# Patient Record
Sex: FEMALE | Race: WHITE | NOT HISPANIC OR LATINO | Employment: OTHER | URBAN - METROPOLITAN AREA
[De-identification: names, ages, dates, MRNs, and addresses within clinical notes are randomized per-mention and may not be internally consistent; named-entity substitution may affect disease eponyms.]

---

## 2017-01-18 DIAGNOSIS — E21.3 HYPERPARATHYROIDISM (H): Chronic | ICD-10-CM

## 2017-01-18 DIAGNOSIS — N18.30 CKD (CHRONIC KIDNEY DISEASE) STAGE 3, GFR 30-59 ML/MIN (H): Chronic | ICD-10-CM

## 2017-01-18 DIAGNOSIS — E55.9 VITAMIN D DEFICIENCY: ICD-10-CM

## 2017-01-18 DIAGNOSIS — D51.9 ANEMIA DUE TO VITAMIN B12 DEFICIENCY, UNSPECIFIED B12 DEFICIENCY TYPE: ICD-10-CM

## 2017-01-18 DIAGNOSIS — I27.20 PULMONARY HYPERTENSION (H): Chronic | ICD-10-CM

## 2017-01-18 LAB
ALBUMIN SERPL-MCNC: 3.6 G/DL (ref 3.4–5)
ALP SERPL-CCNC: 97 U/L (ref 40–150)
ALT SERPL W P-5'-P-CCNC: 17 U/L (ref 0–50)
ANION GAP SERPL CALCULATED.3IONS-SCNC: 6 MMOL/L (ref 3–14)
AST SERPL W P-5'-P-CCNC: 14 U/L (ref 0–45)
BASOPHILS # BLD AUTO: 0.1 10E9/L (ref 0–0.2)
BASOPHILS NFR BLD AUTO: 1 %
BILIRUB SERPL-MCNC: 0.5 MG/DL (ref 0.2–1.3)
BUN SERPL-MCNC: 29 MG/DL (ref 7–30)
CALCIUM SERPL-MCNC: 9.2 MG/DL (ref 8.5–10.1)
CHLORIDE SERPL-SCNC: 106 MMOL/L (ref 94–109)
CO2 SERPL-SCNC: 27 MMOL/L (ref 20–32)
COPATH REPORT: NORMAL
CREAT SERPL-MCNC: 1.43 MG/DL (ref 0.52–1.04)
DEPRECATED CALCIDIOL+CALCIFEROL SERPL-MC: 54 UG/L (ref 20–75)
DIFFERENTIAL METHOD BLD: NORMAL
EOSINOPHIL # BLD AUTO: 0.4 10E9/L (ref 0–0.7)
EOSINOPHIL NFR BLD AUTO: 6 %
ERYTHROCYTE [DISTWIDTH] IN BLOOD BY AUTOMATED COUNT: 13.9 % (ref 10–15)
GFR SERPL CREATININE-BSD FRML MDRD: 35 ML/MIN/1.7M2
GLUCOSE SERPL-MCNC: 96 MG/DL (ref 70–99)
HCT VFR BLD AUTO: 37.8 % (ref 35–47)
HGB BLD-MCNC: 12 G/DL (ref 11.7–15.7)
LYMPHOCYTES # BLD AUTO: 2.5 10E9/L (ref 0.8–5.3)
LYMPHOCYTES NFR BLD AUTO: 36 %
MACROCYTES BLD QL SMEAR: PRESENT
MCH RBC QN AUTO: 30.8 PG (ref 26.5–33)
MCHC RBC AUTO-ENTMCNC: 31.7 G/DL (ref 31.5–36.5)
MCV RBC AUTO: 97 FL (ref 78–100)
MONOCYTES # BLD AUTO: 0.4 10E9/L (ref 0–1.3)
MONOCYTES NFR BLD AUTO: 5 %
NEUTROPHILS # BLD AUTO: 3.6 10E9/L (ref 1.6–8.3)
NEUTROPHILS NFR BLD AUTO: 52 %
PLATELET # BLD AUTO: 254 10E9/L (ref 150–450)
PLATELET # BLD EST: NORMAL 10*3/UL
POLYCHROMASIA BLD QL SMEAR: NORMAL
POTASSIUM SERPL-SCNC: 4.8 MMOL/L (ref 3.4–5.3)
PROT SERPL-MCNC: 7.4 G/DL (ref 6.8–8.8)
PTH-INTACT SERPL-MCNC: 111 PG/ML (ref 12–72)
RBC # BLD AUTO: 3.89 10E12/L (ref 3.8–5.2)
RETICS # AUTO: 69.6 10E9/L (ref 25–95)
RETICS/RBC NFR AUTO: 1.8 % (ref 0.5–2)
SODIUM SERPL-SCNC: 139 MMOL/L (ref 133–144)
WBC # BLD AUTO: 7 10E9/L (ref 4–11)

## 2017-01-18 PROCEDURE — 85060 BLOOD SMEAR INTERPRETATION: CPT | Performed by: INTERNAL MEDICINE

## 2017-01-18 PROCEDURE — 85045 AUTOMATED RETICULOCYTE COUNT: CPT | Performed by: INTERNAL MEDICINE

## 2017-01-18 PROCEDURE — 36415 COLL VENOUS BLD VENIPUNCTURE: CPT | Performed by: INTERNAL MEDICINE

## 2017-01-18 PROCEDURE — 80053 COMPREHEN METABOLIC PANEL: CPT | Performed by: INTERNAL MEDICINE

## 2017-01-18 PROCEDURE — 85004 AUTOMATED DIFF WBC COUNT: CPT | Mod: GZ | Performed by: INTERNAL MEDICINE

## 2017-01-18 PROCEDURE — 85027 COMPLETE CBC AUTOMATED: CPT | Performed by: INTERNAL MEDICINE

## 2017-01-18 PROCEDURE — 83970 ASSAY OF PARATHORMONE: CPT | Performed by: INTERNAL MEDICINE

## 2017-01-18 PROCEDURE — 40000847 ZZHCL STATISTIC MORPHOLOGY W/INTERP HISTOLOGY TC 85060: Performed by: INTERNAL MEDICINE

## 2017-01-18 PROCEDURE — 82306 VITAMIN D 25 HYDROXY: CPT | Performed by: INTERNAL MEDICINE

## 2017-01-24 ENCOUNTER — OFFICE VISIT (OUTPATIENT)
Dept: INTERNAL MEDICINE | Facility: CLINIC | Age: 82
End: 2017-01-24
Payer: MEDICARE

## 2017-01-24 VITALS
BODY MASS INDEX: 31.53 KG/M2 | SYSTOLIC BLOOD PRESSURE: 140 MMHG | HEART RATE: 67 BPM | DIASTOLIC BLOOD PRESSURE: 70 MMHG | WEIGHT: 184.7 LBS | TEMPERATURE: 97.9 F | OXYGEN SATURATION: 100 % | HEIGHT: 64 IN

## 2017-01-24 DIAGNOSIS — R29.898 WEAKNESS OF LEFT LOWER EXTREMITY: ICD-10-CM

## 2017-01-24 DIAGNOSIS — G62.9 PERIPHERAL POLYNEUROPATHY: Primary | Chronic | ICD-10-CM

## 2017-01-24 DIAGNOSIS — N18.30 CKD (CHRONIC KIDNEY DISEASE) STAGE 3, GFR 30-59 ML/MIN (H): Chronic | ICD-10-CM

## 2017-01-24 DIAGNOSIS — I10 ESSENTIAL HYPERTENSION WITH GOAL BLOOD PRESSURE LESS THAN 140/90: Chronic | ICD-10-CM

## 2017-01-24 DIAGNOSIS — D51.0 PERNICIOUS ANEMIA: ICD-10-CM

## 2017-01-24 DIAGNOSIS — E21.3 HYPERPARATHYROIDISM (H): Chronic | ICD-10-CM

## 2017-01-24 DIAGNOSIS — E78.5 HYPERLIPIDEMIA WITH TARGET LDL LESS THAN 130: Chronic | ICD-10-CM

## 2017-01-24 PROCEDURE — 99214 OFFICE O/P EST MOD 30 MIN: CPT | Mod: 25 | Performed by: INTERNAL MEDICINE

## 2017-01-24 PROCEDURE — 96372 THER/PROPH/DIAG INJ SC/IM: CPT | Performed by: INTERNAL MEDICINE

## 2017-01-24 RX ORDER — GABAPENTIN 100 MG/1
CAPSULE ORAL
Qty: 180 CAPSULE | Refills: 1 | Status: SHIPPED | OUTPATIENT
Start: 2017-01-24 | End: 2017-08-25

## 2017-01-24 RX ORDER — AMLODIPINE BESYLATE 2.5 MG/1
2.5 TABLET ORAL DAILY
Qty: 90 TABLET | Refills: 1 | Status: SHIPPED | OUTPATIENT
Start: 2017-01-24 | End: 2017-09-06

## 2017-01-24 RX ORDER — METOPROLOL TARTRATE 50 MG
25 TABLET ORAL 2 TIMES DAILY
Qty: 90 TABLET | Refills: 1 | Status: SHIPPED | OUTPATIENT
Start: 2017-01-24 | End: 2017-09-06

## 2017-01-24 NOTE — NURSING NOTE
"Chief Complaint   Patient presents with     Recheck Medication     Hypertension     Musculoskeletal Problem       Initial /76 mmHg  Pulse 67  Temp(Src) 97.9  F (36.6  C) (Oral)  Ht 5' 4\" (1.626 m)  Wt 184 lb 11.2 oz (83.779 kg)  BMI 31.69 kg/m2  SpO2 100%  Breastfeeding? No Estimated body mass index is 31.69 kg/(m^2) as calculated from the following:    Height as of this encounter: 5' 4\" (1.626 m).    Weight as of this encounter: 184 lb 11.2 oz (83.779 kg).  BP completed using cuff size: yani Barroso CMA      "

## 2017-01-24 NOTE — PROGRESS NOTES
Dr Steiner's note      Patient's instructions / PLAN:                                                        Plan:  1. Gabapentin 100 mg in am, 100 mg in the afternoon and 300 mg at bed time  2. Continue the other meds, same doses for now.  3. Please make a lab appointment for fasting labs   4. Make sure you drink plenty of water the day of the blood test.    5. Please make an appointment few days after the labs to discuss about the results.   6. Neurology referral       ASSESSMENT & PLAN:                                                      (G62.9) Peripheral polyneuropathy (H)  (primary encounter diagnosis)  Comment:   Plan: gabapentin (NEURONTIN) 100 MG capsule, CBC with        platelets, Comprehensive metabolic panel, Lipid        panel reflex to direct LDL, TSH with free T4         reflex, Vitamin D Deficiency, Parathyroid         Hormone Intact            (I10) Essential hypertension with goal blood pressure less than 140/90  Comment:   Plan: metoprolol (LOPRESSOR) 50 MG tablet, amLODIPine        (NORVASC) 2.5 MG tablet, CBC with platelets,         Comprehensive metabolic panel, Lipid panel         reflex to direct LDL, TSH with free T4 reflex,         Vitamin D Deficiency, Parathyroid Hormone         Intact            (E21.3) Hyperparathyroidism (H)  Comment:   Plan: CBC with platelets, Comprehensive metabolic         panel, Lipid panel reflex to direct LDL, TSH         with free T4 reflex, Vitamin D Deficiency,         Parathyroid Hormone Intact            (N18.3) CKD (chronic kidney disease) stage 3, GFR 30-59 ml/min  Comment:   Plan: CBC with platelets, Comprehensive metabolic         panel, Lipid panel reflex to direct LDL, TSH         with free T4 reflex, Vitamin D Deficiency,         Parathyroid Hormone Intact            (E78.5) Hyperlipidemia with target LDL less than 130  Comment:   Plan: CBC with platelets, Comprehensive metabolic         panel, Lipid panel reflex to direct LDL, TSH         with free  "T4 reflex, Vitamin D Deficiency,         Parathyroid Hormone Intact            (I10) HTN goal < 140/90  Comment:   Plan: CBC with platelets            (M62.81) Weakness of left lower extremity  Comment:   Plan: NEUROLOGY ADULT REFERRAL            (D51.0) Pernicious anemia  Comment:   Plan:        Chief complaint:                                                      Follow up chronic medical problems      SUBJECTIVE:                                                    Glendy Díaz is a 83 year old female who presents to clinic today for the following health issues:    Peripheral neuropathy   -- still symptoms   -- she would like to increase the Gabapentin. Not sure what she wants. We talked about Gabapentin.     Upset that the DM dx is still in the chart.     L leg weakness  -- she doesn't feel it all the time  -- it happens 2 times when the L leg gave out, mostly the knee. The weakness was just for \" a split second\" She held on furniture and didn't fall. No knee pain, but hx of knee OA with baker cyst. I doubt it was a TIA  -- no leg weakness today on exam  -- she walks slowly and the balance is not great   -- advised her to use the walker and the christian  -- neuro ref    Hyperlipidemia - intol to statins    CKD - creat little higher. Advised her to drink more water. She takes Furosemide seldom, only when she notices edema     Echo - shows no pulmonary edema as it was 2014.     Hypertension Follow-up      Outpatient blood pressures are not being checked.    Low Salt Diet: low salt       Amount of exercise or physical activity: \"Not too much\"    Problems taking medications regularly: No    Medication side effects: none    Diet: regular (no restrictions)    Echo: Nov 2016 The right ventricle is normal in structure, function and size.  The left atrium is mildly dilated.  Right atrial size is normal.  Small atrial septal defect, secundum type  Left to right atrial shunt, small  Doppler findings do not suggest pulmonary " "hypertension.  Normal IVC (1.5-2.5cm) with >50% respiratory collapse; right atrial pressure  is estimated at 5-10mmHg.  There is trace to mild aortic regurgitation.    LOV: Plan:  1. Vitamin 2000 units daily    2. Continue the other meds, same doses for now.  3. Echocardiogram To schedule this test please call [facility] at: [phone]    4. Please make a lab appointment for non  fasting labs in Jan - Feb  5. Please make an appointment few days after the labs to discuss about the results.      Review of Systems:                                                      ROS: negative for fever, chills, cough, wheezes, chest pain, shortness of breath, vomiting, abdominal pain, leg swelling     A 10-point review of systems was obtained.  Those pertinent are above and in the in the Subjective section.  The rest of the systems are negative.           OBJECTIVE:             Physical exam:  Blood pressure 140/70, pulse 67, temperature 97.9  F (36.6  C), temperature source Oral, height 5' 4\" (1.626 m), weight 184 lb 11.2 oz (83.779 kg), SpO2 100 %, not currently breastfeeding.   NAD, appears comfortable  Skin: no rashes   Neck: supple, no JVD,  No thyroidmegaly. Lymph nodes nonpalpable cervical and supraclavicular.  Chest: clear to auscultation bilaterally, good respiratory effort  Heart: S1 S2, RRR, no mgr appreciated  Abdomen: soft, not tender,   Extremities: no edema, clubbing, cyanosis. Palpable pedal pulses bilaterally,   Neurologic: A, Ox3, no focal signs appreciated. No leg weakness today     PMHx: reviewed  Past Medical History   Diagnosis Date     Hyperlipidemia LDL goal <100      Neck pain, chronic      Osteopenia      Leg weakness, bilateral      Peripheral neuropathy (H)      Hematuria      Hypoglycemia      CKD (chronic kidney disease) stage 3, GFR 30-59 ml/min      Osteoarthritis      Incontinence      DM type 2 (diabetes mellitus, type 2) (H)      diet control     Anxiety      Hyperparathyroidism (H)      " Hypertension      No Cardiologist      PSHx: reviewed  Past Surgical History   Procedure Laterality Date     Appendectomy       Cholecystectomy       Hysterectomy total abdominal       ovaries are in     Cataract iol, rt/lt       Arthroscopy ankle, open repair ligament, combined  5/31/2012     Procedure:COMBINED ARTHROSCOPY ANKLE, OPEN REPAIR LIGAMENT; LEFT ANKLE ARTHROSCOPY, LATERAL LIGAMENT RECONSTRUCTION, ENDOSCOPIC DRISS, BUNION SECOND TOE RAY CORRECTION    LEFT KNEE Marcaine AND CORTIZONE INJECTION, 5 CC Marcaine, 1 CC CELESTONE, ; Surgeon:VARSHA GERMAN; Location:[facility]     Remove hardware foot  12/4/2012     Procedure: REMOVE HARDWARE FOOT;  REMOVAL HARDWARE(SYNTHES SCREW) LEFT FOOT, EXCISION OF INCLUSION CYST;  Surgeon: Varsha German MD;  Location: [facility]     Excise mass foot  12/4/2012     Procedure: EXCISE MASS FOOT;;  Surgeon: Varsha German MD;  Location: [facility]     Arthrodesis foot  5/1/2014     Procedure: ARTHRODESIS FOOT;  Surgeon: Sid Marks DPM;  Location: [facility]     Repair hammer toe  5/1/2014     Procedure: REPAIR HAMMER TOE;  Surgeon: Sid Marks DPM;  Location: [facility]     Release tendon toe  5/1/2014     Procedure: RELEASE TENDON TOE;  Surgeon: Sid Marks DPM;  Location: [facility]     Reverse arthroplasty shoulder Right 7/18/2016     Procedure: REVERSE ARTHROPLASTY SHOULDER;  Surgeon: Marlo Alejandro MD;  Location: [facility]        Meds: reviewed  Current Outpatient Prescriptions   Medication Sig Dispense Refill     traMADol (ULTRAM) 50 MG tablet Take 1 tablet (50 mg) by mouth every 8 hours as needed for moderate pain 40 tablet 0     gabapentin (NEURONTIN) 300 MG capsule Take 1 capsule (300 mg) by mouth At Bedtime 90 capsule 1     aspirin 81 MG tablet Take 1 tablet (81 mg) by mouth daily 30 tablet 3     Cholecalciferol (VITAMIN D) 2000 UNITS tablet Take 2,000 Units by mouth daily 100 tablet 3     furosemide (LASIX) 40 MG tablet Take 1-2 tablets  (40-80 mg) by mouth daily 90 tablet 1     metoprolol (LOPRESSOR) 50 MG tablet Take 0.5 tablets (25 mg) by mouth 2 times daily 90 tablet 1     amLODIPine (NORVASC) 2.5 MG tablet Take 1 tablet (2.5 mg) by mouth daily 90 tablet 1     cyanocobalamin (VITAMIN B12) 1000 MCG/ML injection 1 mL (1,000 mcg) every 30 days 1 mL 11     senna-docusate (SENOKOT-S;PERICOLACE) 8.6-50 MG per tablet Take 1-2 tablets by mouth 2 times daily as needed for constipation 30 tablet 0     ketoprofen 10% in PLO 10% Apply 4 times a day as need for pain 50 g 1     [DISCONTINUED] ORDER FOR DME Equipment being ordered: All diabetic testing supplies including test strips, lancets and solution for testing 1 times per day. 3 Month 1       Soc Hx: reviewed  Fam Hx: reviewed          Veronica Steiner MD  Internal Medicine

## 2017-01-24 NOTE — MR AVS SNAPSHOT
After Visit Summary   1/24/2017    Glendy Díaz    MRN: [id number]           Patient Information     Date Of Birth          11/16/1933        Visit Information        Provider Department      1/24/2017 7:40 AM Veronica Davis MD [facility]        Today's Diagnoses     Peripheral polyneuropathy (H)    -  1     Essential hypertension with goal blood pressure less than 140/90         Hyperparathyroidism (H)         CKD (chronic kidney disease) stage 3, GFR 30-59 ml/min         Hyperlipidemia with target LDL less than 130         HTN goal < 140/90         Weakness of left lower extremity           Care Instructions    Plan:  1. Gabapentin 100 mg in am, 100 mg in the afternoon and 300 mg at bed time  2. Continue the other meds, same doses for now.  3. Please make a lab appointment for fasting labs   4. Make sure you drink plenty of water the day of the blood test.    5. Please make an appointment few days after the labs to discuss about the results.   6. Neurology referral         Follow-ups after your visit        Additional Services     NEUROLOGY ADULT REFERRAL       Your provider has referred you to: [facility]:     [facility] [phone]       [email or web address]    Please be aware that coverage of these services is subject to the terms and limitations of your health insurance plan.  Call member services at your health plan with any benefit or coverage questions.      Please bring the following to your appointment:    >>   Any x-rays, CTs or MRIs which have been performed.  Contact the facility where they were done to arrange for  prior to your scheduled appointment.  Any new CT, MRI or other procedures ordered by your specialist must be performed at a [facility] facility or coordinated by your clinic's referral office.    >>   List of current medications   >>   This referral request   >>   Any  "documents/labs given to you for this referral                  Future tests that were ordered for you today     Open Future Orders        Priority Expected Expires Ordered    CBC with platelets Routine  2018    Comprehensive metabolic panel Routine  2018    Lipid panel reflex to direct LDL Routine  2018    TSH with free T4 reflex Routine  2018    Vitamin D Deficiency Routine  2018    Parathyroid Hormone Intact Routine  2018            Who to contact     If you have questions or need follow up information about today's clinic visit or your schedule [facility] at [phone].  Normal or non-critical lab and imaging results will be communicated to you by MyChart, letter or phone within 4 business days after the clinic has received the results. If you do not hear from us within 7 days, please contact the clinic through [email or web address] or phone. If you have a critical or abnormal lab result, we will notify you by phone as soon as possible.  Submit refill requests through [employer] or call your pharmacy and they will forward the refill request to us. Please allow 3 business days for your refill to be completed.          Additional Information About Your Visit        [employer] Information     [employer] lets you send messages to your doctor, view your test results, renew your prescriptions, schedule appointments and more. To sign up, go to [email or web address] . Click on \"Log in\" on the left side of the screen, which will take you to the Welcome page. Then click on \"Sign up Now\" on the right side of the page.     You will be asked to enter the access code listed below, as well as some personal information. Please follow the directions to create your username and password.     Your access code is: [email or web address]  Expires: 2017  8:43 AM     Your access code will  in 90 days. If you need help or a " "new code, please call your [facility] or [phone].        Care EveryWhere ID     This is your Care EveryWhere ID. This could be used by other organizations to access your [place] medical records  [id number]        Your Vitals Were     Pulse Temperature Height BMI (Body Mass Index) Pulse Oximetry Breastfeeding?    67 97.9  F (36.6  C) (Oral) 5' 4\" (1.626 m) 31.69 kg/m2 100% No       Blood Pressure from Last 3 Encounters:   01/24/17 140/70   10/24/16 140/76   09/16/16 138/60    Weight from Last 3 Encounters:   01/24/17 184 lb 11.2 oz (83.779 kg)   10/24/16 186 lb 14.4 oz (84.777 kg)   09/16/16 179 lb (81.194 kg)              We Performed the Following     NEUROLOGY ADULT REFERRAL          Today's Medication Changes          These changes are accurate as of: 1/24/17  8:43 AM.  If you have any questions, ask your nurse or doctor.               These medicines have changed or have updated prescriptions.        Dose/Directions    * gabapentin 300 MG capsule   Commonly known as:  NEURONTIN   This may have changed:  Another medication with the same name was added. Make sure you understand how and when to take each.   Used for:  Peripheral polyneuropathy (H)   Changed by:  Veronica Davis MD        Dose:  300 mg   Take 1 capsule (300 mg) by mouth At Bedtime   Quantity:  90 capsule   Refills:  1       * gabapentin 100 MG capsule   Commonly known as:  NEURONTIN   This may have changed:  You were already taking a medication with the same name, and this prescription was added. Make sure you understand how and when to take each.   Used for:  Peripheral polyneuropathy (H)   Changed by:  Veronica Davis MD        Take 1 capsule in am and 1 capsule in pm. (you will continue the 300 mg capsule at night you have at home)   Quantity:  180 capsule   Refills:  1       * Notice:  This list has 2 medication(s) that are the same as other medications prescribed for you. Read the directions " carefully, and ask your doctor or other care provider to review them with you.         Where to get your medicines      These medications were sent to [facility] - [address], [facility] [address]     Phone:  [phone]    - amLODIPine 2.5 MG tablet  - gabapentin 100 MG capsule  - metoprolol 50 MG tablet             Primary Care Provider Office Phone # Fax #    Veronica Davis [phone]       [facility] [address] [facility] [address]        Thank you!     Thank you for choosing [facility]  for your care. Our goal is always to provide you with excellent care. Hearing back from our patients is one way we can continue to improve our services. Please take a few minutes to complete the written survey that you may receive in the mail after your visit with us. Thank you!             Your Updated Medication List - Protect others around you: Learn how to safely use, store and throw away your medicines at www.disposemymeds.org.          This list is accurate as of: 1/24/17  8:43 AM.  Always use your most recent med list.                   Brand Name Dispense Instructions for use    amLODIPine 2.5 MG tablet    NORVASC    90 tablet    Take 1 tablet (2.5 mg) by mouth daily       aspirin 81 MG tablet     30 tablet    Take 1 tablet (81 mg) by mouth daily       cyanocobalamin 1000 MCG/ML injection    VITAMIN B12    1 mL    1 mL (1,000 mcg) every 30 days       furosemide 40 MG tablet    LASIX    90 tablet    Take 1-2 tablets (40-80 mg) by mouth daily       * gabapentin 300 MG capsule    NEURONTIN    90 capsule    Take 1 capsule (300 mg) by mouth At Bedtime       * gabapentin 100 MG capsule    NEURONTIN    180 capsule    Take 1 capsule in am and 1 capsule in pm. (you will continue the 300 mg capsule at night you have at home)       ketoprofen 10% in PLO 10% topical gel     50 g    Apply 4  times a day as need for pain       metoprolol 50 MG tablet    LOPRESSOR    90 tablet    Take 0.5 tablets (25 mg) by mouth 2 times daily       senna-docusate 8.6-50 MG per tablet    SENOKOT-S;PERICOLACE    30 tablet    Take 1-2 tablets by mouth 2 times daily as needed for constipation       traMADol 50 MG tablet    ULTRAM    40 tablet    Take 1 tablet (50 mg) by mouth every 8 hours as needed for moderate pain       vitamin D 2000 UNITS tablet     100 tablet    Take 2,000 Units by mouth daily       * Notice:  This list has 2 medication(s) that are the same as other medications prescribed for you. Read the directions carefully, and ask your doctor or other care provider to review them with you.

## 2017-02-06 ENCOUNTER — OFFICE VISIT (OUTPATIENT)
Dept: ORTHOPEDICS | Facility: CLINIC | Age: 82
End: 2017-02-06
Payer: MEDICARE

## 2017-02-06 VITALS — BODY MASS INDEX: 31.53 KG/M2 | HEIGHT: 64 IN | WEIGHT: 184.7 LBS

## 2017-02-06 DIAGNOSIS — M23.204 OLD COMPLEX TEAR OF MEDIAL MENISCUS OF LEFT KNEE: ICD-10-CM

## 2017-02-06 DIAGNOSIS — M17.12 PRIMARY OSTEOARTHRITIS OF LEFT KNEE: Primary | ICD-10-CM

## 2017-02-06 DIAGNOSIS — E66.9 NON MORBID OBESITY, UNSPECIFIED OBESITY TYPE: ICD-10-CM

## 2017-02-06 PROCEDURE — 20611 DRAIN/INJ JOINT/BURSA W/US: CPT | Mod: LT | Performed by: PHYSICAL MEDICINE & REHABILITATION

## 2017-02-06 PROCEDURE — 99213 OFFICE O/P EST LOW 20 MIN: CPT | Mod: 25 | Performed by: PHYSICAL MEDICINE & REHABILITATION

## 2017-02-06 NOTE — Clinical Note
Dear Glendy Laguna [place] [facility] [place].  Please refer to the visit note at your convenience and feel free to contact me should you have any questions.  Sincerely,  Ellis Jc DO, [facility]

## 2017-02-06 NOTE — PATIENT INSTRUCTIONS
We addressed the following today:    1. Left knee arthritis  2. Left medial meniscus tear  3. Obesity    Activity modification as discussed    Home exercise program as instructed    Topical Treatments: Ice    Over the counter medication: Acetaminophen (Tylenol) 1000 mg every 6 hours with food (Maximum of 3000 mg/day)    Discussed importance of weight loss, including decreased oral intake and increased physical activity including aqua therapy, biking activities as tolerated, etc    Steroid injection of the left knee with ultrasound guidance was performed today in clinic    Follow-up in 2 weeks if no improvement of symptoms for further evaluation/medical care (call direct clinic number [[phone]] at any time with questions or concerns)

## 2017-02-06 NOTE — MR AVS SNAPSHOT
After Visit Summary   2/6/2017    Glendy Díaz    MRN: [id number]           Patient Information     Date Of Birth          11/16/1933        Visit Information        Provider Department      2/6/2017 4:40 PM Ellis Jc DO [facility]        Care Instructions    We addressed the following today:    1. Left knee arthritis  2. Left medial meniscus tear  3. Obesity    Activity modification as discussed    Home exercise program as instructed    Topical Treatments: Ice    Over the counter medication: Acetaminophen (Tylenol) 1000 mg every 6 hours with food (Maximum of 3000 mg/day)    Ibuprofen (Advil) maximum of 800 mg four times a day with food    Discussed importance of weight loss, including decreased oral intake and increased physical activity including aqua therapy, biking activities as tolerated, etc    Steroid injection of the left knee with ultrasound guidance was performed today in clinic    Follow-up in 2 weeks if no improvement of symptoms for further evaluation/medical care (call direct clinic number [[phone]] at any time with questions or concerns)          Follow-ups after your visit        Who to contact     If you have questions or need follow up information about today's clinic visit or your schedule please contact [facility] directly at [phone].  Normal or non-critical lab and imaging results will be communicated to you by MyChart, letter or phone within 4 business days after the clinic has received the results. If you do not hear from us within 7 days, please contact the clinic through MyChart or phone. If you have a critical or abnormal lab result, we will notify you by phone as soon as possible.  Submit refill requests through HSystem or call your pharmacy and they will forward the refill request to us. Please allow 3 business days for your refill to be completed.          Additional Information About Your Visit        MyChart  "Information     PrizeBoxâ„¢ lets you send messages to your doctor, view your test results, renew your prescriptions, schedule appointments and more. To sign up, go to [email or web address] . Click on \"Log in\" on the left side of the screen, which will take you to the Welcome page. Then click on \"Sign up Now\" on the right side of the page.     You will be asked to enter the access code listed below, as well as some personal information. Please follow the directions to create your username and password.     Your access code is: [email or web address]  Expires: 2017  8:43 AM     Your access code will  in 90 days. If you need help or a new code, please call your [facility] clinic or [phone].        Care EveryWhere ID     This is your [facility] EveryWhere ID. This could be used by other organizations to access your [facility] medical records  [id number]        Your Vitals Were     Height BMI (Body Mass Index)                1.626 m (5' 4\") 31.69 kg/m2           Blood Pressure from Last 3 Encounters:   17 140/70   10/24/16 140/76   16 138/60    Weight from Last 3 Encounters:   17 83.779 kg (184 lb 11.2 oz)   17 83.779 kg (184 lb 11.2 oz)   10/24/16 84.777 kg (186 lb 14.4 oz)              Today, you had the following     No orders found for display       Primary Care Provider Office Phone # Fax #    Veronica Sophia Davis [phone]       [facility] [address] [facility] [address]        Thank you!     Thank you for choosing [facility]  for your care. Our goal is always to provide you with excellent care. Hearing back from our patients is one way we can continue to improve our services. Please take a few minutes to complete the written survey that you may receive in the mail after your visit with us. Thank you!             Your Updated Medication List - Protect others around you: Learn how to safely use, store and throw away your " medicines at www.disposemymeds.org.          This list is accurate as of: 2/6/17  5:22 PM.  Always use your most recent med list.                   Brand Name Dispense Instructions for use    amLODIPine 2.5 MG tablet    NORVASC    90 tablet    Take 1 tablet (2.5 mg) by mouth daily       aspirin 81 MG tablet     30 tablet    Take 1 tablet (81 mg) by mouth daily       cyanocobalamin 1000 MCG/ML injection    VITAMIN B12    1 mL    1 mL (1,000 mcg) every 30 days       furosemide 40 MG tablet    LASIX    90 tablet    Take 1-2 tablets (40-80 mg) by mouth daily       * gabapentin 300 MG capsule    NEURONTIN    90 capsule    Take 1 capsule (300 mg) by mouth At Bedtime       * gabapentin 100 MG capsule    NEURONTIN    180 capsule    Take 1 capsule in am and 1 capsule in pm. (you will continue the 300 mg capsule at night you have at home)       ketoprofen 10% in PLO 10% topical gel     50 g    Apply 4 times a day as need for pain       metoprolol 50 MG tablet    LOPRESSOR    90 tablet    Take 0.5 tablets (25 mg) by mouth 2 times daily       senna-docusate 8.6-50 MG per tablet    SENOKOT-S;PERICOLACE    30 tablet    Take 1-2 tablets by mouth 2 times daily as needed for constipation       traMADol 50 MG tablet    ULTRAM    40 tablet    Take 1 tablet (50 mg) by mouth every 8 hours as needed for moderate pain       vitamin D 2000 UNITS tablet     100 tablet    Take 2,000 Units by mouth daily       * Notice:  This list has 2 medication(s) that are the same as other medications prescribed for you. Read the directions carefully, and ask your doctor or other care provider to review them with you.

## 2017-02-06 NOTE — PROGRESS NOTES
" [facility]   Follow-up Visit s Feb 6, 2017    Subjective:  Glendy Díaz is a 83 year old female who is seen in follow up for evaluation of left knee pain.  Patient is accompanied in clinic today by her daughter.  Last visit for the left knee was on 9/25/2015 with a left knee intra-articular aspiration/corticosteroid injection performed with ultrasound guidance with excellent relief for 1 year.  Has been completing home exercise program as previously prescribed during formal physical therapy.    Reports left knee pain that is located anterior with radiation absent.  Reports intermittent weakness of the left knee.  Reports popping of the left knee.  Denies any locking, catching, clicking, bruising, or swelling of the left knee.  Denies any trauma/falls since last clinical visit.    Patient's past medical, surgical, social, and family histories are reviewed today    Objective:  Ht 1.626 m (5' 4\")  Wt 83.779 kg (184 lb 11.2 oz)  BMI 31.69 kg/m2  General: healthy, alert, obese, and in no distress    Imaging:  No x-rays indicated during today's visit  Previous films were reviewed today and results were discussed with the patient    ASSESSMENT:  1. Primary left knee osteoarthrosis  2. Left medial meniscus tear  3. Non-morbid obesity    PLAN:  1. Acetaminophen/ice as needed for improved pain control.  2. Activity modification as discussed, including limitation of activities that cause pain/discomfort.  3. Discussed importance of weight loss, including decreased oral intake and increased physical activity including aqua therapy, biking activities as tolerated, etc.  4. Continue with pain-free home exercise program as previously prescribed from formal physical therapy.  5. Discussed potential side effects and complications of repeat left knee intra-articular corticosteroid injection with ultrasound guidance including but not necessarily limited to infection, bleeding, allergic reaction, " post-injection flare, local tissue breakdown (including but not limited to potential for skin depigmentation and/or subcutaneous fat atrophy), systemic effects of corticosteroids, elevation of blood glucose, injury to soft tissue and/or nerves and seizure.  Patient indicated their understanding and agreed to proceed.  See procedure note below for further details.  6. Follow-up in 2 weeks if no improvement of symptoms for further evaluation/medical care.  If asymptomatic, can follow-up as needed.  Consider referral to orthopedic surgery, left knee intra-articular viscosupplementation injection with ultrasound guidance, etc as deemed appropriate moving forward.  Instructed to follow-up if change of symptoms arise.    Left Knee Intra-Articular Corticosteroid Injection     Diagnoses (preoperative and postoperative):  Left knee pain/primary left knee osteoarthrosis  Current Procedure (include preoperative):  Sonographically guided left knee intra-articular corticosteroid injection  Current Indication (include preoperative):  Alleviation of pain  REASON FOR REFERRAL:  A repeat left knee intra-articular corticosteroid injection has been recommended to help with modulation of pain. Sonographic guidance will be used to ensure accurate placement of the medication within the joint space.  PATIENT EDUCATION:  Ready to learn with no apparent learning barriers identified.  Learning preferences include listening. Explained diagnosis and treatment plan as well as treatment alternatives. Patient expressed understanding of the content.  Following denial of allergy and review of potential side effects and complications including but not necessarily limited to infection, bleeding, allergic reaction, post-injection flare, local tissue breakdown, injury to soft tissue and/or nerves and seizure, patient indicated their understanding and agreed to proceed.  PROCEDURE:  Prior to the procedure, the left knee joint was examined with a 12 MHz  linear transducer to visualize the joint space and determine the approach for the procedure.  Procedure was carried out using sterile technique including Betadine, a sterile transducer cover, and sterile transducer gel. A simple surgical tray was used.  PROCEDURAL PAUSE:  Procedural pause conducted to verify correct patient identity, procedure to be performed, and as applicable, correct side/site, correct patient position, availability of implants, special equipment, or special requirements.  Patient position:  Supine  Transducer type:  12 MHz linear array transducer  Approach: Lateral to medial parallel to long axis of transducer  Local Anesthesia: Sonographically guided 25-gauge 2-inch needle was used to anesthetize the skin and subcutaneous tissue with 5 ml of 1% Lidocaine  Injection: Sonographically guided 25-gauge 2-inch needle was directly visualized entering down into the left knee joint.  After confirming needle tip position a solution containing 1 ml of 40 mg/ml Depo Medrol and 4 ml of 1% Lidocaine was injected and seen filling the joint space. Needle/syringe was removed and bandage placed over the wound.  AFTERCARE:  Patient tolerated the procedure without complication. After a short observation period, the patient was discharged under their own power and in excellent condition.  Physician spent a total of 20 minutes face-to-face with the patient, including 10 minutes spent discussing chief complaint and 10 minutes for completion of corticosteroid injection.      Ellis Jc DO, [facility]    Disclaimer: This note consists of symbols derived from keyboarding, dictation and/or voice recognition software. As a result, there may be errors in the script that have gone undetected. Please consider this when interpreting information found in this chart.    This document serves as a record of the services and decisions personally performed and made by Ellis Jc DO. It was created on  his behalf by Bacilio Rosenberg, a trained medical scribe. The creation of this document is based on the provider's statements to the medical scribe.  Bacilio Rosenberg February 6, 2017 5:12 PM

## 2017-02-07 RX ORDER — METHYLPREDNISOLONE ACETATE 40 MG/ML
40 INJECTION, SUSPENSION INTRA-ARTICULAR; INTRALESIONAL; INTRAMUSCULAR; SOFT TISSUE ONCE
Qty: 1 ML | Refills: 0 | OUTPATIENT
Start: 2017-02-07 | End: 2017-02-07

## 2017-02-07 RX ORDER — LIDOCAINE HYDROCHLORIDE 10 MG/ML
4 INJECTION, SOLUTION INFILTRATION; PERINEURAL ONCE
Qty: 4 ML | Refills: 0 | OUTPATIENT
Start: 2017-02-07 | End: 2017-02-07

## 2017-02-23 ENCOUNTER — ALLIED HEALTH/NURSE VISIT (OUTPATIENT)
Dept: NURSING | Facility: CLINIC | Age: 82
End: 2017-02-23
Payer: MEDICARE

## 2017-02-23 DIAGNOSIS — D51.0 PERNICIOUS ANEMIA: ICD-10-CM

## 2017-02-23 PROCEDURE — 99207 ZZC NO CHARGE NURSE ONLY: CPT

## 2017-02-23 PROCEDURE — 96372 THER/PROPH/DIAG INJ SC/IM: CPT

## 2017-03-24 ENCOUNTER — ALLIED HEALTH/NURSE VISIT (OUTPATIENT)
Dept: NURSING | Facility: CLINIC | Age: 82
End: 2017-03-24
Payer: MEDICARE

## 2017-03-24 DIAGNOSIS — D51.0 PERNICIOUS ANEMIA: ICD-10-CM

## 2017-03-24 PROCEDURE — 96372 THER/PROPH/DIAG INJ SC/IM: CPT

## 2017-04-21 ENCOUNTER — ALLIED HEALTH/NURSE VISIT (OUTPATIENT)
Dept: NURSING | Facility: CLINIC | Age: 82
End: 2017-04-21
Payer: MEDICARE

## 2017-04-21 DIAGNOSIS — D51.0 PERNICIOUS ANEMIA: ICD-10-CM

## 2017-04-21 PROCEDURE — 96372 THER/PROPH/DIAG INJ SC/IM: CPT

## 2017-05-26 ENCOUNTER — ALLIED HEALTH/NURSE VISIT (OUTPATIENT)
Dept: NURSING | Facility: CLINIC | Age: 82
End: 2017-05-26
Payer: MEDICARE

## 2017-05-26 DIAGNOSIS — D51.0 PERNICIOUS ANEMIA: Primary | ICD-10-CM

## 2017-05-26 PROCEDURE — 96372 THER/PROPH/DIAG INJ SC/IM: CPT

## 2017-05-26 NOTE — MR AVS SNAPSHOT
"              After Visit Summary   2017    Glendy Díaz    MRN: [id number]           Patient Information     Date Of Birth          1933        Visit Information        Provider Department      2017 8:30 AM RI IM NURSE [facility]        Today's Diagnoses     Pernicious anemia    -  1       Follow-ups after your visit        Who to contact     If you have questions or need follow up information about today's clinic visit or your schedule please contact [facility] directly at [phone].  Normal or non-critical lab and imaging results will be communicated to you by MyChart, letter or phone within 4 business days after the clinic has received the results. If you do not hear from us within 7 days, please contact the clinic through Intermolecular[employer] or phone. If you have a critical or abnormal lab result, we will notify you by phone as soon as possible.  Submit refill requests through RocketPlay or call your pharmacy and they will forward the refill request to us. Please allow 3 business days for your refill to be completed.          Additional Information About Your Visit        MyChart Information     RocketPlay lets you send messages to your doctor, view your test results, renew your prescriptions, schedule appointments and more. To sign up, go to [email or web address] . Click on \"Log in\" on the left side of the screen, which will take you to the Welcome page. Then click on \"Sign up Now\" on the right side of the page.     You will be asked to enter the access code listed below, as well as some personal information. Please follow the directions to create your username and password.     Your access code is: [email or web address]  Expires: 2017 11:06 AM     Your access code will  in 90 days. If you need help or a new code, please call your [facility] or [phone].        Care EveryWhere ID     This is your Care EveryWhere ID. This could be used by other organizations to " access your [facility] medical records  [id number]         Blood Pressure from Last 3 Encounters:   01/24/17 140/70   10/24/16 140/76   09/16/16 138/60    Weight from Last 3 Encounters:   02/06/17 184 lb 11.2 oz (83.8 kg)   01/24/17 184 lb 11.2 oz (83.8 kg)   10/24/16 186 lb 14.4 oz (84.8 kg)              Today, you had the following     No orders found for display       Primary Care Provider Office Phone # Fax #    Veronica Sophia Davis [phone]       [facility] [address] [facility] [address]        Thank you!     Thank you for choosing [facility]  for your care. Our goal is always to provide you with excellent care. Hearing back from our patients is one way we can continue to improve our services. Please take a few minutes to complete the written survey that you may receive in the mail after your visit with us. Thank you!             Your Updated Medication List - Protect others around you: Learn how to safely use, store and throw away your medicines at www.disposemymeds.org.          This list is accurate as of: 5/26/17 11:06 AM.  Always use your most recent med list.                   Brand Name Dispense Instructions for use    amLODIPine 2.5 MG tablet    NORVASC    90 tablet    Take 1 tablet (2.5 mg) by mouth daily       aspirin 81 MG tablet     30 tablet    Take 1 tablet (81 mg) by mouth daily       cyanocobalamin 1000 MCG/ML injection    VITAMIN B12    1 mL    1 mL (1,000 mcg) every 30 days       furosemide 40 MG tablet    LASIX    90 tablet    Take 1-2 tablets (40-80 mg) by mouth daily       * gabapentin 300 MG capsule    NEURONTIN    90 capsule    Take 1 capsule (300 mg) by mouth At Bedtime       * gabapentin 100 MG capsule    NEURONTIN    180 capsule    Take 1 capsule in am and 1 capsule in pm. (you will continue the 300 mg capsule at night you have at home)       ketoprofen 10% in PLO 10% topical gel     50 g    Apply 4 times a day as  need for pain       metoprolol 50 MG tablet    LOPRESSOR    90 tablet    Take 0.5 tablets (25 mg) by mouth 2 times daily       senna-docusate 8.6-50 MG per tablet    SENOKOT-S;PERICOLACE    30 tablet    Take 1-2 tablets by mouth 2 times daily as needed for constipation       traMADol 50 MG tablet    ULTRAM    40 tablet    Take 1 tablet (50 mg) by mouth every 8 hours as needed for moderate pain       vitamin D 2000 UNITS tablet     100 tablet    Take 2,000 Units by mouth daily       * Notice:  This list has 2 medication(s) that are the same as other medications prescribed for you. Read the directions carefully, and ask your doctor or other care provider to review them with you.

## 2017-06-23 ENCOUNTER — ALLIED HEALTH/NURSE VISIT (OUTPATIENT)
Dept: NURSING | Facility: CLINIC | Age: 82
End: 2017-06-23
Payer: MEDICARE

## 2017-06-23 DIAGNOSIS — D51.0 PERNICIOUS ANEMIA: ICD-10-CM

## 2017-06-23 PROCEDURE — 96372 THER/PROPH/DIAG INJ SC/IM: CPT

## 2017-07-18 ENCOUNTER — OFFICE VISIT (OUTPATIENT)
Dept: ORTHOPEDICS | Facility: CLINIC | Age: 82
End: 2017-07-18
Payer: MEDICARE

## 2017-07-18 ENCOUNTER — RADIANT APPOINTMENT (OUTPATIENT)
Dept: GENERAL RADIOLOGY | Facility: CLINIC | Age: 82
End: 2017-07-18
Attending: PHYSICIAN ASSISTANT
Payer: MEDICARE

## 2017-07-18 DIAGNOSIS — Z96.611 STATUS POST REPLACEMENT OF RIGHT SHOULDER JOINT: Primary | ICD-10-CM

## 2017-07-18 DIAGNOSIS — Z96.611 STATUS POST REPLACEMENT OF RIGHT SHOULDER JOINT: ICD-10-CM

## 2017-07-18 PROCEDURE — 99212 OFFICE O/P EST SF 10 MIN: CPT | Performed by: PHYSICIAN ASSISTANT

## 2017-07-18 PROCEDURE — 73030 X-RAY EXAM OF SHOULDER: CPT | Mod: RT | Performed by: ORTHOPAEDIC SURGERY

## 2017-07-18 NOTE — PROGRESS NOTES
[facility]   Follow-up Visit s Jul 18, 2017    Subjective:  Glendy Díaz is a 83 year old female who is seen in follow up for 1 year evaluation of R rev BECK, WINSTON 7/18/2016, Dr. Alejandro.  Her last visit was on Visit date 10/4/17.  Since that time, symptoms have been improving overall.    Still cannot sleep on right side.  Preop pain is gone while using during the day.  Main complaint is lack of reaching behind the back and behind the head.  Tries to use during the day as much as possible but due to weakness is limited.     Overall is painless, but weaker and can do less than before surgery.    Patient's past medical, surgical, social and family histories are reviewed today.    Objective:  There were no vitals taken for this visit.  General: healthy, alert and no distress  Skin: no suspicious lesions or rashes  Neuro: Sensory and motor exam grossly normal; no focal neurologic deficits appreciated.  MSK:    RIGHT SHOULDER  Inspection: no swelling, bruising, discoloration, or obvious deformity, mild muscle atrophy at deltoid and shoulder height asymmetry lower on right.  Palpation: Tender about the lateral upper humerus otherwise asymptomatic.  Active Range of Motion:   Abduction 1050 / FF 1350 /  / IR hip pocket  Passive Range of Motion: limited by tightness  Strength:  forward flexion 4+/5, abduction  4+/5, internal rotation  5-/5, external rotation  4/5 and bicep 5-/5.  Special Tests: none.      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 No evidence of subluxation or dislocation of the shoulder joint is noted. Presence of ill-defined ridged of tissue mass is present as noted on the Y view of the shoulder.The exact location of the soft tissue shadow is not clear. Further imaging with MRI scan may be  helpful.               Last Resulted: 07/24/17 12:24 PM         Dr. 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    PLAN:  1) Home exercises per previous.  2) activities as tolerated.  3)  Well placed reverse total shoulder based on X ray.  Potential un identified soft tissue noted on read.    Nir Rojo PA-C  [facility] - Surgery  July 18, 2017

## 2017-07-18 NOTE — MR AVS SNAPSHOT
"              After Visit Summary   7/18/2017    Glendy Díaz    MRN: [id number]           Patient Information     Date Of Birth          11/16/1933        Visit Information        Provider Department      7/18/2017 8:20 AM Nir Rojo PA-C [facility] ORTHOPEDIC SURGERY        Today's Diagnoses     Status post replacement of right shoulder joint    -  1      Care Instructions    Activities as tolerated.    Continue to work on flexibility by using the other hand or a stick in the other hand to lift arm.    Follow up as needed.           Follow-ups after your visit        Follow-up notes from your care team     Return if symptoms worsen or fail to improve.      Your next 10 appointments already scheduled     Jul 27, 2017  1:30 PM CDT   Nurse Only with [facility] IM NURSE   [facility]    [address]  [facility] [address] [phone]              Who to contact     If you have questions or need follow up information about today's clinic visit or your schedule please contact [facility] ORTHOPEDIC SURGERY directly at [phone].  Normal or non-critical lab and imaging results will be communicated to you by [email or web address].[email or web address], letter or phone within 4 business days after the clinic has received the results. If you do not hear from us within 7 days, please contact the clinic through [email or web address].[email or web address] or phone. If you have a critical or abnormal lab result, we will notify you by phone as soon as possible.  Submit refill requests through CHOBOLABS or call your pharmacy and they will forward the refill request to us. Please allow 3 business days for your refill to be completed.          Additional Information About Your Visit        MyChart Information     CHOBOLABS lets you send messages to your doctor, view your test results, renew your prescriptions, schedule appointments and more. To sign up, go to [email or web address] . Click on \"Log in\" on the left side of the " "screen, which will take you to the Welcome page. Then click on \"Sign up Now\" on the right side of the page.     You will be asked to enter the access code listed below, as well as some personal information. Please follow the directions to create your username and password.     Your access code is: [email or web address]  Expires: 2017 11:06 AM     Your access code will  in 90 days. If you need help or a new code, please call your [facility] or [phone].        Care EveryWhere ID     This is your Care EveryWhere ID. This could be used by other organizations to access your [facility] medical records  [id number]         Blood Pressure from Last 3 Encounters:   17 140/70   10/24/16 140/76   16 138/60    Weight from Last 3 Encounters:   17 184 lb 11.2 oz (83.8 kg)   17 184 lb 11.2 oz (83.8 kg)   10/24/16 186 lb 14.4 oz (84.8 kg)               Primary Care Provider Office Phone # Fax #    Veronica Sophia Davis [phone]       [facility] [address] [facility] [address]        Equal Access to Services     PATRICK HIDALGO AH: Hadii carlos ku hadasho Soomaali, waaxda luqadaha, qaybta kaalmada adeegyada, deirdre driver haymateo cevallos . So [facility] [phone].    ATENCIÓN: Si habla español, tiene a jones disposición servicios gratuitos de asistencia lingüística. Llame al [phone].    We comply with applicable federal civil rights laws and [place] laws. We do not discriminate on the basis of race, color, national origin, age, disability sex, sexual orientation or gender identity.            Thank you!     Thank you for choosing [facility] ORTHOPEDIC SURGERY  for your care. Our goal is always to provide you with excellent care. Hearing back from our patients is one way we can continue to improve our services. Please take a few minutes to complete the written survey that you may receive in the mail after your visit with us. Thank you!      "        Your Updated Medication List - Protect others around you: Learn how to safely use, store and throw away your medicines at www.disposemymeds.org.          This list is accurate as of: 7/18/17 11:59 PM.  Always use your most recent med list.                   Brand Name Dispense Instructions for use Diagnosis    amLODIPine 2.5 MG tablet    NORVASC    90 tablet    Take 1 tablet (2.5 mg) by mouth daily    Essential hypertension with goal blood pressure less than 140/90       aspirin 81 MG tablet     30 tablet    Take 1 tablet (81 mg) by mouth daily    CKD (chronic kidney disease) stage 3, GFR 30-59 ml/min, Essential hypertension with goal blood pressure less than 140/90       cyanocobalamin 1000 MCG/ML injection    VITAMIN B12    1 mL    1 mL (1,000 mcg) every 30 days        furosemide 40 MG tablet    LASIX    90 tablet    Take 1-2 tablets (40-80 mg) by mouth daily    Essential hypertension with goal blood pressure less than 140/90       * gabapentin 300 MG capsule    NEURONTIN    90 capsule    Take 1 capsule (300 mg) by mouth At Bedtime    Peripheral polyneuropathy (H)       * gabapentin 100 MG capsule    NEURONTIN    180 capsule    Take 1 capsule in am and 1 capsule in pm. (you will continue the 300 mg capsule at night you have at home)    Peripheral polyneuropathy (H)       ketoprofen 10% in PLO 10% topical gel     50 g    Apply 4 times a day as need for pain    Arthritis       metoprolol 50 MG tablet    LOPRESSOR    90 tablet    Take 0.5 tablets (25 mg) by mouth 2 times daily    Essential hypertension with goal blood pressure less than 140/90       senna-docusate 8.6-50 MG per tablet    SENOKOT-S;PERICOLACE    30 tablet    Take 1-2 tablets by mouth 2 times daily as needed for constipation    Constipation due to pain medication       traMADol 50 MG tablet    ULTRAM    40 tablet    Take 1 tablet (50 mg) by mouth every 8 hours as needed for moderate pain    Multiple joint pain       vitamin D 2000 UNITS tablet      100 tablet    Take 2,000 Units by mouth daily    Vitamin D deficiency, Hyperparathyroidism (H)       * Notice:  This list has 2 medication(s) that are the same as other medications prescribed for you. Read the directions carefully, and ask your doctor or other care provider to review them with you.

## 2017-07-21 NOTE — PATIENT INSTRUCTIONS
Activities as tolerated.    Continue to work on flexibility by using the other hand or a stick in the other hand to lift arm.    Follow up as needed.

## 2017-07-27 ENCOUNTER — ALLIED HEALTH/NURSE VISIT (OUTPATIENT)
Dept: NURSING | Facility: CLINIC | Age: 82
End: 2017-07-27
Payer: MEDICARE

## 2017-07-27 DIAGNOSIS — D51.0 PERNICIOUS ANEMIA: ICD-10-CM

## 2017-07-27 PROCEDURE — 99207 ZZC NO CHARGE NURSE ONLY: CPT

## 2017-07-27 PROCEDURE — 96372 THER/PROPH/DIAG INJ SC/IM: CPT

## 2017-07-27 NOTE — MR AVS SNAPSHOT
"              After Visit Summary   2017    Glendy Díaz    MRN: [id number]           Patient Information     Date Of Birth          1933        Visit Information        Provider Department      2017 1:30 PM [facility] IM NURSE [facility]        Today's Diagnoses     Pernicious anemia           Follow-ups after your visit        Who to contact     If you have questions or need follow up information about today's clinic visit or your schedule please contact [facility] directly at [phone].  Normal or non-critical lab and imaging results will be communicated to you by Prestigoshart, letter or phone within 4 business days after the clinic has received the results. If you do not hear from us within 7 days, please contact the clinic through Prestigoshart or phone. If you have a critical or abnormal lab result, we will notify you by phone as soon as possible.  Submit refill requests through Tiller or call your pharmacy and they will forward the refill request to us. Please allow 3 business days for your refill to be completed.          Additional Information About Your Visit        MyChart Information     Tiller lets you send messages to your doctor, view your test results, renew your prescriptions, schedule appointments and more. To sign up, go to [email or web address] . Click on \"Log in\" on the left side of the screen, which will take you to the Welcome page. Then click on \"Sign up Now\" on the right side of the page.     You will be asked to enter the access code listed below, as well as some personal information. Please follow the directions to create your username and password.     Your access code is: [email or web address]  Expires: 2017 11:06 AM     Your access code will  in 90 days. If you need help or a new code, please call your [facility] or [phone].        Care EveryWhere ID     This is your Care EveryWhere ID. This could be used by other organizations to " access your [facility] medical records  [id number]         Blood Pressure from Last 3 Encounters:   01/24/17 140/70   10/24/16 140/76   09/16/16 138/60    Weight from Last 3 Encounters:   02/06/17 184 lb 11.2 oz (83.8 kg)   01/24/17 184 lb 11.2 oz (83.8 kg)   10/24/16 186 lb 14.4 oz (84.8 kg)              We Performed the Following     VITAMIN B12 1000 mcg (standing order with a count of 15)        Primary Care Provider Office Phone # Fax #    Veronica Davis [phone]       [facility] [address] [facility] [address]        Equal Access to Services     PATRICK HIDALGO : Hadii aad ku hadasho Soomaali, waaxda luqadaha, qaybta kaalmada adeegyada, deirdre young. So [place] [phone].    ATENCIÓN: Si habla español, tiene a jones disposición servicios gratuitos de asistencia lingüística. Llame al [phone].    We comply with applicable federal civil rights laws and [place] laws. We do not discriminate on the basis of race, color, national origin, age, disability sex, sexual orientation or gender identity.            Thank you!     Thank you for choosing [facility]  for your care. Our goal is always to provide you with excellent care. Hearing back from our patients is one way we can continue to improve our services. Please take a few minutes to complete the written survey that you may receive in the mail after your visit with us. Thank you!             Your Updated Medication List - Protect others around you: Learn how to safely use, store and throw away your medicines at www.disposemymeds.org.          This list is accurate as of: 7/27/17  1:46 PM.  Always use your most recent med list.                   Brand Name Dispense Instructions for use Diagnosis    amLODIPine 2.5 MG tablet    NORVASC    90 tablet    Take 1 tablet (2.5 mg) by mouth daily    Essential hypertension with goal blood pressure less than 140/90        aspirin 81 MG tablet     30 tablet    Take 1 tablet (81 mg) by mouth daily    CKD (chronic kidney disease) stage 3, GFR 30-59 ml/min, Essential hypertension with goal blood pressure less than 140/90       cyanocobalamin 1000 MCG/ML injection    VITAMIN B12    1 mL    1 mL (1,000 mcg) every 30 days        furosemide 40 MG tablet    LASIX    90 tablet    Take 1-2 tablets (40-80 mg) by mouth daily    Essential hypertension with goal blood pressure less than 140/90       * gabapentin 300 MG capsule    NEURONTIN    90 capsule    Take 1 capsule (300 mg) by mouth At Bedtime    Peripheral polyneuropathy (H)       * gabapentin 100 MG capsule    NEURONTIN    180 capsule    Take 1 capsule in am and 1 capsule in pm. (you will continue the 300 mg capsule at night you have at home)    Peripheral polyneuropathy (H)       ketoprofen 10% in PLO 10% topical gel     50 g    Apply 4 times a day as need for pain    Arthritis       metoprolol 50 MG tablet    LOPRESSOR    90 tablet    Take 0.5 tablets (25 mg) by mouth 2 times daily    Essential hypertension with goal blood pressure less than 140/90       senna-docusate 8.6-50 MG per tablet    SENOKOT-S;PERICOLACE    30 tablet    Take 1-2 tablets by mouth 2 times daily as needed for constipation    Constipation due to pain medication       traMADol 50 MG tablet    ULTRAM    40 tablet    Take 1 tablet (50 mg) by mouth every 8 hours as needed for moderate pain    Multiple joint pain       vitamin D 2000 UNITS tablet     100 tablet    Take 2,000 Units by mouth daily    Vitamin D deficiency, Hyperparathyroidism (H)       * Notice:  This list has 2 medication(s) that are the same as other medications prescribed for you. Read the directions carefully, and ask your doctor or other care provider to review them with you.

## 2017-08-17 DIAGNOSIS — I10 ESSENTIAL HYPERTENSION WITH GOAL BLOOD PRESSURE LESS THAN 140/90: Chronic | ICD-10-CM

## 2017-08-17 DIAGNOSIS — E21.3 HYPERPARATHYROIDISM (H): Chronic | ICD-10-CM

## 2017-08-17 DIAGNOSIS — E78.5 HYPERLIPIDEMIA WITH TARGET LDL LESS THAN 130: Chronic | ICD-10-CM

## 2017-08-17 DIAGNOSIS — G62.9 PERIPHERAL POLYNEUROPATHY: Chronic | ICD-10-CM

## 2017-08-17 DIAGNOSIS — N18.30 CKD (CHRONIC KIDNEY DISEASE) STAGE 3, GFR 30-59 ML/MIN (H): Chronic | ICD-10-CM

## 2017-08-17 LAB
ALBUMIN SERPL-MCNC: 3.7 G/DL (ref 3.4–5)
ALP SERPL-CCNC: 103 U/L (ref 40–150)
ALT SERPL W P-5'-P-CCNC: 19 U/L (ref 0–50)
ANION GAP SERPL CALCULATED.3IONS-SCNC: 12 MMOL/L (ref 3–14)
AST SERPL W P-5'-P-CCNC: 20 U/L (ref 0–45)
BILIRUB SERPL-MCNC: 0.5 MG/DL (ref 0.2–1.3)
BUN SERPL-MCNC: 30 MG/DL (ref 7–30)
CALCIUM SERPL-MCNC: 9.9 MG/DL (ref 8.5–10.1)
CHLORIDE SERPL-SCNC: 109 MMOL/L (ref 94–109)
CHOLEST SERPL-MCNC: 232 MG/DL
CO2 SERPL-SCNC: 22 MMOL/L (ref 20–32)
CREAT SERPL-MCNC: 1.5 MG/DL (ref 0.52–1.04)
ERYTHROCYTE [DISTWIDTH] IN BLOOD BY AUTOMATED COUNT: 13.5 % (ref 10–15)
GFR SERPL CREATININE-BSD FRML MDRD: 33 ML/MIN/1.7M2
GLUCOSE SERPL-MCNC: 103 MG/DL (ref 70–99)
HCT VFR BLD AUTO: 38.7 % (ref 35–47)
HDLC SERPL-MCNC: 54 MG/DL
HGB BLD-MCNC: 12.1 G/DL (ref 11.7–15.7)
LDLC SERPL CALC-MCNC: 142 MG/DL
MCH RBC QN AUTO: 30.6 PG (ref 26.5–33)
MCHC RBC AUTO-ENTMCNC: 31.3 G/DL (ref 31.5–36.5)
MCV RBC AUTO: 98 FL (ref 78–100)
NONHDLC SERPL-MCNC: 178 MG/DL
PLATELET # BLD AUTO: 280 10E9/L (ref 150–450)
POTASSIUM SERPL-SCNC: 4.6 MMOL/L (ref 3.4–5.3)
PROT SERPL-MCNC: 7.7 G/DL (ref 6.8–8.8)
PTH-INTACT SERPL-MCNC: 69 PG/ML (ref 12–72)
RBC # BLD AUTO: 3.95 10E12/L (ref 3.8–5.2)
SODIUM SERPL-SCNC: 143 MMOL/L (ref 133–144)
TRIGL SERPL-MCNC: 178 MG/DL
TSH SERPL DL<=0.005 MIU/L-ACNC: 0.94 MU/L (ref 0.4–4)
WBC # BLD AUTO: 7 10E9/L (ref 4–11)

## 2017-08-17 PROCEDURE — 82306 VITAMIN D 25 HYDROXY: CPT | Performed by: INTERNAL MEDICINE

## 2017-08-17 PROCEDURE — 80061 LIPID PANEL: CPT | Performed by: INTERNAL MEDICINE

## 2017-08-17 PROCEDURE — 84443 ASSAY THYROID STIM HORMONE: CPT | Performed by: INTERNAL MEDICINE

## 2017-08-17 PROCEDURE — 85027 COMPLETE CBC AUTOMATED: CPT | Performed by: INTERNAL MEDICINE

## 2017-08-17 PROCEDURE — 36415 COLL VENOUS BLD VENIPUNCTURE: CPT | Performed by: INTERNAL MEDICINE

## 2017-08-17 PROCEDURE — 80053 COMPREHEN METABOLIC PANEL: CPT | Performed by: INTERNAL MEDICINE

## 2017-08-17 PROCEDURE — 83970 ASSAY OF PARATHORMONE: CPT | Performed by: INTERNAL MEDICINE

## 2017-08-18 LAB — DEPRECATED CALCIDIOL+CALCIFEROL SERPL-MC: 51 UG/L (ref 20–75)

## 2017-08-25 ENCOUNTER — OFFICE VISIT (OUTPATIENT)
Dept: INTERNAL MEDICINE | Facility: CLINIC | Age: 82
End: 2017-08-25
Payer: MEDICARE

## 2017-08-25 VITALS
TEMPERATURE: 98.3 F | DIASTOLIC BLOOD PRESSURE: 78 MMHG | SYSTOLIC BLOOD PRESSURE: 144 MMHG | BODY MASS INDEX: 33.43 KG/M2 | OXYGEN SATURATION: 98 % | WEIGHT: 195.8 LBS | HEIGHT: 64 IN | HEART RATE: 98 BPM

## 2017-08-25 DIAGNOSIS — K59.03 CONSTIPATION DUE TO PAIN MEDICATION: ICD-10-CM

## 2017-08-25 DIAGNOSIS — R07.2 PRECORDIAL PAIN: Primary | ICD-10-CM

## 2017-08-25 DIAGNOSIS — R06.02 SOB (SHORTNESS OF BREATH) ON EXERTION: ICD-10-CM

## 2017-08-25 DIAGNOSIS — G62.9 PERIPHERAL POLYNEUROPATHY: Chronic | ICD-10-CM

## 2017-08-25 DIAGNOSIS — M25.50 MULTIPLE JOINT PAIN: ICD-10-CM

## 2017-08-25 DIAGNOSIS — R60.0 BILATERAL LEG EDEMA: ICD-10-CM

## 2017-08-25 DIAGNOSIS — I10 ESSENTIAL HYPERTENSION WITH GOAL BLOOD PRESSURE LESS THAN 140/90: Chronic | ICD-10-CM

## 2017-08-25 PROCEDURE — 99215 OFFICE O/P EST HI 40 MIN: CPT | Performed by: INTERNAL MEDICINE

## 2017-08-25 PROCEDURE — 93000 ELECTROCARDIOGRAM COMPLETE: CPT | Performed by: INTERNAL MEDICINE

## 2017-08-25 RX ORDER — AMOXICILLIN 250 MG
1-2 CAPSULE ORAL 2 TIMES DAILY PRN
Qty: 30 TABLET | Refills: 3 | Status: SHIPPED | OUTPATIENT
Start: 2017-08-25 | End: 2020-11-05

## 2017-08-25 RX ORDER — FUROSEMIDE 40 MG
40-80 TABLET ORAL DAILY
Qty: 90 TABLET | Refills: 1 | Status: SHIPPED | OUTPATIENT
Start: 2017-08-25 | End: 2018-04-06

## 2017-08-25 RX ORDER — TRAMADOL HYDROCHLORIDE 50 MG/1
50 TABLET ORAL EVERY 8 HOURS PRN
Qty: 40 TABLET | Refills: 0 | Status: SHIPPED | OUTPATIENT
Start: 2017-08-25 | End: 2017-09-22

## 2017-08-25 ASSESSMENT — ANXIETY QUESTIONNAIRES
6. BECOMING EASILY ANNOYED OR IRRITABLE: MORE THAN HALF THE DAYS
1. FEELING NERVOUS, ANXIOUS, OR ON EDGE: MORE THAN HALF THE DAYS
3. WORRYING TOO MUCH ABOUT DIFFERENT THINGS: SEVERAL DAYS
5. BEING SO RESTLESS THAT IT IS HARD TO SIT STILL: MORE THAN HALF THE DAYS
GAD7 TOTAL SCORE: 14
IF YOU CHECKED OFF ANY PROBLEMS ON THIS QUESTIONNAIRE, HOW DIFFICULT HAVE THESE PROBLEMS MADE IT FOR YOU TO DO YOUR WORK, TAKE CARE OF THINGS AT HOME, OR GET ALONG WITH OTHER PEOPLE: SOMEWHAT DIFFICULT
7. FEELING AFRAID AS IF SOMETHING AWFUL MIGHT HAPPEN: MORE THAN HALF THE DAYS
2. NOT BEING ABLE TO STOP OR CONTROL WORRYING: NEARLY EVERY DAY

## 2017-08-25 ASSESSMENT — PATIENT HEALTH QUESTIONNAIRE - PHQ9
5. POOR APPETITE OR OVEREATING: MORE THAN HALF THE DAYS
SUM OF ALL RESPONSES TO PHQ QUESTIONS 1-9: 13

## 2017-08-25 NOTE — NURSING NOTE
"Chief Complaint   Patient presents with     RECHECK     Imm/Inj     Allergies     Pain     Chest Pain       Initial /78 (BP Location: Left arm, Patient Position: Chair, Cuff Size: Adult Large)  Pulse 98  Temp 98.3  F (36.8  C) (Oral)  Ht 5' 4\" (1.626 m)  Wt 195 lb 12.8 oz (88.8 kg)  SpO2 98%  Breastfeeding? No  BMI 33.61 kg/m2 Estimated body mass index is 33.61 kg/(m^2) as calculated from the following:    Height as of this encounter: 5' 4\" (1.626 m).    Weight as of this encounter: 195 lb 12.8 oz (88.8 kg).  Medication Reconciliation: complete   Aspen Barroso CMA      "

## 2017-08-25 NOTE — LETTER
[facility]  [address], [address]  [place] [address]  [phone]        August 25, 2017    Glendy Díaz  [address]  [place] [address]            To Whom It May Concern:       Ms. Glendy Díaz has no diabetes mellitus.    Sincerely,    Veronica Steiner MD  Internal Medicine

## 2017-08-25 NOTE — PROGRESS NOTES
Dr Steiner's note      Patient's instructions / PLAN:                                                        Plan:  1. Take the FUrosemide very early in the morning   -- take 2 tablets for 3 days then take 1 tablet daily  -- the goal is to loose 10 lbs water weight   2. Continue the other meds, same doses for now.  3. Stress test To schedule this test please call [facility] at: [phone]   4. Follow up few days after the stress test  5. Electrocardiogram today      ASSESSMENT & PLAN:                                                      (R07.2) Precordial pain  (primary encounter diagnosis)  (R06.02) SOB (shortness of breath) on exertion  (R60.0) Bilateral leg edema  Comment: She has history of self adjusting medications  Plan: NM Lexiscan stress test, EKG 12-lead complete         w/read - Clinics              (G62.9) Peripheral polyneuropathy (H)  Comment:   Plan: Resume gabapentin    (I10) Essential hypertension with goal blood pressure less than 140/90  Comment: Controlled    Plan: furosemide (LASIX) 40 MG tablet, EKG 12-lead         complete w/read - Clinics            (K59.03) Constipation due to pain medication  Comment:   Plan: senna-docusate (SENOKOT-S;PERICOLACE) 8.6-50 MG        per tablet            (M25.50) Multiple joint pain  Comment:   Plan: traMADol (ULTRAM) 50 MG tablet               Chief complaint:                                                      Follow up chronic medical problems    Chest pain, shortness of breath  Daughter is present    SUBJECTIVE:   Glendy Díaz is a 83 year old female who presents to clinic today for the following health issues:    Leg edema  Wt is up w 10 lbs  --  No salt resteriction   -- she takes 3 tabs Furosemide a week  -- she doesn't take it because she wants to go and play cards     Allergies  -- more for 1 month  -- sinuses congestion   -- takes Zyrtec     periph neuropathy  -- stopped gabapentin because she thought it caused dry mouth   -- didn't want to see  "neurologist   -- Again she complains of painful burning sensation in her hands and feet    CP  -- pressure pain sometimes at rest, she doesn't do much exercise.  -- No chest pain with exercise, but shortness of breath with exercise  -- Daughter reports that pt has been very tired, can hardly walk any distances.         LOV: Plan:  1. Gabapentin 100 mg in am, 100 mg in the afternoon and 300 mg at bed time  2. Continue the other meds, same doses for now.  3. Please make a lab appointment for fasting labs   4. Make sure you drink plenty of water the day of the blood test.    5. Please make an appointment few days after the labs to discuss about the results.   6. Neurology referral     Review of Systems:                                                      ROS: negative for fever, chills, cough, wheezes, vomiting, abdominal pain, positive for chest pain, shortness of breath, leg swelling     A 10-point review of systems was obtained.  Those pertinent are above and in the in the Subjective section.  The rest of the systems are negative.           OBJECTIVE:             Physical exam:  Blood pressure 116/78, pulse 98, temperature 98.3  F (36.8  C), temperature source Oral, height 5' 4\" (1.626 m), weight 195 lb 12.8 oz (88.8 kg), SpO2 98 %, not currently breastfeeding.   NAD, appears comfortable  Skin: no rashes   Neck: supple, no JVD,  No thyroidmegaly. Lymph nodes nonpalpable cervical and supraclavicular.  Chest: few rales bilaterally, good respiratory effort  Heart: S1 S2, RRR, no mgr appreciated  Abdomen: soft, not tender, no hepatosplenomegaly or masses appreciated, no abdominal bruit, present bowel sounds  Extremities: + 2 edema,   Neurologic: A, Ox3, no focal signs appreciated    PMHx: reviewed  Past Medical History:   Diagnosis Date     Anxiety      CKD (chronic kidney disease) stage 3, GFR 30-59 ml/min      Hematuria      Hyperlipidemia LDL goal <100      Hyperparathyroidism (H)      Hypertension     No " Cardiologist     Incontinence      Leg weakness, bilateral      Neck pain, chronic      Osteoarthritis      Osteopenia      Peripheral neuropathy (H)       PSHx: reviewed  Past Surgical History:   Procedure Laterality Date     APPENDECTOMY       ARTHRODESIS FOOT  5/1/2014    Procedure: ARTHRODESIS FOOT;  Surgeon: Sid Marks DPM;  Location: [facility] OR     ARTHROSCOPY ANKLE, OPEN REPAIR LIGAMENT, COMBINED  5/31/2012    Procedure:COMBINED ARTHROSCOPY ANKLE, OPEN REPAIR LIGAMENT; LEFT ANKLE ARTHROSCOPY, LATERAL LIGAMENT RECONSTRUCTION, ENDOSCOPIC DRISS, BUNION SECOND TOE RAY CORRECTION    LEFT KNEE Marcaine AND CORTIZONE INJECTION, 5 CC Marcaine, 1 CC CELESTONE, ; Surgeon:VARSHA GERMAN; Location: [facility]     CATARACT IOL, RT/LT       CHOLECYSTECTOMY       EXCISE MASS FOOT  12/4/2012    Procedure: EXCISE MASS FOOT;;  Surgeon: Varsha German MD;  Location:  [facility]     HYSTERECTOMY TOTAL ABDOMINAL      ovaries are in     RELEASE TENDON TOE  5/1/2014    Procedure: RELEASE TENDON TOE;  Surgeon: Sid Marks DPM;  Location: [facility] OR     REMOVE HARDWARE FOOT  12/4/2012    Procedure: REMOVE HARDWARE FOOT;  REMOVAL HARDWARE(SYNTHES SCREW) LEFT FOOT, EXCISION OF INCLUSION CYST;  Surgeon: Varsha German MD;  [facility]     REPAIR HAMMER TOE  5/1/2014    Procedure: REPAIR HAMMER TOE;  Surgeon: Sid Marks DPM;  Location: [facility] OR     REVERSE ARTHROPLASTY SHOULDER Right 7/18/2016    Procedure: REVERSE ARTHROPLASTY SHOULDER;  Surgeon: Marlo Alejandro MD;  Location: [facility] OR        Meds: reviewed  Current Outpatient Prescriptions   Medication Sig Dispense Refill     metoprolol (LOPRESSOR) 50 MG tablet Take 0.5 tablets (25 mg) by mouth 2 times daily 90 tablet 1     amLODIPine (NORVASC) 2.5 MG tablet Take 1 tablet (2.5 mg) by mouth daily 90 tablet 1     traMADol (ULTRAM) 50 MG tablet Take 1 tablet (50 mg) by mouth every 8 hours as needed for moderate pain 40 tablet 0     aspirin 81 MG  tablet Take 1 tablet (81 mg) by mouth daily 30 tablet 3     Cholecalciferol (VITAMIN D) 2000 UNITS tablet Take 2,000 Units by mouth daily 100 tablet 3     furosemide (LASIX) 40 MG tablet Take 1-2 tablets (40-80 mg) by mouth daily 90 tablet 1     cyanocobalamin (VITAMIN B12) 1000 MCG/ML injection 1 mL (1,000 mcg) every 30 days 1 mL 11     senna-docusate (SENOKOT-S;PERICOLACE) 8.6-50 MG per tablet Take 1-2 tablets by mouth 2 times daily as needed for constipation 30 tablet 0     ketoprofen 10% in PLO 10% Apply 4 times a day as need for pain 50 g 1     [DISCONTINUED] ORDER FOR DME Equipment being ordered: All diabetic testing supplies including test strips, lancets and solution for testing 1 times per day. 3 Month 1       Soc Hx: reviewed  Fam Hx: reviewed      Time spent with the patient  40 min, more than 50% in counseling and coordinating care, Re above medical problems : Chest pain, shortness of breath, leg edema, peripheral neuropathy, the importance of being compliant with the diuretic treatment     Veronica Steiner MD  Internal Medicine

## 2017-08-25 NOTE — MR AVS SNAPSHOT
After Visit Summary   8/25/2017    Glendy Díaz    MRN: [id number]           Patient Information     Date Of Birth          11/16/1933        Visit Information        Provider Department      8/25/2017 1:40 PM Veronica Davis MD [facility]        Today's Diagnoses     Precordial pain    -  1    Constipation due to pain medication        Multiple joint pain        Essential hypertension with goal blood pressure less than 140/90        Peripheral polyneuropathy (H)          Care Instructions    Plan:  1. Take the FUrosemide very early in the morning   -- take 2 tablets for 3 days then take 1 tablet daily  -- the goal is to loose 10 lbs water weight   2. Continue the other meds, same doses for now.  3. Stress test To schedule this test please call [place] [facility] at: [phone]   4. Follow up few days after the stress test  5. Electrocardiogram today          Follow-ups after your visit        Future tests that were ordered for you today     Open Future Orders        Priority Expected Expires Ordered    NM Lexiscan stress test Routine  8/25/2018 8/25/2017            Who to contact     If you have questions or need follow up information about today's clinic visit or your schedule please contact [facility] directly at [phone].  Normal or non-critical lab and imaging results will be communicated to you by MyChart, letter or phone within 4 business days after the clinic has received the results. If you do not hear from us within 7 days, please contact the clinic through CorkSharehart or phone. If you have a critical or abnormal lab result, we will notify you by phone as soon as possible.  Submit refill requests through QuantaLife or call your pharmacy and they will forward the refill request to us. Please allow 3 business days for your refill to be completed.          Additional Information About Your Visit        CorkSharehart Information     QuantaLife lets you send  "messages to your doctor, view your test results, renew your prescriptions, schedule appointments and more. To sign up, go to [email or web address] . Click on \"Log in\" on the left side of the screen, which will take you to the Welcome page. Then click on \"Sign up Now\" on the right side of the page.     You will be asked to enter the access code listed below, as well as some personal information. Please follow the directions to create your username and password.     Your access code is: [email or web address]  Expires: 2017  2:43 PM     Your access code will  in 90 days. If you need help or a new code, please call your [place] clinic or [phone].        Care EveryWhere ID     This is your Care EveryWhere ID. This could be used by other organizations to access your [place] medical records  [id number]        Your Vitals Were     Pulse Temperature Height Pulse Oximetry Breastfeeding? BMI (Body Mass Index)    98 98.3  F (36.8  C) (Oral) 5' 4\" (1.626 m) 98% No 33.61 kg/m2       Blood Pressure from Last 3 Encounters:   17 144/78   17 140/70   10/24/16 140/76    Weight from Last 3 Encounters:   17 195 lb 12.8 oz (88.8 kg)   17 184 lb 11.2 oz (83.8 kg)   17 184 lb 11.2 oz (83.8 kg)              We Performed the Following     EKG 12-lead complete w/read - Clinics          Where to get your medicines      These medications were sent to [facility] - [address], [facility] [address]     Phone:  [phone]     furosemide 40 MG tablet    senna-docusate 8.6-50 MG per tablet         Some of these will need a paper prescription and others can be bought over the counter.  Ask your nurse if you have questions.     Bring a paper prescription for each of these medications     traMADol 50 MG tablet          Primary Care Provider Office Phone # Fax #    Veronica Davis [phone]       [address] [facility] [address]        Equal Access to Services     PATRICK HIDALGO : Hadii carlos odonnell haddavidkarli Sohemaali, waedyda luqadaha, qazaidta kajanetda collinleathagali, deirdre snowann-mariepablo young. So [facility] [phone].    ATENCIÓN: Si habla español, tiene a jones disposición servicios gratuitos de asistencia lingüística. Llame al [phone].    We comply with applicable federal civil rights laws and [place] laws. We do not discriminate on the basis of race, color, national origin, age, disability sex, sexual orientation or gender identity.            Thank you!     Thank you for choosing [facility]  for your care. Our goal is always to provide you with excellent care. Hearing back from our patients is one way we can continue to improve our services. Please take a few minutes to complete the written survey that you may receive in the mail after your visit with us. Thank you!             Your Updated Medication List - Protect others around you: Learn how to safely use, store and throw away your medicines at www.disposemymeds.org.          This list is accurate as of: 8/25/17  2:45 PM.  Always use your most recent med list.                   Brand Name Dispense Instructions for use Diagnosis    amLODIPine 2.5 MG tablet    NORVASC    90 tablet    Take 1 tablet (2.5 mg) by mouth daily    Essential hypertension with goal blood pressure less than 140/90       aspirin 81 MG tablet     30 tablet    Take 1 tablet (81 mg) by mouth daily    CKD (chronic kidney disease) stage 3, GFR 30-59 ml/min, Essential hypertension with goal blood pressure less than 140/90       cyanocobalamin 1000 MCG/ML injection    VITAMIN B12    1 mL    1 mL (1,000 mcg) every 30 days        furosemide 40 MG tablet    LASIX    90 tablet    Take 1-2 tablets (40-80 mg) by mouth daily    Essential hypertension with goal blood pressure less than 140/90       ketoprofen 10% in PLO 10% topical gel     50 g    Apply 4 times a day as need  for pain    Arthritis       metoprolol 50 MG tablet    LOPRESSOR    90 tablet    Take 0.5 tablets (25 mg) by mouth 2 times daily    Essential hypertension with goal blood pressure less than 140/90       senna-docusate 8.6-50 MG per tablet    SENOKOT-S;PERICOLACE    30 tablet    Take 1-2 tablets by mouth 2 times daily as needed for constipation    Constipation due to pain medication       traMADol 50 MG tablet    ULTRAM    40 tablet    Take 1 tablet (50 mg) by mouth every 8 hours as needed for moderate pain    Multiple joint pain       vitamin D 2000 UNITS tablet     100 tablet    Take 2,000 Units by mouth daily    Vitamin D deficiency, Hyperparathyroidism (H)

## 2017-08-26 ASSESSMENT — ANXIETY QUESTIONNAIRES: GAD7 TOTAL SCORE: 14

## 2017-09-06 DIAGNOSIS — I10 ESSENTIAL HYPERTENSION WITH GOAL BLOOD PRESSURE LESS THAN 140/90: Chronic | ICD-10-CM

## 2017-09-06 NOTE — TELEPHONE ENCOUNTER
AMLODIPINE      Last Written Prescription Date: 01/24/17  Last Fill Quantity: 90, # refills: 1  Last Office Visit with [facility], [facility] or [facility] prescribing provider: 08/25/17       Potassium   Date Value Ref Range Status   08/17/2017 4.6 3.4 - 5.3 mmol/L Final     Creatinine   Date Value Ref Range Status   08/17/2017 1.50 (H) 0.52 - 1.04 mg/dL Final     BP Readings from Last 3 Encounters:   08/25/17 144/78   01/24/17 140/70   10/24/16 140/76     METOPROLOL      Last Written Prescription Date: 01/24/17  Last Fill Quantity: 90, # refills: 1    Last Office Visit with [facility], [facility] or [facility] prescribing provider:  08/25/17   Future Office Visit:        BP Readings from Last 3 Encounters:   08/25/17 144/78   01/24/17 140/70   10/24/16 140/76

## 2017-09-08 RX ORDER — AMLODIPINE BESYLATE 2.5 MG/1
TABLET ORAL
Qty: 90 TABLET | Refills: 0 | Status: SHIPPED | OUTPATIENT
Start: 2017-09-08 | End: 2017-12-06

## 2017-09-08 RX ORDER — METOPROLOL TARTRATE 50 MG
TABLET ORAL
Qty: 90 TABLET | Refills: 0 | Status: SHIPPED | OUTPATIENT
Start: 2017-09-08 | End: 2017-12-06

## 2017-09-13 ENCOUNTER — HOSPITAL ENCOUNTER (OUTPATIENT)
Dept: CARDIOLOGY | Facility: CLINIC | Age: 82
Discharge: HOME | End: 2017-09-13
Attending: INTERNAL MEDICINE | Admitting: INTERNAL MEDICINE
Payer: MEDICARE

## 2017-09-13 ENCOUNTER — HOSPITAL ENCOUNTER (OUTPATIENT)
Dept: NUCLEAR MEDICINE | Facility: CLINIC | Age: 82
Setting detail: NUCLEAR MEDICINE
End: 2017-09-13
Attending: INTERNAL MEDICINE
Payer: MEDICARE

## 2017-09-13 DIAGNOSIS — R07.2 PRECORDIAL PAIN: ICD-10-CM

## 2017-09-13 PROCEDURE — 34300033 ZZH RX 343: Performed by: INTERNAL MEDICINE

## 2017-09-13 PROCEDURE — 78452 HT MUSCLE IMAGE SPECT MULT: CPT | Mod: 26 | Performed by: INTERNAL MEDICINE

## 2017-09-13 PROCEDURE — A9502 TC99M TETROFOSMIN: HCPCS | Performed by: INTERNAL MEDICINE

## 2017-09-13 PROCEDURE — 93016 CV STRESS TEST SUPVJ ONLY: CPT | Performed by: INTERNAL MEDICINE

## 2017-09-13 PROCEDURE — 93018 CV STRESS TEST I&R ONLY: CPT | Performed by: INTERNAL MEDICINE

## 2017-09-13 PROCEDURE — 78452 HT MUSCLE IMAGE SPECT MULT: CPT

## 2017-09-13 RX ORDER — REGADENOSON 0.08 MG/ML
INJECTION, SOLUTION INTRAVENOUS
Status: DISPENSED
Start: 2017-09-13 | End: 2017-09-13

## 2017-09-13 RX ORDER — REGADENOSON 0.08 MG/ML
0.4 INJECTION, SOLUTION INTRAVENOUS ONCE
Status: COMPLETED | OUTPATIENT
Start: 2017-09-13 | End: 2017-09-13

## 2017-09-13 RX ADMIN — TETROFOSMIN 11 MCI.: 1.38 INJECTION, POWDER, LYOPHILIZED, FOR SOLUTION INTRAVENOUS at 08:00

## 2017-09-13 RX ADMIN — REGADENOSON 0.4 MG: 0.08 INJECTION, SOLUTION INTRAVENOUS at 09:35

## 2017-09-13 RX ADMIN — TETROFOSMIN 30.2 MCI.: 1.38 INJECTION, POWDER, LYOPHILIZED, FOR SOLUTION INTRAVENOUS at 10:31

## 2017-09-13 NOTE — PROGRESS NOTES
Pre-procedure:    Initial vital signs: /96, HR 86, RR 16  Allergies reviewed: see epic   Rhythm: Sinus  Medications taken within 48 hours of procedure: see epic   Last Caffeine: none  Lung sounds: CTA, no wheezing, crackles or rtx  Health History (COPD, Asthma, etc): none    Procedure: Lexiscan  Reaction/symptoms after receiving Clara injection: Shortness of breath  Intensity of Pain: none  1. Vital Signs:/69, , RR 16  2. Vital Signs:/71, , RR 16    Reversal agent: N/A    Post:   Resolution of symptoms?: YES  Vital signs: /80, , RR 16  Walk: NO  Comment: Patients heart rate up but she states she feels fine  Return to Radiology

## 2017-09-22 ENCOUNTER — OFFICE VISIT (OUTPATIENT)
Dept: INTERNAL MEDICINE | Facility: CLINIC | Age: 82
End: 2017-09-22
Payer: MEDICARE

## 2017-09-22 VITALS
OXYGEN SATURATION: 95 % | TEMPERATURE: 98.1 F | SYSTOLIC BLOOD PRESSURE: 130 MMHG | HEIGHT: 64 IN | WEIGHT: 185.8 LBS | HEART RATE: 102 BPM | BODY MASS INDEX: 31.72 KG/M2 | DIASTOLIC BLOOD PRESSURE: 68 MMHG

## 2017-09-22 DIAGNOSIS — D51.0 PERNICIOUS ANEMIA: ICD-10-CM

## 2017-09-22 DIAGNOSIS — R06.02 SOB (SHORTNESS OF BREATH): Primary | ICD-10-CM

## 2017-09-22 DIAGNOSIS — R60.0 BILATERAL LEG EDEMA: ICD-10-CM

## 2017-09-22 DIAGNOSIS — M25.50 MULTIPLE JOINT PAIN: ICD-10-CM

## 2017-09-22 DIAGNOSIS — I10 ESSENTIAL HYPERTENSION WITH GOAL BLOOD PRESSURE LESS THAN 140/90: Chronic | ICD-10-CM

## 2017-09-22 DIAGNOSIS — Z23 NEED FOR PROPHYLACTIC VACCINATION AND INOCULATION AGAINST INFLUENZA: ICD-10-CM

## 2017-09-22 DIAGNOSIS — Z23 NEED FOR VACCINATION WITH 13-POLYVALENT PNEUMOCOCCAL CONJUGATE VACCINE: ICD-10-CM

## 2017-09-22 PROCEDURE — 90670 PCV13 VACCINE IM: CPT | Performed by: INTERNAL MEDICINE

## 2017-09-22 PROCEDURE — 96372 THER/PROPH/DIAG INJ SC/IM: CPT | Performed by: INTERNAL MEDICINE

## 2017-09-22 PROCEDURE — 99214 OFFICE O/P EST MOD 30 MIN: CPT | Mod: 25 | Performed by: INTERNAL MEDICINE

## 2017-09-22 PROCEDURE — G0009 ADMIN PNEUMOCOCCAL VACCINE: HCPCS | Performed by: INTERNAL MEDICINE

## 2017-09-22 RX ORDER — TRAMADOL HYDROCHLORIDE 50 MG/1
50 TABLET ORAL 2 TIMES DAILY PRN
Qty: 40 TABLET | Refills: 0 | Status: SHIPPED | OUTPATIENT
Start: 2017-09-22 | End: 2018-04-06

## 2017-09-22 NOTE — NURSING NOTE
"Chief Complaint   Patient presents with     RECHECK     Imm/Inj     Recheck Medication       Initial /68 (BP Location: Right arm, Patient Position: Chair, Cuff Size: Adult Large)  Pulse 102  Temp 98.1  F (36.7  C) (Oral)  Ht 5' 4\" (1.626 m)  Wt 185 lb 12.8 oz (84.3 kg)  SpO2 95%  Breastfeeding? No  BMI 31.89 kg/m2 Estimated body mass index is 31.89 kg/(m^2) as calculated from the following:    Height as of this encounter: 5' 4\" (1.626 m).    Weight as of this encounter: 185 lb 12.8 oz (84.3 kg).  Medication Reconciliation: complete   Aspen Barroso CMA      "

## 2017-09-22 NOTE — PROGRESS NOTES
Dr Steiner's note      Patient's instructions / PLAN:                                                        Plan:  1. Continue same meds, same doses for now   2. Please make a lab appointment for non fasting labs  In 6 months   3. Please make an appointment few days after the labs to discuss about the results.           ASSESSMENT & PLAN:                                                      (R06.02) SOB (shortness of breath)  (primary encounter diagnosis)  (R60.0) Bilateral leg edema  Comment: better But for just the furosemide dose  Plan: Continue same meds, same doses for now     (D51.0) Pernicious anemia  Comment: stable   Plan: Continue B-12 supplements    (I10) Essential hypertension with goal blood pressure less than 140/90  Comment: Controlled    Plan: Comprehensive metabolic panel            (M25.50) Multiple joint pain  Comment:   Plan: traMADol (ULTRAM) 50 MG tablet            (Z23) Need for prophylactic vaccination and inoculation against influenza  Comment:   Plan: CANCELED: HC FLU VACCINE, INCREASED ANTIGEN,         PRESV FREE            (Z23) Need for vaccination with 13-polyvalent pneumococcal conjugate vaccine  Comment:   Plan: Pneumococcal vaccine 13 valent PCV13 IM         (Prevnar) [04805], ADMIN MEDICARE: Pneumococcal        Vaccine (), ADMIN: Vaccine, Initial         (34441)               Chief complaint:                                                      Follow up chronic medical problems      SUBJECTIVE:   Glendy Díaz is a 83 year old female who presents to clinic today for the following health issues:    Ankles are less swollen  Trace today  She takes the furosemide daily but now she plans to take it every other day. She doesn't like to urinate frequently.    The nuclear medicine stress test was negative        LOV: Plan:  1. Take the FUrosemide very early in the morning   -- take 2 tablets for 3 days then take 1 tablet daily  -- the goal is to loose 10 lbs water weight   2.  "Continue the other meds, same doses for now.  3. Stress test To schedule this test please call [facility] at: [phone]   4. Follow up few days after the stress test  5. Electrocardiogram today    Review of Systems:                                                      ROS: negative for fever, chills, cough, wheezes, chest pain, shortness of breath, vomiting, abdominal pain, leg swelling     A 10-point review of systems was obtained.  Those pertinent are above and in the in the Subjective section.  The rest of the systems are negative.           OBJECTIVE:             Physical exam:  Blood pressure 130/68, pulse 102, temperature 98.1  F (36.7  C), temperature source Oral, height 5' 4\" (1.626 m), weight 185 lb 12.8 oz (84.3 kg), SpO2 95 %, not currently breastfeeding.   NAD, appears comfortable  Skin: no rashes   Chest: clear to auscultation bilaterally, good respiratory effort  Heart: S1 S2, RRR, no mgr appreciated  Abdomen: soft, not tender,   Extremities: no edema,  Neurologic: A, Ox3, no focal signs appreciated    PMHx: reviewed  Past Medical History:   Diagnosis Date     Anxiety      CKD (chronic kidney disease) stage 3, GFR 30-59 ml/min      Hematuria      Hyperlipidemia LDL goal <100      Hyperparathyroidism (H)      Hypertension     No Cardiologist     Incontinence      Leg weakness, bilateral      Neck pain, chronic      Osteoarthritis      Osteopenia      Peripheral neuropathy (H)       PSHx: reviewed  Past Surgical History:   Procedure Laterality Date     APPENDECTOMY       ARTHRODESIS FOOT  5/1/2014    Procedure: ARTHRODESIS FOOT;  Surgeon: Sid Marks DPM;  Location: [facility] OR     ARTHROSCOPY ANKLE, OPEN REPAIR LIGAMENT, COMBINED  5/31/2012    Procedure:COMBINED ARTHROSCOPY ANKLE, OPEN REPAIR LIGAMENT; LEFT ANKLE ARTHROSCOPY, LATERAL LIGAMENT RECONSTRUCTION, ENDOSCOPIC KIRIT NAQVI SECOND TOE RAY CORRECTION    LEFT KNEE Marcaine AND CORTIZONE INJECTION, 5 CC Marcaine, 1 CC CELESTONE, ; " Surgeon:AKSHAT GERMAN; Location:[facility]     CATARACT IOL, RT/LT       CHOLECYSTECTOMY       EXCISE MASS FOOT  12/4/2012    Procedure: EXCISE MASS FOOT;;  Surgeon: Akshat German MD;  Location: [facility]     HYSTERECTOMY TOTAL ABDOMINAL      ovaries are in     RELEASE TENDON TOE  5/1/2014    Procedure: RELEASE TENDON TOE;  Surgeon: Sid Marks DPM;  Location: [facility] OR     REMOVE HARDWARE FOOT  12/4/2012    Procedure: REMOVE HARDWARE FOOT;  REMOVAL HARDWARE(SYNTHES SCREW) LEFT FOOT, EXCISION OF INCLUSION CYST;  Surgeon: Akshat German MD;  Location: [facility]     REPAIR HAMMER TOE  5/1/2014    Procedure: REPAIR HAMMER TOE;  Surgeon: Sid Marks DPM;  Location: [facility] OR     REVERSE ARTHROPLASTY SHOULDER Right 7/18/2016    Procedure: REVERSE ARTHROPLASTY SHOULDER;  Surgeon: Marlo Alejandro MD;  Location: [facility] OR        Meds: reviewed  Current Outpatient Prescriptions   Medication Sig Dispense Refill     amLODIPine (NORVASC) 2.5 MG tablet Take 1 tablet by mouth  daily 90 tablet 0     metoprolol (LOPRESSOR) 50 MG tablet Take one-half tablet by  mouth two times daily 90 tablet 0     senna-docusate (SENOKOT-S;PERICOLACE) 8.6-50 MG per tablet Take 1-2 tablets by mouth 2 times daily as needed for constipation 30 tablet 3     traMADol (ULTRAM) 50 MG tablet Take 1 tablet (50 mg) by mouth every 8 hours as needed for moderate pain 40 tablet 0     furosemide (LASIX) 40 MG tablet Take 1-2 tablets (40-80 mg) by mouth daily 90 tablet 1     aspirin 81 MG tablet Take 1 tablet (81 mg) by mouth daily 30 tablet 3     Cholecalciferol (VITAMIN D) 2000 UNITS tablet Take 2,000 Units by mouth daily 100 tablet 3     cyanocobalamin (VITAMIN B12) 1000 MCG/ML injection 1 mL (1,000 mcg) every 30 days 1 mL 11     ketoprofen 10% in PLO 10% Apply 4 times a day as need for pain 50 g 1     [DISCONTINUED] ORDER FOR DME Equipment being ordered: All diabetic testing supplies including test strips, lancets and solution for  testing 1 times per day. 3 Month 1       Soc Hx: reviewed  Fam Hx: reviewed          Veronica Steiner MD  Internal Medicine

## 2017-09-22 NOTE — PATIENT INSTRUCTIONS
Plan:  1. Continue same meds, same doses for now   2. Please make a lab appointment for non fasting labs  In 6 months   3. Please make an appointment few days after the labs to discuss about the results.

## 2017-09-22 NOTE — MR AVS SNAPSHOT
After Visit Summary   9/22/2017    Glendy Díaz    MRN: [id number]           Patient Information     Date Of Birth          11/16/1933        Visit Information        Provider Department      9/22/2017 4:20 PM Veronica Davis MD [facility]        Today's Diagnoses     Need for prophylactic vaccination and inoculation against influenza    -  1    Need for vaccination with 13-polyvalent pneumococcal conjugate vaccine        Pernicious anemia        Essential hypertension with goal blood pressure less than 140/90        Multiple joint pain          Care Instructions    Plan:  1. Continue same meds, same doses for now   2. Please make a lab appointment for non fasting labs  In 6 months   3. Please make an appointment few days after the labs to discuss about the results.          Follow-ups after your visit        Future tests that were ordered for you today     Open Future Orders        Priority Expected Expires Ordered    Comprehensive metabolic panel Routine  9/22/2018 9/22/2017            Who to contact     If you have questions or need follow up information about today's clinic visit or your schedule please contact [facility] directly at [phone].  Normal or non-critical lab and imaging results will be communicated to you by MyChart, letter or phone within 4 business days after the clinic has received the results. If you do not hear from us within 7 days, please contact the clinic through Monitor Backlinkshart or phone. If you have a critical or abnormal lab result, we will notify you by phone as soon as possible.  Submit refill requests through Plastio or call your pharmacy and they will forward the refill request to us. Please allow 3 business days for your refill to be completed.          Additional Information About Your Visit        Monitor Backlinkshart Information     Plastio lets you send messages to your doctor, view your test results, renew your prescriptions,  "schedule appointments and more. To sign up, go to [email or web address] . Click on \"Log in\" on the left side of the screen, which will take you to the Welcome page. Then click on \"Sign up Now\" on the right side of the page.     You will be asked to enter the access code listed below, as well as some personal information. Please follow the directions to create your username and password.     Your access code is: [email or web address]  Expires: 2017  2:43 PM     Your access code will  in 90 days. If you need help or a new code, please call your [place] clinic or [phone].        Care EveryWhere ID     This is your Care EveryWhere ID. This could be used by other organizations to access your [place] medical records  [id number]        Your Vitals Were     Pulse Temperature Height Pulse Oximetry Breastfeeding? BMI (Body Mass Index)    102 98.1  F (36.7  C) (Oral) 5' 4\" (1.626 m) 95% No 31.89 kg/m2       Blood Pressure from Last 3 Encounters:   17 130/68   17 144/78   17 140/70    Weight from Last 3 Encounters:   17 185 lb 12.8 oz (84.3 kg)   17 195 lb 12.8 oz (88.8 kg)   17 184 lb 11.2 oz (83.8 kg)              We Performed the Following     ADMIN MEDICARE: Pneumococcal Vaccine ()     ADMIN: Vaccine, Initial (67364)     Pneumococcal vaccine 13 valent PCV13 IM (Prevnar) [40610]     VITAMIN B12 1000 mcg (standing order with a count of 15)          Today's Medication Changes          These changes are accurate as of: 17  5:05 PM.  If you have any questions, ask your nurse or doctor.               These medicines have changed or have updated prescriptions.        Dose/Directions    traMADol 50 MG tablet   Commonly known as:  ULTRAM   This may have changed:  when to take this   Used for:  Multiple joint pain   Changed by:  Veronica Davis MD        Dose:  50 mg   Take 1 tablet (50 mg) by mouth 2 times daily as needed for moderate pain   Quantity:  40 " tablet   Refills:  0            Where to get your medicines      Some of these will need a paper prescription and others can be bought over the counter.  Ask your nurse if you have questions.     Bring a paper prescription for each of these medications     traMADol 50 MG tablet                Primary Care Provider Office Phone # Fax #    Veronica Sophia Davis [phone]       [address] [facility] [address]        Equal Access to Services     PATRICK HIDALGO : Hadii aad ku hadasho Soomaali, waaxda luqadaha, qaybta kaalmada adeegyada, waxay idiin hayaan adeeg kharash laalexsandra . So [facility] [phone].    ATENCIÓN: Si habla español, tiene a jones disposición servicios gratuitos de asistencia lingüística. Llame al [phone].    We comply with applicable federal civil rights laws and [place] laws. We do not discriminate on the basis of race, color, national origin, age, disability sex, sexual orientation or gender identity.            Thank you!     Thank you for choosing [facility]  for your care. Our goal is always to provide you with excellent care. Hearing back from our patients is one way we can continue to improve our services. Please take a few minutes to complete the written survey that you may receive in the mail after your visit with us. Thank you!             Your Updated Medication List - Protect others around you: Learn how to safely use, store and throw away your medicines at www.disposemymeds.org.          This list is accurate as of: 9/22/17  5:05 PM.  Always use your most recent med list.                   Brand Name Dispense Instructions for use Diagnosis    amLODIPine 2.5 MG tablet    NORVASC    90 tablet    Take 1 tablet by mouth  daily    Essential hypertension with goal blood pressure less than 140/90       aspirin 81 MG tablet     30 tablet    Take 1 tablet (81 mg) by mouth daily    CKD (chronic kidney disease) stage 3, GFR 30-59 ml/min, Essential  hypertension with goal blood pressure less than 140/90       cyanocobalamin 1000 MCG/ML injection    VITAMIN B12    1 mL    1 mL (1,000 mcg) every 30 days        furosemide 40 MG tablet    LASIX    90 tablet    Take 1-2 tablets (40-80 mg) by mouth daily    Essential hypertension with goal blood pressure less than 140/90       ketoprofen 10% in PLO 10% topical gel     50 g    Apply 4 times a day as need for pain    Arthritis       metoprolol 50 MG tablet    LOPRESSOR    90 tablet    Take one-half tablet by  mouth two times daily    Essential hypertension with goal blood pressure less than 140/90       senna-docusate 8.6-50 MG per tablet    SENOKOT-S;PERICOLACE    30 tablet    Take 1-2 tablets by mouth 2 times daily as needed for constipation    Constipation due to pain medication       traMADol 50 MG tablet    ULTRAM    40 tablet    Take 1 tablet (50 mg) by mouth 2 times daily as needed for moderate pain    Multiple joint pain       vitamin D 2000 UNITS tablet     100 tablet    Take 2,000 Units by mouth daily    Vitamin D deficiency, Hyperparathyroidism (H)

## 2017-10-13 DIAGNOSIS — I10 ESSENTIAL HYPERTENSION WITH GOAL BLOOD PRESSURE LESS THAN 140/90: Chronic | ICD-10-CM

## 2017-10-13 NOTE — TELEPHONE ENCOUNTER
Attempted to contact patient, no answer, left voice message to call back with [employer].

## 2017-10-13 NOTE — TELEPHONE ENCOUNTER
Lasix      Last Written Prescription Date: 8/25/17  Last Fill Quantity: 90, # refills: 1  Last Office Visit with [facility], [facility] or [facility] prescribing provider: 9/22/17       Potassium   Date Value Ref Range Status   08/17/2017 4.6 3.4 - 5.3 mmol/L Final     Creatinine   Date Value Ref Range Status   08/17/2017 1.50 (H) 0.52 - 1.04 mg/dL Final     BP Readings from Last 3 Encounters:   09/22/17 130/68   08/25/17 144/78   01/24/17 140/70     Routing refill request to provider for review/approval because:  Labs out of range:  Cr

## 2017-10-31 RX ORDER — FUROSEMIDE 40 MG
TABLET ORAL
Qty: 180 TABLET | OUTPATIENT
Start: 2017-10-31

## 2017-11-01 ENCOUNTER — ALLIED HEALTH/NURSE VISIT (OUTPATIENT)
Dept: NURSING | Facility: CLINIC | Age: 82
End: 2017-11-01
Payer: MEDICARE

## 2017-11-01 DIAGNOSIS — D51.9 ANEMIA DUE TO VITAMIN B12 DEFICIENCY, UNSPECIFIED B12 DEFICIENCY TYPE: Primary | ICD-10-CM

## 2017-11-01 PROCEDURE — 96372 THER/PROPH/DIAG INJ SC/IM: CPT

## 2017-11-28 ENCOUNTER — TELEPHONE (OUTPATIENT)
Dept: INTERNAL MEDICINE | Facility: CLINIC | Age: 82
End: 2017-11-28

## 2017-11-28 DIAGNOSIS — G62.9 PERIPHERAL POLYNEUROPATHY: Primary | Chronic | ICD-10-CM

## 2017-11-28 NOTE — TELEPHONE ENCOUNTER
Reason for Call:  Other referral    Detailed comments: for podiatry, patient has a sore in between toes, please contact Abigail (daughter)    Phone Number Patient can be reached at: Other phone number:  [phone]    Best Time: any    Can we leave a detailed message on this number? YES    Call taken on 11/28/2017 at 4:44 PM by Rocio Lucas    1

## 2017-11-29 ENCOUNTER — ALLIED HEALTH/NURSE VISIT (OUTPATIENT)
Dept: NURSING | Facility: CLINIC | Age: 82
End: 2017-11-29
Payer: MEDICARE

## 2017-11-29 DIAGNOSIS — D51.0 PERNICIOUS ANEMIA: ICD-10-CM

## 2017-11-29 PROCEDURE — 96372 THER/PROPH/DIAG INJ SC/IM: CPT

## 2017-12-01 NOTE — TELEPHONE ENCOUNTER
Dr. Steiner, do we need to do referral to send someone to podiatry? Also, would this be a podiatry issue or a wound care issue?

## 2017-12-04 NOTE — TELEPHONE ENCOUNTER
Left message for daughter to call back. Would like more info to determine appropriate dx. Please sign order and inform daughter once she calls back.

## 2017-12-05 NOTE — TELEPHONE ENCOUNTER
Greta was given number for referral.  Glendy actually needs a podiatrist because the person that comes to her high rise and cuts nails has been cutting on a growth that she has between her toes.  Greta thinks it might be getting infected. She was advised to call back if she can't get into see someone soon enough to address possible infection

## 2017-12-06 DIAGNOSIS — I10 ESSENTIAL HYPERTENSION WITH GOAL BLOOD PRESSURE LESS THAN 140/90: Chronic | ICD-10-CM

## 2017-12-09 RX ORDER — METOPROLOL TARTRATE 50 MG
TABLET ORAL
Qty: 90 TABLET | Refills: 0 | Status: SHIPPED | OUTPATIENT
Start: 2017-12-09 | End: 2018-01-09

## 2017-12-09 RX ORDER — AMLODIPINE BESYLATE 2.5 MG/1
TABLET ORAL
Qty: 90 TABLET | Refills: 0 | Status: SHIPPED | OUTPATIENT
Start: 2017-12-09 | End: 2018-01-09

## 2017-12-12 ENCOUNTER — OFFICE VISIT (OUTPATIENT)
Dept: PODIATRY | Facility: CLINIC | Age: 82
End: 2017-12-12
Payer: MEDICARE

## 2017-12-12 VITALS — HEIGHT: 64 IN | BODY MASS INDEX: 31.58 KG/M2 | WEIGHT: 185 LBS | RESPIRATION RATE: 16 BRPM | HEART RATE: 88 BPM

## 2017-12-12 DIAGNOSIS — L84 CALLUS: Primary | ICD-10-CM

## 2017-12-12 DIAGNOSIS — M77.52 CAPSULITIS OF METATARSOPHALANGEAL (MTP) JOINT OF LEFT FOOT: ICD-10-CM

## 2017-12-12 PROCEDURE — 11055 PARING/CUTG B9 HYPRKER LES 1: CPT | Mod: GA | Performed by: PODIATRIST

## 2017-12-12 PROCEDURE — 99203 OFFICE O/P NEW LOW 30 MIN: CPT | Mod: 25 | Performed by: PODIATRIST

## 2017-12-12 NOTE — PATIENT INSTRUCTIONS
Thank you for choosing [facility]!    DR. RUGGIERO'S CLINIC LOCATIONS:   MONDAY - [place] TUESDAY - [place]   [address] [facility]  [address]   [place] [address] [place] [address]   [phone]       THURSDAY AM - [place] THURSDAY PM - [place]   [address]   [place] [address] [place], [place] [address] [phone]       FRIDAY AM - [place] SET UP SURGERY: [phone] [address] APPOINTMENTS: [phone]   [place] [address] BILLING QUESTIONS: [phone] FAX NUMBER: [phone]     Follow Up: as needed  PRICE THERAPY    Many aches and pains throughout the foot and ankle can be helped with many simple treatments. This is usually described as PRICE Therapy.      P - Protection - often times, inflammation/pain in the lower extremity is not able to improve simply because the areas involved are never allowed to rest. Every step we take can bother the problematic area. Protecting those areas is an important step in the healing process. This may involve a walking cast boot, a special insert/orthotic device, an ankle brace, or simply avoiding barefoot walking.    R - Rest - in addition to protecting the foot/ankle, resting is an important, but often times difficult, treatment option. Getting off your feet when they bother you, and specifically avoiding activities that cause pain/discomfort, are very beneficial to prevent, and treat, foot/ankle pain.      I - Ice - icing regularly can help to decrease inflammation and swelling in the foot, thus decreasing pain. Using an ice pack or a bag of frozen veggies works very well. Ice for 20 minutes multiple times per day as needed.  Do not place the ice directly on the skin as this can cause tissue damage.    C - Compression - using a compression wrap or an ACE wrap can help to decrease swelling, which can help to decrease pain. Wearing  the wraps is generally not needed at night, but they should be worn on a regular basis when you are going to be on your feet for prolonged periods as gravity tends to pull fluids down to your feet/ankles.    E - Elevation - elevating your lower extremities multiple times daily for 15-20 minutes can help to decrease swelling, which works well in decreasing pain levels.    NSAID/Tylenol - Anti-inflammatories like Aleve or ibuprofen, and/or a pain medication, such as Tylenol, can help to improve pain levels and get the issue resolved sooner rather than later. Anyone with liver issues should be careful with Tylenol, and anyone with high blood pressure or heart, stomach or kidney issues should be careful with anti-inflammatories. Please ask if you have questions about these medications, including dosage.      Over the Counter Inserts    Super Feet are the most common and easiest to find.    Locations include any [employer] Store, [employer] in [place] on [address]  and in [place] on [address], [employer] in \[facility] [place] [facility]\"" on [address], [facility] Running Room in \[place] [facility]\"" on [address], [facility] Running Room in [place] on [address], [employer] in [place] on [address] and [employer] in \[place]\"" on [address] and in [place] on [address].    Sofsole Fit can be found at [employer] in [place].  You can also find them online at Sofsole.com    Spenco can be found online and at [employer] in \[place]\"" on [address], [employer] in [facility] on [address], [employer] in [place] on [address], [employer] in [place] on [address] and [employer] in [place] on [address].    Power Step can be a little harder to find.  Locations include [employer] in [place] on [address], [employer] in \[place] [facility] [place] [facility]\"", [employer] in [place] on [address] and online    Walk-Fit - [employer]     [facility]    **  A good  high quality over the counter insert can cost around $40-$50.          CAPSULITIS / METATARSALGIA  All joints in the body are surrounded by a capsule, or a covering of soft tissue and ligaments. The capsule holds bones together and secretes joint fluid to help lubricate the joint.  If a joint capsule is exposed to excessive force, it can develop microscopic tears and become inflamed. This commonly occurs in the foot due to mild variation in anatomy. Hammertoes, bunions, irregular bone length, joint immobility, etc. can all lead to excessive force on the joint. Capsule injury can also occur due to repetitive stress from exercise, insufficient support from shoes, excessive bare foot walking and excessive weight.      Conservative treatments include ice, rest from the aggravating activity, weight loss, orthotic inserts, improving shoes and shoe modifications. Appropriate shoes will protect the inflamed tissue improving the chances of healing. Avoidance of standing or walking barefoot, including around the house, is necessary to allow healing. Casts are sometimes used for more aggressive protection.  NSAIDs such as Advil are also used to help with pain and decreasing inflammation. If pain continues over a period of weeks with continuous rest and icing, Corticosteroid injections can be a treatment option to try and help decrease inflammation.    Surgery is often necessary to correct the underlying structural problem. Surgery might include shortening an excessively long bone, repairing bunion or hammertoe, lengthening a tight Achilles  tendon, etc. These are same day surgeries that might be pursued if more conservative measures fail to provide relief.      The inflamed joint capsule has the potential to completely tear. This will allow the toe to drift off the ground, curving toward the other toes. The involved toe may under or overlap the adjacent toes as drift continues. The pain may improve after the joint tears or  this new position will be permanent. Surgery can address the toe alignment. Your goal of treating capsulitis is to avoid this scenario.          CALLUS / CORN / IPK CARE  When there is excessive friction or pressure on the skin, the body responds by making the skin thicker to protect the deeper structures from becoming exposed. While this works well to protect the deeper structures, the thickened skin can cause increased pressure and pain.    Callus: flat, diffuse thickened areas are simple calluses and they are usually caused by friction. Often these are the result of rubbing on a shoe or from going barefoot.    Corns: calluses with a central core on or between the toes are called corns. These result from prominent joints on toes rubbing together. Theses are a symptom of bone malalignment or illfitting shoes and will always recur unless the underlying bones are addressed surgically.    IPK: calluses with a central core on the ball of the foot are usually IPKs (intractable plantar keratosis). These are caused by excessive pressure from the metatarsals, the bones that make up the ball of the foot. Often one of these bones is too long or too prominent. Again, these will always recur unless the underlying bone issue is addressed. There is no cure for these. They will either go away by themselves, recur, or more could develop.    TREATMENT RECOMENDATIONS  - File: Trim them down with a pumice stone or callus file a couple times a week to remove callus tissue that builds up. An electric callus removing device. Amope Pedi Perfect Electronic Pedicure Foot File and Callus Remover can be a good option.   - Moisturize: Lotion can be applied to soften the callus. A lactic acid or urea based cream such as Carmol, Kersal or Vanicream or thicker cream with shea butter are good options.   - Foot Gear: Good supportive shoes and minimizing barefoot walking can slow down callus formation and can decrease pain levels. Gel inserts can  also provide padding to the bottom of the foot to prevent pain and slow recurrence. Toe spacers, toe covers, can custom orthotic inserts can be beneficial as well.  - Surgery: If there is a surgical pathology noted, such as a prominent bone, often this needs to be addressed surgically to minimize recurrence. However, sometimes the lesion simply migrates to another spot after surgery, so it is not a guaranteed cure.   *If you cannot treat them yourself at home, call the home foot care nurses below:  [employer]  [phone] [employer]  [phone]   Footworks  [facility]  [phone]   [facility]   [place]  [phone]     [facility]  [facility]  [phone] [facility] [phone]                     Body Mass Index (BMI)  Many things can cause foot and ankle problems. Foot structure, activity level, foot mechanics and injuries are common causes of pain. One very important issue that often goes unmentioned, is body weight. Extra weight can cause increased stress on muscles, ligaments, bones and tendons. Sometimes just a few extra pounds is all it takes to put one over her/his threshold. Without reducing that stress, it can be difficult to alleviate pain. Some people are uncomfortable addressing this issue, but we feel it is important for you to think about it. As Foot &  Ankle specialists, our job is addressing the lower extremity problem and possible causes. Regarding extra body weight, we encourage patients to discuss diet and weight management plans with their primary care doctors. It is this team approach that gives you the best opportunity for pain relief and getting you back on your feet.

## 2017-12-12 NOTE — PROGRESS NOTES
"Foot & Ankle Surgery  December 12, 2017    CC: sore between toes    I was asked to see Glendy Díaz regarding the chief complaint by:  Dr. Steiner    HPI:  Pt is a 84 year old female who presents with above complaint.  Painful lesion medial L 2nd toe, rubs against hallux.  Pain x 3 months.  \"Burning and shooting pain when walking\".  Pain 8/10 \"all the time\".  \"Podiatrist has been clipping the dead skin off but it returns\".  She also has pain plantar 2nd MPJ.  No injury noted. Painful with lots of standing/walking.  Has tried comfortable shoes with some improvement.     ROS:   Pos for CC.  The patient denies current nausea, vomiting, chills, fevers, belly pain, calf pain, chest pain or SOB.  Complete remainder of ROS is otherwise neg.    VITALS:    Vitals:    12/12/17 0834   Pulse: 88   Resp: 16   Weight: 185 lb (83.9 kg)   Height: 5' 4\" (1.626 m)       PMH:    Past Medical History:   Diagnosis Date     Anxiety      CKD (chronic kidney disease) stage 3, GFR 30-59 ml/min      Hematuria      Hyperlipidemia LDL goal <100      Hyperparathyroidism (H)      Hypertension     No Cardiologist     Incontinence      Leg weakness, bilateral      Neck pain, chronic      Osteoarthritis      Osteopenia      Peripheral neuropathy        SXHX:    Past Surgical History:   Procedure Laterality Date     APPENDECTOMY       ARTHRODESIS FOOT  5/1/2014    Procedure: ARTHRODESIS FOOT;  Surgeon: Sid Marks DPM;  Location: [facility]     ARTHROSCOPY ANKLE, OPEN REPAIR LIGAMENT, COMBINED  5/31/2012    Procedure:COMBINED ARTHROSCOPY ANKLE, OPEN REPAIR LIGAMENT; LEFT ANKLE ARTHROSCOPY, LATERAL LIGAMENT RECONSTRUCTION, ENDOSCOPIC DRISSKIRIT SANDS SECOND TOE RAY CORRECTION    LEFT KNEE Marcaine AND CORTIZONE INJECTION, 5 CC Marcaine, 1 CC CELESTONE, ; Surgeon:VARSHA WILDER; Location: [facility]     CATARACT IOL, RT/LT       CHOLECYSTECTOMY       EXCISE MASS FOOT  12/4/2012    Procedure: EXCISE MASS FOOT;;  Surgeon: Varsha Wilder" MD Jose;  Location: [facility]     HYSTERECTOMY TOTAL ABDOMINAL      ovaries are in     RELEASE TENDON TOE  5/1/2014    Procedure: RELEASE TENDON TOE;  Surgeon: Sid Marks DPM;  Location: [facility] OR     REMOVE HARDWARE FOOT  12/4/2012    Procedure: REMOVE HARDWARE FOOT;  REMOVAL HARDWARE(SYNTHES SCREW) LEFT FOOT, EXCISION OF INCLUSION CYST;  Surgeon: Akshat German MD;  Location: [facility]     REPAIR HAMMER TOE  5/1/2014    Procedure: REPAIR HAMMER TOE;  Surgeon: Sid Marks DPM;  Location: [facility]     REVERSE ARTHROPLASTY SHOULDER Right 7/18/2016    Procedure: REVERSE ARTHROPLASTY SHOULDER;  Surgeon: Marlo Alejandro MD;  Location: [facility] OR        MEDS:    Current Outpatient Prescriptions   Medication     metoprolol (LOPRESSOR) 50 MG tablet     amLODIPine (NORVASC) 2.5 MG tablet     traMADol (ULTRAM) 50 MG tablet     senna-docusate (SENOKOT-S;PERICOLACE) 8.6-50 MG per tablet     furosemide (LASIX) 40 MG tablet     aspirin 81 MG tablet     Cholecalciferol (VITAMIN D) 2000 UNITS tablet     cyanocobalamin (VITAMIN B12) 1000 MCG/ML injection     ketoprofen 10% in PLO 10%     [DISCONTINUED] ORDER FOR DME     No current facility-administered medications for this visit.        ALL:     Allergies   Allergen Reactions     Amlodipine      Leg edema with 10 mg. Tolerates 5 mg     Fosamax [Alendronic Acid]      Bone pain     Lisinopril      Increased potassium     Pravastatin      Muscle aches      Simvastatin      Muscle aches        FMH:    Family History   Problem Relation Age of Onset     Lipids Mother      DIABETES Mother      Circulatory Mother      Kidney disease     DIABETES Brother      Cancer - colorectal Brother      Breast Cancer Sister      Thyroid Disease Daughter      Cancer - colorectal Brother      Alcohol/Drug Brother        SocHx:    Social History     Social History     Marital status:      Spouse name: N/A     Number of children: N/A     Years of education: N/A     Occupational History      Not on file.     Social History Main Topics     Smoking status: Former Smoker     Packs/day: 1.00     Years: 20.00     Types: Cigarettes     Smokeless tobacco: Never Used     Alcohol use No     Drug use: No     Sexual activity: No     Other Topics Concern     Not on file     Social History Narrative           EXAMINATION:  Gen:   No apparent distress  Neuro:   A&Ox3, no deficits  Psych:    Answering questions appropriately for age and situation with normal affect  Head:    NCAT  Eye:    Visual scanning without deficit  Ear:    Response to auditory stimuli wnl  Lung:    Non-labored breathing on RA noted  Abd:    NTND per patient report  Lymph:    Neg for pitting/non-pitting edema BLE  Vasc:    Pulses palpable, CFT minimally delayed  Neuro:    Light touch sensation intact to all sensory nerve distributions without paresthesias  Derm:    Callus medial L 2nd PIPJ without underlying ulceration.    MSK:    L 2nd MPJ shows pain plantarly, drew at base of phalanx.  No flexor tendon pain noted today.  Calf:    Neg for redness, swelling or tenderness    Assessment:  84 year old female with painful callus medial L 2nd PIPJ; L 2nd MPJ capsulitis/plantar plate pain      Plan:  Discussed etiologies and options  1.  Callus medial L 2nd toe  -callus debrided with 15 blade without incident; ABN signed  -We discussed that many calluses are caused by pressure or shearing forces on the skin, and these can often be treated sufficiently with shoes, inserts and local callus debridement.  Some calluses, however, can have a deep core, and while home treatments are helpful, occasional clinical debridement may be necessary.  In certain cases, surgical excision may be required if no other treatments have proven sufficient.  Our home callus instruction handout was dispensed, detailing home therapies to minimize regrowth of the calluses.    -toe pad/spacer handout dispensed  -accommodative shoe gear    2.  2nd MPJ capsulitis/plantar plate  pathology  -RICE/NSAID prn  -wear inserts that came with her SAS   -supportive shoes, minimize shoeless ambulation  -consider custom orthotics     Follow up:  prn or sooner with acute issues      Patient's medical history was reviewed today    Body mass index is 31.76 kg/(m^2).  Weight management plan: Patient was referred to their PCP to discuss a diet and exercise plan.        Steven Bonilla DPM   Podiatric Foot & Ankle Surgeon  [facility]  [phone]

## 2017-12-12 NOTE — LETTER
"    12/12/2017         RE: Glendy Díaz  [address]  [place] [address]        Dear Colleague,    Thank you for referring your patient, Glendy Díaz, to the [facility]. Please see a copy of my visit note below.    Foot & Ankle Surgery  December 12, 2017    CC: sore between toes    I was asked to see Glendy Díaz regarding the chief complaint by:  Dr. Steiner    HPI:  Pt is a 84 year old female who presents with above complaint.  Painful lesion medial L 2nd toe, rubs against hallux.  Pain x 3 months.  \"Burning and shooting pain when walking\".  Pain 8/10 \"all the time\".  \"Podiatrist has been clipping the dead skin off but it returns\".  She also has pain plantar 2nd MPJ.  No injury noted. Painful with lots of standing/walking.  Has tried comfortable shoes with some improvement.     ROS:   Pos for CC.  The patient denies current nausea, vomiting, chills, fevers, belly pain, calf pain, chest pain or SOB.  Complete remainder of ROS is otherwise neg.    VITALS:    Vitals:    12/12/17 0834   Pulse: 88   Resp: 16   Weight: 185 lb (83.9 kg)   Height: 5' 4\" (1.626 m)       PMH:    Past Medical History:   Diagnosis Date     Anxiety      CKD (chronic kidney disease) stage 3, GFR 30-59 ml/min      Hematuria      Hyperlipidemia LDL goal <100      Hyperparathyroidism (H)      Hypertension     No Cardiologist     Incontinence      Leg weakness, bilateral      Neck pain, chronic      Osteoarthritis      Osteopenia      Peripheral neuropathy        SXHX:    Past Surgical History:   Procedure Laterality Date     APPENDECTOMY       ARTHRODESIS FOOT  5/1/2014    Procedure: ARTHRODESIS FOOT;  Surgeon: Sid Marks DPM;  [facility]: [facility]     ARTHROSCOPY ANKLE, OPEN REPAIR LIGAMENT, COMBINED  5/31/2012    Procedure:COMBINED ARTHROSCOPY ANKLE, OPEN REPAIR LIGAMENT; LEFT ANKLE ARTHROSCOPY, LATERAL LIGAMENT RECONSTRUCTION, ENDOSCOPIC KIRIT NAQVI SECOND TOE RAY CORRECTION    LEFT KNEE Panfilo " AND CORTIZONE INJECTION, 5 CC Marcaine, 1 CC CELESTONE, ; Surgeon:AKSHAT GERMAN; Location:[facility]     CATARACT IOL, RT/LT       CHOLECYSTECTOMY       EXCISE MASS FOOT  12/4/2012    Procedure: EXCISE MASS FOOT;;  Surgeon: Akshat German MD;  Location: [facility]     HYSTERECTOMY TOTAL ABDOMINAL      ovaries are in     RELEASE TENDON TOE  5/1/2014    Procedure: RELEASE TENDON TOE;  Surgeon: Sid Marks DPM;  [facility]: [facility]     REMOVE HARDWARE FOOT  12/4/2012    Procedure: REMOVE HARDWARE FOOT;  REMOVAL HARDWARE(SYNTHES SCREW) LEFT FOOT, EXCISION OF INCLUSION CYST;  Surgeon: Akshat German MD;  Location: [facility]     REPAIR HAMMER TOE  5/1/2014    Procedure: REPAIR HAMMER TOE;  Surgeon: Sid Marks DPM;  [facility]: [facility]     REVERSE ARTHROPLASTY SHOULDER Right 7/18/2016    Procedure: REVERSE ARTHROPLASTY SHOULDER;  Surgeon: Marlo Alejandro MD;  Location: [facility]        MEDS:    Current Outpatient Prescriptions   Medication     metoprolol (LOPRESSOR) 50 MG tablet     amLODIPine (NORVASC) 2.5 MG tablet     traMADol (ULTRAM) 50 MG tablet     senna-docusate (SENOKOT-S;PERICOLACE) 8.6-50 MG per tablet     furosemide (LASIX) 40 MG tablet     aspirin 81 MG tablet     Cholecalciferol (VITAMIN D) 2000 UNITS tablet     cyanocobalamin (VITAMIN B12) 1000 MCG/ML injection     ketoprofen 10% in PLO 10%     [DISCONTINUED] ORDER FOR DME     No current facility-administered medications for this visit.        ALL:     Allergies   Allergen Reactions     Amlodipine      Leg edema with 10 mg. Tolerates 5 mg     Fosamax [Alendronic Acid]      Bone pain     Lisinopril      Increased potassium     Pravastatin      Muscle aches      Simvastatin      Muscle aches        FMH:    Family History   Problem Relation Age of Onset     Lipids Mother      DIABETES Mother      Circulatory Mother      Kidney disease     DIABETES Brother      Cancer - colorectal Brother      Breast Cancer Sister      Thyroid Disease  Daughter      Cancer - colorectal Brother      Alcohol/Drug Brother        SocHx:    Social History     Social History     Marital status:      Spouse name: N/A     Number of children: N/A     Years of education: N/A     Occupational History     Not on file.     Social History Main Topics     Smoking status: Former Smoker     Packs/day: 1.00     Years: 20.00     Types: Cigarettes     Smokeless tobacco: Never Used     Alcohol use No     Drug use: No     Sexual activity: No     Other Topics Concern     Not on file     Social History Narrative           EXAMINATION:  Gen:   No apparent distress  Neuro:   A&Ox3, no deficits  Psych:    Answering questions appropriately for age and situation with normal affect  Head:    NCAT  Eye:    Visual scanning without deficit  Ear:    Response to auditory stimuli wnl  Lung:    Non-labored breathing on RA noted  Abd:    NTND per patient report  Lymph:    Neg for pitting/non-pitting edema BLE  Vasc:    Pulses palpable, CFT minimally delayed  Neuro:    Light touch sensation intact to all sensory nerve distributions without paresthesias  Derm:    Callus medial L 2nd PIPJ without underlying ulceration.    MSK:    L 2nd MPJ shows pain plantarly, drew at base of phalanx.  No flexor tendon pain noted today.  Calf:    Neg for redness, swelling or tenderness    Assessment:  84 year old female with painful callus medial L 2nd PIPJ; L 2nd MPJ capsulitis/plantar plate pain      Plan:  Discussed etiologies and options  1.  Callus medial L 2nd toe  -callus debrided with 15 blade without incident; ABN signed  -We discussed that many calluses are caused by pressure or shearing forces on the skin, and these can often be treated sufficiently with shoes, inserts and local callus debridement.  Some calluses, however, can have a deep core, and while home treatments are helpful, occasional clinical debridement may be necessary.  In certain cases, surgical excision may be required if no other  treatments have proven sufficient.  Our home callus instruction handout was dispensed, detailing home therapies to minimize regrowth of the calluses.    -toe pad/spacer handout dispensed  -accommodative shoe gear    2.  2nd MPJ capsulitis/plantar plate pathology  -RICE/NSAID prn  -wear inserts that came with her SAS   -supportive shoes, minimize shoeless ambulation  -consider custom orthotics     Follow up:  prn or sooner with acute issues      Patient's medical history was reviewed today    Body mass index is 31.76 kg/(m^2).  Weight management plan: Patient was referred to their PCP to discuss a diet and exercise plan.        Steven Bonilla DPM   Podiatric Foot & Ankle Surgeon  [facility]  [phone]      Again, thank you for allowing me to participate in the care of your patient.        Sincerely,        Steven Bonilla DPM, DPFIONA

## 2017-12-12 NOTE — NURSING NOTE
"Chief Complaint   Patient presents with     Foot Problems     L foot sore between hallux and 2nd toe x few months, burning       Initial Pulse 88  Resp 16  Ht 5' 4\" (1.626 m)  Wt 185 lb (83.9 kg)  BMI 31.76 kg/m2 Estimated body mass index is 31.76 kg/(m^2) as calculated from the following:    Height as of this encounter: 5' 4\" (1.626 m).    Weight as of this encounter: 185 lb (83.9 kg).  Medication Reconciliation: complete    Ruben Manzano CMA ([facility])  Podiatry / Foot & Ankle Surgery  [facility]    "

## 2017-12-12 NOTE — MR AVS SNAPSHOT
After Visit Summary   12/12/2017    Glendy Díaz    MRN: [id number]           Patient Information     Date Of Birth          11/16/1933        Visit Information        Provider Department      12/12/2017 8:30 AM tSeven Bonilla DPM [facility]        Care Instructions    Thank you for choosing [facility]!    DR. BONILLA'S CLINIC LOCATIONS:   MONDAY - [place] TUESDAY - [place]   [address] [facility]  [address]   [place] [address] [place] [address]   [phone]       THURSDAY AM - [place] THURSDAY PM - [place]   [address]   [place] [address] [place] [address]   [phone]       FRIDAY AM - [place] SET UP SURGERY: [phone] [address] APPOINTMENTS: [phone]   [place] [address] BILLING QUESTIONS: [phone] FAX NUMBER: [phone]     Follow Up: as needed  PRICE THERAPY    Many aches and pains throughout the foot and ankle can be helped with many simple treatments. This is usually described as PRICE Therapy.      P - Protection - often times, inflammation/pain in the lower extremity is not able to improve simply because the areas involved are never allowed to rest. Every step we take can bother the problematic area. Protecting those areas is an important step in the healing process. This may involve a walking cast boot, a special insert/orthotic device, an ankle brace, or simply avoiding barefoot walking.    R - Rest - in addition to protecting the foot/ankle, resting is an important, but often times difficult, treatment option. Getting off your feet when they bother you, and specifically avoiding activities that cause pain/discomfort, are very beneficial to prevent, and treat, foot/ankle pain.      I - Ice - icing regularly can help to decrease inflammation and swelling in the foot, thus decreasing pain. Using an ice pack  or a bag of frozen veggies works very well. Ice for 20 minutes multiple times per day as needed.  Do not place the ice directly on the skin as this can cause tissue damage.    C - Compression - using a compression wrap or an ACE wrap can help to decrease swelling, which can help to decrease pain. Wearing the wraps is generally not needed at night, but they should be worn on a regular basis when you are going to be on your feet for prolonged periods as gravity tends to pull fluids down to your feet/ankles.    E - Elevation - elevating your lower extremities multiple times daily for 15-20 minutes can help to decrease swelling, which works well in decreasing pain levels.    NSAID/Tylenol - Anti-inflammatories like Aleve or ibuprofen, and/or a pain medication, such as Tylenol, can help to improve pain levels and get the issue resolved sooner rather than later. Anyone with liver issues should be careful with Tylenol, and anyone with high blood pressure or heart, stomach or kidney issues should be careful with anti-inflammatories. Please ask if you have questions about these medications, including dosage.      Over the Counter Inserts    Super Feet are the most common and easiest to find.    Locations include any [employer], [employer] in [place] on [address] [address] and in [place] on [address], [employer] in [facility] on [facility], [facility] Running Room in [facility] on [facility], [facility] Running Room in [place] on [address], Recreational [employer] in [place] on [address] and [email or web address] Sport Shop in [facility] on [address] and in [place] on [address] [facility].    Sofsole Fit can be found at [employer] in [place].  You can also find them online at Sofsole.com    Spenco can be found online and at [employer] Shoe Shop in [facility] on [address], Run N' Fun in [facility] on [address], Gear Running Store in [place] on [address], [employer] in [place] on [address]  and [employer] in [place] on [address].    Power Step can be a little harder to find.  Locations include [employer] in [place] on [address], [place] in [facility], Stop[employer]over Store in [place] on [address] and online    Walk-Fit - [employer]     [employer] - [facility]    **  A good high quality over the counter insert can cost around $40-$50.          CAPSULITIS / METATARSALGIA  All joints in the body are surrounded by a capsule, or a covering of soft tissue and ligaments. The capsule holds bones together and secretes joint fluid to help lubricate the joint.  If a joint capsule is exposed to excessive force, it can develop microscopic tears and become inflamed. This commonly occurs in the foot due to mild variation in anatomy. Hammertoes, bunions, irregular bone length, joint immobility, etc. can all lead to excessive force on the joint. Capsule injury can also occur due to repetitive stress from exercise, insufficient support from shoes, excessive bare foot walking and excessive weight.      Conservative treatments include ice, rest from the aggravating activity, weight loss, orthotic inserts, improving shoes and shoe modifications. Appropriate shoes will protect the inflamed tissue improving the chances of healing. Avoidance of standing or walking barefoot, including around the house, is necessary to allow healing. Casts are sometimes used for more aggressive protection.  NSAIDs such as Advil are also used to help with pain and decreasing inflammation. If pain continues over a period of weeks with continuous rest and icing, Corticosteroid injections can be a treatment option to try and help decrease inflammation.    Surgery is often necessary to correct the underlying structural problem. Surgery might include shortening an excessively long bone, repairing bunion or hammertoe, lengthening a tight Achilles  tendon, etc. These are same day surgeries that might be pursued if more conservative  measures fail to provide relief.      The inflamed joint capsule has the potential to completely tear. This will allow the toe to drift off the ground, curving toward the other toes. The involved toe may under or overlap the adjacent toes as drift continues. The pain may improve after the joint tears or this new position will be permanent. Surgery can address the toe alignment. Your goal of treating capsulitis is to avoid this scenario.          CALLUS / CORN / IPK CARE  When there is excessive friction or pressure on the skin, the body responds by making the skin thicker to protect the deeper structures from becoming exposed. While this works well to protect the deeper structures, the thickened skin can cause increased pressure and pain.    Callus: flat, diffuse thickened areas are simple calluses and they are usually caused by friction. Often these are the result of rubbing on a shoe or from going barefoot.    Corns: calluses with a central core on or between the toes are called corns. These result from prominent joints on toes rubbing together. Theses are a symptom of bone malalignment or illfitting shoes and will always recur unless the underlying bones are addressed surgically.    IPK: calluses with a central core on the ball of the foot are usually IPKs (intractable plantar keratosis). These are caused by excessive pressure from the metatarsals, the bones that make up the ball of the foot. Often one of these bones is too long or too prominent. Again, these will always recur unless the underlying bone issue is addressed. There is no cure for these. They will either go away by themselves, recur, or more could develop.    TREATMENT RECOMENDATIONS  - File: Trim them down with a pumice stone or callus file a couple times a week to remove callus tissue that builds up. An electric callus removing device. Amope Pedi Perfect Electronic Pedicure Foot File and Callus Remover can be a good option.   - Moisturize: Lotion  can be applied to soften the callus. A lactic acid or urea based cream such as Carmol, Kersal or Vanicream or thicker cream with shea butter are good options.   - Foot Gear: Good supportive shoes and minimizing barefoot walking can slow down callus formation and can decrease pain levels. Gel inserts can also provide padding to the bottom of the foot to prevent pain and slow recurrence. Toe spacers, toe covers, can custom orthotic inserts can be beneficial as well.  - Surgery: If there is a surgical pathology noted, such as a prominent bone, often this needs to be addressed surgically to minimize recurrence. However, sometimes the lesion simply migrates to another spot after surgery, so it is not a guaranteed cure.   *If you cannot treat them yourself at home, call the home foot care nurses below:  Happy [employer]et  [phone] [employer]  [phone]   Footworks  [facility]  [phone]   [facility]   [place]  [phone]     [facility]  [facility]  [phone] [facility] [phone]                     Body Mass Index (BMI)  Many things can cause foot and ankle problems. Foot structure, activity level, foot mechanics and injuries are common causes of pain. One very important issue that often goes unmentioned, is body weight. Extra weight can cause increased stress on muscles, ligaments, bones and tendons. Sometimes just a few extra pounds is all it takes to put one over her/his threshold. Without reducing that stress, it can be difficult to alleviate pain. Some people are uncomfortable addressing this issue, but we feel it is important for you to think about it. As Foot &  Ankle specialists, our job is addressing the lower extremity problem and possible causes. Regarding extra body weight, we encourage patients to discuss diet and weight management plans with their primary care doctors. It is this team approach that gives you the best opportunity  "for pain relief and getting you back on your feet.            Follow-ups after your visit        Who to contact     If you have questions or need follow up information about today's clinic visit or your schedule please contact [facility] directly at [phone].  Normal or non-critical lab and imaging results will be communicated to you by MyChart, letter or phone within 4 business days after the clinic has received the results. If you do not hear from us within 7 days, please contact the clinic through MyChart or phone. If you have a critical or abnormal lab result, we will notify you by phone as soon as possible.  Submit refill requests through [employer] or call your pharmacy and they will forward the refill request to us. Please allow 3 business days for your refill to be completed.          Additional Information About Your Visit        Wazokuhart Information     [employer] lets you send messages to your doctor, view your test results, renew your prescriptions, schedule appointments and more. To sign up, go to [email or web address] . Click on \"Log in\" on the left side of the screen, which will take you to the Welcome page. Then click on \"Sign up Now\" on the right side of the page.     You will be asked to enter the access code listed below, as well as some personal information. Please follow the directions to create your username and password.     Your access code is: [email or web address]  Expires: 2018  8:44 AM     Your access code will  in 90 days. If you need help or a new code, please call your [facility] clinic or [phone].        Care EveryWhere ID     This is your Care EveryWhere ID. This could be used by other organizations to access your [facility] medical records  [id number]        Your Vitals Were     Pulse Respirations Height BMI (Body Mass Index)          88 16 5' 4\" (1.626 m) 31.76 kg/m2         Blood Pressure from Last 3 Encounters:   17 130/68   17 144/78   17 " 140/70    Weight from Last 3 Encounters:   12/12/17 185 lb (83.9 kg)   09/22/17 185 lb 12.8 oz (84.3 kg)   08/25/17 195 lb 12.8 oz (88.8 kg)              Today, you had the following     No orders found for display       Primary Care Provider Office Phone # Fax #    Veronica Davis [phone]       [address]  [facility] [address]        Equal Access to Services     TORIBIO [facility] CenterMY[facility] : Hadii aad ku hadasho Soomaali, waaxda luqadaha, qaybta kaalmada adeegyada, waxay idiin hayaan adeeg kharash mart . So [facility] [phone].    ATENCIÓN: Si habla español, tiene a jones disposición servicios gratuitos de asistencia lingüística. Llame al [phone].    We comply with applicable federal civil rights laws and [place] laws. We do not discriminate on the basis of race, color, national origin, age, disability, sex, sexual orientation, or gender identity.            Thank you!     Thank you for choosing [facility]  for your care. Our goal is always to provide you with excellent care. Hearing back from our patients is one way we can continue to improve our services. Please take a few minutes to complete the written survey that you may receive in the mail after your visit with us. Thank you!             Your Updated Medication List - Protect others around you: Learn how to safely use, store and throw away your medicines at www.disposemymeds.org.          This list is accurate as of: 12/12/17  8:55 AM.  Always use your most recent med list.                   Brand Name Dispense Instructions for use Diagnosis    amLODIPine 2.5 MG tablet    NORVASC    90 tablet    TAKE 1 TABLET BY MOUTH  DAILY    Essential hypertension with goal blood pressure less than 140/90       aspirin 81 MG tablet     30 tablet    Take 1 tablet (81 mg) by mouth daily    CKD (chronic kidney disease) stage 3, GFR 30-59 ml/min, Essential hypertension with goal blood pressure less than 140/90        cyanocobalamin 1000 MCG/ML injection    VITAMIN B12    1 mL    1 mL (1,000 mcg) every 30 days        furosemide 40 MG tablet    LASIX    90 tablet    Take 1-2 tablets (40-80 mg) by mouth daily    Essential hypertension with goal blood pressure less than 140/90       ketoprofen 10% in PLO 10% topical gel     50 g    Apply 4 times a day as need for pain    Arthritis       metoprolol 50 MG tablet    LOPRESSOR    90 tablet    TAKE ONE-HALF TABLET BY  MOUTH TWO TIMES DAILY    Essential hypertension with goal blood pressure less than 140/90       senna-docusate 8.6-50 MG per tablet    SENOKOT-S;PERICOLACE    30 tablet    Take 1-2 tablets by mouth 2 times daily as needed for constipation    Constipation due to pain medication       traMADol 50 MG tablet    ULTRAM    40 tablet    Take 1 tablet (50 mg) by mouth 2 times daily as needed for moderate pain    Multiple joint pain       vitamin D 2000 UNITS tablet     100 tablet    Take 2,000 Units by mouth daily    Vitamin D deficiency, Hyperparathyroidism (H)

## 2018-01-09 ENCOUNTER — ALLIED HEALTH/NURSE VISIT (OUTPATIENT)
Dept: NURSING | Facility: CLINIC | Age: 83
End: 2018-01-09
Payer: MEDICARE

## 2018-01-09 DIAGNOSIS — I10 ESSENTIAL HYPERTENSION WITH GOAL BLOOD PRESSURE LESS THAN 140/90: Chronic | ICD-10-CM

## 2018-01-09 DIAGNOSIS — D51.0 PERNICIOUS ANEMIA: ICD-10-CM

## 2018-01-09 PROCEDURE — 99207 ZZC NO CHARGE NURSE ONLY: CPT

## 2018-01-09 PROCEDURE — 96372 THER/PROPH/DIAG INJ SC/IM: CPT

## 2018-01-09 NOTE — TELEPHONE ENCOUNTER
"Requested Prescriptions   Pending Prescriptions Disp Refills     amLODIPine (NORVASC) 2.5 MG tablet 90 tablet 0    Calcium Channel Blockers Protocol  Failed    1/9/2018  9:28 AM       Failed - Normal serum creatinine on file in past 12 months    Recent Labs   Lab Test  08/17/17   0902   CR  1.50*            Passed - Blood pressure under 140/90    BP Readings from Last 3 Encounters:   09/22/17 130/68   08/25/17 144/78   01/24/17 140/70                Passed - Recent or future visit with authorizing provider    Patient had office visit in the last year or has a visit in the next 30 days with authorizing provider.  See \"Patient Info\" tab in inbasket, or \"Choose Columns\" in Meds & Orders section of the refill encounter.              Passed - Patient is age 18 or older       Passed - No active pregnancy on record       Passed - No positive pregnancy test in past 12 months        metoprolol (LOPRESSOR) 50 MG tablet 90 tablet 0    Beta-Blockers Protocol Passed    1/9/2018  9:28 AM       Passed - Blood pressure under 140/90    BP Readings from Last 3 Encounters:   09/22/17 130/68   08/25/17 144/78   01/24/17 140/70                Passed - Patient is age 6 or older       Passed - Recent or future visit with authorizing provider's specialty    Patient had office visit in the last year or has a visit in the next 30 days with authorizing provider.  See \"Patient Info\" tab in inbasket, or \"Choose Columns\" in Meds & Orders section of the refill encounter.                 "

## 2018-01-12 RX ORDER — METOPROLOL TARTRATE 50 MG
TABLET ORAL
Qty: 90 TABLET | Refills: 0 | Status: SHIPPED | OUTPATIENT
Start: 2018-01-12 | End: 2018-03-02

## 2018-01-12 RX ORDER — AMLODIPINE BESYLATE 2.5 MG/1
TABLET ORAL
Qty: 90 TABLET | Refills: 0 | Status: SHIPPED | OUTPATIENT
Start: 2018-01-12 | End: 2018-03-02

## 2018-01-12 NOTE — TELEPHONE ENCOUNTER
Lopressor - Prescription approved per [facility] Refill Protocol.  Norvasc-Routing refill request to provider for review/approval because:  Labs out of range:  cr

## 2018-02-06 ENCOUNTER — ALLIED HEALTH/NURSE VISIT (OUTPATIENT)
Dept: NURSING | Facility: CLINIC | Age: 83
End: 2018-02-06
Payer: MEDICARE

## 2018-02-06 DIAGNOSIS — D51.0 PERNICIOUS ANEMIA: ICD-10-CM

## 2018-02-06 PROCEDURE — 99207 ZZC NO CHARGE NURSE ONLY: CPT

## 2018-02-06 PROCEDURE — 96372 THER/PROPH/DIAG INJ SC/IM: CPT

## 2018-02-06 NOTE — MR AVS SNAPSHOT
"              After Visit Summary   2018    Glendy Díaz    MRN: [id number]           Patient Information     Date Of Birth          1933        Visit Information        Provider Department      2018 8:30 AM RI IM NURSE [facility]        Today's Diagnoses     Pernicious anemia           Follow-ups after your visit        Who to contact     If you have questions or need follow up information about today's clinic visit or your schedule please contact [facility] directly at [phone].  Normal or non-critical lab and imaging results will be communicated to you by Donutshart, letter or phone within 4 business days after the clinic has received the results. If you do not hear from us within 7 days, please contact the clinic through Donutshart or phone. If you have a critical or abnormal lab result, we will notify you by phone as soon as possible.  Submit refill requests through [employer] or call your pharmacy and they will forward the refill request to us. Please allow 3 business days for your refill to be completed.          Additional Information About Your Visit        MyChart Information     [employer] lets you send messages to your doctor, view your test results, renew your prescriptions, schedule appointments and more. To sign up, go to [email or web address] . Click on \"Log in\" on the left side of the screen, which will take you to the Welcome page. Then click on \"Sign up Now\" on the right side of the page.     You will be asked to enter the access code listed below, as well as some personal information. Please follow the directions to create your username and password.     Your access code is: [email or web address]  Expires: 2018  8:44 AM     Your access code will  in 90 days. If you need help or a new code, please call your [facility] or [phone].        Care EveryWhere ID     This is your Care EveryWhere ID. This could be used by other organizations to " access your [facility] medical records  [id number]         Blood Pressure from Last 3 Encounters:   09/22/17 130/68   08/25/17 144/78   01/24/17 140/70    Weight from Last 3 Encounters:   12/12/17 185 lb (83.9 kg)   09/22/17 185 lb 12.8 oz (84.3 kg)   08/25/17 195 lb 12.8 oz (88.8 kg)              We Performed the Following     VITAMIN B12 1000 mcg (standing order with a count of 15)     VITAMIN B12 1000 mcg (standing order with a count of 15)        Primary Care Provider Office Phone # Fax #    Veronica Sophia Davis [phone]       [address]  [facility] [address]        Equal Access to Services     PATRICK HIDALGO : Hadii carlos rioso Solopez, waaxda luqadaha, qaybta kaalmada adeegyada, deirdre cevallos . So [facility] [phone].    ATENCIÓN: Si habla español, tiene a jones disposición servicios gratuitos de asistencia lingüística. Llame al [phone].    We comply with applicable federal civil rights laws and [place] laws. We do not discriminate on the basis of race, color, national origin, age, disability, sex, sexual orientation, or gender identity.            Thank you!     Thank you for choosing [facility]  for your care. Our goal is always to provide you with excellent care. Hearing back from our patients is one way we can continue to improve our services. Please take a few minutes to complete the written survey that you may receive in the mail after your visit with us. Thank you!             Your Updated Medication List - Protect others around you: Learn how to safely use, store and throw away your medicines at www.disposemymeds.org.          This list is accurate as of 2/6/18  9:13 AM.  Always use your most recent med list.                   Brand Name Dispense Instructions for use Diagnosis    amLODIPine 2.5 MG tablet    NORVASC    90 tablet    TAKE 1 TABLET BY MOUTH  DAILY    Essential hypertension with goal blood pressure less  than 140/90       aspirin 81 MG tablet     30 tablet    Take 1 tablet (81 mg) by mouth daily    CKD (chronic kidney disease) stage 3, GFR 30-59 ml/min, Essential hypertension with goal blood pressure less than 140/90       cyanocobalamin 1000 MCG/ML injection    VITAMIN B12    1 mL    1 mL (1,000 mcg) every 30 days        furosemide 40 MG tablet    LASIX    90 tablet    Take 1-2 tablets (40-80 mg) by mouth daily    Essential hypertension with goal blood pressure less than 140/90       ketoprofen 10% in PLO 10% topical gel     50 g    Apply 4 times a day as need for pain    Arthritis       metoprolol tartrate 50 MG tablet    LOPRESSOR    90 tablet    TAKE ONE-HALF TABLET BY  MOUTH TWO TIMES DAILY    Essential hypertension with goal blood pressure less than 140/90       senna-docusate 8.6-50 MG per tablet    SENOKOT-S;PERICOLACE    30 tablet    Take 1-2 tablets by mouth 2 times daily as needed for constipation    Constipation due to pain medication       traMADol 50 MG tablet    ULTRAM    40 tablet    Take 1 tablet (50 mg) by mouth 2 times daily as needed for moderate pain    Multiple joint pain       vitamin D 2000 UNITS tablet     100 tablet    Take 2,000 Units by mouth daily    Vitamin D deficiency, Hyperparathyroidism (H)

## 2018-02-09 ENCOUNTER — APPOINTMENT (OUTPATIENT)
Dept: CT IMAGING | Facility: CLINIC | Age: 83
End: 2018-02-09
Attending: EMERGENCY MEDICINE
Payer: MEDICARE

## 2018-02-09 ENCOUNTER — HOSPITAL ENCOUNTER (EMERGENCY)
Facility: CLINIC | Age: 83
Discharge: HOME | End: 2018-02-09
Attending: EMERGENCY MEDICINE | Admitting: EMERGENCY MEDICINE
Payer: MEDICARE

## 2018-02-09 VITALS
RESPIRATION RATE: 16 BRPM | DIASTOLIC BLOOD PRESSURE: 95 MMHG | TEMPERATURE: 97.3 F | SYSTOLIC BLOOD PRESSURE: 146 MMHG | HEART RATE: 93 BPM | OXYGEN SATURATION: 97 %

## 2018-02-09 DIAGNOSIS — N28.9 RENAL INSUFFICIENCY: ICD-10-CM

## 2018-02-09 DIAGNOSIS — J32.9 SINUSITIS, UNSPECIFIED CHRONICITY, UNSPECIFIED LOCATION: ICD-10-CM

## 2018-02-09 DIAGNOSIS — R42 DIZZINESS: ICD-10-CM

## 2018-02-09 LAB
ALBUMIN UR-MCNC: NEGATIVE MG/DL
ANION GAP SERPL CALCULATED.3IONS-SCNC: 10 MMOL/L (ref 3–14)
APPEARANCE UR: CLEAR
BASOPHILS # BLD AUTO: 0 10E9/L (ref 0–0.2)
BASOPHILS NFR BLD AUTO: 0.4 %
BILIRUB UR QL STRIP: NEGATIVE
BUN SERPL-MCNC: 40 MG/DL (ref 7–30)
CALCIUM SERPL-MCNC: 9 MG/DL (ref 8.5–10.1)
CHLORIDE SERPL-SCNC: 104 MMOL/L (ref 94–109)
CO2 SERPL-SCNC: 22 MMOL/L (ref 20–32)
COLOR UR AUTO: ABNORMAL
CREAT SERPL-MCNC: 1.48 MG/DL (ref 0.52–1.04)
DIFFERENTIAL METHOD BLD: NORMAL
EOSINOPHIL # BLD AUTO: 0.2 10E9/L (ref 0–0.7)
EOSINOPHIL NFR BLD AUTO: 1.9 %
ERYTHROCYTE [DISTWIDTH] IN BLOOD BY AUTOMATED COUNT: 13.1 % (ref 10–15)
GFR SERPL CREATININE-BSD FRML MDRD: 34 ML/MIN/1.7M2
GLUCOSE SERPL-MCNC: 94 MG/DL (ref 70–99)
GLUCOSE UR STRIP-MCNC: NEGATIVE MG/DL
HCT VFR BLD AUTO: 40.1 % (ref 35–47)
HGB BLD-MCNC: 13.2 G/DL (ref 11.7–15.7)
HGB UR QL STRIP: ABNORMAL
IMM GRANULOCYTES # BLD: 0 10E9/L (ref 0–0.4)
IMM GRANULOCYTES NFR BLD: 0.4 %
INTERPRETATION ECG - MUSE: NORMAL
KETONES UR STRIP-MCNC: NEGATIVE MG/DL
LEUKOCYTE ESTERASE UR QL STRIP: ABNORMAL
LYMPHOCYTES # BLD AUTO: 2.2 10E9/L (ref 0.8–5.3)
LYMPHOCYTES NFR BLD AUTO: 28.1 %
MCH RBC QN AUTO: 30.8 PG (ref 26.5–33)
MCHC RBC AUTO-ENTMCNC: 32.9 G/DL (ref 31.5–36.5)
MCV RBC AUTO: 94 FL (ref 78–100)
MONOCYTES # BLD AUTO: 0.6 10E9/L (ref 0–1.3)
MONOCYTES NFR BLD AUTO: 7.2 %
MUCOUS THREADS #/AREA URNS LPF: PRESENT /LPF
NEUTROPHILS # BLD AUTO: 4.8 10E9/L (ref 1.6–8.3)
NEUTROPHILS NFR BLD AUTO: 62 %
NITRATE UR QL: NEGATIVE
NRBC # BLD AUTO: 0 10*3/UL
NRBC BLD AUTO-RTO: 0 /100
PH UR STRIP: 5 PH (ref 5–7)
PLATELET # BLD AUTO: 259 10E9/L (ref 150–450)
POTASSIUM SERPL-SCNC: 4.1 MMOL/L (ref 3.4–5.3)
RBC # BLD AUTO: 4.28 10E12/L (ref 3.8–5.2)
RBC #/AREA URNS AUTO: 2 /HPF (ref 0–2)
SODIUM SERPL-SCNC: 136 MMOL/L (ref 133–144)
SOURCE: ABNORMAL
SP GR UR STRIP: 1 (ref 1–1.03)
SQUAMOUS #/AREA URNS AUTO: 1 /HPF (ref 0–1)
TROPONIN I SERPL-MCNC: <0.015 UG/L (ref 0–0.04)
UROBILINOGEN UR STRIP-MCNC: 0 MG/DL (ref 0–2)
WBC # BLD AUTO: 7.8 10E9/L (ref 4–11)
WBC #/AREA URNS AUTO: 3 /HPF (ref 0–2)

## 2018-02-09 PROCEDURE — 87086 URINE CULTURE/COLONY COUNT: CPT | Performed by: EMERGENCY MEDICINE

## 2018-02-09 PROCEDURE — 81001 URINALYSIS AUTO W/SCOPE: CPT | Performed by: EMERGENCY MEDICINE

## 2018-02-09 PROCEDURE — 96360 HYDRATION IV INFUSION INIT: CPT

## 2018-02-09 PROCEDURE — 70450 CT HEAD/BRAIN W/O DYE: CPT

## 2018-02-09 PROCEDURE — 85025 COMPLETE CBC W/AUTO DIFF WBC: CPT | Performed by: EMERGENCY MEDICINE

## 2018-02-09 PROCEDURE — 96361 HYDRATE IV INFUSION ADD-ON: CPT

## 2018-02-09 PROCEDURE — 80048 BASIC METABOLIC PNL TOTAL CA: CPT | Performed by: EMERGENCY MEDICINE

## 2018-02-09 PROCEDURE — 84484 ASSAY OF TROPONIN QUANT: CPT | Performed by: EMERGENCY MEDICINE

## 2018-02-09 PROCEDURE — 25000128 H RX IP 250 OP 636: Performed by: EMERGENCY MEDICINE

## 2018-02-09 PROCEDURE — A9270 NON-COVERED ITEM OR SERVICE: HCPCS | Mod: GY | Performed by: EMERGENCY MEDICINE

## 2018-02-09 PROCEDURE — 25000131 ZZH RX MED GY IP 250 OP 636 PS 637: Mod: GY | Performed by: EMERGENCY MEDICINE

## 2018-02-09 PROCEDURE — 99285 EMERGENCY DEPT VISIT HI MDM: CPT | Mod: 25

## 2018-02-09 RX ORDER — MECLIZINE HYDROCHLORIDE 25 MG/1
25 TABLET ORAL ONCE
Status: COMPLETED | OUTPATIENT
Start: 2018-02-09 | End: 2018-02-09

## 2018-02-09 RX ORDER — CLONIDINE HYDROCHLORIDE 0.1 MG/1
0.1 TABLET ORAL ONCE
Status: DISCONTINUED | OUTPATIENT
Start: 2018-02-09 | End: 2018-02-09

## 2018-02-09 RX ORDER — AMOXICILLIN AND CLAVULANATE POTASSIUM 500; 125 MG/1; MG/1
1 TABLET, FILM COATED ORAL 2 TIMES DAILY
Qty: 20 TABLET | Refills: 0 | Status: SHIPPED | OUTPATIENT
Start: 2018-02-09 | End: 2018-02-19

## 2018-02-09 RX ADMIN — SODIUM CHLORIDE 500 ML: 9 INJECTION, SOLUTION INTRAVENOUS at 15:51

## 2018-02-09 RX ADMIN — MECLIZINE HYDROCHLORIDE 25 MG: 25 TABLET ORAL at 15:53

## 2018-02-09 ASSESSMENT — ENCOUNTER SYMPTOMS
SINUS PAIN: 1
NAUSEA: 0
WEAKNESS: 1
SORE THROAT: 0
DIZZINESS: 1

## 2018-02-09 NOTE — ED PROVIDER NOTES
History     Chief Complaint:  Dizziness    HPI   Glendy Díaz is a 84 year old female with a history of hypertension, hyperlipidemia, and CKD who presents to the ED for evaluation of dizziness. The patient reports that she had an episode of room- spinning dizziness and weakness around 1230 this morning that worsens with standing. She also notes that she has some sinus pain on the right side, that she believes may be contributing to her symptoms. She denies any chest pain, a sore throat, or nausea.  Patient states she did not miss her BP medicines today.  Right facial pain.  Right toothache in frontal area.  Mild sinus congestion.    Allergies:  Amlodipine  Fosamax  Lisinopril  Pravastatin  Simvastatin    Medications:    Amlodipine  Metoprolol  Tramadol  Senokot  Lasix  Aspirin  Ketoprofen    Past Medical History:    Anxiety  CKD  Hyperlipidemia  Hyperparathyroidism  HTN  Chronic neck pain  Osteoarthritis  Osteopenia  Peripheral neuropathy     Past Surgical History:    Appendectomy  Foot arthrodesis  Ankle arthroscopy  Cataract IOL  Cholecystectomy  Foot Mass excision  Hysterectomy  Toe Tendon release  Foot hardware removal  Hammer toe repair  Shoulder arthroplasty    Family History:    Mother: Lipids, Diabetes, Kidney disease  Brother: Diabetes, Colorectal cancer  Sister: Breast cancer    Social History:  Marital Status:   [5]  Former Smoker  Negative for alcohol use.    Review of Systems   HENT: Positive for sinus pain. Negative for sore throat.    Cardiovascular: Negative for chest pain.   Gastrointestinal: Negative for nausea.   Neurological: Positive for dizziness and weakness.   All other systems reviewed and are negative.  Arrives to ED reports weakness and dizziness that started today apporx 1200, she reports she feels as if she may have a sinus infection that is causing this.  Sinus congestion and fullness in the right face for at least 10-15 days.  No fevers.  Sinus drainage.    Physical Exam      Patient Vitals for the past 24 hrs:   BP Temp Temp src Pulse Resp SpO2   02/09/18 1715 - - - - - 97 %   02/09/18 1700 (!) 146/95 - - - - 98 %   02/09/18 1645 - - - - - 98 %   02/09/18 1630 (!) 140/93 - - - - 99 %   02/09/18 1627 - - - - - 98 %   02/09/18 1554 146/88 - - - - 98 %   02/09/18 1511 (!) 133/112 97.3  F (36.3  C) Temporal 93 16 97 %     Physical Exam  GEN: patient smiling, no distress, mother at the bedside  HEAD: atraumatic, normocephalic  EYES: pupils reactive (3plus to 2plus), extraocular muscles intact, conjunctivae normal  ENT: TMs flat and white bilaterally, oropharynx normal with no erythema or exudate, mucus membranes moist, floor of mouth is soft, midface stable, nose mucosa pink with no exudate bilaterally, right frontal and maxillary sinuses with opacification by transillumination.    NECK: no cervical LAD, no meningeal signs, trachea midline, no JVD  RESPIRATORY: no tachypnea, breath sounds clear to auscultation (no rales, wheezes, rhonchi), chest wall nontender, normal phonation  CVS: normal S1/S2, no murmurs/rubs/gallops  ABDOMEN: soft, nontender, no masses or organomegaly, no rebound, positive bowel sounds  BACK: no costovertebral angle tenderness  EXTREMITIES: intact pulses x 2 (radial pulses intact) no edema  MUSCULOSKELETAL: no deformities  SKIN: warm and dry  NEURO: GCS 15, cranial nerves intact.  Motor- moves all 4 extremities with 5/5 strength,  4-/5.  DF and PF 4-/5.  Sensation- intact all dermatones to pinprick and light touch.  Reflexes- DTRs 2plus.  Coordination- romberg negative, no ataxia.  Overall symmetrical exam.  High pike maneuver negative.  Later in the ED course, patient ambulatory.  HEME: no bruising or petechiae/contusions  LYMPH: no lymphadenopathy    Emergency Department Course   ECG:  Indication: Dizziness  Time: 1517  Vent. Rate 97 bpm. ME interval 188. QRS duration 96. QT/QTc 386/490. P-R-T axis 63 -44 82.  Normal sinus rhythm. Left axis deviation. Minimal  voltage criteria for LVH, may be a normal variant. Prolonged QT. Abnormal ECG. Read time: 1517    Imaging:  Radiographic findings were communicated with the patient who voiced understanding of the findings.  CT Head without contrast:   Minimal atrophy. Nothing acute, as per radiology.    Laboratory:  CBC: WBC: 7.8, HGB: 13.2, PLT: 259  BMP: BUN 40 (H), Creatinine 1.48 (H), GFR 34 (L), o/w WNL    1632 Troponin: <0.015    UA with Microscopic: blood small (A), leukocyte esterase moderate (A), WBC 3 (H), mucous present (A), o/w WNL  Urine culture: sent    Interventions:  1551 NS 0.5L IV  1553 Meclizine 25 mg PO  Heplock  Cardiac/Sp02 monitoring  Oxygen by nasal cannula at 2L/min    Emergency Department Course:  Nursing notes and vitals reviewed. (1535) I performed an exam of the patient as documented above.     IV inserted. Medicine administered as documented above. Blood drawn. This was sent to the lab for further testing, results above.    The patient provided a urine sample here in the emergency department. This was sent for laboratory testing, findings above.     The patient was sent for a Head CT while in the emergency department, findings above.     EKG obtained in the ED, see results above.     (1807) I rechecked the patient and discussed the results of her workup thus far. Ambulatory, improved.   BP (!) 146/95  Pulse 93  Temp 97.3  F (36.3  C) (Temporal)  Resp 16  SpO2 97%    Findings and plan explained to the Patient and daughter. Patient discharged home with instructions regarding supportive care, medications, and reasons to return. The importance of close follow-up was reviewed. The patient was prescribed Augmentin.    I personally reviewed the laboratory results with the Patient and daughter and answered all related questions prior to discharge.     BP (!) 146/95  Pulse 93  Temp 97.3  F (36.3  C) (Temporal)  Resp 16  SpO2 97%      Impression & Plan      Medical Decision Making:  Glendy Díaz is a  84 year old female who was dizzy but did not have any obvious vertigo or nystagmus when she arrived.  She has clinical evidence of sinusitis.  Labs were started and an IV placed.   Her Head CT did not show any changes. She was given fluids in addition to Antivert, and is now improved. She is ambulatory about the ED. Her troponin did not show any evidence of ischemia, and her labs were otherwise normal. Her blood pressure was initially elevated when she first arrived so we ordered clonidine, but it ended up not being given because her pressures resolved. Her creatinine is a little high at 1.48, with the BUN elevated which is consistent with dehydration. Urine shows moderate leukocyte esterase and epithelial cells, and has been sent for culture. CBC does not otherwise show any signs of anemia. I have made the patient aware that we will treat her for sinusitis. Even though this is not seen on the CT, clinically she does have sinusitis. She needs to continue to push fluids and her daughter is okay with that. They will follow up with their PCP and take the antibiotics in addition to probiotics.  Patient is ambulatory and improved and wishes to go home.    Diagnosis:    ICD-10-CM    1. Dizziness R42 Basic metabolic panel     Troponin I     UA with Microscopic   2. Sinusitis, unspecified chronicity, unspecified location J32.9        Disposition:  discharged to home    Discharge Medications:   Details   amoxicillin-clavulanate (AUGMENTIN) 500-125 MG per tablet Take 1 tablet by mouth 2 times daily for 10 days, Disp-20 tablet, R-0, Local Print        Instructions to patient:  Take all the antibiotics.    All antibiotics can cause antibiotic-associated diarrhea- consider probiotics (pills with Lactobacillus) or yogurt while taking the antibiotics.    Antibiotics can cause diarrhea and can clean out normal bacteria- please use probiotics (pills with Lactobacillus available from the pharmacy) or yogurt 2-3X day while taking  antibiotics.    Any antibiotic has the potential to cause a reaction- fever, rash, aching, or other complications.    Side effects can occur with all medications, such as rash, fevers, GI upset, diarrhea.  Reasons to return: chest pain, shortness of breath, nausea/vomiting, bleeding, confusion, blood in stools, dizziness, passing out, increasing headache, weakness, inability to walk.  Also return if cough, difficulty breathing, nausea/vomiting, confusion, or any other problems.      Scribe Disclosure:  I, Nina Poole, am serving as a scribe on 2/9/2018 at 3:45 PM to personally document services performed by Zaina Lozoya MD based on my observations and the provider's statements to me.     Nina Poole  2/9/2018   [facility] EMERGENCY DEPARTMENT       Zaina Lozoya MD  02/10/18 0331

## 2018-02-09 NOTE — ED AVS SNAPSHOT
[facility] Emergency Department    [address]    [facility] [address]    Phone:  [phone]    Fax:  [phone]                                       Glendy Díaz   MRN: [id number]    Department:  [facility] Emergency Department   Date of Visit:  2/9/2018           Patient Information     Date Of Birth          11/16/1933        Your diagnoses for this visit were:     Dizziness     Sinusitis, unspecified chronicity, unspecified location        You were seen by Zaina Lozoya MD.      Follow-up Information     Follow up with Veronica Davis MD.    Specialty:  Internal Medicine    Contact information:    [address]  [facility] [address]  [phone]          Discharge Instructions       Take all the antibiotics.    All antibiotics can cause antibiotic-associated diarrhea- consider probiotics (pills with Lactobacillus) or yogurt while taking the antibiotics.    Antibiotics can cause diarrhea and can clean out normal bacteria- please use probiotics (pills with Lactobacillus available from the pharmacy) or yogurt 2-3X day while taking antibiotics.    Any antibiotic has the potential to cause a reaction- fever, rash, aching, or other complications.    Side effects can occur with all medications, such as rash, fevers, GI upset, diarrhea.  Reasons to return: chest pain, shortness of breath, nausea/vomiting, bleeding, confusion, blood in stools, dizziness, passing out, increasing headache, weakness, inability to walk.  Also return if cough, difficulty breathing, nausea/vomiting, confusion, or any other problems.        Discharge References/Attachments     SINUSITIS, ACUTE (ENGLISH)    DIZZINESS (VERTIGO) AND BALANCE PROBLEMS: STAYING SAFE (ENGLISH)      24 Hour Appointment Hotline       To make an appointment at any [place] clinic, call [phone] ([phone]). If you don't have a family doctor or clinic, we will help you find one.  [facility] are conveniently located to serve the needs of you and your family.             Review of your medicines      START taking        Dose / Directions Last dose taken    amoxicillin-clavulanate 500-125 MG per tablet   Commonly known as:  AUGMENTIN   Dose:  1 tablet   Quantity:  20 tablet        Take 1 tablet by mouth 2 times daily for 10 days   Refills:  0          Our records show that you are taking the medicines listed below. If these are incorrect, please call your family doctor or clinic.        Dose / Directions Last dose taken    amLODIPine 2.5 MG tablet   Commonly known as:  NORVASC   Quantity:  90 tablet        TAKE 1 TABLET BY MOUTH  DAILY   Refills:  0        aspirin 81 MG tablet   Dose:  81 mg   Quantity:  30 tablet        Take 1 tablet (81 mg) by mouth daily   Refills:  3        cyanocobalamin 1000 MCG/ML injection   Commonly known as:  VITAMIN B12   Dose:  1 mL   Quantity:  1 mL        1 mL (1,000 mcg) every 30 days   Refills:  11        furosemide 40 MG tablet   Commonly known as:  LASIX   Dose:  40-80 mg   Quantity:  90 tablet        Take 1-2 tablets (40-80 mg) by mouth daily   Refills:  1        ketoprofen 10% in PLO 10% topical gel   Quantity:  50 g        Apply 4 times a day as need for pain   Refills:  1        metoprolol tartrate 50 MG tablet   Commonly known as:  LOPRESSOR   Quantity:  90 tablet        TAKE ONE-HALF TABLET BY  MOUTH TWO TIMES DAILY   Refills:  0        senna-docusate 8.6-50 MG per tablet   Commonly known as:  SENOKOT-S;PERICOLACE   Dose:  1-2 tablet   Quantity:  30 tablet        Take 1-2 tablets by mouth 2 times daily as needed for constipation   Refills:  3        traMADol 50 MG tablet   Commonly known as:  ULTRAM   Dose:  50 mg   Quantity:  40 tablet        Take 1 tablet (50 mg) by mouth 2 times daily as needed for moderate pain   Refills:  0        vitamin D 2000 UNITS tablet   Dose:  2000 Units   Quantity:  100 tablet        Take 2,000 Units by mouth daily    Refills:  3                Prescriptions were sent or printed at these locations (1 Prescription)                   Other Prescriptions                Printed at Department/Unit printer (1 of 1)         amoxicillin-clavulanate (AUGMENTIN) 500-125 MG per tablet                Procedures and tests performed during your visit     Basic metabolic panel    CBC with platelets differential    CT Head w/o Contrast    EKG 12 lead    Peripheral IV catheter    Troponin I    UA with Microscopic      Orders Needing Specimen Collection     None      Pending Results     No orders found from 2/7/2018 to 2/10/2018.            Pending Culture Results     No orders found from 2/7/2018 to 2/10/2018.            Pending Results Instructions     If you had any lab results that were not finalized at the time of your Discharge, you can call the ED Lab Result RN at [phone]. You will be contacted by this team for any positive Lab results or changes in treatment. The nurses are available 7 days a week from 10A to 6:30P.  You can leave a message 24 hours per day and they will return your call.        Test Results From Your Hospital Stay        2/9/2018  3:53 PM      Component Results     Component Value Ref Range & Units Status    WBC 7.8 4.0 - 11.0 10e9/L Final    RBC Count 4.28 3.8 - 5.2 10e12/L Final    Hemoglobin 13.2 11.7 - 15.7 g/dL Final    Hematocrit 40.1 35.0 - 47.0 % Final    MCV 94 78 - 100 fl Final    MCH 30.8 26.5 - 33.0 pg Final    MCHC 32.9 31.5 - 36.5 g/dL Final    RDW 13.1 10.0 - 15.0 % Final    Platelet Count 259 150 - 450 10e9/L Final    Diff Method Automated Method  Final    % Neutrophils 62.0 % Final    % Lymphocytes 28.1 % Final    % Monocytes 7.2 % Final    % Eosinophils 1.9 % Final    % Basophils 0.4 % Final    % Immature Granulocytes 0.4 % Final    Nucleated RBCs 0 0 /100 Final    Absolute Neutrophil 4.8 1.6 - 8.3 10e9/L Final    Absolute Lymphocytes 2.2 0.8 - 5.3 10e9/L Final    Absolute Monocytes 0.6 0.0 - 1.3  10e9/L Final    Absolute Eosinophils 0.2 0.0 - 0.7 10e9/L Final    Absolute Basophils 0.0 0.0 - 0.2 10e9/L Final    Abs Immature Granulocytes 0.0 0 - 0.4 10e9/L Final    Absolute Nucleated RBC 0.0  Final         2/9/2018  4:32 PM      Component Results     Component Value Ref Range & Units Status    Sodium 136 133 - 144 mmol/L Final    Potassium 4.1 3.4 - 5.3 mmol/L Final    Specimen slightly hemolyzed, potassium may be falsely elevated    Chloride 104 94 - 109 mmol/L Final    Carbon Dioxide 22 20 - 32 mmol/L Final    Anion Gap 10 3 - 14 mmol/L Final    Glucose 94 70 - 99 mg/dL Final    Urea Nitrogen 40 (H) 7 - 30 mg/dL Final    Creatinine 1.48 (H) 0.52 - 1.04 mg/dL Final    GFR Estimate 34 (L) >60 mL/min/1.7m2 Final    Non  GFR Calc    GFR Estimate If Black 41 (L) >60 mL/min/1.7m2 Final    African American GFR Calc    Calcium 9.0 8.5 - 10.1 mg/dL Final         2/9/2018  4:32 PM      Component Results     Component Value Ref Range & Units Status    Troponin I ES <0.015 0.000 - 0.045 ug/L Final    The 99th percentile for upper reference range is 0.045 ug/L.  Troponin values   in the range of 0.045 - 0.120 ug/L may be associated with risks of adverse   clinical events.           2/9/2018  4:42 PM      Component Results     Component Value Ref Range & Units Status    Color Urine Straw  Final    Appearance Urine Clear  Final    Glucose Urine Negative NEG^Negative mg/dL Final    Bilirubin Urine Negative NEG^Negative Final    Ketones Urine Negative NEG^Negative mg/dL Final    Specific Gravity Urine 1.003 1.003 - 1.035 Final    Blood Urine Small (A) NEG^Negative Final    pH Urine 5.0 5.0 - 7.0 pH Final    Protein Albumin Urine Negative NEG^Negative mg/dL Final    Urobilinogen mg/dL 0.0 0.0 - 2.0 mg/dL Final    Nitrite Urine Negative NEG^Negative Final    Leukocyte Esterase Urine Moderate (A) NEG^Negative Final    Source Midstream Urine  Final    WBC Urine 3 (H) 0 - 2 /HPF Final    RBC Urine 2 0 - 2 /HPF  Final    Squamous Epithelial /HPF Urine 1 0 - 1 /HPF Final    Mucous Urine Present (A) NEG^Negative /LPF Final         2/9/2018  4:28 PM      Narrative     CT HEAD WITHOUT CONTRAST  2/9/2018 4:09 PM    HISTORY: Dizziness.    TECHNIQUE: Scans were obtained through the head without IV contrast.  Radiation dose for this scan was reduced using automated exposure  control, adjustment of the mA and/or kV according to patient size, or  iterative reconstruction technique.    COMPARISON: None.    FINDINGS: Minimal atrophy. No hemorrhage, mass lesion, or focal area  of acute infarction identified. Paranasal sinuses are normal.  Degenerative changes both mandibular condyles.        Impression     IMPRESSION: Minimal atrophy. Nothing acute.    YAZ FIELDS MD                Clinical Quality Measure: Blood Pressure Screening     Your blood pressure was checked while you were in the emergency department today. The last reading we obtained was  BP: (!) 146/95 . Please read the guidelines below about what these numbers mean and what you should do about them.  If your systolic blood pressure (the top number) is less than 120 and your diastolic blood pressure (the bottom number) is less than 80, then your blood pressure is normal. There is nothing more that you need to do about it.  If your systolic blood pressure (the top number) is 120-139 or your diastolic blood pressure (the bottom number) is 80-89, your blood pressure may be higher than it should be. You should have your blood pressure rechecked within a year by a primary care provider.  If your systolic blood pressure (the top number) is 140 or greater or your diastolic blood pressure (the bottom number) is 90 or greater, you may have high blood pressure. High blood pressure is treatable, but if left untreated over time it can put you at risk for heart attack, stroke, or kidney failure. You should have your blood pressure rechecked by a primary care provider within the next  "4 weeks.  If your provider in the emergency department today gave you specific instructions to follow-up with your doctor or provider even sooner than that, you should follow that instruction and not wait for up to 4 weeks for your follow-up visit.        Thank you for choosing [facility]       Thank you for choosing [facility] for your care. Our goal is always to provide you with excellent care. Hearing back from our patients is one way we can continue to improve our services. Please take a few minutes to complete the written survey that you may receive in the mail after you visit with us. Thank you!        [facility] Information     [facility] lets you send messages to your doctor, view your test results, renew your prescriptions, schedule appointments and more. To sign up, go to [email or web address] . Click on \"Log in\" on the left side of the screen, which will take you to the Welcome page. Then click on \"Sign up Now\" on the right side of the page.     You will be asked to enter the access code listed below, as well as some personal information. Please follow the directions to create your username and password.     Your access code is: [email or web address]  Expires: 2018  8:44 AM     Your access code will  in 90 days. If you need help or a new code, please call your [facility] clinic or [phone].        Care EveryWhere ID     This is your Care EveryWhere ID. This could be used by other organizations to access your [facility] medical records  [id number]        Equal Access to Services     PATRICK HIDALGO AH: Hadii carlos worrell Solopez, waaxda luqadaha, qaybta kaalmada aleksey, deirdre cevallos . So [facility] [phone].    ATENCIÓN: Si habla español, tiene a jones disposición servicios gratuitos de asistencia lingüística. Llsangeetha al [phone].    We comply with applicable federal civil rights laws and [place] laws. We do not discriminate on the basis of race, color, national origin, age, " disability, sex, sexual orientation, or gender identity.            After Visit Summary       This is your record. Keep this with you and show to your community pharmacist(s) and doctor(s) at your next visit.

## 2018-02-09 NOTE — ED NOTES
Pt ambulated in hallway using her cane without need for intervention. Says she doesn't feel dizzy anymore and is ready to go home. Given glass of water which she drank without nausea or difficulty.

## 2018-02-09 NOTE — ED NOTES
Arrives to ED reports weakness and dizziness that started today apporx 1200, she reports she feels as if she may have a sinus infection that is causing this. Pt a/ox 3. ABC's intact.

## 2018-02-09 NOTE — ED AVS SNAPSHOT
[facility] Emergency Department    [address]    [facility] [address]    Phone:  [phone]    Fax:  [phone]                                       Glendy Díaz   MRN: [id number]    Department:  [facility] Emergency Department   Date of Visit:  2/9/2018           After Visit Summary Signature Page     I have received my discharge instructions, and my questions have been answered. I have discussed any challenges I see with this plan with the nurse or doctor.    ..........................................................................................................................................  Patient/Patient Representative Signature      ..........................................................................................................................................  Patient Representative Print Name and Relationship to Patient    ..................................................               ................................................  Date                                            Time    ..........................................................................................................................................  Reviewed by Signature/Title    ...................................................              ..............................................  Date                                                            Time

## 2018-02-10 LAB
BACTERIA SPEC CULT: NO GROWTH
Lab: NORMAL
SPECIMEN SOURCE: NORMAL

## 2018-02-10 NOTE — DISCHARGE INSTRUCTIONS
Take all the antibiotics.    All antibiotics can cause antibiotic-associated diarrhea- consider probiotics (pills with Lactobacillus) or yogurt while taking the antibiotics.    Antibiotics can cause diarrhea and can clean out normal bacteria- please use probiotics (pills with Lactobacillus available from the pharmacy) or yogurt 2-3X day while taking antibiotics.    Any antibiotic has the potential to cause a reaction- fever, rash, aching, or other complications.    Side effects can occur with all medications, such as rash, fevers, GI upset, diarrhea.  Reasons to return: chest pain, shortness of breath, nausea/vomiting, bleeding, confusion, blood in stools, dizziness, passing out, increasing headache, weakness, inability to walk.  Also return if cough, difficulty breathing, nausea/vomiting, confusion, or any other problems.

## 2018-02-21 ENCOUNTER — TELEPHONE (OUTPATIENT)
Dept: INTERNAL MEDICINE | Facility: CLINIC | Age: 83
End: 2018-02-21

## 2018-02-21 DIAGNOSIS — N18.30 CKD (CHRONIC KIDNEY DISEASE) STAGE 3, GFR 30-59 ML/MIN (H): Chronic | ICD-10-CM

## 2018-02-21 DIAGNOSIS — E78.5 HYPERLIPIDEMIA WITH TARGET LDL LESS THAN 130: Primary | Chronic | ICD-10-CM

## 2018-02-21 DIAGNOSIS — R73.9 BLOOD SUGAR INCREASED: ICD-10-CM

## 2018-02-21 NOTE — TELEPHONE ENCOUNTER
Pt's daughter calls, requesting lab orders for pt's upcoming appts. Discuss per 09/22 OV note f/u with labs and appt in 6 months. Daughter hoping to line vitamin b12 injection 04/03 with lab appt. Would do MD appt later in the week after lab.    Please advise regarding orders, thanks.

## 2018-02-28 ENCOUNTER — OFFICE VISIT (OUTPATIENT)
Dept: INTERNAL MEDICINE | Facility: CLINIC | Age: 83
End: 2018-02-28
Payer: MEDICARE

## 2018-02-28 VITALS
DIASTOLIC BLOOD PRESSURE: 78 MMHG | WEIGHT: 184.3 LBS | RESPIRATION RATE: 16 BRPM | OXYGEN SATURATION: 90 % | SYSTOLIC BLOOD PRESSURE: 150 MMHG | BODY MASS INDEX: 31.47 KG/M2 | HEART RATE: 92 BPM | TEMPERATURE: 98.5 F | HEIGHT: 64 IN

## 2018-02-28 DIAGNOSIS — R05.9 COUGH: Primary | ICD-10-CM

## 2018-02-28 PROCEDURE — 99213 OFFICE O/P EST LOW 20 MIN: CPT | Performed by: NURSE PRACTITIONER

## 2018-02-28 RX ORDER — ALBUTEROL SULFATE 90 UG/1
2 AEROSOL, METERED RESPIRATORY (INHALATION) EVERY 6 HOURS PRN
Qty: 1 INHALER | Refills: 1 | Status: SHIPPED | OUTPATIENT
Start: 2018-02-28 | End: 2021-01-14

## 2018-02-28 NOTE — MR AVS SNAPSHOT
After Visit Summary   2/28/2018    Glendy Díaz    MRN: [id number]           Patient Information     Date Of Birth          11/16/1933        Visit Information        Provider Department      2/28/2018 10:00 AM Amber Davis NP [facility]        Today's Diagnoses     Cough    -  1       Follow-ups after your visit        Your next 10 appointments already scheduled     Mar 06, 2018  8:30 AM CST   Nurse [facility] with [facility]UR[facility]    [address]  [facility] [id number] [phone]            Mar 30, 2018  8:15 AM CDT   LAB with [facility] LAB   [facility]    [address]  [facility] [id number] [phone]           Please do not eat 10-12 hours before your appointment if you are coming in fasting for labs on lipids, cholesterol, or glucose (sugar). This does not apply to pregnant women. Water, hot tea and black coffee (with nothing added) are okay. Do not drink other fluids, diet soda or chew gum.            Apr 03, 2018  4:40 PM CDT   [facility]   [facility]    [address]  [facility] [id number] [phone]              Who to contact     If you have questions or need follow up information about today's clinic visit or your schedule please contact [facility] directly at [phone].  Normal or non-critical lab and imaging results will be communicated to you by MyChart, letter or phone within 4 business days after the clinic has received the results. If you do not hear from us within 7 days, please contact the clinic through PrimeStonehart or phone. If you have a critical or abnormal lab result, we will notify you by phone as soon as possible.  Submit refill requests through Bangcle or call your pharmacy and they will forward the refill request to  "us. Please allow 3 business days for your refill to be completed.          Additional Information About Your Visit        BalconyTVhart Information     CIBDO lets you send messages to your doctor, view your test results, renew your prescriptions, schedule appointments and more. To sign up, go to [email or web address] . Click on \"Log in\" on the left side of the screen, which will take you to the Welcome page. Then click on \"Sign up Now\" on the right side of the page.     You will be asked to enter the access code listed below, as well as some personal information. Please follow the directions to create your username and password.     Your access code is: [email or web address]  Expires: 2018 10:30 AM     Your access code will  in 90 days. If you need help or a new code, please call your [facility] clinic or [phone].        Care EveryWhere ID     This is your [facility] EveryWhere ID. This could be used by other organizations to access your [facility] medical records  [id number]        Your Vitals Were     Pulse Temperature Respirations Height Pulse Oximetry BMI (Body Mass Index)    92 98.5  F (36.9  C) (Oral) 16 5' 4\" (1.626 m) 90% 31.64 kg/m2       Blood Pressure from Last 3 Encounters:   18 150/78   18 (!) 146/95   17 130/68    Weight from Last 3 Encounters:   18 184 lb 4.8 oz (83.6 kg)   17 185 lb (83.9 kg)   17 185 lb 12.8 oz (84.3 kg)              Today, you had the following     No orders found for display         Today's Medication Changes          These changes are accurate as of 18 10:30 AM.  If you have any questions, ask your nurse or doctor.               Start taking these medicines.        Dose/Directions    albuterol 108 (90 BASE) MCG/ACT Inhaler   Commonly known as:  PROAIR HFA/PROVENTIL HFA/VENTOLIN HFA   Used for:  Cough   Started by:  Amber Davis NP        Dose:  2 puff   Inhale 2 puffs into the lungs every 6 hours as needed for shortness of breath / " dyspnea or wheezing   Quantity:  1 Inhaler   Refills:  1            Where to get your medicines      These medications were sent to [email or web address] - [place], [place] - [address]  [address], [facility] [address]     Phone:  [phone]     albuterol 108 (90 BASE) MCG/ACT Inhaler                Primary Care Provider Office Phone # Fax #    Veronica Davis [phone]       [address] [facility] [address]        Equal Access to Services     [facility]: Hadii aad ku hadasho Soomaali, waaxda luqadaha, qaybta kaalmada adeegyada, deirdre driver haymateo cevallos . So [facility] [phone].    ATENCIÓN: Si habla español, tiene a jones disposición servicios gratuitos de asistencia lingüística. [facility] [phone].    We comply with applicable federal civil rights laws and [place] laws. We do not discriminate on the basis of race, color, national origin, age, disability, sex, sexual orientation, or gender identity.            Thank you!     Thank you for choosing [facility]  for your care. Our goal is always to provide you with excellent care. Hearing back from our patients is one way we can continue to improve our services. Please take a few minutes to complete the written survey that you may receive in the mail after your visit with us. Thank you!             Your Updated Medication List - Protect others around you: Learn how to safely use, store and throw away your medicines at www.disposemymeds.org.          This list is accurate as of 2/28/18 10:30 AM.  Always use your most recent med list.                   Brand Name Dispense Instructions for use Diagnosis    albuterol 108 (90 BASE) MCG/ACT Inhaler    PROAIR HFA/PROVENTIL HFA/VENTOLIN HFA    1 Inhaler    Inhale 2 puffs into the lungs every 6 hours as needed for shortness of breath / dyspnea or wheezing    Cough       amLODIPine 2.5 MG  tablet    NORVASC    90 tablet    TAKE 1 TABLET BY MOUTH  DAILY    Essential hypertension with goal blood pressure less than 140/90       aspirin 81 MG tablet     30 tablet    Take 1 tablet (81 mg) by mouth daily    CKD (chronic kidney disease) stage 3, GFR 30-59 ml/min, Essential hypertension with goal blood pressure less than 140/90       cyanocobalamin 1000 MCG/ML injection    VITAMIN B12    1 mL    1 mL (1,000 mcg) every 30 days        furosemide 40 MG tablet    LASIX    90 tablet    Take 1-2 tablets (40-80 mg) by mouth daily    Essential hypertension with goal blood pressure less than 140/90       ketoprofen 10% in PLO 10% topical gel     50 g    Apply 4 times a day as need for pain    Arthritis       metoprolol tartrate 50 MG tablet    LOPRESSOR    90 tablet    TAKE ONE-HALF TABLET BY  MOUTH TWO TIMES DAILY    Essential hypertension with goal blood pressure less than 140/90       senna-docusate 8.6-50 MG per tablet    SENOKOT-S;PERICOLACE    30 tablet    Take 1-2 tablets by mouth 2 times daily as needed for constipation    Constipation due to pain medication       traMADol 50 MG tablet    ULTRAM    40 tablet    Take 1 tablet (50 mg) by mouth 2 times daily as needed for moderate pain    Multiple joint pain       vitamin D 2000 UNITS tablet     100 tablet    Take 2,000 Units by mouth daily    Vitamin D deficiency, Hyperparathyroidism (H)

## 2018-02-28 NOTE — PROGRESS NOTES
SUBJECTIVE:   Glendy Díaz is a 84 year old female who presents to clinic today for the following health issues:      ED/UC Followup:    Facility:  [facility]  Date of visit: 02/09/18  Reason for visit: sinus  Current Status: finished amoxicillin, still coughing,congestion.  Had fever to 101 last weekend.           Patient Active Problem List   Diagnosis     Leg weakness, bilateral     Osteopenia     Neck pain, chronic     Hyperlipidemia with target LDL less than 130     Peripheral neuropathy     Hematuria     Family history of diabetes mellitus     Family hx of colon cancer     Advanced directives, counseling/discussion     CKD (chronic kidney disease) stage 3, GFR 30-59 ml/min     Anxiety     Osteoarthrosis of knee     Medial meniscus tear     Closed fracture of tibial plateau     Hyperparathyroidism (H)     Controlled substance agreement signed     HTN goal < 140/90     Status post reverse total shoulder replacement     Anemia due to vitamin B12 deficiency, unspecified B12 deficiency type     Past Surgical History:   Procedure Laterality Date     APPENDECTOMY       ARTHRODESIS FOOT  5/1/2014    Procedure: ARTHRODESIS FOOT;  Surgeon: Sid Marks DPM;  Location: [facility] OR     ARTHROSCOPY ANKLE, OPEN REPAIR LIGAMENT, COMBINED  5/31/2012    Procedure:COMBINED ARTHROSCOPY ANKLE, OPEN REPAIR LIGAMENT; LEFT ANKLE ARTHROSCOPY, LATERAL LIGAMENT RECONSTRUCTION, ENDOSCOPIC DRISS, BUNION SECOND TOE RAY CORRECTION    LEFT KNEE Marcaine AND CORTIZONE INJECTION, 5 CC Marcaine, 1 CC CELESTONE, ; Surgeon:VARSHA GERMAN; Location: [facility]     CATARACT IOL, RT/LT       CHOLECYSTECTOMY       EXCISE MASS FOOT  12/4/2012    Procedure: EXCISE MASS FOOT;;  Surgeon: Varsha German MD;  Location:  [facility]     HYSTERECTOMY TOTAL ABDOMINAL      ovaries are in     RELEASE TENDON TOE  5/1/2014    Procedure: RELEASE TENDON TOE;  Surgeon: Sid Marks DPM;  Location:  OR     REMOVE HARDWARE FOOT  12/4/2012     Procedure: REMOVE HARDWARE FOOT;  REMOVAL HARDWARE(SYNTHES SCREW) LEFT FOOT, EXCISION OF INCLUSION CYST;  Surgeon: Akshat German MD;  [facility]: [facility]     REPAIR HAMMER TOE  5/1/2014    Procedure: REPAIR HAMMER TOE;  Surgeon: Sid Marks DPM;  Location: [facility] OR     REVERSE ARTHROPLASTY SHOULDER Right 7/18/2016    Procedure: REVERSE ARTHROPLASTY SHOULDER;  Surgeon: Marlo Alejandro MD;  Location: [facility] OR       Social History   Substance Use Topics     Smoking status: Former Smoker     Packs/day: 1.00     Years: 20.00     Types: Cigarettes     Smokeless tobacco: Never Used     Alcohol use No     Family History   Problem Relation Age of Onset     Lipids Mother      DIABETES Mother      Circulatory Mother      Kidney disease     DIABETES Brother      Cancer - colorectal Brother      Breast Cancer Sister      Thyroid Disease Daughter      Cancer - colorectal Brother      Alcohol/Drug Brother          Current Outpatient Prescriptions   Medication Sig Dispense Refill     albuterol (PROAIR HFA/PROVENTIL HFA/VENTOLIN HFA) 108 (90 BASE) MCG/ACT Inhaler Inhale 2 puffs into the lungs every 6 hours as needed for shortness of breath / dyspnea or wheezing 1 Inhaler 1     amLODIPine (NORVASC) 2.5 MG tablet TAKE 1 TABLET BY MOUTH  DAILY 90 tablet 0     metoprolol tartrate (LOPRESSOR) 50 MG tablet TAKE ONE-HALF TABLET BY  MOUTH TWO TIMES DAILY 90 tablet 0     traMADol (ULTRAM) 50 MG tablet Take 1 tablet (50 mg) by mouth 2 times daily as needed for moderate pain 40 tablet 0     senna-docusate (SENOKOT-S;PERICOLACE) 8.6-50 MG per tablet Take 1-2 tablets by mouth 2 times daily as needed for constipation 30 tablet 3     furosemide (LASIX) 40 MG tablet Take 1-2 tablets (40-80 mg) by mouth daily 90 tablet 1     aspirin 81 MG tablet Take 1 tablet (81 mg) by mouth daily 30 tablet 3     Cholecalciferol (VITAMIN D) 2000 UNITS tablet Take 2,000 Units by mouth daily 100 tablet 3     cyanocobalamin (VITAMIN B12) 1000 MCG/ML  "injection 1 mL (1,000 mcg) every 30 days 1 mL 11     ketoprofen 10% in PLO 10% Apply 4 times a day as need for pain 50 g 1     [DISCONTINUED] ORDER FOR DME Equipment being ordered: All diabetic testing supplies including test strips, lancets and solution for testing 1 times per day. 3 Month 1     BP Readings from Last 3 Encounters:   02/28/18 150/78   02/09/18 (!) 146/95   09/22/17 130/68    Wt Readings from Last 3 Encounters:   02/28/18 184 lb 4.8 oz (83.6 kg)   12/12/17 185 lb (83.9 kg)   09/22/17 185 lb 12.8 oz (84.3 kg)                    Reviewed and updated as needed this visit by clinical staff  Tobacco  Allergies  Meds  Med Hx  Surg Hx  Fam Hx  Soc Hx      Reviewed and updated as needed this visit by Provider         ROS:  Constitutional, HEENT, cardiovascular, pulmonary, gi and gu systems are negative, except as otherwise noted.    OBJECTIVE:     /78 (BP Location: Right arm, Patient Position: Sitting, Cuff Size: Adult Large)  Pulse 92  Temp 98.5  F (36.9  C) (Oral)  Resp 16  Ht 5' 4\" (1.626 m)  Wt 184 lb 4.8 oz (83.6 kg)  SpO2 90%  BMI 31.64 kg/m2  Body mass index is 31.64 kg/(m^2).  GENERAL: healthy, alert and no distress  HENT: ear canals and TM's normal, nose and mouth without ulcers or lesions  NECK: no adenopathy, no asymmetry, masses, or scars and thyroid normal to palpation  RESP: lungs clear to auscultation - no rales, rhonchi or wheezes  CV: regular rate and rhythm, normal S1 S2, no S3 or S4, no murmur, click or rub, no peripheral edema and peripheral pulses strong        ASSESSMENT/PLAN:               ICD-10-CM    1. Cough R05 albuterol (PROAIR HFA/PROVENTIL HFA/VENTOLIN HFA) 108 (90 BASE) MCG/ACT Inhaler       Call if fever returns, continue home cares.    Amber Davis NP  [facility]    "

## 2018-02-28 NOTE — NURSING NOTE
"Chief Complaint   Patient presents with     Follow Up For     [facility] ER on 02/09/18 sinus, cough, last Sunday had a fever.       Initial /78 (BP Location: Right arm, Patient Position: Sitting, Cuff Size: Adult Large)  Pulse 92  Temp 98.5  F (36.9  C) (Oral)  Resp 16  Ht 5' 4\" (1.626 m)  Wt 184 lb 4.8 oz (83.6 kg)  SpO2 90%  BMI 31.64 kg/m2 Estimated body mass index is 31.64 kg/(m^2) as calculated from the following:    Height as of this encounter: 5' 4\" (1.626 m).    Weight as of this encounter: 184 lb 4.8 oz (83.6 kg).  Medication Reconciliation: complete    "

## 2018-03-02 DIAGNOSIS — I10 ESSENTIAL HYPERTENSION WITH GOAL BLOOD PRESSURE LESS THAN 140/90: Chronic | ICD-10-CM

## 2018-03-05 RX ORDER — AMLODIPINE BESYLATE 2.5 MG/1
TABLET ORAL
Qty: 90 TABLET | Refills: 0 | Status: SHIPPED | OUTPATIENT
Start: 2018-03-05 | End: 2018-04-06

## 2018-03-05 RX ORDER — METOPROLOL TARTRATE 50 MG
TABLET ORAL
Qty: 90 TABLET | Refills: 0 | Status: SHIPPED | OUTPATIENT
Start: 2018-03-05 | End: 2018-04-06

## 2018-03-05 NOTE — TELEPHONE ENCOUNTER
"Requested Prescriptions   Pending Prescriptions Disp Refills     amLODIPine (NORVASC) 2.5 MG tablet [Pharmacy Med Name: AMLODIPINE  2.5MG  TAB] 90 tablet      Sig: TAKE 1 TABLET BY MOUTH  DAILY    Calcium Channel Blockers Protocol  Failed    3/2/2018  2:50 AM       Failed - Blood pressure under 140/90 in past 12 months    BP Readings from Last 3 Encounters:   02/28/18 150/78   02/09/18 (!) 146/95 09/22/17 130/68                Failed - Normal serum creatinine on file in past 12 months    Recent Labs   Lab Test  02/09/18   1544   CR  1.48*            Passed - Recent (12 mo) or future (30 days) visit within the authorizing provider's specialty    Patient had office visit in the last year or has a visit in the next 30 days with authorizing provider.  See \"Patient Info\" tab in inbasket, or \"Choose Columns\" in Meds & Orders section of the refill encounter.            Passed - Patient is age 18 or older       Passed - No active pregnancy on record       Passed - No positive pregnancy test in past 12 months        metoprolol tartrate (LOPRESSOR) 50 MG tablet [Pharmacy Med Name: METOPROLOL TARTRATE  50MG  TAB] 90 tablet      Sig: TAKE ONE-HALF TABLET BY  MOUTH TWICE A DAY    Beta-Blockers Protocol Failed    3/2/2018  2:50 AM       Failed - Blood pressure under 140/90 in past 12 months    BP Readings from Last 3 Encounters:   02/28/18 150/78   02/09/18 (!) 146/95   09/22/17 130/68                Passed - Patient is age 6 or older       Passed - Recent (12 mo) or future (30 days) visit within the authorizing provider's specialty    Patient had office visit in the last year or has a visit in the next 30 days with authorizing provider.  See \"Patient Info\" tab in inbasket, or \"Choose Columns\" in Meds & Orders section of the refill encounter.             Routing refill request to provider for review/approval because:  Labs out of range:  BP, Cr        "

## 2018-03-06 ENCOUNTER — ALLIED HEALTH/NURSE VISIT (OUTPATIENT)
Dept: NURSING | Facility: CLINIC | Age: 83
End: 2018-03-06
Payer: MEDICARE

## 2018-03-06 DIAGNOSIS — D51.9 ANEMIA DUE TO VITAMIN B12 DEFICIENCY, UNSPECIFIED B12 DEFICIENCY TYPE: Primary | ICD-10-CM

## 2018-03-06 PROCEDURE — 96372 THER/PROPH/DIAG INJ SC/IM: CPT

## 2018-03-06 PROCEDURE — 99207 ZZC NO CHARGE NURSE ONLY: CPT

## 2018-03-30 DIAGNOSIS — I10 ESSENTIAL HYPERTENSION WITH GOAL BLOOD PRESSURE LESS THAN 140/90: Chronic | ICD-10-CM

## 2018-03-30 DIAGNOSIS — R73.9 BLOOD SUGAR INCREASED: ICD-10-CM

## 2018-03-30 LAB
ALBUMIN SERPL-MCNC: 3.5 G/DL (ref 3.4–5)
ALP SERPL-CCNC: 87 U/L (ref 40–150)
ALT SERPL W P-5'-P-CCNC: 14 U/L (ref 0–50)
ANION GAP SERPL CALCULATED.3IONS-SCNC: 5 MMOL/L (ref 3–14)
AST SERPL W P-5'-P-CCNC: 17 U/L (ref 0–45)
BILIRUB SERPL-MCNC: 0.4 MG/DL (ref 0.2–1.3)
BUN SERPL-MCNC: 23 MG/DL (ref 7–30)
CALCIUM SERPL-MCNC: 9.3 MG/DL (ref 8.5–10.1)
CHLORIDE SERPL-SCNC: 111 MMOL/L (ref 94–109)
CO2 SERPL-SCNC: 25 MMOL/L (ref 20–32)
CREAT SERPL-MCNC: 1.35 MG/DL (ref 0.52–1.04)
GFR SERPL CREATININE-BSD FRML MDRD: 37 ML/MIN/1.7M2
GLUCOSE SERPL-MCNC: 90 MG/DL (ref 70–99)
HBA1C MFR BLD: 5 % (ref 0–6.4)
POTASSIUM SERPL-SCNC: 4.3 MMOL/L (ref 3.4–5.3)
PROT SERPL-MCNC: 7.6 G/DL (ref 6.8–8.8)
SODIUM SERPL-SCNC: 141 MMOL/L (ref 133–144)

## 2018-03-30 PROCEDURE — 83036 HEMOGLOBIN GLYCOSYLATED A1C: CPT | Performed by: INTERNAL MEDICINE

## 2018-03-30 PROCEDURE — 36415 COLL VENOUS BLD VENIPUNCTURE: CPT | Performed by: INTERNAL MEDICINE

## 2018-03-30 PROCEDURE — 80053 COMPREHEN METABOLIC PANEL: CPT | Performed by: INTERNAL MEDICINE

## 2018-04-06 ENCOUNTER — OFFICE VISIT (OUTPATIENT)
Dept: INTERNAL MEDICINE | Facility: CLINIC | Age: 83
End: 2018-04-06
Payer: MEDICARE

## 2018-04-06 VITALS
RESPIRATION RATE: 14 BRPM | HEART RATE: 74 BPM | WEIGHT: 185.7 LBS | OXYGEN SATURATION: 96 % | SYSTOLIC BLOOD PRESSURE: 144 MMHG | DIASTOLIC BLOOD PRESSURE: 80 MMHG | TEMPERATURE: 98.2 F | BODY MASS INDEX: 31.7 KG/M2 | HEIGHT: 64 IN

## 2018-04-06 DIAGNOSIS — D51.9 ANEMIA DUE TO VITAMIN B12 DEFICIENCY, UNSPECIFIED B12 DEFICIENCY TYPE: ICD-10-CM

## 2018-04-06 DIAGNOSIS — E78.5 HYPERLIPIDEMIA WITH TARGET LDL LESS THAN 130: Chronic | ICD-10-CM

## 2018-04-06 DIAGNOSIS — J30.89 CHRONIC NON-SEASONAL ALLERGIC RHINITIS, UNSPECIFIED TRIGGER: ICD-10-CM

## 2018-04-06 DIAGNOSIS — M25.50 MULTIPLE JOINT PAIN: ICD-10-CM

## 2018-04-06 DIAGNOSIS — N18.30 CKD (CHRONIC KIDNEY DISEASE) STAGE 3, GFR 30-59 ML/MIN (H): Chronic | ICD-10-CM

## 2018-04-06 DIAGNOSIS — E21.3 HYPERPARATHYROIDISM (H): Chronic | ICD-10-CM

## 2018-04-06 DIAGNOSIS — I10 ESSENTIAL HYPERTENSION WITH GOAL BLOOD PRESSURE LESS THAN 140/90: Primary | Chronic | ICD-10-CM

## 2018-04-06 PROCEDURE — 99214 OFFICE O/P EST MOD 30 MIN: CPT | Mod: 25 | Performed by: INTERNAL MEDICINE

## 2018-04-06 PROCEDURE — 96372 THER/PROPH/DIAG INJ SC/IM: CPT | Performed by: INTERNAL MEDICINE

## 2018-04-06 RX ORDER — TRAMADOL HYDROCHLORIDE 50 MG/1
50 TABLET ORAL 2 TIMES DAILY PRN
Qty: 40 TABLET | Refills: 0 | Status: SHIPPED | OUTPATIENT
Start: 2018-04-06 | End: 2018-10-18

## 2018-04-06 RX ORDER — METOPROLOL TARTRATE 50 MG
TABLET ORAL
Qty: 90 TABLET | Refills: 1 | Status: SHIPPED | OUTPATIENT
Start: 2018-04-06 | End: 2018-09-10

## 2018-04-06 RX ORDER — CETIRIZINE HYDROCHLORIDE 10 MG/1
10 TABLET ORAL DAILY PRN
Qty: 90 TABLET | Refills: 3 | COMMUNITY
Start: 2018-04-06 | End: 2022-03-03

## 2018-04-06 RX ORDER — FLUTICASONE PROPIONATE 50 MCG
1-2 SPRAY, SUSPENSION (ML) NASAL DAILY
Qty: 3 BOTTLE | Refills: 11 | Status: SHIPPED | OUTPATIENT
Start: 2018-04-06 | End: 2019-06-26

## 2018-04-06 RX ORDER — FUROSEMIDE 40 MG
40-80 TABLET ORAL DAILY
Qty: 90 TABLET | Refills: 1 | Status: SHIPPED | OUTPATIENT
Start: 2018-04-06 | End: 2018-05-25

## 2018-04-06 RX ORDER — AMLODIPINE BESYLATE 2.5 MG/1
TABLET ORAL
Qty: 90 TABLET | Refills: 1 | Status: SHIPPED | OUTPATIENT
Start: 2018-04-06 | End: 2018-09-10

## 2018-04-06 NOTE — NURSING NOTE
"Chief Complaint   Patient presents with     Recheck Medication     Imm/Inj     RECHECK       Initial /80 (BP Location: Right arm, Patient Position: Chair, Cuff Size: Adult Large)  Pulse 74  Temp 98.2  F (36.8  C) (Oral)  Resp 14  Ht 5' 4\" (1.626 m)  Wt 185 lb 11.2 oz (84.2 kg)  SpO2 96%  Breastfeeding? No  BMI 31.88 kg/m2 Estimated body mass index is 31.88 kg/(m^2) as calculated from the following:    Height as of this encounter: 5' 4\" (1.626 m).    Weight as of this encounter: 185 lb 11.2 oz (84.2 kg).  Medication Reconciliation: complete   Aspen Barroso CMA      "

## 2018-04-06 NOTE — PATIENT INSTRUCTIONS
Plan:  1. Keep legs elevated when you rest   2. Flonase nasal spray   3. Continue same meds, same doses for now   4. Please make a lab appointment for fasting labs in 6 months  5. Please make an appointment few days after the labs to discuss about the results.   6. Make sure you drink plenty of water the day of the blood test.

## 2018-04-06 NOTE — MR AVS SNAPSHOT
"              After Visit Summary   4/6/2018    Glendy Díaz    MRN: [id number]           Patient Information     Date Of Birth          11/16/1933        Visit Information        Provider Department      4/6/2018 8:20 AM Veronica Davis MD [facility]        Today's Diagnoses     Essential hypertension with goal blood pressure less than 140/90        Multiple joint pain          Care Instructions    Plan:  1. Keep legs elevated when you rest   2. Flonase nasal spray   3. Continue same meds, same doses for now   4. Please make a lab appointment for fasting labs in 6 months  5. Please make an appointment few days after the labs to discuss about the results.   6. Make sure you drink plenty of water the day of the blood test.            Follow-ups after your visit        Who to contact     If you have questions or need follow up information about today's clinic visit or your schedule please contact [facility] directly at [phone].  Normal or non-critical lab and imaging results will be communicated to you by MyChart, letter or phone within 4 business days after the clinic has received the results. If you do not hear from us within 7 days, please contact the clinic through MedGRCt or phone. If you have a critical or abnormal lab result, we will notify you by phone as soon as possible.  Submit refill requests through Gameology or call your pharmacy and they will forward the refill request to us. Please allow 3 business days for your refill to be completed.          Additional Information About Your Visit        New[employer] Infinitehart Information     Gameology lets you send messages to your doctor, view your test results, renew your prescriptions, schedule appointments and more. To sign up, go to [email or web address] . Click on \"Log in\" on the left side of the screen, which will take you to the Welcome page. Then click on \"Sign up Now\" on the right side of the page.     You will be " "asked to enter the access code listed below, as well as some personal information. Please follow the directions to create your username and password.     Your access code is: [email or web address]  Expires: 2018 11:30 AM     Your access code will  in 90 days. If you need help or a new code, please call your [facility] clinic or [phone].        Care EveryWhere ID     This is your Care EveryWhere ID. This could be used by other organizations to access your [facility] medical records  [id number]        Your Vitals Were     Pulse Temperature Respirations Height Pulse Oximetry Breastfeeding?    74 98.2  F (36.8  C) (Oral) 14 5' 4\" (1.626 m) 96% No    BMI (Body Mass Index)                   31.88 kg/m2            Blood Pressure from Last 3 Encounters:   18 144/80   18 150/78   18 (!) 146/95    Weight from Last 3 Encounters:   18 185 lb 11.2 oz (84.2 kg)   18 184 lb 4.8 oz (83.6 kg)   17 185 lb (83.9 kg)              Today, you had the following     No orders found for display         Today's Medication Changes          These changes are accurate as of 18  9:06 AM.  If you have any questions, ask your nurse or doctor.               These medicines have changed or have updated prescriptions.        Dose/Directions    amLODIPine 2.5 MG tablet   Commonly known as:  NORVASC   This may have changed:  See the new instructions.   Used for:  Essential hypertension with goal blood pressure less than 140/90   Changed by:  Veronica Davis MD        TAKE 1 TABLET BY MOUTH  DAILY   Quantity:  90 tablet   Refills:  1       metoprolol tartrate 50 MG tablet   Commonly known as:  LOPRESSOR   This may have changed:  See the new instructions.   Used for:  Essential hypertension with goal blood pressure less than 140/90   Changed by:  Veronica Davis MD        TAKE ONE-HALF TABLET BY  MOUTH TWICE A DAY   Quantity:  90 tablet   Refills:  1            Where to get your " medicines      These medications were sent to [facility] - [address]  [address] [facility] [address], [facility] [address]     Phone:  [phone]     amLODIPine 2.5 MG tablet    furosemide 40 MG tablet    metoprolol tartrate 50 MG tablet         Some of these will need a paper prescription and others can be bought over the counter.  Ask your nurse if you have questions.     Bring a paper prescription for each of these medications     traMADol 50 MG tablet                Primary Care Provider Office Phone # Fax #    Veronica Davis [phone]       [address] [facility] [address]        Equal Access to Services     TORIBIO HIDALGO : Hadii carlos rioso Solopez, waaxda luqadaha, qaybta kaalmada aderosanna, deirdre young. So [facility] [phone].    ATENCIÓN: Si habla español, tiene a jones disposición servicios gratuitos de asistencia lingüística. Llame al [phone].    We comply with applicable federal civil rights laws and [place] laws. We do not discriminate on the basis of race, color, national origin, age, disability, sex, sexual orientation, or gender identity.            Thank you!     Thank you for choosing [facility]  for your care. Our goal is always to provide you with excellent care. Hearing back from our patients is one way we can continue to improve our services. Please take a few minutes to complete the written survey that you may receive in the mail after your visit with us. Thank you!             Your Updated Medication List - Protect others around you: Learn how to safely use, store and throw away your medicines at www.disposemymeds.org.          This list is accurate as of 4/6/18  9:06 AM.  Always use your most recent med list.                   Brand Name Dispense Instructions for use Diagnosis    albuterol 108 (90 BASE) MCG/ACT Inhaler    PROAIR HFA/PROVENTIL HFA/VENTOLIN HFA     1 Inhaler    Inhale 2 puffs into the lungs every 6 hours as needed for shortness of breath / dyspnea or wheezing    Cough       amLODIPine 2.5 MG tablet    NORVASC    90 tablet    TAKE 1 TABLET BY MOUTH  DAILY    Essential hypertension with goal blood pressure less than 140/90       aspirin 81 MG tablet     30 tablet    Take 1 tablet (81 mg) by mouth daily    CKD (chronic kidney disease) stage 3, GFR 30-59 ml/min, Essential hypertension with goal blood pressure less than 140/90       cetirizine 10 MG tablet    zyrTEC    90 tablet    Take 1 tablet (10 mg) by mouth every evening        cyanocobalamin 1000 MCG/ML injection    VITAMIN B12    1 mL    1 mL (1,000 mcg) every 30 days        furosemide 40 MG tablet    LASIX    90 tablet    Take 1-2 tablets (40-80 mg) by mouth daily    Essential hypertension with goal blood pressure less than 140/90       ketoprofen 10% in PLO 10% topical gel     50 g    Apply 4 times a day as need for pain    Arthritis       metoprolol tartrate 50 MG tablet    LOPRESSOR    90 tablet    TAKE ONE-HALF TABLET BY  MOUTH TWICE A DAY    Essential hypertension with goal blood pressure less than 140/90       senna-docusate 8.6-50 MG per tablet    SENOKOT-S;PERICOLACE    30 tablet    Take 1-2 tablets by mouth 2 times daily as needed for constipation    Constipation due to pain medication       traMADol 50 MG tablet    ULTRAM    40 tablet    Take 1 tablet (50 mg) by mouth 2 times daily as needed for moderate pain    Multiple joint pain       vitamin D 2000 UNITS tablet     100 tablet    Take 2,000 Units by mouth daily    Vitamin D deficiency, Hyperparathyroidism (H)

## 2018-04-06 NOTE — PROGRESS NOTES
Dr Steiner's note      Patient's instructions / PLAN:                                                        Plan:  1. Keep legs elevated when you rest   2. Flonase nasal spray   3. Continue same meds, same doses for now   4. Please make a lab appointment for fasting labs in 6 months  5. Please make an appointment few days after the labs to discuss about the results.   6. Make sure you drink plenty of water the day of the blood test.          ASSESSMENT & PLAN:                                                      (I10) HTN, goal < 150/90  (primary encounter diagnosis)  Comment: Controlled    Plan: metoprolol tartrate (LOPRESSOR) 50 MG tablet,         amLODIPine (NORVASC) 2.5 MG tablet, furosemide         (LASIX) 40 MG tablet, Comprehensive metabolic         panel, Lipid panel reflex to direct LDL         Fasting, TSH with free T4 reflex, CBC with         platelets            (N18.3) CKD (chronic kidney disease) stage 3, GFR 30-59 ml/min  Comment: stable   Plan: Comprehensive metabolic panel, Lipid panel         reflex to direct LDL Fasting, TSH with free T4         reflex, CBC with platelets, Parathyroid Hormone        Intact            (E21.3) Hyperparathyroidism (H)  Comment:   Plan: Parathyroid Hormone Intact            (E78.5) Hyperlipidemia with target LDL less than 130  Comment:   Plan: Comprehensive metabolic panel, Lipid panel         reflex to direct LDL Fasting, TSH with free T4         reflex            (J30.89) Chronic non-seasonal allergic rhinitis, unspecified trigger  Comment:   Plan: fluticasone (FLONASE) 50 MCG/ACT spray            (D51.9) Anemia due to vitamin B12 deficiency, unspecified B12 deficiency type  Comment:   Plan: VITAMIN B12 1000 mcg (standing order with a         count of 15)            (M25.50) Multiple joint pain  Comment:   Plan: traMADol (ULTRAM) 50 MG tablet               Chief complaint:                                                      Follow up chronic medical problems   "    SUBJECTIVE:   Glendy Díaz is a 84 year old female who presents to clinic today for the following health issues:    Creat - better     *Recheck Medication-Review and refills. LOV 9/22/17. Labs done 3/30/18. Would like to make sure she has enough refills to last 6 months. Needs refill of Tramadol today.   *Imm/Inj-B12  *RECHECK--Was seen in February for sinus infection that was causing very bad dizziness. Daughter feels like she seems weaker since then, balance hasn't been quite right since then. Still seems to have a lot of allergy symptoms (mainly post-nasal drip). Has been using cetirizine. Has used claritin and allegra in the past.       Hyperlipidemia Follow-Up      Rate your low fat/cholesterol diet?: fair    Taking statin?  No    Other lipid medications/supplements?:  none    Hypertension Follow-up      Outpatient blood pressures are being checked at home every once in a while.  Results are high the last time she checked-thinks it was upper 140's/80's. .    Low Salt Diet: admits she has not been very good about salt intake      Amount of exercise or physical activity: minimal because she has had so many balance issues.     Problems taking medications regularly: No    Medication side effects: none    Diet: admits she has been eating more salt than she could.         Review of Systems:                                                      ROS: negative for fever, chills, cough, wheezes, chest pain, shortness of breath, vomiting, abdominal pain, pos for  leg swelling         OBJECTIVE:             Physical exam:  Blood pressure 144/80, pulse 74, temperature 98.2  F (36.8  C), temperature source Oral, resp. rate 14, height 5' 4\" (1.626 m), weight 185 lb 11.2 oz (84.2 kg), SpO2 96 %, not currently breastfeeding.   NAD, appears comfortable  Skin: no rashes   Neck: supple, no JVD,  No thyroidmegaly. Lymph nodes nonpalpable cervical and supraclavicular.  Chest: clear to auscultation bilaterally, good respiratory " effort  Heart: S1 S2, RRR, no mgr appreciated  Abdomen: soft, not tender,  Extremities: +1 edema,  Neurologic: A, Ox3, no focal signs appreciated    PMHx: reviewed  Past Medical History:   Diagnosis Date     Anxiety      CKD (chronic kidney disease) stage 3, GFR 30-59 ml/min      Hematuria      Hyperlipidemia LDL goal <100      Hyperparathyroidism (H)      Hypertension     No Cardiologist     Incontinence      Leg weakness, bilateral      Neck pain, chronic      Osteoarthritis      Osteopenia      Peripheral neuropathy       PSHx: reviewed  Past Surgical History:   Procedure Laterality Date     APPENDECTOMY       ARTHRODESIS FOOT  5/1/2014    Procedure: ARTHRODESIS FOOT;  Surgeon: Sid Marks DPM;  Location: [facility]     ARTHROSCOPY ANKLE, OPEN REPAIR LIGAMENT, COMBINED  5/31/2012    Procedure:COMBINED ARTHROSCOPY ANKLE, OPEN REPAIR LIGAMENT; LEFT ANKLE ARTHROSCOPY, LATERAL LIGAMENT RECONSTRUCTION, ENDOSCOPIC DRISS, KIRIT SECOND TOE RAY CORRECTION    LEFT KNEE Marcaine AND CORTIZONE INJECTION, 5 CC Marcaine, 1 CC CELESTONE, ; Surgeon:VARSHA GERMAN; Location:[facility]     CATARACT IOL, RT/LT       CHOLECYSTECTOMY       EXCISE MASS FOOT  12/4/2012    Procedure: EXCISE MASS FOOT;;  Surgeon: Varsha German MD;  Location:  [facility]     HYSTERECTOMY TOTAL ABDOMINAL      ovaries are in     RELEASE TENDON TOE  5/1/2014    Procedure: RELEASE TENDON TOE;  Surgeon: Sid Marks DPM;  Location: [facility] OR     REMOVE HARDWARE FOOT  12/4/2012    Procedure: REMOVE HARDWARE FOOT;  REMOVAL HARDWARE(SYNTHES SCREW) LEFT FOOT, EXCISION OF INCLUSION CYST;  Surgeon: Varsha German MD;  Location: [facility]     REPAIR HAMMER TOE  5/1/2014    Procedure: REPAIR HAMMER TOE;  Surgeon: Sid Marks DPM;  Location: [facility]     REVERSE ARTHROPLASTY SHOULDER Right 7/18/2016    Procedure: REVERSE ARTHROPLASTY SHOULDER;  Surgeon: Marlo Alejandro MD;  Location: [facility] OR        Meds: reviewed  Current Outpatient  Prescriptions   Medication Sig Dispense Refill     cetirizine (ZYRTEC) 10 MG tablet Take 1 tablet (10 mg) by mouth every evening 90 tablet 3     amLODIPine (NORVASC) 2.5 MG tablet TAKE 1 TABLET BY MOUTH  DAILY 90 tablet 0     metoprolol tartrate (LOPRESSOR) 50 MG tablet TAKE ONE-HALF TABLET BY  MOUTH TWICE A DAY 90 tablet 0     albuterol (PROAIR HFA/PROVENTIL HFA/VENTOLIN HFA) 108 (90 BASE) MCG/ACT Inhaler Inhale 2 puffs into the lungs every 6 hours as needed for shortness of breath / dyspnea or wheezing 1 Inhaler 1     traMADol (ULTRAM) 50 MG tablet Take 1 tablet (50 mg) by mouth 2 times daily as needed for moderate pain 40 tablet 0     senna-docusate (SENOKOT-S;PERICOLACE) 8.6-50 MG per tablet Take 1-2 tablets by mouth 2 times daily as needed for constipation 30 tablet 3     furosemide (LASIX) 40 MG tablet Take 1-2 tablets (40-80 mg) by mouth daily 90 tablet 1     aspirin 81 MG tablet Take 1 tablet (81 mg) by mouth daily 30 tablet 3     Cholecalciferol (VITAMIN D) 2000 UNITS tablet Take 2,000 Units by mouth daily 100 tablet 3     cyanocobalamin (VITAMIN B12) 1000 MCG/ML injection 1 mL (1,000 mcg) every 30 days 1 mL 11     ketoprofen 10% in PLO 10% Apply 4 times a day as need for pain 50 g 1     [DISCONTINUED] ORDER FOR DME Equipment being ordered: All diabetic testing supplies including test strips, lancets and solution for testing 1 times per day. 3 Month 1       Soc Hx: reviewed  Fam Hx: reviewed          Veronica Steiner MD  Internal Medicine

## 2018-05-04 ENCOUNTER — ALLIED HEALTH/NURSE VISIT (OUTPATIENT)
Dept: NURSING | Facility: CLINIC | Age: 83
End: 2018-05-04
Payer: MEDICARE

## 2018-05-04 DIAGNOSIS — D51.9 ANEMIA DUE TO VITAMIN B12 DEFICIENCY, UNSPECIFIED B12 DEFICIENCY TYPE: Primary | ICD-10-CM

## 2018-05-04 PROCEDURE — 99207 ZZC NO CHARGE NURSE ONLY: CPT

## 2018-05-04 PROCEDURE — 96372 THER/PROPH/DIAG INJ SC/IM: CPT

## 2018-05-04 NOTE — NURSING NOTE
Screening Questionnaire for Adult Immunization    Are you sick today?   No   Do you have allergies to medications, food, a vaccine component or latex?   No   Have you ever had a serious reaction after receiving a vaccination?   No   Do you have a long-term health problem with heart disease, lung disease, asthma, kidney disease, metabolic disease (e.g. diabetes), anemia, or other blood disorder?   No   Do you have cancer, leukemia, HIV/AIDS, or any other immune system problem?   No   In the past 3 months, have you taken medications that affect  your immune system, such as prednisone, other steroids, or anticancer drugs; drugs for the treatment of rheumatoid arthritis, Crohn s disease, or psoriasis; or have you had radiation treatments?   No   Have you had a seizure, or a brain or other nervous system problem?   No   During the past year, have you received a transfusion of blood or blood     products, or been given immune (gamma) globulin or antiviral drug?   No   For women: Are you pregnant or is there a chance you could become        pregnant during the next month?   No   Have you received any vaccinations in the past 4 weeks?   No     Immunization questionnaire answers were all negative.        Per orders of Dr. Steiner, injection of B12 given by Nilda Marcus. Patient instructed to remain in clinic for 15 minutes afterwards, and to report any adverse reaction to me immediately.       Screening performed by Nilda Marcus on 5/4/2018 at 4:21 PM.

## 2018-05-04 NOTE — MR AVS SNAPSHOT
"              After Visit Summary   2018    Glendy Díaz    MRN: [id number]           Patient Information     Date Of Birth          1933        Visit Information        Provider Department      2018 4:15 PM [facility] IM NURSE [facility]        Today's Diagnoses     Anemia due to vitamin B12 deficiency, unspecified B12 deficiency type    -  1       Follow-ups after your visit        Who to contact     If you have questions or need follow up information about today's clinic visit or your schedule please contact [facility] directly at [phone].  Normal or non-critical lab and imaging results will be communicated to you by [employer], letter or phone within 4 business days after the clinic has received the results. If you do not hear from us within 7 days, please contact the clinic through [employer] or phone. If you have a critical or abnormal lab result, we will notify you by phone as soon as possible.  Submit refill requests through [employer] or call your pharmacy and they will forward the refill request to us. Please allow 3 business days for your refill to be completed.          Additional Information About Your Visit        MyChart Information     [employer] lets you send messages to your doctor, view your test results, renew your prescriptions, schedule appointments and more. To sign up, go to [email or web address] . Click on \"Log in\" on the left side of the screen, which will take you to the Welcome page. Then click on \"Sign up Now\" on the right side of the page.     You will be asked to enter the access code listed below, as well as some personal information. Please follow the directions to create your username and password.     Your access code is: [email or web address]  Expires: 2018 11:30 AM     Your access code will  in 90 days. If you need help or a new code, please call your [facility] or [phone].        Care EveryWhere ID     This is your Care " EveryWhere ID. This could be used by other organizations to access your [place] medical records  [id number]         Blood Pressure from Last 3 Encounters:   04/06/18 144/80   02/28/18 150/78   02/09/18 (!) 146/95    Weight from Last 3 Encounters:   04/06/18 185 lb 11.2 oz (84.2 kg)   02/28/18 184 lb 4.8 oz (83.6 kg)   12/12/17 185 lb (83.9 kg)              Today, you had the following     No orders found for display       Primary Care Provider Office Phone # Fax #    Veronica Davis [phone]       [address]  [facility] [address]        Equal Access to Services     TORIBIO HIDALGO : Hadii aad ku hadasho Soomaali, waaxda luqadaha, qaybta kaalmada adeegyada, deirdre cevallos . So [facility] [phone].    ATENCIÓN: Si habla español, tiene a jones disposición servicios gratuitos de asistencia lingüística. Llame al [phone].    We comply with applicable federal civil rights laws and [place] laws. We do not discriminate on the basis of race, color, national origin, age, disability, sex, sexual orientation, or gender identity.            Thank you!     Thank you for choosing [facility]  for your care. Our goal is always to provide you with excellent care. Hearing back from our patients is one way we can continue to improve our services. Please take a few minutes to complete the written survey that you may receive in the mail after your visit with us. Thank you!             Your Updated Medication List - Protect others around you: Learn how to safely use, store and throw away your medicines at www.disposemymeds.org.          This list is accurate as of 5/4/18  4:25 PM.  Always use your most recent med list.                   Brand Name Dispense Instructions for use Diagnosis    albuterol 108 (90 Base) MCG/ACT Inhaler    PROAIR HFA/PROVENTIL HFA/VENTOLIN HFA    1 Inhaler    Inhale 2 puffs into the lungs every 6 hours as needed for shortness  of breath / dyspnea or wheezing    Cough       amLODIPine 2.5 MG tablet    NORVASC    90 tablet    TAKE 1 TABLET BY MOUTH  DAILY    Essential hypertension with goal blood pressure less than 140/90       aspirin 81 MG tablet     30 tablet    Take 1 tablet (81 mg) by mouth daily    CKD (chronic kidney disease) stage 3, GFR 30-59 ml/min, Essential hypertension with goal blood pressure less than 140/90       cetirizine 10 MG tablet    zyrTEC    90 tablet    Take 1 tablet (10 mg) by mouth every evening        cyanocobalamin 1000 MCG/ML injection    VITAMIN B12    1 mL    1 mL (1,000 mcg) every 30 days        fluticasone 50 MCG/ACT spray    FLONASE    3 Bottle    Spray 1-2 sprays into both nostrils daily    Chronic non-seasonal allergic rhinitis, unspecified trigger       furosemide 40 MG tablet    LASIX    90 tablet    Take 1-2 tablets (40-80 mg) by mouth daily    Essential hypertension with goal blood pressure less than 140/90       ketoprofen 10% in PLO 10% topical gel     50 g    Apply 4 times a day as need for pain    Arthritis       metoprolol tartrate 50 MG tablet    LOPRESSOR    90 tablet    TAKE ONE-HALF TABLET BY  MOUTH TWICE A DAY    Essential hypertension with goal blood pressure less than 140/90       senna-docusate 8.6-50 MG per tablet    SENOKOT-S;PERICOLACE    30 tablet    Take 1-2 tablets by mouth 2 times daily as needed for constipation    Constipation due to pain medication       traMADol 50 MG tablet    ULTRAM    40 tablet    Take 1 tablet (50 mg) by mouth 2 times daily as needed for moderate pain    Multiple joint pain       vitamin D 2000 units tablet     100 tablet    Take 2,000 Units by mouth daily    Vitamin D deficiency, Hyperparathyroidism (H)

## 2018-05-25 DIAGNOSIS — I10 ESSENTIAL HYPERTENSION WITH GOAL BLOOD PRESSURE LESS THAN 140/90: Chronic | ICD-10-CM

## 2018-05-25 DIAGNOSIS — R60.0 BILATERAL LEG EDEMA: Primary | ICD-10-CM

## 2018-05-25 RX ORDER — FUROSEMIDE 40 MG
TABLET ORAL
Qty: 180 TABLET | Refills: 0 | Status: SHIPPED | OUTPATIENT
Start: 2018-05-25 | End: 2018-12-13

## 2018-05-25 NOTE — TELEPHONE ENCOUNTER
"Requested Prescriptions   Pending Prescriptions Disp Refills     furosemide (LASIX) 40 MG tablet [Pharmacy Med Name: FUROSEMIDE  40MG  TAB] 180 tablet      Sig: TAKE 1 TO 2 TABLETS BY  MOUTH DAILY    Diuretics (Including Combos) Protocol Failed    5/25/2018  3:21 AM       Failed - Blood pressure under 140/90 in past 12 months    BP Readings from Last 3 Encounters:   04/06/18 144/80   02/28/18 150/78   02/09/18 (!) 146/95          Failed - Normal serum creatinine on file in past 12 months    Recent Labs   Lab Test  03/30/18   0816   CR  1.35*          Passed - Recent (12 mo) or future (30 days) visit within the authorizing provider's specialty    Patient had office visit in the last 12 months or has a visit in the next 30 days with authorizing provider or within the authorizing provider's specialty.  See \"Patient Info\" tab in inbasket, or \"Choose Columns\" in Meds & Orders section of the refill encounter.    Last OV: 04/06/18, per OV note f/u in 6 months       Passed - Patient is age 18 or older       Passed - No active pregancy on record       Passed - Normal serum potassium on file in past 12 months    Recent Labs   Lab Test  03/30/18   0816   POTASSIUM  4.3          Passed - Normal serum sodium on file in past 12 months    Recent Labs   Lab Test  03/30/18   0816   NA  141          Passed - No positive pregnancy test in past 12 months        Routing refill request to provider for review/approval because:  Labs out of range:  Cr  BP out of SO parameters    Please advise, thanks.  "

## 2018-06-02 ENCOUNTER — ALLIED HEALTH/NURSE VISIT (OUTPATIENT)
Dept: NURSING | Facility: CLINIC | Age: 83
End: 2018-06-02
Payer: MEDICARE

## 2018-06-02 DIAGNOSIS — D51.9 ANEMIA DUE TO VITAMIN B12 DEFICIENCY, UNSPECIFIED B12 DEFICIENCY TYPE: Primary | ICD-10-CM

## 2018-06-02 PROCEDURE — 96372 THER/PROPH/DIAG INJ SC/IM: CPT

## 2018-06-02 NOTE — MR AVS SNAPSHOT
After Visit Summary   6/2/2018    Glendy Díaz    MRN: [id number]           Patient Information     Date Of Birth          11/16/1933        Visit Information        Provider Department      6/2/2018 9:30 AM CHANTELLE WISDOM/LPN [facility]        Today's Diagnoses     Anemia due to vitamin B12 deficiency, unspecified B12 deficiency type    -  1       Follow-ups after your visit        Who to contact     If you have questions or need follow up information about today's clinic visit or your schedule please contact [facility] directly at [phone].  Normal or non-critical lab and imaging results will be communicated to you by MyChart, letter or phone within 4 business days after the clinic has received the results. If you do not hear from us within 7 days, please contact the clinic through MyChart or phone. If you have a critical or abnormal lab result, we will notify you by phone as soon as possible.  Submit refill requests through [employer] or call your pharmacy and they will forward the refill request to us. Please allow 3 business days for your refill to be completed.          Additional Information About Your Visit        Care EveryWhere ID     This is your Care EveryWhere ID. This could be used by other organizations to access your [place] medical records  [id number]         Blood Pressure from Last 3 Encounters:   04/06/18 144/80   02/28/18 150/78   02/09/18 (!) 146/95    Weight from Last 3 Encounters:   04/06/18 185 lb 11.2 oz (84.2 kg)   02/28/18 184 lb 4.8 oz (83.6 kg)   12/12/17 185 lb (83.9 kg)              Today, you had the following     No orders found for display       Primary Care Provider Office Phone # Fax #    Veronica Davis [phone]       [address]  [facility] [address]        Equal Access to Services     PATRICK HIDALGO : Isabel Garibay, sammy munoz, deirdre shah  shahana bruceceline edyeric cevallos ah. So [facility] [phone].    ATENCIÓN: Si ericla olvin, tiene a jones disposición servicios gratuitos de asistencia lingüística. Vincent aleman [phone].    We comply with applicable federal civil rights laws and [place] laws. We do not discriminate on the basis of race, color, national origin, age, disability, sex, sexual orientation, or gender identity.            Thank you!     Thank you for choosing [facility]  for your care. Our goal is always to provide you with excellent care. Hearing back from our patients is one way we can continue to improve our services. Please take a few minutes to complete the written survey that you may receive in the mail after your visit with us. Thank you!             Your Updated Medication List - Protect others around you: Learn how to safely use, store and throw away your medicines at www.disposemymeds.org.          This list is accurate as of 6/2/18  9:45 AM.  Always use your most recent med list.                   Brand Name Dispense Instructions for use Diagnosis    albuterol 108 (90 Base) MCG/ACT Inhaler    PROAIR HFA/PROVENTIL HFA/VENTOLIN HFA    1 Inhaler    Inhale 2 puffs into the lungs every 6 hours as needed for shortness of breath / dyspnea or wheezing    Cough       amLODIPine 2.5 MG tablet    NORVASC    90 tablet    TAKE 1 TABLET BY MOUTH  DAILY    Essential hypertension with goal blood pressure less than 140/90       aspirin 81 MG tablet     30 tablet    Take 1 tablet (81 mg) by mouth daily    CKD (chronic kidney disease) stage 3, GFR 30-59 ml/min, Essential hypertension with goal blood pressure less than 140/90       cetirizine 10 MG tablet    zyrTEC    90 tablet    Take 1 tablet (10 mg) by mouth every evening        cyanocobalamin 1000 MCG/ML injection    VITAMIN B12    1 mL    1 mL (1,000 mcg) every 30 days        fluticasone 50 MCG/ACT spray    FLONASE    3 Bottle    Spray 1-2 sprays into both nostrils daily    Chronic  non-seasonal allergic rhinitis, unspecified trigger       furosemide 40 MG tablet    LASIX    180 tablet    TAKE 1 TO 2 TABLETS BY  MOUTH DAILY    Essential hypertension with goal blood pressure less than 140/90, Bilateral leg edema       ketoprofen 10% in PLO 10% topical gel     50 g    Apply 4 times a day as need for pain    Arthritis       metoprolol tartrate 50 MG tablet    LOPRESSOR    90 tablet    TAKE ONE-HALF TABLET BY  MOUTH TWICE A DAY    Essential hypertension with goal blood pressure less than 140/90       senna-docusate 8.6-50 MG per tablet    SENOKOT-S;PERICOLACE    30 tablet    Take 1-2 tablets by mouth 2 times daily as needed for constipation    Constipation due to pain medication       traMADol 50 MG tablet    ULTRAM    40 tablet    Take 1 tablet (50 mg) by mouth 2 times daily as needed for moderate pain    Multiple joint pain       vitamin D 2000 units tablet     100 tablet    Take 2,000 Units by mouth daily    Vitamin D deficiency, Hyperparathyroidism (H)

## 2018-06-02 NOTE — NURSING NOTE
Patients orders are  for vitamin b12 injections. Huddled with Rn (Ariana BETHEA) here at [facility] and she okayed me to give the vaccine.       Jeanne Armenta, CMA

## 2018-06-27 ENCOUNTER — TELEPHONE (OUTPATIENT)
Dept: INTERNAL MEDICINE | Facility: CLINIC | Age: 83
End: 2018-06-27

## 2018-06-27 DIAGNOSIS — G62.9 PERIPHERAL POLYNEUROPATHY: Primary | Chronic | ICD-10-CM

## 2018-06-27 NOTE — TELEPHONE ENCOUNTER
Received fax from [facility] requesting refill of Gabapentin, no dosage specified.  This med was discontinued from med list Aug. 2017 with reason being med was stopped by patient.  At 8/25/17 office visit patient had been instructed to resume the Gabapentin 100 mg AM, 100 mg in afternoon and 300 mg HS.   KEVIN Zaidi R.N.

## 2018-06-28 RX ORDER — GABAPENTIN 100 MG/1
CAPSULE ORAL
Qty: 150 CAPSULE | Refills: 3 | Status: SHIPPED | OUTPATIENT
Start: 2018-06-28 | End: 2018-10-18

## 2018-07-05 ENCOUNTER — TRANSFERRED RECORDS (OUTPATIENT)
Dept: HEALTH INFORMATION MANAGEMENT | Facility: CLINIC | Age: 83
End: 2018-07-05

## 2018-07-06 ENCOUNTER — ALLIED HEALTH/NURSE VISIT (OUTPATIENT)
Dept: NURSING | Facility: CLINIC | Age: 83
End: 2018-07-06
Payer: MEDICARE

## 2018-07-06 DIAGNOSIS — D51.9 ANEMIA DUE TO VITAMIN B12 DEFICIENCY, UNSPECIFIED B12 DEFICIENCY TYPE: ICD-10-CM

## 2018-07-06 PROCEDURE — 96372 THER/PROPH/DIAG INJ SC/IM: CPT

## 2018-07-06 PROCEDURE — 99207 ZZC NO CHARGE NURSE ONLY: CPT

## 2018-08-06 ENCOUNTER — ALLIED HEALTH/NURSE VISIT (OUTPATIENT)
Dept: NURSING | Facility: CLINIC | Age: 83
End: 2018-08-06
Payer: MEDICARE

## 2018-08-06 DIAGNOSIS — D51.9 ANEMIA DUE TO VITAMIN B12 DEFICIENCY, UNSPECIFIED B12 DEFICIENCY TYPE: ICD-10-CM

## 2018-08-06 PROCEDURE — 99207 ZZC NO CHARGE NURSE ONLY: CPT

## 2018-08-06 PROCEDURE — 96372 THER/PROPH/DIAG INJ SC/IM: CPT

## 2018-09-07 ENCOUNTER — ALLIED HEALTH/NURSE VISIT (OUTPATIENT)
Dept: NURSING | Facility: CLINIC | Age: 83
End: 2018-09-07
Payer: MEDICARE

## 2018-09-07 DIAGNOSIS — D51.9 ANEMIA DUE TO VITAMIN B12 DEFICIENCY, UNSPECIFIED B12 DEFICIENCY TYPE: ICD-10-CM

## 2018-09-07 PROCEDURE — 99207 ZZC NO CHARGE NURSE ONLY: CPT

## 2018-09-07 PROCEDURE — 96372 THER/PROPH/DIAG INJ SC/IM: CPT

## 2018-09-07 NOTE — MR AVS SNAPSHOT
After Visit Summary   9/7/2018    Glendy Díaz    MRN: [id number]           Patient Information     Date Of Birth          11/16/1933        Visit Information        Provider Department      9/7/2018 1:30 PM RI IM NURSE [facility]        Today's Diagnoses     Anemia due to vitamin B12 deficiency, unspecified B12 deficiency type           Follow-ups after your visit        Who to contact     If you have questions or need follow up information about today's clinic visit or your schedule please contact [facility] directly at [phone].  Normal or non-critical lab and imaging results will be communicated to you by MyChart, letter or phone within 4 business days after the clinic has received the results. If you do not hear from us within 7 days, please contact the clinic through MyChart or phone. If you have a critical or abnormal lab result, we will notify you by phone as soon as possible.  Submit refill requests through [employer] or call your pharmacy and they will forward the refill request to us. Please allow 3 business days for your refill to be completed.          Additional Information About Your Visit        Care EveryWhere ID     This is your Care EveryWhere ID. This could be used by other organizations to access your [facility] medical records  [id number]         Blood Pressure from Last 3 Encounters:   04/06/18 144/80   02/28/18 150/78   02/09/18 (!) 146/95    Weight from Last 3 Encounters:   04/06/18 185 lb 11.2 oz (84.2 kg)   02/28/18 184 lb 4.8 oz (83.6 kg)   12/12/17 185 lb (83.9 kg)              We Performed the Following     VITAMIN B12 1000 mcg (standing order with a count of 15)        Primary Care Provider Office Phone # Fax #    Veronica Davis [phone]       [address]  [facility] [address]        Equal Access to Services     PATRICK HIDALGO : Isabel Garibay, sammy munoz, brittney wallis  deirdre rydreceline aceaan ah. So [facility] [phone].    ATENCIÓN: Si ericla olvin, tiene a jones disposición servicios gratuitos de asistencia lingüística. Vincent al [phone].    We comply with applicable federal civil rights laws and [place] laws. We do not discriminate on the basis of race, color, national origin, age, disability, sex, sexual orientation, or gender identity.            Thank you!     Thank you for choosing [facility]  for your care. Our goal is always to provide you with excellent care. Hearing back from our patients is one way we can continue to improve our services. Please take a few minutes to complete the written survey that you may receive in the mail after your visit with us. Thank you!             Your Updated Medication List - Protect others around you: Learn how to safely use, store and throw away your medicines at www.disposemymeds.org.          This list is accurate as of 9/7/18  1:41 PM.  Always use your most recent med list.                   Brand Name Dispense Instructions for use Diagnosis    albuterol 108 (90 Base) MCG/ACT inhaler    PROAIR HFA/PROVENTIL HFA/VENTOLIN HFA    1 Inhaler    Inhale 2 puffs into the lungs every 6 hours as needed for shortness of breath / dyspnea or wheezing    Cough       amLODIPine 2.5 MG tablet    NORVASC    90 tablet    TAKE 1 TABLET BY MOUTH  DAILY    Essential hypertension with goal blood pressure less than 140/90       aspirin 81 MG tablet     30 tablet    Take 1 tablet (81 mg) by mouth daily    CKD (chronic kidney disease) stage 3, GFR 30-59 ml/min, Essential hypertension with goal blood pressure less than 140/90       cetirizine 10 MG tablet    zyrTEC    90 tablet    Take 1 tablet (10 mg) by mouth every evening        cyanocobalamin 1000 MCG/ML injection    VITAMIN B12    1 mL    1 mL (1,000 mcg) every 30 days        fluticasone 50 MCG/ACT spray    FLONASE    3 Bottle    Spray 1-2 sprays into both  nostrils daily    Chronic non-seasonal allergic rhinitis, unspecified trigger       furosemide 40 MG tablet    LASIX    180 tablet    TAKE 1 TO 2 TABLETS BY  MOUTH DAILY    Essential hypertension with goal blood pressure less than 140/90, Bilateral leg edema       gabapentin 100 MG capsule    NEURONTIN    150 capsule    Gabapentin 100 mg in am, 100 mg in the afternoon and 300 mg at bed time    Peripheral polyneuropathy       ketoprofen 10% in PLO 10% topical gel     50 g    Apply 4 times a day as need for pain    Arthritis       metoprolol tartrate 50 MG tablet    LOPRESSOR    90 tablet    TAKE ONE-HALF TABLET BY  MOUTH TWICE A DAY    Essential hypertension with goal blood pressure less than 140/90       senna-docusate 8.6-50 MG per tablet    SENOKOT-S;PERICOLACE    30 tablet    Take 1-2 tablets by mouth 2 times daily as needed for constipation    Constipation due to pain medication       traMADol 50 MG tablet    ULTRAM    40 tablet    Take 1 tablet (50 mg) by mouth 2 times daily as needed for moderate pain    Multiple joint pain       vitamin D 2000 units tablet     100 tablet    Take 2,000 Units by mouth daily    Vitamin D deficiency, Hyperparathyroidism (H)

## 2018-09-10 DIAGNOSIS — I10 ESSENTIAL HYPERTENSION WITH GOAL BLOOD PRESSURE LESS THAN 140/90: Chronic | ICD-10-CM

## 2018-09-11 NOTE — TELEPHONE ENCOUNTER
"Requested Prescriptions   Pending Prescriptions Disp Refills     metoprolol tartrate (LOPRESSOR) 50 MG tablet [Pharmacy Med Name: METOPROLOL TARTRATE  50MG  TAB]  Last Written Prescription Date:  4/6/2018  Last Fill Quantity: 90,  # refills: 1   Last office visit: 4/6/2018 with prescribing provider:     Future Office Visit:   90 tablet      Sig: TAKE ONE-HALF TABLET BY  MOUTH TWICE A DAY    Beta-Blockers Protocol Failed    9/10/2018  3:23 AM       Failed - Blood pressure under 140/90 in past 12 months    BP Readings from Last 3 Encounters:   04/06/18 144/80   02/28/18 150/78   02/09/18 (!) 146/95                Passed - Patient is age 6 or older       Passed - Recent (12 mo) or future (30 days) visit within the authorizing provider's specialty    Patient had office visit in the last 12 months or has a visit in the next 30 days with authorizing provider or within the authorizing provider's specialty.  See \"Patient Info\" tab in inbasket, or \"Choose Columns\" in Meds & Orders section of the refill encounter.            amLODIPine (NORVASC) 2.5 MG tablet [Pharmacy Med Name: AMLODIPINE  2.5MG  TAB]  Last Written Prescription Date:  4/6/2018  Last Fill Quantity: 90,  # refills: 1   Last office visit: 4/6/2018 with prescribing provider:     Future Office Visit:   90 tablet      Sig: TAKE 1 TABLET BY MOUTH  DAILY    Calcium Channel Blockers Protocol  Failed    9/10/2018  3:23 AM       Failed - Blood pressure under 140/90 in past 12 months    BP Readings from Last 3 Encounters:   04/06/18 144/80   02/28/18 150/78   02/09/18 (!) 146/95                Failed - Normal serum creatinine on file in past 12 months    Recent Labs   Lab Test  03/30/18   0816   CR  1.35*            Passed - Recent (12 mo) or future (30 days) visit within the authorizing provider's specialty    Patient had office visit in the last 12 months or has a visit in the next 30 days with authorizing provider or within the authorizing provider's specialty.  See " "\"Patient Info\" tab in inbasket, or \"Choose Columns\" in Meds & Orders section of the refill encounter.           Passed - Patient is age 18 or older       Passed - No active pregnancy on record       Passed - No positive pregnancy test in past 12 months        "

## 2018-09-12 RX ORDER — AMLODIPINE BESYLATE 2.5 MG/1
TABLET ORAL
Qty: 90 TABLET | Refills: 0 | Status: SHIPPED | OUTPATIENT
Start: 2018-09-12 | End: 2018-10-18

## 2018-09-12 RX ORDER — METOPROLOL TARTRATE 50 MG
TABLET ORAL
Qty: 90 TABLET | Refills: 0 | Status: SHIPPED | OUTPATIENT
Start: 2018-09-12 | End: 2018-10-18

## 2018-09-12 NOTE — TELEPHONE ENCOUNTER
Per Dr. Steiner's 4/6/18 Office Visit note:   3. Continue same meds, same doses for now   4. Please make a lab appointment for fasting labs in 6 months  5. Please make an appointment few days after the labs to discuss about the results.     Medication is being filled for 1 time refill only due to:  Patient needs to be seen because due for 6 month follow up appointment in October.

## 2018-10-08 ENCOUNTER — ALLIED HEALTH/NURSE VISIT (OUTPATIENT)
Dept: NURSING | Facility: CLINIC | Age: 83
End: 2018-10-08
Payer: MEDICARE

## 2018-10-08 DIAGNOSIS — D51.9 ANEMIA DUE TO VITAMIN B12 DEFICIENCY, UNSPECIFIED B12 DEFICIENCY TYPE: Primary | ICD-10-CM

## 2018-10-08 PROCEDURE — 96372 THER/PROPH/DIAG INJ SC/IM: CPT

## 2018-10-11 DIAGNOSIS — E21.3 HYPERPARATHYROIDISM (H): Chronic | ICD-10-CM

## 2018-10-11 DIAGNOSIS — E78.5 HYPERLIPIDEMIA WITH TARGET LDL LESS THAN 130: Chronic | ICD-10-CM

## 2018-10-11 DIAGNOSIS — N18.30 CKD (CHRONIC KIDNEY DISEASE) STAGE 3, GFR 30-59 ML/MIN (H): Chronic | ICD-10-CM

## 2018-10-11 DIAGNOSIS — I10 ESSENTIAL HYPERTENSION WITH GOAL BLOOD PRESSURE LESS THAN 140/90: Chronic | ICD-10-CM

## 2018-10-11 LAB
ALBUMIN SERPL-MCNC: 3.6 G/DL (ref 3.4–5)
ALP SERPL-CCNC: 79 U/L (ref 40–150)
ALT SERPL W P-5'-P-CCNC: 20 U/L (ref 0–50)
ANION GAP SERPL CALCULATED.3IONS-SCNC: 8 MMOL/L (ref 3–14)
AST SERPL W P-5'-P-CCNC: 20 U/L (ref 0–45)
BILIRUB SERPL-MCNC: 0.6 MG/DL (ref 0.2–1.3)
BUN SERPL-MCNC: 21 MG/DL (ref 7–30)
CALCIUM SERPL-MCNC: 9.4 MG/DL (ref 8.5–10.1)
CHLORIDE SERPL-SCNC: 103 MMOL/L (ref 94–109)
CHOLEST SERPL-MCNC: 192 MG/DL
CO2 SERPL-SCNC: 26 MMOL/L (ref 20–32)
CREAT SERPL-MCNC: 1.5 MG/DL (ref 0.52–1.04)
ERYTHROCYTE [DISTWIDTH] IN BLOOD BY AUTOMATED COUNT: 13.3 % (ref 10–15)
GFR SERPL CREATININE-BSD FRML MDRD: 33 ML/MIN/1.7M2
GLUCOSE SERPL-MCNC: 93 MG/DL (ref 70–99)
HCT VFR BLD AUTO: 38.8 % (ref 35–47)
HDLC SERPL-MCNC: 49 MG/DL
HGB BLD-MCNC: 12.2 G/DL (ref 11.7–15.7)
LDLC SERPL CALC-MCNC: 110 MG/DL
MCH RBC QN AUTO: 30.8 PG (ref 26.5–33)
MCHC RBC AUTO-ENTMCNC: 31.4 G/DL (ref 31.5–36.5)
MCV RBC AUTO: 98 FL (ref 78–100)
NONHDLC SERPL-MCNC: 143 MG/DL
PLATELET # BLD AUTO: 261 10E9/L (ref 150–450)
POTASSIUM SERPL-SCNC: 4.5 MMOL/L (ref 3.4–5.3)
PROT SERPL-MCNC: 7.8 G/DL (ref 6.8–8.8)
PTH-INTACT SERPL-MCNC: 119 PG/ML (ref 18–80)
RBC # BLD AUTO: 3.96 10E12/L (ref 3.8–5.2)
SODIUM SERPL-SCNC: 137 MMOL/L (ref 133–144)
TRIGL SERPL-MCNC: 164 MG/DL
TSH SERPL DL<=0.005 MIU/L-ACNC: 1.87 MU/L (ref 0.4–4)
WBC # BLD AUTO: 7.5 10E9/L (ref 4–11)

## 2018-10-11 PROCEDURE — 85027 COMPLETE CBC AUTOMATED: CPT | Performed by: INTERNAL MEDICINE

## 2018-10-11 PROCEDURE — 83970 ASSAY OF PARATHORMONE: CPT | Performed by: INTERNAL MEDICINE

## 2018-10-11 PROCEDURE — 80061 LIPID PANEL: CPT | Performed by: INTERNAL MEDICINE

## 2018-10-11 PROCEDURE — 84443 ASSAY THYROID STIM HORMONE: CPT | Performed by: INTERNAL MEDICINE

## 2018-10-11 PROCEDURE — 80053 COMPREHEN METABOLIC PANEL: CPT | Performed by: INTERNAL MEDICINE

## 2018-10-11 PROCEDURE — 36415 COLL VENOUS BLD VENIPUNCTURE: CPT | Performed by: INTERNAL MEDICINE

## 2018-10-12 DIAGNOSIS — E21.3 HYPERPARATHYROIDISM (H): Primary | Chronic | ICD-10-CM

## 2018-10-18 ENCOUNTER — TELEPHONE (OUTPATIENT)
Dept: INTERNAL MEDICINE | Facility: CLINIC | Age: 83
End: 2018-10-18

## 2018-10-18 ENCOUNTER — OFFICE VISIT (OUTPATIENT)
Dept: INTERNAL MEDICINE | Facility: CLINIC | Age: 83
End: 2018-10-18
Payer: MEDICARE

## 2018-10-18 VITALS
HEART RATE: 91 BPM | WEIGHT: 183.9 LBS | OXYGEN SATURATION: 96 % | BODY MASS INDEX: 31.4 KG/M2 | RESPIRATION RATE: 14 BRPM | DIASTOLIC BLOOD PRESSURE: 76 MMHG | HEIGHT: 64 IN | SYSTOLIC BLOOD PRESSURE: 148 MMHG | TEMPERATURE: 98.3 F

## 2018-10-18 DIAGNOSIS — N18.30 CKD (CHRONIC KIDNEY DISEASE) STAGE 3, GFR 30-59 ML/MIN (H): Chronic | ICD-10-CM

## 2018-10-18 DIAGNOSIS — E21.3 HYPERPARATHYROIDISM (H): Chronic | ICD-10-CM

## 2018-10-18 DIAGNOSIS — M19.90 ARTHRITIS: ICD-10-CM

## 2018-10-18 DIAGNOSIS — R60.0 BILATERAL LEG EDEMA: ICD-10-CM

## 2018-10-18 DIAGNOSIS — R42 LIGHTHEADEDNESS: Primary | ICD-10-CM

## 2018-10-18 DIAGNOSIS — I10 ESSENTIAL HYPERTENSION WITH GOAL BLOOD PRESSURE LESS THAN 140/90: Chronic | ICD-10-CM

## 2018-10-18 DIAGNOSIS — Z23 NEED FOR PROPHYLACTIC VACCINATION AND INOCULATION AGAINST INFLUENZA: ICD-10-CM

## 2018-10-18 DIAGNOSIS — M25.50 MULTIPLE JOINT PAIN: ICD-10-CM

## 2018-10-18 DIAGNOSIS — G62.9 PERIPHERAL POLYNEUROPATHY: Chronic | ICD-10-CM

## 2018-10-18 PROCEDURE — G0008 ADMIN INFLUENZA VIRUS VAC: HCPCS | Performed by: INTERNAL MEDICINE

## 2018-10-18 PROCEDURE — 90662 IIV NO PRSV INCREASED AG IM: CPT | Performed by: INTERNAL MEDICINE

## 2018-10-18 PROCEDURE — 99215 OFFICE O/P EST HI 40 MIN: CPT | Mod: 25 | Performed by: INTERNAL MEDICINE

## 2018-10-18 RX ORDER — AMLODIPINE BESYLATE 2.5 MG/1
TABLET ORAL
Qty: 90 TABLET | Refills: 1 | Status: SHIPPED | OUTPATIENT
Start: 2018-10-18 | End: 2018-10-22

## 2018-10-18 RX ORDER — TRAMADOL HYDROCHLORIDE 50 MG/1
50 TABLET ORAL 2 TIMES DAILY PRN
Qty: 40 TABLET | Refills: 0 | Status: SHIPPED | OUTPATIENT
Start: 2018-10-18 | End: 2019-10-01

## 2018-10-18 RX ORDER — GABAPENTIN 100 MG/1
CAPSULE ORAL
Qty: 150 CAPSULE | Refills: 3 | Status: SHIPPED | OUTPATIENT
Start: 2018-10-18 | End: 2019-10-01

## 2018-10-18 RX ORDER — METOPROLOL TARTRATE 50 MG
TABLET ORAL
Qty: 90 TABLET | Refills: 1 | Status: SHIPPED | OUTPATIENT
Start: 2018-10-18 | End: 2018-10-22

## 2018-10-18 ASSESSMENT — ANXIETY QUESTIONNAIRES
7. FEELING AFRAID AS IF SOMETHING AWFUL MIGHT HAPPEN: NOT AT ALL
5. BEING SO RESTLESS THAT IT IS HARD TO SIT STILL: SEVERAL DAYS
4. TROUBLE RELAXING: SEVERAL DAYS
GAD7 TOTAL SCORE: 6
GAD7 TOTAL SCORE: 6
2. NOT BEING ABLE TO STOP OR CONTROL WORRYING: SEVERAL DAYS
GAD7 TOTAL SCORE: 6
6. BECOMING EASILY ANNOYED OR IRRITABLE: SEVERAL DAYS
1. FEELING NERVOUS, ANXIOUS, OR ON EDGE: SEVERAL DAYS
7. FEELING AFRAID AS IF SOMETHING AWFUL MIGHT HAPPEN: NOT AT ALL
3. WORRYING TOO MUCH ABOUT DIFFERENT THINGS: SEVERAL DAYS

## 2018-10-18 ASSESSMENT — PATIENT HEALTH QUESTIONNAIRE - PHQ9
SUM OF ALL RESPONSES TO PHQ QUESTIONS 1-9: 7
SUM OF ALL RESPONSES TO PHQ QUESTIONS 1-9: 7
10. IF YOU CHECKED OFF ANY PROBLEMS, HOW DIFFICULT HAVE THESE PROBLEMS MADE IT FOR YOU TO DO YOUR WORK, TAKE CARE OF THINGS AT HOME, OR GET ALONG WITH OTHER PEOPLE: NOT DIFFICULT AT ALL

## 2018-10-18 NOTE — TELEPHONE ENCOUNTER
[facility] pharmacy calling--ketoprofen topical gel is a compounded medication and will need to be filled at the compounding pharmacy.  Rx sent to the compounding pharmacy as written by MD. Candy Ramos RN

## 2018-10-18 NOTE — PROGRESS NOTES
Dr Steiner's note      Patient's instructions / PLAN:                                                        Plan:  1. If the lightheadedness episodes reoccur call and I will order a heart monitor ( event monitor)   2. Ranitidine 150 mg twice a day for 1-2 months  3. Boil water and breath the warm vapors 2-3 times a day to try to open up the sinuses.   4. Take flonase nasal spray  daily   5. Gabapentin 100 mg : Start with 1 capsule daily and every week increase by 1 capsule up to 1 capsule in am, 1 capsule in pm and 3 capsules at bed time  6. Take vitamin D 4000 units daily for 3 months  7. Please make a lab appointment for non fasting labs  In January 8. Please make an appointment few days after the labs to discuss about the results.         ASSESSMENT & PLAN:                                                      (R42) Lightheadedness  (primary encounter diagnosis)  Comment:   Plan: as above     (E21.3) Hyperparathyroidism (H)  Comment: again high PTH  Plan: Parathyroid Hormone Intact, Vitamin D         Deficiency, Comprehensive metabolic panel            (I10) HTN, goal < 150/90  Comment: Controlled    Plan: amLODIPine (NORVASC) 2.5 MG tablet, metoprolol         tartrate (LOPRESSOR) 50 MG tablet, Parathyroid         Hormone Intact, Vitamin D Deficiency,         Comprehensive metabolic panel            (R60.0) Bilateral leg edema  Comment: Not controlled She does not keep legs elevated  Plan: Advised for  low-salt diet    (M25.50) Multiple joint pain  Comment:   Plan: traMADol (ULTRAM) 50 MG tablet            (M19.90) Arthritis  Comment:   Plan: DISCONTINUED: ketoprofen 10% in PLO 10% topical        gel            (G62.9) Peripheral polyneuropathy  Comment:   Plan: gabapentin (NEURONTIN) 100 MG capsule        I hand wrote the schedule for the gabapentin     (N18.3) CKD (chronic kidney disease) stage 3, GFR 30-59 ml/min (H)  Comment: stable   Plan: Parathyroid Hormone Intact, Vitamin D         Deficiency,  Comprehensive metabolic panel            (Z23) Need for prophylactic vaccination and inoculation against influenza  Comment:   Plan: FLU VACCINE, INCREASED ANTIGEN, PRESV FREE, AGE        65+ [24877], Vaccine Administration, Initial         [[id number]]               Chief complaint:                                                      Follow up chronic medical problems   Lightheadedness episodes  Peripheral neuropathy symptoms  Choking spells    SUBJECTIVE:   Gelndy Díaz is a 84 year old female who presents to clinic today for the following health issues:       Not a good historian. I had to ask her several time to clarify what she was saying. Daughter was not familiar with her complains.     *Results-Labs done 10/12/18 - discussed   Episodes  --  lightheaded, sweaty, slightly short-of-breath.  -- no related with exercise, it can come at rest  -- she feels lightheaded. She feels it is related with the sinuses. Because she had nasal congestion and postnasal drip before the lightheadedness   -- symptoms are not every week   -- no CP, no SOB, no palpitations   -- the episodes usually last 30 min, but the last one a week ago lasted 2-3 hours.   -- She had meclizine while she was in the hospital, and she felt like this helped (daughter thinks she had this through the IV and has not had any tablets for this).     Hyperlipidemia Follow-Up      Rate your low fat/cholesterol diet?: not monitoring fat    Taking statin?  No    Other lipid medications/supplements?:  none    Hypertension Follow-up      Outpatient blood pressures are being checked at home.  Results are usually 140/70.    Low Salt Diet: not monitoring salt      Amount of exercise or physical activity: Still has a lot of issues with her balance, is trying to do things to help her be more independent.     Problems taking medications regularly: No    Medication side effects: none    Diet: regular (no restrictions)    PTH   -- high  -- she takes vit D 2000 daily  "  --Daughter has a lot of questions about PTH.  I explained her and also I printed out [facility] information    Choking spells   -- even water  -- she doesn't want to do more tests now. She states she had tests in [place] for this and she is fine  -- postnasal or gerd ?  -- eating well, the food doesn't get stuck in the chest     periph neurop  -- she was doing well with the gabapentin ( on increasing doses)  so she decided to stop it  -- she has sympotms again so she wants to resume it  -- she has hx of self adjusting meds       I apologized, I was 60 late for her appointment     Review of Systems:                                                      ROS: negative for fever, chills, cough, wheezes, chest pain, shortness of breath, vomiting, abdominal pain, leg swelling       OBJECTIVE:             Physical exam:  Blood pressure 164/62, pulse 91, temperature 98.3  F (36.8  C), temperature source Oral, resp. rate 14, height 5' 4\" (1.626 m), weight 183 lb 14.4 oz (83.4 kg), SpO2 96 %, not currently breastfeeding.   NAD, appears comfortable  Skin: no rashes   HEENT: PERRLA, EOMI, pink conjunctiva, anicteric sclerae, bilateral tympanic membranes are clinically normal, oropharynx is normal color  Neck: supple, no JVD,  No thyroidmegaly. Lymph nodes nonpalpable cervical and supraclavicular.  Chest: clear to auscultation bilaterally, good respiratory effort  Heart: S1 S2, RRR, no mgr appreciated  Abdomen: soft, not tender,  Extremities: + 1 edema,   Neurologic: A, Ox3, no focal signs appreciated    PMHx: reviewed  Past Medical History:   Diagnosis Date     Anxiety      CKD (chronic kidney disease) stage 3, GFR 30-59 ml/min (H)      Hematuria      Hyperlipidemia LDL goal <100      Hyperparathyroidism (H)      Hypertension     No Cardiologist     Incontinence      Leg weakness, bilateral      Neck pain, chronic      Osteoarthritis      Osteopenia      Peripheral neuropathy       PSHx: reviewed  Past Surgical History: "   Procedure Laterality Date     APPENDECTOMY       ARTHRODESIS FOOT  5/1/2014    Procedure: ARTHRODESIS FOOT;  Surgeon: Sid Marks DPM;  Location: [facility] OR     ARTHROSCOPY ANKLE, OPEN REPAIR LIGAMENT, COMBINED  5/31/2012    Procedure:COMBINED ARTHROSCOPY ANKLE, OPEN REPAIR LIGAMENT; LEFT ANKLE ARTHROSCOPY, LATERAL LIGAMENT RECONSTRUCTION, ENDOSCOPIC DRISS, BUNION SECOND TOE RAY CORRECTION    LEFT KNEE Marcaine AND CORTIZONE INJECTION, 5 CC Marcaine, 1 CC CELESTONE, ; Surgeon:VARSHA GERMAN; Location: [facility]     CATARACT IOL, RT/LT       CHOLECYSTECTOMY       EXCISE MASS FOOT  12/4/2012    Procedure: EXCISE MASS FOOT;;  Surgeon: Varsha German MD;  Location:  [facility]     HYSTERECTOMY TOTAL ABDOMINAL      ovaries are in     RELEASE TENDON TOE  5/1/2014    Procedure: RELEASE TENDON TOE;  Surgeon: Sid Marks DPM;  Location: [facility] OR     REMOVE HARDWARE FOOT  12/4/2012    Procedure: REMOVE HARDWARE FOOT;  REMOVAL HARDWARE(SYNTHES SCREW) LEFT FOOT, EXCISION OF INCLUSION CYST;  Surgeon: Varsha German MD;  Location:  [facility]     REPAIR HAMMER TOE  5/1/2014    Procedure: REPAIR HAMMER TOE;  Surgeon: Sid Marks DPM;  Location: [facility] OR     REVERSE ARTHROPLASTY SHOULDER Right 7/18/2016    Procedure: REVERSE ARTHROPLASTY SHOULDER;  Surgeon: Marlo Alejandro MD;  Location: [facility] OR        Meds: reviewed  Current Outpatient Prescriptions   Medication Sig Dispense Refill     albuterol (PROAIR HFA/PROVENTIL HFA/VENTOLIN HFA) 108 (90 BASE) MCG/ACT Inhaler Inhale 2 puffs into the lungs every 6 hours as needed for shortness of breath / dyspnea or wheezing 1 Inhaler 1     amLODIPine (NORVASC) 2.5 MG tablet TAKE 1 TABLET BY MOUTH  DAILY 90 tablet 0     aspirin 81 MG tablet Take 1 tablet (81 mg) by mouth daily 30 tablet 3     cetirizine (ZYRTEC) 10 MG tablet Take 1 tablet (10 mg) by mouth every evening 90 tablet 3     Cholecalciferol (VITAMIN D) 2000 UNITS tablet Take 2,000 Units by mouth daily  100 tablet 3     cyanocobalamin (VITAMIN B12) 1000 MCG/ML injection 1 mL (1,000 mcg) every 30 days 1 mL 11     fluticasone (FLONASE) 50 MCG/ACT spray Spray 1-2 sprays into both nostrils daily 3 Bottle 11     furosemide (LASIX) 40 MG tablet TAKE 1 TO 2 TABLETS BY  MOUTH DAILY 180 tablet 0     gabapentin (NEURONTIN) 100 MG capsule Gabapentin 100 mg in am, 100 mg in the afternoon and 300 mg at bed time 150 capsule 3     ketoprofen 10% in PLO 10% Apply 4 times a day as need for pain 50 g 1     metoprolol tartrate (LOPRESSOR) 50 MG tablet TAKE ONE-HALF TABLET BY  MOUTH TWICE A DAY 90 tablet 0     senna-docusate (SENOKOT-S;PERICOLACE) 8.6-50 MG per tablet Take 1-2 tablets by mouth 2 times daily as needed for constipation 30 tablet 3     traMADol (ULTRAM) 50 MG tablet Take 1 tablet (50 mg) by mouth 2 times daily as needed for moderate pain 40 tablet 0     [DISCONTINUED] ORDER FOR DME Equipment being ordered: All diabetic testing supplies including test strips, lancets and solution for testing 1 times per day. 3 Month 1       Soc Hx: reviewed  Fam Hx: reviewed    Answers for HPI/ROS submitted by the patient on 10/18/2018   If you checked off any problems, how difficult have these problems made it for you to do your work, take care of things at home, or get along with other people?: Not difficult at all  PHQ9 TOTAL SCORE: 7  MARIAN 7 TOTAL SCORE: 6    Time spent with the patient  40 min, more than 50% in counseling and coordinating care, Re above medical problems lightheadedness, choking episodes, peripheral neuropathy, gabapentin, hypertension, hyperparathyroidism, leg edema, recent lab results    Veronica Steiner MD  Internal Medicine       Injectable Influenza Immunization Documentation    1.  Is the person to be vaccinated sick today?   No    2. Does the person to be vaccinated have an allergy to a component   of the vaccine?   No  Egg Allergy Algorithm Link    3. Has the person to be vaccinated ever had a serious reaction    to influenza vaccine in the past?   No    4. Has the person to be vaccinated ever had Guillain-Barré syndrome?   No    Form completed by Aspen Barroso CMA

## 2018-10-18 NOTE — MR AVS SNAPSHOT
After Visit Summary   10/18/2018    Glendy Díaz    MRN: [id number]           Patient Information     Date Of Birth          11/16/1933        Visit Information        Provider Department      10/18/2018 3:00 PM Veronica Davis MD [facility]        Today's Diagnoses     Need for prophylactic vaccination and inoculation against influenza    -  1    Multiple joint pain        Arthritis        Peripheral polyneuropathy        HTN, goal < 150/90        CKD (chronic kidney disease) stage 3, GFR 30-59 ml/min (H)        Hyperparathyroidism (H)          Care Instructions    Plan:  1. If the lightheadedness episodes reoccur call and I will order a heart monitor ( event monitor)   2. Ranitidine 150 mg twice a day for 1-2 months. It is over the counter  3. Boil water and breath the warm vapors 2-3 times a day to try to open up the sinuses.   4. Take flonase nasal spray  daily   5. Gabapentin 100 mg : Start with 1 capsule daily and every week increase by 1 capsule up to 1 capsule in am, 1 capsule in pm and 3 capsules at bed time  6. Take vitamin D 4000 units daily for 3 months  7. Please make a lab appointment for non fasting labs  In January 8. Please make an appointment few days after the labs to discuss about the results.           Follow-ups after your visit        Your next 10 appointments already scheduled     Nov 05, 2018  8:30 AM CST   Nurse Only with [facility] IM NURSE   [facility] ([facility]    [address]  [facility] [address]   [phone]              Future tests that were ordered for you today     Open Future Orders        Priority Expected Expires Ordered    Parathyroid Hormone Intact Routine 9/18/2019 10/18/2019 10/18/2018    Vitamin D Deficiency Routine  10/18/2019 10/18/2018    Comprehensive metabolic panel Routine  10/18/2019 10/18/2018            Who to contact     If you have questions or need follow up  "information about today's clinic visit or your schedule please contact [facility] directly at [phone].  Normal or non-critical lab and imaging results will be communicated to you by MyChart, letter or phone within 4 business days after the clinic has received the results. If you do not hear from us within 7 days, please contact the clinic through MyChart or phone. If you have a critical or abnormal lab result, we will notify you by phone as soon as possible.  Submit refill requests through [employer] or call your pharmacy and they will forward the refill request to us. Please allow 3 business days for your refill to be completed.          Additional Information About Your Visit        Care EveryWhere ID     This is your Care EveryWhere ID. This could be used by other organizations to access your [facility] medical records  [id number]        Your Vitals Were     Pulse Temperature Respirations Height Pulse Oximetry Breastfeeding?    91 98.3  F (36.8  C) (Oral) 14 5' 4\" (1.626 m) 96% No    BMI (Body Mass Index)                   31.57 kg/m2            Blood Pressure from Last 3 Encounters:   10/18/18 148/76   04/06/18 144/80   02/28/18 150/78    Weight from Last 3 Encounters:   10/18/18 183 lb 14.4 oz (83.4 kg)   04/06/18 185 lb 11.2 oz (84.2 kg)   02/28/18 184 lb 4.8 oz (83.6 kg)              We Performed the Following     FLU VACCINE, INCREASED ANTIGEN, PRESV FREE, AGE 65+ [61608]     Vaccine Administration, Initial [44144]          Today's Medication Changes          These changes are accurate as of 10/18/18  4:45 PM.  If you have any questions, ask your nurse or doctor.               These medicines have changed or have updated prescriptions.        Dose/Directions    gabapentin 100 MG capsule   Commonly known as:  NEURONTIN   This may have changed:  additional instructions   Used for:  Peripheral polyneuropathy   Changed by:  Veronica Davis MD        Start with 1 capsule daily " and every week increase by 1 capsule up to 1 capsule in am, 1 capsule in pm and 3 capsules at bed time   Quantity:  150 capsule   Refills:  3            Where to get your medicines      These medications were sent to [place], [place] - [address] [address]  [address] [facility] [address]     Phone:  [phone]     amLODIPine 2.5 MG tablet    gabapentin 100 MG capsule    ketoprofen 10% in PLO 10% topical gel    metoprolol tartrate 50 MG tablet         Some of these will need a paper prescription and others can be bought over the counter.  Ask your nurse if you have questions.     Bring a paper prescription for each of these medications     traMADol 50 MG tablet               Information about OPIOIDS     PRESCRIPTION OPIOIDS: WHAT YOU NEED TO KNOW   We gave you an opioid (narcotic) pain medicine. It is important to manage your pain, but opioids are not always the best choice. You should first try all the other options your care team gave you. Take this medicine for as short a time (and as few doses) as possible.    Some activities can increase your pain, such as bandage changes or therapy sessions. It may help to take your pain medicine 30 to 60 minutes before these activities. Reduce your stress by getting enough sleep, working on hobbies you enjoy and practicing relaxation or meditation. Talk to your care team about ways to manage your pain beyond prescription opioids.    These medicines have risks:    DO NOT drive when on new or higher doses of pain medicine. These medicines can affect your alertness and reaction times, and you could be arrested for driving under the influence (DUI). If you need to use opioids long-term, talk to your care team about driving.    DO NOT operate heavy machinery    DO NOT do any other dangerous activities while taking these medicines.    DO NOT drink any alcohol while taking these medicines.     If the opioid prescribed includes  acetaminophen, DO NOT take with any other medicines that contain acetaminophen. Read all labels carefully. Look for the word  acetaminophen  or  Tylenol.  Ask your pharmacist if you have questions or are unsure.    You can get addicted to pain medicines, especially if you have a history of addiction (chemical, alcohol or substance dependence). Talk to your care team about ways to reduce this risk.    All opioids tend to cause constipation. Drink plenty of water and eat foods that have a lot of fiber, such as fruits, vegetables, prune juice, apple juice and high-fiber cereal. Take a laxative (Miralax, milk of magnesia, Colace, Senna) if you don t move your bowels at least every other day. Other side effects include upset stomach, sleepiness, dizziness, throwing up, tolerance (needing more of the medicine to have the same effect), physical dependence and slowed breathing.    Store your pills in a secure place, locked if possible. We will not replace any lost or stolen medicine. If you don t finish your medicine, please throw away (dispose) as directed by your pharmacist. The [place] [employer]ution [employer] has more information about safe disposal: [email or web address]://www.pca.[email or web address].[email or web address]/living-[email or web address]/managing-[email or web address]-medications         Primary Care Provider Office Phone # Fax #    Veronica Davis [phone]       [address]  [facility] [address]        Equal Access to Services     PATRICK HIDALGO : Hadii carlos odonnell hadasho Sohemaali, waaxda luqadaha, qaybta kaalmada aleksey, deirdre young. So [facility] [phone].    ATENCIÓN: Si habla español, tiene a jones disposición servicios gratuitos de asistencia lingüística. Vincent al [phone].    We comply with applicable federal civil rights laws and [place] laws. We do not discriminate on the basis of race, color, national origin, age, disability, sex, sexual orientation, or gender identity.            Thank you!      Thank you for choosing [facility]  for your care. Our goal is always to provide you with excellent care. Hearing back from our patients is one way we can continue to improve our services. Please take a few minutes to complete the written survey that you may receive in the mail after your visit with us. Thank you!             Your Updated Medication List - Protect others around you: Learn how to safely use, store and throw away your medicines at www.disposemymeds.org.          This list is accurate as of 10/18/18  4:45 PM.  Always use your most recent med list.                   Brand Name Dispense Instructions for use Diagnosis    albuterol 108 (90 Base) MCG/ACT inhaler    PROAIR HFA/PROVENTIL HFA/VENTOLIN HFA    1 Inhaler    Inhale 2 puffs into the lungs every 6 hours as needed for shortness of breath / dyspnea or wheezing    Cough       amLODIPine 2.5 MG tablet    NORVASC    90 tablet    TAKE 1 TABLET BY MOUTH  DAILY    Essential hypertension with goal blood pressure less than 140/90       aspirin 81 MG tablet     30 tablet    Take 1 tablet (81 mg) by mouth daily    CKD (chronic kidney disease) stage 3, GFR 30-59 ml/min (H), Essential hypertension with goal blood pressure less than 140/90       cetirizine 10 MG tablet    zyrTEC    90 tablet    Take 1 tablet (10 mg) by mouth every evening        cyanocobalamin 1000 MCG/ML injection    VITAMIN B12    1 mL    1 mL (1,000 mcg) every 30 days        fluticasone 50 MCG/ACT spray    FLONASE    3 Bottle    Spray 1-2 sprays into both nostrils daily    Chronic non-seasonal allergic rhinitis, unspecified trigger       furosemide 40 MG tablet    LASIX    180 tablet    TAKE 1 TO 2 TABLETS BY  MOUTH DAILY    Essential hypertension with goal blood pressure less than 140/90, Bilateral leg edema       gabapentin 100 MG capsule    NEURONTIN    150 capsule    Start with 1 capsule daily and every week increase by 1 capsule up to 1 capsule in am, 1 capsule in pm and 3  capsules at bed time    Peripheral polyneuropathy       ketoprofen 10% in PLO 10% topical gel     50 g    Apply 4 times a day as need for pain    Arthritis       metoprolol tartrate 50 MG tablet    LOPRESSOR    90 tablet    TAKE ONE-HALF TABLET BY  MOUTH TWICE A DAY    Essential hypertension with goal blood pressure less than 140/90       senna-docusate 8.6-50 MG per tablet    SENOKOT-S;PERICOLACE    30 tablet    Take 1-2 tablets by mouth 2 times daily as needed for constipation    Constipation due to pain medication       traMADol 50 MG tablet    ULTRAM    40 tablet    Take 1 tablet (50 mg) by mouth 2 times daily as needed for moderate pain    Multiple joint pain       vitamin D 2000 units tablet     100 tablet    Take 2,000 Units by mouth daily    Vitamin D deficiency, Hyperparathyroidism (H)

## 2018-10-18 NOTE — NURSING NOTE
"/62 (BP Location: Right arm, Patient Position: Sitting, Cuff Size: Adult Large)  Pulse 91  Temp 98.3  F (36.8  C) (Oral)  Resp 14  Ht 5' 4\" (1.626 m)  Wt 183 lb 14.4 oz (83.4 kg)  SpO2 96%  Breastfeeding? No  BMI 31.57 kg/m2  Aspen Barroso CMA    "

## 2018-10-18 NOTE — PATIENT INSTRUCTIONS
Plan:  1. If the lightheadedness episodes reoccur call and I will order a heart monitor ( event monitor)   2. Ranitidine 150 mg twice a day for 1-2 months. It is over the counter  3. Boil water and breath the warm vapors 2-3 times a day to try to open up the sinuses.   4. Take flonase nasal spray  daily   5. Gabapentin 100 mg : Start with 1 capsule daily and every week increase by 1 capsule up to 1 capsule in am, 1 capsule in pm and 3 capsules at bed time  6. Take vitamin D 4000 units daily for 3 months  7. Please make a lab appointment for non fasting labs  In January 8. Please make an appointment few days after the labs to discuss about the results.

## 2018-10-19 ASSESSMENT — ANXIETY QUESTIONNAIRES: GAD7 TOTAL SCORE: 6

## 2018-10-19 ASSESSMENT — PATIENT HEALTH QUESTIONNAIRE - PHQ9: SUM OF ALL RESPONSES TO PHQ QUESTIONS 1-9: 7

## 2018-10-22 DIAGNOSIS — I10 ESSENTIAL HYPERTENSION WITH GOAL BLOOD PRESSURE LESS THAN 140/90: Chronic | ICD-10-CM

## 2018-10-22 RX ORDER — AMLODIPINE BESYLATE 2.5 MG/1
TABLET ORAL
Qty: 90 TABLET | Refills: 1 | Status: SHIPPED | OUTPATIENT
Start: 2018-10-22 | End: 2019-03-12

## 2018-10-22 RX ORDER — METOPROLOL TARTRATE 50 MG
TABLET ORAL
Qty: 90 TABLET | Refills: 1 | Status: SHIPPED | OUTPATIENT
Start: 2018-10-22 | End: 2019-03-12

## 2018-10-22 NOTE — TELEPHONE ENCOUNTER
Daughter called asking that Amlodipine and Metoprolol rxs be sent to [facility] rather than [facility].  Rxs sent to [facility] mail order pharmacy as requested.  KEVIN Zaidi R.N.

## 2018-10-23 ENCOUNTER — TELEPHONE (OUTPATIENT)
Dept: INTERNAL MEDICINE | Facility: CLINIC | Age: 83
End: 2018-10-23

## 2018-10-23 DIAGNOSIS — R42 DIZZINESS: Primary | ICD-10-CM

## 2018-10-23 NOTE — TELEPHONE ENCOUNTER
Clinic Action Needed: Yes, please call daughter Abigail at [phone]    Presenting Problem: Pt had another episode this morning at 5:30am where she felt dizzy/lightheaded, sweaty, weak, and nauseous, she is feeling better now.  Daughter states pt was supposed to call if these symptoms occurred again, as discussed in clinic, and the provider was going to order a heart monitor.      Routed to: DARCY Stanford RN/FNA

## 2018-10-23 NOTE — TELEPHONE ENCOUNTER
Heart monitor ordered  To schedule for this test the patient or the family needs to call [place] [facility] at: [phone] if she has again    Episodes of persistent dizziness, she will need to go to emergency room

## 2018-10-25 ENCOUNTER — TRANSFERRED RECORDS (OUTPATIENT)
Dept: HEALTH INFORMATION MANAGEMENT | Facility: CLINIC | Age: 83
End: 2018-10-25

## 2018-10-29 ENCOUNTER — HOSPITAL ENCOUNTER (OUTPATIENT)
Dept: CARDIOLOGY | Facility: CLINIC | Age: 83
Discharge: HOME | End: 2018-10-29
Attending: INTERNAL MEDICINE | Admitting: INTERNAL MEDICINE
Payer: MEDICARE

## 2018-10-29 DIAGNOSIS — R42 DIZZINESS: ICD-10-CM

## 2018-10-29 PROCEDURE — 0296T ZIO PATCH HOLTER: CPT

## 2018-10-29 PROCEDURE — 0298T ZZC EXT ECG > 48HR TO 21 DAY REVIEW AND INTERPRETATN: CPT | Performed by: INTERNAL MEDICINE

## 2018-10-29 NOTE — LETTER
[facility]  [address], [address]  [place] [address]  [phone]        November 24, 2018    Glendy Díaz  [address]  [facility] [address]            Dear Ms. Glendy Díaz:    The recent heart monitor shows normal rhythm with few episodes of fast heart rate.  We will discuss this finding on your next appointment in January.  If you still have episodes of dizziness, you may try to move the appointment for December.    Sincerely,    Veronica Steiner MD  Internal Medicine

## 2018-11-05 ENCOUNTER — ALLIED HEALTH/NURSE VISIT (OUTPATIENT)
Dept: NURSING | Facility: CLINIC | Age: 83
End: 2018-11-05
Payer: MEDICARE

## 2018-11-05 DIAGNOSIS — D51.9 ANEMIA DUE TO VITAMIN B12 DEFICIENCY, UNSPECIFIED B12 DEFICIENCY TYPE: ICD-10-CM

## 2018-11-05 PROCEDURE — 96372 THER/PROPH/DIAG INJ SC/IM: CPT

## 2018-11-05 PROCEDURE — 99207 ZZC NO CHARGE NURSE ONLY: CPT

## 2018-11-05 NOTE — MR AVS SNAPSHOT
After Visit Summary   11/5/2018    Glendy Díaz    MRN: [id number]           Patient Information     Date Of Birth          11/16/1933        Visit Information        Provider Department      11/5/2018 8:30 AM [facility] IM NURSE [facility]        Today's Diagnoses     Anemia due to vitamin B12 deficiency, unspecified B12 deficiency type           Follow-ups after your visit        Who to contact     If you have questions or need follow up information about today's clinic visit or your schedule please contact [facility] directly at [phone].  Normal or non-critical lab and imaging results will be communicated to you by MyChart, letter or phone within 4 business days after the clinic has received the results. If you do not hear from us within 7 days, please contact the clinic through MyChart or phone. If you have a critical or abnormal lab result, we will notify you by phone as soon as possible.  Submit refill requests through [employer] or call your pharmacy and they will forward the refill request to us. Please allow 3 business days for your refill to be completed.          Additional Information About Your Visit        Care EveryWhere ID     This is your Care EveryWhere ID. This could be used by other organizations to access your [facility] medical records  [id number]         Blood Pressure from Last 3 Encounters:   10/18/18 148/76   04/06/18 144/80   02/28/18 150/78    Weight from Last 3 Encounters:   10/18/18 183 lb 14.4 oz (83.4 kg)   04/06/18 185 lb 11.2 oz (84.2 kg)   02/28/18 184 lb 4.8 oz (83.6 kg)              We Performed the Following     VITAMIN B12 1000 mcg (standing order with a count of 15)        Primary Care Provider Office Phone # Fax #    Veronica Davis [phone]       [address]  [facility] [address]        Equal Access to Services     PATRICK HIDALGO : Isabel Garibay, sammy munoz, brittney  deirdre ruckerceline cevallos ah. Callie [facility] [phone].    ATENCIÓN: Si amanda bah, tiene a jones disposición servicios gratuitos de asistencia lingüística. Vincent al [phone].    We comply with applicable federal civil rights laws and [place] laws. We do not discriminate on the basis of race, color, national origin, age, disability, sex, sexual orientation, or gender identity.            Thank you!     Thank you for choosing [facility]  for your care. Our goal is always to provide you with excellent care. Hearing back from our patients is one way we can continue to improve our services. Please take a few minutes to complete the written survey that you may receive in the mail after your visit with us. Thank you!             Your Updated Medication List - Protect others around you: Learn how to safely use, store and throw away your medicines at www.disposemymeds.org.          This list is accurate as of 11/5/18  8:38 AM.  Always use your most recent med list.                   Brand Name Dispense Instructions for use Diagnosis    albuterol 108 (90 Base) MCG/ACT inhaler    PROAIR HFA/PROVENTIL HFA/VENTOLIN HFA    1 Inhaler    Inhale 2 puffs into the lungs every 6 hours as needed for shortness of breath / dyspnea or wheezing    Cough       amLODIPine 2.5 MG tablet    NORVASC    90 tablet    TAKE 1 TABLET BY MOUTH  DAILY    Essential hypertension with goal blood pressure less than 140/90       aspirin 81 MG tablet     30 tablet    Take 1 tablet (81 mg) by mouth daily    CKD (chronic kidney disease) stage 3, GFR 30-59 ml/min (H), Essential hypertension with goal blood pressure less than 140/90       cetirizine 10 MG tablet    zyrTEC    90 tablet    Take 1 tablet (10 mg) by mouth every evening        cyanocobalamin 1000 MCG/ML injection    VITAMIN B12    1 mL    1 mL (1,000 mcg) every 30 days        fluticasone 50 MCG/ACT spray    FLONASE    3 Bottle    Spray 1-2 sprays  into both nostrils daily    Chronic non-seasonal allergic rhinitis, unspecified trigger       furosemide 40 MG tablet    LASIX    180 tablet    TAKE 1 TO 2 TABLETS BY  MOUTH DAILY    Essential hypertension with goal blood pressure less than 140/90, Bilateral leg edema       gabapentin 100 MG capsule    NEURONTIN    150 capsule    Start with 1 capsule daily and every week increase by 1 capsule up to 1 capsule in am, 1 capsule in pm and 3 capsules at bed time    Peripheral polyneuropathy       ketoprofen 10% in PLO 10% topical gel     50 g    Apply 4 times a day as need for pain    Arthritis       metoprolol tartrate 50 MG tablet    LOPRESSOR    90 tablet    TAKE ONE-HALF TABLET BY  MOUTH TWICE A DAY    Essential hypertension with goal blood pressure less than 140/90       senna-docusate 8.6-50 MG per tablet    SENOKOT-S;PERICOLACE    30 tablet    Take 1-2 tablets by mouth 2 times daily as needed for constipation    Constipation due to pain medication       traMADol 50 MG tablet    ULTRAM    40 tablet    Take 1 tablet (50 mg) by mouth 2 times daily as needed for moderate pain    Multiple joint pain       vitamin D 2000 units tablet     100 tablet    Take 2,000 Units by mouth daily    Vitamin D deficiency, Hyperparathyroidism (H)

## 2018-12-07 ENCOUNTER — ALLIED HEALTH/NURSE VISIT (OUTPATIENT)
Dept: NURSING | Facility: CLINIC | Age: 83
End: 2018-12-07
Payer: MEDICARE

## 2018-12-07 DIAGNOSIS — D51.9 ANEMIA DUE TO VITAMIN B12 DEFICIENCY, UNSPECIFIED B12 DEFICIENCY TYPE: ICD-10-CM

## 2018-12-07 PROCEDURE — 96372 THER/PROPH/DIAG INJ SC/IM: CPT

## 2018-12-13 DIAGNOSIS — I10 ESSENTIAL HYPERTENSION WITH GOAL BLOOD PRESSURE LESS THAN 140/90: Chronic | ICD-10-CM

## 2018-12-13 DIAGNOSIS — R60.0 BILATERAL LEG EDEMA: ICD-10-CM

## 2018-12-13 RX ORDER — FUROSEMIDE 40 MG
TABLET ORAL
Qty: 180 TABLET | Refills: 0 | Status: SHIPPED | OUTPATIENT
Start: 2018-12-13 | End: 2019-03-12

## 2018-12-13 NOTE — TELEPHONE ENCOUNTER
Routing refill request to provider for review/approval because:  Labs out of range:  Creat, blood pressure

## 2018-12-13 NOTE — TELEPHONE ENCOUNTER
"Requested Prescriptions   Pending Prescriptions Disp Refills     furosemide (LASIX) 40 MG tablet [Pharmacy Med Name: FUROSEMIDE  40MG  TAB] 180 tablet 0    Last Written Prescription Date:  05/25/2018  Last Fill Quantity: 180,  # refills:0   Last office visit: 10/18/2018 with prescribing provider:  Future Office Visit:   Sig: TAKE 1 TO 2 TABLETS BY  MOUTH DAILY    Diuretics (Including Combos) Protocol Failed - 12/13/2018  3:15 AM       Failed - Blood pressure under 140/90 in past 12 months    BP Readings from Last 3 Encounters:   10/18/18 148/76   04/06/18 144/80   02/28/18 150/78                Failed - Normal serum creatinine on file in past 12 months    Recent Labs   Lab Test 10/11/18  0826   CR 1.50*             Passed - Recent (12 mo) or future (30 days) visit within the authorizing provider's specialty    Patient had office visit in the last 12 months or has a visit in the next 30 days with authorizing provider or within the authorizing provider's specialty.  See \"Patient Info\" tab in inbasket, or \"Choose Columns\" in Meds & Orders section of the refill encounter.             Passed - Patient is age 18 or older       Passed - No active pregancy on record       Passed - Normal serum potassium on file in past 12 months    Recent Labs   Lab Test 10/11/18  0826   POTASSIUM 4.5                   Passed - Normal serum sodium on file in past 12 months    Recent Labs   Lab Test 10/11/18  0826                Passed - No positive pregnancy test in past 12 months        "

## 2019-01-03 DIAGNOSIS — D51.9 ANEMIA DUE TO VITAMIN B12 DEFICIENCY, UNSPECIFIED B12 DEFICIENCY TYPE: Primary | ICD-10-CM

## 2019-01-03 RX ORDER — CYANOCOBALAMIN 1000 UG/ML
1000 INJECTION, SOLUTION INTRAMUSCULAR; SUBCUTANEOUS
Status: ACTIVE | OUTPATIENT
Start: 2019-01-03 | End: 2019-11-29

## 2019-01-07 ENCOUNTER — ALLIED HEALTH/NURSE VISIT (OUTPATIENT)
Dept: NURSING | Facility: CLINIC | Age: 84
End: 2019-01-07
Payer: MEDICARE

## 2019-01-07 DIAGNOSIS — D51.9 ANEMIA DUE TO VITAMIN B12 DEFICIENCY, UNSPECIFIED B12 DEFICIENCY TYPE: Primary | ICD-10-CM

## 2019-01-07 PROCEDURE — 96372 THER/PROPH/DIAG INJ SC/IM: CPT

## 2019-01-07 RX ADMIN — CYANOCOBALAMIN 1000 MCG: 1000 INJECTION, SOLUTION INTRAMUSCULAR; SUBCUTANEOUS at 11:09

## 2019-02-11 ENCOUNTER — ALLIED HEALTH/NURSE VISIT (OUTPATIENT)
Dept: NURSING | Facility: CLINIC | Age: 84
End: 2019-02-11
Payer: MEDICARE

## 2019-02-11 DIAGNOSIS — D51.9 ANEMIA DUE TO VITAMIN B12 DEFICIENCY, UNSPECIFIED B12 DEFICIENCY TYPE: Primary | ICD-10-CM

## 2019-02-11 PROCEDURE — 96372 THER/PROPH/DIAG INJ SC/IM: CPT

## 2019-02-11 RX ADMIN — CYANOCOBALAMIN 1000 MCG: 1000 INJECTION, SOLUTION INTRAMUSCULAR; SUBCUTANEOUS at 14:52

## 2019-02-11 NOTE — NURSING NOTE
Prior to injection, verified patient identity using patient's name and date of birth.  Due to injection administration, patient instructed to remain in clinic for 15 minutes afterwards, and to report any adverse reaction to me or the  staff immediately.    B12    Drug Amount Wasted:  0.3ml  Vial/Syringe: Single dose vial  Expiration Date:  05/30/20     performed by Oh Ramires on 2/11/2019 at 3:44 PM.

## 2019-03-07 DIAGNOSIS — E21.3 HYPERPARATHYROIDISM (H): Chronic | ICD-10-CM

## 2019-03-07 DIAGNOSIS — I10 ESSENTIAL HYPERTENSION WITH GOAL BLOOD PRESSURE LESS THAN 140/90: Chronic | ICD-10-CM

## 2019-03-07 DIAGNOSIS — N18.30 CKD (CHRONIC KIDNEY DISEASE) STAGE 3, GFR 30-59 ML/MIN (H): Chronic | ICD-10-CM

## 2019-03-07 LAB
ALBUMIN SERPL-MCNC: 3.8 G/DL (ref 3.4–5)
ALP SERPL-CCNC: 84 U/L (ref 40–150)
ALT SERPL W P-5'-P-CCNC: 17 U/L (ref 0–50)
ANION GAP SERPL CALCULATED.3IONS-SCNC: 7 MMOL/L (ref 3–14)
AST SERPL W P-5'-P-CCNC: 16 U/L (ref 0–45)
BILIRUB SERPL-MCNC: 0.4 MG/DL (ref 0.2–1.3)
BUN SERPL-MCNC: 27 MG/DL (ref 7–30)
CALCIUM SERPL-MCNC: 9.2 MG/DL (ref 8.5–10.1)
CHLORIDE SERPL-SCNC: 110 MMOL/L (ref 94–109)
CO2 SERPL-SCNC: 26 MMOL/L (ref 20–32)
CREAT SERPL-MCNC: 1.36 MG/DL (ref 0.52–1.04)
GFR SERPL CREATININE-BSD FRML MDRD: 35 ML/MIN/{1.73_M2}
GLUCOSE SERPL-MCNC: 99 MG/DL (ref 70–99)
POTASSIUM SERPL-SCNC: 4.7 MMOL/L (ref 3.4–5.3)
PROT SERPL-MCNC: 7.7 G/DL (ref 6.8–8.8)
PTH-INTACT SERPL-MCNC: 142 PG/ML (ref 18–80)
SODIUM SERPL-SCNC: 143 MMOL/L (ref 133–144)

## 2019-03-07 PROCEDURE — 36415 COLL VENOUS BLD VENIPUNCTURE: CPT | Performed by: INTERNAL MEDICINE

## 2019-03-07 PROCEDURE — 80053 COMPREHEN METABOLIC PANEL: CPT | Performed by: INTERNAL MEDICINE

## 2019-03-07 PROCEDURE — 83970 ASSAY OF PARATHORMONE: CPT | Performed by: INTERNAL MEDICINE

## 2019-03-07 PROCEDURE — 82306 VITAMIN D 25 HYDROXY: CPT | Performed by: INTERNAL MEDICINE

## 2019-03-08 LAB — DEPRECATED CALCIDIOL+CALCIFEROL SERPL-MC: 58 UG/L (ref 20–75)

## 2019-03-11 ENCOUNTER — ALLIED HEALTH/NURSE VISIT (OUTPATIENT)
Dept: NURSING | Facility: CLINIC | Age: 84
End: 2019-03-11
Payer: MEDICARE

## 2019-03-11 DIAGNOSIS — D51.9 ANEMIA DUE TO VITAMIN B12 DEFICIENCY, UNSPECIFIED B12 DEFICIENCY TYPE: Primary | ICD-10-CM

## 2019-03-11 PROCEDURE — 96372 THER/PROPH/DIAG INJ SC/IM: CPT

## 2019-03-11 RX ADMIN — CYANOCOBALAMIN 1000 MCG: 1000 INJECTION, SOLUTION INTRAMUSCULAR; SUBCUTANEOUS at 09:44

## 2019-03-12 ENCOUNTER — OFFICE VISIT (OUTPATIENT)
Dept: INTERNAL MEDICINE | Facility: CLINIC | Age: 84
End: 2019-03-12
Payer: MEDICARE

## 2019-03-12 VITALS
OXYGEN SATURATION: 97 % | BODY MASS INDEX: 31 KG/M2 | DIASTOLIC BLOOD PRESSURE: 77 MMHG | WEIGHT: 181.6 LBS | TEMPERATURE: 97.6 F | SYSTOLIC BLOOD PRESSURE: 144 MMHG | HEART RATE: 68 BPM | HEIGHT: 64 IN | RESPIRATION RATE: 16 BRPM

## 2019-03-12 DIAGNOSIS — G62.9 PERIPHERAL POLYNEUROPATHY: Chronic | ICD-10-CM

## 2019-03-12 DIAGNOSIS — I47.10 SVT (SUPRAVENTRICULAR TACHYCARDIA) (H): ICD-10-CM

## 2019-03-12 DIAGNOSIS — I10 ESSENTIAL HYPERTENSION WITH GOAL BLOOD PRESSURE LESS THAN 140/90: Chronic | ICD-10-CM

## 2019-03-12 DIAGNOSIS — E21.3 HYPERPARATHYROIDISM (H): Chronic | ICD-10-CM

## 2019-03-12 DIAGNOSIS — E78.5 HYPERLIPIDEMIA WITH TARGET LDL LESS THAN 130: Chronic | ICD-10-CM

## 2019-03-12 DIAGNOSIS — K59.00 CONSTIPATION, UNSPECIFIED CONSTIPATION TYPE: ICD-10-CM

## 2019-03-12 DIAGNOSIS — R60.0 BILATERAL LEG EDEMA: ICD-10-CM

## 2019-03-12 DIAGNOSIS — N18.30 CKD (CHRONIC KIDNEY DISEASE) STAGE 3, GFR 30-59 ML/MIN (H): Primary | Chronic | ICD-10-CM

## 2019-03-12 DIAGNOSIS — D51.9 ANEMIA DUE TO VITAMIN B12 DEFICIENCY, UNSPECIFIED B12 DEFICIENCY TYPE: ICD-10-CM

## 2019-03-12 PROCEDURE — 99215 OFFICE O/P EST HI 40 MIN: CPT | Performed by: INTERNAL MEDICINE

## 2019-03-12 RX ORDER — AMLODIPINE BESYLATE 2.5 MG/1
TABLET ORAL
Qty: 90 TABLET | Refills: 1 | Status: SHIPPED | OUTPATIENT
Start: 2019-03-12 | End: 2019-11-20 | Stop reason: DRUGHIGH

## 2019-03-12 RX ORDER — FUROSEMIDE 40 MG
TABLET ORAL
Qty: 180 TABLET | Refills: 1 | Status: SHIPPED | OUTPATIENT
Start: 2019-03-12 | End: 2019-10-01

## 2019-03-12 RX ORDER — METOPROLOL TARTRATE 50 MG
TABLET ORAL
Qty: 90 TABLET | Refills: 1 | Status: SHIPPED | OUTPATIENT
Start: 2019-03-12 | End: 2019-10-01

## 2019-03-12 ASSESSMENT — MIFFLIN-ST. JEOR: SCORE: 1253.73

## 2019-03-12 NOTE — PROGRESS NOTES
Dr Steiner's note      Patient's instructions / PLAN:                                                        Plan:  1. Gabapentin in the evening: take 1 capsule around 7:00 pm and 2 capsules just before you get in bed  2. You may try to stop the Gabapentin in the morning. Resume it if you have pain/burning sensation   3. Continue the other meds, same doses for now.  4.  Miralax for constipation  5. If still constipation and you are worried about the colon cancer, I put an order for colonoscopy   6. Please make a lab appointment for fasting labs  In 6 months  7. Please make an appointment few days after the labs to discuss about the results.   8. Vitamin B 12  2500 mcg daily instead of shots   9. Stop the vitamin B 12 a week before next labs         ASSESSMENT & PLAN:                                                      (N18.3) CKD (chronic kidney disease) stage 3, GFR 30-59 ml/min (H)  (primary encounter diagnosis)  Comment: stable   Plan: Comprehensive metabolic panel        Avoid NSAIDs     (I47.1) SVT (supraventricular tachycardia) (H), o 2018  Comment: See below discussion regarding the heart monitor  Plan: cont Metoprolol    (I10) HTN, goal < 150/90  Comment: Controlled    Plan: amLODIPine (NORVASC) 2.5 MG tablet, furosemide         (LASIX) 40 MG tablet, metoprolol tartrate         (LOPRESSOR) 50 MG tablet, CBC with platelets,         Comprehensive metabolic panel, Lipid panel         reflex to direct LDL Fasting, TSH with free T4         reflex            (E21.3) Hyperparathyroidism (H)  Comment: See below discussion regarding hyperparathyroidism  Plan: Comprehensive metabolic panel            (E78.5) Hyperlipidemia with target LDL less than 130  Comment: Controlled    Plan: Lipid panel reflex to direct LDL Fasting        Continue same meds, same doses for now     (K59.00) Constipation, unspecified constipation type  Comment:   Plan: GASTROENTEROLOGY ADULT REF PROCEDURE ONLY         Augustus Morales ([phone])  [phone]; No Provider         Preference            (R60.0) Bilateral leg edema  Comment: stable Plan: furosemide (LASIX) 40 MG tablet, Comprehensive         metabolic panel            (D51.9) Anemia due to vitamin B12 deficiency, unspecified B12 deficiency type  Comment:   Plan: Vitamin B12        as above     (G62.9) Peripheral polyneuropathy  Comment: Not controlled   See below discussion  Plan: as above        Chief complaint:                                                      List of questions and Follow up chronic medical problems      SUBJECTIVE:   Glendy Díaz is a 85 year old female who presents to clinic today for the following health issues:    Peripheral neuropathy with Feet burning sensation    -- she wakes up at 6:00 am and takes Gabapentin at 7:00  -- she doesn't know if she needs it in the morning  -- she stopped taking it in the afternoon because it affected her balance  -- she takes 3 capsules at 21:00 just before she enters the bed. Then she feels the burning sensation for about 1 h - 1.5 h   --The story is a little bit confusing, she thinks that gabapentin is causing the burning discomfort at night, but it helps in the morning.  I think there is not enough time for the gabapentin to act before she goes to bed.      Labs March 7 discussed     Lightheadedness  At her last office visit she complains of lightheadedness.  Daughter would like to review the report.  It shows few episodes of SVT.  Mrs Pike has an interesting story: She states that 2 or 3 days after the monitor was placed, she had a sudden noise and noticed that the monitor exploded.  She was not injured.  She did not reported.  She returned the monitor.  While she was wearing the monitor she did not have any episodes of lightheadedness.  I look at the report and and it recorded for 23 days.  I explained her that we can do the monitor again or we can do an event monitor.  She does not want any more heart monitors.  If she changes  "her mind she will let us know.  Daughter will talk to her more about this.        Hyperparathyroidism   PTH higher normal vit D. Questions answered. Normal C.  I gave her printed information about hyperparathyroidism  We discussed the treatment options.  Since she does not have hypercalcemia I would not recommend surgery at her age.  She agrees      Constipation   -- she wants colonoscopy because she has a strong family history of cancer  --She has been using different products over-the-counter.  She does not know the names    B12 def anemia  --She has been getting monthly IM B12.  She would like to try tablets.   --She may try them until the next appointment    Hyperlipidemia Follow-Up      Rate your low fat/cholesterol diet?: good    Taking statin?  No    Other lipid medications/supplements?:  none    Hypertension Follow-up      Outpatient blood pressures Check once every week    Low Salt Diet: no added salt      Amount of exercise or physical activity: None    Problems taking medications regularly: No    Medication side effects: none    Diet: regular (no restrictions)        Review of Systems:                                                      ROS: negative for fever, chills, cough, wheezes, chest pain, shortness of breath, vomiting, abdominal pain, leg swelling         OBJECTIVE:             Physical exam:  Blood pressure 144/77, pulse 68, temperature 97.6  F (36.4  C), temperature source Oral, resp. rate 16, height 1.626 m (5' 4\"), weight 82.4 kg (181 lb 9.6 oz), SpO2 97 %, not currently breastfeeding.   NAD, appears comfortable  Skin: no rashes   Neck: supple, no JVD,  No thyroidmegaly. Lymph nodes nonpalpable cervical and supraclavicular.  Chest: clear to auscultation bilaterally, good respiratory effort  Heart: S1 S2, RRR, no mgr appreciated  Abdomen: soft, not tender, no hepatosplenomegaly or masses appreciated, no abdominal bruit, present bowel sounds  Extremities: +1 edema,   Neurologic: A, Ox3, no " focal signs appreciated    PMHx: reviewed  Past Medical History:   Diagnosis Date     Anxiety      CKD (chronic kidney disease) stage 3, GFR 30-59 ml/min (H)      Hematuria      Hyperlipidemia LDL goal <100      Hyperparathyroidism (H)      Hypertension     No Cardiologist     Incontinence      Leg weakness, bilateral      Neck pain, chronic      Osteoarthritis      Osteopenia      Peripheral neuropathy       PSHx: reviewed  Past Surgical History:   Procedure Laterality Date     APPENDECTOMY       ARTHRODESIS FOOT  5/1/2014    Procedure: ARTHRODESIS FOOT;  Surgeon: Sid Marks DPM;  Location: [facility]     ARTHROSCOPY ANKLE, OPEN REPAIR LIGAMENT, COMBINED  5/31/2012    Procedure:COMBINED ARTHROSCOPY ANKLE, OPEN REPAIR LIGAMENT; LEFT ANKLE ARTHROSCOPY, LATERAL LIGAMENT RECONSTRUCTION, ENDOSCOPIC DRISS, BUNION SECOND TOE RAY CORRECTION    LEFT KNEE Marcaine AND CORTIZONE INJECTION, 5 CC Marcaine, 1 CC CELESTONE, ; Surgeon:VARSHA GERMAN; Location:[facility]     CATARACT IOL, RT/LT       CHOLECYSTECTOMY       EXCISE MASS FOOT  12/4/2012    Procedure: EXCISE MASS FOOT;;  Surgeon: Varsha German MD;  Location: [facility]     HYSTERECTOMY TOTAL ABDOMINAL      ovaries are in     RELEASE TENDON TOE  5/1/2014    Procedure: RELEASE TENDON TOE;  Surgeon: Sid Marks DPM;  Location: [facility]     REMOVE HARDWARE FOOT  12/4/2012    Procedure: REMOVE HARDWARE FOOT;  REMOVAL HARDWARE(SYNTHES SCREW) LEFT FOOT, EXCISION OF INCLUSION CYST;  Surgeon: Varsha German MD;  Location: [facility]     REPAIR HAMMER TOE  5/1/2014    Procedure: REPAIR HAMMER TOE;  Surgeon: Sid Marks DPM;  Location: [facility]     REVERSE ARTHROPLASTY SHOULDER Right 7/18/2016    Procedure: REVERSE ARTHROPLASTY SHOULDER;  Surgeon: Marlo Alejandro MD;  Location: [facility]        Meds: reviewed  Current Outpatient Medications   Medication Sig Dispense Refill     albuterol (PROAIR HFA/PROVENTIL HFA/VENTOLIN HFA) 108 (90 BASE) MCG/ACT  Inhaler Inhale 2 puffs into the lungs every 6 hours as needed for shortness of breath / dyspnea or wheezing 1 Inhaler 1     amLODIPine (NORVASC) 2.5 MG tablet TAKE 1 TABLET BY MOUTH  DAILY 90 tablet 1     aspirin 81 MG tablet Take 1 tablet (81 mg) by mouth daily 30 tablet 3     Cholecalciferol (VITAMIN D) 2000 UNITS tablet Take 2,000 Units by mouth daily 100 tablet 3     fluticasone (FLONASE) 50 MCG/ACT spray Spray 1-2 sprays into both nostrils daily 3 Bottle 11     furosemide (LASIX) 40 MG tablet TAKE 1 TO 2 TABLETS BY  MOUTH DAILY 180 tablet 0     gabapentin (NEURONTIN) 100 MG capsule Start with 1 capsule daily and every week increase by 1 capsule up to 1 capsule in am, 1 capsule in pm and 3 capsules at bed time 150 capsule 3     ketoprofen 10% in PLO 10% topical gel Apply 4 times a day as need for pain 50 g 1     metoprolol tartrate (LOPRESSOR) 50 MG tablet TAKE ONE-HALF TABLET BY  MOUTH TWICE A DAY 90 tablet 1     traMADol (ULTRAM) 50 MG tablet Take 1 tablet (50 mg) by mouth 2 times daily as needed for moderate pain 40 tablet 0     cetirizine (ZYRTEC) 10 MG tablet Take 1 tablet (10 mg) by mouth every evening 90 tablet 3     senna-docusate (SENOKOT-S;PERICOLACE) 8.6-50 MG per tablet Take 1-2 tablets by mouth 2 times daily as needed for constipation (Patient not taking: Reported on 3/12/2019) 30 tablet 3       Soc Hx: reviewed  Fam Hx: reviewed    Time spent with the patient  More than 40 min, more than 50% in counseling and coordinating care, Re above medical problems peripheral neuropathy, heart monitor, lightheadedness, hypertension, hyperparathyroidism, leg edema, B12 anemia      Veronica Steiner MD  Internal Medicine

## 2019-03-12 NOTE — PATIENT INSTRUCTIONS
Plan:  1. Gabapentin in the evening: take 1 capsule around 7:00 pm and 2 capsules just before you get in bed  2. You may try to stop the Gabapentin in the morning. Resume it if you have pain/burning sensation   3. Continue the other meds, same doses for now.  4.  Miralax for constipation  5. If still constipation and you are worried about the colon cancer, I put an order for colonoscopy   6. Please make a lab appointment for fasting labs  In 6 months  7. Please make an appointment few days after the labs to discuss about the results.   8. Vitamin B 12  2500 mcg daily instead of shots   9. Stop the vitamin B 12 a week before next labs

## 2019-03-19 ENCOUNTER — TELEPHONE (OUTPATIENT)
Dept: INTERNAL MEDICINE | Facility: CLINIC | Age: 84
End: 2019-03-19

## 2019-03-19 DIAGNOSIS — G62.9 PERIPHERAL POLYNEUROPATHY: Chronic | ICD-10-CM

## 2019-03-19 RX ORDER — GABAPENTIN 100 MG/1
CAPSULE ORAL
Qty: 270 CAPSULE | Refills: 1 | Status: SHIPPED | OUTPATIENT
Start: 2019-03-19 | End: 2019-10-01

## 2019-03-19 NOTE — TELEPHONE ENCOUNTER
Patient currently takes Gabapentin 100 mg caps, a total of 3 per day.  She takes 1 in the afternoon and 2 at bedtime.  Only has 1 week left so requests ASAP.      Last rx was 10/18/18 for #150 with 3 refills, that was when she was taking 5 caps per day which she didn't tolerate.

## 2019-03-19 NOTE — TELEPHONE ENCOUNTER
Reason for Call:  Medication or medication refill:    Do you use a [facility]?  Name of the pharmacy and phone number for the current request:  [facility]    Name of the medication requested: gabapentin    Other request: Pt is stating the Dr never refilled this medication and this was the one she needed.    Can we leave a detailed message on this number? YES    Phone number patient can be reached at: Home number on file [phone] (home)    Best Time: any    Call taken on 3/19/2019 at 9:38 AM by Marline Pacheco

## 2019-05-06 ENCOUNTER — OFFICE VISIT (OUTPATIENT)
Dept: PODIATRY | Facility: CLINIC | Age: 84
End: 2019-05-06
Payer: MEDICARE

## 2019-05-06 VITALS
SYSTOLIC BLOOD PRESSURE: 132 MMHG | HEIGHT: 64 IN | WEIGHT: 181.6 LBS | DIASTOLIC BLOOD PRESSURE: 80 MMHG | BODY MASS INDEX: 31 KG/M2

## 2019-05-06 DIAGNOSIS — L97.521 ULCER OF TOE, LEFT, LIMITED TO BREAKDOWN OF SKIN (H): ICD-10-CM

## 2019-05-06 DIAGNOSIS — M77.52 CAPSULITIS OF METATARSOPHALANGEAL (MTP) JOINT OF LEFT FOOT: Primary | ICD-10-CM

## 2019-05-06 PROCEDURE — 99213 OFFICE O/P EST LOW 20 MIN: CPT | Mod: 25 | Performed by: PODIATRIST

## 2019-05-06 PROCEDURE — 97597 DBRDMT OPN WND 1ST 20 CM/<: CPT | Performed by: PODIATRIST

## 2019-05-06 ASSESSMENT — MIFFLIN-ST. JEOR: SCORE: 1253.73

## 2019-05-06 NOTE — PROGRESS NOTES
"Foot & Ankle Surgery   May 6, 2019    S:  Pt is seen today for evaluation of 2 issues.  I saw her for a callus medial L 2nd toe and sub 2nd MPJ L foot pain 12/12/17.  She presents today with worsening pain L 2nd toe.  She has the callus trimmed when she has her nails trimmed, but wouldn't let them touch is recently as it was sore.  She also has increased pain sub 2nd MPJ, very tender with walking.  She wears \"the best shoes\", SAS.     Vitals:    05/06/19 0951   BP: 132/80   Weight: 82.4 kg (181 lb 9.6 oz)   Height: 1.626 m (5' 4\")   '      ROS - Pos for CC.  Patient denies current nausea, vomiting, chills, fevers, belly pain, calf pain, chest pain or SOB.  Complete remainder of ROS it otherwise neg.      PE:  Gen:   No apparent distress  Eye:    Visual scanning without deficit  Ear:    Response to auditory stimuli wnl  Lung:    Non-labored breathing on RA noted  Abd:    NTND per patient report  Lymph:    Neg for pitting/non-pitting edema BLE  Vasc:    Pulses palpable, CFT minimally delayed  Neuro:    Light touch sensation intact to all sensory nerve distributions without paresthesias  Derm:    4 x 5mm partial thickness wound plantar-medial L 2nd toe, healthy base and no SOI  MSK:    Left lower extremity - bunion with overlapping 2nd toe.  Tender sub 2nd MPJ without subluxation/dislocating 2nd MPJ  Calf:    Neg for redness, swelling or tenderness      Assessment:  85 year old female with partial thickness L 2nd toe ulcer; 2nd MPJ capsulitis with plantar plate injury      Plan:  Discussed etiologies, anatomy and options  1.  Partial thickness left 2nd toe ulcer without exposed fat  -Excisional debridement was performed, partial-thickness(limited to skin breakdown, no exposed subcutaneous fat), sharply debriding the wound, excising nonviable tissue to the above dimensions with a tissue nipper  -wound cares - wash/dry, abx ointment/bandaid daily until healed  -surgical shoe  -no indication for PO abx  -she wears a toe " cap daily.  Continue this once wound is healed  -utilize surgical shoe prn if pain levels dictate; otherwise, transition back to regular shoes once wound is healed    2.  2nd MPJ capsulitis left lower extremity in setting of bunion/2nd hammertoe  -surgical shoe  -OTC insert, ok to add to surgical shoe to alleviate pressure  -RICE/NSAID vs tylenol prn based on pain levels  -consider orthotics, walking boot, PT, imaging if this fails to respond      Follow up:  3 weeks or sooner with acute issues      Body mass index is 31.17 kg/m .  Weight management plan: Patient was referred to their PCP to discuss a diet and exercise plan.         Steven Bonilla DPM FACFAS FACFAOM  Podiatric Foot & Ankle Surgeon  [facility]  [phone]

## 2019-05-06 NOTE — PATIENT INSTRUCTIONS
Thank you for choosing [facility]!    DR. RUGGIERO'S CLINIC LOCATIONS:   MONDAY - [place] TUESDAY - [place]   [address] [facility]  [address]   [place] [address] [place] [address]   [phone]       THURSDAY AM - [place] THURSDAY PM - [place]   [address]   [place] [address] [place], [place] [address] [phone]       FRIDAY AM - [place] SET UP SURGERY: [phone] [address] APPOINTMENTS: [phone]   [place] [address] BILLING QUESTIONS: [phone] FAX NUMBER: [phone]     Follow Up: 3 weeks    PRICE THERAPY  Many aches and pains throughout the foot and ankle can be helped with many simple treatments. This is usually described as PRICE Therapy.      P - Protection - often times, inflammation/pain in the lower extremity is not able to improve simply because the areas involved are never allowed to rest. Every step we take can bother the problematic area. Protecting those areas is an important step in the healing process. This may involve a walking cast boot, a special insert/orthotic device, an ankle brace, or simply avoiding barefoot walking.    R - Rest - in addition to protecting the foot/ankle, resting is an important, but often times difficult, treatment option. Getting off your feet when they bother you, and specifically avoiding activities that cause pain/discomfort, are very beneficial to prevent, and treat, foot/ankle pain.      I - Ice - icing regularly can help to decrease inflammation and swelling in the foot, thus decreasing pain. Using an ice pack or a bag of frozen veggies works very well. Ice for 20 minutes multiple times per day as needed.  Do not place the ice directly on the skin as this can cause tissue damage.    C - Compression - using a compression wrap or an ACE wrap can help to decrease swelling, which can help to decrease pain. Wearing the  wraps is generally not needed at night, but they should be worn on a regular basis when you are going to be on your feet for prolonged periods as gravity tends to pull fluids down to your feet/ankles.    E - Elevation - elevating your lower extremities multiple times daily for 15-20 minutes can help to decrease swelling, which works well in decreasing pain levels.    NSAID/Tylenol - Anti-inflammatories like Aleve or ibuprofen, and/or a pain medication, such as Tylenol, can help to improve pain levels and get the issue resolved sooner rather than later. Anyone with liver issues should be careful with Tylenol, and anyone with high blood pressure or heart, stomach or kidney issues should be careful with anti-inflammatories. Please ask if you have questions about these medications, including dosage.    OVER THE COUNTER INSERTS    [employer] Fit CloudBolt Software   Power Step   Walk-Fit Arch Cradles     Most of these can be found at your local [employer], [employer], or online:    1.  https://www.LensVector.boolino/en-us/  2.  Https://Shopitize.boolino/  3.  Https://www.powersteps.com/    **A good high quality over the counter insert should cost around $40-$50                BODY WEIGHT AND YOUR FEET  The following information is included in the after visit summary for all patients. Body weight can be a sensitive issue to discuss in clinic, but we think the following information is very important. Although we focus on the feet and ankles, we do support the overall health of our patients.     Many things can cause foot and ankle problems. Foot structure, activity level, foot mechanics and injuries are common causes of pain. One very important issue that often goes unmentioned, is body weight. Extra weight can cause increased stress on muscles, ligaments, bones and tendons. Sometimes just a few extra pounds is all it takes to put one over her/his threshold. Without reducing that stress, it can  be difficult to alleviate pain. As Foot & Ankle specialists, our job is addressing the lower extremity problem and possible causes. Regarding extra body weight, we encourage patients to discuss diet and weight management plans with their primary care doctors. It is this team approach that gives you the best opportunity for pain relief and getting you back on your feet.      [facility] has a Comprehensive Weight Management Program. This program includes counseling, education, non-surgical and surgical approaches to weight loss. If you are interested in learning more either talk to you primary care provider or call [phone].

## 2019-05-28 ENCOUNTER — OFFICE VISIT (OUTPATIENT)
Dept: PODIATRY | Facility: CLINIC | Age: 84
End: 2019-05-28
Payer: MEDICARE

## 2019-05-28 VITALS
BODY MASS INDEX: 31 KG/M2 | HEIGHT: 64 IN | SYSTOLIC BLOOD PRESSURE: 130 MMHG | DIASTOLIC BLOOD PRESSURE: 84 MMHG | WEIGHT: 181.6 LBS

## 2019-05-28 DIAGNOSIS — M77.52 CAPSULITIS OF METATARSOPHALANGEAL (MTP) JOINT OF LEFT FOOT: Primary | ICD-10-CM

## 2019-05-28 DIAGNOSIS — L84 CALLUS: ICD-10-CM

## 2019-05-28 PROCEDURE — 99214 OFFICE O/P EST MOD 30 MIN: CPT | Performed by: PODIATRIST

## 2019-05-28 ASSESSMENT — MIFFLIN-ST. JEOR: SCORE: 1253.73

## 2019-05-28 NOTE — PROGRESS NOTES
"Foot & Ankle Surgery   May 28, 2019    S:  Pt is seen today for evaluation of 2 issues, the left 2nd toe ulcer and L 2nd MPJ capsulitis.  The ulcer is still sore.  She has a visiting podiatry team that tries to trim it but she told them to stop.  It can be sore.  The area under the 2nd toe is still sore.  The surgical shoe with the Superfeet insert isn't fitting very well.    Vitals:    05/28/19 0702   BP: 130/84   Weight: 82.4 kg (181 lb 9.6 oz)   Height: 1.626 m (5' 4\")   '      ROS - Pos for CC.  Patient denies current nausea, vomiting, chills, fevers, belly pain, calf pain, chest pain or SOB.  Complete remainder of ROS it otherwise neg.      PE:  Gen:   No apparent distress  Eye:    Visual scanning without deficit  Ear:    Response to auditory stimuli wnl  Lung:    Non-labored breathing on RA noted  Abd:    NTND per patient report  Lymph:    Neg for pitting/non-pitting edema BLE  Vasc:    Pulses palpable, CFT minimally delayed  Neuro:    Light touch sensation intact to all sensory nerve distributions without paresthesias  Derm:    the ulcer is healed and there is simply some callusing present.    MSK:    Left lower extremity - bunion with overlapping 2nd toe.  Tender sub 2nd MPJ without subluxation/dislocating 2nd MPJ  Calf:    Neg for redness, swelling or tenderness      Assessment:  85 year old female with callus L 2nd toe; L 2nd MPJ capsulitis       Plan:  Discussed etiologies, anatomy and options  1.  Callus L 2nd toe  -debrided with tissue nipper to evaluate for wound, none was found; no charge  -We discussed that many calluses are caused by pressure or shearing forces on the skin, and these can often be treated sufficiently with shoes, inserts and local callus debridement.  Some calluses, however, can have a deep core, and while home treatments are helpful, occasional clinical debridement may be necessary.  In certain cases, surgical excision may be required if no other treatments have proven " "sufficient.  -Our home callus instruction handout was dispensed, detailing home therapies to minimize regrowth of the calluses.    2.  L 2nd MPJ capsulitis with plantar plate pain in setting of hammertoe  -transition back to regular shoe as the surgical shoe is not fitting properly.  This one is too large and the smaller one was too small  -Superfeet in her SAS shoes, transition back as tolerated  -advised trying a crest pad.  We did not have any samples available in clinic to dispense, so information was dispensed for them  -discussed walking boot and PT.  Declined boot today as they have concerns that she's \"tippy\".    -consider PT if above plan is not sufficient      Follow up:  prn or sooner with acute issues      Body mass index is 31.17 kg/m .  Weight management plan: Patient was referred to their PCP to discuss a diet and exercise plan.         Steven Bonilla DPM FACFAS FACFAOM  Podiatric Foot & Ankle Surgeon  [facility]  [phone]  "

## 2019-05-28 NOTE — PATIENT INSTRUCTIONS
Thank you for choosing [facility]!    DR. RUGGIERO'S CLINIC LOCATIONS:   MONDAY - [place] TUESDAY - [place]   [address] [facility]  [address]   [place] [address] [place] [address]   [phone]       THURSDAY AM - [place] THURSDAY PM - [place]   [address]   [place] [address] [place] [address]   [phone]       FRIDAY AM - [place] SET UP SURGERY: [phone] [address] APPOINTMENTS: [phone]   [place] [address] BILLING QUESTIONS: [phone] FAX NUMBER: [phone]   CALLUS / CORN / IPK CARE  When there is excessive friction or pressure on the skin, the body responds by making the skin thicker to protect the deeper structures from becoming exposed. While this works well to protect the deeper structures, the thickened skin can cause increased pressure and pain.    Callus: flat, diffuse thickened areas are simple calluses and they are usually caused by friction. Often these are the result of rubbing on a shoe or from going barefoot.    Corns: calluses with a central core on or between the toes are called corns. These result from prominent joints on toes rubbing together. Theses are a symptom of bone malalignment or illfitting shoes and will always recur unless the underlying bones are addressed surgically.    IPK: calluses with a central core on the ball of the foot are usually IPKs (intractable plantar keratosis). These are caused by excessive pressure from the metatarsals, the bones that make up the ball of the foot. Often one of these bones is too long or too prominent. Again, these will always recur unless the underlying bone issue is addressed. There is no cure for these. They will either go away by themselves, recur, or more could develop.    TREATMENT RECOMENDATIONS  - File: Trim them down with a pumice stone or callus file a couple times a week to remove  callus tissue that builds up. An electric callus removing device. Amope Pedi Perfect Electronic Pedicure Foot File and Callus Remover can be a good option.   - Moisturize: Lotion can be applied to soften the callus. A lactic acid or urea based cream such as Carmol, Kersal or Vanicream or thicker cream with shea butter are good options.   - Foot Gear: Good supportive shoes and minimizing barefoot walking can slow down callus formation and can decrease pain levels. Gel inserts can also provide padding to the bottom of the foot to prevent pain and slow recurrence. Toe spacers, toe covers, can custom orthotic inserts can be beneficial as well.  - Surgery: If there is a surgical pathology noted, such as a prominent bone, often this needs to be addressed surgically to minimize recurrence. However, sometimes the lesion simply migrates to another spot after surgery, so it is not a guaranteed cure.     BODY WEIGHT AND YOUR FEET  The following information is included in the after visit summary for all patients. Body weight can be a sensitive issue to discuss in clinic, but we think the following information is very important. Although we focus on the feet and ankles, we do support the overall health of our patients.     Many things can cause foot and ankle problems. Foot structure, activity level, foot mechanics and injuries are common causes of pain. One very important issue that often goes unmentioned, is body weight. Extra weight can cause increased stress on muscles, ligaments, bones and tendons. Sometimes just a few extra pounds is all it takes to put one over her/his threshold. Without reducing that stress, it can be difficult to alleviate pain. As Foot & Ankle specialists, our job is addressing the lower extremity problem and possible causes. Regarding extra body weight, we encourage patients to discuss diet and weight management plans with their primary care doctors. It is this team approach that gives you the best  opportunity for pain relief and getting you back on your feet.      [facility] has a Comprehensive Weight Management Program. This program includes counseling, education, non-surgical and surgical approaches to weight loss. If you are interested in learning more either talk to you primary care provider or call [phone].

## 2019-06-26 DIAGNOSIS — J30.89 CHRONIC NON-SEASONAL ALLERGIC RHINITIS: ICD-10-CM

## 2019-06-26 NOTE — TELEPHONE ENCOUNTER
"Requested Prescriptions   Pending Prescriptions Disp Refills     fluticasone (FLONASE) 50 MCG/ACT nasal spray [Pharmacy Med Name: FLUTICASONE  50MCG  SPRAY  PROP.] 48 g      Sig: USE 1-2 SPRAYS INTO BOTH  NOSTRILS DAILY       Inhaled Steroids Protocol Passed - 6/26/2019 10:57 AM   Last Written Prescription Date:  04/06/2018  Last Fill Quantity: 3 bottle ,  # refills: 11   Last office visit: 3/12/2019 with prescribing provider:     Future Office Visit:       Passed - Patient is age 12 or older        Passed - Recent (12 mo) or future (30 days) visit within the authorizing provider's specialty     Patient had office visit in the last 12 months or has a visit in the next 30 days with authorizing provider or within the authorizing provider's specialty.  See \"Patient Info\" tab in inbasket, or \"Choose Columns\" in Meds & Orders section of the refill encounter.              Passed - Medication is active on med list        "

## 2019-06-27 RX ORDER — FLUTICASONE PROPIONATE 50 MCG
SPRAY, SUSPENSION (ML) NASAL
Qty: 48 G | Refills: 2 | Status: SHIPPED | OUTPATIENT
Start: 2019-06-27 | End: 2019-10-01

## 2019-06-27 NOTE — TELEPHONE ENCOUNTER
Prescription approved per [facility] Refill Protocol.  Candy Ramos RN

## 2019-07-12 ENCOUNTER — APPOINTMENT (OUTPATIENT)
Dept: CT IMAGING | Facility: CLINIC | Age: 84
End: 2019-07-12
Attending: EMERGENCY MEDICINE
Payer: MEDICARE

## 2019-07-12 ENCOUNTER — HOSPITAL ENCOUNTER (EMERGENCY)
Facility: CLINIC | Age: 84
Discharge: HOME | End: 2019-07-12
Attending: EMERGENCY MEDICINE | Admitting: EMERGENCY MEDICINE
Payer: MEDICARE

## 2019-07-12 VITALS
DIASTOLIC BLOOD PRESSURE: 69 MMHG | RESPIRATION RATE: 16 BRPM | TEMPERATURE: 97.9 F | OXYGEN SATURATION: 97 % | SYSTOLIC BLOOD PRESSURE: 162 MMHG | BODY MASS INDEX: 30.69 KG/M2 | WEIGHT: 178.79 LBS

## 2019-07-12 DIAGNOSIS — N28.9 RENAL INSUFFICIENCY: ICD-10-CM

## 2019-07-12 DIAGNOSIS — R42 LIGHTHEADEDNESS: ICD-10-CM

## 2019-07-12 DIAGNOSIS — E86.0 DEHYDRATION: ICD-10-CM

## 2019-07-12 LAB
ALBUMIN SERPL-MCNC: 3.4 G/DL (ref 3.4–5)
ALP SERPL-CCNC: 76 U/L (ref 40–150)
ALT SERPL W P-5'-P-CCNC: 16 U/L (ref 0–50)
ANION GAP SERPL CALCULATED.3IONS-SCNC: 7 MMOL/L (ref 3–14)
AST SERPL W P-5'-P-CCNC: 21 U/L (ref 0–45)
BASOPHILS # BLD AUTO: 0 10E9/L (ref 0–0.2)
BASOPHILS NFR BLD AUTO: 0.5 %
BILIRUB SERPL-MCNC: 0.5 MG/DL (ref 0.2–1.3)
BUN SERPL-MCNC: 24 MG/DL (ref 7–30)
CALCIUM SERPL-MCNC: 9.2 MG/DL (ref 8.5–10.1)
CHLORIDE SERPL-SCNC: 109 MMOL/L (ref 94–109)
CO2 SERPL-SCNC: 26 MMOL/L (ref 20–32)
CREAT BLD-MCNC: 1.4 MG/DL (ref 0.52–1.04)
CREAT SERPL-MCNC: 1.25 MG/DL (ref 0.52–1.04)
DIFFERENTIAL METHOD BLD: NORMAL
EOSINOPHIL # BLD AUTO: 0.1 10E9/L (ref 0–0.7)
EOSINOPHIL NFR BLD AUTO: 2.2 %
ERYTHROCYTE [DISTWIDTH] IN BLOOD BY AUTOMATED COUNT: 13.2 % (ref 10–15)
GFR SERPL CREATININE-BSD FRML MDRD: 36 ML/MIN/{1.73_M2}
GFR SERPL CREATININE-BSD FRML MDRD: 39 ML/MIN/{1.73_M2}
GLUCOSE SERPL-MCNC: 89 MG/DL (ref 70–99)
HCT VFR BLD AUTO: 38.2 % (ref 35–47)
HGB BLD-MCNC: 12.1 G/DL (ref 11.7–15.7)
IMM GRANULOCYTES # BLD: 0 10E9/L (ref 0–0.4)
IMM GRANULOCYTES NFR BLD: 0.5 %
INR PPP: 0.91 (ref 0.86–1.14)
LYMPHOCYTES # BLD AUTO: 1.8 10E9/L (ref 0.8–5.3)
LYMPHOCYTES NFR BLD AUTO: 32.3 %
MAGNESIUM SERPL-MCNC: 2.3 MG/DL (ref 1.6–2.3)
MCH RBC QN AUTO: 31.7 PG (ref 26.5–33)
MCHC RBC AUTO-ENTMCNC: 31.7 G/DL (ref 31.5–36.5)
MCV RBC AUTO: 100 FL (ref 78–100)
MONOCYTES # BLD AUTO: 0.5 10E9/L (ref 0–1.3)
MONOCYTES NFR BLD AUTO: 8.3 %
NEUTROPHILS # BLD AUTO: 3.1 10E9/L (ref 1.6–8.3)
NEUTROPHILS NFR BLD AUTO: 56.2 %
NRBC # BLD AUTO: 0 10*3/UL
NRBC BLD AUTO-RTO: 0 /100
PLATELET # BLD AUTO: 205 10E9/L (ref 150–450)
POTASSIUM SERPL-SCNC: 4.3 MMOL/L (ref 3.4–5.3)
PROT SERPL-MCNC: 7.2 G/DL (ref 6.8–8.8)
RBC # BLD AUTO: 3.82 10E12/L (ref 3.8–5.2)
SODIUM SERPL-SCNC: 142 MMOL/L (ref 133–144)
TROPONIN I BLD-MCNC: 0 UG/L (ref 0–0.08)
WBC # BLD AUTO: 5.6 10E9/L (ref 4–11)

## 2019-07-12 PROCEDURE — 70498 CT ANGIOGRAPHY NECK: CPT

## 2019-07-12 PROCEDURE — 96374 THER/PROPH/DIAG INJ IV PUSH: CPT | Mod: 59

## 2019-07-12 PROCEDURE — 85025 COMPLETE CBC W/AUTO DIFF WBC: CPT | Performed by: EMERGENCY MEDICINE

## 2019-07-12 PROCEDURE — 82565 ASSAY OF CREATININE: CPT

## 2019-07-12 PROCEDURE — 85610 PROTHROMBIN TIME: CPT | Performed by: EMERGENCY MEDICINE

## 2019-07-12 PROCEDURE — 99285 EMERGENCY DEPT VISIT HI MDM: CPT | Mod: 25

## 2019-07-12 PROCEDURE — 96361 HYDRATE IV INFUSION ADD-ON: CPT

## 2019-07-12 PROCEDURE — 25000128 H RX IP 250 OP 636: Performed by: EMERGENCY MEDICINE

## 2019-07-12 PROCEDURE — 80053 COMPREHEN METABOLIC PANEL: CPT | Performed by: EMERGENCY MEDICINE

## 2019-07-12 PROCEDURE — 70450 CT HEAD/BRAIN W/O DYE: CPT | Mod: XS

## 2019-07-12 PROCEDURE — 83735 ASSAY OF MAGNESIUM: CPT | Performed by: EMERGENCY MEDICINE

## 2019-07-12 PROCEDURE — 84484 ASSAY OF TROPONIN QUANT: CPT

## 2019-07-12 RX ORDER — ONDANSETRON 2 MG/ML
4 INJECTION INTRAMUSCULAR; INTRAVENOUS EVERY 30 MIN PRN
Status: DISCONTINUED | OUTPATIENT
Start: 2019-07-12 | End: 2019-07-12 | Stop reason: HOSPADM

## 2019-07-12 RX ORDER — SODIUM CHLORIDE 9 MG/ML
1000 INJECTION, SOLUTION INTRAVENOUS CONTINUOUS
Status: DISCONTINUED | OUTPATIENT
Start: 2019-07-12 | End: 2019-07-12 | Stop reason: HOSPADM

## 2019-07-12 RX ORDER — IOPAMIDOL 755 MG/ML
500 INJECTION, SOLUTION INTRAVASCULAR ONCE
Status: COMPLETED | OUTPATIENT
Start: 2019-07-12 | End: 2019-07-12

## 2019-07-12 RX ORDER — LIDOCAINE 40 MG/G
CREAM TOPICAL
Status: DISCONTINUED | OUTPATIENT
Start: 2019-07-12 | End: 2019-07-12 | Stop reason: HOSPADM

## 2019-07-12 RX ADMIN — SODIUM CHLORIDE 1000 ML: 9 INJECTION, SOLUTION INTRAVENOUS at 09:46

## 2019-07-12 RX ADMIN — IOPAMIDOL 70 ML: 755 INJECTION, SOLUTION INTRAVENOUS at 10:14

## 2019-07-12 RX ADMIN — ONDANSETRON HYDROCHLORIDE 4 MG: 2 INJECTION, SOLUTION INTRAMUSCULAR; INTRAVENOUS at 09:46

## 2019-07-12 RX ADMIN — SODIUM CHLORIDE 80 ML: 9 INJECTION, SOLUTION INTRAVENOUS at 10:14

## 2019-07-12 ASSESSMENT — ENCOUNTER SYMPTOMS
SHORTNESS OF BREATH: 1
DIZZINESS: 1
FATIGUE: 1
ABDOMINAL PAIN: 1
SINUS PRESSURE: 0
WEAKNESS: 1
LIGHT-HEADEDNESS: 1
SORE THROAT: 0

## 2019-07-12 NOTE — DISCHARGE INSTRUCTIONS
Discharge Instructions  Dizziness (Lightheaded)  Today you were seen for dizziness.  Dizziness can be caused by many things.  At this time, your doctor has found no signs that your dizziness is due to a serious or life-threatening condition. However, sometimes there is a serious problem that does not show up right away, and it is important for you to follow up with your regular doctor as instructed.  The most common cause of dizziness is called a vasovagal reaction. This is the kind of dizziness people get when they have their blood drawn or see unpleasant things. This can also happen with dehydration, extreme emotion, nausea, severe pain and also sometimes with urinating or coughing.  This type of dizziness is not serious. It is more common to be lightheaded when you are warm, when you have been standing still for a long time, when you haven t eaten or had very much to drink, and many other factors. Many times there are several things all going on together that caused your spell today. As you get older, your blood vessels get more stiff, and it is common to have dizziness, especially when you first stand up.  If you have been lying down, be sure to sit for a few minutes before standing up, and always sit right down if you feel faint.  Other causes of dizziness include heart disease, irregular heartbeat, side effects from medications, effects of drugs and alcohol, blood pressure changes, infections, and blood loss (anemia). Some of these causes can be serious and even life threatening. Be sure to follow your doctor s discharge instructions for follow up with other doctors to further evaluate your problem.      Return to the Emergency Department if:    You pass out (fainting or falling out), especially during exercise.    You develop chest pain, chest pressure or difficulty breathing.  Your feel an irregular heartbeat.  You have excessive vaginal bleeding, or blood in your stool or vomit.  You have a high  fever.  Your symptoms get worse or more frequent.    If when you begin to feel dizzy, it is important to sit down or lay down immediately to prevent injury from falling.  If you were given a prescription for medicine here today, be sure to read all of the information (including the package insert) that comes with your prescription.  This will include important information about the medicine, its side effects, and any warnings that you need to know about.  The pharmacist who fills the prescription can provide more information and answer questions you may have about the medicine.  If you have questions or concerns that the pharmacist cannot address, please call or return to the Emergency Department.     Opioid Medication Information    Pain medications are among the most commonly prescribed medicines, so we are including this information for all our patients. If you did not receive pain medication or get a prescription for pain medicine, you can ignore it.     You may have been given a prescription for an opioid (narcotic) pain medicine and/or have received a pain medicine while here in the Emergency Department. These medicines can make you drowsy or impaired. You must not drive, operate dangerous equipment, or engage in any other dangerous activities while taking these medications. If you drive while taking these medications, you could be arrested for DUI, or driving under the influence. Do not drink any alcohol while you are taking these medications.     Opioid pain medications can cause addiction. If you have a history of chemical dependency of any type, you are at a higher risk of becoming addicted to pain medications.  Only take these prescribed medications to treat your pain when all other options have been tried. Take it for as short a time and as few doses as possible. Store your pain pills in a secure place, as they are frequently stolen and provide a dangerous opportunity for children or visitors in your  house to start abusing these powerful medications. We will not replace any lost or stolen medicine.  As soon as your pain is better, you should flush all your remaining medication.     Many prescription pain medications contain Tylenol  (acetaminophen), including Vicodin , Tylenol #3 , Norco , Lortab , and Percocet .  You should not take any extra pills of Tylenol  if you are using these prescription medications or you can get very sick.  Do not ever take more than 3000 mg of acetaminophen in any 24 hour period.    All opioids tend to cause constipation. Drink plenty of water and eat foods that have a lot of fiber, such as fruits, vegetables, prune juice, apple juice and high fiber cereal.  Take a laxative if you don t move your bowels at least every other day. Miralax , Milk of Magnesia, Colace , or Senna  can be used to keep you regular.      Remember that you can always come back to the Emergency Department if you are not able to see your regular doctor in the amount of time listed above, if you get any new symptoms, or if there is anything that worries you.

## 2019-07-12 NOTE — ED AVS SNAPSHOT
[facility] Emergency Department  [address]  [facility] [address]  Phone:  [phone]  Fax:  [phone]                                    Glendy Díaz   MRN: [id number]    Department:  [facility] Emergency Department   Date of Visit:  7/12/2019           After Visit Summary Signature Page    I have received my discharge instructions, and my questions have been answered. I have discussed any challenges I see with this plan with the nurse or doctor.    ..........................................................................................................................................  Patient/Patient Representative Signature      ..........................................................................................................................................  Patient Representative Print Name and Relationship to Patient    ..................................................               ................................................  Date                                   Time    ..........................................................................................................................................  Reviewed by Signature/Title    ...................................................              ..............................................  Date                                               Time          22EPIC Rev 08/18

## 2019-07-12 NOTE — ED PROVIDER NOTES
"  History     Chief Complaint:  Dizziness      HPI   Glendy Díaz is a 85 year old female who presents to the emergency department today for evaluation of dizziness. This morning, she has noticed weakness, and has \"attacks\" that make her dizzy. She is not in pain. She was seen last Feb for a similar issue, but that time she had a sinus infection and she thinks this episode is different because she doesn't have any cold symptoms. She began to feel weak during the day yesterday, and got worse through the night while out in the heat playing [employer] till 11 pm.  She feels generally weak, and notes it is not localized to one specific spot. Today, she fell against the wall due to this dizziness. She did not sustain any injuries during this fall. Of note, she also has pain on the top of her head from hitting a door after a fall 6 months ago, and she occasionally gets pain in this spot. She also had burning in her stomach that started this morning. She feels shortness of breath with this as well. She denies cough or other signs of a cold. She denies diarrhea, urination issues, or other problems. After taking deep breaths, she got increasingly weak.    Allergies:  Amlodipine  Fosamax [Alendronic Acid]  Lisinopril  Pravastatin  Simvastatin      Medications:    albuterol    amLODIPine (NORVASC)   aspirin    cetirizine (ZYRTEC)    Cholecalciferol (VITAMIN D)    furosemide (LASIX)   gabapentin (NEURONTIN)    metoprolol tartrate (LOPRESSOR)    senna-docusate    traMADol (ULTRAM)        Past Medical History:    Anxiety   CKD (chronic kidney disease) stage 3   Hematuria   Hyperlipidemia LDL goal <100   Hyperparathyroidism    Hypertension   Incontinence   Leg weakness, bilateral   Neck pain, chronic   Osteoarthritis   Osteopenia   Peripheral neuropathy       Past Surgical History:    APPENDECTOMY   ARTHRODESIS FOOT   ARTHROSCOPY ANKLE, OPEN REPAIR LIGAMENT, COMBINED   CATARACT IOL, RT/LT   CHOLECYSTECTOMY   EXCISE MASS FOOT "   HYSTERECTOMY TOTAL ABDOMINAL   RELEASE TENDON TOE   REMOVE HARDWARE FOOT   REPAIR HAMMER TOE   REVERSE ARTHROPLASTY SHOULDER       Family History:    Lipids   Diabetes   Circulatory- Kidney   Diabetes   Cancer - colorectal   Breast Cancer   Thyroid Disease   Cancer - colorectal   Alcohol/Drug       Social History:  The patient was accompanied to the ED by her daughter.  Smoking Status: former  Smokeless Tobacco: no  Alcohol Use: no   Marital Status:   [5]     Review of Systems   Constitutional: Positive for fatigue.   HENT: Negative for congestion, sinus pressure and sore throat.    Respiratory: Positive for shortness of breath.    Gastrointestinal: Positive for abdominal pain (burning, central chest/abdomen).   Neurological: Positive for dizziness, weakness and light-headedness.   All other systems reviewed and are negative.        Physical Exam   First Vitals:  BP: 162/69  Heart Rate: 63  Temp: 97.9  F (36.6  C)  Resp: 16  Weight: 81.1 kg (178 lb 12.7 oz)  SpO2: 97 %      Physical Exam  General: The patient is alert, in no respiratory distress.    HENT: Mucous membranes moist.    Cardiovascular: Regular rate and rhythm. Good pulses in all four extremities. Normal capillary refill and skin turgor.     Respiratory: Lungs are clear. No nasal flaring. No retractions. No wheezing, no crackles.    Gastrointestinal: Abdomen soft. No guarding, no rebound. No palpable hernias.     Musculoskeletal: No gross deformity.     Skin: No rashes or petechiae.     Neurologic: The patient is alert and oriented x3. GCS 15. No testable cranial nerve deficit. Follows commands with clear and appropriate speech. Gives appropriate answers. No gross neurologic deficit. Gross sensation intact. Pupils are round and reactive. No meningismus. Symptoms recreated with sitting upright, neuro otherwise intact except for weakened  strength on right side, pt states is baseline     Lymphatic: No cervical adenopathy. No lower extremity  swelling.    Psychiatric: The patient is non-tearful.    Emergency Department Course     Imaging:  Radiographic findings were communicated with the patient who voiced understanding of the findings.    CT head  IMPRESSION:     1. No evidence of acute intracranial hemorrhage, mass, or herniation.  2. Mild diffuse parenchymal volume loss and white matter changes  likely due to chronic microvascular ischemic disease.  Reading per radiology    CTA head  IMPRESSION: Patent arteries in the head and neck without vascular  cutoff. No evidence of dissection. No aneurysm identified. No  significant stenosis.  Reading per radiology     Laboratory:  Laboratory findings were communicated with the patient who voiced understanding of the findings.     Troponin (Collected 0936): 0.00  Creatinine POCT (Collected 0928): Creatinine 1.4, GFR 36  CBC: WNL (WBC 5.6, HGB 12.1, )   INR: 0.91  CMP: GFR 39 (Creatinine 1.25)   Magnesium: 2.3     Interventions:  0946 Zofran 4mg IV injection    0946 0.9% NaCl 1L IV Bolus     Emergency Department Course:  Past medical records, nursing notes, and vitals reviewed.    0850: I performed an exam of the patient and obtained history, as documented above.     The patient was sent for a head CT and CTA while in the emergency department, findings above.     IV inserted and blood drawn.     The patient provided a urine sample here in the emergency department. This was sent for laboratory testing, findings above.     1102: I rechecked the patient. Explained findings to patient. Patient is feeling better and was able to walk to the bathroom on her own.    1106: I rechecked the patient. Findings and plan explained to the Patient. Patient discharged home with instructions regarding supportive care, medications, and reasons to return. The importance of close follow-up was reviewed.          Impression & Plan      Medical Decision Making:  Pt presented with difficult to describe sensation, she felt like it  came over the top of her head and felt off. It sounds more like lightheadedness vs vertigo as she has previously had. Pt was out in heat last night which made things worse. She had fallen, and considered she could've hit her head. Vascular processes were considered including vertebrobasilar insufficiency,and therefore ordered imaging and labs. After IV fluids, pt felt better and was able to walk without problems. Therefore, I suspect dehydration as opposed to ACS, PE, anemia, or other cause. I discussed her chronic renal problems. She was discharged home in good condition and told to stay in the AC and drink fluids. She will follow up with her PCP.      Diagnosis:    ICD-10-CM    1. Lightheadedness R42    2. Dehydration E86.0    3. Renal insufficiency N28.9        Disposition:  discharged to home    Kat Mari  7/12/2019   [facility] EMERGENCY DEPARTMENT  Scribe Disclosure:  I, Kat Mari, am serving as a scribe at 8:50 AM on 7/12/2019 to document services personally performed by Nir Winslow MD based on my observations and the provider's statements to me.       Nir Winslow MD  07/12/19 [id number]

## 2019-07-12 NOTE — ED TRIAGE NOTES
Pt here with intermittent lightheadedness since last night. Also c.o nausea and abd pain. Denies CP, SOB, dizziness, visual changes or dysuria. ABC intact.

## 2019-09-24 DIAGNOSIS — R60.0 BILATERAL LEG EDEMA: ICD-10-CM

## 2019-09-24 DIAGNOSIS — N18.30 CKD (CHRONIC KIDNEY DISEASE) STAGE 3, GFR 30-59 ML/MIN (H): Chronic | ICD-10-CM

## 2019-09-24 DIAGNOSIS — D51.9 ANEMIA DUE TO VITAMIN B12 DEFICIENCY, UNSPECIFIED B12 DEFICIENCY TYPE: ICD-10-CM

## 2019-09-24 DIAGNOSIS — E21.3 HYPERPARATHYROIDISM (H): Chronic | ICD-10-CM

## 2019-09-24 DIAGNOSIS — I10 ESSENTIAL HYPERTENSION WITH GOAL BLOOD PRESSURE LESS THAN 140/90: Chronic | ICD-10-CM

## 2019-09-24 DIAGNOSIS — E78.5 HYPERLIPIDEMIA WITH TARGET LDL LESS THAN 130: Chronic | ICD-10-CM

## 2019-09-24 LAB
ERYTHROCYTE [DISTWIDTH] IN BLOOD BY AUTOMATED COUNT: 14.6 % (ref 10–15)
HCT VFR BLD AUTO: 35.6 % (ref 35–47)
HGB BLD-MCNC: 11.2 G/DL (ref 11.7–15.7)
MCH RBC QN AUTO: 31 PG (ref 26.5–33)
MCHC RBC AUTO-ENTMCNC: 31.5 G/DL (ref 31.5–36.5)
MCV RBC AUTO: 99 FL (ref 78–100)
PLATELET # BLD AUTO: 234 10E9/L (ref 150–450)
RBC # BLD AUTO: 3.61 10E12/L (ref 3.8–5.2)
VIT B12 SERPL-MCNC: 687 PG/ML (ref 193–986)
WBC # BLD AUTO: 6.7 10E9/L (ref 4–11)

## 2019-09-24 PROCEDURE — 80053 COMPREHEN METABOLIC PANEL: CPT | Performed by: INTERNAL MEDICINE

## 2019-09-24 PROCEDURE — 82607 VITAMIN B-12: CPT | Performed by: INTERNAL MEDICINE

## 2019-09-24 PROCEDURE — 80061 LIPID PANEL: CPT | Performed by: INTERNAL MEDICINE

## 2019-09-24 PROCEDURE — 85027 COMPLETE CBC AUTOMATED: CPT | Performed by: INTERNAL MEDICINE

## 2019-09-24 PROCEDURE — 36415 COLL VENOUS BLD VENIPUNCTURE: CPT | Performed by: INTERNAL MEDICINE

## 2019-09-24 PROCEDURE — 84443 ASSAY THYROID STIM HORMONE: CPT | Performed by: INTERNAL MEDICINE

## 2019-09-24 PROCEDURE — 84439 ASSAY OF FREE THYROXINE: CPT | Performed by: INTERNAL MEDICINE

## 2019-09-25 LAB
ALBUMIN SERPL-MCNC: 3.6 G/DL (ref 3.4–5)
ALP SERPL-CCNC: 93 U/L (ref 40–150)
ALT SERPL W P-5'-P-CCNC: 21 U/L (ref 0–50)
ANION GAP SERPL CALCULATED.3IONS-SCNC: 7 MMOL/L (ref 3–14)
AST SERPL W P-5'-P-CCNC: 18 U/L (ref 0–45)
BILIRUB SERPL-MCNC: 0.5 MG/DL (ref 0.2–1.3)
BUN SERPL-MCNC: 19 MG/DL (ref 7–30)
CALCIUM SERPL-MCNC: 9 MG/DL (ref 8.5–10.1)
CHLORIDE SERPL-SCNC: 109 MMOL/L (ref 94–109)
CHOLEST SERPL-MCNC: 196 MG/DL
CO2 SERPL-SCNC: 25 MMOL/L (ref 20–32)
CREAT SERPL-MCNC: 1.05 MG/DL (ref 0.52–1.04)
GFR SERPL CREATININE-BSD FRML MDRD: 48 ML/MIN/{1.73_M2}
GLUCOSE SERPL-MCNC: 93 MG/DL (ref 70–99)
HDLC SERPL-MCNC: 53 MG/DL
LDLC SERPL CALC-MCNC: 115 MG/DL
NONHDLC SERPL-MCNC: 143 MG/DL
POTASSIUM SERPL-SCNC: 3.9 MMOL/L (ref 3.4–5.3)
PROT SERPL-MCNC: 7.3 G/DL (ref 6.8–8.8)
SODIUM SERPL-SCNC: 141 MMOL/L (ref 133–144)
T4 FREE SERPL-MCNC: 1.28 NG/DL (ref 0.76–1.46)
TRIGL SERPL-MCNC: 139 MG/DL
TSH SERPL DL<=0.005 MIU/L-ACNC: 0.25 MU/L (ref 0.4–4)

## 2019-10-01 ENCOUNTER — OFFICE VISIT (OUTPATIENT)
Dept: INTERNAL MEDICINE | Facility: CLINIC | Age: 84
End: 2019-10-01
Payer: MEDICARE

## 2019-10-01 VITALS
HEART RATE: 82 BPM | HEIGHT: 63 IN | SYSTOLIC BLOOD PRESSURE: 162 MMHG | DIASTOLIC BLOOD PRESSURE: 68 MMHG | TEMPERATURE: 98.4 F | RESPIRATION RATE: 16 BRPM | OXYGEN SATURATION: 95 % | BODY MASS INDEX: 31.06 KG/M2 | WEIGHT: 175.3 LBS

## 2019-10-01 DIAGNOSIS — M19.90 ARTHRITIS: ICD-10-CM

## 2019-10-01 DIAGNOSIS — E05.90 SUBCLINICAL HYPERTHYROIDISM: ICD-10-CM

## 2019-10-01 DIAGNOSIS — J30.89 CHRONIC NON-SEASONAL ALLERGIC RHINITIS: ICD-10-CM

## 2019-10-01 DIAGNOSIS — Z23 FLU VACCINE NEED: ICD-10-CM

## 2019-10-01 DIAGNOSIS — M25.50 MULTIPLE JOINT PAIN: ICD-10-CM

## 2019-10-01 DIAGNOSIS — G62.9 PERIPHERAL POLYNEUROPATHY: Chronic | ICD-10-CM

## 2019-10-01 DIAGNOSIS — I10 ESSENTIAL HYPERTENSION WITH GOAL BLOOD PRESSURE LESS THAN 140/90: Primary | Chronic | ICD-10-CM

## 2019-10-01 DIAGNOSIS — R60.0 BILATERAL LEG EDEMA: ICD-10-CM

## 2019-10-01 PROCEDURE — G0008 ADMIN INFLUENZA VIRUS VAC: HCPCS | Performed by: INTERNAL MEDICINE

## 2019-10-01 PROCEDURE — 99214 OFFICE O/P EST MOD 30 MIN: CPT | Mod: 25 | Performed by: INTERNAL MEDICINE

## 2019-10-01 PROCEDURE — 90662 IIV NO PRSV INCREASED AG IM: CPT | Performed by: INTERNAL MEDICINE

## 2019-10-01 RX ORDER — AMLODIPINE BESYLATE 5 MG/1
5 TABLET ORAL DAILY
Qty: 90 TABLET | Refills: 0 | Status: SHIPPED | OUTPATIENT
Start: 2019-10-01 | End: 2019-11-01

## 2019-10-01 RX ORDER — AMLODIPINE BESYLATE 2.5 MG/1
TABLET ORAL
Qty: 90 TABLET | Refills: 1 | Status: CANCELLED | OUTPATIENT
Start: 2019-10-01

## 2019-10-01 RX ORDER — FLUTICASONE PROPIONATE 50 MCG
SPRAY, SUSPENSION (ML) NASAL
Qty: 48 G | Refills: 2 | Status: SHIPPED | OUTPATIENT
Start: 2019-10-01 | End: 2020-08-26

## 2019-10-01 RX ORDER — FUROSEMIDE 40 MG
TABLET ORAL
Qty: 180 TABLET | Refills: 1 | Status: SHIPPED | OUTPATIENT
Start: 2019-10-01 | End: 2020-10-23

## 2019-10-01 RX ORDER — GABAPENTIN 100 MG/1
CAPSULE ORAL
Qty: 270 CAPSULE | Refills: 1 | Status: SHIPPED | OUTPATIENT
Start: 2019-10-01 | End: 2020-03-17

## 2019-10-01 RX ORDER — METOPROLOL TARTRATE 50 MG
TABLET ORAL
Qty: 90 TABLET | Refills: 1 | Status: SHIPPED | OUTPATIENT
Start: 2019-10-01 | End: 2020-03-17

## 2019-10-01 RX ORDER — TRAMADOL HYDROCHLORIDE 50 MG/1
50 TABLET ORAL 2 TIMES DAILY PRN
Qty: 40 TABLET | Refills: 0 | Status: SHIPPED | OUTPATIENT
Start: 2019-10-01 | End: 2020-06-02

## 2019-10-01 ASSESSMENT — PATIENT HEALTH QUESTIONNAIRE - PHQ9
SUM OF ALL RESPONSES TO PHQ QUESTIONS 1-9: 8
5. POOR APPETITE OR OVEREATING: SEVERAL DAYS

## 2019-10-01 ASSESSMENT — MIFFLIN-ST. JEOR: SCORE: 1201.35

## 2019-10-01 ASSESSMENT — ANXIETY QUESTIONNAIRES
6. BECOMING EASILY ANNOYED OR IRRITABLE: SEVERAL DAYS
3. WORRYING TOO MUCH ABOUT DIFFERENT THINGS: SEVERAL DAYS
GAD7 TOTAL SCORE: 7
2. NOT BEING ABLE TO STOP OR CONTROL WORRYING: SEVERAL DAYS
1. FEELING NERVOUS, ANXIOUS, OR ON EDGE: MORE THAN HALF THE DAYS
5. BEING SO RESTLESS THAT IT IS HARD TO SIT STILL: SEVERAL DAYS
IF YOU CHECKED OFF ANY PROBLEMS ON THIS QUESTIONNAIRE, HOW DIFFICULT HAVE THESE PROBLEMS MADE IT FOR YOU TO DO YOUR WORK, TAKE CARE OF THINGS AT HOME, OR GET ALONG WITH OTHER PEOPLE: SOMEWHAT DIFFICULT
7. FEELING AFRAID AS IF SOMETHING AWFUL MIGHT HAPPEN: NOT AT ALL

## 2019-10-01 NOTE — PROGRESS NOTES
Dr Steiner's note      Patient's instructions / PLAN:                                                        Plan:  1. Resume Zyrtec/Cetirizine to help you with the mucus in the throat   2. Increase the Amlodipine to 5 mg daily   3. Continue the other meds, same doses for now.  4. Follow up in a month        ASSESSMENT & PLAN:                                                      (I10) HTN, goal < 150/90  (primary encounter diagnosis)  Comment: Not controlled   Plan: metoprolol tartrate (LOPRESSOR) 50 MG tablet,         furosemide (LASIX) 40 MG tablet, amLODIPine         (NORVASC) 5 MG tablet        Low-salt diet    (M25.50) Multiple joint pain  Comment:   Plan: traMADol (ULTRAM) 50 MG tablet            (G62.9) Peripheral polyneuropathy  Comment: Controlled with gabapentin  Plan: gabapentin (NEURONTIN) 100 MG capsule            (M19.90) Arthritis  Comment: So far, she has been able to obtain the ketoprofen gel ( compound gel)  Plan: ketoprofen 10% in PLO 10% topical gel            (J30.89) Chronic non-seasonal allergic rhinitis  Comment:   Plan: fluticasone (FLONASE) 50 MCG/ACT nasal spray            (R60.0) Bilateral leg edema  Comment:   Plan: furosemide (LASIX) 40 MG tablet  Compression socks, see below            (E05.90) Subclinical hyperthyroidism TSH 0.25 Sept 2019  Comment:   Plan: Monitor TSH    (Z23) Flu vaccine need  Comment:   Plan: HC FLU VACCINE, INCREASED ANTIGEN, PRESV FREE               Chief complaint:                                                      Follow up chronic medical problems   Daughter is present    SUBJECTIVE:                                                    History of present illness:      *Recheck Medication-Follow up diabetes, hypertension, lipids. LOV 3/12/19  *Results-Labs done 9/24/19--discussed  She doesn't take Zyrtec  Stopped vit D because constipation    + 2 edema. Can't put compression sock on  Suggested the ones w zipper     BP Readings from Last 2 Encounters:   07/12/19  "162/69   05/28/19 130/84     Hemoglobin A1C (%)   Date Value   03/30/2018 5.0   02/27/2015 5.3     LDL Cholesterol Calculated (mg/dL)   Date Value   09/24/2019 115 (H)   10/11/2018 110 (H)       Diabetes Management Resources  Hyperlipidemia Follow-Up      Are you having any of the following symptoms? (Select all that apply)  Increased sweating or nausea with activity    Are you regularly taking any medication or supplement to lower your cholesterol?   No    Are you having muscle aches or other side effects that you think could be caused by your cholesterol lowering medication?  N/A      Hypertension Follow-up      Do you check your blood pressure regularly outside of the clinic? No     Are you following a low salt diet? No    Are your blood pressures ever more than 140 on the top number (systolic) OR more   than 90 on the bottom number (diastolic), for example 140/90? Yes      How many servings of fruits and vegetables do you eat daily?  0-1    On average, how many sweetened beverages do you drink each day (soda, juice, sweet tea, etc)?   0  How many days per week do you miss taking your medication? 1    What makes it hard for you to take your medications?  remembering to take      Review of Systems:                                                      ROS: negative for fever, chills, cough, wheezes, chest pain, shortness of breath, vomiting, abdominal pain, positive for leg swelling       OBJECTIVE:             Physical exam:  Blood pressure (!) 162/68, pulse 82, temperature 98.4  F (36.9  C), temperature source Oral, resp. rate 16, height 1.588 m (5' 2.5\"), weight 79.5 kg (175 lb 4.8 oz), SpO2 95 %, not currently breastfeeding.   NAD, appears comfortable  Skin: no rashes   Neck: supple, no JVD,  No thyroidmegaly. Lymph nodes nonpalpable cervical and supraclavicular.  Chest: clear to auscultation bilaterally, good respiratory effort  Heart: S1 S2, RRR, no mgr appreciated  Abdomen: soft, not tender,   Extremities: + " 2 edema,  Neurologic: A, Ox3, no focal signs appreciated    PMHx: reviewed  Past Medical History:   Diagnosis Date     Anxiety      CKD (chronic kidney disease) stage 3, GFR 30-59 ml/min (H)      Hematuria      Hyperlipidemia LDL goal <100      Hyperparathyroidism (H)      Hypertension     No Cardiologist     Incontinence      Leg weakness, bilateral      Neck pain, chronic      Osteoarthritis      Osteopenia      Peripheral neuropathy       PSHx: reviewed  Past Surgical History:   Procedure Laterality Date     APPENDECTOMY       ARTHRODESIS FOOT  5/1/2014    Procedure: ARTHRODESIS FOOT;  Surgeon: Sid Marks DPM;  Location: [facility]     ARTHROSCOPY ANKLE, OPEN REPAIR LIGAMENT, COMBINED  5/31/2012    Procedure:COMBINED ARTHROSCOPY ANKLE, OPEN REPAIR LIGAMENT; LEFT ANKLE ARTHROSCOPY, LATERAL LIGAMENT RECONSTRUCTION, ENDOSCOPIC DRISS, NATALIION SECOND TOE RAY CORRECTION    LEFT KNEE Marcaine AND CORTIZONE INJECTION, 5 CC Marcaine, 1 CC CELESTONE, ; Surgeon:VARSHA GERMAN; Location:[facility]     CATARACT IOL, RT/LT       CHOLECYSTECTOMY       EXCISE MASS FOOT  12/4/2012    Procedure: EXCISE MASS FOOT;;  Surgeon: Varsha German MD;  Location: [facility]     HYSTERECTOMY TOTAL ABDOMINAL      ovaries are in     RELEASE TENDON TOE  5/1/2014    Procedure: RELEASE TENDON TOE;  Surgeon: Sid Marks DPM;  Location: [facility]     REMOVE HARDWARE FOOT  12/4/2012    Procedure: REMOVE HARDWARE FOOT;  REMOVAL HARDWARE(SYNTHES SCREW) LEFT FOOT, EXCISION OF INCLUSION CYST;  Surgeon: Varsha German MD;  Location: [facility]     REPAIR HAMMER TOE  5/1/2014    Procedure: REPAIR HAMMER TOE;  Surgeon: Sid Marks DPM;  Location: [facility]     REVERSE ARTHROPLASTY SHOULDER Right 7/18/2016    Procedure: REVERSE ARTHROPLASTY SHOULDER;  Surgeon: Marlo Alejandro MD;  Location: [facility]        Meds: reviewed  Current Outpatient Medications   Medication Sig Dispense Refill     albuterol (PROAIR HFA/PROVENTIL  HFA/VENTOLIN HFA) 108 (90 BASE) MCG/ACT Inhaler Inhale 2 puffs into the lungs every 6 hours as needed for shortness of breath / dyspnea or wheezing 1 Inhaler 1     amLODIPine (NORVASC) 2.5 MG tablet TAKE 1 TABLET BY MOUTH  DAILY 90 tablet 1     aspirin 81 MG tablet Take 1 tablet (81 mg) by mouth daily 30 tablet 3     cetirizine (ZYRTEC) 10 MG tablet Take 1 tablet (10 mg) by mouth every evening 90 tablet 3     Cholecalciferol (VITAMIN D) 2000 UNITS tablet Take 2,000 Units by mouth daily 100 tablet 3     fluticasone (FLONASE) 50 MCG/ACT nasal spray USE 1-2 SPRAYS INTO BOTH  NOSTRILS DAILY 48 g 2     furosemide (LASIX) 40 MG tablet TAKE 1 TO 2 TABLETS BY  MOUTH DAILY 180 tablet 1     gabapentin (NEURONTIN) 100 MG capsule Take 1 capsule in afternoon and 2 at bedtime 270 capsule 1     ketoprofen 10% in PLO 10% topical gel Apply 4 times a day as need for pain 50 g 1     metoprolol tartrate (LOPRESSOR) 50 MG tablet TAKE ONE-HALF TABLET BY  MOUTH TWICE A DAY 90 tablet 1     order for DME Equipment being ordered: surgicxal shoe size 10 1 Device 0     senna-docusate (SENOKOT-S;PERICOLACE) 8.6-50 MG per tablet Take 1-2 tablets by mouth 2 times daily as needed for constipation 30 tablet 3     traMADol (ULTRAM) 50 MG tablet Take 1 tablet (50 mg) by mouth 2 times daily as needed for moderate pain 40 tablet 0       Soc Hx: reviewed  Fam Hx: reviewed          Veronica Steiner MD  Internal Medicine

## 2019-10-01 NOTE — PATIENT INSTRUCTIONS
Plan:  1. Resume Zyrtec/Cetirizine to help you with the mucus in the throat   2. Increase the Amlodipine to 5 mg daily   3. Continue the other meds, same doses for now.  4. Follow up in a month

## 2019-10-01 NOTE — NURSING NOTE
"BP (!) 162/68   Pulse 82   Temp 98.4  F (36.9  C) (Oral)   Resp 16   Ht 1.588 m (5' 2.5\")   Wt 79.5 kg (175 lb 4.8 oz)   SpO2 95%   BMI 31.55 kg/m      "

## 2019-10-02 ASSESSMENT — ANXIETY QUESTIONNAIRES: GAD7 TOTAL SCORE: 7

## 2019-10-04 ENCOUNTER — TELEPHONE (OUTPATIENT)
Dept: INTERNAL MEDICINE | Facility: CLINIC | Age: 84
End: 2019-10-04

## 2019-10-04 NOTE — TELEPHONE ENCOUNTER
Verification of non-prescription medications form received via fax. Form in your mailbox to be signed.

## 2019-10-06 ENCOUNTER — MEDICAL CORRESPONDENCE (OUTPATIENT)
Dept: HEALTH INFORMATION MANAGEMENT | Facility: CLINIC | Age: 84
End: 2019-10-06

## 2019-11-01 ENCOUNTER — OFFICE VISIT (OUTPATIENT)
Dept: INTERNAL MEDICINE | Facility: CLINIC | Age: 84
End: 2019-11-01
Payer: MEDICARE

## 2019-11-01 VITALS
SYSTOLIC BLOOD PRESSURE: 142 MMHG | BODY MASS INDEX: 30.44 KG/M2 | OXYGEN SATURATION: 94 % | TEMPERATURE: 98.2 F | DIASTOLIC BLOOD PRESSURE: 70 MMHG | WEIGHT: 171.8 LBS | HEIGHT: 63 IN | RESPIRATION RATE: 20 BRPM | HEART RATE: 88 BPM

## 2019-11-01 DIAGNOSIS — I10 ESSENTIAL HYPERTENSION WITH GOAL BLOOD PRESSURE LESS THAN 140/90: Chronic | ICD-10-CM

## 2019-11-01 PROCEDURE — 99213 OFFICE O/P EST LOW 20 MIN: CPT | Performed by: INTERNAL MEDICINE

## 2019-11-01 RX ORDER — AMLODIPINE BESYLATE 5 MG/1
5 TABLET ORAL DAILY
Qty: 90 TABLET | Refills: 1 | Status: SHIPPED | OUTPATIENT
Start: 2019-11-01 | End: 2020-01-08

## 2019-11-01 ASSESSMENT — MIFFLIN-ST. JEOR: SCORE: 1185.47

## 2019-11-01 NOTE — NURSING NOTE
"BP (!) 157/86 (BP Location: Right arm, Patient Position: Sitting, Cuff Size: Adult Regular)   Pulse 88   Temp 98.2  F (36.8  C) (Oral)   Resp 20   Ht 1.588 m (5' 2.5\")   Wt 77.9 kg (171 lb 12.8 oz)   SpO2 94%   BMI 30.92 kg/m      "

## 2019-11-01 NOTE — PROGRESS NOTES
Dr Steiner's note    ASSESSMENT & PLAN:                                                      (I10) HTN, goal < 150/90  Comment: Controlled  For her age  Plan: amLODIPine (NORVASC) 5 MG tablet        Continue same meds, same doses for now     F/u 6 m    Chief complaint:                                                      F/u HTN     SUBJECTIVE:                                                    History of present illness:    LOV 10/1/2019    HTN  -- 142/70 BP today    Hyperlipidemia Follow-Up      Are you having any of the following symptoms? (Select all that apply)  No complaints of shortness of breath, chest pain or pressure.  No increased sweating or nausea with activity.  No left-sided neck or arm pain.  No complaints of pain in calves when walking 1-2 blocks.    Are you regularly taking any medication or supplement to lower your cholesterol?   No    Are you having muscle aches or other side effects that you think could be caused by your cholesterol lowering medication?  Yes-     Hypertension Follow-up      Do you check your blood pressure regularly outside of the clinic? Yes     Are you following a low salt diet? No    Are your blood pressures ever more than 140 on the top number (systolic) OR more   than 90 on the bottom number (diastolic), for example 140/90? Yes      How many servings of fruits and vegetables do you eat daily?  0-1    On average, how many sweetened beverages do you drink each day (soda, juice, sweet tea, etc)?   0    How many days per week do you miss taking your medication? 0    LOV: Plan:  1. Resume Zyrtec/Cetirizine to help you with the mucus in the throat   2. Increase the Amlodipine to 5 mg daily   3. Continue the other meds, same doses for now.  4. Follow up in a month    Review of Systems:                                                      ROS: negative for fever, chills, cough, wheezes, chest pain, shortness of breath, vomiting, abdominal pain, pos for leg swelling      This  "document serves as a record of the services and decisions personally performed and made by Dr. Obdulia MD. It was created on their behalf by Behzad Matos, a trained medical scribe. The creation of this document is based on the provider's statements to the medical scribe.  Behzad Matos November 1, 2019 4:07 PM      OBJECTIVE:             Physical exam:    Blood pressure (!) 142/70, pulse 88, temperature 98.2  F (36.8  C), temperature source Oral, resp. rate 20, height 1.588 m (5' 2.5\"), weight 77.9 kg (171 lb 12.8 oz), SpO2 94 %, not currently breastfeeding.     NAD, appears comfortable  Skin: no rashes   Chest: clear to auscultation bilaterally, good respiratory effort  Heart: S1 S2, RRR, no mgr appreciated  Abdomen: soft, not tender,   Extremities: + 1-2 edema  Neurologic: A, Ox3, no focal signs appreciated    PMHx: reviewed  Past Medical History:   Diagnosis Date     Anxiety      CKD (chronic kidney disease) stage 3, GFR 30-59 ml/min (H)      Hematuria      Hyperlipidemia LDL goal <100      Hyperparathyroidism (H)      Hypertension     No Cardiologist     Incontinence      Leg weakness, bilateral      Neck pain, chronic      Osteoarthritis      Osteopenia      Peripheral neuropathy       PSHx: reviewed  Past Surgical History:   Procedure Laterality Date     APPENDECTOMY       ARTHRODESIS FOOT  5/1/2014    Procedure: ARTHRODESIS FOOT;  Surgeon: Sid Marks DPM;  Location: [facility]     ARTHROSCOPY ANKLE, OPEN REPAIR LIGAMENT, COMBINED  5/31/2012    Procedure:COMBINED ARTHROSCOPY ANKLE, OPEN REPAIR LIGAMENT; LEFT ANKLE ARTHROSCOPY, LATERAL LIGAMENT RECONSTRUCTION, ENDOSCOPIC DRISS, KIRIT SECOND TOE RAY CORRECTION    LEFT KNEE Marcaine AND CORTIZONE INJECTION, 5 CC Marcaine, 1 CC CELESTONE, ; Surgeon:VARSHA GERMAN; Location:[facility]     CATARACT IOL, RT/LT       CHOLECYSTECTOMY       EXCISE MASS FOOT  12/4/2012    Procedure: EXCISE MASS FOOT;;  Surgeon: Varsha German MD;  Location: [facility] "     HYSTERECTOMY TOTAL ABDOMINAL      ovaries are in     RELEASE TENDON TOE  5/1/2014    Procedure: RELEASE TENDON TOE;  Surgeon: Sid Marks DPM;  Location: [facility]     REMOVE HARDWARE FOOT  12/4/2012    Procedure: REMOVE HARDWARE FOOT;  REMOVAL HARDWARE(SYNTHES SCREW) LEFT FOOT, EXCISION OF INCLUSION CYST;  Surgeon: Akshat German MD;  [facility]     REPAIR HAMMER TOE  5/1/2014    Procedure: REPAIR HAMMER TOE;  Surgeon: Sid Marks DPM;  Location: [facility]     REVERSE ARTHROPLASTY SHOULDER Right 7/18/2016    Procedure: REVERSE ARTHROPLASTY SHOULDER;  Surgeon: Marlo Alejandro MD;  Location: [facility]        Meds: reviewed  Current Outpatient Medications   Medication Sig Dispense Refill     albuterol (PROAIR HFA/PROVENTIL HFA/VENTOLIN HFA) 108 (90 BASE) MCG/ACT Inhaler Inhale 2 puffs into the lungs every 6 hours as needed for shortness of breath / dyspnea or wheezing 1 Inhaler 1     amLODIPine (NORVASC) 5 MG tablet Take 1 tablet (5 mg) by mouth daily 90 tablet 0     aspirin 81 MG tablet Take 1 tablet (81 mg) by mouth daily 30 tablet 3     cetirizine (ZYRTEC) 10 MG tablet Take 1 tablet (10 mg) by mouth every evening 90 tablet 3     Cholecalciferol (VITAMIN D) 2000 UNITS tablet Take 2,000 Units by mouth daily 100 tablet 3     fluticasone (FLONASE) 50 MCG/ACT nasal spray USE 1-2 SPRAYS INTO BOTH  NOSTRILS DAILY 48 g 2     furosemide (LASIX) 40 MG tablet TAKE 1 TO 2 TABLETS BY  MOUTH DAILY 180 tablet 1     gabapentin (NEURONTIN) 100 MG capsule Take 1 capsule in afternoon and 2 at bedtime 270 capsule 1     ketoprofen 10% in PLO 10% topical gel Apply 4 times a day as need for pain 50 g 1     metoprolol tartrate (LOPRESSOR) 50 MG tablet TAKE ONE-HALF TABLET BY  MOUTH TWICE A DAY 90 tablet 1     order for DME Equipment being ordered: surgicxal shoe size 10 1 Device 0     senna-docusate (SENOKOT-S;PERICOLACE) 8.6-50 MG per tablet Take 1-2 tablets by mouth 2 times daily as needed for constipation 30  tablet 3     traMADol (ULTRAM) 50 MG tablet Take 1 tablet (50 mg) by mouth 2 times daily as needed for moderate pain 40 tablet 0     amLODIPine (NORVASC) 2.5 MG tablet TAKE 1 TABLET BY MOUTH  DAILY (Patient not taking: Reported on 11/1/2019) 90 tablet 1       Soc Hx: reviewed  Fam Hx: reviewed    The information in this document, created by the medical scribe for me, accurately reflects the services I personally performed and the decisions made by me. I have reviewed and approved this document for accuracy prior to leaving the patient care area.  November 1, 2019 4:21 PM     Veronica Steiner MD  Internal Medicine

## 2019-11-19 DIAGNOSIS — I10 ESSENTIAL HYPERTENSION WITH GOAL BLOOD PRESSURE LESS THAN 140/90: Chronic | ICD-10-CM

## 2019-11-19 NOTE — TELEPHONE ENCOUNTER
"Requested Prescriptions   Pending Prescriptions Disp Refills     amLODIPine (NORVASC) 5 MG tablet [Pharmacy Med Name: AMLODIPINE  5MG  TAB] 90 tablet 0     Sig: TAKE 1 TABLET BY MOUTH  DAILY       Calcium Channel Blockers Protocol  Failed - 11/19/2019  3:15 AM        Failed - Blood pressure under 140/90 in past 12 months     BP Readings from Last 3 Encounters:   11/01/19 (!) 142/70   10/01/19 (!) 162/68   07/12/19 162/69                 Failed - Normal serum creatinine on file in past 12 months     Recent Labs   Lab Test 09/24/19  0959 07/12/19  0928   CR 1.05*  --    CREAT  --  1.4*             Passed - Recent (12 mo) or future (30 days) visit within the authorizing provider's specialty     Patient has had an office visit with the authorizing provider or a provider within the authorizing providers department within the previous 12 mos or has a future within next 30 days. See \"Patient Info\" tab in inbasket, or \"Choose Columns\" in Meds & Orders section of the refill encounter.              Passed - Medication is active on med list        Passed - Patient is age 18 or older        Passed - No active pregnancy on record        Passed - No positive pregnancy test in past 12 months        Last Written Prescription Date:  11/1/19  Last Fill Quantity: 90,  # refills: 1   Last office visit: 11/1/2019 with prescribing provider:  11/1/19   Future Office Visit:      "

## 2019-11-20 RX ORDER — AMLODIPINE BESYLATE 5 MG/1
TABLET ORAL
Qty: 90 TABLET | Refills: 0 | OUTPATIENT
Start: 2019-11-20

## 2020-01-06 DIAGNOSIS — I10 ESSENTIAL HYPERTENSION WITH GOAL BLOOD PRESSURE LESS THAN 140/90: Chronic | ICD-10-CM

## 2020-01-06 NOTE — TELEPHONE ENCOUNTER
"Requested Prescriptions   Pending Prescriptions Disp Refills     amLODIPine (NORVASC) 5 MG tablet [Pharmacy Med Name: AMLODIPINE  5MG  TAB] 90 tablet 1     Sig: TAKE 1 TABLET BY MOUTH  DAILY   Last Written Prescription Date:  11/01/2019  Last Fill Quantity: 90,  # refills: 01   Last office visit: 11/1/2019 with prescribing provider:     Future Office Visit:      Calcium Channel Blockers Protocol  Failed - 1/6/2020  2:10 PM        Failed - Blood pressure under 140/90 in past 12 months     BP Readings from Last 3 Encounters:   11/01/19 (!) 142/70   10/01/19 (!) 162/68   07/12/19 162/69                 Failed - Normal serum creatinine on file in past 12 months     Recent Labs   Lab Test 09/24/19  0959 07/12/19  0928   CR 1.05*  --    CREAT  --  1.4*             Passed - Recent (12 mo) or future (30 days) visit within the authorizing provider's specialty     Patient has had an office visit with the authorizing provider or a provider within the authorizing providers department within the previous 12 mos or has a future within next 30 days. See \"Patient Info\" tab in inbasket, or \"Choose Columns\" in Meds & Orders section of the refill encounter.              Passed - Medication is active on med list        Passed - Patient is age 18 or older        Passed - No active pregnancy on record        Passed - No positive pregnancy test in past 12 months        "

## 2020-01-08 RX ORDER — AMLODIPINE BESYLATE 5 MG/1
TABLET ORAL
Qty: 90 TABLET | Refills: 1 | Status: SHIPPED | OUTPATIENT
Start: 2020-01-08 | End: 2020-05-26

## 2020-01-08 NOTE — TELEPHONE ENCOUNTER
"Pharmacy change.  Per Dr. Steiner's 11/1/19 office visit note:  \"(I10) HTN, goal < 150/90  Comment: Controlled  For her age  Plan: amLODIPine (NORVASC) 5 MG tablet        Continue same meds, same doses for now     F/u 6 m\"     Prescription [place] [facility] [place].   "

## 2020-03-11 ENCOUNTER — TELEPHONE (OUTPATIENT)
Dept: INTERNAL MEDICINE | Facility: CLINIC | Age: 85
End: 2020-03-11

## 2020-03-11 DIAGNOSIS — G62.9 PERIPHERAL POLYNEUROPATHY: Chronic | ICD-10-CM

## 2020-03-11 DIAGNOSIS — I10 ESSENTIAL HYPERTENSION WITH GOAL BLOOD PRESSURE LESS THAN 140/90: Chronic | ICD-10-CM

## 2020-03-11 NOTE — TELEPHONE ENCOUNTER
"Requested Prescriptions   Pending Prescriptions Disp Refills     metoprolol tartrate (LOPRESSOR) 50 MG tablet 90 tablet 1     Sig: TAKE ONE-HALF TABLET BY  MOUTH TWICE A DAY   Last Written Prescription Date:  10/01/2019  Last Fill Quantity: 90,  # refills: 1   Last office visit: 11/1/2019 with prescribing provider:  Dr Steiner   Future Office Visit:            Beta-Blockers Protocol Failed - 3/11/2020 10:41 AM        Failed - Blood pressure under 140/90 in past 12 months     BP Readings from Last 3 Encounters:   11/01/19 (!) 142/70   10/01/19 (!) 162/68   07/12/19 162/69                 Passed - Patient is age 6 or older        Passed - Recent (12 mo) or future (30 days) visit within the authorizing provider's specialty     Patient has had an office visit with the authorizing provider or a provider within the authorizing providers department within the previous 12 mos or has a future within next 30 days. See \"Patient Info\" tab in inbasket, or \"Choose Columns\" in Meds & Orders section of the refill encounter.              Passed - Medication is active on med list           gabapentin (NEURONTIN) 100 MG capsule 270 capsule 1     Sig: Take 1 capsule in afternoon and 2 at bedtime     Last Written Prescription Date:  10/01/2019  Last Fill Quantity: 270,  # refills: 1   Last office visit: 11/1/2019 with prescribing provider:  Dr Steiner   Future Office Visit:          There is no refill protocol information for this order        "

## 2020-03-16 ENCOUNTER — NURSE TRIAGE (OUTPATIENT)
Dept: NURSING | Facility: CLINIC | Age: 85
End: 2020-03-16

## 2020-03-16 NOTE — TELEPHONE ENCOUNTER
Gabapentin  Routing refill request to provider for review/approval because:  Drug not on the [facility] refill protocol     Metoprolol  Routing refill request to provider for review/approval because:  Blood pressures out of range

## 2020-03-16 NOTE — TELEPHONE ENCOUNTER
Reverse shoulder surgery, she woke up with terrible right shoulder pain, and was crying in her apartment. Her daughter wants to know if she should make an appointment to be seen .    Yola Mota RN/ [facility]        Reason for Disposition    Health Information question, no triage required and triager able to answer question    Protocols used: INFORMATION ONLY CALL-A-AH

## 2020-03-17 RX ORDER — GABAPENTIN 100 MG/1
CAPSULE ORAL
Qty: 270 CAPSULE | Refills: 0 | Status: SHIPPED | OUTPATIENT
Start: 2020-03-17 | End: 2020-05-26

## 2020-03-17 RX ORDER — METOPROLOL TARTRATE 50 MG
TABLET ORAL
Qty: 90 TABLET | Refills: 0 | Status: SHIPPED | OUTPATIENT
Start: 2020-03-17 | End: 2020-05-26

## 2020-05-22 ENCOUNTER — VIRTUAL VISIT (OUTPATIENT)
Dept: INTERNAL MEDICINE | Facility: CLINIC | Age: 85
End: 2020-05-22
Payer: MEDICARE

## 2020-05-22 ENCOUNTER — TELEPHONE (OUTPATIENT)
Dept: INTERNAL MEDICINE | Facility: CLINIC | Age: 85
End: 2020-05-22

## 2020-05-22 ENCOUNTER — NURSE TRIAGE (OUTPATIENT)
Dept: NURSING | Facility: CLINIC | Age: 85
End: 2020-05-22

## 2020-05-22 DIAGNOSIS — I10 ESSENTIAL HYPERTENSION WITH GOAL BLOOD PRESSURE LESS THAN 140/90: Chronic | ICD-10-CM

## 2020-05-22 DIAGNOSIS — Z78.9: ICD-10-CM

## 2020-05-22 DIAGNOSIS — D51.9 ANEMIA DUE TO VITAMIN B12 DEFICIENCY, UNSPECIFIED B12 DEFICIENCY TYPE: ICD-10-CM

## 2020-05-22 DIAGNOSIS — M85.80 OSTEOPENIA, UNSPECIFIED LOCATION: Primary | Chronic | ICD-10-CM

## 2020-05-22 DIAGNOSIS — N18.30 CKD (CHRONIC KIDNEY DISEASE) STAGE 3, GFR 30-59 ML/MIN (H): Chronic | ICD-10-CM

## 2020-05-22 DIAGNOSIS — Z83.3 FAMILY HISTORY OF DIABETES MELLITUS: ICD-10-CM

## 2020-05-22 DIAGNOSIS — Z20.822 EXPOSURE TO COVID-19 VIRUS: ICD-10-CM

## 2020-05-22 PROCEDURE — 99442: CPT | Performed by: NURSE PRACTITIONER

## 2020-05-22 NOTE — Clinical Note
She is doing lab and will make appointment with you. Talked to daughter for this visit, as she cannot hear on phone, so not sure if you want face to face when you are here, or will do virtual visit. I said I would ask you and you could have nursing let daughter know what you want.   Hope you are well and happy  Maritza

## 2020-05-22 NOTE — TELEPHONE ENCOUNTER
Daughter Abigail calls regarding patient (on consent to communicate). Abigail states patient lives in [facility]. A resident tested positive for Covid-19 in he building. Abigail wondering if patient needs to be tested. Patient is not showing signs or symptoms of Covid-19.    Per protocol, advised patient would not qualify for testing per [place] guidelines. Advised to continue to monitor for symptoms, and call back with will call back with further concerns. Daughter verbalized understanding.

## 2020-05-22 NOTE — PROGRESS NOTES
"Glendy Díaz is a 86 year old female who is being evaluated via a billable telephone visit.      The patient has been notified of following:     \"This telephone visit will be conducted via a call between you and your physician/provider. We have found that certain health care needs can be provided without the need for a physical exam.  This service lets us provide the care you need with a short phone conversation.  If a prescription is necessary we can send it directly to your pharmacy.  If lab work is needed we can place an order for that and you can then stop by our lab to have the test done at a later time.    Telephone visits are billed at different rates depending on your insurance coverage. During this emergency period, for some insurers they may be billed the same as an in-person visit.  Please reach out to your insurance provider with any questions.    If during the course of the call the physician/provider feels a telephone visit is not appropriate, you will not be charged for this service.\"    Patient has given verbal consent for Telephone visit?  Yes    What phone number would you like to be contacted at? [phone] Rickey AMADOR    How would you like to obtain your AVS? Mail a copy    Subjective     Glendy Díaz is a 86 year old female who presents via phone visit today for the following health issues:    HPI     Daughter TALIA was the  for this visit as patient has hearing deficit and cannot hear well on phone     Covid 19 positive patient in patient's living facility. Needs 6 mo labs. Will make appointment with Obdulia to follow up.     She lives in independent living and has been exposed to covid 19 positive person    Was told to get tested - she is asymptomatic at this time   FNA screened and ok testing     She is due for her lab and follow up with Obdulia   Lab orders are placed              Patient Active Problem List   Diagnosis     Leg weakness, bilateral     Osteopenia     Neck pain, " chronic     Hyperlipidemia with target LDL less than 130     Peripheral neuropathy     Hematuria     Family history of diabetes mellitus     Family hx of colon cancer     Advanced directives, counseling/discussion     CKD (chronic kidney disease) stage 3, GFR 30-59 ml/min (H)     Anxiety     Osteoarthrosis of knee     Medial meniscus tear     Closed fracture of tibial plateau     Hyperparathyroidism (H)     Controlled substance agreement signed     Status post reverse total shoulder replacement     Anemia due to vitamin B12 deficiency, unspecified B12 deficiency type     HTN, goal < 150/90     Past Surgical History:   Procedure Laterality Date     APPENDECTOMY       ARTHRODESIS FOOT  5/1/2014    Procedure: ARTHRODESIS FOOT;  Surgeon: Sid Marks DPM;  Location: [facility]     ARTHROSCOPY ANKLE, OPEN REPAIR LIGAMENT, COMBINED  5/31/2012    Procedure:COMBINED ARTHROSCOPY ANKLE, OPEN REPAIR LIGAMENT; LEFT ANKLE ARTHROSCOPY, LATERAL LIGAMENT RECONSTRUCTION, ENDOSCOPIC DRISS, KIRIT SECOND TOE RAY CORRECTION    LEFT KNEE Marcaine AND CORTIZONE INJECTION, 5 CC Marcaine, 1 CC CELESTONE, ; Surgeon:VARSHA GERMAN; Location:[facility]     CATARACT IOL, RT/LT       CHOLECYSTECTOMY       EXCISE MASS FOOT  12/4/2012    Procedure: EXCISE MASS FOOT;;  Surgeon: Varsha German MD;  Location: [facility]     HYSTERECTOMY TOTAL ABDOMINAL      ovaries are in     RELEASE TENDON TOE  5/1/2014    Procedure: RELEASE TENDON TOE;  Surgeon: Sid Marks DPM;  Location: [facility]     REMOVE HARDWARE FOOT  12/4/2012    Procedure: REMOVE HARDWARE FOOT;  REMOVAL HARDWARE(SYNTHES SCREW) LEFT FOOT, EXCISION OF INCLUSION CYST;  Surgeon: Varsha German MD;  Location: [facility]     REPAIR HAMMER TOE  5/1/2014    Procedure: REPAIR HAMMER TOE;  Surgeon: Sid Marks DPM;  [facility]: [facility]     REVERSE ARTHROPLASTY SHOULDER Right 7/18/2016    Procedure: REVERSE ARTHROPLASTY SHOULDER;  Surgeon: Marlo Alejandro MD;   Location: [facility] OR       Social History     Tobacco Use     Smoking status: Former Smoker     Packs/day: 1.00     Years: 20.00     Pack years: 20.00     Types: Cigarettes     Smokeless tobacco: Never Used   Substance Use Topics     Alcohol use: No     Family History   Problem Relation Age of Onset     Lipids Mother      Diabetes Mother      Circulatory Mother         Kidney disease     Diabetes Brother      Cancer - colorectal Brother      Breast Cancer Sister      Thyroid Disease Daughter      Cancer - colorectal Brother      Alcohol/Drug Brother            Reviewed and updated as needed this visit by Provider  Tobacco  Allergies  Meds  Problems  Med Hx  Surg Hx  Fam Hx         Review of Systems   Constitutional, HEENT, cardiovascular, pulmonary, gi and gu systems are negative, except as otherwise noted.       Objective   Reported vitals:  There were no vitals taken for this visit.   alert and no distress reported by daughter   Did not speak to patient   She has no respiratory symptoms      Diagnostic Test Results:  Labs reviewed in [facility]  Lab         Assessment/Plan:  1. Osteopenia, unspecified location  Lab recheck   - Comprehensive metabolic panel (BMP + Alb, Alk Phos, ALT, AST, Total. Bili, TP); Future    2. Anemia due to vitamin B12 deficiency, unspecified B12 deficiency type  Hemoglobin   Date Value Ref Range Status   09/24/2019 11.2 (L) 11.7 - 15.7 g/dL Final   ]    - CBC with Platelets and Reflex to Iron Studies; Future  - Vitamin B12; Future    3. CKD (chronic kidney disease) stage 3, GFR 30-59 ml/min (H)  Recheck   - Comprehensive metabolic panel (BMP + Alb, Alk Phos, ALT, AST, Total. Bili, TP); Future    4. Family history of diabetes mellitus    - Comprehensive metabolic panel (BMP + Alb, Alk Phos, ALT, AST, Total. Bili, TP); Future    5. HTN, goal < 150/90  Stable   - Comprehensive metabolic panel (BMP + Alb, Alk Phos, ALT, AST, Total. Bili, TP); Future  - TSH with free T4 reflex; Future    6.  Exposure to Covid-19 Virus    - Asymptomatic COVID-19 Virus (Coronavirus) by PCR; Future    7. Lives in senior facility    - Asymptomatic COVID-19 Virus (Coronavirus) by PCR; Future    Return in about 4 weeks (around 6/19/2020) for Lab Work, In-Clinic Visit?? cannot hear on phone .      Phone call duration:  18 minutes    SHRUTI Craft CNP

## 2020-05-22 NOTE — TELEPHONE ENCOUNTER
RN triage call from daughter Abigail (Power of )  Patient lives in an Assisted Living facility  Abigail and her mom were just informed that another resident tested positive for COVID19  It was recommended that residents contact their primary to arrange for testing.  Glendy does not have any symptoms of fever, cough, or shortness of breath.  Warm transferred to scheduling for telephone visit with a provider today.  Tania Matt RN  [facility] Nurse Advisor          Additional Information    Negative: SEVERE difficulty breathing (e.g., struggling for each breath, speaks in single words)    Negative: Bluish (or gray) lips or face now    Negative: Sounds like a life-threatening emergency to the triager    [facility] Specific Disposition  - REQUIRED: [facility] Specific Patient Instructions  COVID 19 Nurse Triage Plan/Patient Instructions    Please be aware that novel coronavirus (COVID-19) may be circulating in the community. If you develop symptoms such as fever, cough, or SOB or if you have concerns about the presence of another infection including coronavirus (COVID-19), please contact your health care provider or visit [email or web address].       Patient to have scheduled Telephone Visit with a provider. Follow System Ambulatory Workflow for COVID 19.     The clinic staff will assist you to schedule an appointment to complete the Telephone Visit with a provider during normal clinic hours.       Call Back If: Your symptoms worsen before you are able to complete your Telephone Visit with a provider.    Thank you for limiting contact with others, wearing a simple mask to cover your cough, practice good hand hygiene habits and accessing our virtual services where possible to limit the spread of this virus.    For more information about COVID19 and options for caring for yourself at home, please visit the CDC website at https://www.cdc.gov/coronavirus/2019-ncov/about/steps-when-sick.html  For  more options for care at [facility], please visit our website at [email or web address]    For more information, please use the [place] Department of Health ([facility]) COVID-19 Hotlines (Interpreters available):   Health questions: Phone Number: [phone] or [phone] and Hours: 7 a.m. to 7 p.m.  Schools and  questions: Phone Number: [phone] or [phone] and Hours 7 a.m. to 7 p.m.    Protocols used: CORONAVIRUS (COVID-19) EXPOSURE-A- 4.22.20

## 2020-05-26 ENCOUNTER — TELEPHONE (OUTPATIENT)
Dept: INTERNAL MEDICINE | Facility: CLINIC | Age: 85
End: 2020-05-26

## 2020-05-26 DIAGNOSIS — M85.80 OSTEOPENIA, UNSPECIFIED LOCATION: Chronic | ICD-10-CM

## 2020-05-26 DIAGNOSIS — Z83.3 FAMILY HISTORY OF DIABETES MELLITUS: ICD-10-CM

## 2020-05-26 DIAGNOSIS — D51.9 ANEMIA DUE TO VITAMIN B12 DEFICIENCY, UNSPECIFIED B12 DEFICIENCY TYPE: ICD-10-CM

## 2020-05-26 DIAGNOSIS — I10 ESSENTIAL HYPERTENSION WITH GOAL BLOOD PRESSURE LESS THAN 140/90: Chronic | ICD-10-CM

## 2020-05-26 DIAGNOSIS — N18.30 CKD (CHRONIC KIDNEY DISEASE) STAGE 3, GFR 30-59 ML/MIN (H): Chronic | ICD-10-CM

## 2020-05-26 LAB
ERYTHROCYTE [DISTWIDTH] IN BLOOD BY AUTOMATED COUNT: 14.2 % (ref 10–15)
HCT VFR BLD AUTO: 38 % (ref 35–47)
HGB BLD-MCNC: 11.8 G/DL (ref 11.7–15.7)
IRON STUDY JIC TUBE: ABNORMAL
MCH RBC QN AUTO: 30.9 PG (ref 26.5–33)
MCHC RBC AUTO-ENTMCNC: 31.1 G/DL (ref 31.5–36.5)
MCV RBC AUTO: 100 FL (ref 78–100)
PLATELET # BLD AUTO: 260 10E9/L (ref 150–450)
RBC # BLD AUTO: 3.82 10E12/L (ref 3.8–5.2)
VIT B12 SERPL-MCNC: 4366 PG/ML (ref 193–986)
WBC # BLD AUTO: 8.5 10E9/L (ref 4–11)

## 2020-05-26 PROCEDURE — 80053 COMPREHEN METABOLIC PANEL: CPT | Performed by: NURSE PRACTITIONER

## 2020-05-26 PROCEDURE — 36415 COLL VENOUS BLD VENIPUNCTURE: CPT | Performed by: NURSE PRACTITIONER

## 2020-05-26 PROCEDURE — 84443 ASSAY THYROID STIM HORMONE: CPT | Performed by: NURSE PRACTITIONER

## 2020-05-26 PROCEDURE — 83550 IRON BINDING TEST: CPT | Performed by: NURSE PRACTITIONER

## 2020-05-26 PROCEDURE — 82728 ASSAY OF FERRITIN: CPT | Performed by: NURSE PRACTITIONER

## 2020-05-26 PROCEDURE — 83540 ASSAY OF IRON: CPT | Performed by: NURSE PRACTITIONER

## 2020-05-26 PROCEDURE — 82607 VITAMIN B-12: CPT | Performed by: NURSE PRACTITIONER

## 2020-05-26 PROCEDURE — 85027 COMPLETE CBC AUTOMATED: CPT | Performed by: NURSE PRACTITIONER

## 2020-05-26 NOTE — TELEPHONE ENCOUNTER
Daughter is calling because Glendy is coming into our lab today and she is wondering if she should also be tested for the covid antibody. The resident in her building that tested positive for covid didn't have any symtoms.

## 2020-05-27 DIAGNOSIS — Z20.822 EXPOSURE TO COVID-19 VIRUS: ICD-10-CM

## 2020-05-27 DIAGNOSIS — Z78.9: ICD-10-CM

## 2020-05-27 LAB
ALBUMIN SERPL-MCNC: 3.7 G/DL (ref 3.4–5)
ALP SERPL-CCNC: 74 U/L (ref 40–150)
ALT SERPL W P-5'-P-CCNC: 15 U/L (ref 0–50)
ANION GAP SERPL CALCULATED.3IONS-SCNC: 8 MMOL/L (ref 3–14)
AST SERPL W P-5'-P-CCNC: 18 U/L (ref 0–45)
BILIRUB SERPL-MCNC: 0.4 MG/DL (ref 0.2–1.3)
BUN SERPL-MCNC: 24 MG/DL (ref 7–30)
CALCIUM SERPL-MCNC: 9.3 MG/DL (ref 8.5–10.1)
CHLORIDE SERPL-SCNC: 106 MMOL/L (ref 94–109)
CO2 SERPL-SCNC: 25 MMOL/L (ref 20–32)
CREAT SERPL-MCNC: 1.22 MG/DL (ref 0.52–1.04)
FERRITIN SERPL-MCNC: 100 NG/ML (ref 8–252)
GFR SERPL CREATININE-BSD FRML MDRD: 40 ML/MIN/{1.73_M2}
GLUCOSE SERPL-MCNC: 99 MG/DL (ref 70–99)
IRON SATN MFR SERPL: 18 % (ref 15–46)
IRON SERPL-MCNC: 55 UG/DL (ref 35–180)
POTASSIUM SERPL-SCNC: 4.8 MMOL/L (ref 3.4–5.3)
PROT SERPL-MCNC: 8 G/DL (ref 6.8–8.8)
SODIUM SERPL-SCNC: 139 MMOL/L (ref 133–144)
TIBC SERPL-MCNC: 310 UG/DL (ref 240–430)
TSH SERPL DL<=0.005 MIU/L-ACNC: 1.36 MU/L (ref 0.4–4)

## 2020-05-27 PROCEDURE — 87635 SARS-COV-2 COVID-19 AMP PRB: CPT | Mod: 90 | Performed by: NURSE PRACTITIONER

## 2020-05-27 PROCEDURE — 99207 ZZC NO BILLABLE SERVICE THIS VISIT: CPT

## 2020-05-27 PROCEDURE — 99000 SPECIMEN HANDLING OFFICE-LAB: CPT | Performed by: NURSE PRACTITIONER

## 2020-05-29 LAB
SARS-COV-2 RNA SPEC QL NAA+PROBE: NOT DETECTED
SPECIMEN SOURCE: NORMAL

## 2020-06-02 ENCOUNTER — OFFICE VISIT (OUTPATIENT)
Dept: INTERNAL MEDICINE | Facility: CLINIC | Age: 85
End: 2020-06-02
Payer: MEDICARE

## 2020-06-02 VITALS
WEIGHT: 172.2 LBS | OXYGEN SATURATION: 98 % | HEIGHT: 63 IN | DIASTOLIC BLOOD PRESSURE: 85 MMHG | HEART RATE: 89 BPM | RESPIRATION RATE: 14 BRPM | TEMPERATURE: 98.3 F | SYSTOLIC BLOOD PRESSURE: 170 MMHG | BODY MASS INDEX: 30.51 KG/M2

## 2020-06-02 DIAGNOSIS — N39.41 URGE INCONTINENCE OF URINE: ICD-10-CM

## 2020-06-02 DIAGNOSIS — E87.70 HYPERVOLEMIA, UNSPECIFIED HYPERVOLEMIA TYPE: Primary | ICD-10-CM

## 2020-06-02 DIAGNOSIS — M25.50 MULTIPLE JOINT PAIN: ICD-10-CM

## 2020-06-02 DIAGNOSIS — R60.0 BILATERAL LEG EDEMA: ICD-10-CM

## 2020-06-02 DIAGNOSIS — I10 ESSENTIAL HYPERTENSION WITH GOAL BLOOD PRESSURE LESS THAN 140/90: Chronic | ICD-10-CM

## 2020-06-02 DIAGNOSIS — N18.30 CKD (CHRONIC KIDNEY DISEASE) STAGE 3, GFR 30-59 ML/MIN (H): Chronic | ICD-10-CM

## 2020-06-02 PROCEDURE — 99214 OFFICE O/P EST MOD 30 MIN: CPT | Performed by: INTERNAL MEDICINE

## 2020-06-02 RX ORDER — TRAMADOL HYDROCHLORIDE 50 MG/1
50 TABLET ORAL 2 TIMES DAILY PRN
Qty: 40 TABLET | Refills: 0 | Status: SHIPPED | OUTPATIENT
Start: 2020-06-02 | End: 2020-11-05

## 2020-06-02 RX ORDER — CARVEDILOL 6.25 MG/1
6.25 TABLET ORAL 2 TIMES DAILY WITH MEALS
Qty: 60 TABLET | Refills: 1 | Status: SHIPPED | OUTPATIENT
Start: 2020-06-02 | End: 2020-06-24

## 2020-06-02 RX ORDER — MIRABEGRON 25 MG/1
25 TABLET, FILM COATED, EXTENDED RELEASE ORAL DAILY
Qty: 30 TABLET | Refills: 1 | Status: SHIPPED | OUTPATIENT
Start: 2020-06-02 | End: 2020-07-04

## 2020-06-02 ASSESSMENT — MIFFLIN-ST. JEOR: SCORE: 1182.28

## 2020-06-02 NOTE — PATIENT INSTRUCTIONS
Plan:  1. Stop Amlodipine   2. Stop Metoprolol  3. Start Carvedilol 6.25 mg twice a day - for the blood pressure   4. Mirabegron 25 mg daily - for the bladder .  If it is not covered, patient needs to call insurance and find out which equivalent is covered.    5. Urology referral - for the bladder [facility] [place] [phone]  6. Follow up video visit in about 7-10 days for the blood pressure   7. You need to take the water pills

## 2020-06-02 NOTE — PROGRESS NOTES
Patient's instructions / PLAN:                                                        Plan:  1. Stop Amlodipine   2. Stop Metoprolol  3. Start Carvedilol 6.25 mg twice a day - for the blood pressure   4. Mirabegron 25 mg daily - for the bladder .  If it is not covered, patient needs to call insurance and find out which equivalent is covered.    5. Urology referral - for the bladder [facility] [place] [phone]  6. Follow up video visit in about 7-10 days  7. You need to take the water pills        ASSESSMENT & PLAN:                                                      (E87.70) Hypervolemia, unspecified hypervolemia type  (primary encounter diagnosis)  (R60.0) Bilateral leg edema  Comment: not compliant with the diuretics   Plan: as above     (I10) HTN, goal < 150/90  Comment: Not controlled   Plan: as above     (M25.50) Multiple joint pain  Comment:   Plan: tramadol prn     (N39.41) Urge incontinence of urine  Comment: We discussed about the new meds, advantages and potential side effects. The patient will read also the info from the pharmacy and call back if questions.   Plan: as above     (N18.3) CKD (chronic kidney disease) stage 3, GFR 30-59 ml/min (H)  Comment: stable   Plan: Monitor labs         Chief Complaint:                                                      Follow up chronic medical problems        SUBJECTIVE:                                                    History of present illness     Leg edema  -- getting worse  -- she is not taking the water pill because she of the bladder    Urine incontinence   -- she takes Azo otc   -- she urinates 3-4 times a night   -- mixture of urgency and stress. When she stands up the urine just comes out when she takes the water pill.  -- she uses pads and not depends   -- she didn't like the Oxybutynin because it dried the mouth     HTN  -- she can't tolerate ACEI and ARB because she had increased K while on Lisinopril   -- she had leg edema with  "Amlodipine 10 mg   -- /60, 140/80, few times in the 150s     Labs May 26 reviewed     ROS:                                                      ROS: negative for fever, chills, cough, wheezes, chest pain, shortness of breath, vomiting, abdominal pain, pos for  leg swelling     OBJECTIVE:                                                    Physical Exam :    Blood pressure (!) 170/85, pulse 89, temperature 98.3  F (36.8  C), temperature source Oral, resp. rate 14, height 1.588 m (5' 2.5\"), weight 78.1 kg (172 lb 3.2 oz), SpO2 98 %, not currently breastfeeding.   NAD, appears comfortable  Skin: no rashes   HEENT: PERRLA, EOMI, pink conjunctiva, anicteric sclerae, bilateral tympanic membranes are clinically normal, oropharynx is normal color  Neck: supple, no JVD, No thyroidmegaly. Lymph nodes nonpalpable cervical and supraclavicular.  Chest: bibasilar rales  good respiratory effort  Heart: S1 S2, RRR, no mgr appreciated  Abdomen: soft, not tender,   Extremities: + 2  edema, clubbing, cyanosis.  Neurologic: A, Ox3, no focal signs appreciated    PMHx: reviewed  Past Medical History:   Diagnosis Date     Anxiety      CKD (chronic kidney disease) stage 3, GFR 30-59 ml/min (H)      Hematuria      Hyperlipidemia LDL goal <100      Hyperparathyroidism (H)      Hypertension     No Cardiologist     Incontinence      Leg weakness, bilateral      Neck pain, chronic      Osteoarthritis      Osteopenia      Peripheral neuropathy       PSHx: reviewed  Past Surgical History:   Procedure Laterality Date     APPENDECTOMY       ARTHRODESIS FOOT  5/1/2014    Procedure: ARTHRODESIS FOOT;  Surgeon: Sid Marks DPM;  Location: [facility]     ARTHROSCOPY ANKLE, OPEN REPAIR LIGAMENT, COMBINED  5/31/2012    Procedure:COMBINED ARTHROSCOPY ANKLE, OPEN REPAIR LIGAMENT; LEFT ANKLE ARTHROSCOPY, LATERAL LIGAMENT RECONSTRUCTION, ENDOSCOPIC KIRIT NAQVI SECOND TOE RAY CORRECTION    LEFT KNEE Marcaine AND CORTIZONE INJECTION, 5 CC Marcaine, " 1 CC CELESTONE, ; Surgeon:AKSHAT GERMAN; Location:[facility]     CATARACT IOL, RT/LT       CHOLECYSTECTOMY       EXCISE MASS FOOT  12/4/2012    Procedure: EXCISE MASS FOOT;;  Surgeon: Akshat German MD;  Location: [facility]     HYSTERECTOMY TOTAL ABDOMINAL      ovaries are in     RELEASE TENDON TOE  5/1/2014    Procedure: RELEASE TENDON TOE;  Surgeon: Sid Marks DPM;  Location: [facility]     REMOVE HARDWARE FOOT  12/4/2012    Procedure: REMOVE HARDWARE FOOT;  REMOVAL HARDWARE(SYNTHES SCREW) LEFT FOOT, EXCISION OF INCLUSION CYST;  Surgeon: Akshat German MD;  Location: [facility]     REPAIR HAMMER TOE  5/1/2014    Procedure: REPAIR HAMMER TOE;  Surgeon: Sid Marks DPM;  [facility]: [facility]     REVERSE ARTHROPLASTY SHOULDER Right 7/18/2016    Procedure: REVERSE ARTHROPLASTY SHOULDER;  Surgeon: Marlo Alejandro MD;  Location: [facility] OR        Meds: reviewed  Current Outpatient Medications   Medication Sig Dispense Refill     albuterol (PROAIR HFA/PROVENTIL HFA/VENTOLIN HFA) 108 (90 BASE) MCG/ACT Inhaler Inhale 2 puffs into the lungs every 6 hours as needed for shortness of breath / dyspnea or wheezing 1 Inhaler 1     amLODIPine (NORVASC) 5 MG tablet TAKE 1 TABLET BY MOUTH  DAILY 90 tablet 0     aspirin 81 MG tablet Take 1 tablet (81 mg) by mouth daily 30 tablet 3     cetirizine (ZYRTEC) 10 MG tablet Take 1 tablet (10 mg) by mouth every evening 90 tablet 3     Cholecalciferol (VITAMIN D) 2000 UNITS tablet Take 2,000 Units by mouth daily 100 tablet 3     fluticasone (FLONASE) 50 MCG/ACT nasal spray USE 1-2 SPRAYS INTO BOTH  NOSTRILS DAILY 48 g 2     furosemide (LASIX) 40 MG tablet TAKE 1 TO 2 TABLETS BY  MOUTH DAILY 180 tablet 1     gabapentin (NEURONTIN) 100 MG capsule TAKE 1 CAPSULE BY MOUTH IN  THE AFTERNOON AND 2  CAPSULES BY MOUTH AT  BEDTIME 270 capsule 0     ketoprofen 10% in PLO 10% topical gel Apply 4 times a day as need for pain 50 g 1     metoprolol tartrate (LOPRESSOR) 50 MG tablet  TAKE ONE-HALF TABLET BY  MOUTH TWICE DAILY 90 tablet 0     order for DME Equipment being ordered: surgicxal shoe size 10 1 Device 0     senna-docusate (SENOKOT-S;PERICOLACE) 8.6-50 MG per tablet Take 1-2 tablets by mouth 2 times daily as needed for constipation 30 tablet 3     traMADol (ULTRAM) 50 MG tablet Take 1 tablet (50 mg) by mouth 2 times daily as needed for moderate pain 40 tablet 0       Soc Hx: reviewed  Fam Hx: reviewed      Time spent with the patient  40 min, more than 50% in counseling and coordinating care, Re above medical problems leg edema, HTN, meds, incontinence, pain meds      Veronica Steiner MD  Internal Medicine

## 2020-06-23 ENCOUNTER — TELEPHONE (OUTPATIENT)
Dept: INTERNAL MEDICINE | Facility: CLINIC | Age: 85
End: 2020-06-23

## 2020-06-23 DIAGNOSIS — I10 ESSENTIAL HYPERTENSION WITH GOAL BLOOD PRESSURE LESS THAN 140/90: Chronic | ICD-10-CM

## 2020-06-23 NOTE — TELEPHONE ENCOUNTER
Pt's daughter (on consent to communicate) calls to report that pt's blood pressure and swelling has greatly improved after changes made at 6/2/20 visit with Dr. Steiner. Daughter would like to know if pt still needs to follow up with Dr. Steiner. If not she would like prescriptions to be sent to pharmacy that is teed up. Daughter can be reached at [phone]. OK to leave a detailed message. Please advise. Thanks.

## 2020-06-24 RX ORDER — CARVEDILOL 6.25 MG/1
6.25 TABLET ORAL 2 TIMES DAILY WITH MEALS
Qty: 180 TABLET | Refills: 0 | Status: SHIPPED | OUTPATIENT
Start: 2020-06-24 | End: 2020-08-15

## 2020-06-24 NOTE — TELEPHONE ENCOUNTER
Left a detailed message per consent to communicate informing daughter Abigail of primary care provider's message below.

## 2020-06-24 NOTE — TELEPHONE ENCOUNTER
"Please see message below and advise.    Per 6/2/220 office visit note:  \"Plan:  1. Stop Amlodipine   2. Stop Metoprolol  3. Start Carvedilol 6.25 mg twice a day - for the blood pressure   4. Mirabegron 25 mg daily - for the bladder .  If it is not covered, patient needs to call insurance and find out which equivalent is covered.    5. Urology referral - for the bladder [facility] - [place] [phone]  6. Follow up video visit in about 7-10 days  7. You need to take the water pills\"  "

## 2020-06-30 DIAGNOSIS — N39.41 URGE INCONTINENCE OF URINE: ICD-10-CM

## 2020-07-01 NOTE — TELEPHONE ENCOUNTER
Pending Prescriptions:                       Disp   Refills    mirabegron (MYRBETRIQ) 25 MG 24 hr tablet  30 tab*1        Sig: Take 1 tablet (25 mg) by mouth daily    Patient requesting pharmacy change to mail order- needs to be a 90 day supply

## 2020-07-04 RX ORDER — MIRABEGRON 25 MG/1
25 TABLET, FILM COATED, EXTENDED RELEASE ORAL DAILY
Qty: 30 TABLET | Refills: 1 | Status: SHIPPED | OUTPATIENT
Start: 2020-07-04 | End: 2020-08-27

## 2020-08-12 DIAGNOSIS — I10 ESSENTIAL HYPERTENSION WITH GOAL BLOOD PRESSURE LESS THAN 140/90: Chronic | ICD-10-CM

## 2020-08-13 NOTE — TELEPHONE ENCOUNTER
Pending Prescriptions:                       Disp   Refills    carvedilol (COREG) 6.25 MG tablet [Pharmac*180 ta*3        Sig: TAKE 1 TABLET BY MOUTH  TWICE DAILY

## 2020-08-15 RX ORDER — CARVEDILOL 6.25 MG/1
TABLET ORAL
Qty: 180 TABLET | Refills: 0 | Status: SHIPPED | OUTPATIENT
Start: 2020-08-15 | End: 2020-10-05

## 2020-08-17 DIAGNOSIS — G62.9 PERIPHERAL POLYNEUROPATHY: Chronic | ICD-10-CM

## 2020-08-18 NOTE — TELEPHONE ENCOUNTER
Pending Prescriptions:                       Disp   Refills    gabapentin (NEURONTIN) 100 MG capsule [Pha*270 ca*3        Sig: TAKE 1 CAPSULE BY MOUTH IN  THE AFTERNOON AND 2            CAPSULES BY MOUTH AT  BEDTIME

## 2020-08-20 RX ORDER — GABAPENTIN 100 MG/1
CAPSULE ORAL
Qty: 270 CAPSULE | Refills: 3 | Status: ON HOLD | OUTPATIENT
Start: 2020-08-20 | End: 2020-12-29

## 2020-08-24 DIAGNOSIS — N39.41 URGE INCONTINENCE OF URINE: ICD-10-CM

## 2020-08-24 NOTE — TELEPHONE ENCOUNTER
Abigail is requesting a mail in order of this refill. She states her mother needs it by next week. If the mail in order option will not get there by next week to use the [facility] pharmacy. She also states this RX is working really well for her mom.    She also would like to know if Dr. Steiner would like to have a f/u appt rather it be in person or over the phone. Please advise Abigail

## 2020-08-25 DIAGNOSIS — J30.89 CHRONIC NON-SEASONAL ALLERGIC RHINITIS: ICD-10-CM

## 2020-08-26 RX ORDER — FLUTICASONE PROPIONATE 50 MCG
SPRAY, SUSPENSION (ML) NASAL
Qty: 48 G | Refills: 2 | Status: ON HOLD | OUTPATIENT
Start: 2020-08-26 | End: 2020-12-29

## 2020-08-26 NOTE — TELEPHONE ENCOUNTER
Prescription approved per [facility] Refill Protocol.  Nithya Cervantes RN

## 2020-08-27 RX ORDER — MIRABEGRON 25 MG/1
25 TABLET, FILM COATED, EXTENDED RELEASE ORAL DAILY
Qty: 90 TABLET | Refills: 1 | Status: SHIPPED | OUTPATIENT
Start: 2020-08-27 | End: 2020-12-21 | Stop reason: DRUGHIGH

## 2020-09-12 ENCOUNTER — APPOINTMENT (OUTPATIENT)
Dept: GENERAL RADIOLOGY | Facility: CLINIC | Age: 85
End: 2020-09-12
Attending: EMERGENCY MEDICINE
Payer: MEDICARE

## 2020-09-12 ENCOUNTER — HOSPITAL ENCOUNTER (EMERGENCY)
Facility: CLINIC | Age: 85
Discharge: HOME | End: 2020-09-12
Attending: EMERGENCY MEDICINE | Admitting: EMERGENCY MEDICINE
Payer: MEDICARE

## 2020-09-12 VITALS
WEIGHT: 170 LBS | TEMPERATURE: 98.2 F | OXYGEN SATURATION: 98 % | DIASTOLIC BLOOD PRESSURE: 72 MMHG | HEART RATE: 61 BPM | BODY MASS INDEX: 29.02 KG/M2 | RESPIRATION RATE: 18 BRPM | SYSTOLIC BLOOD PRESSURE: 148 MMHG | HEIGHT: 64 IN

## 2020-09-12 DIAGNOSIS — R42 DIZZINESS: Primary | ICD-10-CM

## 2020-09-12 LAB
ALBUMIN UR-MCNC: NEGATIVE MG/DL
ANION GAP SERPL CALCULATED.3IONS-SCNC: 5 MMOL/L (ref 3–14)
APPEARANCE UR: CLEAR
BASOPHILS # BLD AUTO: 0 10E9/L (ref 0–0.2)
BASOPHILS NFR BLD AUTO: 0.5 %
BILIRUB UR QL STRIP: NEGATIVE
BUN SERPL-MCNC: 27 MG/DL (ref 7–30)
CALCIUM SERPL-MCNC: 9 MG/DL (ref 8.5–10.1)
CHLORIDE SERPL-SCNC: 109 MMOL/L (ref 94–109)
CO2 SERPL-SCNC: 24 MMOL/L (ref 20–32)
COLOR UR AUTO: NORMAL
CREAT SERPL-MCNC: 1.23 MG/DL (ref 0.52–1.04)
DIFFERENTIAL METHOD BLD: ABNORMAL
EOSINOPHIL # BLD AUTO: 0.2 10E9/L (ref 0–0.7)
EOSINOPHIL NFR BLD AUTO: 2.9 %
ERYTHROCYTE [DISTWIDTH] IN BLOOD BY AUTOMATED COUNT: 13.2 % (ref 10–15)
GFR SERPL CREATININE-BSD FRML MDRD: 39 ML/MIN/{1.73_M2}
GLUCOSE SERPL-MCNC: 92 MG/DL (ref 70–99)
GLUCOSE UR STRIP-MCNC: NEGATIVE MG/DL
HCT VFR BLD AUTO: 38.2 % (ref 35–47)
HGB BLD-MCNC: 11.9 G/DL (ref 11.7–15.7)
HGB UR QL STRIP: NEGATIVE
IMM GRANULOCYTES # BLD: 0 10E9/L (ref 0–0.4)
IMM GRANULOCYTES NFR BLD: 0.3 %
KETONES UR STRIP-MCNC: NEGATIVE MG/DL
LEUKOCYTE ESTERASE UR QL STRIP: NEGATIVE
LYMPHOCYTES # BLD AUTO: 2 10E9/L (ref 0.8–5.3)
LYMPHOCYTES NFR BLD AUTO: 34.2 %
MCH RBC QN AUTO: 31.3 PG (ref 26.5–33)
MCHC RBC AUTO-ENTMCNC: 31.2 G/DL (ref 31.5–36.5)
MCV RBC AUTO: 101 FL (ref 78–100)
MONOCYTES # BLD AUTO: 0.5 10E9/L (ref 0–1.3)
MONOCYTES NFR BLD AUTO: 7.7 %
NEUTROPHILS # BLD AUTO: 3.2 10E9/L (ref 1.6–8.3)
NEUTROPHILS NFR BLD AUTO: 54.4 %
NITRATE UR QL: NEGATIVE
NRBC # BLD AUTO: 0 10*3/UL
NRBC BLD AUTO-RTO: 0 /100
PH UR STRIP: 6.5 PH (ref 5–7)
PLATELET # BLD AUTO: 224 10E9/L (ref 150–450)
POTASSIUM SERPL-SCNC: 4.5 MMOL/L (ref 3.4–5.3)
RBC # BLD AUTO: 3.8 10E12/L (ref 3.8–5.2)
RBC #/AREA URNS AUTO: <1 /HPF (ref 0–2)
SODIUM SERPL-SCNC: 138 MMOL/L (ref 133–144)
SOURCE: NORMAL
SP GR UR STRIP: 1.01 (ref 1–1.03)
SQUAMOUS #/AREA URNS AUTO: <1 /HPF (ref 0–1)
TROPONIN I SERPL-MCNC: <0.015 UG/L (ref 0–0.04)
UROBILINOGEN UR STRIP-MCNC: NORMAL MG/DL (ref 0–2)
WBC # BLD AUTO: 5.9 10E9/L (ref 4–11)
WBC #/AREA URNS AUTO: 1 /HPF (ref 0–5)

## 2020-09-12 PROCEDURE — 25000132 ZZH RX MED GY IP 250 OP 250 PS 637: Mod: GY | Performed by: EMERGENCY MEDICINE

## 2020-09-12 PROCEDURE — 93005 ELECTROCARDIOGRAM TRACING: CPT

## 2020-09-12 PROCEDURE — 96360 HYDRATION IV INFUSION INIT: CPT

## 2020-09-12 PROCEDURE — 84484 ASSAY OF TROPONIN QUANT: CPT | Performed by: EMERGENCY MEDICINE

## 2020-09-12 PROCEDURE — 81001 URINALYSIS AUTO W/SCOPE: CPT | Performed by: EMERGENCY MEDICINE

## 2020-09-12 PROCEDURE — 71045 X-RAY EXAM CHEST 1 VIEW: CPT

## 2020-09-12 PROCEDURE — 80048 BASIC METABOLIC PNL TOTAL CA: CPT | Performed by: EMERGENCY MEDICINE

## 2020-09-12 PROCEDURE — 99285 EMERGENCY DEPT VISIT HI MDM: CPT | Mod: 25

## 2020-09-12 PROCEDURE — 85025 COMPLETE CBC W/AUTO DIFF WBC: CPT | Performed by: EMERGENCY MEDICINE

## 2020-09-12 PROCEDURE — 25800030 ZZH RX IP 258 OP 636: Performed by: EMERGENCY MEDICINE

## 2020-09-12 RX ORDER — FLUTICASONE PROPIONATE 50 MCG
1 SPRAY, SUSPENSION (ML) NASAL DAILY
Status: DISCONTINUED | OUTPATIENT
Start: 2020-09-12 | End: 2020-09-12 | Stop reason: HOSPADM

## 2020-09-12 RX ORDER — ACETAMINOPHEN 500 MG
500 TABLET ORAL EVERY 4 HOURS PRN
Status: DISCONTINUED | OUTPATIENT
Start: 2020-09-12 | End: 2020-09-12 | Stop reason: HOSPADM

## 2020-09-12 RX ADMIN — ACETAMINOPHEN 500 MG: 500 TABLET, FILM COATED ORAL at 10:38

## 2020-09-12 RX ADMIN — SODIUM CHLORIDE 500 ML: 9 INJECTION, SOLUTION INTRAVENOUS at 09:09

## 2020-09-12 RX ADMIN — FLUTICASONE PROPIONATE 1 SPRAY: 50 SPRAY, METERED NASAL at 10:38

## 2020-09-12 ASSESSMENT — ENCOUNTER SYMPTOMS
FEVER: 0
SHORTNESS OF BREATH: 0
DIZZINESS: 1
HEADACHES: 0
LIGHT-HEADEDNESS: 1
WEAKNESS: 1

## 2020-09-12 ASSESSMENT — MIFFLIN-ST. JEOR: SCORE: 1196.11

## 2020-09-12 NOTE — ED AVS SNAPSHOT
[facility]  [address]  [facility] [address]  Phone:  [phone]  Fax:  [phone]                                    Glendy Díaz   MRN: [id number]    Department:  [facility]   Date of Visit:  9/12/2020           After Visit Summary Signature Page    I have received my discharge instructions, and my questions have been answered. I have discussed any challenges I see with this plan with the nurse or doctor.    ..........................................................................................................................................  Patient/Patient Representative Signature      ..........................................................................................................................................  Patient Representative Print Name and Relationship to Patient    ..................................................               ................................................  Date                                   Time    ..........................................................................................................................................  Reviewed by Signature/Title    ...................................................              ..............................................  Date                                               Time          22EPIC Rev 08/18

## 2020-09-12 NOTE — CONSULTS
"ED Care Manager Note      Met with: Patient.  Phoned daughter, Abigail Jordan, [phone], after Pt gave me verbal approval to speak with daughter.    Data:     Reason for ED visit:   Chest pain and generalized weakness  Cognitive Status: awake, alert and oriented.  Primary Care [facility]: [facility]  Primary Care MD Name: TIAN Davis  Contact information and PCP information verified: Yes  Lives With: alone    Quality of Family Relationships: helpful, involved, supportive  Description of Support System: Supportive, Involved   Who is your support system?: Children     Insurance concerns: No Insurance issues identified     Assessment:    Identified issues/concerns regarding health management:     ED provider believes that Pt could benefit from some additional support at home.  Pt lives alone at [facility], independently, and she has no services.  Pt , two days ago, got a new cell phone, which has [employer], which she can contact if she falls down.  Pt may have some anxiety about her health, and has been isolated d/t COVID precautions.    Action/Resources:    I talked with Pt about the [facility], and she was in agreement with getting visits from CP.  I talked with daughter and educated her that Pt could get a \"life line\" emergency pendant from [facility] provider, who are closed today, so I gave daughter their phone number, and she was OK with contacting them on Monday.  I emailed a referral to Steff Wynne, the CP , and assisted the provider with putting in a CP referral.    Plan:    Will continue to follow and assist as needed.      Gladys Chapin RN Care Coordinator,  BSN, PHN, CCM, JOE  Inpatient Care Coordination - Emergency Department  [facility]   [phone]    "

## 2020-09-12 NOTE — ED TRIAGE NOTES
Patient is here for evaluation of burning sensation in her chest, lightheadedness, and weakness that started this morning when she woke up.  She states she has had these episodes in the past and usually they subside in about 15 minutes.  This morning, her symptoms have not improved.  She states she did fall yesterday while out for a walk.  Her daughter Abigail Jordan brought her to the ER today:[phone],  ABCs intact.  Patient is alert and oriented x3.

## 2020-09-12 NOTE — ED NOTES
Patient got up to the bedside commode to void.  Slight dizziness with changing from lying to sitting.  Warm blankets for comfort upon return to bed.

## 2020-09-12 NOTE — ED PROVIDER NOTES
History     Chief Complaint:  Generalized Weakness; Dizziness      The history is provided by the patient.      Glendy Díaz is a 86 year old female who presents with dizziness.  Patient reports she has had this for over a month.  It happens in the morning where she feels dizzy, lightheaded and unsteady on her feet.  And then eventually resolves however when she does activity she gets the symptoms.  She fell yesterday.  She has had this before.  She reports some burning in her chest which she has had for quite some time.  She denies any chest pain.  She denies any headaches.  She denies any fevers.  She denies any shortness of breath.  She currently feels fatigued and not herself.    Allergies:  Amlodipine  Fosamax  Lisinopril  Pravastatin  Simvastatin    Medications:    Albuterol  ASA  Coreg  Flonase  Lasix  Neurontin  Myrbetriq   Ultram  Senokot-S    Past Medical History:    Anxiety  CKD  Hematuria  Hyperlipidemia  Hyperparathyroidism  HTN  Incontinence  Osteoarthritis  Osteopenia  Peripheral neuropathy  Anemia due to Vitamin B12 deficiency    Past Surgical History:    Appendectomy  Arthrodesis foot  Arthroscopy ankle, open repair ligament, combined  Cataract IOL, RT/LT  Cholecystectomy  Reverse arthroplasty shoulder  Repair hammer toe  Remove hardware foot  Release tendon toe  Hysterectomy total abdominal  Excise mass foot    Family History:    Mother: lipids, diabetes, kidney disease  Brother(s): diabetes, colorectal cancer, alcohol/drug abuse  Sister: breast cancer    Social History:  The patient was unaccompanied to the ED.  Smoking Status: former smoker  Smokeless Tobacco: never used  Alcohol Use: no  Drug Use: no   Marital Status:   [5]    Review of Systems   Constitutional: Negative for fever.   Respiratory: Negative for shortness of breath.    Cardiovascular: Negative for chest pain.   Neurological: Positive for dizziness, weakness and light-headedness. Negative for headaches.   All other  "systems reviewed and are negative.    Physical Exam     Patient Vitals for the past 24 hrs:   BP Temp Temp src Pulse Resp SpO2 Height Weight   09/12/20 1120 (!) 157/81 -- -- 69 18 96 % -- --   09/12/20 0945 (!) 155/80 -- -- 70 13 99 % -- --   09/12/20 0915 (!) 173/91 -- -- 69 -- -- -- --   09/12/20 0845 (!) 170/75 -- -- 71 9 99 % -- --   09/12/20 0830 (!) 164/73 -- -- 69 16 99 % -- --   09/12/20 0730 (!) 180/86 -- -- 79 -- -- -- --   09/12/20 0719 (!) 201/105 98.2  F (36.8  C) Oral 84 20 98 % 1.626 m (5' 4\") 77.1 kg (170 lb)       Physical Exam  General: Patient is alert and interactive when I enter the room  Head:  The scalp, face, and head appear normal  Eyes:  Conjunctivae are normal  ENT:    The nose is normal    Pinnae are normal     External acoustic canals are normal    TM clear bilaterally, no sinus tenderness   Neck:  Trachea midline  CV:  Pulses are normal, RRR.    Resp:  No respiratory distress, CTAB  Abdomen:      Soft, non-tender, non-distended  Musc:  Normal muscular tone    No major joint effusions    No asymmetric leg swelling  Skin:  No rash or lesions noted  Neuro:  Speech is normal and fluent. Face is symmetric.     Moving all extremities well. No pronator drift. Normal finger to nose. Good strength in extremities. No leg drift.   Psych: Awake. Alert.  Normal affect.  Appropriate interactions.      Emergency Department Course   ECG:  Indication: weakness  ECG taken at 0722, ECG read at 0725 by Dr. Pascale Kauffman MD  Sinus rhythm  Left axis deviation  Minimal voltage criteria for LVH, may be normal variant  Nonspecific ST and T wave abnormality  When compared with prior from 02/09/2018, nonspecific T wave abnormality, worse in lateral leads and QT has shortened  Rate 80 bpm. VT interval 202. QRS duration 90. QT/QTc 390/449. P-R-T axes 62 -42 72.    Imaging:  Radiology findings were communicated with the patient who voiced understanding of the findings.    XR chest port 1 view  IMPRESSION: No focal " infiltrate, pleural effusion or pneumothorax.   Calcified mediastinal lymph nodes. Normal cardiac and mediastinal   silhouette. Interval right reverse total shoulder arthroplasty.   Reading per radiology     Laboratory:  Laboratory findings were communicated with the patient who voiced understanding of the findings.    CBC: AWNL (WBC 5.9, HGB 11.9, )  BMP: Creatinine 1.23(H), GFR estimate 39(L) o/w within normal limits  Troponin (Collected 0837): <0.015     UA with Microscopic: AWNL     Interventions:  0909 0.9% NaCl Bolus 500 mL IV  1038 Tylenol 500 mg oral  1038 Flonase 1 spray both nostrils  Emergency Department Course:  Past medical records, nursing notes, and vitals reviewed.    (1257)   I performed an exam of the patient as documented above.     IV was inserted and blood was drawn for laboratory testing, results above.    The patient provided a urine sample here in the emergency department. This was sent for laboratory testing, findings above.    The patient was sent for a XR chest portable while in the emergency department, results above.     (9900)   I rechecked the patient and discussed the results of her workup thus far.     Findings and plan explained to the Patient. Patient discharged home with instructions regarding supportive care, medications, and reasons to return. The importance of close follow-up was reviewed.    I personally reviewed the laboratory and imaging results with the Patient and answered all related questions prior to discharge.     Impression & Plan   Medical Decision Making:  Glendy Díaz is a 85 yo female who presents with dizziness.  Patient is somewhat of a difficult historian as she reports multiple symptoms that have been ongoing for over a month.  It seems to be very episodic.  The daughter reports this is the third time this is happened where she is come to the ER with these episodes.  Initially she was told she had a sinus infection but the second time she did not.   Reviewing her chart she has been seen in the ER previously for very similar symptoms.  At that time she had normal work-up including normal head CT and normal CTA.  Here she has a normal neurologic exam and otherwise appears well.  She does report some dizziness with sitting up when we try to do orthostatics but her orthostatics were otherwise normal in terms of vitals.  We did give her a small bolus of fluid and checked blood work including urine.  These were unremarkable.  EKG unchanged from prior.  Chest x-ray unremarkable.  Patient was able to ambulate at her baseline and felt improved.  She told me she was having some right-sided ear and neck pain but I do not see any signs of otitis media.  She also has no physical exam findings suggest sinusitis.  Unclear etiology of her symptoms but she was quite anxious when she came in so there could be some degree of anxiety and stress with these episodes.  We did set her up for a community paramedic to come check on her every week to ensure that she is being able to take her meds and she feels otherwise comfortable.  Patient felt comfortable this plan patient discharged in improved condition.      Diagnosis:    ICD-10-CM    1. Dizziness  R42        Disposition:  Discharged to home.    Discharge Medications:  New Prescriptions    No medications on file       Scribe Disclosure:  MAGO, Gary Stacy, am serving as a scribe at 7:12 AM on 9/12/2020 to document services personally performed by Pascale Kauffman MD based on my observations and the provider's statements to me.        Pascale Kauffman MD  09/12/20 5522

## 2020-09-12 NOTE — ED NOTES
Telephone update given to patient's daughter, Abigail who recalls the patient having two similar episodes in the past.  With the first episode like this the patient was diagnosed with a sinus infection and was placed on antibiotics. Denisse wonders if this episode could be related to sinus or inner ear problems again.   The patient reports she recently had some ear pain at home.  MD notified.

## 2020-09-14 LAB — INTERPRETATION ECG - MUSE: NORMAL

## 2020-09-15 ENCOUNTER — PATIENT OUTREACH (OUTPATIENT)
Dept: CARE COORDINATION | Facility: CLINIC | Age: 85
End: 2020-09-15

## 2020-09-15 NOTE — PROGRESS NOTES
Clinic Care Coordination Contact  [facility]/Voicemail       Clinical Data: Care Coordinator Outreach  Outreach attempted x 1.  Left message on patient's voicemail with call back information and requested return call.  Plan: Care Coordinator will try to reach patient again in 1-2 business days.

## 2020-09-16 ENCOUNTER — ALLIED HEALTH/NURSE VISIT (OUTPATIENT)
Dept: BEHAVIORAL HEALTH | Facility: CLINIC | Age: 85
End: 2020-09-16
Payer: MEDICARE

## 2020-09-16 VITALS
RESPIRATION RATE: 16 BRPM | TEMPERATURE: 98.1 F | SYSTOLIC BLOOD PRESSURE: 152 MMHG | HEART RATE: 66 BPM | DIASTOLIC BLOOD PRESSURE: 78 MMHG | OXYGEN SATURATION: 98 %

## 2020-09-16 DIAGNOSIS — I10 ESSENTIAL HYPERTENSION WITH GOAL BLOOD PRESSURE LESS THAN 140/90: Primary | Chronic | ICD-10-CM

## 2020-09-16 PROCEDURE — 99207 C COMMUNITY PARAMEDIC - PATIENT NOT BILLABLE: CPT

## 2020-09-16 ASSESSMENT — ACTIVITIES OF DAILY LIVING (ADL): DEPENDENT_IADLS:: INDEPENDENT

## 2020-09-16 NOTE — PROGRESS NOTES
Community Paramedics Initial Visit  September 16, 2020 TIME:1400    Glendy Díaz is a 86 year old female being seen at home for a Community Paramedic Home visit.    Present at appointment:  Patient, daughter         Chief Complaint   Patient presents with     ER F/U       Universal Utilization:      Utilization    Last refreshed: 9/16/2020  9:14 AM:  Hospital Admissions 0           Last refreshed: 9/16/2020  9:14 AM:  ED Visits 1           Last refreshed: 9/16/2020  9:14 AM:  No Show Count (past year) 0              Current as of: 9/16/2020  9:14 AM              Clinical Concerns:  Current Medical Concerns:  Dizziness    Current Behavioral Concerns: none    Education Provided to patient: none     Vitals: BP (!) 152/78   Pulse 66   Temp 98.1  F (36.7  C)   Resp 16   SpO2 98%   BMI: There is no height or weight on file to calculate BMI.       Health Maintenance Reviewed:      Clinical Pathway: None    Review of Symptoms/PE    Skin: negative  Eyes: negative  Ears/Nose/Throat: negative  Respiratory: No shortness of breath, dyspnea on exertion, cough, or hemoptysis  Cardiovascular: negative  Gastrointestinal: negative  Genitourinary: negative  Musculoskeletal: joint pain and joint stiffness  Neurologic: negative  Psychiatric: negative    Medication Management:    Medications need to be refilled:no     Medications set up? No  Pill Box issued: Yes  Scale issued: No    Functional Status:       Living Situation:       Community Paramedics Home Safety Assessment                      Diet/Exercise/Sleep:       Transportation:           Psychosocial:            No  Face to Face in Home / Community    Today's PHQ-2 Score:      Today's PHQ-9 Score:   PHQ-9 SCORE 10/1/2019   PHQ-9 Total Score -   PHQ-9 Total Score MyChart -   PHQ-9 Total Score 8        Today's GAD7 score:   MARIAN-7 SCORE 10/1/2019   Total Score -   Total Score -   Total Score 7            Financial/Insurance:           Resources and  Interventions:        Patient Active Problem List   Diagnosis     Leg weakness, bilateral     Osteopenia     Neck pain, chronic     Hyperlipidemia with target LDL less than 130     Peripheral neuropathy     Hematuria     Family history of diabetes mellitus     Family hx of colon cancer     Advanced directives, counseling/discussion     CKD (chronic kidney disease) stage 3, GFR 30-59 ml/min (H)     Anxiety     Osteoarthrosis of knee     Medial meniscus tear     Closed fracture of tibial plateau     Hyperparathyroidism (H)     Controlled substance agreement signed     Status post reverse total shoulder replacement     Anemia due to vitamin B12 deficiency, unspecified B12 deficiency type     HTN, goal < 150/90         Current Outpatient Medications:      albuterol (PROAIR HFA/PROVENTIL HFA/VENTOLIN HFA) 108 (90 BASE) MCG/ACT Inhaler, Inhale 2 puffs into the lungs every 6 hours as needed for shortness of breath / dyspnea or wheezing, Disp: 1 Inhaler, Rfl: 1     aspirin 81 MG tablet, Take 1 tablet (81 mg) by mouth daily, Disp: 30 tablet, Rfl: 3     carvedilol (COREG) 6.25 MG tablet, TAKE 1 TABLET BY MOUTH  TWICE DAILY, Disp: 180 tablet, Rfl: 0     fluticasone (FLONASE) 50 MCG/ACT nasal spray, USE 1-2 SPRAYS INTO BOTH  NOSTRILS DAILY, Disp: 48 g, Rfl: 2     gabapentin (NEURONTIN) 100 MG capsule, TAKE 1 CAPSULE BY MOUTH IN  THE AFTERNOON AND 2  CAPSULES BY MOUTH AT  BEDTIME, Disp: 270 capsule, Rfl: 3     ketoprofen 10% in PLO 10% topical gel, Apply 4 times a day as need for pain, Disp: 50 g, Rfl: 1     traMADol (ULTRAM) 50 MG tablet, Take 1 tablet (50 mg) by mouth 2 times daily as needed for moderate pain, Disp: 40 tablet, Rfl: 0     cetirizine (ZYRTEC) 10 MG tablet, Take 1 tablet (10 mg) by mouth every evening, Disp: 90 tablet, Rfl: 3     Cholecalciferol (VITAMIN D) 2000 UNITS tablet, Take 2,000 Units by mouth daily (Patient not taking: Reported on 9/16/2020), Disp: 100 tablet, Rfl: 3     furosemide (LASIX) 40 MG tablet,  TAKE 1 TO 2 TABLETS BY  MOUTH DAILY (Patient not taking: Reported on 9/16/2020), Disp: 180 tablet, Rfl: 1     mirabegron (MYRBETRIQ) 25 MG 24 hr tablet, Take 1 tablet (25 mg) by mouth daily (Patient not taking: Reported on 9/16/2020), Disp: 90 tablet, Rfl: 1     order for DME, Equipment being ordered: surgicxal shoe size 10, Disp: 1 Device, Rfl: 0     senna-docusate (SENOKOT-S;PERICOLACE) 8.6-50 MG per tablet, Take 1-2 tablets by mouth 2 times daily as needed for constipation, Disp: 30 tablet, Rfl: 3        Time spent with patient: 90    The patient meets one or more of the following criteria:  * Requires set up of services for discharge from a nursing home or hospital    Acute concern/Follow-up recommendations: Follow up with PCP    Next CP visit scheduled: 9/22/2020    Issues for Provider to follow up on:   Furosemide takes one a week maybe, stopped taking Myrbetriq 3 days ago. Taking AZO Bladder control. All changed by patient without PCP instructions.    Provider follow up visit needed: TBD

## 2020-09-22 ENCOUNTER — ALLIED HEALTH/NURSE VISIT (OUTPATIENT)
Dept: BEHAVIORAL HEALTH | Facility: CLINIC | Age: 85
End: 2020-09-22
Payer: MEDICARE

## 2020-09-22 VITALS
HEART RATE: 75 BPM | DIASTOLIC BLOOD PRESSURE: 78 MMHG | OXYGEN SATURATION: 97 % | TEMPERATURE: 98.3 F | RESPIRATION RATE: 16 BRPM | SYSTOLIC BLOOD PRESSURE: 156 MMHG

## 2020-09-22 DIAGNOSIS — Z71.89 COUNSELING AND COORDINATION OF CARE: Primary | ICD-10-CM

## 2020-09-22 DIAGNOSIS — I10 ESSENTIAL HYPERTENSION WITH GOAL BLOOD PRESSURE LESS THAN 140/90: Primary | ICD-10-CM

## 2020-09-22 PROCEDURE — 99207 C COMMUNITY PARAMEDIC - PATIENT NOT BILLABLE: CPT

## 2020-09-22 ASSESSMENT — ACTIVITIES OF DAILY LIVING (ADL): DEPENDENT_IADLS:: INDEPENDENT

## 2020-09-22 NOTE — PROGRESS NOTES
Community Paramedic requesting SW CC assistance with facilitating an order for today's visit. Community Paramedic plans to visit Patient at 2PM today pending the order is signed by PCP. Will forward to PCP for signature with [facility] and provide an FYI to the Community Paramedic. No further SW CC interventions at this time.    Henri Hairston [facility]  Clinic Care Coordinator  Ph. [phone]  [email or web address]

## 2020-09-23 NOTE — PROGRESS NOTES
Community Paramedics Follow-up Visit  September 22, 2020  TIME: 1230    Glendy Díaz is a 86 year old female being seen at home for a follow-up visit.    Present at appointment: Children, Patient         Chief Complaint   Patient presents with     Outreach       [place] Utilization:   Clinic Utilization  Difficulty keeping appointments:: No  Compliance Concerns: No  No-Show Concerns: No  No PCP office visit in Past Year: No  Utilization    Last refreshed: 9/22/2020  2:54 PM:  Hospital Admissions 0           Last refreshed: 9/22/2020  2:54 PM:  ED Visits 1           Last refreshed: 9/22/2020  2:54 PM:  No Show Count (past year) 0              Current as of: 9/22/2020  2:54 PM              BP (!) 156/78   Pulse 75   Temp 98.3  F (36.8  C)   Resp 16   SpO2 97%     Clinical Concerns:  Current Medical Concerns:  Falls, dizziness    Current Behavioral Concerns: na    Education Provided to patient: yes information on life line.   Medication set up? No  Pill Box issued: No  Scale issued: No  Health Maintenance Reviewed:      Clinical Pathway: None    No  Face to Face in Home / Community      Review of Symptoms/PE    Skin: negative  Eyes: glasses  Ears/Nose/Throat: negative  Respiratory: No shortness of breath, dyspnea on exertion, cough, or hemoptysis  Cardiovascular: negative  Gastrointestinal: negative  Genitourinary: negative  Musculoskeletal: back pain and arthritis  Neurologic: negative  Psychiatric: negative    Pain Management::  Pain (GOAL):: Yes  Type: Chronic (>3mo)  Location of chronic pain:: hips, shoulder, hands, knees       Has arthritis  Radiating: No  Progression: Unchanged  Description of pain: Burning, Nagging, Numb, Penetrating, Sharp, Shooting, Stabbing, Throbbing  Chronic pain severity:: 5  Limitation of routine activities due to chronic pain:: Yes  Description: Able to do most things most days with some rest  Alleviating Factors: Rest, Heat, Exercise, Procedures or Injections, Pain  Medication  Aggravating Factors: Activity, Positioning    Medication Reconciliation  Medication adherence problem (GOAL):: No  Knowledgeable about how to use meds:: Yes  Medication side effects suspected:: No    Diet/Exercise/Sleep  Diet:: Regular  Tube Feeding: No  Inadequate activity/exercise (GOAL):: No  Significant changes in sleep pattern (GOAL): No    Plan:     Time spent with patient: 45    The patient meets one or more of the following criteria:  * Has received hospital emergency department services three or more times in four consecutive months within a twelve month period    Acute concern/Follow-up recommendations: Follow up with PCP    Next CP visit scheduled: 9/30/2020    Issues for Provider to follow up on: The pt feels she is doing well.  She still experiences some dizziness after waking up in the morning.  She was encouraged to go slow and let herself sit down before getting up and being busy.  She reports that she is trying to follow those instructions and has not had any dizzy spells for the past few days.    Provider follow up visit needed: 10/5/2020

## 2020-09-30 ENCOUNTER — ALLIED HEALTH/NURSE VISIT (OUTPATIENT)
Dept: BEHAVIORAL HEALTH | Facility: CLINIC | Age: 85
End: 2020-09-30
Payer: MEDICARE

## 2020-09-30 ENCOUNTER — ANCILLARY PROCEDURE (OUTPATIENT)
Dept: GENERAL RADIOLOGY | Facility: CLINIC | Age: 85
End: 2020-09-30
Attending: FAMILY MEDICINE
Payer: MEDICARE

## 2020-09-30 ENCOUNTER — OFFICE VISIT (OUTPATIENT)
Dept: ORTHOPEDICS | Facility: CLINIC | Age: 85
End: 2020-09-30
Payer: MEDICARE

## 2020-09-30 VITALS
DIASTOLIC BLOOD PRESSURE: 70 MMHG | TEMPERATURE: 98.1 F | SYSTOLIC BLOOD PRESSURE: 148 MMHG | HEART RATE: 71 BPM | OXYGEN SATURATION: 98 % | RESPIRATION RATE: 16 BRPM

## 2020-09-30 VITALS
DIASTOLIC BLOOD PRESSURE: 82 MMHG | WEIGHT: 170 LBS | HEIGHT: 64 IN | BODY MASS INDEX: 29.02 KG/M2 | SYSTOLIC BLOOD PRESSURE: 142 MMHG

## 2020-09-30 DIAGNOSIS — M54.16 BILATERAL LUMBAR RADICULOPATHY: ICD-10-CM

## 2020-09-30 DIAGNOSIS — M25.559 HIP PAIN: ICD-10-CM

## 2020-09-30 DIAGNOSIS — M79.662 PAIN IN LEFT SHIN: ICD-10-CM

## 2020-09-30 DIAGNOSIS — M51.369 LUMBAR DEGENERATIVE DISC DISEASE: ICD-10-CM

## 2020-09-30 DIAGNOSIS — R29.898 LEG WEAKNESS, BILATERAL: Primary | ICD-10-CM

## 2020-09-30 DIAGNOSIS — M25.559 HIP PAIN: Primary | ICD-10-CM

## 2020-09-30 PROCEDURE — 73590 X-RAY EXAM OF LOWER LEG: CPT | Mod: LT | Performed by: FAMILY MEDICINE

## 2020-09-30 PROCEDURE — 99207 C COMMUNITY PARAMEDIC - PATIENT NOT BILLABLE: CPT

## 2020-09-30 PROCEDURE — 72100 X-RAY EXAM L-S SPINE 2/3 VWS: CPT

## 2020-09-30 PROCEDURE — 99204 OFFICE O/P NEW MOD 45 MIN: CPT | Performed by: FAMILY MEDICINE

## 2020-09-30 ASSESSMENT — ACTIVITIES OF DAILY LIVING (ADL): DEPENDENT_IADLS:: INDEPENDENT

## 2020-09-30 ASSESSMENT — MIFFLIN-ST. JEOR: SCORE: 1196.11

## 2020-09-30 NOTE — PROGRESS NOTES
ASSESSMENT & PLAN  Patient Instructions     1. Hip pain    2. Pain in left shin    3. Lumbar degenerative disc disease    4. Bilateral lumbar radiculopathy      -Patient has low back pain rating down into the left leg as well as right leg due to degenerative disc disease causing sciatica  -Patient will get an MRI of the lumbar spine for further evaluation.  Your MRI has been ordered. You may go down to the first floor, [address] to schedule the MRI in person, or call [phone] to schedule over the phone. Once you know the date of your MRI, please call my office and schedule a follow-up phone visit for 2 days after that.  -We will discuss the results of her MRI and determine if she would benefit from epidural steroid injections.  Patient had a left SI joint injection 5 years ago which helped significantly  -Call direct clinic number [[phone]] at any time with questions or concerns.    Albert Yeo MD [facility]  [facility]          -----    SUBJECTIVE  Glendykim Díaz is a/an 86 year old female who is seen as a self referral for evaluation of left hip pain. The patient is seen with their daughter.    Left hip pain  Onset: 5 month(s) ago. Reports insidious onset without acute precipitating event.  Location of Pain: left posterior hip pain, and buttock pain  Rating of Pain at worst: 8/10  Rating of Pain Currently: 4/10  Worsened by: mornings when putting weight on the leg, sitting for long periods of time. Sitting to standing. Bending down, walking for long periods. Laying flat  Better with: nothing  Treatments tried: heat, Tylenol, other medications: Tramadol (Ultram) and massage  Quality: sharp  Associated symptoms: numbness, tingling, warmth, weakness of leg muscles and feeling of instability  Orthopedic history: NO  Relevant surgical history: NO  Social history: social history: retired      Left shin pain  Onset: 5 years(s) ago. Reports insidious onset  without acute precipitating event.  Location of Pain: left anterior shin pain   Rating of Pain at worst: 9/10  Rating of Pain Currently: 7/10  Worsened by: sitting for long periods of time, sitting to standing, walking   Better with: cream  Treatments tried: Tylenol, other medications: Tramadol (Ultram) and a medicated cream she can't remember the name of  Associated symptoms: swelling, numbness, tingling and feeling of instability  Orthopedic history: YES - Date: fx of tibial plateau in 2015  Relevant surgical history: NO    Past Medical History:   Diagnosis Date     Anxiety      CKD (chronic kidney disease) stage 3, GFR 30-59 ml/min (H)      Hematuria      Hyperlipidemia LDL goal <100      Hyperparathyroidism (H)      Hypertension     No Cardiologist     Incontinence      Leg weakness, bilateral      Neck pain, chronic      Osteoarthritis      Osteopenia      Peripheral neuropathy      Social History     Socioeconomic History     Marital status:      Spouse name: Not on file     Number of children: Not on file     Years of education: Not on file     Highest education level: Not on file   Occupational History     Not on file   Social Needs     Financial resource strain: Not on file     Food insecurity     Worry: Not on file     Inability: Not on file     Transportation needs     Medical: Not on file     Non-medical: Not on file   Tobacco Use     Smoking status: Former Smoker     Packs/day: 1.00     Years: 20.00     Pack years: 20.00     Types: Cigarettes     Smokeless tobacco: Never Used   Substance and Sexual Activity     Alcohol use: No     Drug use: No     Sexual activity: Never   Lifestyle     Physical activity     Days per week: Not on file     Minutes per session: Not on file     Stress: Not on file   Relationships     Social connections     Talks on phone: Not on file     Gets together: Not on file     Attends Orthodoxy service: Not on file     Active member of club or organization: Not on file      "Attends meetings of clubs or organizations: Not on file     Relationship status: Not on file     Intimate partner violence     Fear of current or ex partner: Not on file     Emotionally abused: Not on file     Physically abused: Not on file     Forced sexual activity: Not on file   Other Topics Concern     Parent/sibling w/ CABG, MI or angioplasty before 65F 55M? Not Asked   Social History Narrative     Not on file         Patient's past medical, surgical, social, and family histories were reviewed today and no changes are noted.    REVIEW OF SYSTEMS:  10 point ROS is negative other than symptoms noted above in HPI, Past Medical History or as stated below  Constitutional: NEGATIVE for fever, chills, change in weight  Skin: NEGATIVE for worrisome rashes, moles or lesions  GI/: NEGATIVE for bowel or bladder changes  Neuro: NEGATIVE for weakness, dizziness or paresthesias    OBJECTIVE:  BP (!) 142/82   Ht 1.626 m (5' 4\")   Wt 77.1 kg (170 lb)   BMI 29.18 kg/m     General: healthy, alert and in no distress  HEENT: no scleral icterus or conjunctival erythema  Skin: no suspicious lesions or rash. No jaundice.  CV: no pedal edema  Resp: normal respiratory effort without conversational dyspnea   Psych: normal mood and affect  Gait: normal steady gait with appropriate coordination and balance  Neuro: Normal light sensory exam of lower extremity  MSK:  THORACIC/LUMBAR SPINE  Inspection:    No redness, swelling, overlying skin change, gross deformity/asymmetry, scapular winging  Palpation:    Tender about the left para lumbar muscles. Otherwise remainder of landmarks are nontender.  Range of Motion:     Lumbar flexion limited substantially by pain    Lumbar extension limited substantially by pain    Right side bend limited substantially by pain    Left side bend limited substantially by pain    Right rotation limited substantially by pain    Left rotation limited substantially by pain  Strength:    Full strength " throughout hips/quads/hamstrings  Special Tests:    Positive: none  Negative: straight leg raise (bilateral), slump test (bilateral)      Independent visualization of the below image:  Recent Results (from the past 24 hour(s))   XR Lumbar Spine 2/3 Views    Narrative    LUMBAR SPINE TWO TO THREE VIEWS September 30, 2020 10:45 AM     HISTORY: Hip pain.    COMPARISON: X-rays 12/12/2014.      Impression    IMPRESSION: No fracture is identified. Multilevel moderate  degenerative disc disease is present, worst at L4-L5, worsened  compared to the prior exam. Moderate lower lumbar spine facet  arthropathy is present. Alignment is significant for dextroconvex  curvature. Vascular and splenic calcifications noted.    GIORGIO MOULTON MD   XR Tibia & Fibula Left 2 Views    Narrative    No acute fracture, dislocation.  Mild degenerative changes of the knee.           Albert Yeo MD [facility]

## 2020-09-30 NOTE — LETTER
9/30/2020         RE: Glendy Díaz  [address]  [place] [address]        Dear Colleague,    Thank you for referring your patient, Glendy Díaz, to the [facility]. Please see a copy of my visit note below.    ASSESSMENT & PLAN  Patient Instructions     1. Hip pain    2. Pain in left shin    3. Lumbar degenerative disc disease    4. Bilateral lumbar radiculopathy      -Patient has low back pain rating down into the left leg as well as right leg due to degenerative disc disease causing sciatica  -Patient will get an MRI of the lumbar spine for further evaluation.  Your MRI has been ordered. You may go down to the first floor, [address] to schedule the MRI in person, or call [phone] to schedule over the phone. Once you know the date of your MRI, please call my office and schedule a follow-up phone visit for 2 days after that.  -We will discuss the results of her MRI and determine if she would benefit from epidural steroid injections.  Patient had a left SI joint injection 5 years ago which helped significantly  -Call direct clinic number [[phone]] at any time with questions or concerns.    Albert Yeo MD [facility]  [facility] [place]          -----    SUBJECTIVE  Glendy Díaz is a/an 86 year old female who is seen as a self referral for evaluation of left hip pain. The patient is seen with their daughter.    Left hip pain  Onset: 5 month(s) ago. Reports insidious onset without acute precipitating event.  Location of Pain: left posterior hip pain, and buttock pain  Rating of Pain at worst: 8/10  Rating of Pain Currently: 4/10  Worsened by: mornings when putting weight on the leg, sitting for long periods of time. Sitting to standing. Bending down, walking for long periods. Laying flat  Better with: nothing  Treatments tried: heat, Tylenol, other medications: Tramadol (Ultram) and massage  Quality:  sharp  Associated symptoms: numbness, tingling, warmth, weakness of leg muscles and feeling of instability  Orthopedic history: NO  Relevant surgical history: NO  Social history: social history: retired      Left shin pain  Onset: 5 years(s) ago. Reports insidious onset without acute precipitating event.  Location of Pain: left anterior shin pain   Rating of Pain at worst: 9/10  Rating of Pain Currently: 7/10  Worsened by: sitting for long periods of time, sitting to standing, walking   Better with: cream  Treatments tried: Tylenol, other medications: Tramadol (Ultram) and a medicated cream she can't remember the name of  Associated symptoms: swelling, numbness, tingling and feeling of instability  Orthopedic history: YES - Date: fx of tibial plateau in 2015  Relevant surgical history: NO    Past Medical History:   Diagnosis Date     Anxiety      CKD (chronic kidney disease) stage 3, GFR 30-59 ml/min (H)      Hematuria      Hyperlipidemia LDL goal <100      Hyperparathyroidism (H)      Hypertension     No Cardiologist     Incontinence      Leg weakness, bilateral      Neck pain, chronic      Osteoarthritis      Osteopenia      Peripheral neuropathy      Social History     Socioeconomic History     Marital status:      Spouse name: Not on file     Number of children: Not on file     Years of education: Not on file     Highest education level: Not on file   Occupational History     Not on file   Social Needs     Financial resource strain: Not on file     Food insecurity     Worry: Not on file     Inability: Not on file     Transportation needs     Medical: Not on file     Non-medical: Not on file   Tobacco Use     Smoking status: Former Smoker     Packs/day: 1.00     Years: 20.00     Pack years: 20.00     Types: Cigarettes     Smokeless tobacco: Never Used   Substance and Sexual Activity     Alcohol use: No     Drug use: No     Sexual activity: Never   Lifestyle     Physical activity     Days per week: Not on  "file     Minutes per session: Not on file     Stress: Not on file   Relationships     Social connections     Talks on phone: Not on file     Gets together: Not on file     Attends Gnosticist service: Not on file     Active member of club or organization: Not on file     Attends meetings of clubs or organizations: Not on file     Relationship status: Not on file     Intimate partner violence     Fear of current or ex partner: Not on file     Emotionally abused: Not on file     Physically abused: Not on file     Forced sexual activity: Not on file   Other Topics Concern     Parent/sibling w/ CABG, MI or angioplasty before 65F 55M? Not Asked   Social History Narrative     Not on file         Patient's past medical, surgical, social, and family histories were reviewed today and no changes are noted.    REVIEW OF SYSTEMS:  10 point ROS is negative other than symptoms noted above in HPI, Past Medical History or as stated below  Constitutional: NEGATIVE for fever, chills, change in weight  Skin: NEGATIVE for worrisome rashes, moles or lesions  GI/: NEGATIVE for bowel or bladder changes  Neuro: NEGATIVE for weakness, dizziness or paresthesias    OBJECTIVE:  BP (!) 142/82   Ht 1.626 m (5' 4\")   Wt 77.1 kg (170 lb)   BMI 29.18 kg/m     General: healthy, alert and in no distress  HEENT: no scleral icterus or conjunctival erythema  Skin: no suspicious lesions or rash. No jaundice.  CV: no pedal edema  Resp: normal respiratory effort without conversational dyspnea   Psych: normal mood and affect  Gait: normal steady gait with appropriate coordination and balance  Neuro: Normal light sensory exam of lower extremity  MSK:  THORACIC/LUMBAR SPINE  Inspection:    No redness, swelling, overlying skin change, gross deformity/asymmetry, scapular winging  Palpation:    Tender about the left para lumbar muscles. Otherwise remainder of landmarks are nontender.  Range of Motion:     Lumbar flexion limited substantially by pain    Lumbar " extension limited substantially by pain    Right side bend limited substantially by pain    Left side bend limited substantially by pain    Right rotation limited substantially by pain    Left rotation limited substantially by pain  Strength:    Full strength throughout hips/quads/hamstrings  Special Tests:    Positive: none  Negative: straight leg raise (bilateral), slump test (bilateral)      Independent visualization of the below image:  Recent Results (from the past 24 hour(s))   XR Lumbar Spine 2/3 Views    Narrative    LUMBAR SPINE TWO TO THREE VIEWS September 30, 2020 10:45 AM     HISTORY: Hip pain.    COMPARISON: X-rays 12/12/2014.      Impression    IMPRESSION: No fracture is identified. Multilevel moderate  degenerative disc disease is present, worst at L4-L5, worsened  compared to the prior exam. Moderate lower lumbar spine facet  arthropathy is present. Alignment is significant for dextroconvex  curvature. Vascular and splenic calcifications noted.    ERIC J HARTMAN, MD Albert Yeo MD [facility]      Again, thank you for allowing me to participate in the care of your patient.        Sincerely,        Albert Yeo, MD

## 2020-09-30 NOTE — PATIENT INSTRUCTIONS
1. Hip pain    2. Pain in left shin    3. Lumbar degenerative disc disease    4. Bilateral lumbar radiculopathy      -Patient has low back pain rating down into the left leg as well as right leg due to degenerative disc disease causing sciatica  -Patient will get an MRI of the lumbar spine for further evaluation.  Your MRI has been ordered. You may go down to the first floor, [address] to schedule the MRI in person, or call [phone] to schedule over the phone. Once you know the date of your MRI, please call my office and schedule a follow-up phone visit for 2 days after that.  -We will discuss the results of her MRI and determine if she would benefit from epidural steroid injections.  Patient had a left SI joint injection 5 years ago which helped significantly  -Call direct clinic number [[phone]] at any time with questions or concerns.    Albert Yeo MD [facility]  [facility] [place]

## 2020-10-02 NOTE — PROGRESS NOTES
[facility] Follow-up Visit  September 30, 2020  TIME: 1400    Glendy Díaz is a 86 year old female being seen at home for a follow-up visit.    Present at appointment: Children, Patient         Chief Complaint   Patient presents with     Outreach     back pain       Universal Utilization:   Clinic Utilization  Difficulty keeping appointments:: No  Compliance Concerns: No  No-Show Concerns: No  No PCP office visit in Past Year: No  Utilization    Last refreshed: 10/2/2020  5:07 AM: Hospital Admissions 0           Last refreshed: 10/2/2020  5:07 AM: ED Visits 1           Last refreshed: 10/2/2020  5:07 AM: No Show Count (past year) 0              Current as of: 10/2/2020  5:07 AM              BP (!) 148/70   Pulse 71   Temp 98.1  F (36.7  C)   Resp 16   SpO2 98%     Clinical Concerns:  Current Medical Concerns:  Dizziness, hip pain    Current Behavioral Concerns: NA    Education Provided to patient: None   Medication set up? No  Pill Box issued: No  Scale issued: No  Health Maintenance Reviewed: Due/Overdue     Clinical Pathway: None    No  Face to Face in Home / Community    Review of Symptoms/PE    Skin: negative  Eyes: negative  Ears/Nose/Throat: negative  Respiratory: No shortness of breath, dyspnea on exertion, cough, or hemoptysis  Cardiovascular: lower extremity edema, minimal  Gastrointestinal: negative  Genitourinary: negative  Musculoskeletal: back pain, arthritis, joint pain and joint stiffness  Neurologic: negative  Psychiatric: negative    Pain Management::  Pain (GOAL):: Yes  Type: Chronic (>3mo)  Location of chronic pain:: hips, shoulder, hands, knees       Has arthritis  Radiating: No  Progression: Unchanged  Description of pain: Burning, Nagging, Numb, Penetrating, Sharp, Shooting, Stabbing, Throbbing  Chronic pain severity:: 5  Limitation of routine activities due to chronic pain:: Yes  Description: Able to do most things most days with some rest  Alleviating Factors: Rest, Heat,  Exercise, Procedures or Injections, Pain Medication  Aggravating Factors: Activity, Positioning    Medication Reconciliation  Medication adherence problem (GOAL):: No  Knowledgeable about how to use meds:: Yes  Medication side effects suspected:: No    Diet/Exercise/Sleep  Diet:: Regular  Tube Feeding: No  Inadequate activity/exercise (GOAL):: No  Significant changes in sleep pattern (GOAL): No    Plan:     Time spent with patient: 45    The patient meets one or more of the following criteria:  * Has received hospital emergency department services three or more times in four consecutive months within a twelve month period    Acute concern/Follow-up recommendations: Continue tx plan    Next CP visit scheduled: 10/14/2020    Issues for Provider to follow up on: Pt reports feeling better.  Her dizziness is improving and she understands the importance of giving her self a minute before she begins activities after getting up from a lying or sitting position.  She has been to Orthopedics and is working on a tx plan with them.  The pt has an upcoming appointment with her PCP and will discuss her medications changes that she has made on her own.     Provider follow up visit needed:10/5/2020

## 2020-10-03 DIAGNOSIS — I10 ESSENTIAL HYPERTENSION WITH GOAL BLOOD PRESSURE LESS THAN 140/90: Chronic | ICD-10-CM

## 2020-10-05 ENCOUNTER — OFFICE VISIT (OUTPATIENT)
Dept: INTERNAL MEDICINE | Facility: CLINIC | Age: 85
End: 2020-10-05
Payer: MEDICARE

## 2020-10-05 DIAGNOSIS — M19.041 PRIMARY OSTEOARTHRITIS OF BOTH HANDS: ICD-10-CM

## 2020-10-05 DIAGNOSIS — I10 ESSENTIAL HYPERTENSION WITH GOAL BLOOD PRESSURE LESS THAN 140/90: ICD-10-CM

## 2020-10-05 DIAGNOSIS — Z23 NEED FOR PROPHYLACTIC VACCINATION AND INOCULATION AGAINST INFLUENZA: ICD-10-CM

## 2020-10-05 DIAGNOSIS — E05.90 SUBCLINICAL HYPERTHYROIDISM: ICD-10-CM

## 2020-10-05 DIAGNOSIS — N18.31 STAGE 3A CHRONIC KIDNEY DISEASE (H): ICD-10-CM

## 2020-10-05 DIAGNOSIS — G62.9 PERIPHERAL POLYNEUROPATHY: Chronic | ICD-10-CM

## 2020-10-05 DIAGNOSIS — M19.042 PRIMARY OSTEOARTHRITIS OF BOTH HANDS: ICD-10-CM

## 2020-10-05 DIAGNOSIS — R42 LIGHTHEADEDNESS: ICD-10-CM

## 2020-10-05 DIAGNOSIS — Z00.00 ROUTINE GENERAL MEDICAL EXAMINATION AT A HEALTH CARE FACILITY: Primary | ICD-10-CM

## 2020-10-05 PROCEDURE — 99214 OFFICE O/P EST MOD 30 MIN: CPT | Mod: 25 | Performed by: INTERNAL MEDICINE

## 2020-10-05 PROCEDURE — G0008 ADMIN INFLUENZA VIRUS VAC: HCPCS | Performed by: INTERNAL MEDICINE

## 2020-10-05 PROCEDURE — 90662 IIV NO PRSV INCREASED AG IM: CPT | Performed by: INTERNAL MEDICINE

## 2020-10-05 PROCEDURE — G0438 PPPS, INITIAL VISIT: HCPCS | Performed by: INTERNAL MEDICINE

## 2020-10-05 RX ORDER — CARVEDILOL 6.25 MG/1
6.25 TABLET ORAL 2 TIMES DAILY
Qty: 180 TABLET | Refills: 0 | Status: SHIPPED | OUTPATIENT
Start: 2020-10-05 | End: 2020-11-10

## 2020-10-05 ASSESSMENT — ENCOUNTER SYMPTOMS
PARESTHESIAS: 1
FEVER: 0
BREAST MASS: 0
NAUSEA: 0
ABDOMINAL PAIN: 0
EYE PAIN: 1
HEMATOCHEZIA: 0
CONSTIPATION: 1
CHILLS: 0
DIARRHEA: 0
COUGH: 0
HEARTBURN: 0
SHORTNESS OF BREATH: 0
JOINT SWELLING: 1
WEAKNESS: 1
NERVOUS/ANXIOUS: 1
HEMATURIA: 0
SORE THROAT: 0
PALPITATIONS: 0
DIZZINESS: 1
MYALGIAS: 1
ARTHRALGIAS: 1
DYSURIA: 0
FREQUENCY: 1
HEADACHES: 1

## 2020-10-05 ASSESSMENT — ACTIVITIES OF DAILY LIVING (ADL)
CURRENT_FUNCTION: MONEY MANAGEMENT REQUIRES ASSISTANCE
CURRENT_FUNCTION: SHOPPING REQUIRES ASSISTANCE
CURRENT_FUNCTION: TRANSPORTATION REQUIRES ASSISTANCE

## 2020-10-05 ASSESSMENT — MIFFLIN-ST. JEOR: SCORE: 1191.58

## 2020-10-05 NOTE — PROGRESS NOTES
Dr Steiner's note    Patient's instructions / PLAN:                                                        Plan:  1. Continue same meds, same doses for now   2. If more episodes of lightheadedness call and I will order a heart monitor   3. Please make a lab appointment for fasting labs in 6 months  4. Please make an appointment few days after the labs to discuss about the results.   5.  The following vaccines are recommended for you. Please check with your insurance about coverage.  Some insurances cover better if you have these vaccines at the pharmacy:  -- Tetanus vaccine   -- Shingerix vaccine - the newest vaccine for shingles   -- flu vaccine  - today   6. Voltaren gel to the hands         ASSESSMENT & PLAN:                                                      (Z00.00) Routine general medical examination at a health care facility  (primary encounter diagnosis)  Comment:   Plan: CBC with platelets, Comprehensive metabolic         panel, Lipid panel reflex to direct LDL         Fasting, Albumin Random Urine Quantitative with        Creat Ratio, TSH with free T4 reflex            (I10) HTN, goal < 150/90  (R42) Lightheadedness  Comment: Blood pressure is not controlled, but I think it is reasonable for her age.  Because of her lightheadedness, I am reluctant to increase the blood pressure for now.  Plan: CBC with platelets, Comprehensive metabolic         panel, Lipid panel reflex to direct LDL         Fasting, Albumin Random Urine Quantitative with        Creat Ratio, TSH with free T4 reflex,         carvedilol (COREG) 6.25 MG tablet              (E05.90) Subclinical hyperthyroidism TSH 0.25 Sept 2019  Comment:   Plan: CBC with platelets, Comprehensive metabolic         panel, Lipid panel reflex to direct LDL         Fasting, TSH with free T4 reflex            (N18.31) Stage 3a chronic kidney disease  Comment:   Plan: CBC with platelets, Comprehensive metabolic         panel, Lipid panel reflex to direct LDL          Fasting, TSH with free T4 reflex            (M19.041,  M19.042) Primary osteoarthritis of both hands  Comment:   Plan: Voltaren gel over-the-counter, as above    (Z23) Need for prophylactic vaccination and inoculation against influenza  Comment:   Plan: FLUZONE HIGH DOSE 65+  [19834], Vaccine         Administration, Initial [57864]            (G62.9) Peripheral polyneuropathy  Comment:   Plan: Continue gabapentin       Chief Complaint:                                                        Annual exam  Follow up chronic medical problems    Daughter is present    SUBJECTIVE:                                                    History of present illness     We reviewed the chronic medical problems as above.   I reviewed the recent tests results in Epic       Lightheadedness   -- in ER on    -- few episodes since One episode this am  -- she states that she feels lightheaded suddenly and some mild nausea And she needs to sit down otherwise she will fall  -- 2019 - CTA and dizziness. She states that these symptoms are not dizziness     BP sittin/77  BP standin/88     BP at home 148-158/70, HR 66-71    Zio patch 2018 -no significant arrhythmia    She stopped taking diuretic because of the urine incontinence. Her legs are really well since she stopped Amlodipine in July       ROS:     See below          PMHx: - reviewed  Past Medical History:   Diagnosis Date     Anxiety      CKD (chronic kidney disease) stage 3, GFR 30-59 ml/min      Hematuria      Hyperlipidemia LDL goal <100      Hyperparathyroidism (H)      Hypertension     No Cardiologist     Incontinence      Leg weakness, bilateral      Neck pain, chronic      Osteoarthritis      Osteopenia      Peripheral neuropathy        PSHx: reviewed  Past Surgical History:   Procedure Laterality Date     APPENDECTOMY       ARTHRODESIS FOOT  2014    Procedure: ARTHRODESIS FOOT;  Surgeon: Sid Marks DPM;  Location: [facility] OR     ARTHROSCOPY  ANKLE, OPEN REPAIR LIGAMENT, COMBINED  5/31/2012    Procedure:COMBINED ARTHROSCOPY ANKLE, OPEN REPAIR LIGAMENT; LEFT ANKLE ARTHROSCOPY, LATERAL LIGAMENT RECONSTRUCTION, ENDOSCOPIC KIRIT NAQVI SECOND TOE RAY CORRECTION    LEFT KNEE Marcaine AND CORTIZONE INJECTION, 5 CC Marcaine, 1 CC CELESTONE, ; Surgeon:VARSHA WILDER; Location:[facility]     CATARACT IOL, RT/LT       CHOLECYSTECTOMY       EXCISE MASS FOOT  12/4/2012    Procedure: EXCISE MASS FOOT;;  Surgeon: Varsha Wilder MD;  Location: [facility]     HYSTERECTOMY TOTAL ABDOMINAL      ovaries are in     RELEASE TENDON TOE  5/1/2014    Procedure: RELEASE TENDON TOE;  Surgeon: Sid Marks DPM;  Location: [facility] OR     REMOVE HARDWARE FOOT  12/4/2012    Procedure: REMOVE HARDWARE FOOT;  REMOVAL HARDWARE(SYNTHES SCREW) LEFT FOOT, EXCISION OF INCLUSION CYST;  Surgeon: Varsha Wilder MD;  Location: [facility]     REPAIR HAMMER TOE  5/1/2014    Procedure: REPAIR HAMMER TOE;  Surgeon: Sid Marks DPM;  Location: [facility]     REVERSE ARTHROPLASTY SHOULDER Right 7/18/2016    Procedure: REVERSE ARTHROPLASTY SHOULDER;  Surgeon: Marlo Alejandro MD;  Location: [facility] OR        Soc Hx: No daily alcohol, no smoking  Social History     Socioeconomic History     Marital status:      Spouse name: Not on file     Number of children: Not on file     Years of education: Not on file     Highest education level: Not on file   Occupational History     Not on file   Social Needs     Financial resource strain: Not on file     Food insecurity     Worry: Not on file     Inability: Not on file     Transportation needs     Medical: Not on file     Non-medical: Not on file   Tobacco Use     Smoking status: Former Smoker     Packs/day: 1.00     Years: 20.00     Pack years: 20.00     Types: Cigarettes     Smokeless tobacco: Never Used   Substance and Sexual Activity     Alcohol use: No     Drug use: No     Sexual activity: Never   Lifestyle     Physical activity      Days per week: Not on file     Minutes per session: Not on file     Stress: Not on file   Relationships     Social connections     Talks on phone: Not on file     Gets together: Not on file     Attends [employer] service: Not on file     Active member of club or organization: Not on file     Attends meetings of clubs or organizations: Not on file     Relationship status: Not on file     Intimate partner violence     Fear of current or ex partner: Not on file     Emotionally abused: Not on file     Physically abused: Not on file     Forced sexual activity: Not on file   Other Topics Concern     Parent/sibling w/ CABG, MI or angioplasty before 65F 55M? Not Asked   Social History Narrative     Not on file        Fam Hx: reviewed  Family History   Problem Relation Age of Onset     Lipids Mother      Diabetes Mother      Circulatory Mother         Kidney disease     Diabetes Brother      Cancer - colorectal Brother      Breast Cancer Sister      Thyroid Disease Daughter      Cancer - colorectal Brother      Alcohol/Drug Brother          Screening: reviewed      All: reviewed    Meds: reviewed  Current Outpatient Medications   Medication Sig Dispense Refill     albuterol (PROAIR HFA/PROVENTIL HFA/VENTOLIN HFA) 108 (90 BASE) MCG/ACT Inhaler Inhale 2 puffs into the lungs every 6 hours as needed for shortness of breath / dyspnea or wheezing 1 Inhaler 1     aspirin 81 MG tablet Take 1 tablet (81 mg) by mouth daily 30 tablet 3     carvedilol (COREG) 6.25 MG tablet TAKE 1 TABLET BY MOUTH  TWICE DAILY 180 tablet 0     cetirizine (ZYRTEC) 10 MG tablet Take 1 tablet (10 mg) by mouth every evening 90 tablet 3     Cholecalciferol (VITAMIN D) 2000 UNITS tablet Take 2,000 Units by mouth daily 100 tablet 3     fluticasone (FLONASE) 50 MCG/ACT nasal spray USE 1-2 SPRAYS INTO BOTH  NOSTRILS DAILY 48 g 2     gabapentin (NEURONTIN) 100 MG capsule TAKE 1 CAPSULE BY MOUTH IN  THE AFTERNOON AND 2  CAPSULES BY MOUTH AT  BEDTIME 270 capsule  "3     mirabegron (MYRBETRIQ) 25 MG 24 hr tablet Take 1 tablet (25 mg) by mouth daily 90 tablet 1     order for DME Equipment being ordered: surgicxal shoe size 10 1 Device 0     senna-docusate (SENOKOT-S;PERICOLACE) 8.6-50 MG per tablet Take 1-2 tablets by mouth 2 times daily as needed for constipation 30 tablet 3     traMADol (ULTRAM) 50 MG tablet Take 1 tablet (50 mg) by mouth 2 times daily as needed for moderate pain 40 tablet 0     furosemide (LASIX) 40 MG tablet TAKE 1 TO 2 TABLETS BY  MOUTH DAILY (Patient not taking: Reported on 10/5/2020) 180 tablet 1       OBJECTIVE:                                                    Physical Exam :  Blood pressure 158/77, pulse 71, resp. rate 16, height 1.626 m (5' 4\"), weight 76.7 kg (169 lb), SpO2 97 %, not currently breastfeeding.     NAD, appears comfortable  Skin clear, no rashes  Neck: supple, no JVD,  no thyroidmegaly  Lymph nodes non palpable in the cervical, supraclavicular axillaries,   Chest: clear to auscultation with good respiratory effort  Cardiac: S1S2, RRR, no mgr appreciated  Abdomen: soft, not tender, not distended, audible bowel sound, no hepatosplenomegaly, no palpable masses, no abdominal bruits  Extremities: no cyanosis, clubbing or edema.   Neuro: A, Ox3, no focal signs.          Veronica Steiner MD  Internal Medicine         SUBJECTIVE:   Glendy Díaz is a 86 year old female who presents for Preventive Visit.      Patient has been advised of split billing requirements and indicates understanding: Yes   Are you in the first 12 months of your Medicare coverage?  No    Healthy Habits:     In general, how would you rate your overall health?  Fair    Frequency of exercise:  None    Do you usually eat at least 4 servings of fruit and vegetables a day, include whole grains    & fiber and avoid regularly eating high fat or \"junk\" foods?  No    Taking medications regularly:  Yes    Medication side effects:  Muscle aches and Lightheadedness    Ability to " successfully perform activities of daily living:  Transportation requires assistance, shopping requires assistance and money management requires assistance    Home Safety:  No safety concerns identified    Hearing Impairment:  Difficulty following a conversation in a noisy restaurant or crowded room, feel that people are mumbling or not speaking clearly, difficulty following dialogue in the theater, difficult to understand a speaker at a public meeting or [employer] service, need to ask people to speak up or repeat themselves, find that men's voices are easier to understand than woman's, difficulty understanding soft or whispered speech and difficulty understanding speech on the telephone    In the past 6 months, have you been bothered by leaking of urine? Yes    In general, how would you rate your overall mental or emotional health?  Fair      PHQ-2 Total Score: 1    Additional concerns today:  Yes    Do you feel safe in your environment? Yes    Have you ever done Advance Care Planning? (For example, a Health Directive, POLST, or a discussion with a medical provider or your loved ones about your wishes): Yes, advance care planning is on file.      Fall risk       Cognitive Screening   1) Repeat 3 items (Leader, Season, Table)    2) Clock draw: NORMAL  3) 3 item recall: Recalls 2 objects   Results: NORMAL clock, 1-2 items recalled: COGNITIVE IMPAIRMENT LESS LIKELY    Mini-CogTM Copyright LAVON Hargrove. Licensed by the author for use in [facility]; reprinted with permission (moraima@.[facility]). All rights reserved.      Do you have sleep apnea, excessive snoring or daytime drowsiness?: no    Reviewed and updated as needed this visit by clinical staff   Allergies               Reviewed and updated as needed this visit by Provider                Social History     Tobacco Use     Smoking status: Former Smoker     Packs/day: 1.00     Years: 20.00     Pack years: 20.00     Types: Cigarettes     Smokeless tobacco:  Never Used   Substance Use Topics     Alcohol use: No         Alcohol Use 10/5/2020   Prescreen: >3 drinks/day or >7 drinks/week? Not Applicable           Hyperlipidemia Follow-Up      Are you regularly taking any medication or supplement to lower your cholesterol?   No    Are you having muscle aches or other side effects that you think could be caused by your cholesterol lowering medication?  No    Hypertension Follow-up      Do you check your blood pressure regularly outside of the clinic? No     Are you following a low salt diet? Yes    Are your blood pressures ever more than 140 on the top number (systolic) OR more   than 90 on the bottom number (diastolic), for example 140/90? No      Current providers sharing in care for this patient include:   Patient Care Team:  Veronica Davis MD as PCP - General (Internal Medicine)  Veronica Davis MD as Assigned PCP    The following health maintenance items are reviewed in Epic and correct as of today:  Health Maintenance   Topic Date Due     URINE DRUG SCREEN  11/16/1933     ZOSTER IMMUNIZATION (1 of 2) 11/16/1983     MEDICARE ANNUAL WELLNESS VISIT  11/16/1998     MICROALBUMIN  07/14/2017     Pneumococcal Vaccine: 65+ Years (2 of 2 - PPSV23) 09/22/2018     DTAP/TDAP/TD IMMUNIZATION (3 - Td) 01/27/2020     INFLUENZA VACCINE (1) 09/01/2020     LIPID  09/24/2020     MARIAN ASSESSMENT  10/01/2020     BMP  09/12/2021     FALL RISK ASSESSMENT  09/16/2021     ADVANCE CARE PLANNING  04/06/2023     PHQ-2  Completed     Pneumococcal Vaccine: Pediatrics (0 to 5 Years) and At-Risk Patients (6 to 64 Years)  Aged Out     IPV IMMUNIZATION  Aged Out     MENINGITIS IMMUNIZATION  Aged Out     HEPATITIS B IMMUNIZATION  Aged Out     Labs reviewed in EPIC      Review of Systems   Constitutional: Negative for chills and fever.   HENT: Positive for congestion, ear pain and hearing loss. Negative for sore throat.    Eyes: Positive for pain and visual disturbance.  "  Respiratory: Negative for cough and shortness of breath.    Cardiovascular: Positive for peripheral edema. Negative for chest pain and palpitations.   Gastrointestinal: Positive for constipation. Negative for abdominal pain, diarrhea, heartburn, hematochezia and nausea.   Breasts:  Negative for tenderness, breast mass and discharge.   Genitourinary: Positive for frequency and urgency. Negative for dysuria, genital sores, hematuria, pelvic pain, vaginal bleeding and vaginal discharge.   Musculoskeletal: Positive for arthralgias, joint swelling and myalgias.   Skin: Negative for rash.   Neurological: Positive for dizziness, weakness, headaches and paresthesias.   Psychiatric/Behavioral: Positive for mood changes. The patient is nervous/anxious.          Patient has been advised of split billing requirements and indicates understanding: Yes At the check in, at the    COUNSELING:  Reviewed preventive health counseling, as reflected in patient instructions       Regular exercise       Healthy diet/nutrition    Estimated body mass index is 29.18 kg/m  as calculated from the following:    Height as of 9/30/20: 1.626 m (5' 4\").    Weight as of 9/30/20: 77.1 kg (170 lb).        She reports that she has quit smoking. Her smoking use included cigarettes. She has a 20.00 pack-year smoking history. She has never used smokeless tobacco.      Appropriate preventive services were discussed with this patient, including applicable screening as appropriate for cardiovascular disease, diabetes, osteopenia/osteoporosis, and glaucoma.  As appropriate for age/gender, discussed screening for colorectal cancer, prostate cancer, breast cancer, and cervical cancer. Checklist reviewing preventive services available has been given to the patient.    Reviewed patients plan of care and provided an AVS. The Basic Care Plan (routine screening as documented in Health Maintenance) for Glendy meets the Care Plan requirement. This Care Plan " has been established and reviewed with the Patient and daughter.    Counseling Resources:  ATP IV Guidelines  Pooled Cohorts Equation Calculator  Breast Cancer Risk Calculator  Breast Cancer: Medication to Reduce Risk  FRAX Risk Assessment  ICSI Preventive Guidelines  Dietary Guidelines for Americans, 2010  USDA's MyPlate  ASA Prophylaxis  Lung CA Screening    Veronica Davis MD  [facility]    Identified Health Risks:

## 2020-10-05 NOTE — PATIENT INSTRUCTIONS
Plan:  1. Continue same meds, same doses for now   2. If more episodes of lightheadedness call and I will order a heart monitor   3. Please make a lab appointment for fasting labs in 6 months  4. Please make an appointment few days after the labs to discuss about the results.   5.  The following vaccines are recommended for you. Please check with your insurance about coverage.  Some insurances cover better if you have these vaccines at the pharmacy:  -- Tetanus vaccine   -- Shingerix vaccine - the newest vaccine for shingles   -- flu vaccine  - today   6. Voltaren gel to the hands

## 2020-10-06 RX ORDER — CARVEDILOL 6.25 MG/1
TABLET ORAL
Qty: 180 TABLET | Refills: 3 | OUTPATIENT
Start: 2020-10-06

## 2020-10-12 ENCOUNTER — HOSPITAL ENCOUNTER (OUTPATIENT)
Dept: MRI IMAGING | Facility: CLINIC | Age: 85
Discharge: HOME | End: 2020-10-12
Attending: FAMILY MEDICINE | Admitting: FAMILY MEDICINE
Payer: MEDICARE

## 2020-10-12 DIAGNOSIS — M51.369 LUMBAR DEGENERATIVE DISC DISEASE: ICD-10-CM

## 2020-10-12 DIAGNOSIS — M54.16 BILATERAL LUMBAR RADICULOPATHY: ICD-10-CM

## 2020-10-12 PROCEDURE — 72148 MRI LUMBAR SPINE W/O DYE: CPT

## 2020-10-16 ENCOUNTER — OFFICE VISIT (OUTPATIENT)
Dept: ORTHOPEDICS | Facility: CLINIC | Age: 85
End: 2020-10-16
Payer: MEDICARE

## 2020-10-16 ENCOUNTER — TELEPHONE (OUTPATIENT)
Dept: INTERNAL MEDICINE | Facility: CLINIC | Age: 85
End: 2020-10-16

## 2020-10-16 VITALS
WEIGHT: 169 LBS | HEIGHT: 64 IN | DIASTOLIC BLOOD PRESSURE: 80 MMHG | SYSTOLIC BLOOD PRESSURE: 170 MMHG | BODY MASS INDEX: 28.85 KG/M2

## 2020-10-16 DIAGNOSIS — M48.061 SPINAL STENOSIS OF LUMBAR REGION WITHOUT NEUROGENIC CLAUDICATION: ICD-10-CM

## 2020-10-16 DIAGNOSIS — M51.369 LUMBAR DEGENERATIVE DISC DISEASE: Primary | ICD-10-CM

## 2020-10-16 DIAGNOSIS — M47.816 LUMBAR FACET ARTHROPATHY: ICD-10-CM

## 2020-10-16 PROCEDURE — 99214 OFFICE O/P EST MOD 30 MIN: CPT | Performed by: FAMILY MEDICINE

## 2020-10-16 ASSESSMENT — MIFFLIN-ST. JEOR: SCORE: 1191.58

## 2020-10-16 NOTE — TELEPHONE ENCOUNTER
Verification of prescription medicines and nutritional supplements used as medical expenses received via fax. Form in your mailbox to be signed.

## 2020-10-16 NOTE — NURSING NOTE
Glendy to follow up with Primary Care provider regarding elevated blood pressure.  Rocio Munoz MS, ATC

## 2020-10-16 NOTE — LETTER
10/16/2020         RE: Glendy Díaz  [address]  [place] [address]        Dear Colleague,    Thank you for referring your patient, Glendy Díaz, to the [facility] [place]. Please see a copy of my visit note below.    ASSESSMENT & PLAN  Patient Instructions     1. Lumbar degenerative disc disease    2. Lumbar facet arthropathy    3. Spinal stenosis of lumbar region without neurogenic claudication      -Patient is following up for chronic low back pain due to multilevel arthritis, degenerative disc disease and stenosis.  -MRI lumbar spine was reviewed today.  All questions were answered.  -Patient has multiple levels of degenerative changes that will likely require multiple epidural cortisone injections.  Patient will be referred to pain management for evaluation for comprehensive services  -Patient states that she is not having any pain relief with tramadol pain medication and cannot sleep at night due to pain.  Patient given Tylenol with codeine to take at bedtime.  Patient will continue with extra Tylenol during the day for mild to moderate pain.  -Patient may try 4% lidocaine patches to be applied to the low back for 12 hours a day.  -Call direct clinic number [[phone]] at any time with questions or concerns.    Albert Yeo MD [facility]  [facility] [place]          -----    SUBJECTIVE:  Glendy Díaz is a 86 year old female who is seen in follow-up for low back pain rating down into the left leg as well as right leg due to degenerative disc disease causing sciatica.They were last seen 9/30/2020.     Since their last visit reports 0% - (About the same as last time). Pain comes and goes, reaching out for things, and pain with getting up from the sitting position on the left hand side, will subside with walking. They indicate that their current pain level is 5/10. They have tried Tylenol and previous  "imaging (MRI 10/12/2020).      The patient is seen with their daughter.    Patient's past medical, surgical, social, and family histories were reviewed today and no changes are noted.    REVIEW OF SYSTEMS:  Constitutional: NEGATIVE for fever, chills, change in weight  Skin: NEGATIVE for worrisome rashes, moles or lesions  GI/: NEGATIVE for bowel or bladder changes  Neuro: NEGATIVE for weakness, dizziness or paresthesias    OBJECTIVE:  BP (!) 170/80   Ht 1.626 m (5' 4\")   Wt 76.7 kg (169 lb)   BMI 29.01 kg/m     General: healthy, alert and in no distress  HEENT: no scleral icterus or conjunctival erythema  Skin: no suspicious lesions or rash. No jaundice.  CV: regular rhythm by palpation, no pedal edema  Resp: normal respiratory effort without conversational dyspnea   Psych: normal mood and affect  Gait: normal steady gait with appropriate coordination and balance  Neuro: normal light touch sensory exam of the extremities.    MSK:  THORACIC/LUMBAR SPINE  Inspection:    No redness, swelling, overlying skin change, gross deformity/asymmetry, scapular winging  Palpation:    Tender about the left para lumbar muscles. Otherwise remainder of landmarks are nontender.  Range of Motion:     Lumbar flexion limited substantially by pain    Lumbar extension limited substantially by pain    Right side bend limited substantially by pain    Left side bend limited substantially by pain    Right rotation limited substantially by pain    Left rotation limited substantially by pain  Strength:    Full strength throughout hips/quads/hamstrings  Special Tests:    Positive: none  Negative: straight leg raise (bilateral), slump test (bilateral)    Independent visualization of the below image:  MR Lumbar Spine w/o Contrast [[id number]] Resulted: 10/13/20 0747   Order Status: Completed Updated: 10/13/20 0748   Narrative:     MRI LUMBAR SPINE WITHOUT CONTRAST October 12, 2020 5:01 PM     HISTORY: Abnormal x-ray, lumbosacral spine, " degenerative joint   disease. Radiculopathy greater than six weeks, conservative therapy,   persistent symptoms. Lumbar degenerative disc disease. Bilateral   lumbar radiculopathy.     TECHNIQUE: Multiplanar multisequence MRI of the lumbar spine without   contrast.     COMPARISON: Lumbar spine x-rays 9/30/2020.     FINDINGS: Alignment is significant for dextroconvex curvature of the   thoracolumbar junction and levoconvex curvature of the lumbar spine.   There is also trace grade 1 anterolisthesis of L5 on S1. Bone marrow   demonstrates endplate edema surrounding the L2-L3, L3-L4, L4-L5, and   L5-S1 disc joints. Conus medullaris and cauda equina are unremarkable.   Conus medullaris terminates at the level of the L1-L2 disc. Multiple   bilateral T2 hyperintense renal lesions are present which are   incompletely characterized, statistically likely benign cysts. Renal   ultrasound correlation could be performed.     Segmental Analysis:     T12-L1: Disc height maintained. No herniation. Normal facet joints. No   foraminal or spinal canal stenosis.     L1-L2: Disc height maintained. No herniation. Normal facet joints. No   foraminal or spinal canal stenosis.       L2-L3: Mild disc height loss. Disc bulge with broad central and left   lateral protrusion components. Mild bilateral facet arthropathy. Mild   right foraminal stenosis. Moderate left foraminal stenosis. Mild to   moderate spinal canal stenosis.       L3-L4: Mild disc height loss. Disc bulge with posterior disc T2   hyperintensity. Moderate right facet arthropathy. Mild left facet   arthropathy. Moderate bilateral foraminal stenosis. Moderate to severe   spinal canal stenosis.       L4-L5:  Moderate to severe disc height loss. Disc bulge, asymmetric to   the right. Moderate bilateral facet arthropathy. Moderate right   foraminal stenosis. Mild left foraminal stenosis. Mild spinal canal   stenosis.       L5-S1: Mild disc height loss. Minimal disc  bulge/uncovering. Severe   bilateral facet arthropathy. Mild bilateral foraminal stenosis. Mild   spinal canal stenosis.    Impression:     IMPRESSION: Multilevel degenerative change as detailed.     ERIC J HARTMAN, MD Albert Yeo MD, [facility]          Again, thank you for allowing me to participate in the care of your patient.        Sincerely,        Albert Yeo, MD

## 2020-10-16 NOTE — PROGRESS NOTES
ASSESSMENT & PLAN  Patient Instructions     1. Lumbar degenerative disc disease    2. Lumbar facet arthropathy    3. Spinal stenosis of lumbar region without neurogenic claudication      -Patient is following up for chronic low back pain due to multilevel arthritis, degenerative disc disease and stenosis.  -MRI lumbar spine was reviewed today.  All questions were answered.  -Patient has multiple levels of degenerative changes that will likely require multiple epidural cortisone injections.  Patient will be referred to pain management for evaluation for comprehensive services  -Patient states that she is not having any pain relief with tramadol pain medication and cannot sleep at night due to pain.  Patient given Tylenol with codeine to take at bedtime.  Patient will continue with extra Tylenol during the day for mild to moderate pain.  -Patient may try 4% lidocaine patches to be applied to the low back for 12 hours a day.  -Call direct clinic number [[phone]] at any time with questions or concerns.    Albert Yeo MD [facility]  [facility]          -----    SUBJECTIVE:  Glendy Díaz is a 86 year old female who is seen in follow-up for low back pain rating down into the left leg as well as right leg due to degenerative disc disease causing sciatica.They were last seen 9/30/2020.     Since their last visit reports 0% - (About the same as last time). Pain comes and goes, reaching out for things, and pain with getting up from the sitting position on the left hand side, will subside with walking. They indicate that their current pain level is 5/10. They have tried Tylenol and previous imaging (MRI 10/12/2020).      The patient is seen with their daughter.    Patient's past medical, surgical, social, and family histories were reviewed today and no changes are noted.    REVIEW OF SYSTEMS:  Constitutional: NEGATIVE for fever, chills, change in weight  Skin:  "NEGATIVE for worrisome rashes, moles or lesions  GI/: NEGATIVE for bowel or bladder changes  Neuro: NEGATIVE for weakness, dizziness or paresthesias    OBJECTIVE:  BP (!) 170/80   Ht 1.626 m (5' 4\")   Wt 76.7 kg (169 lb)   BMI 29.01 kg/m     General: healthy, alert and in no distress  HEENT: no scleral icterus or conjunctival erythema  Skin: no suspicious lesions or rash. No jaundice.  CV: regular rhythm by palpation, no pedal edema  Resp: normal respiratory effort without conversational dyspnea   Psych: normal mood and affect  Gait: normal steady gait with appropriate coordination and balance  Neuro: normal light touch sensory exam of the extremities.    MSK:  THORACIC/LUMBAR SPINE  Inspection:    No redness, swelling, overlying skin change, gross deformity/asymmetry, scapular winging  Palpation:    Tender about the left para lumbar muscles. Otherwise remainder of landmarks are nontender.  Range of Motion:     Lumbar flexion limited substantially by pain    Lumbar extension limited substantially by pain    Right side bend limited substantially by pain    Left side bend limited substantially by pain    Right rotation limited substantially by pain    Left rotation limited substantially by pain  Strength:    Full strength throughout hips/quads/hamstrings  Special Tests:    Positive: none  Negative: straight leg raise (bilateral), slump test (bilateral)    Independent visualization of the below image:  MR Lumbar Spine w/o Contrast [[id number]] Resulted: 10/13/20 0747   Order Status: Completed Updated: 10/13/20 0748   Narrative:     MRI LUMBAR SPINE WITHOUT CONTRAST October 12, 2020 5:01 PM     HISTORY: Abnormal x-ray, lumbosacral spine, degenerative joint   disease. Radiculopathy greater than six weeks, conservative therapy,   persistent symptoms. Lumbar degenerative disc disease. Bilateral   lumbar radiculopathy.     TECHNIQUE: Multiplanar multisequence MRI of the lumbar spine without   contrast.     COMPARISON: " Lumbar spine x-rays 9/30/2020.     FINDINGS: Alignment is significant for dextroconvex curvature of the   thoracolumbar junction and levoconvex curvature of the lumbar spine.   There is also trace grade 1 anterolisthesis of L5 on S1. Bone marrow   demonstrates endplate edema surrounding the L2-L3, L3-L4, L4-L5, and   L5-S1 disc joints. Conus medullaris and cauda equina are unremarkable.   Conus medullaris terminates at the level of the L1-L2 disc. Multiple   bilateral T2 hyperintense renal lesions are present which are   incompletely characterized, statistically likely benign cysts. Renal   ultrasound correlation could be performed.     Segmental Analysis:     T12-L1: Disc height maintained. No herniation. Normal facet joints. No   foraminal or spinal canal stenosis.     L1-L2: Disc height maintained. No herniation. Normal facet joints. No   foraminal or spinal canal stenosis.       L2-L3: Mild disc height loss. Disc bulge with broad central and left   lateral protrusion components. Mild bilateral facet arthropathy. Mild   right foraminal stenosis. Moderate left foraminal stenosis. Mild to   moderate spinal canal stenosis.       L3-L4: Mild disc height loss. Disc bulge with posterior disc T2   hyperintensity. Moderate right facet arthropathy. Mild left facet   arthropathy. Moderate bilateral foraminal stenosis. Moderate to severe   spinal canal stenosis.       L4-L5:  Moderate to severe disc height loss. Disc bulge, asymmetric to   the right. Moderate bilateral facet arthropathy. Moderate right   foraminal stenosis. Mild left foraminal stenosis. Mild spinal canal   stenosis.       L5-S1: Mild disc height loss. Minimal disc bulge/uncovering. Severe   bilateral facet arthropathy. Mild bilateral foraminal stenosis. Mild   spinal canal stenosis.    Impression:     IMPRESSION: Multilevel degenerative change as detailed.     ERIC J HARTMAN, MD Albert Yeo MD, [facility]

## 2020-10-16 NOTE — PATIENT INSTRUCTIONS
1. Lumbar degenerative disc disease    2. Lumbar facet arthropathy    3. Spinal stenosis of lumbar region without neurogenic claudication      -Patient is following up for chronic low back pain due to multilevel arthritis, degenerative disc disease and stenosis.  -MRI lumbar spine was reviewed today.  All questions were answered.  -Patient has multiple levels of degenerative changes that will likely require multiple epidural cortisone injections.  Patient will be referred to pain management for evaluation for comprehensive services  -Patient states that she is not having any pain relief with tramadol pain medication and cannot sleep at night due to pain.  Patient given Tylenol with codeine to take at bedtime.  Patient will continue with extra Tylenol during the day for mild to moderate pain.  -Patient may try 4% lidocaine patches to be applied to the low back for 12 hours a day.  -Glendy to follow up with Primary Care provider regarding elevated blood pressure.  -Call direct clinic number [[phone]] at any time with questions or concerns.    Albert Yeo MD [facility]  [facility] [place]

## 2020-10-18 VITALS
DIASTOLIC BLOOD PRESSURE: 77 MMHG | RESPIRATION RATE: 16 BRPM | HEART RATE: 71 BPM | OXYGEN SATURATION: 97 % | BODY MASS INDEX: 28.85 KG/M2 | WEIGHT: 169 LBS | HEIGHT: 64 IN | SYSTOLIC BLOOD PRESSURE: 158 MMHG

## 2020-10-18 PROBLEM — E05.90 SUBCLINICAL HYPERTHYROIDISM: Status: ACTIVE | Noted: 2020-10-18

## 2020-10-19 ENCOUNTER — TRANSFERRED RECORDS (OUTPATIENT)
Dept: HEALTH INFORMATION MANAGEMENT | Facility: CLINIC | Age: 85
End: 2020-10-19

## 2020-10-19 ENCOUNTER — TELEPHONE (OUTPATIENT)
Dept: PALLIATIVE MEDICINE | Facility: CLINIC | Age: 85
End: 2020-10-19

## 2020-10-19 DIAGNOSIS — I10 ESSENTIAL HYPERTENSION WITH GOAL BLOOD PRESSURE LESS THAN 140/90: ICD-10-CM

## 2020-10-19 NOTE — TELEPHONE ENCOUNTER
Pain Management Center Referral      1. Confirmed address with patient? Yes  2. Confirmed phone number with patient? Yes  3. Confirmed referring provider? Yes  4. Is the PCP the same as the referring provider? No  5. Has the patient been to any previous pain clinics? No  (If yes, send AMIE with welcome letter)  6. Which insurance are we to bill for this appointment?  MEDICARE/[employer]    7. Informed pt of cancellation (48 hour) policy? Yes    REGARDING OPIOID MEDICATIONS: We will always address appropriateness of opioid pain medications, but we generally will not automatically take on a prescribing role. When we do take on prescribing of opioids for chronic pain, it is in collaboration with the referring physician for an intermediate period of time (months), with an expectation that the primary physician or provider will assume the prescribing role if medications are effective at stable doses with demonstrated compliance. Therefore, please do not assume that your prescribing responsibilities end on the day of pain clinic consultation.  8. Informed pt of prescribing policy? Yes    9.Please be aware that once you are established with a pain provider and location, you will need to continue have all future visits with that provider and location. It is best to determine what location is the most convenient for you and schedule with that one.    ** PATIENT INFORMED OF THIS POLICY Yes      9. Referring Provider: YEO

## 2020-10-20 RX ORDER — CARVEDILOL 6.25 MG/1
TABLET ORAL
Qty: 180 TABLET | Refills: 3 | OUTPATIENT
Start: 2020-10-20

## 2020-10-21 DIAGNOSIS — I10 ESSENTIAL HYPERTENSION WITH GOAL BLOOD PRESSURE LESS THAN 140/90: Chronic | ICD-10-CM

## 2020-10-21 DIAGNOSIS — R60.0 BILATERAL LEG EDEMA: ICD-10-CM

## 2020-10-22 NOTE — TELEPHONE ENCOUNTER
Routing refill request to provider for review/approval because:  Labs not current:  Creat  BP elevated > [facility] protocol for RN refill

## 2020-10-23 RX ORDER — FUROSEMIDE 40 MG
TABLET ORAL
Qty: 180 TABLET | Refills: 1 | Status: ON HOLD | OUTPATIENT
Start: 2020-10-23 | End: 2020-12-29

## 2020-10-29 ENCOUNTER — TELEPHONE (OUTPATIENT)
Dept: INTERNAL MEDICINE | Facility: CLINIC | Age: 85
End: 2020-10-29

## 2020-10-29 NOTE — TELEPHONE ENCOUNTER
[facility] in [place] calling to get verbal orders to ok patient to have 13 teeth extracted with IV sedation. Call Lisa back at [phone]

## 2020-10-29 NOTE — TELEPHONE ENCOUNTER
Please clarify if they actually want a preop exam and if so schedule with her primary.  If they do not need a preop exam, her primary will still need to review this, it does not appear any of us know the patient otherwise well enough to prove her for sedation.

## 2020-11-02 NOTE — TELEPHONE ENCOUNTER
Iv sedation requires preop.  Since her last office visit was October 5, we can do the preop through video visit

## 2020-11-03 DIAGNOSIS — N39.41 URGE INCONTINENCE OF URINE: Primary | ICD-10-CM

## 2020-11-03 NOTE — PROGRESS NOTES
"Glendy Díaz is a 86 year old female who is being evaluated via a billable video visit.      The patient has been notified of following:     \"This video visit will be conducted via a call between you and your physician/provider. We have found that certain health care needs can be provided without the need for an in-person physical exam.  This service lets us provide the care you need with a video conversation.  If a prescription is necessary we can send it directly to your pharmacy.  If lab work is needed we can place an order for that and you can then stop by our lab to have the test done at a later time.    Video visits are billed at different rates depending on your insurance coverage.  Please reach out to your insurance provider with any questions.    If during the course of the call the physician/provider feels a video visit is not appropriate, you will not be charged for this service.\"    Patient has given verbal consent for Video visit? Yes  How would you like to obtain your AVS? Mail a copy  If you are dropped from the video visit, the video invite should be resent to: Text to cell phone: [phone]  Will anyone else be joining your video visit? Patient's daughter will be with patient during video visit.  Deepali Colon [facility]  [facility]     Date of visit: 11/5/2020    Reason for consultation:    Glendy Díaz is a 86 year old female who is seen in consultation today at the request of her sports medicine provider, Dr. Yeo, for evaluation of her pain issues and recommendations for management, with specific emphasis on  My Clinical Question Is: multilevel lumbar degenerative disc disease, facet arthropathy and stenosis    Timeframe Requested: Routine: Next available opening    Priority: Routine    Reason for Referral: Evaluation for Comprehensive Services    Please review opioid agreement in process instructions, do you " agree to these terms? Yes    Are there any red flags that may impact the assessment or management of the patient? N/A      Please see the [facility] health questionnaire which the patient completed and reviewed with me in detail.    Review of [place] Prescription Monitoring Program (): No concern for abuse or misuse of controlled medications based on this report.     Pain medications are being prescribed by Dr. Yeo.     Subjective:    Chief Complaint:    Chief Complaint   Patient presents with     Pain     Video Visit due to Covid-19       Pain history:  Glendy Díaz is a 86 year old female who presents for initial evaluation of chief complaint of low back pain. She presents with her daughter Sharyn.     She first started having problems with low back pain several years ago. Insidious onset, without acute precipitating event. Her pain has been more bothersome for the last several months. She has not tried chiropractic care, physical therapy, or any other therapies. She was referred to sports medicine, initially worked with Dr. Jc. She had a left SI joint injection with improvement in 2015 with improvement for years. She has also worked with Dr. Yeo. He recommended pain management. Her pain is primarily intermittent, most notable while getting out of a chair or bending over. The pain is located in bilateral low back, in a belt like distribution. Started in left low back and this is worse than right. Her pain radiates into left hip, into anterior thigh, shin, ending in her foot. Her axial low back pain is most bothersome. Numbness and tingling in left anterior leg and foot. Generalized weakness ongoing for years, related to pain.    She first started having problems with right sided mid back pain two years ago. Insidious onset, without acute precipitating event. Her pain has been most bothersome since March but did not come in because of COVID. She has never had evaluation of this pain.   She uses heat and vibration. Her pain is located in right mid back below her shoulder blade, denies radiation.    Pain description:  Location: low back  Quality: burning, shooting, stabbing  Severity/Intensity: 6/10 at best, 6/10 at worst, 6/10 on average  Aggravating factors include: bending over, getting out of a chair,   Relieving factors include: heat, voltaren    The patient otherwise denies bowel or bladder incontinence (ongoing issue), parasthesias, weakness, saddle anesthesia, unintentional weight loss, or fever/chills/sweats.     Glenyd Díaz has not been seen at a pain clinic in the past.      Pain Treatments:  (H--helped; HI--Helped initially; [facility]--Somewhat helpful; NH--No help; W--worse; SE--side effects; ?--Unsure if helpful)   1. Medications:       Current pain medications:   Tylenol 500mg daily prn- [facility]   Tylenol #3 300-30mg at bedtime- [facility]   Gabapentin 100mg daily and 200mg at bedtime- [facility], has been taking for several years   Voltaren gel prn- [facility]    Current calculated MME: 4.5    1. Previous Pain Relevant Medications:  NOTE: This medication information taken from patient's intake form, not medical records.    Opiates: Tramadol- [facility]/?, Tylenol #3- [facility]    NSAIDS: has been told not to take    Muscle Relaxants: unsure of the name   Anti-migraine mediations: no   Anti-depressants: no   Sleep aids: no   Anxiolytics: no   Neuropathics: gabapentin- [facility]    Topicals: Voltaren gel- [facility], lidocaine patches- trouble sticking   Other medications not covered above: Tylenol- [facility]    2. Physical Therapy: no  3. Pain Psychology: no  4. Surgery: right shoulder replacement March 2016  5. Injections: left SI joint injection with Dr. Jc on 2/12/15- H for years  6. Chiropractic: no  7. Acupuncture: no  8. TENS Unit: no    Imaging:  MRI of lumbar spine was completed on 10/12/2020 and shows:     T12-L1: Disc height maintained. No herniation. Normal facet joints. No  foraminal or spinal canal stenosis.     L1-L2: Disc  height maintained. No herniation. Normal facet joints. No  foraminal or spinal canal stenosis.       L2-L3: Mild disc height loss. Disc bulge with broad central and left  lateral protrusion components. Mild bilateral facet arthropathy. Mild  right foraminal stenosis. Moderate left foraminal stenosis. Mild to  moderate spinal canal stenosis.       L3-L4: Mild disc height loss. Disc bulge with posterior disc T2  hyperintensity. Moderate right facet arthropathy. Mild left facet  arthropathy. Moderate bilateral foraminal stenosis. Moderate to severe  spinal canal stenosis.       L4-L5:  Moderate to severe disc height loss. Disc bulge, asymmetric to  the right. Moderate bilateral facet arthropathy. Moderate right  foraminal stenosis. Mild left foraminal stenosis. Mild spinal canal  stenosis.       L5-S1: Mild disc height loss. Minimal disc bulge/uncovering. Severe  bilateral facet arthropathy. Mild bilateral foraminal stenosis. Mild  spinal canal stenosis.                                                                      IMPRESSION: Multilevel degenerative change as detailed.       Past Medical History:  Past Medical History:   Diagnosis Date     Anxiety      CKD (chronic kidney disease) stage 3, GFR 30-59 ml/min      Hematuria      Hyperlipidemia LDL goal <100      Hyperparathyroidism (H)      Hypertension     No Cardiologist     Incontinence      Leg weakness, bilateral      Mumps      Neck pain, chronic      Osteoarthritis      Osteopenia      Peripheral neuropathy        Past Surgical History:  Past Surgical History:   Procedure Laterality Date     APPENDECTOMY       ARTHRODESIS FOOT  5/1/2014    Procedure: ARTHRODESIS FOOT;  Surgeon: Sid Marks DPM;  Location: [facility]     ARTHROSCOPY ANKLE, OPEN REPAIR LIGAMENT, COMBINED  5/31/2012    Procedure:COMBINED ARTHROSCOPY ANKLE, OPEN REPAIR LIGAMENT; LEFT ANKLE ARTHROSCOPY, LATERAL LIGAMENT RECONSTRUCTION, ENDOSCOPIC KIRIT NAQVI SECOND TOE RAY CORRECTION     LEFT KNEE Marcaine AND CORTIZONE INJECTION, 5 CC Marcaine, 1 CC CELESTONE, ; Surgeon:AKSHAT GERMAN; Location:[facility]     BLADDER SURGERY       CATARACT IOL, RT/LT       CHOLECYSTECTOMY       CYSTOSCOPY       EXCISE MASS FOOT  12/4/2012    Procedure: EXCISE MASS FOOT;;  Surgeon: Akshat German MD;  Location: [facility]     HYSTERECTOMY TOTAL ABDOMINAL      ovaries are in     RELEASE TENDON TOE  5/1/2014    Procedure: RELEASE TENDON TOE;  Surgeon: Sid Marks DPM;  Location: [facility]     REMOVE HARDWARE FOOT  12/4/2012    Procedure: REMOVE HARDWARE FOOT;  REMOVAL HARDWARE(SYNTHES SCREW) LEFT FOOT, EXCISION OF INCLUSION CYST;  Surgeon: Akshat German MD;  Location: [facility]     REPAIR HAMMER TOE  5/1/2014    Procedure: REPAIR HAMMER TOE;  Surgeon: Sid Marks DPM;  Location: [facility]     REVERSE ARTHROPLASTY SHOULDER Right 7/18/2016    Procedure: REVERSE ARTHROPLASTY SHOULDER;  Surgeon: Marlo Alejandro MD;  Location: [facility] OR       Medications:  Current Outpatient Medications   Medication Sig Dispense Refill     acetaminophen-codeine (TYLENOL #3) 300-30 MG tablet Take 1 tablet by mouth nightly as needed for severe pain 20 tablet 0     aspirin 81 MG tablet Take 1 tablet (81 mg) by mouth daily 30 tablet 3     carvedilol (COREG) 6.25 MG tablet Take 1 tablet (6.25 mg) by mouth 2 times daily 180 tablet 0     cetirizine (ZYRTEC) 10 MG tablet Take 1 tablet (10 mg) by mouth every evening 90 tablet 3     Cholecalciferol (VITAMIN D) 2000 UNITS tablet Take 2,000 Units by mouth daily 100 tablet 3     estradiol (ESTRACE VAGINAL) 0.1 MG/GM vaginal cream Apply small amount to the vaginal opening and urethra M, W, F @ h.s. 42.5 g 3     fluticasone (FLONASE) 50 MCG/ACT nasal spray USE 1-2 SPRAYS INTO BOTH  NOSTRILS DAILY 48 g 2     furosemide (LASIX) 40 MG tablet TAKE 1 TO 2 TABLETS BY  MOUTH DAILY 180 tablet 1     gabapentin (NEURONTIN) 100 MG capsule TAKE 1 CAPSULE BY MOUTH IN  THE AFTERNOON AND 2   CAPSULES BY MOUTH AT  BEDTIME 270 capsule 3     mirabegron (MYRBETRIQ) 25 MG 24 hr tablet Take 1 tablet (25 mg) by mouth daily 90 tablet 1     albuterol (PROAIR HFA/PROVENTIL HFA/VENTOLIN HFA) 108 (90 BASE) MCG/ACT Inhaler Inhale 2 puffs into the lungs every 6 hours as needed for shortness of breath / dyspnea or wheezing (Patient not taking: Reported on 11/4/2020) 1 Inhaler 1     order for DME Equipment being ordered: surgicxal shoe size 10 (Patient not taking: Reported on 11/4/2020) 1 Device 0       Allergies:     Allergies   Allergen Reactions     Amlodipine      Leg edema with 5 mg     Fosamax [Alendronic Acid]      Bone pain     Lisinopril      Increased potassium     Pravastatin      Muscle aches      Simvastatin      Muscle aches        Social History:  Home situation: lives in a senior living  Support system: daughter  Occupation/Schooling: retired  Tobacco use: no  Drug use: no  Alcohol use: no  History of chemical dependency treatment: no  Mental health admissions: no    Family history:  Family History   Problem Relation Age of Onset     Lipids Mother      Diabetes Mother      Circulatory Mother         Kidney disease     Diabetes Brother      Cancer - colorectal Brother      Breast Cancer Sister      Thyroid Disease Daughter      Cancer - colorectal Brother      Alcohol/Drug Brother        Review of Systems:    POSTIVE IN BOLD  GENERAL: fever/chills, fatigue, general unwell feeling, weight gain/loss.  HEAD/EYES:  headache, dizziness, or vision changes.    EARS/NOSE/THROAT: nosebleeds, hearing loss, sinus infection, earache, tinnitus.  IMMUNE:  allergies, cancer, immune deficiency, or infections.  SKIN:  itching, rash, hives  HEME/Lymphatic: anemia, easy bruising, easy bleeding.  RESPIRATORY: cough, wheezing, or shortness of breath  CARDIOVASCULAR/Circulation: extremity edema, syncope, hypertension, tachycardia, or angina.  GASTROINTESTINAL: abdominal pain, nausea/emesis, diarrhea, constipation,   hematochezia, or melena.  ENDOCRINE:  diabetes, steroid use,  thyroid disease or osteoporosis.  MUSCULOSKELETAL: joint pain, stiffness, neck pain, back pain, arthritis, or gout.  GENITOURINARY: frequency, urgency, dysuria, difficulty voiding, hematuria or incontinence.  NEUROLOGIC: weakness, numbness, paresthesias, seizure, tremor, stroke or memory loss.  PSYCHIATRIC: depression, anxiety, stress, suicidal thoughts or mood swings.     Objective:    Physical Exam:  There were no vitals filed for this visit.  Exam:  Constitutional: Well developed, well nourished, appears stated age.  HEENT: Head atraumatic, normocephalic. Eyes without conjunctival injection or jaundice. Oropharynx clear. Neck supple. No obvious neck masses.  Skin: No rash, lesions, or petechiae of exposed skin.   Extremities: Peripheral pulses intact. No clubbing, cyanosis, or edema.  Psychiatric/mental status: Alert, without lethargy or stupor. Speech fluent. Appropriate affect. Mood normal. Able to follow commands without difficulty.     Musculoskeletal exam:  Patient has antalgic gait favoring neither side.   Normal bulk and tone. Unremarkable spinal curvature.     Cervical spine:  Range of motion reduced bilaterally right > left.   Rotation/ext to right: pain free  Rotation/ext to left: pain free    Thoracic spine:    Kyphosis. Yes    Lumbar spine:    Flex:  90 degrees   Ext: 25 degrees   Rotation/ext to right: painful    Rotation/ext to left: painful     DIRE Score for ongoing opioid management is calculated as follows:   Diagnosis = 3 pts (advanced condition; severe pain/objective findings)   Intractability = 2 pts (most treatments tried; patient not fully engaged/barriers)   Risk    Psych = 3 pts (no significant personality dysfunction/mental illness; good communication with clinic)    Chem Hlth = 3 pts (no history of chemical dependency; not drug-focused)   Reliability = 3 pts (highly reliable with meds, appointments, treatments)   Social = 3  pts (supportive family/close relationships; involved in work/school; no isolation)   (Psych + Chem hlth + Reliability + Social) = 17     Efficacy = 2 pts (moderate benefit/function; low med dose; too early/not tried meds)         DIRE Score = 19        7-13: likely NOT suitable candidate for long-term opioid analgesia       14-21: may be a suitable candidate for long-term opioid analgesia     Assessment:  Glendy Díaz is a 86 year old female with a past medical history significant for HTN, hyperlipidemia, hyperparathyroidism, osteopenia, CKD stage 3, who presents with complaints of low back and mid back pain.     1. Low back pain- etiology may be a combination of lumbar facet arthropathy, central/foraminal stenosis, and SI joint dysfunction. Recommend in person physical exam for better determination of greatest pain origin.   2. Mid back pain- etiology unclear without imaging to review or physical exam. Recommend in person physical exam of this also.   3. Mental Health - the patient's mental health concerns affect her experience of pain and contribute to her clinically significant distress.    1. Lumbar degenerative disc disease    2. Lumbar facet arthropathy    3. Spinal stenosis of lumbar region without neurogenic claudication        Plan:      We discussed participation in our pain program today. While I think participating could be helpful for Glendy, prior to developing a specific plan, I suggested we first start with a physical exam. She is interested in having an injection done soon, especially given previous success with SI joint injection. We will complete a physical exam at next visit and discuss most likely origin of pain (though likely multifactorial) and subsequent best injection recommendation. We will also discuss medication management, may consider nortriptyline or Cymbalta, and pain physical therapy. She will follow up with an in person visit in the next 1-2 weeks.     Video-Visit Details    Type  of service:  Video Visit    Video Start Time: 1305  Video End Time: 1342    Originating Location (pt. Location): Home    Distant Location (provider location):  [facility] [place]     Platform used for Video Visit: [email or web address]    SHRUTI Bunn CNP

## 2020-11-04 ENCOUNTER — OFFICE VISIT (OUTPATIENT)
Dept: UROLOGY | Facility: CLINIC | Age: 85
End: 2020-11-04
Attending: INTERNAL MEDICINE
Payer: MEDICARE

## 2020-11-04 ENCOUNTER — HOSPITAL ENCOUNTER (EMERGENCY)
Facility: CLINIC | Age: 85
Discharge: HOME | End: 2020-11-05
Attending: PHYSICIAN ASSISTANT | Admitting: PHYSICIAN ASSISTANT
Payer: MEDICARE

## 2020-11-04 VITALS
DIASTOLIC BLOOD PRESSURE: 80 MMHG | HEIGHT: 64 IN | HEART RATE: 78 BPM | BODY MASS INDEX: 28.85 KG/M2 | WEIGHT: 169 LBS | SYSTOLIC BLOOD PRESSURE: 160 MMHG

## 2020-11-04 DIAGNOSIS — I47.10 SVT (SUPRAVENTRICULAR TACHYCARDIA) (H): ICD-10-CM

## 2020-11-04 DIAGNOSIS — N39.41 URGE INCONTINENCE OF URINE: ICD-10-CM

## 2020-11-04 LAB
ALBUMIN UR-MCNC: NEGATIVE MG/DL
ANION GAP SERPL CALCULATED.3IONS-SCNC: 10 MMOL/L (ref 3–14)
APPEARANCE UR: CLEAR
BACTERIA #/AREA URNS HPF: ABNORMAL /HPF
BASOPHILS # BLD AUTO: 0 10E9/L (ref 0–0.2)
BASOPHILS NFR BLD AUTO: 0.4 %
BILIRUB UR QL STRIP: NEGATIVE
BUN SERPL-MCNC: 21 MG/DL (ref 7–30)
CALCIUM SERPL-MCNC: 9 MG/DL (ref 8.5–10.1)
CHLORIDE SERPL-SCNC: 102 MMOL/L (ref 94–109)
CO2 SERPL-SCNC: 23 MMOL/L (ref 20–32)
COLOR UR AUTO: YELLOW
CREAT SERPL-MCNC: 1.19 MG/DL (ref 0.52–1.04)
DIFFERENTIAL METHOD BLD: ABNORMAL
EOSINOPHIL # BLD AUTO: 0.2 10E9/L (ref 0–0.7)
EOSINOPHIL NFR BLD AUTO: 2.3 %
ERYTHROCYTE [DISTWIDTH] IN BLOOD BY AUTOMATED COUNT: 12.7 % (ref 10–15)
GFR SERPL CREATININE-BSD FRML MDRD: 41 ML/MIN/{1.73_M2}
GLUCOSE SERPL-MCNC: 90 MG/DL (ref 70–99)
GLUCOSE UR STRIP-MCNC: NEGATIVE MG/DL
HCT VFR BLD AUTO: 37.6 % (ref 35–47)
HGB BLD-MCNC: 11.8 G/DL (ref 11.7–15.7)
HGB UR QL STRIP: ABNORMAL
IMM GRANULOCYTES # BLD: 0 10E9/L (ref 0–0.4)
IMM GRANULOCYTES NFR BLD: 0.2 %
KETONES UR STRIP-MCNC: NEGATIVE MG/DL
LEUKOCYTE ESTERASE UR QL STRIP: ABNORMAL
LYMPHOCYTES # BLD AUTO: 3.4 10E9/L (ref 0.8–5.3)
LYMPHOCYTES NFR BLD AUTO: 40.7 %
MAGNESIUM SERPL-MCNC: 2.3 MG/DL (ref 1.6–2.3)
MCH RBC QN AUTO: 31.1 PG (ref 26.5–33)
MCHC RBC AUTO-ENTMCNC: 31.4 G/DL (ref 31.5–36.5)
MCV RBC AUTO: 99 FL (ref 78–100)
MONOCYTES # BLD AUTO: 0.6 10E9/L (ref 0–1.3)
MONOCYTES NFR BLD AUTO: 7.7 %
NEUTROPHILS # BLD AUTO: 4.1 10E9/L (ref 1.6–8.3)
NEUTROPHILS NFR BLD AUTO: 48.7 %
NITRATE UR QL: NEGATIVE
NRBC # BLD AUTO: 0 10*3/UL
NRBC BLD AUTO-RTO: 0 /100
PH UR STRIP: 6.5 PH (ref 5–7)
PLATELET # BLD AUTO: 217 10E9/L (ref 150–450)
POTASSIUM SERPL-SCNC: 3.8 MMOL/L (ref 3.4–5.3)
RBC # BLD AUTO: 3.79 10E12/L (ref 3.8–5.2)
RBC #/AREA URNS AUTO: 1 /HPF (ref 0–2)
RESIDUAL VOLUME (RV) (EXTERNAL): 13
SODIUM SERPL-SCNC: 135 MMOL/L (ref 133–144)
SOURCE: ABNORMAL
SP GR UR STRIP: 1.01 (ref 1–1.03)
SQUAMOUS #/AREA URNS AUTO: 1 /HPF (ref 0–1)
TROPONIN I SERPL-MCNC: <0.015 UG/L (ref 0–0.04)
UROBILINOGEN UR STRIP-ACNC: 0.2 EU/DL (ref 0.2–1)
WBC # BLD AUTO: 8.3 10E9/L (ref 4–11)
WBC #/AREA URNS AUTO: 2 /HPF (ref 0–5)

## 2020-11-04 PROCEDURE — 84484 ASSAY OF TROPONIN QUANT: CPT | Performed by: PHYSICIAN ASSISTANT

## 2020-11-04 PROCEDURE — 85025 COMPLETE CBC W/AUTO DIFF WBC: CPT | Performed by: PHYSICIAN ASSISTANT

## 2020-11-04 PROCEDURE — 83735 ASSAY OF MAGNESIUM: CPT | Performed by: PHYSICIAN ASSISTANT

## 2020-11-04 PROCEDURE — 80048 BASIC METABOLIC PNL TOTAL CA: CPT | Performed by: PHYSICIAN ASSISTANT

## 2020-11-04 PROCEDURE — 99285 EMERGENCY DEPT VISIT HI MDM: CPT

## 2020-11-04 PROCEDURE — 84999 UNLISTED CHEMISTRY PROCEDURE: CPT | Mod: 90 | Performed by: PHYSICIAN ASSISTANT

## 2020-11-04 PROCEDURE — 51798 US URINE CAPACITY MEASURE: CPT | Performed by: PHYSICIAN ASSISTANT

## 2020-11-04 PROCEDURE — 99000 SPECIMEN HANDLING OFFICE-LAB: CPT | Performed by: PHYSICIAN ASSISTANT

## 2020-11-04 PROCEDURE — 99203 OFFICE O/P NEW LOW 30 MIN: CPT | Mod: 25 | Performed by: PHYSICIAN ASSISTANT

## 2020-11-04 PROCEDURE — 93005 ELECTROCARDIOGRAM TRACING: CPT | Mod: 76

## 2020-11-04 PROCEDURE — 81003 URINALYSIS AUTO W/O SCOPE: CPT | Mod: QW | Performed by: PHYSICIAN ASSISTANT

## 2020-11-04 PROCEDURE — 93005 ELECTROCARDIOGRAM TRACING: CPT

## 2020-11-04 RX ORDER — ESTRADIOL 0.1 MG/G
CREAM VAGINAL
Qty: 42.5 G | Refills: 3 | Status: SHIPPED | OUTPATIENT
Start: 2020-11-04 | End: 2021-01-21

## 2020-11-04 ASSESSMENT — MIFFLIN-ST. JEOR: SCORE: 1183.64

## 2020-11-04 ASSESSMENT — PAIN SCALES - GENERAL: PAINLEVEL: SEVERE PAIN (6)

## 2020-11-04 NOTE — ED AVS SNAPSHOT
[facility]  [address]  [facility] [address]  Phone: [phone]  Fax: [phone]                                    Glendy Díaz   MRN: [id number]    Department: [facility]   Date of Visit: 11/4/2020           After Visit Summary Signature Page    I have received my discharge instructions, and my questions have been answered. I have discussed any challenges I see with this plan with the nurse or doctor.    ..........................................................................................................................................  Patient/Patient Representative Signature      ..........................................................................................................................................  Patient Representative Print Name and Relationship to Patient    ..................................................               ................................................  Date                                   Time    ..........................................................................................................................................  Reviewed by Signature/Title    ...................................................              ..............................................  Date                                               Time          22EPIC Rev 08/18

## 2020-11-04 NOTE — PROGRESS NOTES
CC: Urinary incontinence.    HPI: It is a pleasure to see Ms. Glendy Díaz, a 86 year old female seen today in the urology clinic in consultation from Dr. Davis for evaluation of urinary incontinence.  This problem has been going on for 5 years and has been getting progressively worse.  Incontinence is no associated with laughing, sneezing, coughing, and bending at the waist.  She has 1-2 episodes of incontinence per day and has been using 2-3 pads per day.      Ms. Díaz has tried Myrbetriq 25 mg (since Aug) in the past for her condition, which has helped some (and Lasix is more rarely used now).  The patient has urinary frequency and urgency.  She denies any dysuria, nocturia, hematuria, pyuria, hesitancy, intermittency, feeling of incomplete emptying, or any recent history of UTI's or stones.    The patient  Struggles with constipation.      Past Medical History:   Diagnosis Date     Anxiety      CKD (chronic kidney disease) stage 3, GFR 30-59 ml/min      Hematuria      Hyperlipidemia LDL goal <100      Hyperparathyroidism (H)      Hypertension     No Cardiologist     Incontinence      Leg weakness, bilateral      Mumps      Neck pain, chronic      Osteoarthritis      Osteopenia      Peripheral neuropathy      Past Surgical History:   Procedure Laterality Date     APPENDECTOMY       ARTHRODESIS FOOT  5/1/2014    Procedure: ARTHRODESIS FOOT;  Surgeon: Sid Marks DPM;  Location: [facility]     ARTHROSCOPY ANKLE, OPEN REPAIR LIGAMENT, COMBINED  5/31/2012    Procedure:COMBINED ARTHROSCOPY ANKLE, OPEN REPAIR LIGAMENT; LEFT ANKLE ARTHROSCOPY, LATERAL LIGAMENT RECONSTRUCTION, ENDOSCOPIC KIRIT NAQVI SECOND TOE RAY CORRECTION    LEFT KNEE Marcaine AND CORTIZONE INJECTION, 5 CC Marcaine, 1 CC CELESTONE, ; Surgeon:VARSHA WILDER; [facility]     BLADDER SURGERY       CATARACT IOL, RT/LT       CHOLECYSTECTOMY       CYSTOSCOPY       EXCISE MASS FOOT  12/4/2012    Procedure: EXCISE MASS FOOT;;   Surgeon: Akshat German MD;  [facility]     HYSTERECTOMY TOTAL ABDOMINAL      ovaries are in     RELEASE TENDON TOE  5/1/2014    Procedure: RELEASE TENDON TOE;  Surgeon: Sid Marks DPM;  Location: [facility] OR     REMOVE HARDWARE FOOT  12/4/2012    Procedure: REMOVE HARDWARE FOOT;  REMOVAL HARDWARE(SYNTHES SCREW) LEFT FOOT, EXCISION OF INCLUSION CYST;  Surgeon: Akshat German MD;  Location: [facility]     REPAIR HAMMER TOE  5/1/2014    Procedure: REPAIR HAMMER TOE;  Surgeon: iSd Marks DPM;  Location: [facility]     REVERSE ARTHROPLASTY SHOULDER Right 7/18/2016    Procedure: REVERSE ARTHROPLASTY SHOULDER;  Surgeon: Marlo Alejandro MD;  Location: [facility] OR     Current Outpatient Medications   Medication Sig Dispense Refill     acetaminophen-codeine (TYLENOL #3) 300-30 MG tablet Take 1 tablet by mouth nightly as needed for severe pain 20 tablet 0     aspirin 81 MG tablet Take 1 tablet (81 mg) by mouth daily 30 tablet 3     carvedilol (COREG) 6.25 MG tablet Take 1 tablet (6.25 mg) by mouth 2 times daily 180 tablet 0     cetirizine (ZYRTEC) 10 MG tablet Take 1 tablet (10 mg) by mouth every evening 90 tablet 3     Cholecalciferol (VITAMIN D) 2000 UNITS tablet Take 2,000 Units by mouth daily 100 tablet 3     fluticasone (FLONASE) 50 MCG/ACT nasal spray USE 1-2 SPRAYS INTO BOTH  NOSTRILS DAILY 48 g 2     furosemide (LASIX) 40 MG tablet TAKE 1 TO 2 TABLETS BY  MOUTH DAILY 180 tablet 1     gabapentin (NEURONTIN) 100 MG capsule TAKE 1 CAPSULE BY MOUTH IN  THE AFTERNOON AND 2  CAPSULES BY MOUTH AT  BEDTIME 270 capsule 3     mirabegron (MYRBETRIQ) 25 MG 24 hr tablet Take 1 tablet (25 mg) by mouth daily 90 tablet 1     traMADol (ULTRAM) 50 MG tablet Take 1 tablet (50 mg) by mouth 2 times daily as needed for moderate pain 40 tablet 0     albuterol (PROAIR HFA/PROVENTIL HFA/VENTOLIN HFA) 108 (90 BASE) MCG/ACT Inhaler Inhale 2 puffs into the lungs every 6 hours as needed for shortness of breath / dyspnea  "or wheezing (Patient not taking: Reported on 11/4/2020) 1 Inhaler 1     order for DME Equipment being ordered: surgicxal shoe size 10 (Patient not taking: Reported on 11/4/2020) 1 Device 0     senna-docusate (SENOKOT-S;PERICOLACE) 8.6-50 MG per tablet Take 1-2 tablets by mouth 2 times daily as needed for constipation (Patient not taking: Reported on 11/4/2020) 30 tablet 3     Allergies   Allergen Reactions     Amlodipine      Leg edema with 5 mg     Fosamax [Alendronic Acid]      Bone pain     Lisinopril      Increased potassium     Pravastatin      Muscle aches      Simvastatin      Muscle aches        FAMILY HISTORY: The patient denies any history of genitourinary carcinoma and no history of urolithiasis.    SOCIAL HISTORY: The patient does not smoke cigarettes, denies EtOH and illicit drug abuse.      ROS: A comprehensive 14 point ROS was obtained and negative except for that outlined above in the HPI.    PHYSICAL EXAM:   Vitals:    11/04/20 1316   BP: (!) 160/80   Pulse: 78   Weight: 76.7 kg (169 lb)   Height: 1.613 m (5' 3.5\")     GENERAL: Well groomed/well developed/well nourished female in NAD.  HEENT: EOMI, AT, NC.  SKIN: Warm to touch, dry.    RESP: No increased respiratory effort.  LYMPH: No LE edema.  ABD: Soft, NT  MS: Full ROM in extremities.  NEURO: Alert and oriented x 3.  PSYCH: Normal mood and affect, pleasant and agreeable during interview and exam.    PVR: Postvoid residual urine volume as measured by ultrasound was 13 mL.    Admission on 09/12/2020, Discharged on 09/12/2020   Component Date Value Ref Range Status     WBC 09/12/2020 5.9  4.0 - 11.0 10e9/L Final     RBC Count 09/12/2020 3.80  3.8 - 5.2 10e12/L Final     Hemoglobin 09/12/2020 11.9  11.7 - 15.7 g/dL Final     Hematocrit 09/12/2020 38.2  35.0 - 47.0 % Final     MCV 09/12/2020 101* 78 - 100 fl Final     MCH 09/12/2020 31.3  26.5 - 33.0 pg Final     MCHC 09/12/2020 31.2* 31.5 - 36.5 g/dL Final     RDW 09/12/2020 13.2  10.0 - 15.0 % " Final     Platelet Count 09/12/2020 224  150 - 450 10e9/L Final     Diff Method 09/12/2020 Automated Method   Final     % Neutrophils 09/12/2020 54.4  % Final     % Lymphocytes 09/12/2020 34.2  % Final     % Monocytes 09/12/2020 7.7  % Final     % Eosinophils 09/12/2020 2.9  % Final     % Basophils 09/12/2020 0.5  % Final     % Immature Granulocytes 09/12/2020 0.3  % Final     Nucleated RBCs 09/12/2020 0  0 /100 Final     Absolute Neutrophil 09/12/2020 3.2  1.6 - 8.3 10e9/L Final     Absolute Lymphocytes 09/12/2020 2.0  0.8 - 5.3 10e9/L Final     Absolute Monocytes 09/12/2020 0.5  0.0 - 1.3 10e9/L Final     Absolute Eosinophils 09/12/2020 0.2  0.0 - 0.7 10e9/L Final     Absolute Basophils 09/12/2020 0.0  0.0 - 0.2 10e9/L Final     Abs Immature Granulocytes 09/12/2020 0.0  0 - 0.4 10e9/L Final     Absolute Nucleated RBC 09/12/2020 0.0   Final     Sodium 09/12/2020 138  133 - 144 mmol/L Final     Potassium 09/12/2020 4.5  3.4 - 5.3 mmol/L Final    Specimen slightly hemolyzed, potassium may be falsely elevated     Chloride 09/12/2020 109  94 - 109 mmol/L Final     Carbon Dioxide 09/12/2020 24  20 - 32 mmol/L Final     Anion Gap 09/12/2020 5  3 - 14 mmol/L Final     Glucose 09/12/2020 92  70 - 99 mg/dL Final     Urea Nitrogen 09/12/2020 27  7 - 30 mg/dL Final     Creatinine 09/12/2020 1.23* 0.52 - 1.04 mg/dL Final     GFR Estimate 09/12/2020 39* >60 mL/min/[1.73_m2] Final    Comment: Non  GFR Calc  Starting 12/18/2018, serum creatinine based estimated GFR (eGFR) will be   calculated using the Chronic Kidney Disease Epidemiology Collaboration   (CKD-EPI) equation.       GFR Estimate If Black 09/12/2020 46* >60 mL/min/[1.73_m2] Final    Comment:  GFR Calc  Starting 12/18/2018, serum creatinine based estimated GFR (eGFR) will be   calculated using the Chronic Kidney Disease Epidemiology Collaboration   (CKD-EPI) equation.       Calcium 09/12/2020 9.0  8.5 - 10.1 mg/dL Final     Troponin I ES  09/12/2020 <0.015  0.000 - 0.045 ug/L Final    Comment: The 99th percentile for upper reference range is 0.045 ug/L.  Troponin values   in the range of 0.045 - 0.120 ug/L may be associated with risks of adverse   clinical events.       Interpretation ECG 09/12/2020 Click View Image link to view waveform and result   Final     Color Urine 09/12/2020 Straw   Final     Appearance Urine 09/12/2020 Clear   Final     Glucose Urine 09/12/2020 Negative  NEG^Negative mg/dL Final     Bilirubin Urine 09/12/2020 Negative  NEG^Negative Final     Ketones Urine 09/12/2020 Negative  NEG^Negative mg/dL Final     Specific Gravity Urine 09/12/2020 1.006  1.003 - 1.035 Final     Blood Urine 09/12/2020 Negative  NEG^Negative Final     pH Urine 09/12/2020 6.5  5.0 - 7.0 pH Final     Protein Albumin Urine 09/12/2020 Negative  NEG^Negative mg/dL Final     Urobilinogen mg/dL 09/12/2020 Normal  0.0 - 2.0 mg/dL Final     Nitrite Urine 09/12/2020 Negative  NEG^Negative Final     Leukocyte Esterase Urine 09/12/2020 Negative  NEG^Negative Final     Source 09/12/2020 Midstream Urine   Final     WBC Urine 09/12/2020 1  0 - 5 /HPF Final     RBC Urine 09/12/2020 <1  0 - 2 /HPF Final     Squamous Epithelial /HPF Urine 09/12/2020 <1  0 - 1 /HPF Final       IMAGING: No recent    ASSESSMENT and PLAN:    Ms. Glendy Díaz is a 86 year old female with urge incontinence.  Different management options were discussed today with the patient including observation, Kegel exercises, dedicated pelvic floor physical therapy with biofeedback, medication therapy including anticholinergics and Myrbetriq, deferring bladder Botox injections, PTNS, InterStim and potential surgery to a consult with a urologist. The patient has elected to proceed with:  - Increase of Myrbetriq to 50mg daily.  -Estrogen cream three times a week near urethra (pea-sized amount): If >$40, she will let me know and we can get via a compound pharmacy.  -Eliminate irritants  -micro    Follow up  in 3 months to reassess, sooner as needed via virtual visit.    Video urodynamics and cystoscopy would be the next diagnostic steps to further evaluate the patient should she fail medication therapy.      I have enjoyed participating in the medical care of this very pleasant patient.  Please don't hesitate to contact me with any questions or concerns.     Magalys Escobedo PA-C  [facility]    30 minutes were spent with the patient today, > 50% in counseling and coordination of care of incontinence.

## 2020-11-04 NOTE — LETTER
11/4/2020       RE: Glendy Díaz  [address]  [place] [address]     Dear Colleague,    Thank you for referring your patient, Glendy Díaz, to the [facility] [place] at [facility]. Please see a copy of my visit note below.    CC: Urinary incontinence.    HPI: It is a pleasure to see Ms. Glendy Díaz, a 86 year old female seen today in the urology clinic in consultation from Dr. Davis for evaluation of urinary incontinence.  This problem has been going on for 5 years and has been getting progressively worse.  Incontinence is no associated with laughing, sneezing, coughing, and bending at the waist.  She has 1-2 episodes of incontinence per day and has been using 2-3 pads per day.      Ms. Díaz has tried Myrbetriq 25 mg (since Aug) in the past for her condition, which has helped some (and Lasix is more rarely used now).  The patient has urinary frequency and urgency.  She denies any dysuria, nocturia, hematuria, pyuria, hesitancy, intermittency, feeling of incomplete emptying, or any recent history of UTI's or stones.    The patient  Struggles with constipation.      Past Medical History:   Diagnosis Date     Anxiety      CKD (chronic kidney disease) stage 3, GFR 30-59 ml/min      Hematuria      Hyperlipidemia LDL goal <100      Hyperparathyroidism (H)      Hypertension     No Cardiologist     Incontinence      Leg weakness, bilateral      Mumps      Neck pain, chronic      Osteoarthritis      Osteopenia      Peripheral neuropathy      Past Surgical History:   Procedure Laterality Date     APPENDECTOMY       ARTHRODESIS FOOT  5/1/2014    Procedure: ARTHRODESIS FOOT;  Surgeon: Sid Marks DPM;  Location: [facility] OR     ARTHROSCOPY ANKLE, OPEN REPAIR LIGAMENT, COMBINED  5/31/2012    Procedure:COMBINED ARTHROSCOPY ANKLE, OPEN REPAIR LIGAMENT; LEFT ANKLE ARTHROSCOPY, LATERAL LIGAMENT RECONSTRUCTION, ENDOSCOPIC DRISS  BUNION SECOND TOE RAY CORRECTION    LEFT KNEE Marcaine AND CORTIZONE INJECTION, 5 CC Marcaine, 1 CC CELESTONE, ; Surgeon:AKSHAT GERMAN; Location:[facility]     BLADDER SURGERY       CATARACT IOL, RT/LT       CHOLECYSTECTOMY       CYSTOSCOPY       EXCISE MASS FOOT  12/4/2012    Procedure: EXCISE MASS FOOT;;  Surgeon: Akshat German MD;  Location: [facility]     HYSTERECTOMY TOTAL ABDOMINAL      ovaries are in     RELEASE TENDON TOE  5/1/2014    Procedure: RELEASE TENDON TOE;  Surgeon: Sid Marks DPM;  Location: [facility] OR     REMOVE HARDWARE FOOT  12/4/2012    Procedure: REMOVE HARDWARE FOOT;  REMOVAL HARDWARE(SYNTHES SCREW) LEFT FOOT, EXCISION OF INCLUSION CYST;  Surgeon: Akshat German MD;  Location: [facility]     REPAIR HAMMER TOE  5/1/2014    Procedure: REPAIR HAMMER TOE;  Surgeon: Sid Marks DPM;  Location: [facility] OR     REVERSE ARTHROPLASTY SHOULDER Right 7/18/2016    Procedure: REVERSE ARTHROPLASTY SHOULDER;  Surgeon: Marlo Alejandro MD;  Location:  OR     Current Outpatient Medications   Medication Sig Dispense Refill     acetaminophen-codeine (TYLENOL #3) 300-30 MG tablet Take 1 tablet by mouth nightly as needed for severe pain 20 tablet 0     aspirin 81 MG tablet Take 1 tablet (81 mg) by mouth daily 30 tablet 3     carvedilol (COREG) 6.25 MG tablet Take 1 tablet (6.25 mg) by mouth 2 times daily 180 tablet 0     cetirizine (ZYRTEC) 10 MG tablet Take 1 tablet (10 mg) by mouth every evening 90 tablet 3     Cholecalciferol (VITAMIN D) 2000 UNITS tablet Take 2,000 Units by mouth daily 100 tablet 3     fluticasone (FLONASE) 50 MCG/ACT nasal spray USE 1-2 SPRAYS INTO BOTH  NOSTRILS DAILY 48 g 2     furosemide (LASIX) 40 MG tablet TAKE 1 TO 2 TABLETS BY  MOUTH DAILY 180 tablet 1     gabapentin (NEURONTIN) 100 MG capsule TAKE 1 CAPSULE BY MOUTH IN  THE AFTERNOON AND 2  CAPSULES BY MOUTH AT  BEDTIME 270 capsule 3     mirabegron (MYRBETRIQ) 25 MG 24 hr tablet Take 1 tablet (25 mg) by  "mouth daily 90 tablet 1     traMADol (ULTRAM) 50 MG tablet Take 1 tablet (50 mg) by mouth 2 times daily as needed for moderate pain 40 tablet 0     albuterol (PROAIR HFA/PROVENTIL HFA/VENTOLIN HFA) 108 (90 BASE) MCG/ACT Inhaler Inhale 2 puffs into the lungs every 6 hours as needed for shortness of breath / dyspnea or wheezing (Patient not taking: Reported on 11/4/2020) 1 Inhaler 1     order for DME Equipment being ordered: surgicxal shoe size 10 (Patient not taking: Reported on 11/4/2020) 1 Device 0     senna-docusate (SENOKOT-S;PERICOLACE) 8.6-50 MG per tablet Take 1-2 tablets by mouth 2 times daily as needed for constipation (Patient not taking: Reported on 11/4/2020) 30 tablet 3     Allergies   Allergen Reactions     Amlodipine      Leg edema with 5 mg     Fosamax [Alendronic Acid]      Bone pain     Lisinopril      Increased potassium     Pravastatin      Muscle aches      Simvastatin      Muscle aches        FAMILY HISTORY: The patient denies any history of genitourinary carcinoma and no history of urolithiasis.    SOCIAL HISTORY: The patient does not smoke cigarettes, denies EtOH and illicit drug abuse.      ROS: A comprehensive 14 point ROS was obtained and negative except for that outlined above in the HPI.    PHYSICAL EXAM:   Vitals:    11/04/20 1316   BP: (!) 160/80   Pulse: 78   Weight: 76.7 kg (169 lb)   Height: 1.613 m (5' 3.5\")     GENERAL: Well groomed/well developed/well nourished female in NAD.  HEENT: EOMI, AT, NC.  SKIN: Warm to touch, dry.    RESP: No increased respiratory effort.  LYMPH: No LE edema.  ABD: Soft, NT  MS: Full ROM in extremities.  NEURO: Alert and oriented x 3.  PSYCH: Normal mood and affect, pleasant and agreeable during interview and exam.    PVR: Postvoid residual urine volume as measured by ultrasound was 13 mL.    Admission on 09/12/2020, Discharged on 09/12/2020   Component Date Value Ref Range Status     WBC 09/12/2020 5.9  4.0 - 11.0 10e9/L Final     RBC Count 09/12/2020 " 3.80  3.8 - 5.2 10e12/L Final     Hemoglobin 09/12/2020 11.9  11.7 - 15.7 g/dL Final     Hematocrit 09/12/2020 38.2  35.0 - 47.0 % Final     MCV 09/12/2020 101* 78 - 100 fl Final     MCH 09/12/2020 31.3  26.5 - 33.0 pg Final     MCHC 09/12/2020 31.2* 31.5 - 36.5 g/dL Final     RDW 09/12/2020 13.2  10.0 - 15.0 % Final     Platelet Count 09/12/2020 224  150 - 450 10e9/L Final     Diff Method 09/12/2020 Automated Method   Final     % Neutrophils 09/12/2020 54.4  % Final     % Lymphocytes 09/12/2020 34.2  % Final     % Monocytes 09/12/2020 7.7  % Final     % Eosinophils 09/12/2020 2.9  % Final     % Basophils 09/12/2020 0.5  % Final     % Immature Granulocytes 09/12/2020 0.3  % Final     Nucleated RBCs 09/12/2020 0  0 /100 Final     Absolute Neutrophil 09/12/2020 3.2  1.6 - 8.3 10e9/L Final     Absolute Lymphocytes 09/12/2020 2.0  0.8 - 5.3 10e9/L Final     Absolute Monocytes 09/12/2020 0.5  0.0 - 1.3 10e9/L Final     Absolute Eosinophils 09/12/2020 0.2  0.0 - 0.7 10e9/L Final     Absolute Basophils 09/12/2020 0.0  0.0 - 0.2 10e9/L Final     Abs Immature Granulocytes 09/12/2020 0.0  0 - 0.4 10e9/L Final     Absolute Nucleated RBC 09/12/2020 0.0   Final     Sodium 09/12/2020 138  133 - 144 mmol/L Final     Potassium 09/12/2020 4.5  3.4 - 5.3 mmol/L Final    Specimen slightly hemolyzed, potassium may be falsely elevated     Chloride 09/12/2020 109  94 - 109 mmol/L Final     Carbon Dioxide 09/12/2020 24  20 - 32 mmol/L Final     Anion Gap 09/12/2020 5  3 - 14 mmol/L Final     Glucose 09/12/2020 92  70 - 99 mg/dL Final     Urea Nitrogen 09/12/2020 27  7 - 30 mg/dL Final     Creatinine 09/12/2020 1.23* 0.52 - 1.04 mg/dL Final     GFR Estimate 09/12/2020 39* >60 mL/min/[1.73_m2] Final    Comment: Non  GFR Calc  Starting 12/18/2018, serum creatinine based estimated GFR (eGFR) will be   calculated using the Chronic Kidney Disease Epidemiology Collaboration   (CKD-EPI) equation.       GFR Estimate If Black  09/12/2020 46* >60 mL/min/[1.73_m2] Final    Comment:  GFR Calc  Starting 12/18/2018, serum creatinine based estimated GFR (eGFR) will be   calculated using the Chronic Kidney Disease Epidemiology Collaboration   (CKD-EPI) equation.       Calcium 09/12/2020 9.0  8.5 - 10.1 mg/dL Final     Troponin I ES 09/12/2020 <0.015  0.000 - 0.045 ug/L Final    Comment: The 99th percentile for upper reference range is 0.045 ug/L.  Troponin values   in the range of 0.045 - 0.120 ug/L may be associated with risks of adverse   clinical events.       Interpretation ECG 09/12/2020 Click View Image link to view waveform and result   Final     Color Urine 09/12/2020 Straw   Final     Appearance Urine 09/12/2020 Clear   Final     Glucose Urine 09/12/2020 Negative  NEG^Negative mg/dL Final     Bilirubin Urine 09/12/2020 Negative  NEG^Negative Final     Ketones Urine 09/12/2020 Negative  NEG^Negative mg/dL Final     Specific Gravity Urine 09/12/2020 1.006  1.003 - 1.035 Final     Blood Urine 09/12/2020 Negative  NEG^Negative Final     pH Urine 09/12/2020 6.5  5.0 - 7.0 pH Final     Protein Albumin Urine 09/12/2020 Negative  NEG^Negative mg/dL Final     Urobilinogen mg/dL 09/12/2020 Normal  0.0 - 2.0 mg/dL Final     Nitrite Urine 09/12/2020 Negative  NEG^Negative Final     Leukocyte Esterase Urine 09/12/2020 Negative  NEG^Negative Final     Source 09/12/2020 Midstream Urine   Final     WBC Urine 09/12/2020 1  0 - 5 /HPF Final     RBC Urine 09/12/2020 <1  0 - 2 /HPF Final     Squamous Epithelial /HPF Urine 09/12/2020 <1  0 - 1 /HPF Final       IMAGING: No recent    ASSESSMENT and PLAN:    Ms. Glendy Díaz is a 86 year old female with urge incontinence.  Different management options were discussed today with the patient including observation, Kegel exercises, dedicated pelvic floor physical therapy with biofeedback, medication therapy including anticholinergics and Myrbetriq, deferring bladder Botox injections, PTNS,  InterStim and potential surgery to a consult with a urologist. The patient has elected to proceed with:  - Increase of Myrbetriq to 50mg daily.  -Estrogen cream three times a week near urethra (pea-sized amount): If >$40, she will let me know and we can get via a compound pharmacy.  -Eliminate irritants  -micro    Follow up in 3 months to reassess, sooner as needed via virtual visit.    Video urodynamics and cystoscopy would be the next diagnostic steps to further evaluate the patient should she fail medication therapy.      I have enjoyed participating in the medical care of this very pleasant patient.  Please don't hesitate to contact me with any questions or concerns.     Magalys Escobedo PA-C  [facility]    30 minutes were spent with the patient today, > 50% in counseling and coordination of care of incontinence.

## 2020-11-04 NOTE — NURSING NOTE
Has  Long hx of  Urge incontinence  When she stand up she also has leakage . On Myrbetriq  And felt like it was working but now that she is on a diuretic it is not helping as much. PVR by bladder scan was 13ml.

## 2020-11-05 ENCOUNTER — TELEPHONE (OUTPATIENT)
Dept: INTERNAL MEDICINE | Facility: CLINIC | Age: 85
End: 2020-11-05

## 2020-11-05 ENCOUNTER — VIRTUAL VISIT (OUTPATIENT)
Dept: PALLIATIVE MEDICINE | Facility: CLINIC | Age: 85
End: 2020-11-05
Attending: FAMILY MEDICINE
Payer: MEDICARE

## 2020-11-05 ENCOUNTER — ALLIED HEALTH/NURSE VISIT (OUTPATIENT)
Dept: BEHAVIORAL HEALTH | Facility: CLINIC | Age: 85
End: 2020-11-05
Payer: MEDICARE

## 2020-11-05 VITALS
SYSTOLIC BLOOD PRESSURE: 169 MMHG | OXYGEN SATURATION: 98 % | BODY MASS INDEX: 29.47 KG/M2 | HEART RATE: 61 BPM | RESPIRATION RATE: 10 BRPM | DIASTOLIC BLOOD PRESSURE: 87 MMHG | WEIGHT: 169 LBS | TEMPERATURE: 97.4 F

## 2020-11-05 VITALS
RESPIRATION RATE: 16 BRPM | DIASTOLIC BLOOD PRESSURE: 88 MMHG | HEART RATE: 64 BPM | SYSTOLIC BLOOD PRESSURE: 152 MMHG | OXYGEN SATURATION: 96 % | TEMPERATURE: 98.1 F

## 2020-11-05 DIAGNOSIS — M51.369 LUMBAR DEGENERATIVE DISC DISEASE: ICD-10-CM

## 2020-11-05 DIAGNOSIS — I47.10 SVT (SUPRAVENTRICULAR TACHYCARDIA) (H): Primary | ICD-10-CM

## 2020-11-05 DIAGNOSIS — R29.898 LEG WEAKNESS, BILATERAL: Primary | ICD-10-CM

## 2020-11-05 DIAGNOSIS — M47.816 LUMBAR FACET ARTHROPATHY: ICD-10-CM

## 2020-11-05 DIAGNOSIS — M48.061 SPINAL STENOSIS OF LUMBAR REGION WITHOUT NEUROGENIC CLAUDICATION: ICD-10-CM

## 2020-11-05 DIAGNOSIS — M53.3 SI (SACROILIAC) JOINT DYSFUNCTION: Primary | ICD-10-CM

## 2020-11-05 LAB
INTERPRETATION ECG - MUSE: NORMAL
INTERPRETATION ECG - MUSE: NORMAL

## 2020-11-05 PROCEDURE — 99207 PR COMMUNITY PARAMEDIC - PATIENT NOT BILLABLE: CPT

## 2020-11-05 PROCEDURE — 99214 OFFICE O/P EST MOD 30 MIN: CPT | Mod: 95 | Performed by: NURSE PRACTITIONER

## 2020-11-05 ASSESSMENT — ENCOUNTER SYMPTOMS
NAUSEA: 1
PALPITATIONS: 1
FEVER: 0
SHORTNESS OF BREATH: 0
ABDOMINAL PAIN: 0
CHILLS: 0
VOMITING: 0

## 2020-11-05 ASSESSMENT — ACTIVITIES OF DAILY LIVING (ADL): DEPENDENT_IADLS:: INDEPENDENT

## 2020-11-05 ASSESSMENT — PAIN SCALES - GENERAL: PAINLEVEL: NO PAIN (0)

## 2020-11-05 NOTE — ED PROVIDER NOTES
History     Chief Complaint:  Palpitations     HPI  Glendy Díaz is a 86 year old year old female presents emergency department with palpitations, positional lightheadedness and generalized weakness.  Symptoms started around 6 PM this evening.  She denies chest pain, shortness of breath, headache, vomiting, or any other concerning symptoms.  Upon my arrival to the patient's room, SVT resolved and she reports feeling improved and able to sit up without feeling lightheaded and palpitations resolved.      Allergies:  Allergies   Allergen Reactions     Amlodipine      Leg edema with 5 mg     Fosamax [Alendronic Acid]      Bone pain     Lisinopril      Increased potassium     Pravastatin      Muscle aches      Simvastatin      Muscle aches        Medications:        acetaminophen-codeine (TYLENOL #3) 300-30 MG tablet       albuterol (PROAIR HFA/PROVENTIL HFA/VENTOLIN HFA) 108 (90 BASE) MCG/ACT Inhaler       aspirin 81 MG tablet       carvedilol (COREG) 6.25 MG tablet       cetirizine (ZYRTEC) 10 MG tablet       Cholecalciferol (VITAMIN D) 2000 UNITS tablet       estradiol (ESTRACE VAGINAL) 0.1 MG/GM vaginal cream       fluticasone (FLONASE) 50 MCG/ACT nasal spray       furosemide (LASIX) 40 MG tablet       gabapentin (NEURONTIN) 100 MG capsule       mirabegron (MYRBETRIQ) 25 MG 24 hr tablet       order for DME       senna-docusate (SENOKOT-S;PERICOLACE) 8.6-50 MG per tablet       traMADol (ULTRAM) 50 MG tablet        Medical History:   Past Medical History:   Diagnosis Date     Anxiety      CKD (chronic kidney disease) stage 3, GFR 30-59 ml/min      Hematuria      Hyperlipidemia LDL goal <100      Hyperparathyroidism (H)      Hypertension     No Cardiologist     Incontinence      Leg weakness, bilateral      Mumps      Neck pain, chronic      Osteoarthritis      Osteopenia      Peripheral neuropathy        Surgical History   Past Surgical History:   Procedure Laterality Date     APPENDECTOMY       ARTHRODESIS FOOT   5/1/2014    Procedure: ARTHRODESIS FOOT;  Surgeon: Sid Marks DPM;  Location: [facility]     ARTHROSCOPY ANKLE, OPEN REPAIR LIGAMENT, COMBINED  5/31/2012    Procedure:COMBINED ARTHROSCOPY ANKLE, OPEN REPAIR LIGAMENT; LEFT ANKLE ARTHROSCOPY, LATERAL LIGAMENT RECONSTRUCTION, ENDOSCOPIC KIRIT NAQVI SECOND TOE RAY CORRECTION    LEFT KNEE Marcaine AND CORTIZONE INJECTION, 5 CC Marcaine, 1 CC CELESTONE, ; Surgeon:VARSHA WILDER; Location:[facility]     BLADDER SURGERY       CATARACT IOL, RT/LT       CHOLECYSTECTOMY       CYSTOSCOPY       EXCISE MASS FOOT  12/4/2012    Procedure: EXCISE MASS FOOT;;  Surgeon: Varsha Wilder MD;  Location: [facility]     HYSTERECTOMY TOTAL ABDOMINAL      ovaries are in     RELEASE TENDON TOE  5/1/2014    Procedure: RELEASE TENDON TOE;  Surgeon: Sid Marks DPM;  Location: [facility]     REMOVE HARDWARE FOOT  12/4/2012    Procedure: REMOVE HARDWARE FOOT;  REMOVAL HARDWARE(SYNTHES SCREW) LEFT FOOT, EXCISION OF INCLUSION CYST;  Surgeon: Varsha Wilder MD;  Location: [facility]     REPAIR HAMMER TOE  5/1/2014    Procedure: REPAIR HAMMER TOE;  Surgeon: Sid Marks DPM;  Location: [facility]     REVERSE ARTHROPLASTY SHOULDER Right 7/18/2016    Procedure: REVERSE ARTHROPLASTY SHOULDER;  Surgeon: Marlo Alejandro MD;  Location: [facility] OR       Family History:   Family History   Problem Relation Age of Onset     Lipids Mother      Diabetes Mother      Circulatory Mother         Kidney disease     Diabetes Brother      Cancer - colorectal Brother      Breast Cancer Sister      Thyroid Disease Daughter      Cancer - colorectal Brother      Alcohol/Drug Brother        Social History:  Presents to the ED with daughter.   Social History     Tobacco Use     Smoking status: Former Smoker     Packs/day: 1.00     Years: 20.00     Pack years: 20.00     Types: Cigarettes     Smokeless tobacco: Never Used   Substance Use Topics     Alcohol use: No     Drug use: No          Review of  Systems   Constitutional: Negative for chills and fever.   Respiratory: Negative for shortness of breath.    Cardiovascular: Positive for palpitations. Negative for chest pain.   Gastrointestinal: Positive for nausea. Negative for abdominal pain and vomiting.   All other systems reviewed and are negative.      Physical Exam     Patient Vitals for the past 24 hrs:   BP Temp Temp src Pulse Resp SpO2 Weight   11/05/20 0040 -- -- -- 61 10 98 % --   11/05/20 0035 -- -- -- 68 8 98 % --   11/05/20 0030 (!) 169/87 -- -- 64 10 98 % --   11/05/20 0025 (!) 164/84 -- -- 65 19 99 % --   11/05/20 0015 -- -- -- 66 11 96 % --   11/05/20 0000 (!) 178/92 -- -- 70 20 97 % --   11/04/20 2345 (!) 171/92 -- -- 68 -- -- --   11/04/20 2330 (!) 183/93 -- -- 68 20 98 % --   11/04/20 2320 (!) 182/88 -- -- 70 15 98 % --   11/04/20 2315 -- -- -- 75 19 97 % --   11/04/20 2310 (!) 179/95 -- -- 75 22 98 % --   11/04/20 2250 (!) 165/122 97.4  F (36.3  C) Temporal 73 16 98 % 76.7 kg (169 lb)          Physical Exam  General: Alert, interactive.   Head:  Scalp is atraumatic.  Eyes:  EOM intact. The pupils are equal, round, and reactive to light. No scleral icterus.   ENT:                                      Ears:  The external ears are normal.  Nose:  The external nose is normal.  Throat:  The oropharynx is normal. Mucus membranes are moist.                 Neck:  Normal range of motion. There is no rigidity.   CV:  After converted to NSR: Regular rate and rhythm. No murmur. 2+ radial pulses  Resp:  Breath sounds are clear bilaterally. Non-labored, no retractions or accessory muscle use.  GI:  Abdomen is soft, no distension, no tenderness.   MS:  Normal range of motion.  No bilateral pitting edema.  Skin:  Warm and dry.   Neuro:  Strength and sensation grossly intact.   Psych:  Awake. Alert.  Appropriate interactions.     Emergency Department Course     ECG:  ECG taken at 2256, ECG read at 2302  Supraventricular tachycardia  Left anterior fascicular  block  Minimal voltage criteria for LVH, may be normal variant  Nonspecific ST and T wave abnormality  Abnormal ECG  Rate 140 bpm. NC interval * ms. QRS duration 94 ms. QT/QTc 312/476 ms. P-R-T axes * -47 68.    ECG:  ECG taken at 2310  Normal sinus rhythm  Left axis deviation  Moderate voltage criteria for LVH, may be normal variant  Abnormal ECG  Rate 75 bpm. NC interval 188 ms. QRS duration 92 ms. QT/QTc 408/455 ms. P-R-T axes 50 -39 50.      Laboratory:  Laboratory findings were communicated with the patient and daughter who voiced understanding of the findings.    Labs Ordered and Resulted from Time of ED Arrival Up to the Time of Departure from the ED   CBC WITH PLATELETS DIFFERENTIAL - Abnormal; Notable for the following components:       Result Value    RBC Count 3.79 (*)     MCHC 31.4 (*)     All other components within normal limits   BASIC METABOLIC PANEL - Abnormal; Notable for the following components:    Creatinine 1.19 (*)     GFR Estimate 41 (*)     GFR Estimate If Black 48 (*)     All other components within normal limits   MAGNESIUM   TROPONIN I      Interventions:   Medications   0.9% sodium chloride BOLUS (has no administration in time range)        Emergency Department Course:    2256 EKG obtained as noted above.    2308 Nursing notes and vitals reviewed.    2310 EKG obtained as noted above.  Patient noted to be normal sinus rhythm.    2312 I performed an exam of the patient as documented above.      IV was inserted and blood was drawn for laboratory testing, results above.     Findings and plan explained to the Patient and daughter. Patient discharged home with instructions regarding supportive care, medications, and reasons to return. The importance of close follow-up was reviewed.     Impression & Plan       Medical Decision Making:  Glendy Díaz is a 86 year old female who presents with an overall unwell feeling and palpitations.  Initial EKG reveals SVT.  When the patient got to the  room, noted patient converted to normal sinus rhythm.  Repeat EKG confirms normal sinus rhythm.  On my examination, patient felt improved and has a normal physical examination.  Patient had no chest pain during this and screening troponin negative.  Blood work including CBC and BMP overall unremarkable.  Creatinine at baseline.    Plan for discharge home with close follow-up with her primary care provider tomorrow to discuss further work-up.  Patient and daughter agree with this plan and understand return precautions.  All questions and concerns addressed prior to discharge home.    Diagnosis:     ICD-10-CM    1. SVT (supraventricular tachycardia) (H)  I47.1     resolved        Disposition:  Discharged to home.    Discharge Medications:  Discharge Medication List as of 11/5/2020 12:34 AM          Scribe Disclosure:  Ronda MERCEDES, am serving as a scribe at 11:10 PM on 11/4/2020 to document services personally performed by Rocio Harris PA-C based on my observations and the provider's statements to me.      Rocio Harris PA-C  11/05/20 0207

## 2020-11-05 NOTE — TELEPHONE ENCOUNTER
Patient's daughter is calling because Glendy was seen in the ED yesterday and they were told to follow up with the PCP to get a echocardiogram.

## 2020-11-05 NOTE — PATIENT INSTRUCTIONS
They will call to schedule an in person follow up visit.  We will do a physical exam at that time and discuss injection/medication recommendations.     ----------------------------------------------------------------  Clinic Number:  [phone]     Call with any questions about your care and for scheduling assistance.     Calls are returned Monday through Friday between 8 AM and 4:30 PM. We usually get back to you within 2 business days depending on the issue/request.    If we are prescribing your medications:    For opioid medication refills, call the clinic or send a [email or web address] message 7 days in advance.  Please include:    Name of requested medication    Name of the pharmacy.    For non-opioid medications, call your pharmacy directly to request a refill. Please allow 3-4 days to be processed.     Per [place] State Law:    All controlled substance prescriptions must be filled within 30 days of being written.      For those controlled substances allowing refills, pickup must occur within 30 days of last fill.      We believe regular attendance is key to your success in our program!      Any time you are unable to keep your appointment we ask that you call us at least 24 hours in advance to cancel.This will allow us to offer the appointment time to another patient.     Multiple missed appointments may lead to dismissal from the clinic.

## 2020-11-05 NOTE — ED NOTES
Assisted pt to the bathroom. Pt tolerated well. Upon sitting in bed she felt dizzy. Pts symptoms lasted three minutes. RN notified.

## 2020-11-05 NOTE — DISCHARGE INSTRUCTIONS
*Follow-up closely with primary care provider tomorrow to discuss echocardiogram and further work-up.  *Return to emergency department if you develop palpitations, lightheadedness, or any other concerning symptoms.

## 2020-11-06 ENCOUNTER — PATIENT OUTREACH (OUTPATIENT)
Dept: CARE COORDINATION | Facility: CLINIC | Age: 85
End: 2020-11-06

## 2020-11-06 DIAGNOSIS — Z71.89 OTHER SPECIFIED COUNSELING: ICD-10-CM

## 2020-11-06 NOTE — TELEPHONE ENCOUNTER
Recommendation  1. Echo -- To schedule this test please call [facility] at: [phone]   2.  Schedule follow-up visit after the echo

## 2020-11-06 NOTE — PROGRESS NOTES
Clinic Care Coordination Contact  Community Health Worker Initial Outreach            Patient accepts CC:Spoke with Denisse today. She is interesting to schedule to look at options that could be available to her mom or if she needed to move what would be the options.Currently Denisse is managing Salma groceries, takes her to appointments, cuts her hair and helps with her bills. Glendy does have health alert and is currently doing her laundry but its hard for her to put on the fitted sheet.  She has been having difficult remembering medications in the afternoon and evening and the  paramedic has been helping with this.     Denisse would like to have the call with the  with her sister. She wants to check her sisters schedule and will get back to me to schedule. We discussed if I didn't hear back from her mid week, I would try to reach back out. She said, that would be great.     Consent to communicate with Ban's Daughters on file

## 2020-11-06 NOTE — TELEPHONE ENCOUNTER
Call to daughter. Advised. Patient has virtual visit scheduled 11/10 for a preop for teeth extraction scheduled 11/12. Daughter asking if the fact that patient was seen in ED 11/4 and needs echo affects her ability to have this procedure on 11/12. Please advise.

## 2020-11-09 NOTE — PROGRESS NOTES
Clinic Care Coordination Contact  Community Health Worker Initial Outreach    CHW Initial Information Gathering:  Referral Source: Care Team  Preferred Hospital: [facility]  [phone]  Preferred Urgent Care: Other([facility])  Current living arrangement:: I live alone  Type of residence:: Independent Senior Living  Community Resources: Other (see comment)([facility] Paramedic)  Supplies used at home:: Incontinence Supplies, Nutritional Supplements  Equipment Currently Used at Home: walker, rolling, grab bar, toilet, shower chair, cane, straight, walker, standard  Informal Support system:: Children  No PCP office visit in Past Year: No  CHW Additional Questions  MyChart active?: No    Patient accepts CC: Yes. Patient scheduled for assessment with SW on 11/13/2020 at 11am. Patient noted desire to discuss Any support or resources. .   Glendy's daughters will be on the call, Call Denisse Salcedo confirmed Friday late afternoon would be the best time for the intake. Appointment scheduled

## 2020-11-10 ENCOUNTER — VIRTUAL VISIT (OUTPATIENT)
Dept: INTERNAL MEDICINE | Facility: CLINIC | Age: 85
End: 2020-11-10
Payer: MEDICARE

## 2020-11-10 DIAGNOSIS — I47.10 SVT (SUPRAVENTRICULAR TACHYCARDIA) (H): ICD-10-CM

## 2020-11-10 DIAGNOSIS — Z01.818 PREOP GENERAL PHYSICAL EXAM: Primary | ICD-10-CM

## 2020-11-10 DIAGNOSIS — I10 ESSENTIAL HYPERTENSION WITH GOAL BLOOD PRESSURE LESS THAN 140/90: Chronic | ICD-10-CM

## 2020-11-10 DIAGNOSIS — Z96.619 STATUS POST REVERSE TOTAL SHOULDER REPLACEMENT, UNSPECIFIED LATERALITY: ICD-10-CM

## 2020-11-10 DIAGNOSIS — R42 LIGHTHEADEDNESS: ICD-10-CM

## 2020-11-10 PROCEDURE — 99214 OFFICE O/P EST MOD 30 MIN: CPT | Mod: 95 | Performed by: INTERNAL MEDICINE

## 2020-11-10 RX ORDER — CARVEDILOL 6.25 MG/1
TABLET ORAL
Qty: 270 TABLET | Refills: 0 | Status: ON HOLD | OUTPATIENT
Start: 2020-11-10 | End: 2021-01-12

## 2020-11-10 NOTE — PROGRESS NOTES
"Glendy Díaz is a 86 year old female who is being evaluated via a billable video visit.      The patient has been notified of following:     \"This video visit will be conducted via a call between you and your physician/provider. We have found that certain health care needs can be provided without the need for an in-person physical exam.  This service lets us provide the care you need with a video conversation.  If a prescription is necessary we can send it directly to your pharmacy.  If lab work is needed we can place an order for that and you can then stop by our lab to have the test done at a later time.    Video visits are billed at different rates depending on your insurance coverage.  Please reach out to your insurance provider with any questions.    If during the course of the call the physician/provider feels a video visit is not appropriate, you will not be charged for this service.\"    Patient has given verbal consent for Video visit? Yes  How would you like to obtain your AVS? Mail a copy  If you are dropped from the video visit, the video invite should be resent to: Text to cell phone: [phone]  Will anyone else be joining your video visit? No            This is a VIDEO ( using Doximity)  encounter with the patient.       Location of the provider : office   Location of the patient : home      11:21 -- 11: 42      [facility] [place] [address] [place] [address]  [phone]  Dept: [phone]    PRE-OP EVALUATION:  Today's date: 11/10/2020    Glendy Díaz (: 1933) presents for pre-operative evaluation assessment as requested by Dr. Tate.  She requires evaluation and anesthesia risk assessment prior to undergoing surgery/procedure for treatment of oral surgery .    Proposed Surgery/ Procedure: oral procedure  Date of Surgery/ Procedure: 20  Time of Surgery/ Procedure: [facility]  Hospital/Surgical Facility: [facility]  Surgery Fax " Number: [phone]  Primary Physician: Veronica Davis  Type of Anesthesia Anticipated: to be determined    Preoperative Questionnaire:   No - Have you ever had a heart attack or stroke?  No - Have you ever had surgery on your heart or blood vessels, such as a stent, coronary (heart) bypass, or surgery on an artery in the head, neck, heart, or legs?  No - Do you have chest pain when you are physically active?  No - Do you have a history of heart failure?  No - Do you currently have a cold, bronchitis, or symptoms of other respiratory (head and chest) infections?  No - Do you have a cough, shortness of breath, or wheezing?  No - Do you or anyone in your family have a history of blood clots?  No - Do you or anyone in your family have a serious bleeding problem, such as long-lasting bleeding after surgeries or cuts?  No - Have you ever had anemia or been told to take iron pills?  No - Have you had any abnormal blood loss such as black, tarry or bloody stools, or abnormal vaginal bleeding?  No - Have you ever had a blood transfusion?  Yes - Are you willing to have a blood transfusion if it is medically needed before, during, or after your surgery?  No - Have you or anyone in your family ever had problems with anesthesia (sedation for surgery)?  No - Do you have sleep apnea, excessive snoring, or daytime drowsiness?   No - Do you have any artifical heart valves or other implanted medical devices, such as a pacemaker, defibrillator, or continuous glucose monitor?  Yes - Do you have any artifical joints?  -- R shoulder   No - Are you allergic to latex?  No - Is there any chance that you may be pregnant?    Patient has a Health Care Directive or Living Will:  YES    CC:  Preop for multiple medical problems.  Follow up ER visit Nov 4, 2020    HPI:    The patient is scheduled for oral surgery with Dr. Tate on  Nov 12  The oral surgeon already prescribed the Amoxi  Recent ER visit for lightheadedness when  she was diagnosed with SVT. ER note and labs reviewed. No change in tx was advised in ER. No other episodes since ER visit   No other acute complaints.    Assessment:  1. V72.83H Preop general physical exam _ I do not see any major contraindications for the patient to go through the scheduled surgery.    The proposed surgical procedure is considered   LOW  surgery risk.    For above listed surgery and anesthesia, Patient is at  MODERATE, risk for surgery/procedure and perioperative/procedure complications.      Cardiovascular risk  Assessment  -- low risk   -- No further cardiac work up is needed before this surgery.        ECG:    sinus rhythm, no changes suggestive for ischemia    Pulmonary Risk Assessment  -- low risk   -- The patient doesn't have chronic lung disease nor acute respiratory problems       Anemia Assessment :  -- no anemia - patient doesn't need further anemia work up      Blood Sugar Assessment  -- patient doesn't have DM      Anticoagulation assessment  -- patient does not take anticoagulation meds    Functional Status Assessment  POOR ( < 4 METS) -  Vacuuming\Activities of daily living (e.g., eating, dressing, bathing)\Walking 2 mph\Writings  MODERATE ( 4-7 METS): Cycling\Climbing a flight of stairs\Golf (without cart)\Walking 4 mph\Yardwork (e.g., raking leaves, weeding, pushing a power mower  EXCELLENT ( > 7 METS) Squash\Jogging (10-minute mile)\Scrubbing floors\Singles tennis        (I10) HTN, goal < 150/90  Comment: Not controlled   Plan: carvedilol (COREG) 6.25 MG tablet            (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 In the morning of  the surgery day you take with a sip of water just these meds: Carvedilol and Mirabegron The rest of the meds you resume after surgery.  2. Increase the Carvedilol to 1.5 tablet twice a day ( 1 tablet 6.25 mg )  3. Heart monitor - To schedule this test please call [place] [facility] at: [phone] -- after the oral surgery     Physical exam:    not currently breastfeeding.   NAD, appears comfortable         Hyperlipidemia Follow-Up      Are you regularly taking any medication or supplement to lower your cholesterol?   No    Are you having muscle aches or other side effects that you think could be caused by your cholesterol lowering medication?  No    Hypertension Follow-up      Do you check your blood pressure regularly outside of the clinic? No     Are you following a low salt diet? No    Are your blood pressures ever more than 140 on the top number (systolic) OR more   than 90 on the bottom number (diastolic), for example 140/90? No      How many servings of fruits and vegetables do you eat daily?  2-3    On average, how many sweetened beverages do you drink each day (Examples: soda, juice, sweet tea, etc.  Do NOT count diet or artificially sweetened beverages)?   0    How many days per week do you exercise enough to make your heart beat faster? 3 or less    How many minutes a day do you exercise enough to make your heart beat faster? 10 - 19    How many days per week do you miss taking your medication? 0         ROS:   as above     Patient Active Problem List   Diagnosis     Leg weakness, bilateral     Osteopenia     Neck pain, chronic     Hyperlipidemia with target LDL less than 130     Peripheral neuropathy     Hematuria     Family history of diabetes mellitus     Family hx of colon cancer     Advanced directives, counseling/discussion     Anxiety     Osteoarthrosis of knee     Medial meniscus tear     Closed fracture of tibial plateau     Hyperparathyroidism (H)     Controlled substance agreement signed     Status post  reverse total shoulder replacement     Anemia due to vitamin B12 deficiency, unspecified B12 deficiency type     HTN, goal < 150/90     Subclinical hyperthyroidism TSH 0.25 Sept 2019        Past Medical History:   Diagnosis Date     Anxiety      CKD (chronic kidney disease) stage 3, GFR 30-59 ml/min      Hematuria      Hyperlipidemia LDL goal <100      Hyperparathyroidism (H)      Hypertension     No Cardiologist     Incontinence      Leg weakness, bilateral      Mumps      Neck pain, chronic      Osteoarthritis      Osteopenia      Peripheral neuropathy       Past Surgical History:   Procedure Laterality Date     APPENDECTOMY       ARTHRODESIS FOOT  5/1/2014    Procedure: ARTHRODESIS FOOT;  Surgeon: Sid Marks DPM;  Location: [facility]     ARTHROSCOPY ANKLE, OPEN REPAIR LIGAMENT, COMBINED  5/31/2012    Procedure:COMBINED ARTHROSCOPY ANKLE, OPEN REPAIR LIGAMENT; LEFT ANKLE ARTHROSCOPY, LATERAL LIGAMENT RECONSTRUCTION, ENDOSCOPIC KIRIT NAQVI SECOND TOE RAY CORRECTION    LEFT KNEE Marcaine AND CORTIZONE INJECTION, 5 CC Marcaine, 1 CC CELESTONE, ; Surgeon:VARSHA WILDER; Location:[facility]     BLADDER SURGERY       CATARACT IOL, RT/LT       CHOLECYSTECTOMY       CYSTOSCOPY       EXCISE MASS FOOT  12/4/2012    Procedure: EXCISE MASS FOOT;;  Surgeon: Varsha Wilder MD;  Location: [facility]     HYSTERECTOMY TOTAL ABDOMINAL      ovaries are in     RELEASE TENDON TOE  5/1/2014    Procedure: RELEASE TENDON TOE;  Surgeon: Sid Marks DPM;  Location: [facility]     REMOVE HARDWARE FOOT  12/4/2012    Procedure: REMOVE HARDWARE FOOT;  REMOVAL HARDWARE(SYNTHES SCREW) LEFT FOOT, EXCISION OF INCLUSION CYST;  Surgeon: Varsha Wilder MD;  Location: [facility]     REPAIR HAMMER TOE  5/1/2014    Procedure: REPAIR HAMMER TOE;  Surgeon: Sid Marks DPM;  Location: [facility]     REVERSE ARTHROPLASTY SHOULDER Right 7/18/2016    Procedure: REVERSE ARTHROPLASTY SHOULDER;  Surgeon: Marlo Alejandro MD;   Location: [facility] OR        PSHx: No complications with prior surgeries or anesthesia     Soc Hx: No daily alcohol, no smoking       All: reviewed    Meds: reviewed  Current Outpatient Medications   Medication Sig Dispense Refill     acetaminophen-codeine (TYLENOL #3) 300-30 MG tablet Take 1 tablet by mouth nightly as needed for severe pain 20 tablet 0     aspirin 81 MG tablet Take 1 tablet (81 mg) by mouth daily 30 tablet 3     carvedilol (COREG) 6.25 MG tablet Take 1 tablet (6.25 mg) by mouth 2 times daily 180 tablet 0     cetirizine (ZYRTEC) 10 MG tablet Take 1 tablet (10 mg) by mouth every evening 90 tablet 3     Cholecalciferol (VITAMIN D) 2000 UNITS tablet Take 2,000 Units by mouth daily 100 tablet 3     estradiol (ESTRACE VAGINAL) 0.1 MG/GM vaginal cream Apply small amount to the vaginal opening and urethra M, W, F @ h.s. 42.5 g 3     fluticasone (FLONASE) 50 MCG/ACT nasal spray USE 1-2 SPRAYS INTO BOTH  NOSTRILS DAILY 48 g 2     furosemide (LASIX) 40 MG tablet TAKE 1 TO 2 TABLETS BY  MOUTH DAILY 180 tablet 1     gabapentin (NEURONTIN) 100 MG capsule TAKE 1 CAPSULE BY MOUTH IN  THE AFTERNOON AND 2  CAPSULES BY MOUTH AT  BEDTIME 270 capsule 3     mirabegron (MYRBETRIQ) 25 MG 24 hr tablet Take 1 tablet (25 mg) by mouth daily 90 tablet 1     order for DME Equipment being ordered: surgicxal shoe size 10 1 Device 0     albuterol (PROAIR HFA/PROVENTIL HFA/VENTOLIN HFA) 108 (90 BASE) MCG/ACT Inhaler Inhale 2 puffs into the lungs every 6 hours as needed for shortness of breath / dyspnea or wheezing (Patient not taking: Reported on 11/4/2020) 1 Inhaler 1            Veronica Steiner MD  Internal Medicine         MEDICAL HISTORY:     Patient Active Problem List    Diagnosis Date Noted     Hyperlipidemia with target LDL less than 130      Priority: High     Diagnosis updated by automated process. Provider to review and confirm.       Peripheral neuropathy      Priority: High     Subclinical hyperthyroidism TSH 0.25 Sept 2019  10/18/2020     Priority: Medium     HTN, goal < 150/90 10/18/2018     Priority: Medium     Anemia due to vitamin B12 deficiency, unspecified B12 deficiency type 10/24/2016     Priority: Medium     Status post reverse total shoulder replacement 07/18/2016     Priority: Medium     Controlled substance agreement signed 04/26/2016     Priority: Medium     Hyperparathyroidism (H)      Priority: Medium     Osteoarthrosis of knee 05/29/2015     Priority: Medium     Medial meniscus tear 05/29/2015     Priority: Medium     Closed fracture of tibial plateau 05/29/2015     Priority: Medium     Anxiety      Priority: Medium     Advanced directives, counseling/discussion 04/17/2012     Priority: Medium     Received outside advance directive for validation. Noted HC POA and Living will dated 9-2-10 previously scanned on 5-30-12. Duplicate copy of Living Will scanned on 8/2/12. Both documents validated as signed by patient and notarized. Both documents executed in [place] but meet [place] requirements for validity. Validation form sent to  to be scanned into EMR as Advance Directive/Living Will document. Updated designated decision makers in Demographics. View document and details in Code Status History Report. Please see advance directive for specifics. Tanvi Wong RN 8/14/2012    Patient states has Advance Directive and will bring in a copy to clinic 4/17/2012       Leg weakness, bilateral      Priority: Medium     Osteopenia      Priority: Medium     Hematuria      Priority: Medium     Neck pain, chronic      Priority: Low     Family history of diabetes mellitus      Priority: Low     Family hx of colon cancer      Priority: Low     2 brothers        Past Medical History:   Diagnosis Date     Anxiety      CKD (chronic kidney disease) stage 3, GFR 30-59 ml/min      Hematuria      Hyperlipidemia LDL goal <100      Hyperparathyroidism (H)      Hypertension     No Cardiologist     Incontinence      Leg weakness, bilateral       Mumps      Neck pain, chronic      Osteoarthritis      Osteopenia      Peripheral neuropathy      Past Surgical History:   Procedure Laterality Date     APPENDECTOMY       ARTHRODESIS FOOT  5/1/2014    Procedure: ARTHRODESIS FOOT;  Surgeon: Sid Marks DPM;  [facility]: [facility]     ARTHROSCOPY ANKLE, OPEN REPAIR LIGAMENT, COMBINED  5/31/2012    Procedure:COMBINED ARTHROSCOPY ANKLE, OPEN REPAIR LIGAMENT; LEFT ANKLE ARTHROSCOPY, LATERAL LIGAMENT RECONSTRUCTION, ENDOSCOPIC DRISS, BUNION SECOND TOE RAY CORRECTION    LEFT KNEE Marcaine AND CORTIZONE INJECTION, 5 CC Marcaine, 1 CC CELESTONE, ; Surgeon:VARSHA GERMAN; Location:[facility]     BLADDER SURGERY       CATARACT IOL, RT/LT       CHOLECYSTECTOMY       CYSTOSCOPY       EXCISE MASS FOOT  12/4/2012    Procedure: EXCISE MASS FOOT;;  Surgeon: Varsha German MD;  Location: [facility]     HYSTERECTOMY TOTAL ABDOMINAL      ovaries are in     RELEASE TENDON TOE  5/1/2014    Procedure: RELEASE TENDON TOE;  Surgeon: Sid Marks DPM;  Location: [facility]     REMOVE HARDWARE FOOT  12/4/2012    Procedure: REMOVE HARDWARE FOOT;  REMOVAL HARDWARE(SYNTHES SCREW) LEFT FOOT, EXCISION OF INCLUSION CYST;  Surgeon: Varsha German MD;  Location: [facility]     REPAIR HAMMER TOE  5/1/2014    Procedure: REPAIR HAMMER TOE;  Surgeon: Sid Marks DPM;  [facility]: [facility]     REVERSE ARTHROPLASTY SHOULDER Right 7/18/2016    Procedure: REVERSE ARTHROPLASTY SHOULDER;  Surgeon: Marlo Alejandro MD;  Location: [facility]     Current Outpatient Medications   Medication Sig Dispense Refill     acetaminophen-codeine (TYLENOL #3) 300-30 MG tablet Take 1 tablet by mouth nightly as needed for severe pain 20 tablet 0     aspirin 81 MG tablet Take 1 tablet (81 mg) by mouth daily 30 tablet 3     carvedilol (COREG) 6.25 MG tablet Take 1 tablet (6.25 mg) by mouth 2 times daily 180 tablet 0     cetirizine (ZYRTEC) 10 MG tablet Take 1 tablet (10 mg) by mouth every evening 90  tablet 3     Cholecalciferol (VITAMIN D) 2000 UNITS tablet Take 2,000 Units by mouth daily 100 tablet 3     estradiol (ESTRACE VAGINAL) 0.1 MG/GM vaginal cream Apply small amount to the vaginal opening and urethra M, W, F @ h.s. 42.5 g 3     fluticasone (FLONASE) 50 MCG/ACT nasal spray USE 1-2 SPRAYS INTO BOTH  NOSTRILS DAILY 48 g 2     furosemide (LASIX) 40 MG tablet TAKE 1 TO 2 TABLETS BY  MOUTH DAILY 180 tablet 1     gabapentin (NEURONTIN) 100 MG capsule TAKE 1 CAPSULE BY MOUTH IN  THE AFTERNOON AND 2  CAPSULES BY MOUTH AT  BEDTIME 270 capsule 3     mirabegron (MYRBETRIQ) 25 MG 24 hr tablet Take 1 tablet (25 mg) by mouth daily 90 tablet 1     order for DME Equipment being ordered: surgicxal shoe size 10 1 Device 0     albuterol (PROAIR HFA/PROVENTIL HFA/VENTOLIN HFA) 108 (90 BASE) MCG/ACT Inhaler Inhale 2 puffs into the lungs every 6 hours as needed for shortness of breath / dyspnea or wheezing (Patient not taking: Reported on 11/4/2020) 1 Inhaler 1     OTC products: None, except as noted above    Allergies   Allergen Reactions     Amlodipine      Leg edema with 5 mg     Fosamax [Alendronic Acid]      Bone pain     Lisinopril      Increased potassium     Pravastatin      Muscle aches      Simvastatin      Muscle aches       Latex Allergy: NO    Social History     Tobacco Use     Smoking status: Former Smoker     Packs/day: 1.00     Years: 20.00     Pack years: 20.00     Types: Cigarettes     Smokeless tobacco: Never Used   Substance Use Topics     Alcohol use: No     History   Drug Use No       REVIEW OF SYSTEMS:       Recent Labs   Lab Test 11/04/20  2318 09/12/20  0736 07/12/19  0911 07/12/19  0911 03/30/18  0816 03/30/18  0816 02/27/15  1044 02/27/15  1044   HGB 11.8 11.9   < > 12.1   < >  --    < > 13.0    224   < > 205   < >  --    < > 222   INR  --   --   --  0.91  --   --   --   --     138   < > 142   < > 141   < > 141   POTASSIUM 3.8 4.5   < > 4.3   < > 4.3   < > 5.4*   CR 1.19* 1.23*   < >  1.25*   < > 1.35*   < > 1.22*   A1C  --   --   --   --   --  5.0  --  5.3    < > = values in this interval not displayed.        APPROVAL GIVEN to proceed with proposed procedure, without further diagnostic evaluation       Signed Electronically by: Veronica Davis MD    Copy of this evaluation report is provided to requesting physician.    [place] Preop Guidelines    Revised Cardiac Risk Index

## 2020-11-10 NOTE — PROGRESS NOTES
[facility] Paramedics Follow-up Visit  November 5, 2020  TIME: 1400    lGendy Díaz is a 86 year old female being seen at home for a follow-up visit.    Present at appointment:  patient         Chief Complaint   Patient presents with     ER F/U       Universal Utilization:      Utilization    Last refreshed: 11/9/2020  5:39 PM: Hospital Admissions 0           Last refreshed: 11/9/2020  5:39 PM: ED Visits 2           Last refreshed: 11/9/2020  5:39 PM: No Show Count (past year) 1              Current as of: 11/9/2020  5:39 PM              BP (!) 152/88   Pulse 64   Temp 98.1  F (36.7  C)   Resp 16   SpO2 96%     Clinical Concerns:  Current Medical Concerns: SVT, Dental implants  Current Behavioral Concerns: NA    Education Provided to patient: no   Medication set up? No  Pill Box issued: No  Scale issued: No  Health Maintenance Reviewed:      Clinical Pathway: None    No  Face to Face in Home / Community      Review of Symptoms/PE    Skin: negative  Eyes: glasses  Ears/Nose/Throat: dental problem, loose teeth,   Respiratory: Dyspnea on exertion-   Cardiovascular: negative  Gastrointestinal: negative  Genitourinary: negative  Musculoskeletal: back pain and joint pain  Neurologic: negative  Psychiatric: negative    Pain Management::                 Plan:     Time spent with patient: 45    The patient meets one or more of the following criteria:  * Has received hospital emergency department services three or more times in four consecutive months within a twelve month period    Acute concern/Follow-up recommendations: Continue tx plan    Next CP visit scheduled: 11/19/2020    Issues for Provider to follow up on: The pt denies any recent episodes of tachycardia and is happy she has connected with the pain clinic.  Her major concern is being able to start the process of implants for her teeth on 11/12/2020    Provider follow up visit needed: 11/10/2020

## 2020-11-11 NOTE — PROGRESS NOTES
Clinic Care Coordination Contact  Community Health Worker Initial Outreach    CHW Initial Information Gathering:  Referral Source: Care Team  Preferred Hospital: [facility]  [phone]  Preferred Urgent Care: Other([facility])  Current living arrangement:: I live alone  Type of residence:: Independent Senior Living  Community Resources: Other (see comment)([facility])  Supplies used at home:: Incontinence Supplies, Nutritional Supplements  Equipment Currently Used at Home: walker, rolling, grab bar, toilet, shower chair, cane, straight, walker, standard  Informal Support system:: Children  No PCP office visit in Past Year: No  CHW Additional Questions  MyChart active?: No    Patient accepts CC: Yes. Patient scheduled for assessment with SW on 11/20 at 11am. Patient noted desire to discuss resources and support with her daughters.     Denisse left me a message to reschedule for 11/20. Appointment has been updated and left message with this change.

## 2020-11-12 ENCOUNTER — NURSE TRIAGE (OUTPATIENT)
Dept: NURSING | Facility: CLINIC | Age: 85
End: 2020-11-12

## 2020-11-12 NOTE — TELEPHONE ENCOUNTER
Oral surgery today ([facility] oral surgery [facility]) - teeth extracted. Sent home with ibuprofen 600 mg four times daily for next 4-5 days. Want her to take it for swelling. Patient thinks her PCP has told her to not take ibuprofen. Is it ok if patient takes the recommended dose from the oral surgeon?    High priority message sent to clinic at 1615. Called clinic @ 1645 to follow up. RN in clinic states there are no providers in clinic to ask - recommended paging on -call provider.    Paged on call Dr. Garcia @ 1656.     Paged on call Dr. Garcia @ 1713.     Paged on call Dr. Garcia @ 1733.    0856 - discussed with Dr. Garcia. Reviewed GFR which is 41. On call MD advises to hold medication until Dr. Davis is able to review the chart. Recommends ice for swelling and tylenol for pain.     1800 - called daughter back - advised of above - will send high priority message to clinic.    Jamia Bass RN on 11/12/2020 at 4:13 PM    Additional Information    Negative: Drug overdose and triager unable to answer question    Negative: Caller requesting information unrelated to medicine    Negative: Caller requesting a prescription for Strep throat and has a positive culture result    Negative: Rash while taking a medication or within 3 days of stopping it    Negative: Immunization reaction suspected    Negative: Asthma and having symptoms of asthma (cough, wheezing, etc.)    Negative: Breastfeeding questions about mother's medicines and diet    Negative: MORE THAN A DOUBLE DOSE of a prescription or over-the-counter (OTC) drug    Negative: DOUBLE DOSE (an extra dose or lesser amount) of over-the-counter (OTC) drug and any symptoms (e.g., dizziness, nausea, pain, sleepiness)    Negative: DOUBLE DOSE (an extra dose or lesser amount) of prescription drug and any symptoms (e.g., dizziness, nausea, pain, sleepiness)    Negative: Took another person's prescription drug    Negative: DOUBLE DOSE (an extra dose or lesser amount) of  prescription drug and NO symptoms (Exception: a double dose of antibiotics)    Negative: Diabetes drug error or overdose (e.g., took wrong type of insulin or took extra dose)    Negative: Caller has medication question about med not prescribed by PCP and triager unable to answer question (e.g., compatibility with other med, storage)    Negative: Request for URGENT new prescription or refill of 'essential' medication (i.e., likelihood of harm to patient if not taken) and triager unable to fill per department policy    Negative: Prescription not at pharmacy and was prescribed today by PCP    Negative: Pharmacy calling with prescription questions and triager unable to answer question    Negative: Caller has urgent medication question about med that PCP prescribed and triager unable to answer question    Caller has NON-URGENT medication question about med that PCP prescribed and triager unable to answer question    Protocols used: MEDICATION QUESTION CALL-A-[place]

## 2020-11-17 ENCOUNTER — HOSPITAL ENCOUNTER (OUTPATIENT)
Dept: CARDIOLOGY | Facility: CLINIC | Age: 85
End: 2020-11-17
Attending: INTERNAL MEDICINE
Payer: MEDICARE

## 2020-11-17 DIAGNOSIS — I47.10 SVT (SUPRAVENTRICULAR TACHYCARDIA) (H): ICD-10-CM

## 2020-11-17 DIAGNOSIS — R42 LIGHTHEADEDNESS: ICD-10-CM

## 2020-11-17 PROCEDURE — 0298T LEADLESS EKG MONITOR 3 TO 14 DAYS: CPT | Performed by: INTERNAL MEDICINE

## 2020-11-17 PROCEDURE — 0296T LEADLESS EKG MONITOR 3 TO 14 DAYS: CPT

## 2020-11-17 PROCEDURE — 93306 TTE W/DOPPLER COMPLETE: CPT

## 2020-11-17 PROCEDURE — 93306 TTE W/DOPPLER COMPLETE: CPT | Mod: 26 | Performed by: INTERNAL MEDICINE

## 2020-11-19 ENCOUNTER — ALLIED HEALTH/NURSE VISIT (OUTPATIENT)
Dept: BEHAVIORAL HEALTH | Facility: CLINIC | Age: 85
End: 2020-11-19
Payer: MEDICARE

## 2020-11-19 ENCOUNTER — VIRTUAL VISIT (OUTPATIENT)
Dept: INTERNAL MEDICINE | Facility: CLINIC | Age: 85
End: 2020-11-19
Payer: MEDICARE

## 2020-11-19 ENCOUNTER — ANCILLARY PROCEDURE (OUTPATIENT)
Dept: GENERAL RADIOLOGY | Facility: CLINIC | Age: 85
End: 2020-11-19
Attending: INTERNAL MEDICINE
Payer: MEDICARE

## 2020-11-19 VITALS
DIASTOLIC BLOOD PRESSURE: 62 MMHG | TEMPERATURE: 98.7 F | SYSTOLIC BLOOD PRESSURE: 138 MMHG | RESPIRATION RATE: 16 BRPM | OXYGEN SATURATION: 98 % | HEART RATE: 80 BPM

## 2020-11-19 DIAGNOSIS — I10 ESSENTIAL HYPERTENSION WITH GOAL BLOOD PRESSURE LESS THAN 140/90: Primary | ICD-10-CM

## 2020-11-19 DIAGNOSIS — M79.672 LEFT FOOT PAIN: ICD-10-CM

## 2020-11-19 DIAGNOSIS — I47.10 SVT (SUPRAVENTRICULAR TACHYCARDIA) (H): Primary | ICD-10-CM

## 2020-11-19 PROCEDURE — 99214 OFFICE O/P EST MOD 30 MIN: CPT | Mod: 95 | Performed by: INTERNAL MEDICINE

## 2020-11-19 PROCEDURE — 73630 X-RAY EXAM OF FOOT: CPT | Mod: LT | Performed by: RADIOLOGY

## 2020-11-19 PROCEDURE — 99207 PR COMMUNITY PARAMEDIC - PATIENT NOT BILLABLE: CPT

## 2020-11-19 ASSESSMENT — ACTIVITIES OF DAILY LIVING (ADL): DEPENDENT_IADLS:: INDEPENDENT

## 2020-11-19 NOTE — PROGRESS NOTES
"Glendy Díaz is a 87 year old female who is being evaluated via a billable video visit.      The patient has been notified of following:     \"This video visit will be conducted via a call between you and your physician/provider. We have found that certain health care needs can be provided without the need for an in-person physical exam.  This service lets us provide the care you need with a video conversation.  If a prescription is necessary we can send it directly to your pharmacy.  If lab work is needed we can place an order for that and you can then stop by our lab to have the test done at a later time.    Video visits are billed at different rates depending on your insurance coverage.  Please reach out to your insurance provider with any questions.    If during the course of the call the physician/provider feels a video visit is not appropriate, you will not be charged for this service.\"    Patient has given verbal consent for Video visit? Yes  How would you like to obtain your AVS? Mail a copy  If you are dropped from the video visit, the video invite should be resent to: Text to cell phone: [phone]  Will anyone else be joining your video visit? No        This is a VIDEO ( using Doximity)  encounter with the patient.       Location of the provider : office  Location of the patient : home          08:31 -- first attempt   10:37 -- second attempt - [id number]  10:43 -- third  attempt   11:00 -- 11:15         Dr Steiner's note      Patient's instructions / PLAN:                                                        Plan:  1. Continue same meds, same doses for now   2. Foot XRay         ASSESSMENT & PLAN:                                                        (I47.1) SVT (supraventricular tachycardia) (H)  (primary encounter diagnosis)  Comment: symptoms better   Plan: wait for Zio results  Continue same meds, same doses for now     (M79.672) Left foot pain  Comment:   Plan: XR Foot " Left G/E 3 Views               Chief complaint:                                                      F/u lightheadedness and echo   Daughter present     SUBJECTIVE:                                                    History of present illness:      Glendy Díaz is a 87 year old female who presents today via video visit for the following health issues:  Patient is being seen for an follow up regarding her echo and to go over recent medication changes. Patient fell a week ago and her right foot is black and blue patients foot look swollen.    Episodes of lightheadedness   -- she hasn't experienced since the increased Carvedilol dose   -- she has the Zio monitor on to be returned on Dec 1.   -- echo - no change compare w 2016. LVEF 55-60. Small PFO    Fell last week Nov 11 and injured left foot  Exam : L foot edema and some echimoses     LOV Plan:  1. In the morning of the surgery day you take with a sip of water just these meds: Carvedilol and Mirabegron The rest of the meds you resume after surgery.  2. Increase the Carvedilol to 1.5 tablet twice a day ( 1 tablet 6.25 mg )  3. Heart monitor - To schedule this test please call [facility] at: [phone] -- after the oral surgery        Review of Systems:                                                      ROS: negative for fever, chills, cough, wheezes, chest pain, shortness of breath, vomiting, abdominal pain, leg swelling       OBJECTIVE:           An actual physical exam can't be done during phone visit   A limited exam can sometimes be performed by video visit   NAD      PMHx: reviewed  Past Medical History:   Diagnosis Date     Anxiety      CKD (chronic kidney disease) stage 3, GFR 30-59 ml/min      Hematuria      Hyperlipidemia LDL goal <100      Hyperparathyroidism (H)      Hypertension     No Cardiologist     Incontinence      Leg weakness, bilateral      Mumps      Neck pain, chronic      Osteoarthritis      Osteopenia      Peripheral neuropathy        PSHx: reviewed  Past Surgical History:   Procedure Laterality Date     APPENDECTOMY       ARTHRODESIS FOOT  5/1/2014    Procedure: ARTHRODESIS FOOT;  Surgeon: Sid Marks DPM;  Location: [facility]     ARTHROSCOPY ANKLE, OPEN REPAIR LIGAMENT, COMBINED  5/31/2012    Procedure:COMBINED ARTHROSCOPY ANKLE, OPEN REPAIR LIGAMENT; LEFT ANKLE ARTHROSCOPY, LATERAL LIGAMENT RECONSTRUCTION, ENDOSCOPIC DRISS, BUNION SECOND TOE RAY CORRECTION    LEFT KNEE Marcaine AND CORTIZONE INJECTION, 5 CC Marcaine, 1 CC CELESTONE, ; Surgeon:VARSHA GERMAN; Location:[facility]     BLADDER SURGERY       CATARACT IOL, RT/LT       CHOLECYSTECTOMY       CYSTOSCOPY       EXCISE MASS FOOT  12/4/2012    Procedure: EXCISE MASS FOOT;;  Surgeon: Varsha German MD;  Location: [facility]     HYSTERECTOMY TOTAL ABDOMINAL      ovaries are in     RELEASE TENDON TOE  5/1/2014    Procedure: RELEASE TENDON TOE;  Surgeon: Sid Marks DPM;  Location: [facility]     REMOVE HARDWARE FOOT  12/4/2012    Procedure: REMOVE HARDWARE FOOT;  REMOVAL HARDWARE(SYNTHES SCREW) LEFT FOOT, EXCISION OF INCLUSION CYST;  Surgeon: Varsha German MD;  Location: [facility]     REPAIR HAMMER TOE  5/1/2014    Procedure: REPAIR HAMMER TOE;  Surgeon: Sid Marks DPM;  Location: [facility]     REVERSE ARTHROPLASTY SHOULDER Right 7/18/2016    Procedure: REVERSE ARTHROPLASTY SHOULDER;  Surgeon: Marlo Alejandro MD;  Location: [facility] OR        Meds: reviewed  Current Outpatient Medications   Medication Sig Dispense Refill     acetaminophen-codeine (TYLENOL #3) 300-30 MG tablet Take 1 tablet by mouth nightly as needed for severe pain 20 tablet 0     albuterol (PROAIR HFA/PROVENTIL HFA/VENTOLIN HFA) 108 (90 BASE) MCG/ACT Inhaler Inhale 2 puffs into the lungs every 6 hours as needed for shortness of breath / dyspnea or wheezing 1 Inhaler 1     aspirin 81 MG tablet Take 1 tablet (81 mg) by mouth daily 30 tablet 3     carvedilol (COREG) 6.25 MG tablet Take 1.5 tablet  twice a day 270 tablet 0     cetirizine (ZYRTEC) 10 MG tablet Take 1 tablet (10 mg) by mouth every evening 90 tablet 3     Cholecalciferol (VITAMIN D) 2000 UNITS tablet Take 2,000 Units by mouth daily 100 tablet 3     estradiol (ESTRACE VAGINAL) 0.1 MG/GM vaginal cream Apply small amount to the vaginal opening and urethra M, W, F @ h.s. 42.5 g 3     fluticasone (FLONASE) 50 MCG/ACT nasal spray USE 1-2 SPRAYS INTO BOTH  NOSTRILS DAILY 48 g 2     furosemide (LASIX) 40 MG tablet TAKE 1 TO 2 TABLETS BY  MOUTH DAILY 180 tablet 1     gabapentin (NEURONTIN) 100 MG capsule TAKE 1 CAPSULE BY MOUTH IN  THE AFTERNOON AND 2  CAPSULES BY MOUTH AT  BEDTIME 270 capsule 3     mirabegron (MYRBETRIQ) 25 MG 24 hr tablet Take 1 tablet (25 mg) by mouth daily 90 tablet 1     order for DME Equipment being ordered: surgicxal shoe size 10 1 Device 0       Soc Hx: reviewed  Fam Hx: reviewed          Veronica Steiner MD  Internal Medicine

## 2020-11-20 ENCOUNTER — PATIENT OUTREACH (OUTPATIENT)
Dept: NURSING | Facility: CLINIC | Age: 85
End: 2020-11-20
Attending: INTERNAL MEDICINE
Payer: MEDICARE

## 2020-11-20 ASSESSMENT — ACTIVITIES OF DAILY LIVING (ADL): DEPENDENT_IADLS:: MEDICATION MANAGEMENT;TRANSPORTATION;CLEANING

## 2020-11-20 NOTE — LETTER
[facility]  [address]  [place] [address]  November 20, 2020      Dear Abigail and Yola,    I am a clinic care coordinator who works with Veronica Davis MD at the [facility]. Thank you for taking the time to speak with me over the phone earlier today! As we discussed, I've put together some resources and information that I hope will be helpful:    -[facility]: You or your loved one s living arrangements and personal care are important, but highly personal issues. When making these decisions, it can be important to have options and guidance from someone who understand your needs. At [facility], we understand the importance of receiving the right information to make the best possible decisions, and we re committed to helping seniors and their families make these tough choices. Our senior living consultants in [place] are here to guide you and offer the resources your family needs. We take the time to get to know your unique situation so that we can offer tailored advice and recommendations that will suit you. Our valuable insight and experience are yours--completely free and without any obligations. We simply want to help you and your loved ones find the perfect housing solutions and senior living resources, no matter what your requirements are.  Contact us now at [phone] for a free, no-obligation consultation and in-depth discussion.   [email or web address]/    -Senior  Services, LSS: Our Senior  service is available across [place] and assists older adults so they are able to remain healthy and active in their own homes?for as long as possible.   Our companions are trained volunteers who visit three to four hours per week to offer support, friendship and transportation in the community.  This LSS service is provided in partnership with [employer], formerly  known as [employer]. This is a national service agency  that connects volunteers who are 55+ with the people and organizations that need them the most.  Contact the [facility] representative in your area or call us toll-free at [phone].  https://www.[facility].org/services/older-adults/-services/senior-    -Respite Services, Transportation, [employer]ART[employer]: Everyone needs a break from their responsibilities once in a while. [employer] strives to make that break  happen for family caregivers by offering in-home respite volunteers for up to four hours a week.   Compassionate, well-trained volunteers provide supervision and companionship to the person who is frail or ill,  so that the caregiver can take a break, knowing their family member is safe.  Safe, caring, and professional rides for older adults in [facility] and the [facility] Area  https://[employer].org/    -Private Duty Home Care: You can search for various home care companies via this website.  Https://www.[place]help.info/  For reviewing home care ratings with Medicare, you can review: https://www.medicare.gov/care-compare/  A few of the more common agencies include [facility], [facility], and [facility].     -Help at Your Door:    Help At Your Door is a nonprofit serving seniors and individuals with disabilities across [place] s [facility] area.  Our grocery assistance, home support and transportation services provide help with in-home tasks and chores.  https://helpatPriceSpoturdoor.org/    -[facility] Transportation Resource Guide: [facility] Transportation Options  https://www.co.[place].us/Transportation/GettingAround/Documents/[facility] HospitalTransportationResourceGuide.pdf      Please feel free to contact me at [phone] with any questions or concerns, or if I missed anything that you may be interested in! We are focused on providing you with the highest-quality healthcare experience possible and that  all starts with you.     Sincerely,       Henri Hairston, [facility]  Clinic Care Coordinator  Ph. [phone]  [email or web address]      Enclosed: I have enclosed a copy of the Complex Care Plan. This has helpful information and goals that we have talked about. Please keep this in an easy to access place to use as needed.

## 2020-11-20 NOTE — PROGRESS NOTES
Clinic Care Coordination Contact    Clinic Care Coordination Contact  OUTREACH    Referral Information:  Referral Source: Care Team  Primary Diagnosis: Psychosocial    Chief Complaint   Patient presents with     Clinic Care Coordination - Post Hospital     ED Follow-Up        Universal Utilization: Reviewed on this date.   Difficulty keeping appointments: No  Compliance Concerns: No  No-Show Concerns: No  No PCP office visit in Past Year: No  Utilization    Last refreshed: 11/23/2020 12:02 PM: Hospital Admissions 0           Last refreshed: 11/23/2020 12:02 PM: ED Visits 2           Last refreshed: 11/23/2020 12:02 PM: No Show Count (past year) 1              Current as of: 11/23/2020 12:02 PM            Clinical Concerns:  Current Medical Concerns:    Patient Active Problem List   Diagnosis     Leg weakness, bilateral     Osteopenia     Neck pain, chronic     Hyperlipidemia with target LDL less than 130     Peripheral neuropathy     Hematuria     Family history of diabetes mellitus     Family hx of colon cancer     Advanced directives, counseling/discussion     Anxiety     Osteoarthrosis of knee     Medial meniscus tear     Closed fracture of tibial plateau     Hyperparathyroidism (H)     Controlled substance agreement signed     Status post reverse total shoulder replacement     Anemia due to vitamin B12 deficiency, unspecified B12 deficiency type     HTN, goal < 150/90     Subclinical hyperthyroidism TSH 0.25 Sept 2019     Per dtrs, some medical needs/procedures were delayed due to COVID-19. Patient recently had teeth extractions and is getting dental implants. She is also on a soft food diet.     They are awaiting results from an x-ray on her foot.   Patient also recently had an echo in follow-up to an ED visit. She will be wearing a heart monitor for a couple weeks.  Current Behavioral Concerns: None noted at this time.     Education Provided to patient: Role of  CC and reason for appointment.    Pain  Pain :  Yes  Type: Chronic (>3mo)  Location of chronic pain: hips, shoulder, hands, knees, back      Has arthritis  Radiating: No  Progression: Unchanged  Description of pain: Burning, Nagging, Numb, Penetrating, Sharp, Shooting, Stabbing, Throbbing  Chronic pain severity: 5  Limitation of routine activities due to chronic pain: Yes  Description: Able to do most things most days with some rest  Alleviating Factors: Rest, Heat, Exercise, Procedures or Injections, Pain Medication  Aggravating Factors: Activity, Positioning  Health Maintenance Reviewed: Not assessed  Clinical Pathway: None    Medication Management:  Managed with support of family.      Functional Status:  Dependent ADLs: Ambulation-walker  Dependent IADLs: Medication Management, Transportation, Cleaning  Bed or wheelchair confined: No  Mobility Status: Independent w/Device  Fallen 2 or more times in the past year?: Yes  Any fall with injury in the past year?: No    Living Situation:  Current living arrangement: I live alone  Type of residence: Independent Senior Living    Lifestyle & Psychosocial Needs:  Diet: Other(Soft food diet following recent teeth extractions)  Tube Feeding: No  Inadequate activity/exercise : No  Significant changes in sleep pattern : No  Transportation means: Family     Mandaen or spiritual beliefs that impact treatment: No  Mental health DX: Yes  Mental health DX how managed: None  Mental health management concern : Yes  Chemical Dependency Status: No Current Concerns  Informal Support system: Children   Socioeconomic History     Marital status:      Spouse name: Not on file     Number of children: Not on file     Years of education: Not on file     Highest education level: Not on file     Tobacco Use     Smoking status: Former Smoker     Packs/day: 1.00     Years: 20.00     Pack years: 20.00     Types: Cigarettes     Smokeless tobacco: Never Used   Substance and Sexual Activity     Alcohol use: No     Drug use: No     Sexual  "activity: Never     Dtrs report that Patient just turned 87, and has been living in a subsidized senior living apartment close to the hospital for the last 8.5 years. They report that she is doing \"pretty good.\"  They also note that Patient has been falling. She has had a life alert for about a month.     Pt's dtr report that she has recently been \"spiraling a little bit\" with health issues. They also note that Pt may be experiencing some depression because of COVID-19.     Dtrs report that it is getting to the point where she's going to need some help. Pt's dtr, Abigail, lives in [place], and has been providing frequent check-ins and visits, which Abigail report is \"a lot.\" Pt's other dtr, Yola, is a  and often out of town.     Abigail and Yola are aware of county benefits like the elderly waiver, but report that Patient has $144K in savings. We discussed that Pt would need to apply for assistance when she is about 3 months away from running out of funding for cares ([employer]).    We discussed that [facility] is available to assist with finding a higher level of care/services at no cost. They would be able to help navigate which options require a period of private-pay time, but also eventually accept the elderly waiver.     Pt's dtrs are agreeable to the following resources:   -Respite  -Private Duty Home Care  -Senior   -[facility]  -Transportation    They are most interested in options for transportation assistance and medication reminders/check-ins. See letter for resources emailed to dtrs.     Email: [email or web address]   [email or web address]     Resources and Interventions:  Community Resources: Other (see comment)([facility] Paramedic)  Supplies used at home: Incontinence Supplies, Nutritional Supplements  Equipment Currently Used at Home: walker, rolling, grab bar, toilet, shower chair, cane, straight, walker, standard  Employment Status: retired)    Advance Care Plan/Directive  Advanced Care " Plans/Directives on file: Yes  Type Advanced Care Plans/Directives: Advanced Directive - On File, POLST  Advanced Care Plan/Directive Status: Not Applicable    Referrals Placed: Community Resources, Senior Linkage Line, County Resources, Assisted Living, Transportation     Goals:   Goals        General    1. Psychosocial (pt-stated)     Notes - Note edited  11/23/2020  1:56 PM by Henri Owusu LGSW    Goal Statement: I want to access in-home services or a higher level of care within 6 months.  Date Goal set: 11.23.2020  Barriers: COVID-19, navigating agencies and services.  Strengths: Family support system, awareness of community resources, Financially feasible.   Date to Achieve By: 5.23.2021  Patient expressed understanding of goal: Pt reports understanding and denies any additional questions or concerns at this times. SW CC engaged in AIDET communication during encounter.    Action steps to achieve this goal:  1. I will review community resources and options with family.  2. I will contact services/programs I am interested in with family assistance.  3. I will access services as they are available.                Patient/Caregiver understanding: Pt reports understanding and denies any additional questions or concerns at this times. SW CC engaged in AIDET communication during encounter.    Outreach Frequency: monthly  Future Appointments              In 1 week 3, Rj Community Paramedic [facility], [facility]    In 2 months Magalys Escobedo PA-C [facility] [place]          Plan: Pt's dtrs will review the resources sent by ASAEL CC and pursue options of interest. CHW will outreach to Pt's dtr, Abigail, in one month to discuss goal progression and offer CC assistance/support. SW CC will remain available and will schedule a clinical review in 6 weeks.    Henri Hairston ASAEL  Clinic Care Coordinator  Ph.  [phone]  [email or web address]

## 2020-11-20 NOTE — LETTER
[facility]  Complex Care Plan  About Me:    Patient Name:  Glendy Díaz    YOB: 1933  Age:         87 year old   [place] MRN:    [id number] Telephone Information:  Home Phone [phone]   Mobile [phone]       Address:  [address] [place] [address] [address] Mares Iain  [facility] [address] Email address:  [email or web address]      Emergency Contact(s)    Name Relationship Lgl Grd Work Phone Home Phone Mobile Phone   1. TALIA CARDENAS Daughter  none [phone]   2. KATY CARDENAS Relative   [phone]           Primary language:  English     needed? No   [place] [employer]:  [phone] op. 1  Other communication barriers: None  Preferred Method of Communication:  Mail  Current living arrangement: I live alone  Mobility Status/ Medical Equipment: Independent w/Device    Health Maintenance  Health Maintenance Reviewed: Not assessed  Health Maintenance Due   Topic Date Due     URINE DRUG SCREEN  11/16/1933     ZOSTER IMMUNIZATION (1 of 2) 11/16/1983     MICROALBUMIN  07/14/2017     DTAP/TDAP/TD IMMUNIZATION (3 - Td) 01/27/2020     LIPID  09/24/2020       My Access Plan  Medical Emergency 911   Primary Clinic Line [facility] - [phone]   24 Hour Appointment Line [phone] or  [phone]) (toll-free)   24 Hour Nurse Line [phone] (toll-free)   Preferred Urgent Care Other([facility])   Preferred Hospital [facility]  [phone]   Preferred Pharmacy [facility] - [address]     Behavioral Health Crisis Line The National Suicide Prevention Lifeline at 1-725.691.6330 or 911       My Care Team Members  Patient Care Team       Relationship Specialty Notifications Start End    Veronica Davis MD PCP - General Internal Medicine  4/2/12     Phone: [phone] Pager: [phone] Fax: [phone]        [address]  [facility] [address]    Veronica Davis MD Assigned PCP   10/4/12     Phone: [phone] Pager: [phone] Fax: [phone]        [address] [facility] [address]    Yeo, Albert, MD Assigned Musculoskeletal Provider   10/23/20     Phone: [phone] Fax: [phone] [address] [facility] [address] [facility] [address]    Henri Owusu, [facility] Lead Care Coordinator   11/20/20     Hong Neri Community Health Worker   11/20/20             My Care Plans  Self Management and Treatment Plan  Goals and (Comments)  Goals        General    1. Psychosocial (pt-stated)       To Access community supports and services.      Advance Care Plans/Directives Type:   Type Advanced Care Plans/Directives: Advanced Directive - On File, POLST    My Medical and Care Information  Problem List   Patient Active Problem List   Diagnosis     Leg weakness, bilateral     Osteopenia     Neck pain, chronic     Hyperlipidemia with target LDL less than 130     Peripheral neuropathy     Hematuria     Family history of diabetes mellitus     Family hx of colon cancer     Advanced directives, counseling/discussion     Anxiety     Osteoarthrosis of knee     Medial meniscus tear     Closed fracture of tibial plateau     Hyperparathyroidism (H)     Controlled substance agreement signed     Status post reverse total shoulder replacement     Anemia due to vitamin B12 deficiency, unspecified B12 deficiency type     HTN, goal < 150/90     Subclinical hyperthyroidism TSH 0.25 Sept 2019      Current Medications and Allergies:  See printed Medication Report.  Current Outpatient Medications   Medication Instructions     acetaminophen-codeine (TYLENOL #3) 300-30 MG tablet 1 tablet, Oral, AT BEDTIME PRN     albuterol (PROAIR HFA/PROVENTIL HFA/VENTOLIN HFA) 108 (90 BASE) MCG/ACT Inhaler 2 puffs, Inhalation, EVERY 6 HOURS PRN     aspirin (ASA) 81 mg, Oral, DAILY     carvedilol (COREG) 6.25 MG tablet Take 1.5 tablet twice a day      cetirizine (ZYRTEC) 10 mg, Oral, EVERY EVENING     estradiol (ESTRACE VAGINAL) 0.1 MG/GM vaginal cream Apply small amount to the vaginal opening and urethra M, W, F @ h.s.     fluticasone (FLONASE) 50 MCG/ACT nasal spray USE 1-2 SPRAYS INTO BOTH  NOSTRILS DAILY     furosemide (LASIX) 40 MG tablet TAKE 1 TO 2 TABLETS BY  MOUTH DAILY     gabapentin (NEURONTIN) 100 MG capsule TAKE 1 CAPSULE BY MOUTH IN  THE AFTERNOON AND 2  CAPSULES BY MOUTH AT  BEDTIME     mirabegron (MYRBETRIQ) 25 mg, Oral, DAILY     order for DME Equipment being ordered: surgicxal shoe size 10     vitamin D3 (CHOLECALCIFEROL) 2,000 Units, Oral, DAILY       Care Coordination Start Date: 11/20/2020   Frequency of Care Coordination: monthly   Form Last Updated: 11/20/2020     Henri Hairston [facility]  Clinic Care Coordinator  Ph. [phone]  [email or web address]

## 2020-11-25 NOTE — PROGRESS NOTES
Community Paramedics Follow-up Visit  November 19, 2020  TIME: 1530    Glendy Díaz is a 87 year old female being seen at home for a follow-up visit.    Present at appointment: Patient         Chief Complaint   Patient presents with     Outreach     ER F/U       [place] Utilization:   Clinic Utilization  Difficulty keeping appointments:: No  Compliance Concerns: No  No-Show Concerns: No  No PCP office visit in Past Year: No  Utilization    Last refreshed: 11/24/2020  7:25 AM: Hospital Admissions 0           Last refreshed: 11/24/2020  7:25 AM: ED Visits 2           Last refreshed: 11/24/2020  7:25 AM: No Show Count (past year) 1              Current as of: 11/24/2020  7:25 AM              /62   Pulse 80   Temp 98.7  F (37.1  C)   Resp 16   SpO2 98%     Clinical Concerns:  Current Medical Concerns:  Possible fx of L foot, palpatations    Current Behavioral Concerns: no    Education Provided to patient: no   Medication set up? No  Pill Box issued: No  Scale issued: No  Health Maintenance Reviewed:      Clinical Pathway: None    No  Face to Face in Home / Community      Review of Symptoms/PE    Skin: negative  Eyes: negative  Ears/Nose/Throat: negative  Respiratory: No shortness of breath, dyspnea on exertion, cough, or hemoptysis  Cardiovascular: palpitations and tachycardia  Gastrointestinal: negative  Genitourinary: negative  Musculoskeletal: back pain, injury to L foot 1 week ago.  Foot swollen and bruised, Pt had dental implants placed and her mouth is sore with bruising  Around the mouth.  Neurologic: negative  Psychiatric: negative    Pain Management::  Pain (GOAL):: Yes  Type: Chronic (>3mo)  Location of chronic pain:: hips, shoulder, hands, knees       Has arthritis  Radiating: No  Progression: Unchanged  Description of pain: Burning, Nagging, Numb, Penetrating, Sharp, Shooting, Stabbing, Throbbing  Chronic pain severity:: 5  Limitation of routine activities due to chronic pain::  Yes  Description: Able to do most things most days with some rest  Alleviating Factors: Rest, Heat, Exercise, Procedures or Injections, Pain Medication  Aggravating Factors: Activity, Positioning    Medication Reconciliation  Medication adherence problem (GOAL):: No  Knowledgeable about how to use meds:: Yes  Medication side effects suspected:: No    Diet/Exercise/Sleep  Diet:: Regular  Tube Feeding: No  Inadequate activity/exercise (GOAL):: No  Significant changes in sleep pattern (GOAL): No    Plan:     Time spent with patient: 60    The patient meets one or more of the following criteria:  * Has received hospital emergency department services three or more times in four consecutive months within a twelve month period    Acute concern/Follow-up recommendations: Follow tx plan    Next CP visit scheduled: 12/3/2020    Issues for Provider to follow up on: The pt L foot is swollen with bruising.     Provider follow up visit needed: TBD

## 2020-12-03 ENCOUNTER — ALLIED HEALTH/NURSE VISIT (OUTPATIENT)
Dept: BEHAVIORAL HEALTH | Facility: CLINIC | Age: 85
End: 2020-12-03
Payer: MEDICARE

## 2020-12-03 VITALS
SYSTOLIC BLOOD PRESSURE: 124 MMHG | RESPIRATION RATE: 16 BRPM | TEMPERATURE: 99.5 F | HEART RATE: 80 BPM | DIASTOLIC BLOOD PRESSURE: 58 MMHG | OXYGEN SATURATION: 96 %

## 2020-12-03 DIAGNOSIS — I10 ESSENTIAL HYPERTENSION WITH GOAL BLOOD PRESSURE LESS THAN 140/90: Primary | ICD-10-CM

## 2020-12-03 PROCEDURE — 99207 PR COMMUNITY PARAMEDIC - PATIENT NOT BILLABLE: CPT

## 2020-12-03 ASSESSMENT — ACTIVITIES OF DAILY LIVING (ADL): DEPENDENT_IADLS:: INDEPENDENT

## 2020-12-07 ENCOUNTER — TELEPHONE (OUTPATIENT)
Dept: UROLOGY | Facility: CLINIC | Age: 85
End: 2020-12-07

## 2020-12-07 DIAGNOSIS — N32.81 OAB (OVERACTIVE BLADDER): Primary | ICD-10-CM

## 2020-12-07 RX ORDER — MIRABEGRON 50 MG/1
50 TABLET, EXTENDED RELEASE ORAL DAILY
Qty: 90 TABLET | Refills: 0 | Status: SHIPPED | OUTPATIENT
Start: 2020-12-07 | End: 2021-02-08

## 2020-12-07 NOTE — TELEPHONE ENCOUNTER
[facility] Call Center    Phone Message    May a detailed message be left on voicemail: yes     Reason for Call: Medication Refill Request    Has the patient contacted the pharmacy for the refill? Yes   Name of medication being requested: Mirabegron 25mg   Provider who prescribed the medication: Dr. Escobedo  Pharmacy: [facility]  Date medication is needed: 12/07/2020   3 month supply      Action Taken: Message routed to:  [facility] Urology    Travel Screening: Not Applicable

## 2020-12-07 NOTE — TELEPHONE ENCOUNTER
Spoke to Sharyn . She has been taking two pills of the 25  Mg . Will refill for the 50 mg dose taking one pill per day .Nina Cunningham LPN

## 2020-12-08 NOTE — PROGRESS NOTES
Community Paramedics Follow-up Visit  December 3, 2020  TIME: 1500    Glendy Díaz is a 87 year old female being seen at home for a follow-up visit.    Present at appointment: Patient         Chief Complaint   Patient presents with     Outreach       [place] Utilization:   Clinic Utilization  Difficulty keeping appointments:: No  Compliance Concerns: No  No-Show Concerns: No  No PCP office visit in Past Year: No  Utilization    Last refreshed: 12/7/2020 10:25 PM: Hospital Admissions 0           Last refreshed: 12/7/2020 10:25 PM: ED Visits 2           Last refreshed: 12/7/2020 10:25 PM: No Show Count (past year) 1              Current as of: 12/7/2020 10:25 PM              /58   Pulse 80   Temp 99.5  F (37.5  C)   Resp 16   SpO2 96%     Clinical Concerns:  Current Medical Concerns:  SVT, Dental implants    Current Behavioral Concerns: NA    Education Provided to patient: no   Medication set up? No  Pill Box issued: No  Scale issued: No  Health Maintenance Reviewed: Due/Overdue     Clinical Pathway: None    No  Face to Face in Home / Community        Review of Symptoms/PE    Skin: negative  Eyes: negative  Ears/Nose/Throat: dental problem  Respiratory: No shortness of breath, dyspnea on exertion, cough, or hemoptysis  Cardiovascular: palpitations and tachycardia  Gastrointestinal: negative  Genitourinary: negative  Musculoskeletal: back pain, joint swelling and joint stiffness  Neurologic: negative  Psychiatric: negative    Pain Management::  Pain (GOAL):: Yes  Type: Chronic (>3mo)  Location of chronic pain:: hips, shoulder, hands, knees       Has arthritis  Radiating: No  Progression: Unchanged  Description of pain: Burning, Nagging, Numb, Penetrating, Sharp, Shooting, Stabbing, Throbbing  Chronic pain severity:: 5  Limitation of routine activities due to chronic pain:: Yes  Description: Able to do most things most days with some rest  Alleviating Factors: Rest, Heat, Exercise, Procedures or  Injections, Pain Medication  Aggravating Factors: Activity, Positioning    Medication Reconciliation  Medication adherence problem (GOAL):: No  Knowledgeable about how to use meds:: Yes  Medication side effects suspected:: No    Diet/Exercise/Sleep  Diet:: Regular  Tube Feeding: No  Inadequate activity/exercise (GOAL):: No  Significant changes in sleep pattern (GOAL): No    Plan:     Time spent with patient: 45    The patient meets one or more of the following criteria:  * Has received hospital emergency department services three or more times in four consecutive months within a twelve month period    Acute concern/Follow-up recommendations: Continue tx plan    Next CP visit scheduled: 1/7/2021    Issues for Provider to follow up on: Pt reports palpitations lasting about 1 hour on 12/2 which resolved.     Provider follow up visit needed: Multiple upcoming

## 2020-12-11 ENCOUNTER — TELEPHONE (OUTPATIENT)
Dept: INTERNAL MEDICINE | Facility: CLINIC | Age: 85
End: 2020-12-11

## 2020-12-13 ENCOUNTER — MYC MEDICAL ADVICE (OUTPATIENT)
Dept: INTERNAL MEDICINE | Facility: CLINIC | Age: 85
End: 2020-12-13

## 2020-12-13 DIAGNOSIS — I47.10 SVT (SUPRAVENTRICULAR TACHYCARDIA) (H): Primary | ICD-10-CM

## 2020-12-15 NOTE — TELEPHONE ENCOUNTER
Daughter Abigail advised of monitor results and given phone number for the [facility].  KEVIN Zaidi R.N.

## 2020-12-21 ENCOUNTER — OFFICE VISIT (OUTPATIENT)
Dept: CARDIOLOGY | Facility: CLINIC | Age: 85
End: 2020-12-21
Attending: INTERNAL MEDICINE
Payer: MEDICARE

## 2020-12-21 ENCOUNTER — TELEPHONE (OUTPATIENT)
Dept: CARDIOLOGY | Facility: CLINIC | Age: 85
End: 2020-12-21

## 2020-12-21 VITALS
SYSTOLIC BLOOD PRESSURE: 158 MMHG | OXYGEN SATURATION: 97 % | BODY MASS INDEX: 28.09 KG/M2 | HEIGHT: 64 IN | DIASTOLIC BLOOD PRESSURE: 71 MMHG | HEART RATE: 66 BPM | WEIGHT: 164.5 LBS

## 2020-12-21 DIAGNOSIS — I47.10 SVT (SUPRAVENTRICULAR TACHYCARDIA) (H): ICD-10-CM

## 2020-12-21 DIAGNOSIS — Z11.59 ENCOUNTER FOR SCREENING FOR OTHER VIRAL DISEASES: Primary | ICD-10-CM

## 2020-12-21 DIAGNOSIS — I47.10 SVT (SUPRAVENTRICULAR TACHYCARDIA) (H): Primary | ICD-10-CM

## 2020-12-21 PROCEDURE — 99205 OFFICE O/P NEW HI 60 MIN: CPT | Performed by: INTERNAL MEDICINE

## 2020-12-21 RX ORDER — AMOXICILLIN 250 MG
1 CAPSULE ORAL DAILY PRN
COMMUNITY
End: 2022-03-03

## 2020-12-21 RX ORDER — TRAMADOL HYDROCHLORIDE 50 MG/1
50 TABLET ORAL 2 TIMES DAILY PRN
Status: ON HOLD | COMMUNITY
End: 2021-03-05

## 2020-12-21 ASSESSMENT — MIFFLIN-ST. JEOR: SCORE: 1158.23

## 2020-12-21 NOTE — TELEPHONE ENCOUNTER
12/21/20 Orders placed for SVT Ablation . Procedure ordered by Dr Truong, will be scheduled w Dr Kay.  [place] 1132 am

## 2020-12-21 NOTE — LETTER
12/21/2020      Veronica Davis MD  [address] [facility] [address]      RE: Glendy HUI Arjun       Dear Colleague,    I had the pleasure of seeing Glendy Díaz in the [facility].    Service Date: 12/21/2020      HISTORY OF PRESENT ILLNESS:  I had the pleasure of seeing this very pleasant 87-year-old lady who was asked by my good friend and colleague, Dr. Veronica Davis, to see a cardiologist because of PSVT.      This delightful lady had rhythm monitoring in November.  My EP colleagues reported multiple runs of PSVT.  During the monitoring period, she has 45 runs.  Average heart rate of these runs was 165 with a maximum weight of 200.  The longest run was 6 minutes and 48 seconds.      This lady is 87.  She had a right shoulder replaced.  She is known to have stage III chronic renal failure.  There is a history of hypertension.  She has also been followed up with regular echocardiography for mild aortic regurgitation.  Left ventricular systolic function is normal.  I reassured her that this is probably highly unlikely to progress and there is an excellent chance that she will never need valve intervention.      For the past 2 years, she has been having palpitations.  These episodes have been increasingly more frequent.  She has taught herself how to perform a Valsalva maneuver.  However, these palpitation episodes have been much more difficult to terminate with a Valsalva maneuver recently.  She feels very exhausted after performing this.  When she has the palpitations, she has chest discomfort and dizzy spells.  She has presented to the emergency room twice recently in September and November.  In September, she presented with weakness and in November, she had palpitations.  However, by the time she got to the emergency room, she was back in sinus rhythm again.        She has 1+ bilateral ankle edema.  This may be due to venostasis.  Recently she was  switched to Coreg for treatment of hypertension and she does not feel this medication has made much difference to her palpitations.     I personally reviewed one of the rhythm strip which is available in her monitoring report.  It appears that she has some APC's and her PSVT was precipitated by an APC.      This lady is frail.  She has not fallen recently, but her daughter who was present today describes that she is very unsteady on her feet and she is very afraid that these episodes of PSVT will make her even more frail, leading to falls with significant injury.  I can certainly empathize with this and agree with her assessment.      In summary, this is a lady with symptomatic PSVT in the presence of essentially structurally normal heart for her age.  I think she would be a good candidate for EP study and possible radiofrequency ablation.      I explained what this procedure involves to both the patient and her daughter.  I gave her a pamphlet to read and I advised further reading on the Internet at a reputable website, such as [facility] or [facility].        Potential complications such as DVT, cardiac perforation, AV block requiring pacer insertion were explained to the patient.  She is very keen to have this procedure done and I will take the liberty of arranging this for the near future.  Her daughter also requested it sooner as she herself is scheduled to go down to [place] soon and she is the main caregiver for this lady.      We will arrange followup once this procedure is done.         SANDEEP GONZALEZ MD, [facility]         D: 2020   T: 2020   MT: AJIT      Name:     LORETTA HENRIQUEZ   MRN:      [id number]        Account:      [id number]   :      1933           Service Date: 2020      Document: [id number]          Outpatient Encounter Medications as of 2020   Medication Sig Dispense Refill     albuterol (PROAIR HFA/PROVENTIL HFA/VENTOLIN HFA) 108 (90 BASE) MCG/ACT Inhaler  Inhale 2 puffs into the lungs every 6 hours as needed for shortness of breath / dyspnea or wheezing 1 Inhaler 1     aspirin 81 MG tablet Take 1 tablet (81 mg) by mouth daily 30 tablet 3     carvedilol (COREG) 6.25 MG tablet Take 1.5 tablet twice a day 270 tablet 0     cetirizine (ZYRTEC) 10 MG tablet Take 1 tablet (10 mg) by mouth every evening 90 tablet 3     Cholecalciferol (VITAMIN D) 2000 UNITS tablet Take 2,000 Units by mouth daily 100 tablet 3     estradiol (ESTRACE VAGINAL) 0.1 MG/GM vaginal cream Apply small amount to the vaginal opening and urethra M, W, F @ h.s. 42.5 g 3     fluticasone (FLONASE) 50 MCG/ACT nasal spray USE 1-2 SPRAYS INTO BOTH  NOSTRILS DAILY 48 g 2     furosemide (LASIX) 40 MG tablet TAKE 1 TO 2 TABLETS BY  MOUTH DAILY 180 tablet 1     gabapentin (NEURONTIN) 100 MG capsule TAKE 1 CAPSULE BY MOUTH IN  THE AFTERNOON AND 2  CAPSULES BY MOUTH AT  BEDTIME 270 capsule 3     mirabegron (MYRBETRIQ) 50 MG 24 hr tablet Take 1 tablet (50 mg) by mouth daily 90 tablet 0     order for DME Equipment being ordered: surgicxal shoe size 10 1 Device 0     senna-docusate (SENOKOT-S/PERICOLACE) 8.6-50 MG tablet Take 1 tablet by mouth daily as needed for constipation       traMADol (ULTRAM) 50 MG tablet Take 50 mg by mouth 2 times daily as needed for severe pain       [DISCONTINUED] acetaminophen-codeine (TYLENOL #3) 300-30 MG tablet Take 1 tablet by mouth nightly as needed for severe pain 20 tablet 0     [DISCONTINUED] mirabegron (MYRBETRIQ) 25 MG 24 hr tablet Take 1 tablet (25 mg) by mouth daily 90 tablet 1     No facility-administered encounter medications on file as of 12/21/2020.        Again, thank you for allowing me to participate in the care of your patient.      Sincerely,    DR SANDEEP GONZALEZ MD     [facility]

## 2020-12-21 NOTE — PROGRESS NOTES
Service Date: 12/21/2020      HISTORY OF PRESENT ILLNESS:  I had the pleasure of seeing this very pleasant 87-year-old lady who was asked by my good friend and colleague, Dr. Veronica Davis, to see a cardiologist because of PSVT.      This delightful lady had rhythm monitoring in November.  My EP colleagues reported multiple runs of PSVT.  During the monitoring period, she has 45 runs.  Average heart rate of these runs was 165 with a maximum weight of 200.  The longest run was 6 minutes and 48 seconds.      This lady is 87.  She had a right shoulder replaced.  She is known to have stage III chronic renal failure.  There is a history of hypertension.  She has also been followed up with regular echocardiography for mild aortic regurgitation.  Left ventricular systolic function is normal.  I reassured her that this is probably highly unlikely to progress and there is an excellent chance that she will never need valve intervention.      For the past 2 years, she has been having palpitations.  These episodes have been increasingly more frequent.  She has taught herself how to perform a Valsalva maneuver.  However, these palpitation episodes have been much more difficult to terminate with a Valsalva maneuver recently.  She feels very exhausted after performing this.  When she has the palpitations, she has chest discomfort and dizzy spells.  She has presented to the emergency room twice recently in September and November.  In September, she presented with weakness and in November, she had palpitations.  However, by the time she got to the emergency room, she was back in sinus rhythm again.        She has 1+ bilateral ankle edema.  This may be due to venostasis.  Recently she was switched to Coreg for treatment of hypertension and she does not feel this medication has made much difference to her palpitations.     I personally reviewed one of the rhythm strip which is available in her monitoring report.  It appears  that she has some APC's and her PSVT was precipitated by an APC.      This lady is frail.  She has not fallen recently, but her daughter who was present today describes that she is very unsteady on her feet and she is very afraid that these episodes of PSVT will make her even more frail, leading to falls with significant injury.  I can certainly empathize with this and agree with her assessment.      In summary, this is a lady with symptomatic PSVT in the presence of essentially structurally normal heart for her age.  I think she would be a good candidate for EP study and possible radiofrequency ablation.      I explained what this procedure involves to both the patient and her daughter.  I gave her a pamphlet to read and I advised further reading on the Internet at a reputable website, such as [facility] or [facility].        Potential complications such as DVT, cardiac perforation, AV block requiring pacer insertion were explained to the patient.  She is very keen to have this procedure done and I will take the liberty of arranging this for the near future.  Her daughter also requested it sooner as she herself is scheduled to go down to [place] soon and she is the main caregiver for this lady.      We will arrange followup once this procedure is done.         SANDEEP GONZALEZ MD, [facility]             D: 2020   T: 2020   MT: AJIT      Name:     LORETTA HENRIQUEZ   MRN:      [id number]        Account:      [id number]   :      1933           Service Date: 2020      Document: [id number]

## 2020-12-21 NOTE — LETTER
12/21/2020    Veronica Davis MD  [address] [facility] [address]    RE: Glendy Díaz       Dear Colleague,    I had the pleasure of seeing Glendy Díaz in the [facility].    HPI and Plan:   See dictation    No orders of the defined types were placed in this encounter.      Orders Placed This Encounter   Medications     senna-docusate (SENOKOT-S/PERICOLACE) 8.6-50 MG tablet     Sig: Take 1 tablet by mouth daily as needed for constipation     traMADol (ULTRAM) 50 MG tablet     Sig: Take 50 mg by mouth 2 times daily as needed for severe pain       Encounter Diagnosis   Name Primary?     SVT (supraventricular tachycardia) (H)        CURRENT MEDICATIONS:  Current Outpatient Medications   Medication Sig Dispense Refill     albuterol (PROAIR HFA/PROVENTIL HFA/VENTOLIN HFA) 108 (90 BASE) MCG/ACT Inhaler Inhale 2 puffs into the lungs every 6 hours as needed for shortness of breath / dyspnea or wheezing 1 Inhaler 1     aspirin 81 MG tablet Take 1 tablet (81 mg) by mouth daily 30 tablet 3     carvedilol (COREG) 6.25 MG tablet Take 1.5 tablet twice a day 270 tablet 0     cetirizine (ZYRTEC) 10 MG tablet Take 1 tablet (10 mg) by mouth every evening 90 tablet 3     Cholecalciferol (VITAMIN D) 2000 UNITS tablet Take 2,000 Units by mouth daily 100 tablet 3     estradiol (ESTRACE VAGINAL) 0.1 MG/GM vaginal cream Apply small amount to the vaginal opening and urethra M, W, F @ h.s. 42.5 g 3     fluticasone (FLONASE) 50 MCG/ACT nasal spray USE 1-2 SPRAYS INTO BOTH  NOSTRILS DAILY 48 g 2     furosemide (LASIX) 40 MG tablet TAKE 1 TO 2 TABLETS BY  MOUTH DAILY 180 tablet 1     gabapentin (NEURONTIN) 100 MG capsule TAKE 1 CAPSULE BY MOUTH IN  THE AFTERNOON AND 2  CAPSULES BY MOUTH AT  BEDTIME 270 capsule 3     mirabegron (MYRBETRIQ) 50 MG 24 hr tablet Take 1 tablet (50 mg) by mouth daily 90 tablet 0     order for DME Equipment being ordered: surgicxal shoe size 10 1 Device 0      senna-docusate (SENOKOT-S/PERICOLACE) 8.6-50 MG tablet Take 1 tablet by mouth daily as needed for constipation       traMADol (ULTRAM) 50 MG tablet Take 50 mg by mouth 2 times daily as needed for severe pain         ALLERGIES     Allergies   Allergen Reactions     Amlodipine      Leg edema with 5 mg     Fosamax [Alendronic Acid]      Bone pain     Lisinopril      Increased potassium     Pravastatin      Muscle aches      Simvastatin      Muscle aches        PAST MEDICAL HISTORY:  Past Medical History:   Diagnosis Date     Anxiety      CKD (chronic kidney disease) stage 3, GFR 30-59 ml/min      Hematuria      Hyperlipidemia LDL goal <100      Hyperparathyroidism (H)      Hypertension     No Cardiologist     Incontinence      Leg weakness, bilateral      Mumps      Neck pain, chronic      Osteoarthritis      Osteopenia      Peripheral neuropathy        PAST SURGICAL HISTORY:  Past Surgical History:   Procedure Laterality Date     APPENDECTOMY       ARTHRODESIS FOOT  5/1/2014    Procedure: ARTHRODESIS FOOT;  Surgeon: Sid Marks DPM;  Location:  OR     ARTHROSCOPY ANKLE, OPEN REPAIR LIGAMENT, COMBINED  5/31/2012    Procedure:COMBINED ARTHROSCOPY ANKLE, OPEN REPAIR LIGAMENT; LEFT ANKLE ARTHROSCOPY, LATERAL LIGAMENT RECONSTRUCTION, ENDOSCOPIC DRISS, KIRIT SECOND TOE RAY CORRECTION    LEFT KNEE Marcaine AND CORTIZONE INJECTION, 5 CC Marcaine, 1 CC CELESTONE, ; Surgeon:VARSHA GERMAN; Location:[facility]     BLADDER SURGERY       CATARACT IOL, RT/LT       CHOLECYSTECTOMY       CYSTOSCOPY       EXCISE MASS FOOT  12/4/2012    Procedure: EXCISE MASS FOOT;;  Surgeon: Varsha German MD;  Location: [facility]     HYSTERECTOMY TOTAL ABDOMINAL      ovaries are in     RELEASE TENDON TOE  5/1/2014    Procedure: RELEASE TENDON TOE;  Surgeon: Sid Marks DPM;  Location:  OR     REMOVE HARDWARE FOOT  12/4/2012    Procedure: REMOVE HARDWARE FOOT;  REMOVAL HARDWARE(SYNTHES SCREW) LEFT FOOT, EXCISION OF  INCLUSION CYST;  Surgeon: Akshat German MD;  Location:  [facility]     REPAIR HAMMER TOE  2014    Procedure: REPAIR HAMMER TOE;  Surgeon: Sid Marks DPM;  Location: [facility] OR     REVERSE ARTHROPLASTY SHOULDER Right 2016    Procedure: REVERSE ARTHROPLASTY SHOULDER;  Surgeon: Marlo Aljeandro MD;  Location: [facility] OR       FAMILY HISTORY:  Family History   Problem Relation Age of Onset     Lipids Mother      Diabetes Mother      Circulatory Mother         Kidney disease     Diabetes Brother      Cancer - colorectal Brother      Breast Cancer Sister      Thyroid Disease Daughter      Cancer - colorectal Brother      Alcohol/Drug Brother        SOCIAL HISTORY:  Social History     Socioeconomic History     Marital status:      Spouse name: None     Number of children: None     Years of education: None     Highest education level: None   Occupational History     None   Social Needs     Financial resource strain: None     Food insecurity     Worry: None     Inability: None     Transportation needs     Medical: None     Non-medical: None   Tobacco Use     Smoking status: Former Smoker     Packs/day: 1.00     Years: 20.00     Pack years: 20.00     Types: Cigarettes     Start date:      Quit date:      Years since quittin.9     Smokeless tobacco: Never Used   Substance and Sexual Activity     Alcohol use: No     Drug use: No     Sexual activity: Never   Lifestyle     Physical activity     Days per week: None     Minutes per session: None     Stress: None   Relationships     Social connections     Talks on phone: None     Gets together: None     Attends Judaism service: None     Active member of club or organization: None     Attends meetings of clubs or organizations: None     Relationship status: None     Intimate partner violence     Fear of current or ex partner: None     Emotionally abused: None     Physically abused: None     Forced sexual activity: None   Other Topics Concern      "Parent/sibling w/ CABG, MI or angioplasty before 65F 55M? Not Asked   Social History Narrative     None       Review of Systems:  Skin:  Negative     Eyes:  Positive for glasses  ENT:  Positive for hearing loss  Respiratory:  Negative    Cardiovascular:    Positive for;palpitations;lightheadedness;edema  Gastroenterology: Positive for nausea  Genitourinary:  Negative    Musculoskeletal:  Positive for arthritis  Neurologic:  Negative numbness or tingling of hands;numbness or tingling of feet  Psychiatric:  Positive for anxiety  Heme/Lymph/Imm:  Positive for allergies  Endocrine:  Negative      Physical Exam:  Vitals: BP (!) 158/71   Pulse 66   Ht 1.613 m (5' 3.5\")   Wt 74.6 kg (164 lb 8 oz)   SpO2 97%   BMI 28.68 kg/m      Constitutional:  cooperative;well nourished        Skin:  warm and dry to the touch, no apparent skin lesions or masses noted venous stasis changes        Head:  normocephalic, no masses or lesions        Eyes:  pupils equal and round        Lymph:No Cervical lymphadenopathy present     ENT:  no pallor or cyanosis, dentition good        Neck:  carotid pulses are full and equal bilaterally, JVP normal, no carotid bruit        Respiratory:  normal breath sounds, clear to auscultation, normal A-P diameter, normal symmetry, normal respiratory excursion, no use of accessory muscles         Cardiac: regular rhythm, normal S1/S2, no S3 or S4, apical impulse not displaced, no murmurs, gallops or rubs                  pulses below the femoral arteries are diminished                                      GI:  abdomen soft, non-tender, BS normoactive, no mass, no HSM, no bruits        Extremities and Muscular Skeletal:  no deformities, clubbing, cyanosis, erythema observed              Neurological:  affect appropriate        Psych:  Alert and Oriented x 3        Recent Lab Results:  LIPID RESULTS:  Lab Results   Component Value Date    CHOL 196 09/24/2019    HDL 53 09/24/2019     (H) 09/24/2019    " TRIG 139 09/24/2019    CHOLHDLRATIO 4.0 11/16/2015       LIVER ENZYME RESULTS:  Lab Results   Component Value Date    AST 18 05/26/2020    ALT 15 05/26/2020       CBC RESULTS:  Lab Results   Component Value Date    WBC 8.3 11/04/2020    RBC 3.79 (L) 11/04/2020    HGB 11.8 11/04/2020    HCT 37.6 11/04/2020    MCV 99 11/04/2020    MCH 31.1 11/04/2020    MCHC 31.4 (L) 11/04/2020    RDW 12.7 11/04/2020     11/04/2020       BMP RESULTS:  Lab Results   Component Value Date     11/04/2020    POTASSIUM 3.8 11/04/2020    CHLORIDE 102 11/04/2020    CO2 23 11/04/2020    ANIONGAP 10 11/04/2020    GLC 90 11/04/2020    BUN 21 11/04/2020    CR 1.19 (H) 11/04/2020    GFRESTIMATED 41 (L) 11/04/2020    GFRESTBLACK 48 (L) 11/04/2020    MUMTAZ 9.0 11/04/2020        A1C RESULTS:  Lab Results   Component Value Date    A1C 5.0 03/30/2018       INR RESULTS:  Lab Results   Component Value Date    INR 0.91 07/12/2019           CC  Veronica Davis MD  [address] [place] [address]                    Thank you for allowing me to participate in the care of your patient.      Sincerely,     DR SANDEEP GONZALEZ MD     [facility]    cc:   Veronica Davis MD  [address]  [place] [phone]

## 2020-12-21 NOTE — PROGRESS NOTES
HPI and Plan:   See dictation    No orders of the defined types were placed in this encounter.      Orders Placed This Encounter   Medications     senna-docusate (SENOKOT-S/PERICOLACE) 8.6-50 MG tablet     Sig: Take 1 tablet by mouth daily as needed for constipation     traMADol (ULTRAM) 50 MG tablet     Sig: Take 50 mg by mouth 2 times daily as needed for severe pain       Encounter Diagnosis   Name Primary?     SVT (supraventricular tachycardia) (H)        CURRENT MEDICATIONS:  Current Outpatient Medications   Medication Sig Dispense Refill     albuterol (PROAIR HFA/PROVENTIL HFA/VENTOLIN HFA) 108 (90 BASE) MCG/ACT Inhaler Inhale 2 puffs into the lungs every 6 hours as needed for shortness of breath / dyspnea or wheezing 1 Inhaler 1     aspirin 81 MG tablet Take 1 tablet (81 mg) by mouth daily 30 tablet 3     carvedilol (COREG) 6.25 MG tablet Take 1.5 tablet twice a day 270 tablet 0     cetirizine (ZYRTEC) 10 MG tablet Take 1 tablet (10 mg) by mouth every evening 90 tablet 3     Cholecalciferol (VITAMIN D) 2000 UNITS tablet Take 2,000 Units by mouth daily 100 tablet 3     estradiol (ESTRACE VAGINAL) 0.1 MG/GM vaginal cream Apply small amount to the vaginal opening and urethra M, W, F @ h.s. 42.5 g 3     fluticasone (FLONASE) 50 MCG/ACT nasal spray USE 1-2 SPRAYS INTO BOTH  NOSTRILS DAILY 48 g 2     furosemide (LASIX) 40 MG tablet TAKE 1 TO 2 TABLETS BY  MOUTH DAILY 180 tablet 1     gabapentin (NEURONTIN) 100 MG capsule TAKE 1 CAPSULE BY MOUTH IN  THE AFTERNOON AND 2  CAPSULES BY MOUTH AT  BEDTIME 270 capsule 3     mirabegron (MYRBETRIQ) 50 MG 24 hr tablet Take 1 tablet (50 mg) by mouth daily 90 tablet 0     order for DME Equipment being ordered: surgicxal shoe size 10 1 Device 0     senna-docusate (SENOKOT-S/PERICOLACE) 8.6-50 MG tablet Take 1 tablet by mouth daily as needed for constipation       traMADol (ULTRAM) 50 MG tablet Take 50 mg by mouth 2 times daily as needed for severe pain         ALLERGIES      Allergies   Allergen Reactions     Amlodipine      Leg edema with 5 mg     Fosamax [Alendronic Acid]      Bone pain     Lisinopril      Increased potassium     Pravastatin      Muscle aches      Simvastatin      Muscle aches        PAST MEDICAL HISTORY:  Past Medical History:   Diagnosis Date     Anxiety      CKD (chronic kidney disease) stage 3, GFR 30-59 ml/min      Hematuria      Hyperlipidemia LDL goal <100      Hyperparathyroidism (H)      Hypertension     No Cardiologist     Incontinence      Leg weakness, bilateral      Mumps      Neck pain, chronic      Osteoarthritis      Osteopenia      Peripheral neuropathy        PAST SURGICAL HISTORY:  Past Surgical History:   Procedure Laterality Date     APPENDECTOMY       ARTHRODESIS FOOT  5/1/2014    Procedure: ARTHRODESIS FOOT;  Surgeon: Sid Marks DPM;  Location: [facility]     ARTHROSCOPY ANKLE, OPEN REPAIR LIGAMENT, COMBINED  5/31/2012    Procedure:COMBINED ARTHROSCOPY ANKLE, OPEN REPAIR LIGAMENT; LEFT ANKLE ARTHROSCOPY, LATERAL LIGAMENT RECONSTRUCTION, ENDOSCOPIC KIRIT NAQVI SECOND TOE RAY CORRECTION    LEFT KNEE Marcaine AND CORTIZONE INJECTION, 5 CC Marcaine, 1 CC CELESTONE, ; Surgeon:AKSHAT GERMAN; Location:[facility]     BLADDER SURGERY       CATARACT IOL, RT/LT       CHOLECYSTECTOMY       CYSTOSCOPY       EXCISE MASS FOOT  12/4/2012    Procedure: EXCISE MASS FOOT;;  Surgeon: Akshat German MD;  Location: [facility]     HYSTERECTOMY TOTAL ABDOMINAL      ovaries are in     RELEASE TENDON TOE  5/1/2014    Procedure: RELEASE TENDON TOE;  Surgeon: Sid Marks DPM;  Location: [facility]     REMOVE HARDWARE FOOT  12/4/2012    Procedure: REMOVE HARDWARE FOOT;  REMOVAL HARDWARE(SYNTHES SCREW) LEFT FOOT, EXCISION OF INCLUSION CYST;  Surgeon: Akshat German MD;  Location: [facility]     REPAIR HAMMER TOE  5/1/2014    Procedure: REPAIR HAMMER TOE;  Surgeon: Sid Marks DPM;  Location: [facility]     REVERSE ARTHROPLASTY SHOULDER Right  2016    Procedure: REVERSE ARTHROPLASTY SHOULDER;  Surgeon: Marlo Alejandro MD;  Location: [facility]       FAMILY HISTORY:  Family History   Problem Relation Age of Onset     Lipids Mother      Diabetes Mother      Circulatory Mother         Kidney disease     Diabetes Brother      Cancer - colorectal Brother      Breast Cancer Sister      Thyroid Disease Daughter      Cancer - colorectal Brother      Alcohol/Drug Brother        SOCIAL HISTORY:  Social History     Socioeconomic History     Marital status:      Spouse name: None     Number of children: None     Years of education: None     Highest education level: None   Occupational History     None   Social Needs     Financial resource strain: None     Food insecurity     Worry: None     Inability: None     Transportation needs     Medical: None     Non-medical: None   Tobacco Use     Smoking status: Former Smoker     Packs/day: 1.00     Years: 20.00     Pack years: 20.00     Types: Cigarettes     Start date:      Quit date:      Years since quittin.9     Smokeless tobacco: Never Used   Substance and Sexual Activity     Alcohol use: No     Drug use: No     Sexual activity: Never   Lifestyle     Physical activity     Days per week: None     Minutes per session: None     Stress: None   Relationships     Social connections     Talks on phone: None     Gets together: None     Attends Mormon service: None     Active member of club or organization: None     Attends meetings of clubs or organizations: None     Relationship status: None     Intimate partner violence     Fear of current or ex partner: None     Emotionally abused: None     Physically abused: None     Forced sexual activity: None   Other Topics Concern     Parent/sibling w/ CABG, MI or angioplasty before 65F 55M? Not Asked   Social History Narrative     None       Review of Systems:  Skin:  Negative     Eyes:  Positive for glasses  ENT:  Positive for hearing loss  Respiratory:   "Negative    Cardiovascular:    Positive for;palpitations;lightheadedness;edema  Gastroenterology: Positive for nausea  Genitourinary:  Negative    Musculoskeletal:  Positive for arthritis  Neurologic:  Negative numbness or tingling of hands;numbness or tingling of feet  Psychiatric:  Positive for anxiety  Heme/Lymph/Imm:  Positive for allergies  Endocrine:  Negative      Physical Exam:  Vitals: BP (!) 158/71   Pulse 66   Ht 1.613 m (5' 3.5\")   Wt 74.6 kg (164 lb 8 oz)   SpO2 97%   BMI 28.68 kg/m      Constitutional:  cooperative;well nourished        Skin:  warm and dry to the touch, no apparent skin lesions or masses noted venous stasis changes        Head:  normocephalic, no masses or lesions        Eyes:  pupils equal and round        Lymph:No Cervical lymphadenopathy present     ENT:  no pallor or cyanosis, dentition good        Neck:  carotid pulses are full and equal bilaterally, JVP normal, no carotid bruit        Respiratory:  normal breath sounds, clear to auscultation, normal A-P diameter, normal symmetry, normal respiratory excursion, no use of accessory muscles         Cardiac: regular rhythm, normal S1/S2, no S3 or S4, apical impulse not displaced, no murmurs, gallops or rubs                  pulses below the femoral arteries are diminished                                      GI:  abdomen soft, non-tender, BS normoactive, no mass, no HSM, no bruits        Extremities and Muscular Skeletal:  no deformities, clubbing, cyanosis, erythema observed              Neurological:  affect appropriate        Psych:  Alert and Oriented x 3        Recent Lab Results:  LIPID RESULTS:  Lab Results   Component Value Date    CHOL 196 09/24/2019    HDL 53 09/24/2019     (H) 09/24/2019    TRIG 139 09/24/2019    CHOLHDLRATIO 4.0 11/16/2015       LIVER ENZYME RESULTS:  Lab Results   Component Value Date    AST 18 05/26/2020    ALT 15 05/26/2020       CBC RESULTS:  Lab Results   Component Value Date    WBC 8.3 " 11/04/2020    RBC 3.79 (L) 11/04/2020    HGB 11.8 11/04/2020    HCT 37.6 11/04/2020    MCV 99 11/04/2020    MCH 31.1 11/04/2020    MCHC 31.4 (L) 11/04/2020    RDW 12.7 11/04/2020     11/04/2020       BMP RESULTS:  Lab Results   Component Value Date     11/04/2020    POTASSIUM 3.8 11/04/2020    CHLORIDE 102 11/04/2020    CO2 23 11/04/2020    ANIONGAP 10 11/04/2020    GLC 90 11/04/2020    BUN 21 11/04/2020    CR 1.19 (H) 11/04/2020    GFRESTIMATED 41 (L) 11/04/2020    GFRESTBLACK 48 (L) 11/04/2020    MUMTAZ 9.0 11/04/2020        A1C RESULTS:  Lab Results   Component Value Date    A1C 5.0 03/30/2018       INR RESULTS:  Lab Results   Component Value Date    INR 0.91 07/12/2019           CC  Veronica Davis MD  [address] [place] [address]

## 2020-12-27 ENCOUNTER — HOSPITAL ENCOUNTER (EMERGENCY)
Facility: CLINIC | Age: 85
Discharge: HOME | DRG: 177 | End: 2020-12-27
Attending: EMERGENCY MEDICINE | Admitting: EMERGENCY MEDICINE
Payer: MEDICARE

## 2020-12-27 ENCOUNTER — APPOINTMENT (OUTPATIENT)
Dept: GENERAL RADIOLOGY | Facility: CLINIC | Age: 85
DRG: 177 | End: 2020-12-27
Attending: EMERGENCY MEDICINE
Payer: MEDICARE

## 2020-12-27 VITALS
TEMPERATURE: 97.2 F | DIASTOLIC BLOOD PRESSURE: 95 MMHG | SYSTOLIC BLOOD PRESSURE: 162 MMHG | HEART RATE: 83 BPM | RESPIRATION RATE: 28 BRPM | OXYGEN SATURATION: 100 %

## 2020-12-27 DIAGNOSIS — R53.1 WEAKNESS: ICD-10-CM

## 2020-12-27 DIAGNOSIS — I47.10 PAROXYSMAL SUPRAVENTRICULAR TACHYCARDIA (H): ICD-10-CM

## 2020-12-27 DIAGNOSIS — U07.1 2019 NOVEL CORONAVIRUS DISEASE (COVID-19): ICD-10-CM

## 2020-12-27 LAB
ALBUMIN UR-MCNC: NEGATIVE MG/DL
ANION GAP SERPL CALCULATED.3IONS-SCNC: 5 MMOL/L (ref 3–14)
APPEARANCE UR: CLEAR
BASOPHILS # BLD AUTO: 0 10E9/L (ref 0–0.2)
BASOPHILS NFR BLD AUTO: 0.2 %
BILIRUB UR QL STRIP: NEGATIVE
BUN SERPL-MCNC: 17 MG/DL (ref 7–30)
CALCIUM SERPL-MCNC: 9.4 MG/DL (ref 8.5–10.1)
CHLORIDE SERPL-SCNC: 108 MMOL/L (ref 94–109)
CO2 SERPL-SCNC: 27 MMOL/L (ref 20–32)
COLOR UR AUTO: ABNORMAL
CREAT SERPL-MCNC: 1.2 MG/DL (ref 0.52–1.04)
DIFFERENTIAL METHOD BLD: ABNORMAL
EOSINOPHIL # BLD AUTO: 0.1 10E9/L (ref 0–0.7)
EOSINOPHIL NFR BLD AUTO: 1.8 %
ERYTHROCYTE [DISTWIDTH] IN BLOOD BY AUTOMATED COUNT: 14.1 % (ref 10–15)
FLUABV+SARS-COV-2+RSV PNL RESP NAA+PROBE: NEGATIVE
FLUABV+SARS-COV-2+RSV PNL RESP NAA+PROBE: NEGATIVE
GFR SERPL CREATININE-BSD FRML MDRD: 41 ML/MIN/{1.73_M2}
GLUCOSE SERPL-MCNC: 93 MG/DL (ref 70–99)
GLUCOSE UR STRIP-MCNC: NEGATIVE MG/DL
HCT VFR BLD AUTO: 37 % (ref 35–47)
HGB BLD-MCNC: 11.7 G/DL (ref 11.7–15.7)
HGB UR QL STRIP: NEGATIVE
IMM GRANULOCYTES # BLD: 0 10E9/L (ref 0–0.4)
IMM GRANULOCYTES NFR BLD: 0.5 %
KETONES UR STRIP-MCNC: NEGATIVE MG/DL
LABORATORY COMMENT REPORT: ABNORMAL
LEUKOCYTE ESTERASE UR QL STRIP: NEGATIVE
LYMPHOCYTES # BLD AUTO: 0.7 10E9/L (ref 0.8–5.3)
LYMPHOCYTES NFR BLD AUTO: 15.1 %
MCH RBC QN AUTO: 31.5 PG (ref 26.5–33)
MCHC RBC AUTO-ENTMCNC: 31.6 G/DL (ref 31.5–36.5)
MCV RBC AUTO: 100 FL (ref 78–100)
MONOCYTES # BLD AUTO: 0.4 10E9/L (ref 0–1.3)
MONOCYTES NFR BLD AUTO: 10.1 %
NEUTROPHILS # BLD AUTO: 3.2 10E9/L (ref 1.6–8.3)
NEUTROPHILS NFR BLD AUTO: 72.3 %
NITRATE UR QL: NEGATIVE
NRBC # BLD AUTO: 0 10*3/UL
NRBC BLD AUTO-RTO: 0 /100
PH UR STRIP: 7.5 PH (ref 5–7)
PLATELET # BLD AUTO: 155 10E9/L (ref 150–450)
POTASSIUM SERPL-SCNC: 3.5 MMOL/L (ref 3.4–5.3)
RBC # BLD AUTO: 3.72 10E12/L (ref 3.8–5.2)
RBC #/AREA URNS AUTO: <1 /HPF (ref 0–2)
RSV RNA SPEC QL NAA+PROBE: ABNORMAL
SARS-COV-2 RNA SPEC QL NAA+PROBE: POSITIVE
SODIUM SERPL-SCNC: 140 MMOL/L (ref 133–144)
SOURCE: ABNORMAL
SP GR UR STRIP: 1 (ref 1–1.03)
SPECIMEN SOURCE: ABNORMAL
SQUAMOUS #/AREA URNS AUTO: 1 /HPF (ref 0–1)
TROPONIN I SERPL-MCNC: <0.015 UG/L (ref 0–0.04)
TSH SERPL DL<=0.005 MIU/L-ACNC: 0.92 MU/L (ref 0.4–4)
UROBILINOGEN UR STRIP-MCNC: NORMAL MG/DL (ref 0–2)
WBC # BLD AUTO: 4.4 10E9/L (ref 4–11)
WBC #/AREA URNS AUTO: <1 /HPF (ref 0–5)

## 2020-12-27 PROCEDURE — 99285 EMERGENCY DEPT VISIT HI MDM: CPT | Mod: 25

## 2020-12-27 PROCEDURE — 84443 ASSAY THYROID STIM HORMONE: CPT | Performed by: EMERGENCY MEDICINE

## 2020-12-27 PROCEDURE — 81001 URINALYSIS AUTO W/SCOPE: CPT | Performed by: EMERGENCY MEDICINE

## 2020-12-27 PROCEDURE — 87636 SARSCOV2 & INF A&B AMP PRB: CPT | Performed by: EMERGENCY MEDICINE

## 2020-12-27 PROCEDURE — 93005 ELECTROCARDIOGRAM TRACING: CPT

## 2020-12-27 PROCEDURE — 80048 BASIC METABOLIC PNL TOTAL CA: CPT | Performed by: EMERGENCY MEDICINE

## 2020-12-27 PROCEDURE — 85025 COMPLETE CBC W/AUTO DIFF WBC: CPT | Performed by: EMERGENCY MEDICINE

## 2020-12-27 PROCEDURE — 84484 ASSAY OF TROPONIN QUANT: CPT | Performed by: EMERGENCY MEDICINE

## 2020-12-27 PROCEDURE — C9803 HOPD COVID-19 SPEC COLLECT: HCPCS

## 2020-12-27 PROCEDURE — 71045 X-RAY EXAM CHEST 1 VIEW: CPT

## 2020-12-27 ASSESSMENT — ENCOUNTER SYMPTOMS
BACK PAIN: 0
DYSURIA: 0
PALPITATIONS: 1
DIARRHEA: 0
NAUSEA: 0
FEVER: 0
FREQUENCY: 0
ABDOMINAL PAIN: 0
VOMITING: 0
DIFFICULTY URINATING: 0
HEMATURIA: 0
WEAKNESS: 1
SHORTNESS OF BREATH: 1

## 2020-12-27 NOTE — ED PROVIDER NOTES
History   Chief Complaint:  Irregular Heart Beat     The history is provided by the patient and medical records.      Glendy Díaz is a 87 year old female with history of CKD stage III, hyperlipidemia, hypertension, and SVT, who presents with an irregular heart beat.    Per chart review, a note from Dr. Truong, cardiology, the patient has been experiencing palpitations for the last two years, more recently becoming more frequently. When she developed palpitations, she also has chest discomfort and feels dizzy. She has been seen in the ED two times in the past few months for this but had full resolvement in her symptoms by the time she got here. She has taught herself how to perform a Valsalva maneuver.  However, these palpitation episodes have been much more difficult to terminate with a Valsalva maneuver recently.  She feels very exhausted after performing this.      She denies any recent falls, but is unsteady on her feet, and the patient and her daughter are afraid that her episodes of PSVT will make her even more unsteady, resulting in possible injury. Due to her experiencing symptomatic PSVT, the patient was found be  a good candidate for EP study and possible radiofrequency ablation. This is scheduled to occur in 4 days, on the 31st of December.    Last night, about 12 hours ago, the patient developed palpitations and chest pressure. Patient states that she was up all night secondary to her symptoms. She also reports she feels weak and that laying flat worsens her palpitations. Her daughter then came to check on her this morning and decided that it would be in the patient's best interest to seek evaluation in the ED.    Here, the patient reports some shortness of breath but feels like she is able to take a deep breath. Patient denies fever, abdominal pain, nausea, vomiting, diarrhea, back pain, or new urinary symptoms.      Review of Systems   Constitutional: Negative for fever.   Respiratory: Positive for  shortness of breath.    Cardiovascular: Positive for chest pain (pressure), palpitations and leg swelling.   Gastrointestinal: Negative for abdominal pain, diarrhea, nausea and vomiting.   Genitourinary: Negative for decreased urine volume, difficulty urinating, dysuria, frequency, hematuria and urgency.   Musculoskeletal: Negative for back pain.   Neurological: Positive for weakness.   All other systems reviewed and are negative.    Allergies:  Amlodipine  Fosamax  Lisinopril  Pravastatin  Simvastatin    Medications:  Albuterol inhaler  Aspirin 81 g  Coreg  Zyrtec  Lasix  Gabapentin  Mirabegron  Tramadol     Past Medical History:    Anxiety  CKD  Hyperlipidemia  Hyperparathyroidism  Hypertension  Mumps  Osteoarthritis  Osteopenia  Peripheral neuropathy    SVT  Controlled substance agreement signed    Past Surgical History:    Appendectomy  Foot arthrodesis  Ankle arthroscopy, open repair ligament, combined  Bladder surgery  Cataract surgery, bilateral  Cholecystectomy  Cystoscopy  Foot mass excision  Hysterectomy total abdominal  Release tendon toe  Removal of foot hardware  Repair hammer toe  Reverse arthroplasty shoulder     Family History:    Diabetes  Lipids  Kidney disease  Colorectal cancer  Thyroid disease  Breast cancer   Alcohol/drug    Social History:  Smoking status: Former - quit date: 1985  Alcohol use: Negative  Drug use: Negative  Marital Status:   Presents to the ED alone.   PCP: Veronica Davis    Physical Exam     Patient Vitals for the past 24 hrs:   BP Temp Temp src Pulse Resp SpO2   12/27/20 1025 -- -- -- 102 23 99 %   12/27/20 1020 -- -- -- 80 24 98 %   12/27/20 1015 -- -- -- 83 28 99 %   12/27/20 1010 -- -- -- 92 12 --   12/27/20 1005 -- -- -- 81 20 100 %   12/27/20 1000 (!) 151/91 -- -- 81 20 99 %   12/27/20 0955 -- -- -- 92 27 99 %   12/27/20 0950 -- -- -- 85 18 99 %   12/27/20 0945 -- -- -- 82 12 99 %   12/27/20 0940 -- -- -- 85 23 99 %   12/27/20 0935 -- -- -- 80 21  99 %   12/27/20 0930 (!) 175/80 -- -- 86 15 97 %   12/27/20 0925 -- -- -- 81 (!) 33 100 %   12/27/20 0920 (!) 174/80 -- -- 85 16 100 %   12/27/20 0915 (!) 168/97 -- -- 81 13 99 %   12/27/20 0910 -- -- -- -- -- 99 %   12/27/20 0847 (!) 168/99 97.2  F (36.2  C) Temporal 76 22 100 %     Physical Exam  General: Alert, appears elderly and frail. Cooperative.     In mild distress, anxious  HEENT:  Head:  Atraumatic  Ears:  External ears are normal  Mouth/Throat:  Oropharynx is without erythema or exudate and mucous membranes are moist.   Eyes:   Conjunctivae normal and EOM are normal. No scleral icterus.  CV:  Normal rate, regular rhythm, normal heart sounds and radial pulses are 2+ and symmetric.  No murmur.  Resp:  Breath sounds are clear bilaterally    Non-labored, no retractions or accessory muscle use  GI:  Abdomen is soft, no distension, no tenderness. No rebound or guarding.  No CVA tenderness bilaterally  MS:  Normal range of motion. Trace lower extremity bilateral pitting edema.    Normal strength in all 4 extremities.     Back atraumatic.    No midline cervical, thoracic, or lumbar tenderness  Skin:  Warm and dry.  No rash or lesions noted.  Neuro: Alert. Normal strength.  GCS: 15  Psych:  Normal mood and affect.    Emergency Department Course     ECG #1:  Indication: Palpitations  Time: 0907  Vent. Rate 100 bpm. IN interval 186. QRS duration 88. QT/QTc 338/436. P-R-T axis 43 -39 107. SVT with spontaneous conversion to normal sinus rhythm. Left axis deviation. Left ventricular hypertrophy with repolarization abnormality. Abnormal ECG. Read time: 0911    There are no significant changes when compared to an EKG done on 11/21/2020.    ECG #2:  Indication: Palpitations  Time: 1009  Vent. Rate 76 bpm. IN interval 178. QRS duration 92. QT/QTc 408/459. P-R-T axis 58 -40 16. Normal sinus rhythm. Left axis deviation. Voltage criteria for left ventricular hypertrophy. Abnormal ECG. Read time: 1013    There are no  significant changes when compared to an EKG done on 12/27/2020.    Imaging:  Radiology findings were communicated with the patient and family who voiced understanding of the findings.    XR Chest, Portable, G/E 1 view:   No acute disease. As per radiology.    Laboratory:  Laboratory findings were communicated with the patient and family who voiced understanding of the findings.    CBC: WBC: 4.4, HGB: 11.7, PLT: 155  BMP: GFR: 41 (L), GFR Estimate: 41 (L), o/w WNL     TSH with Free T4 Reflex: 0.92    Troponin (0917): <0.015    UA: pH: 7.5 (H), o/w Negative    Symptomatic Influenza A/B antigen & COVID-19 PCR: COVID-19: Positive (!!)     Emergency Department Course:    Reviewed:  0905 I reviewed the patient's nursing notes, vitals, and past medical records.    Assessments:  1020 I rechecked the patient and discussed the results of her workup thus far.     Consults:   0917 I consulted with Abigail, the patient's daughter, regarding the patient's history and presentation here in the emergency department.    1005 I consulted with Abigail, the patient's daughter, regarding the patient's history and presentation here in the emergency department.    Disposition:  The patient was discharged to home.     Impression & Plan     COVID-19  Glendy Díaz was evaluated during a global COVID-19 pandemic, which necessitated consideration that the patient might be at risk for infection with the SARS-CoV-2 virus that causes COVID-19.   Applicable protocols for evaluation were followed during the patient's care.   COVID-19 was considered as part of the patient's evaluation. The plan for testing is:  a test was obtained during this visit.    Medical Decision Making:  Glendy Díaz is an 87-year-old female with a complex past medical history pertinent for history of SVT with plans for ablation intervention in 4 days who presents with increasing weakness and palpitations since 9 PM last night.  On her initial EKG here in the emergency  department we were able to capture a likely run of SVT followed by spontaneous conversion to normal sinus rhythm.  Reassuringly no ischemic changes or concerns for ACS, particularly with an undetectable troponin.  Patient feels weak, more so likely from not having good sleep last night, although I do suspect the majority of her symptoms are due to her known SVT.  Reassuringly her blood work is within normal limits and no acute changes in comparison with historical data.  Patient has no signs of urinary tract infection or kidney infection.  Chest x-ray unremarkable.  We did obtain a COVID swab due to the weakness of unclear etiology and this unfortunately returned positive.     Due to the newly positive COVID-19 diagnosis patient will need to reschedule her ablation which is scheduled for 4 days from now.  I talked with the patient's daughter, Abigail, who agreed to contact Dr. Truong's office tomorrow to coordinate the rescheduling of this.  Reassuringly, chest x-ray does not show signs of pneumonia development and patient is not hypoxic.  She does intermittently develop supraventricular tachycardia, but spontaneously converts back to normal sinus rhythm within seconds.  This seems very consistent with her known history, although the new diagnosis of COVID may be causing her to have more frequent episodes of SVT.  I did  both the patient and the daughter Abigail over the phone in regards to monitoring the patient for signs of hypoxia, worsening shortness of breath, fever, or increasing weakness.  I did enroll her in the get well COVID-19 loop for follow-up on the outpatient basis.  Abigail will purchase a pulse oximeter for the patient to utilize at home or if she were to develop worsening shortness of breath.  They understand return precautions in regards to both COVID as well as PSVT.  Patient will be discharged home with plans for close outpatient follow-up.  After return precautions understood, discharged home in  care of daughter, Abigail.     Diagnosis:    ICD-10-CM    1. Weakness  R53.1 CBC with platelets differential     Basic metabolic panel     Troponin I     TSH with free T4 reflex     UA with Microscopic     Symptomatic Influenza A/B & SARS-CoV2 (COVID-19) Virus PCR Multiplex     COVID-19 GetWell Loop Referral   2. Paroxysmal supraventricular tachycardia (H)  I47.1    3. 2019 novel coronavirus disease (COVID-19)  U07.1 COVID-19 GetWell Loop Referral       Disposition:  Discharged to home.    Scribe Disclosure:  Mary MERCEDES, am serving as a scribe on 12/27/2020 at 8:45 AM to personally document services performed by Sid Combs MD based on my observations and the provider's statements to me.      Sid Combs MD  12/27/20 1127

## 2020-12-27 NOTE — ED AVS SNAPSHOT
[facility]  [address]  [facility] [address]  Phone: [phone]  Fax: [phone]                                    Glendy Díaz   MRN: [id number]    Department: [facility] Emergency Dept   Date of Visit: 12/27/2020           After Visit Summary Signature Page    I have received my discharge instructions, and my questions have been answered. I have discussed any challenges I see with this plan with the nurse or doctor.    ..........................................................................................................................................  Patient/Patient Representative Signature      ..........................................................................................................................................  Patient Representative Print Name and Relationship to Patient    ..................................................               ................................................  Date                                   Time    ..........................................................................................................................................  Reviewed by Signature/Title    ...................................................              ..............................................  Date                                               Time          22EPIC Rev 08/18

## 2020-12-27 NOTE — ED TRIAGE NOTES
A&O x4, ABCs intact. Pt presents with concern for SVT. Pt states that she is feeling really weak right now. In triage, pt HR is maintaining around 80BPM and pulse feels regular. Pt appears SOB but denies it. Pt is supposed to have surgery this next week for SVT.

## 2020-12-27 NOTE — ED NOTES
DATE:  12/27/2020   TIME OF RECEIPT FROM LAB:  10:11 AM  LAB TEST:  covid   LAB VALUE:  positive  RESULTS GIVEN WITH READ-BACK TO (PROVIDER):  Sid Combs MD  TIME LAB VALUE REPORTED TO PROVIDER:   10:12 AM

## 2020-12-27 NOTE — DISCHARGE INSTRUCTIONS
Discharge Instructions  COVID-19    COVID-19 is the disease caused by a new coronavirus. The virus spreads from person-to-person primarily by droplets when an infected person coughs or sneezes and the droplet either lands on another person or that other person touches a surface with the droplet on it. There are tests available to diagnose COVID-19. There is no specific treatment or medicine for the disease.    You may have been diagnosed with COVID, may be being tested for COVID and have a pending test result, or may have been exposed to COVID.    Symptoms of COVID-19    Many people have no symptoms or mild symptoms.  Symptoms may usually appear 4 to 5 days (up to 14 days) after contact with a person with COVID-19. Some people will get severe symptoms and pneumonia. Usual symptoms are:     ? Fever  ? Cough  ? Trouble breathing    Less common symptoms are: Headache, body aches, sore throat, sneezing, diarrhea, loss of taste or smell.    Isolation and Quarantine    You were seen because you have symptoms, had an exposure, or had some other concern about possible COVID. The best way to stop the spread of the virus is to avoid contact with others.  Isolation refers to sick people staying away from people who are not sick. A person in quarantine is limiting activity because they were exposed and are waiting to see if they might become sick.    If you test positive for COVID, you should stay home (isolation) for at least 10 days after your symptoms began, and for 24 hours with no fever and improvement of symptoms--whichever is longer. (Your fever should be gone for 24 hours without using fever-reducing medicine). If you have no symptoms, you should stay home (isolation) for 10 days from the day of the test.    For example, if you have a fever and cough for 6 days, you need to stay home 4 more days with no fever for a total of 10 days. Or, if you have a fever and cough for 10 days, you need to stay home one more day with  no fever for a total of 11 days.    If you have a high-risk exposure to COVID (you spent 15 minutes or more within six feet of somebody who has COVID), you should stay home (quarantine) for 14 days. Even if you test negative for COVID, the CDC recommends a 14-day quarantine from the time of your last exposure to that individual. There are options for a shortened (<14 day quarantine) you can review at:    https://www.health.[facility].[place]./diseases/coronavirus/close.html#long    If you have symptoms but a negative test, you should stay at home until you are symptom-free and without fever for 24 hours, using the same judgment you would for when it is safe to return to work/school from strep throat, influenza, or the common cold. If you worsen, you should consider being re-evaluated.    If you are being tested for COVID and your test is pending, you should stay home until you know your test result.    How should I protect myself and others?    Do not go to work or school. Have a friend or relative do your shopping. Do not use public transportation (bus, train) or ridesharing (Lyft, Uber).    Separate yourself from other people in your home. As much as possible, you should stay in one room and away from other people in your home. Also, use a separate bathroom, if possible. Avoid handling pets or other animals while sick.     Wear a facemask if you need to be around other people and cover your mouth and nose with a tissue when you cough or sneeze.     Avoid sharing personal household items. You should not share dishes, drinking glasses, forks/knives/spoons, towels, or bedding with other people in your home. After using these items, they should be washed with soap and water. Clean parts of your home that are touched often (doorknobs, faucets, countertops, etc.) daily.     Wash your hands often with soap and water for at least 20 seconds or use an alcohol-based hand  containing at least 60% alcohol.     Avoid touching  your face.    Treat your symptoms. You can take Acetaminophen (Tylenol) to treat body aches and fever as needed for comfort. Ibuprofen (Advil or Motrin) can be used as well if you still have symptoms after taking Tylenol. Drink fluids. Rest.    Watch for worsening symptoms such as shortness of breath/difficulty breathing or very severe weakness.    Employers/workplaces are being asked by the Centers for Disease Control (CDC) to not request notes/documentation for you to return to work or prove that you were ill. You may choose to show your employer this paperwork. Also, repeat testing should not be required to return to work.    Return to the Emergency Department if:    If you are developing worsening breathing, shortness of breath, or feel worse you should seek medical attention.  If you are uncertain, contact your health care provider/clinic. If you need emergency medical attention, call 911 and tell them you have been ill.

## 2020-12-28 ENCOUNTER — TELEPHONE (OUTPATIENT)
Dept: CARDIOLOGY | Facility: CLINIC | Age: 85
End: 2020-12-28

## 2020-12-28 LAB
INTERPRETATION ECG - MUSE: NORMAL
INTERPRETATION ECG - MUSE: NORMAL

## 2020-12-28 NOTE — TELEPHONE ENCOUNTER
Received call from daughter Abigail reporting patient was in ED yesterday and COVID test was POSITIVE.  She is scheduled for SVT ablation on 12/31.  Informed daughter that this will need to get rescheduled.    Will have scheduling call daughter to have procedure rescheduled.  DARCY Gutiérrez

## 2020-12-29 ENCOUNTER — HOSPITAL ENCOUNTER (INPATIENT)
Facility: CLINIC | Age: 85
LOS: 1 days | Discharge: HOME HEALTH | DRG: 177 | End: 2020-12-30
Attending: EMERGENCY MEDICINE | Admitting: INTERNAL MEDICINE
Payer: MEDICARE

## 2020-12-29 ENCOUNTER — APPOINTMENT (OUTPATIENT)
Dept: CT IMAGING | Facility: CLINIC | Age: 85
DRG: 177 | End: 2020-12-29
Attending: EMERGENCY MEDICINE
Payer: MEDICARE

## 2020-12-29 ENCOUNTER — APPOINTMENT (OUTPATIENT)
Dept: GENERAL RADIOLOGY | Facility: CLINIC | Age: 85
DRG: 177 | End: 2020-12-29
Attending: EMERGENCY MEDICINE
Payer: MEDICARE

## 2020-12-29 ENCOUNTER — NURSE TRIAGE (OUTPATIENT)
Dept: NURSING | Facility: CLINIC | Age: 85
End: 2020-12-29

## 2020-12-29 ENCOUNTER — PATIENT OUTREACH (OUTPATIENT)
Dept: CARE COORDINATION | Facility: CLINIC | Age: 85
End: 2020-12-29

## 2020-12-29 DIAGNOSIS — M62.81 GENERALIZED MUSCLE WEAKNESS: ICD-10-CM

## 2020-12-29 DIAGNOSIS — U07.1 2019 NOVEL CORONAVIRUS DISEASE (COVID-19): ICD-10-CM

## 2020-12-29 LAB
ALBUMIN SERPL-MCNC: 3.1 G/DL (ref 3.4–5)
ALP SERPL-CCNC: 77 U/L (ref 40–150)
ALT SERPL W P-5'-P-CCNC: 19 U/L (ref 0–50)
ANION GAP SERPL CALCULATED.3IONS-SCNC: 4 MMOL/L (ref 3–14)
AST SERPL W P-5'-P-CCNC: 31 U/L (ref 0–45)
BASE EXCESS BLDV CALC-SCNC: 2.1 MMOL/L
BASOPHILS # BLD AUTO: 0 10E9/L (ref 0–0.2)
BASOPHILS NFR BLD AUTO: 0.3 %
BILIRUB DIRECT SERPL-MCNC: <0.1 MG/DL (ref 0–0.2)
BILIRUB SERPL-MCNC: 0.3 MG/DL (ref 0.2–1.3)
BUN SERPL-MCNC: 17 MG/DL (ref 7–30)
CALCIUM SERPL-MCNC: 8.8 MG/DL (ref 8.5–10.1)
CHLORIDE SERPL-SCNC: 108 MMOL/L (ref 94–109)
CO2 SERPL-SCNC: 30 MMOL/L (ref 20–32)
CREAT SERPL-MCNC: 1.09 MG/DL (ref 0.52–1.04)
CRP SERPL-MCNC: 3.3 MG/L (ref 0–8)
D DIMER PPP FEU-MCNC: 1.3 UG/ML FEU (ref 0–0.5)
DIFFERENTIAL METHOD BLD: ABNORMAL
EOSINOPHIL # BLD AUTO: 0.1 10E9/L (ref 0–0.7)
EOSINOPHIL NFR BLD AUTO: 1.7 %
ERYTHROCYTE [DISTWIDTH] IN BLOOD BY AUTOMATED COUNT: 14.5 % (ref 10–15)
GFR SERPL CREATININE-BSD FRML MDRD: 46 ML/MIN/{1.73_M2}
GLUCOSE SERPL-MCNC: 92 MG/DL (ref 70–99)
HCO3 BLDV-SCNC: 29 MMOL/L (ref 21–28)
HCT VFR BLD AUTO: 35.5 % (ref 35–47)
HGB BLD-MCNC: 11.1 G/DL (ref 11.7–15.7)
IMM GRANULOCYTES # BLD: 0 10E9/L (ref 0–0.4)
IMM GRANULOCYTES NFR BLD: 0.3 %
LDH SERPL L TO P-CCNC: 242 U/L (ref 81–234)
LYMPHOCYTES # BLD AUTO: 1.5 10E9/L (ref 0.8–5.3)
LYMPHOCYTES NFR BLD AUTO: 50.7 %
MCH RBC QN AUTO: 31.8 PG (ref 26.5–33)
MCHC RBC AUTO-ENTMCNC: 31.3 G/DL (ref 31.5–36.5)
MCV RBC AUTO: 102 FL (ref 78–100)
MONOCYTES # BLD AUTO: 0.5 10E9/L (ref 0–1.3)
MONOCYTES NFR BLD AUTO: 15.4 %
NEUTROPHILS # BLD AUTO: 0.9 10E9/L (ref 1.6–8.3)
NEUTROPHILS NFR BLD AUTO: 31.6 %
NRBC # BLD AUTO: 0 10*3/UL
NRBC BLD AUTO-RTO: 0 /100
O2/TOTAL GAS SETTING VFR VENT: ABNORMAL %
OXYHGB MFR BLDV: 45 %
PCO2 BLDV: 53 MM HG (ref 40–50)
PH BLDV: 7.34 PH (ref 7.32–7.43)
PLATELET # BLD AUTO: 133 10E9/L (ref 150–450)
PO2 BLDV: 25 MM HG (ref 25–47)
POTASSIUM SERPL-SCNC: 3.4 MMOL/L (ref 3.4–5.3)
PROT SERPL-MCNC: 6.7 G/DL (ref 6.8–8.8)
RBC # BLD AUTO: 3.49 10E12/L (ref 3.8–5.2)
SODIUM SERPL-SCNC: 142 MMOL/L (ref 133–144)
TROPONIN I SERPL-MCNC: <0.015 UG/L (ref 0–0.04)
WBC # BLD AUTO: 3 10E9/L (ref 4–11)

## 2020-12-29 PROCEDURE — 83615 LACTATE (LD) (LDH) ENZYME: CPT | Performed by: EMERGENCY MEDICINE

## 2020-12-29 PROCEDURE — 96361 HYDRATE IV INFUSION ADD-ON: CPT

## 2020-12-29 PROCEDURE — 85379 FIBRIN DEGRADATION QUANT: CPT | Performed by: EMERGENCY MEDICINE

## 2020-12-29 PROCEDURE — 84484 ASSAY OF TROPONIN QUANT: CPT | Performed by: EMERGENCY MEDICINE

## 2020-12-29 PROCEDURE — 120N000001 HC R&B MED SURG/OB

## 2020-12-29 PROCEDURE — 71045 X-RAY EXAM CHEST 1 VIEW: CPT

## 2020-12-29 PROCEDURE — 80076 HEPATIC FUNCTION PANEL: CPT | Performed by: EMERGENCY MEDICINE

## 2020-12-29 PROCEDURE — 250N000011 HC RX IP 250 OP 636: Performed by: INTERNAL MEDICINE

## 2020-12-29 PROCEDURE — 85025 COMPLETE CBC W/AUTO DIFF WBC: CPT | Performed by: EMERGENCY MEDICINE

## 2020-12-29 PROCEDURE — 99285 EMERGENCY DEPT VISIT HI MDM: CPT | Mod: 25

## 2020-12-29 PROCEDURE — 258N000003 HC RX IP 258 OP 636: Performed by: EMERGENCY MEDICINE

## 2020-12-29 PROCEDURE — 99223 1ST HOSP IP/OBS HIGH 75: CPT | Mod: AI | Performed by: INTERNAL MEDICINE

## 2020-12-29 PROCEDURE — 250N000011 HC RX IP 250 OP 636: Performed by: EMERGENCY MEDICINE

## 2020-12-29 PROCEDURE — 86140 C-REACTIVE PROTEIN: CPT | Performed by: EMERGENCY MEDICINE

## 2020-12-29 PROCEDURE — 96374 THER/PROPH/DIAG INJ IV PUSH: CPT

## 2020-12-29 PROCEDURE — 82805 BLOOD GASES W/O2 SATURATION: CPT | Performed by: EMERGENCY MEDICINE

## 2020-12-29 PROCEDURE — 70450 CT HEAD/BRAIN W/O DYE: CPT | Mod: MG

## 2020-12-29 PROCEDURE — XW033E5 INTRODUCTION OF REMDESIVIR ANTI-INFECTIVE INTO PERIPHERAL VEIN, PERCUTANEOUS APPROACH, NEW TECHNOLOGY GROUP 5: ICD-10-PCS | Performed by: INTERNAL MEDICINE

## 2020-12-29 PROCEDURE — 93005 ELECTROCARDIOGRAM TRACING: CPT

## 2020-12-29 PROCEDURE — 250N000013 HC RX MED GY IP 250 OP 250 PS 637: Performed by: INTERNAL MEDICINE

## 2020-12-29 PROCEDURE — 80048 BASIC METABOLIC PNL TOTAL CA: CPT | Performed by: EMERGENCY MEDICINE

## 2020-12-29 RX ORDER — LIDOCAINE 40 MG/G
CREAM TOPICAL
Status: DISCONTINUED | OUTPATIENT
Start: 2020-12-29 | End: 2020-12-30 | Stop reason: HOSPADM

## 2020-12-29 RX ORDER — ALBUTEROL SULFATE 90 UG/1
2 AEROSOL, METERED RESPIRATORY (INHALATION) EVERY 4 HOURS PRN
Status: DISCONTINUED | OUTPATIENT
Start: 2020-12-29 | End: 2020-12-30 | Stop reason: HOSPADM

## 2020-12-29 RX ORDER — MIRABEGRON 50 MG/1
50 TABLET, EXTENDED RELEASE ORAL DAILY
Status: DISCONTINUED | OUTPATIENT
Start: 2020-12-30 | End: 2020-12-30 | Stop reason: HOSPADM

## 2020-12-29 RX ORDER — GABAPENTIN 100 MG/1
200 CAPSULE ORAL AT BEDTIME
Status: DISCONTINUED | OUTPATIENT
Start: 2020-12-29 | End: 2020-12-30 | Stop reason: HOSPADM

## 2020-12-29 RX ORDER — DEXAMETHASONE SODIUM PHOSPHATE 10 MG/ML
6 INJECTION, SOLUTION INTRAMUSCULAR; INTRAVENOUS ONCE
Status: COMPLETED | OUTPATIENT
Start: 2020-12-29 | End: 2020-12-29

## 2020-12-29 RX ORDER — DEXAMETHASONE SODIUM PHOSPHATE 4 MG/ML
6 INJECTION, SOLUTION INTRA-ARTICULAR; INTRALESIONAL; INTRAMUSCULAR; INTRAVENOUS; SOFT TISSUE DAILY
Status: DISCONTINUED | OUTPATIENT
Start: 2020-12-30 | End: 2020-12-30 | Stop reason: HOSPADM

## 2020-12-29 RX ORDER — FLUTICASONE PROPIONATE 50 MCG
1 SPRAY, SUSPENSION (ML) NASAL DAILY PRN
COMMUNITY
End: 2021-03-17

## 2020-12-29 RX ORDER — ACETAMINOPHEN 650 MG/1
650 SUPPOSITORY RECTAL EVERY 4 HOURS PRN
Status: DISCONTINUED | OUTPATIENT
Start: 2020-12-29 | End: 2020-12-30 | Stop reason: HOSPADM

## 2020-12-29 RX ORDER — GABAPENTIN 100 MG/1
200 CAPSULE ORAL AT BEDTIME
COMMUNITY
End: 2021-01-14

## 2020-12-29 RX ORDER — ASPIRIN 81 MG/1
81 TABLET ORAL DAILY
Status: DISCONTINUED | OUTPATIENT
Start: 2020-12-30 | End: 2020-12-30 | Stop reason: HOSPADM

## 2020-12-29 RX ORDER — GABAPENTIN 100 MG/1
100 CAPSULE ORAL EVERY 24 HOURS
Status: DISCONTINUED | OUTPATIENT
Start: 2020-12-30 | End: 2020-12-30 | Stop reason: HOSPADM

## 2020-12-29 RX ORDER — POLYETHYLENE GLYCOL 3350 17 G/17G
1 POWDER, FOR SOLUTION ORAL
Status: ON HOLD | COMMUNITY
End: 2023-01-01

## 2020-12-29 RX ORDER — ACETAMINOPHEN 325 MG/1
650 TABLET ORAL EVERY 4 HOURS PRN
Status: DISCONTINUED | OUTPATIENT
Start: 2020-12-29 | End: 2020-12-30 | Stop reason: HOSPADM

## 2020-12-29 RX ORDER — AMOXICILLIN 250 MG
2 CAPSULE ORAL 2 TIMES DAILY PRN
Status: DISCONTINUED | OUTPATIENT
Start: 2020-12-29 | End: 2020-12-30 | Stop reason: HOSPADM

## 2020-12-29 RX ORDER — ONDANSETRON 2 MG/ML
4 INJECTION INTRAMUSCULAR; INTRAVENOUS EVERY 6 HOURS PRN
Status: DISCONTINUED | OUTPATIENT
Start: 2020-12-29 | End: 2020-12-30 | Stop reason: HOSPADM

## 2020-12-29 RX ORDER — BENZONATATE 100 MG/1
100 CAPSULE ORAL 3 TIMES DAILY PRN
Status: DISCONTINUED | OUTPATIENT
Start: 2020-12-29 | End: 2020-12-30 | Stop reason: HOSPADM

## 2020-12-29 RX ORDER — GABAPENTIN 100 MG/1
100 CAPSULE ORAL EVERY MORNING
COMMUNITY
End: 2021-04-01

## 2020-12-29 RX ORDER — ONDANSETRON 4 MG/1
4 TABLET, ORALLY DISINTEGRATING ORAL EVERY 6 HOURS PRN
Status: DISCONTINUED | OUTPATIENT
Start: 2020-12-29 | End: 2020-12-30 | Stop reason: HOSPADM

## 2020-12-29 RX ORDER — AMOXICILLIN 250 MG
1 CAPSULE ORAL 2 TIMES DAILY PRN
Status: DISCONTINUED | OUTPATIENT
Start: 2020-12-29 | End: 2020-12-30 | Stop reason: HOSPADM

## 2020-12-29 RX ORDER — POLYETHYLENE GLYCOL 3350 17 G/17G
17 POWDER, FOR SOLUTION ORAL DAILY PRN
Status: DISCONTINUED | OUTPATIENT
Start: 2020-12-29 | End: 2020-12-30 | Stop reason: HOSPADM

## 2020-12-29 RX ADMIN — SODIUM CHLORIDE 500 ML: 9 INJECTION, SOLUTION INTRAVENOUS at 12:34

## 2020-12-29 RX ADMIN — ENOXAPARIN SODIUM 40 MG: 40 INJECTION SUBCUTANEOUS at 19:17

## 2020-12-29 RX ADMIN — DEXAMETHASONE SODIUM PHOSPHATE 6 MG: 10 INJECTION, SOLUTION INTRAMUSCULAR; INTRAVENOUS at 15:45

## 2020-12-29 RX ADMIN — GABAPENTIN 200 MG: 100 CAPSULE ORAL at 21:10

## 2020-12-29 RX ADMIN — CARVEDILOL 9.38 MG: 6.25 TABLET, FILM COATED ORAL at 19:18

## 2020-12-29 ASSESSMENT — ACTIVITIES OF DAILY LIVING (ADL)
WERE_AUXILIARY_AIDS_OFFERED?: NO
USE_OF_HEARING_ASSISTIVE_DEVICES: BILATERAL HEARING AIDS
DRESSING/BATHING_DIFFICULTY: NO
WHICH_OF_THE_ABOVE_FUNCTIONAL_RISKS_HAD_A_RECENT_ONSET_OR_CHANGE?: FALL HISTORY
DESCRIBE_HEARING_LOSS: BILATERAL HEARING LOSS
TOILETING_ISSUES: NO
ADLS_ACUITY_SCORE: 15
WALKING_OR_CLIMBING_STAIRS_DIFFICULTY: NO
THE_FOLLOWING_AIDS_WERE_PROVIDED;: PATIENT DECLINED OFFER OF AUXILIARY AIDS
DIFFICULTY_COMMUNICATING: NO
DOING_ERRANDS_INDEPENDENTLY_DIFFICULTY: NO
EQUIPMENT_CURRENTLY_USED_AT_HOME: WALKER, STANDARD
CONCENTRATING,_REMEMBERING_OR_MAKING_DECISIONS_DIFFICULTY: NO
HEARING_DIFFICULTY_OR_DEAF: YES
NUMBER_OF_TIMES_PATIENT_HAS_FALLEN_WITHIN_LAST_SIX_MONTHS: 4
FALL_HISTORY_WITHIN_LAST_SIX_MONTHS: YES
WEAR_GLASSES_OR_BLIND: YES
DIFFICULTY_EATING/SWALLOWING: NO

## 2020-12-29 ASSESSMENT — ENCOUNTER SYMPTOMS
FEVER: 0
SHORTNESS OF BREATH: 0
VOMITING: 0

## 2020-12-29 NOTE — ED NOTES
[facility]  ED Nurse Handoff Report    Glendy Díaz is a 87 year old female   ED Chief complaint: Generalized Weakness  . ED Diagnosis:   Final diagnoses:   2019 novel coronavirus disease (COVID-19)   Generalized muscle weakness     Allergies:   Allergies   Allergen Reactions     Amlodipine      Leg edema with 5 mg     Fosamax [Alendronic Acid]      Bone pain     Lisinopril      Increased potassium     Pravastatin      Muscle aches      Simvastatin      Muscle aches        Code Status: Full Code  Activity level - Baseline/Home:  Independent. Activity Level - Current:   Stand by Assist. Lift room needed: No. Bariatric: No   Needed: No   Isolation: Yes. Infection: Not Applicable  COVID r/o and special precautions.     Vital Signs:   Vitals:    12/29/20 1158   BP: (!) 144/78   Pulse: 62   Resp: 18   Temp: 97.6  F (36.4  C)   TempSrc: Temporal   SpO2: 98%   Weight: 74.4 kg (164 lb)       Cardiac Rhythm:  ,      Pain level:    Patient confused: No. Patient Falls Risk: Yes.   Elimination Status: Has voided   Patient Report - Initial Complaint: Generalized weakness. Focused Assessment: A&O x4, ABCs intact. Pt here because she has become increasingly weaker over the past couple of days. Today, pt had a hard time ambulated to bathroom and almost fell off her bed. Pt was recently diagnosed with COVID. Pt has hx of SVT episodes and will have surgery for this in Jan. She has not had any episode of SVT while in ED. Pt has been NSR. Pt denies SOB or cough, although I have noted a congested sounding cough while in her room. LS clear bilaterally and throughout. Pt trial marched in room and oxygen saturations dropped to around 87%. Pt O2 rebounds quickly when back to bed. Pt is SBA for hypoxic episodes when up.   Tests Performed: lab, imaging. Abnormal Results:   Labs Ordered and Resulted from Time of ED Arrival Up to the Time of Departure from the ED   CBC WITH PLATELETS DIFFERENTIAL - Abnormal; Notable  for the following components:       Result Value    WBC 3.0 (*)     RBC Count 3.49 (*)     Hemoglobin 11.1 (*)      (*)     MCHC 31.3 (*)     Platelet Count 133 (*)     Absolute Neutrophil 0.9 (*)     All other components within normal limits   BASIC METABOLIC PANEL - Abnormal; Notable for the following components:    Creatinine 1.09 (*)     GFR Estimate 46 (*)     GFR Estimate If Black 53 (*)     All other components within normal limits   BLOOD GAS VENOUS AND OXYHGB - Abnormal; Notable for the following components:    PCO2 Venous 53 (*)     Bicarbonate Venous 29 (*)     All other components within normal limits   PERIPHERAL IV CATHETER   PULSE OXIMETRY NURSING     XR Chest Port 1 View   Final Result   IMPRESSION: Worsening interstitial opacities with mild ground-glass   opacities are compatible with COVID-19. No pneumothorax or pleural   effusion.      DAXA PIERSON MD      Head CT w/o contrast   Final Result   IMPRESSION:      1. No evidence of acute intracranial hemorrhage, mass, or herniation.   2. Mild diffuse parenchymal volume loss and white matter changes   likely due to chronic microvascular ischemic disease. No significant   change since prior.         KOBE QUINTANILLA MD        .   Treatments provided: See MAR  Family Comments: None  OBS brochure/video discussed/provided to patient:  N/A  ED Medications:   Medications   0.9% sodium chloride BOLUS (0 mLs Intravenous Stopped 12/29/20 1350)   dexamethasone PF (DECADRON) injection 6 mg (6 mg Intravenous Given 12/29/20 1545)     Drips infusing:  No  For the majority of the shift, the patient's behavior Green. Interventions performed were None.    Sepsis treatment initiated: No     Patient tested for COVID 19 prior to admission: NO, Pt COVID + 2 days ago    ED Nurse Name/Phone Number: Abraham Freed RN,   3:49 PM    RECEIVING UNIT ED HANDOFF REVIEW    Above ED Nurse Handoff Report was reviewed: Yes  Reviewed by: Yogesh Barrios RN on December  29, 2020 at 4:43 PM

## 2020-12-29 NOTE — ED PROVIDER NOTES
History   Chief Complaint:  Generalized Weakness       HPI   Glendy Díaz is a 87 year old female with history of SVT who presents with generalized weakness.  Patient was seen in the ED 2 days prior with primary complaints of palpitations.  During that evaluation patient was not having SVT or novel dysrhythmia but did have a COVID swab which returned positive.  She otherwise appeared well and was safe for discharge home.  Since being discharged, she has felt globally weak which prompted her repeat evaluation today.  The weakness is generalized and progressive in nature.  It is symmetric.  She lives independently and typically uses a walker for ambulation.  She has had great difficulty ambulating throughout her home because of this.  She denies any chest pain, shortness of breath, fevers, vomiting or diarrhea.  She otherwise feels well.      Review of Systems   Constitutional: Negative for fever.   Respiratory: Negative for shortness of breath.    Cardiovascular: Negative for chest pain.   Gastrointestinal: Negative for vomiting.   All other systems reviewed and are negative.       Allergies:  Amlodipine  Fosamax [Alendronic Acid]  Lisinopril  Pravastatin  Simvastatin    Medications:  Coreg   Lasix   Gabapentin   myrbetriq     Past Medical History:    Anxiety   CKD (chronic kidney disease) stage 3  Hematuria   Hyperlipidemia   Hyperparathyroidism    Hypertension   Incontinence   Leg weakness, bilateral   Mumps   Neck pain, chronic   Osteoarthritis   Osteopenia   Peripheral neuropathy     Past Surgical History:    Appendectomy   Arthrodesis foot  Bladder surgery   Cholecystectomy   Excise mass foot    hysterectomy   Remove hardware foot  Repair hammer toe   Reverse arthroplasty shoulder     Family History:    Lipids   Diabetes  Kidney disease  Breast cancer   Alcohol/drug    Social History:  Resides in independent living    Physical Exam     Patient Vitals for the past 24 hrs:   BP Temp Temp src Pulse Resp SpO2  Weight   12/29/20 1158 (!) 144/78 97.6  F (36.4  C) Temporal 62 18 98 % 74.4 kg (164 lb)       Physical Exam     HEENT:    Oropharynx is moist, without lesions or trismus.  Eyes:    Conjunctiva normal     PERRL     EOMs intac  Neck:    Supple, no meningismus.     CV:     Regular rate and rhythm.      No murmurs, rubs or gallops.       No unilateral leg swelling.       2+ radial pulses bilateral.       1+ lower extremity edema.  PULM:    Clear to auscultation bilateral.       No respiratory distress.      Good air exchange.     No rales or wheezing.     No stridor.  ABD:    Soft, non-tender, non-distended.       No pulsatile masses.       No rebound, guarding or rigidity.  MSK:     No gross deformity to all four extremities.   LYMPH:   No cervical lymphadenopathy.  NEURO:   Alert & O x 3.      CN II-XII intact, speech is clear with no aphasia.       Strength is 5/5 in all 4 extremities.  Sensation is intact.       Normal muscular tone, no tremor.  Skin:    Warm, dry and intact.    Psych:    Mood is good and affect is appropriate.        Emergency Department Course     ECG:  ECG taken at 1219  Sinus rhythm  Left axis deviation  Voltage criteria for left ventricular hypertrophy  Nonspecific T wave abnormality  Prolonged QT  Abnormal ECG  Vent. rate 67 BPM  ND interval 186 ms  QRS duration 94 ms  QT/QTc 452/477 ms  P-R-T axes 57 -42 42  No significant change when compared to EKG dated 12/27/20.     Imaging:  Head CT w/o contrast   Final Result   IMPRESSION:      1. No evidence of acute intracranial hemorrhage, mass, or herniation.   2. Mild diffuse parenchymal volume loss and white matter changes   likely due to chronic microvascular ischemic disease. No significant   change since prior.         KOBE QUINTANILLA MD      XR Chest Port 1 View    (Results Pending)      Laboratory:  CBC:  WBC 3.0 (L), HGB 11.1 (L),  (L), o/w WNL      BMP: GFR 46 (L), o/w WNL (Creatinine: 1.09 (H))     blood gas venous and oxyhgb: pH:  7.34, PCO2: 53 (H), PO2: 25, Bicarbonate: 29 (H), Oxyhgb: 45     Emergency Department Course:    Reviewed:  I reviewed the patient's nursing notes, vitals, past medical records, Care Everywhere.     Assessments:  1213  I performed an exam of the patient as documented above.   1418 The patient's oxygen saturation dropped down to 89% and was tachypnea while ambulating. I spoke with the patient's daughter as well regarding patient's presentation, findings, and plan of care.      Consults:   1517 I consulted with Dr. Galan of the hospitalist service regarding patient. He agrees to admit patient to hospital in monitored bed.      Interventions:  1234 0.9% sodium chloride BOLUS 500 mL IV     Disposition:  Admitted to Dr. Galan.      Impression & Plan   Covid-19  Glendy Díaz was evaluated during a global COVID-19 pandemic, which necessitated consideration that the patient might be at risk for infection with the SARS-CoV-2 virus that causes COVID-19.   Applicable protocols for evaluation were followed during the patient's care.   COVID-19 was considered as part of the patient's evaluation. The plan for testing is:  a test was obtained at a previous visit and reviewed & considered today.    Medical Decision Makin-year-old female diagnosed with COVID-19 2 days prior prior presents with generalized weakness.  No focal neurological deficit noted on exam.  She did have an headache that is likely due to COVID but given advanced age, CT scan undertaken and unremarkable.  At rest, patient is not oxygen dependent and appears well.  Unfortunately with any ambulation even limited within the room, patient becomes tachypneic with O2 sats 87%.  Patient was provided dexamethasone.  After much discussion, she was agreeable to admission.  Patient will be transferred to a medical bed.    Diagnosis:    ICD-10-CM    . 2019 novel coronavirus disease (COVID-19)  U07.1    2. Generalized muscle weakness  M62.81        Sami  Disclosure:  I, Matthew Easleyo, am serving as a scribe at 12:13 PM on 12/29/2020 to document services personally performed by Aditya Scott MD based on my observations and the provider's statements to me.        Aditya Scott MD  12/30/20 0658

## 2020-12-29 NOTE — ED NOTES
"Ambulated pt with pulse ox. Pt's O2 sats decreased to 87% while marching in place. Pt sounded short of breath and noted it was \"a little harder to breathe.\" Assisted pt back to bed. Pt's O2 sats increased to 98%. MD notified.  "

## 2020-12-29 NOTE — ED TRIAGE NOTES
Patient was diagnosed as Covid positive. Patient has had a headache and chills but denies shortness of breath or fevers. States her primary concern is weakness. States she is barely able to ambulate even with her walker.

## 2020-12-29 NOTE — H&P
[facility]  Hospitalist Admission Note  Name: Glendy Díaz    MRN: [id number]  YOB: 1933    Age: 87 year old  Date of admission: 12/29/2020  Primary care provider: Veronica Davis    Chief Complaint:  weakness    Assessment and Plan:   Acute hypoxemic respiratory failure secondary to COVID-19 pneumonia: Presenting with 3 days generalized weakness, new cough today, and some shortness of breath with ambulation.  No fevers or GI symptoms.  Headache for the past 2 days.  Positive COVID-19 test on 12/27.  Not hypoxic on room air, however with ambulation oxygen saturation is did drop down to 87% and she was tachypneic.  She has bilateral interstitial and groundglass opacities on chest x-ray consistent with COVID-19 pneumonia.  -Transfer to [facility] COVID-19 cohort hospital was discussed with the patient who refused transfer  -IV dexamethasone 6 mg daily  -Considered IV remdesivir, but borderline creatinine and overall patient does not look severely ill so at this time deferred starting remdesivir  -Lovenox for DVT prophylaxis  -Check D-dimer, CRP, LDH  -Continuous pulse ox and supplemental oxygen as needed  -Robitussin and Tessalon Perles as needed for cough    Generalized weakness: Weakness for the last 3 days or so.  Did not sleep well due to feeling palpitations 2 nights ago.  Suspect secondary to COVID-19.  Lives alone in independent living.  Daughter has been helping her.  -Consult PT    CKD stage III: Creatinine baseline 1.1-1.2.    Paroxysmal SVT: Episodes of a paroxysmal SVT and is actually scheduled for EP study with possible ablation on January 12, 2020.  This was already delayed once due to positive COVID-19 test on 12/27.  She does have palpitations, 2 nights ago could not sleep secondary to this.  Denies chest pain.  Currently NSR.  Continue her 81 mg daily aspirin-  -Recently changed to carvedilol 9.375 mg twice daily, continue  -Telemetry  -Monitor  potassium and magnesium levels    HTN: Continue carvedilol 9.375 mg daily.  In the past was on Lasix, but has since stopped.  Has not noted any increase in leg swelling.  No history of CHF.  Has trace edema on exam.    Mild pancytopenia: WBC 3.0, hemoglobin 1.1, and platelet count 133.  Suspect in part secondary to her acute COVID-19 infection.  -Monitor platelet count daily while on Lovenox    Peripheral neuropathy: Continue gabapentin 100 mg afternoon and 200 mg at night.    Overactive bladder: Continue PTA mirabegron 50 mg daily.    Former smoker: 20-pack-year history, quit in 1985.    DVT Prophylaxis: Enoxaparin (Lovenox) SQ  Code Status: Full Code  FEN: Regular diet   Discharge Dispo: lives in independent living.  Consult PT  Estimated Disch Date / # of Days until Disch: Admit inpatient due to hypoxia secondary to COVID-19 pneumonia.  Anticipate minimal 2 night hospitalization.      History of Present Illness:  Glendy Díaz is a 87 year old female former smoker with PMH including HTN, CKD stage III, peripheral neuropathy, anxiety, chronic neck pain, and recent episodes of paroxysmal SVT following with cardiology and scheduled for EP study with possible ablation on 1/12/2020 who presents with generalized weakness.  She was initially seen 2 days ago for generalized weakness of 48 hours duration.  It was thought this may have been from her proximal SVT, but her COVID-19 PCR came back positive.  Her generalized weakness has continued and she is not managing well at home where she lives with independent living.  Her daughter has been helping out.  She denies any falls.  She normally walks with a walker and this become increasingly difficult.  She does note a new nonproductive cough throughout the day today.  She has had some shortness of breath with ambulation, but feels okay at rest.  Had a moderate intensity diffuse headache for the past 2 days.  Denies any nausea, vomiting, diarrhea.  Has not had any fevers or  chills.  She is not sure where she could have contacted COVID-19 as she has only been to the doctor's office a few times, but not in any stores.  She did not sleep well 2 nights ago due to significant palpitations.  Denies any chest pain or pressure sensation when she has her SVT episodes.    History obtained from patient, medical record, and from Dr. Scott in the emergency department.  Blood pressure 144/28 with repeat 161/79.  Heart rate 60-80s.  Temperature 97.6  F.  Oxygen 98% on room air at rest.  Desaturations to 87% with ambulation on room air and tachypnea noted.  Chest x-ray shows increasing bilateral interstitial and groundglass opacities secondary to COVID-19 pneumonia.  She received 6 mg of IV dexamethasone for this.  She has mild pancytopenia with WBC 3.0, hemoglobin 1.1, blood count 133.  VBG shows pH 7.34, PCO2 53, PO2 25, and bicarb 29.  BMP within normal limits except for creatinine 1.09 which is her baseline.  EKG shows NSR with no ST elevation or depression.  Noncontrast head CT was obtained due to headaches which was negative for acute pathology.  Transfer to [facility] for [place] [id number] was discussed with the patient who refused.  Admit inpatient here at [facility].     Past Medical History reviewed:  Past Medical History:   Diagnosis Date     Anxiety      CKD (chronic kidney disease) stage 3, GFR 30-59 ml/min      Hematuria      Hyperlipidemia LDL goal <100      Hyperparathyroidism (H)      Hypertension     No Cardiologist     Incontinence      Leg weakness, bilateral      Mumps      Neck pain, chronic      Osteoarthritis      Osteopenia      Peripheral neuropathy      Past Surgical History reviewed:  Past Surgical History:   Procedure Laterality Date     APPENDECTOMY       ARTHRODESIS FOOT  5/1/2014    Procedure: ARTHRODESIS FOOT;  Surgeon: Sid Marks DPM;  Location: [facility] OR     ARTHROSCOPY ANKLE, OPEN REPAIR LIGAMENT, COMBINED  5/31/2012    Procedure:COMBINED ARTHROSCOPY ANKLE, OPEN REPAIR  LIGAMENT; LEFT ANKLE ARTHROSCOPY, LATERAL LIGAMENT RECONSTRUCTION, ENDOSCOPIC KIRIT NAQVI SECOND TOE RAY CORRECTION    LEFT KNEE Marcaine AND CORTIZONE INJECTION, 5 CC Marcaine, 1 CC CELESTONE, ; Surgeon:VARSHA WILDER; Location:[facility]     BLADDER SURGERY       CATARACT IOL, RT/LT       CHOLECYSTECTOMY       CYSTOSCOPY       EXCISE MASS FOOT  2012    Procedure: EXCISE MASS FOOT;;  Surgeon: Varsha Wilder MD;  Location: [facility]     HYSTERECTOMY TOTAL ABDOMINAL      ovaries are in     RELEASE TENDON TOE  2014    Procedure: RELEASE TENDON TOE;  Surgeon: iSd Marks DPM;  Location: [facility] OR     REMOVE HARDWARE FOOT  2012    Procedure: REMOVE HARDWARE FOOT;  REMOVAL HARDWARE(SYNTHES SCREW) LEFT FOOT, EXCISION OF INCLUSION CYST;  Surgeon: Varsha Wilder MD;  Location: [facility]     REPAIR HAMMER TOE  2014    Procedure: REPAIR HAMMER TOE;  Surgeon: Sid Marks DPM;  Location: [facility] OR     REVERSE ARTHROPLASTY SHOULDER Right 2016    Procedure: REVERSE ARTHROPLASTY SHOULDER;  Surgeon: Marlo Alejandro MD;  Location: [facility] OR     Social History reviewed:  Social History     Tobacco Use     Smoking status: Former Smoker     Packs/day: 1.00     Years: 20.00     Pack years: 20.00     Types: Cigarettes     Start date:      Quit date:      Years since quittin.0     Smokeless tobacco: Never Used   Substance Use Topics     Alcohol use: No     Social History     Social History Narrative     Not on file     Family History reviewed:  Family History   Problem Relation Age of Onset     Lipids Mother      Diabetes Mother      Circulatory Mother         Kidney disease     Diabetes Brother      Cancer - colorectal Brother      Breast Cancer Sister      Thyroid Disease Daughter      Cancer - colorectal Brother      Alcohol/Drug Brother      Allergies:  Allergies   Allergen Reactions     Amlodipine      Leg edema with 5 mg     Fosamax [Alendronic Acid]      Bone pain      Lisinopril      Increased potassium     Pravastatin      Muscle aches      Simvastatin      Muscle aches      Medications:  Prior to Admission medications    Medication Sig Last Dose Taking? Auth Provider   albuterol (PROAIR HFA/PROVENTIL HFA/VENTOLIN HFA) 108 (90 BASE) MCG/ACT Inhaler Inhale 2 puffs into the lungs every 6 hours as needed for shortness of breath / dyspnea or wheezing   Amber Davis, NP   aspirin 81 MG tablet Take 1 tablet (81 mg) by mouth daily   Veronica Davis MD   carvedilol (COREG) 6.25 MG tablet Take 1.5 tablet twice a day   Veronica Davis MD   cetirizine (ZYRTEC) 10 MG tablet Take 1 tablet (10 mg) by mouth every evening   Doctor, MD Omer   Cholecalciferol (VITAMIN D) 2000 UNITS tablet Take 2,000 Units by mouth daily   Veronica Davis MD   estradiol (ESTRACE VAGINAL) 0.1 MG/GM vaginal cream Apply small amount to the vaginal opening and urethra M, W, F @ h.s.   Magalys Escobedo PA-C   fluticasone (FLONASE) 50 MCG/ACT nasal spray USE 1-2 SPRAYS INTO BOTH  NOSTRILS DAILY   Veronica Davis MD   furosemide (LASIX) 40 MG tablet TAKE 1 TO 2 TABLETS BY  MOUTH DAILY   Veronica Davis MD   gabapentin (NEURONTIN) 100 MG capsule TAKE 1 CAPSULE BY MOUTH IN  THE AFTERNOON AND 2  CAPSULES BY MOUTH AT  BEDTIME   Veronica Davis MD   mirabegron (MYRBETRIQ) 50 MG 24 hr tablet Take 1 tablet (50 mg) by mouth daily   Magalys Escobedo PA-C   order for DME Equipment being ordered: surgicxal shoe size 10   Steven Bonilla DPM   senna-docusate (SENOKOT-S/PERICOLACE) 8.6-50 MG tablet Take 1 tablet by mouth daily as needed for constipation   Reported, Patient   traMADol (ULTRAM) 50 MG tablet Take 50 mg by mouth 2 times daily as needed for severe pain   Reported, Patient     Review of Systems:  A Comprehensive greater than 10 system review of systems was carried out.  Pertinent positives and negatives are  noted above.  Otherwise negative.     Physical Exam:  Blood pressure (!) 144/78, pulse 62, temperature 97.6  F (36.4  C), temperature source Temporal, resp. rate 18, weight 74.4 kg (164 lb), SpO2 98 %, not currently breastfeeding.  Wt Readings from Last 1 Encounters:   12/29/20 74.4 kg (164 lb)     Exam:  Constitutional: Awake, NAD   Eyes: sclera white   HEENT: atraumatic, MMM  Respiratory: Few scattered bilateral crackles, no wheeze, currently on room air, no tachypnea  Cardiovascular: RRR.  No murmur  GI: non-tender, not distended, bowel sounds present  Skin: no rash or lesions, acyanotic  Musculoskeletal/extremities: atraumatic, no major deformities.  Trace bilateral lower extremity edema  Neurologic: A&O, speech clear, moves all extremities equally  Psychiatric: calm, cooperative, normal affect    Lab and imaging data personally reviewed:  Labs:  Recent Labs   Lab 12/29/20  1234   PHV 7.34   PO2V 25   PCO2V 53*   HCO3V 29*     Recent Labs   Lab 12/29/20  1234 12/27/20  0917   WBC 3.0* 4.4   HGB 11.1* 11.7   HCT 35.5 37.0   * 100   * 155     Recent Labs   Lab 12/29/20  1234 12/27/20  0917    140   POTASSIUM 3.4 3.5   CHLORIDE 108 108   CO2 30 27   ANIONGAP 4 5   GLC 92 93   BUN 17 17   CR 1.09* 1.20*   GFRESTIMATED 46* 41*   GFRESTBLACK 53* 47*   MUMTAZ 8.8 9.4       EKG: NSR, no ST elevation or depression    Imaging:  Recent Results (from the past 24 hour(s))   Head CT w/o contrast    Narrative    CT SCAN OF THE HEAD WITHOUT CONTRAST   12/29/2020 1:51 PM     HISTORY: Headache.    TECHNIQUE:  Axial images of the head and coronal reformations without  IV contrast material. Radiation dose for this scan was reduced using  automated exposure control, adjustment of the mA and/or kV according  to patient size, or iterative reconstruction technique.    COMPARISON: Head CT 7/12/2019.    FINDINGS: There is no evidence of intracranial hemorrhage, mass, acute  infarct or anomaly. The ventricles are normal  in size, shape and  configuration. Mild diffuse parenchymal volume loss. Mild patchy  periventricular white matter hypodensities which are nonspecific, but  likely related to chronic microvascular ischemic disease.     The visualized portions of the sinuses and mastoids appear normal. The  bony calvarium and bones of the skull base appear intact.       Impression    IMPRESSION:     1. No evidence of acute intracranial hemorrhage, mass, or herniation.  2. Mild diffuse parenchymal volume loss and white matter changes  likely due to chronic microvascular ischemic disease. No significant  change since prior.      KOBE QUINTANILLA MD   XR Chest Port 1 View    Narrative    CHEST ONE VIEW PORTABLE December 29, 2020 3:24 PM     HISTORY: COVID, hypoxia.    COMPARISON: None.      Impression    IMPRESSION: Worsening interstitial opacities with mild ground-glass  opacities are compatible with COVID-19. No pneumothorax or pleural  effusion.    MD Reese LENZ MD  Hospitalist  [facility]

## 2020-12-29 NOTE — TELEPHONE ENCOUNTER
Glendy's  Daughter calls in to report that she was diagnosed with covid on the weekend when in ER for SVT event.  She was sent home as she was initially asymptomatic.  Now  Abigail , her daughter, report that Glendy is so weak that she can hardly move with her walker and could not get into bed last nigth.  She is having a hard time with ADL's due to weakness.  Daughter  Is going to do a virtual visit with provider to discuss and/or take her to ER depending on  How Glendy is doing.  Glendy can't do virtual visit without Abigail's help.    Reason for Disposition    Patient sounds very sick or weak to the triager    Additional Information    Negative: SEVERE difficulty breathing (e.g., struggling for each breath, speaks in single words)    Negative: Difficult to awaken or acting confused (e.g., disoriented, slurred speech)    Negative: Bluish (or gray) lips or face now    Negative: Shock suspected (e.g., cold/pale/clammy skin, too weak to stand, low BP, rapid pulse)    Negative: Sounds like a life-threatening emergency to the triager    Negative: [1] COVID-19 exposure AND [2] no symptoms    Negative: [1] Lives with someone known to have influenza (flu test positive) AND [2] flu-like symptoms (e.g., cough, runny nose, sore throat, SOB; with or without fever)    Negative: [1] Adult with possible COVID-19 symptoms AND [2] triager concerned about severity of symptoms or other causes    Negative: Immunization reaction suspected (e.g., fever, headache, muscle aches occurring during days 1-3 days after immunization)    Negative: COVID-19 and breastfeeding, questions about    Negative: SEVERE or constant chest pain or pressure (Exception: mild central chest pain, present only when coughing)    Negative: MODERATE difficulty breathing (e.g., speaks in phrases, SOB even at rest, pulse 100-120)    Negative: [1] Headache AND [2] stiff neck (can't touch chin to chest)    Protocols used: CORONAVIRUS (COVID-19) DIAGNOSED OR  [id number]-S-OF 12.1

## 2020-12-29 NOTE — PROGRESS NOTES
Clinic Care Coordination Contact     Situation:  Patient chart reviewed by Community Health Worker.    Chart Review:  Community Health Worker attempted to call patient for CCC outreach.  However, upon chart review, it looks like further review is needed.     CHW CC'ed message to HORACIO Chen, for chart review.       Plan/Recommendations:  CHW will hold off on outreach til further notification.     TERA Jimenez  Clinic Care Coordination  [facility] : [place], [place], and [place]  Phone: [phone]

## 2020-12-30 ENCOUNTER — PATIENT OUTREACH (OUTPATIENT)
Dept: CARE COORDINATION | Facility: CLINIC | Age: 85
End: 2020-12-30

## 2020-12-30 ENCOUNTER — APPOINTMENT (OUTPATIENT)
Dept: PHYSICAL THERAPY | Facility: CLINIC | Age: 85
DRG: 177 | End: 2020-12-30
Attending: INTERNAL MEDICINE
Payer: MEDICARE

## 2020-12-30 VITALS
HEIGHT: 64 IN | HEART RATE: 67 BPM | TEMPERATURE: 98.3 F | SYSTOLIC BLOOD PRESSURE: 157 MMHG | BODY MASS INDEX: 28 KG/M2 | DIASTOLIC BLOOD PRESSURE: 71 MMHG | WEIGHT: 164 LBS | OXYGEN SATURATION: 98 % | RESPIRATION RATE: 18 BRPM

## 2020-12-30 LAB
ANION GAP SERPL CALCULATED.3IONS-SCNC: 5 MMOL/L (ref 3–14)
BASOPHILS # BLD AUTO: 0 10E9/L (ref 0–0.2)
BASOPHILS NFR BLD AUTO: 0 %
BUN SERPL-MCNC: 14 MG/DL (ref 7–30)
CALCIUM SERPL-MCNC: 9 MG/DL (ref 8.5–10.1)
CHLORIDE SERPL-SCNC: 111 MMOL/L (ref 94–109)
CO2 SERPL-SCNC: 27 MMOL/L (ref 20–32)
CREAT SERPL-MCNC: 0.97 MG/DL (ref 0.52–1.04)
CRP SERPL-MCNC: <2.9 MG/L (ref 0–8)
D DIMER PPP FEU-MCNC: 1 UG/ML FEU (ref 0–0.5)
DIFFERENTIAL METHOD BLD: ABNORMAL
EOSINOPHIL # BLD AUTO: 0 10E9/L (ref 0–0.7)
EOSINOPHIL NFR BLD AUTO: 0 %
ERYTHROCYTE [DISTWIDTH] IN BLOOD BY AUTOMATED COUNT: 14.1 % (ref 10–15)
GFR SERPL CREATININE-BSD FRML MDRD: 52 ML/MIN/{1.73_M2}
GLUCOSE SERPL-MCNC: 110 MG/DL (ref 70–99)
HCT VFR BLD AUTO: 34.8 % (ref 35–47)
HGB BLD-MCNC: 10.9 G/DL (ref 11.7–15.7)
IMM GRANULOCYTES # BLD: 0 10E9/L (ref 0–0.4)
IMM GRANULOCYTES NFR BLD: 0 %
INTERPRETATION ECG - MUSE: NORMAL
LYMPHOCYTES # BLD AUTO: 0.8 10E9/L (ref 0.8–5.3)
LYMPHOCYTES NFR BLD AUTO: 48.3 %
MCH RBC QN AUTO: 31.1 PG (ref 26.5–33)
MCHC RBC AUTO-ENTMCNC: 31.3 G/DL (ref 31.5–36.5)
MCV RBC AUTO: 99 FL (ref 78–100)
MONOCYTES # BLD AUTO: 0.1 10E9/L (ref 0–1.3)
MONOCYTES NFR BLD AUTO: 6.9 %
NEUTROPHILS # BLD AUTO: 0.8 10E9/L (ref 1.6–8.3)
NEUTROPHILS NFR BLD AUTO: 44.8 %
NRBC # BLD AUTO: 0 10*3/UL
NRBC BLD AUTO-RTO: 0 /100
PLATELET # BLD AUTO: 158 10E9/L (ref 150–450)
POTASSIUM SERPL-SCNC: 3.9 MMOL/L (ref 3.4–5.3)
RBC # BLD AUTO: 3.5 10E12/L (ref 3.8–5.2)
SODIUM SERPL-SCNC: 143 MMOL/L (ref 133–144)
TROPONIN I SERPL-MCNC: <0.015 UG/L (ref 0–0.04)
WBC # BLD AUTO: 1.7 10E9/L (ref 4–11)

## 2020-12-30 PROCEDURE — 84484 ASSAY OF TROPONIN QUANT: CPT | Performed by: INTERNAL MEDICINE

## 2020-12-30 PROCEDURE — 250N000011 HC RX IP 250 OP 636: Performed by: INTERNAL MEDICINE

## 2020-12-30 PROCEDURE — 80048 BASIC METABOLIC PNL TOTAL CA: CPT | Performed by: INTERNAL MEDICINE

## 2020-12-30 PROCEDURE — 85025 COMPLETE CBC W/AUTO DIFF WBC: CPT | Performed by: INTERNAL MEDICINE

## 2020-12-30 PROCEDURE — 97161 PT EVAL LOW COMPLEX 20 MIN: CPT | Mod: GP | Performed by: PHYSICAL THERAPIST

## 2020-12-30 PROCEDURE — 97530 THERAPEUTIC ACTIVITIES: CPT | Mod: GP | Performed by: PHYSICAL THERAPIST

## 2020-12-30 PROCEDURE — 86140 C-REACTIVE PROTEIN: CPT | Performed by: INTERNAL MEDICINE

## 2020-12-30 PROCEDURE — 36415 COLL VENOUS BLD VENIPUNCTURE: CPT | Performed by: INTERNAL MEDICINE

## 2020-12-30 PROCEDURE — 85379 FIBRIN DEGRADATION QUANT: CPT | Performed by: INTERNAL MEDICINE

## 2020-12-30 PROCEDURE — 99238 HOSP IP/OBS DSCHRG MGMT 30/<: CPT | Performed by: INTERNAL MEDICINE

## 2020-12-30 PROCEDURE — 250N000013 HC RX MED GY IP 250 OP 250 PS 637: Performed by: INTERNAL MEDICINE

## 2020-12-30 PROCEDURE — 97116 GAIT TRAINING THERAPY: CPT | Mod: GP | Performed by: PHYSICAL THERAPIST

## 2020-12-30 RX ADMIN — CARVEDILOL 9.38 MG: 6.25 TABLET, FILM COATED ORAL at 08:36

## 2020-12-30 RX ADMIN — DEXAMETHASONE SODIUM PHOSPHATE 6 MG: 4 INJECTION, SOLUTION INTRAMUSCULAR; INTRAVENOUS at 08:36

## 2020-12-30 RX ADMIN — ASPIRIN 81 MG: 81 TABLET ORAL at 08:36

## 2020-12-30 RX ADMIN — ACETAMINOPHEN 650 MG: 325 TABLET, FILM COATED ORAL at 02:38

## 2020-12-30 RX ADMIN — GABAPENTIN 100 MG: 100 CAPSULE ORAL at 14:25

## 2020-12-30 RX ADMIN — ACETAMINOPHEN 650 MG: 325 TABLET, FILM COATED ORAL at 08:36

## 2020-12-30 RX ADMIN — MIRABEGRON 50 MG: 50 TABLET, FILM COATED, EXTENDED RELEASE ORAL at 08:36

## 2020-12-30 ASSESSMENT — ACTIVITIES OF DAILY LIVING (ADL)
DEPENDENT_IADLS:: INDEPENDENT
ADLS_ACUITY_SCORE: 17

## 2020-12-30 NOTE — PROGRESS NOTES
Clinic Care Coordination Contact  Ambulatory Care Coordination to Inpatient Care Management   Hand-In Communication    Date:  December 30, 2020  Name: Glenyd Díaz is enrolled in Ambulatory Care Coordination program and I am the Lead Care Coordinator.  CC Contact Information: Epic InBasket + phone  Payor Source: Payor: MEDICARE / Plan: MEDICARE / Product Type: Medicare /   Current services in place:     Please see the CC Snaphot and Care Management Flowsheets for specific  details of this Glendy Díaz care plan.   Additional details/specific concerns r/t this admission:    Psychosocial Pt's dtrs are working to coordinate in-home services vs. senior living. Many resources for respite, private duty home care, NICOL placement and additional services were provided.     I will follow this admission in Epic. Please feel free to contact me with questions or for further collaboration in discharge planning.      Henri Hairston, [facility]  Clinic Care Coordinator  Ph. [phone]  [email or web address]

## 2020-12-30 NOTE — PLAN OF CARE
End of Shift Summary  For vital signs and complete assessments, please see documentation flowsheets.     Pertinent assessments: A/O with some forgetfulness, denies any pain, up with SBA with walker with minimal SOB, on room air. Infrequent productive cough. VSS. Uses hearing aids, pleasant with cares. Tele showed SR with PACs. ARAGON, incontinent at times.    Major Shift Events: uneventful    Treatment Plan: PRN supplemental O2 and cough meds, Dexamethasone, lovenox, PT consult

## 2020-12-30 NOTE — DISCHARGE INSTRUCTIONS
Your home care referral was sent to [facility]  If you haven't heard from them within the next 24-48 hours,  Please call them at [phone]

## 2020-12-30 NOTE — CONSULTS
Care Management Initial Consult    General Information  Assessment completed with: Patient, Children, [facility] SW , Abigail daughter   Type of CM/SW Visit: Initial Assessment    Primary Care Provider verified and updated as needed:     Readmission within the last 30 days: no previous admission in last 30 days   Return Category: Exacerbation of disease     Advance Care Planning:          N/A  Communication Assessment  Patient's communication style: spoken language (English or Bilingual)    Hearing Difficulty or Deaf: yes   Wear Glasses or Blind: yes    Cognitive  Cognitive/Neuro/Behavioral: WDL                      Living Environment:   People in home: alone     Current living Arrangements: apartment      Able to return to prior arrangements: yes  Living Arrangement Comments: looking for NICOL support   Was to move into N[facility] at [facility] this weekend. Now that pt is (+) COVID NICOL facility will not accept until passed her quarantine.     Family/Social Support:  Care provided by: self  Provides care for: no one  Marital Status:   Children          Description of Support System: Supportive, Involved    Support Assessment: Adequate family and caregiver support    Current Resources:   Skilled Home Care Services:  No- family would like have home care RN/HHA support due to COVID status, Pt has Dizzy spells due to heart issue and H/ O Falls.   Community Resources: None  Equipment currently used at home: walker, standard, wheelchair, manual, shower chair, grab bar, toilet, grab bar, tub/shower, raised toilet seat  Supplies currently used at home: Incontinence Supplies, Nutritional Supplements    Employment/Financial:  Employment Status: retired        Financial Concerns:   None at this time           Lifestyle & Psychosocial Needs:        Socioeconomic History     Marital status:      Spouse name: Not on file     Number of children: Not on file     Years of education: Not on file     Highest education level: Not on  file     Tobacco Use     Smoking status: Former Smoker     Packs/day: 1.00     Years: 20.00     Pack years: 20.00     Types: Cigarettes     Start date:      Quit date:      Years since quittin.0     Smokeless tobacco: Never Used   Substance and Sexual Activity     Alcohol use: No     Drug use: No     Sexual activity: Never       Functional Status:  Prior to admission patient needed assistance:   Dependent ADLs:: Ambulation-walker  Dependent IADLs:: Independent       Mental Health Status:        N/A   Chemical Dependency Status:          N/a       Values/Beliefs:  Spiritual, Cultural Beliefs, Yazidism Practices, Values that affect care: no               Additional Information:  Spoke with pt and her daughter Denisse. PT was scheduled to have an oblation tomorrow but not has been placed on hold at this time due to COVID.. Pt has H.o Falls due to dizzy spells ., Falls and dizzy spells are one of the  reason that pt and family were making plans to move her.   Pt has a life line.. pt uses walker at home. Has used [facility] in the past. Daughter is wondering if pt is able to have this support as well as home care. SW is unclear about this.    SW will ask MD for home care RN/HHA orders and then call the clinic to see if they can check into the [facility] as well.   Daughter is willing to provide support for shopping and meal prep.. Daughter is concerned about exposure to COVID.    Corinne C. White, LSW

## 2020-12-30 NOTE — PHARMACY-ADMISSION MEDICATION HISTORY
Admission medication history interview status for this patient is complete. See [facility] admission navigator for allergy information, prior to admission medications and immunization status.     Medication history interview done via telephone during Covid-19 pandemic, indicate source(s): Patient and Family  Medication history resources (including written lists, pill bottles, clinic record):None  Pharmacy: -    Changes made to PTA medication list:  Added: miralax  Deleted: lasix  Changed: zyrtec, flonase to prn,     Actions taken by pharmacist (provider contacted, etc):called pt and daughter Sharyn for mr     Additional medication history information:None    Medication reconciliation/reorder completed by provider prior to medication history?  yes   (Y/N)     For patients on insulin therapy:   Do you use sliding scale insulin based on blood sugars?   What is your pre-meal insulin coverage?    Do you typically eat three meals a day?   How many times do you check your blood glucose per day?   How many episodes of hypoglycemia do you typically have per month?   Do you have a Continuous Glucose Monitor (CGM)?      Prior to Admission medications    Medication Sig Last Dose Taking? Auth Provider   albuterol (PROAIR HFA/PROVENTIL HFA/VENTOLIN HFA) 108 (90 BASE) MCG/ACT Inhaler Inhale 2 puffs into the lungs every 6 hours as needed for shortness of breath / dyspnea or wheezing  Yes Amber Davis NP   aspirin 81 MG tablet Take 1 tablet (81 mg) by mouth daily 12/28/2020 at hs Yes Veronica Davis MD   carvedilol (COREG) 6.25 MG tablet Take 1.5 tablet twice a day 12/29/2020 at x1 time Yes Veronica Davis MD   Cholecalciferol (VITAMIN D) 2000 UNITS tablet Take 2,000 Units by mouth daily 12/28/2020 at pm  Yes Veronica Davis MD   estradiol (ESTRACE VAGINAL) 0.1 MG/GM vaginal cream Apply small amount to the vaginal opening and urethra M, W, F @ h.s. 12/28/2020 at Unknown time Yes  Magalys Escobedo PA-C   fluticasone (FLONASE) 50 MCG/ACT nasal spray Spray 1 spray into both nostrils daily as needed for rhinitis or allergies  Yes Unknown, Entered By History   gabapentin (NEURONTIN) 100 MG capsule Take 100 mg by mouth In the afternoon at 1400 12/28/2020 at Unknown time Yes Unknown, Entered By History   gabapentin (NEURONTIN) 100 MG capsule Take 200 mg by mouth At Bedtime 12/28/2020 at Unknown time Yes Unknown, Entered By History   mirabegron (MYRBETRIQ) 50 MG 24 hr tablet Take 1 tablet (50 mg) by mouth daily 12/29/2020 at Unknown time Yes Magalys Escobedo PA-C   polyethylene glycol (MIRALAX) 17 g packet Take 1 packet by mouth daily as needed for constipation  Yes Unknown, Entered By History   senna-docusate (SENOKOT-S/PERICOLACE) 8.6-50 MG tablet Take 1 tablet by mouth daily as needed for constipation  Yes Reported, Patient   traMADol (ULTRAM) 50 MG tablet Take 50 mg by mouth 2 times daily as needed for severe pain  Yes Reported, Patient   cetirizine (ZYRTEC) 10 MG tablet Take 10 mg by mouth daily as needed    Doctor, None, MD   order for DME Equipment being ordered: surgicxal shoe size 10   Steven Bonilla DPM

## 2020-12-30 NOTE — PLAN OF CARE
End of Shift Summary  For vital signs and complete assessments, please see documentation flowsheets.     Pertinent assessments: A/O with some forgetfulness, denies any pain, up with A1-SBA with minimal SoB, on room air. Regular diet, unable to eat well - teeth at home. Uses hearing aids, pleasant with cares. PiV saline locked, per Tele - SR 60s    Major Shift Events: admitted at around 1700H    Treatment Plan: PRN supplemental O2 and cough meds, Dexamethasone, DVT prophylaxis, PT consult    Bedside Nurse: Yogesh Barrios

## 2020-12-30 NOTE — DISCHARGE SUMMARY
[facility] [place]  Hospitalist Discharge Summary       Date of Admission:  12/29/2020  Date of Discharge:  12/30/2020  Discharging Provider: Mikey Walker MD      Discharge Diagnoses   COVID 19 infection      Follow-ups Needed After Discharge   Follow up with your regular doctor.    Discharge Disposition   Discharged to home  Condition at discharge: Stable    Hospital Course     Patient presented to the hospital with 3 days of generalized weakness and cough as well as headache.  Was noted to be positive for COVID-19 based on her test obtained on 12/27.  Here, she never needed oxygen while she was at rest.  She stated she was feeling much better and she wanted to go home.  Do not feel she needs to continue dexamethasone or any DVT prophylaxis on discharge as she appears well.  She did have bilateral infiltrates on chest x-ray and is at risk for decompensation when she leaves, which was discussed with her and her daughter.  For that reason,  was consulted and helped arrange visiting nurse and home health aide to monitor her respiratory status.  Patient discharged back to independent living in stable condition.  She also had been seen by PT who felt she was appropriate for this.  Discussed at length with patient and daughter the reasons for return to the hospital.  Patient has a pulse oximeter at home that she can use.    Consultations This Hospital Stay   PHYSICAL THERAPY ADULT IP CONSULT  CARE MANAGEMENT / SOCIAL WORK IP CONSULT      Code Status   Full Code    Time Spent on this Encounter   I, Mikey Walker MD, personally saw the patient today and spent less than or equal to 30 minutes discharging this patient.       Mikey Walker MD  [facility]  ______________________________________________________________________    Physical Exam   Vital Signs: Temp: 98.3  F (36.8  C) Temp src: Oral BP: (!) 141/76 Pulse: 76   Resp: 20 SpO2: 98  %(following mobility) O2 Device: None (Room air)    Weight: 164 lbs 0 oz      General: Looks comfortable    HEENT: No scleral icterus. Oropharynx moist.     Neck: Supple. Normal range of motion.    Pulmonary: Normal work of breathing. Clear to auscultation bilaterally.    Cardiovascular: Regular rate and rhythm without murmur or extra heart sounds.    Abdomen: Soft and non-tender.    Extremities: No peripheral edema. No clubbing.    Neurologic: Awake, alert, appropriate.    Skin: Warm and dry.    Psychiatric: Normal affect and mood.       Primary Care Physician   Veronica Davis    Discharge Orders      Home care nursing referral      Reason for your hospital stay    COVID-19 infection.  Symptoms were mild.  You will not currently require any medication for this.     Follow-up and recommended labs and tests     Follow-up with your primary care doctor within the next week.  Keep an eye on your oxygen levels.     Activity    Your activity upon discharge: As tolerated     MD face to face encounter    Documentation of Face to Face and Certification for Home Health Services    I certify that patient: Glendy Díaz is under my care and that I, or a nurse practitioner or physician's assistant working with me, had a face-to-face encounter that meets the physician face-to-face encounter requirements with this patient on: December 30, 2020.    This encounter with the patient was in whole, or in part, for the following medical condition, which is the primary reason for home health care: COVID 19.    I certify that, based on my findings, the following services are medically necessary home health services: Nursing.    My clinical findings support the need for the above services because: Nurse is needed: To assess respiratory status after changes in medications or other medical regimen..    Further, I certify that my clinical findings support that this patient is homebound (i.e. absences from home require  considerable and taxing effort and are for medical reasons or Scientology services or infrequently or of short duration when for other reasons) because: Leaving home is medically contraindicated for the following reason(s): Dyspnea on exertion that makes it so they cannot leave their home for needed services without clinical deterioration...    Based on the above findings. I certify that this patient is confined to the home and needs intermittent skilled nursing care, physical therapy and/or speech therapy.  The patient is under my care, and I have initiated the establishment of the plan of care.  This patient will be followed by a physician who will periodically review the plan of care.  Physician/Provider to provide follow up care: Veronica Davis    Attending hospital physician (the Medicare certified [place] provider): Mikey Walker MD  Physician Signature: See electronic signature associated with these discharge orders.  Date: 12/30/2020     Diet    Follow this diet upon discharge: Regular       Significant Results and Procedures   Most Recent 3 CBC's:  Recent Labs   Lab Test 12/30/20  0718 12/29/20  1234 12/27/20  0917   WBC 1.7* 3.0* 4.4   HGB 10.9* 11.1* 11.7   MCV 99 102* 100    133* 155     Most Recent 3 BMP's:  Recent Labs   Lab Test 12/30/20  0718 12/29/20  1234 12/27/20  0917    142 140   POTASSIUM 3.9 3.4 3.5   CHLORIDE 111* 108 108   CO2 27 30 27   BUN 14 17 17   CR 0.97 1.09* 1.20*   ANIONGAP 5 4 5   MUMTAZ 9.0 8.8 9.4   * 92 93     Most Recent 2 LFT's:  Recent Labs   Lab Test 12/29/20  1234 05/26/20  1238   AST 31 18   ALT 19 15   ALKPHOS 77 74   BILITOTAL 0.3 0.4     Most Recent 3 INR's:  Recent Labs   Lab Test 07/12/19  0911   INR 0.91   ,   Results for orders placed or performed during the hospital encounter of 12/29/20   Head CT w/o contrast    Narrative    CT SCAN OF THE HEAD WITHOUT CONTRAST   12/29/2020 1:51 PM     HISTORY: Headache.    TECHNIQUE:  Axial  images of the head and coronal reformations without  IV contrast material. Radiation dose for this scan was reduced using  automated exposure control, adjustment of the mA and/or kV according  to patient size, or iterative reconstruction technique.    COMPARISON: Head CT 7/12/2019.    FINDINGS: There is no evidence of intracranial hemorrhage, mass, acute  infarct or anomaly. The ventricles are normal in size, shape and  configuration. Mild diffuse parenchymal volume loss. Mild patchy  periventricular white matter hypodensities which are nonspecific, but  likely related to chronic microvascular ischemic disease.     The visualized portions of the sinuses and mastoids appear normal. The  bony calvarium and bones of the skull base appear intact.       Impression    IMPRESSION:     1. No evidence of acute intracranial hemorrhage, mass, or herniation.  2. Mild diffuse parenchymal volume loss and white matter changes  likely due to chronic microvascular ischemic disease. No significant  change since prior.      KOBE QUINTANILLA MD   XR Chest Port 1 View    Narrative    CHEST ONE VIEW PORTABLE December 29, 2020 3:24 PM     HISTORY: COVID, hypoxia.    COMPARISON: None.      Impression    IMPRESSION: Worsening interstitial opacities with mild ground-glass  opacities are compatible with COVID-19. No pneumothorax or pleural  effusion.    DAXA PIERSON MD       Discharge Medications   Current Discharge Medication List      CONTINUE these medications which have NOT CHANGED    Details   albuterol (PROAIR HFA/PROVENTIL HFA/VENTOLIN HFA) 108 (90 BASE) MCG/ACT Inhaler Inhale 2 puffs into the lungs every 6 hours as needed for shortness of breath / dyspnea or wheezing  Qty: 1 Inhaler, Refills: 1    Associated Diagnoses: Cough      aspirin 81 MG tablet Take 1 tablet (81 mg) by mouth daily  Qty: 30 tablet, Refills: 3    Associated Diagnoses: CKD (chronic kidney disease) stage 3, GFR 30-59 ml/min; Essential hypertension with goal blood  pressure less than 140/90      carvedilol (COREG) 6.25 MG tablet Take 1.5 tablet twice a day  Qty: 270 tablet, Refills: 0    Comments: Profile Rx: patient will contact pharmacy when needed  Associated Diagnoses: Essential hypertension with goal blood pressure less than 140/90      Cholecalciferol (VITAMIN D) 2000 UNITS tablet Take 2,000 Units by mouth daily  Qty: 100 tablet, Refills: 3    Associated Diagnoses: Vitamin D deficiency; Hyperparathyroidism (H)      estradiol (ESTRACE VAGINAL) 0.1 MG/GM vaginal cream Apply small amount to the vaginal opening and urethra M, W, F @ h.s.  Qty: 42.5 g, Refills: 3    Associated Diagnoses: Urge incontinence of urine      fluticasone (FLONASE) 50 MCG/ACT nasal spray Spray 1 spray into both nostrils daily as needed for rhinitis or allergies      !! gabapentin (NEURONTIN) 100 MG capsule Take 100 mg by mouth In the afternoon at 1400      !! gabapentin (NEURONTIN) 100 MG capsule Take 200 mg by mouth At Bedtime      mirabegron (MYRBETRIQ) 50 MG 24 hr tablet Take 1 tablet (50 mg) by mouth daily  Qty: 90 tablet, Refills: 0    Associated Diagnoses: OAB (overactive bladder)      polyethylene glycol (MIRALAX) 17 g packet Take 1 packet by mouth daily as needed for constipation      senna-docusate (SENOKOT-S/PERICOLACE) 8.6-50 MG tablet Take 1 tablet by mouth daily as needed for constipation      traMADol (ULTRAM) 50 MG tablet Take 50 mg by mouth 2 times daily as needed for severe pain      cetirizine (ZYRTEC) 10 MG tablet Take 10 mg by mouth daily as needed   Qty: 90 tablet, Refills: 3      order for DME Equipment being ordered: surgicxal shoe size 10  Qty: 1 Device, Refills: 0    Associated Diagnoses: Capsulitis of metatarsophalangeal (MTP) joint of left foot; Ulcer of toe, left, limited to breakdown of skin (H)       !! - Potential duplicate medications found. Please discuss with provider.        Allergies   Allergies   Allergen Reactions     Amlodipine      Leg edema with 5 mg      Fosamax [Alendronic Acid]      Bone pain     Lisinopril      Increased potassium     Pravastatin      Muscle aches      Simvastatin      Muscle aches

## 2020-12-30 NOTE — UTILIZATION REVIEW
Inpatient appropriate    Admission Status; Secondary Review Determination      Under the authority of the Utilization Management Committee, the utilization review process indicated a secondary review on the above patient. The review outcome is based on review of the medical records, discussions with staff, and applying clinical experience noted on the date of the review.    (x) Inpatient Status Appropriate - This patient's medical care is consistent with medical management for inpatient care and reasonable inpatient medical practice.    RATIONALE FOR DETERMINATION  87-year-old female with history of hypertension, CKD stage III, peripheral neuropathy presented to [facility] on 12/29/2020 with generalized weakness and known COVID-19 infection.  She was found to be hypoxic with ambulation with oxygen saturation of 87%. Chest x-ray shows worsening interstitial opacity with mild groundglass opacities compatible with COVID-19.  She is being treated with dexamethasone.  She also has been much weaker compared to her baseline.  Discussed with Dr. Walker, given her known COVID-19 infection, worsening generalized weakness and hypoxia with ambulation, she will need ongoing hospitalization.Inpatient care is appropriate at this time.    At the time of admission with the information available to the attending physician more than 2 nights Hospital complex care was anticipated, based on patient risk of adverse outcome if treated as outpatient and complex care required. Inpatient admission is appropriate based on the Medicare guidelines.    This document was produced using voice recognition software      The information on this document is developed by the utilization review team in order for the business office to ensure compliance. This only denotes the appropriateness of proper admission status and does not reflect the quality of care rendered.  The definitions of Inpatient Status and Observation Status used in  making the determination above are those provided in the CMS Coverage Manual, Chapter 1 and Chapter 6, section 70.4.    Sincerely,    Utilization Review  Physician Advisor  [facility].

## 2020-12-30 NOTE — PROGRESS NOTES
[facility]  Spoke with pt and daughter to discuss plans for HC.  Pt to be discharged home today and has agreed to have [facility] follow with services of SN and HHA. Patient care support center processing referral.  Pt and daughter verbalized understanding that initial visit is scheduled for 12/31 or 1/1/2021.  Pt has 24 hour phone number for [facility] for any questions or concerns provided on AVS.

## 2020-12-30 NOTE — PROGRESS NOTES
Clinic Care Coordination Contact  Care Team Conversations    Voicemail received from Hospitalist regarding discharge disposition. Will send FYI to inpatient Care Management team to address with Patient/family.    Henri Hairston [facility]  Clinic Care Coordinator  Ph. [phone]  [email or web address]

## 2020-12-30 NOTE — PROGRESS NOTES
12/30/20 1003   Quick Adds   Type of Visit Initial PT Evaluation   Living Environment   Living Environment Comments Pt lives in ILF by self no stairs to perform. Use of cane/walker at all times. Daughter helps with groceries, otherwise ind with all ADLs.    Self-Care   Equipment Currently Used at Home walker, standard;wheelchair, manual;shower chair;grab bar, toilet;grab bar, tub/shower;raised toilet seat   Disability/Function   Fall history within last six months yes   Number of times patient has fallen within last six months 4   General Information   Onset of Illness/Injury or Date of Surgery 12/29/20   Referring Physician Reese Galan MD   Patient/Family Therapy Goals Statement (PT) To return home   Pertinent History of Current Problem (include personal factors and/or comorbidities that impact the POC) Pt is a 87 year old female former smoker with PMH including HTN, CKD stage III, peripheral neuropathy, anxiety, chronic neck pain, and recent episodes of paroxysmal SVT following with cardiology and scheduled for EP study with possible ablation on 1/12/2020 who presents with generalized weakness. Pt admitted with acute hypoxemic respiratory failure secondary to COVID-19 pneumonia.   Existing Precautions/Restrictions fall   Weight-Bearing Status - LLE full weight-bearing   Weight-Bearing Status - RLE full weight-bearing   Cognition   Orientation Status (Cognition) oriented x 4   Affect/Mental Status (Cognition) WFL   Follows Commands (Cognition) WFL   Pain Assessment   Patient Currently in Pain No   Range of Motion (ROM)   ROM Comment B LE WFL   Strength   Strength Comments B LE demonstrated functionally >3/5 strength   Bed Mobility   Comment (Bed Mobility) mod ind supine to sit   Transfers   Transfer Safety Comments SBA sit to stand with FWW   Gait/Stairs (Locomotion)   Distance in Feet (Required for LE Total Joints) 10   Pattern (Gait) step-through   Comment (Gait/Stairs) SBA with FWW   Balance    Balance Comments good unsupported sitting balance; fair+ standing balance with FWW   Clinical Impression   Criteria for Skilled Therapeutic Intervention yes, treatment indicated   PT Diagnosis (PT) impaired functional mobility   Influenced by the following impairments decreased balance   Functional limitations due to impairments impaired transfers and ambulation   Clinical Presentation Stable/Uncomplicated   Clinical Presentation Rationale Pt is medically stable, functional status   Clinical Decision Making (Complexity) low complexity   Therapy Frequency (PT) Daily   Predicted Duration of Therapy Intervention (days/wks) 2 days   Planned Therapy Interventions (PT) balance training;bed mobility training;gait training;neuromuscular re-education;home exercise program;patient/family education;strengthening;transfer training   Anticipated Equipment Needs at Discharge (PT) walker, standard   Risk & Benefits of therapy have been explained evaluation/treatment results reviewed;care plan/treatment goals reviewed;risks/benefits reviewed;current/potential barriers reviewed;participants voiced agreement with care plan;participants included;patient   PT Discharge Planning    PT Discharge Recommendation (DC Rec) home   PT Rationale for DC Rec Pt presenting close to baseline level of mobility and has demonstrated safe mobility to return. Pt discussed moving to [facility] for increased services, states that she was waiting until after holidays to move.    PT Brief overview of current status  SBA with FWW   Total Evaluation Time   Total Evaluation Time (Minutes) 4

## 2020-12-30 NOTE — PLAN OF CARE
End of Shift Summary  For vital signs and complete assessments, please see documentation flowsheets.     Pertinent assessments: A/O with some forgetfulness, denies any pain, up with SBA with walker with minimal SOB, on room air. Infrequent productive cough. Hypertensive, otherwise VSS. Uses hearing aids, pleasant with cares. Tele. ARAGON, incontinent at times. Discontinue to Ind living tonight.    Major Shift Events: None  Treatment Plan: DC today  Bedside Nurse: Daly Swift RN

## 2020-12-31 ENCOUNTER — PATIENT OUTREACH (OUTPATIENT)
Dept: CARE COORDINATION | Facility: CLINIC | Age: 85
End: 2020-12-31

## 2020-12-31 ENCOUNTER — TELEPHONE (OUTPATIENT)
Dept: CARDIOLOGY | Facility: CLINIC | Age: 85
End: 2020-12-31

## 2020-12-31 ASSESSMENT — ACTIVITIES OF DAILY LIVING (ADL): DEPENDENT_IADLS:: INDEPENDENT

## 2020-12-31 NOTE — PROGRESS NOTES
Clinic Care Coordination Contact  [facility]/Voicemail    Referral Source: ED Follow-Up  Clinical Data: Care Coordinator Outreach  Outreach attempted x 1.  Left message on [employer]'s voicemail with call back information and requested return call.  Plan: Care Coordinator will try to reach patient again in 1-2 business days.      Henri Hairston [facility]  Clinic Care Coordinator  Ph. [phone]  [email or web address]

## 2020-12-31 NOTE — PLAN OF CARE
AVS reviewed with patient. All questions answered. No new medications ordered. All belongings returned to patient at time of discharge. At patient's request writer reviewed AVS with her daughter at personal vehicle. Covid-19 discharge instructions reviewed with education on when to seek medical care if worsening symptoms noted. Pt given wheelchair ride to personal vehicle where daughter to drive pt back to previous living disposition. No further needs identified at discharge.

## 2020-12-31 NOTE — PLAN OF CARE
Physical Therapy Discharge Summary    Reason for therapy discharge:    Discharged to home.    Progress towards therapy goal(s). See goals on Care Plan in [facility] electronic health record for goal details.  Goals not met.  Barriers to achieving goals:   discharge on same date as initial evaluation.    Therapy recommendation(s):    No further therapy is recommended.

## 2020-12-31 NOTE — TELEPHONE ENCOUNTER
Pt daughter called and wondered if pt should be having her ablation so quickly after her COVID diagnosis. Pt did start to have symptoms, ie Headache, fever and went to hospital on 12/29 with weakness.  Discussed with daughter who will monitor pt and call next week if feeling that pt needs to be moved out on the schedule. Rashmi

## 2021-01-05 ENCOUNTER — TELEPHONE (OUTPATIENT)
Dept: CARDIOLOGY | Facility: CLINIC | Age: 86
End: 2021-01-05

## 2021-01-05 NOTE — TELEPHONE ENCOUNTER
Received a call from Nancy (home care nurse) who admitted patient today for services.  Calling to report patient has been having increased episodes of palpitations.  Patient has not yet been seen by EP. Currently being followed by Dr Truong.  Was scheduled for SVT ablation on 12/30 however had to be cancelled d/t COVID test being POSITIVE.  It is rescheduled for 1/12/2021.  Recommended that Dr Truong's team be called with her current symptoms to see if medications can be adjusted until SVT ablation can be completed.  Provided Nancy with direct phone number to Dr Truong's team.  DARCY Gutiérrez

## 2021-01-06 ENCOUNTER — HOSPITAL ENCOUNTER (INPATIENT)
Facility: CLINIC | Age: 86
LOS: 5 days | Discharge: HOME | DRG: 308 | End: 2021-01-11
Attending: EMERGENCY MEDICINE | Admitting: INTERNAL MEDICINE
Payer: MEDICARE

## 2021-01-06 ENCOUNTER — APPOINTMENT (OUTPATIENT)
Dept: CT IMAGING | Facility: CLINIC | Age: 86
DRG: 308 | End: 2021-01-06
Attending: EMERGENCY MEDICINE
Payer: MEDICARE

## 2021-01-06 DIAGNOSIS — I47.10 SVT (SUPRAVENTRICULAR TACHYCARDIA) (H): ICD-10-CM

## 2021-01-06 DIAGNOSIS — R52 PAIN: Primary | ICD-10-CM

## 2021-01-06 DIAGNOSIS — G62.9 NEUROPATHY: ICD-10-CM

## 2021-01-06 DIAGNOSIS — J18.9 PNEUMONIA DUE TO INFECTIOUS ORGANISM, UNSPECIFIED LATERALITY, UNSPECIFIED PART OF LUNG: ICD-10-CM

## 2021-01-06 LAB
ANION GAP SERPL CALCULATED.3IONS-SCNC: 4 MMOL/L (ref 3–14)
BASOPHILS # BLD AUTO: 0 10E9/L (ref 0–0.2)
BASOPHILS NFR BLD AUTO: 0.2 %
BUN SERPL-MCNC: 24 MG/DL (ref 7–30)
CALCIUM SERPL-MCNC: 8.9 MG/DL (ref 8.5–10.1)
CHLORIDE SERPL-SCNC: 102 MMOL/L (ref 94–109)
CO2 SERPL-SCNC: 29 MMOL/L (ref 20–32)
CREAT SERPL-MCNC: 1.25 MG/DL (ref 0.52–1.04)
D DIMER PPP FEU-MCNC: 1.4 UG/ML FEU (ref 0–0.5)
DIFFERENTIAL METHOD BLD: ABNORMAL
EOSINOPHIL # BLD AUTO: 0.1 10E9/L (ref 0–0.7)
EOSINOPHIL NFR BLD AUTO: 1.8 %
ERYTHROCYTE [DISTWIDTH] IN BLOOD BY AUTOMATED COUNT: 13.5 % (ref 10–15)
GFR SERPL CREATININE-BSD FRML MDRD: 39 ML/MIN/{1.73_M2}
GLUCOSE SERPL-MCNC: 101 MG/DL (ref 70–99)
HCT VFR BLD AUTO: 36.5 % (ref 35–47)
HGB BLD-MCNC: 11.8 G/DL (ref 11.7–15.7)
IMM GRANULOCYTES # BLD: 0 10E9/L (ref 0–0.4)
IMM GRANULOCYTES NFR BLD: 0.2 %
LYMPHOCYTES # BLD AUTO: 2 10E9/L (ref 0.8–5.3)
LYMPHOCYTES NFR BLD AUTO: 40.6 %
MCH RBC QN AUTO: 31.3 PG (ref 26.5–33)
MCHC RBC AUTO-ENTMCNC: 32.3 G/DL (ref 31.5–36.5)
MCV RBC AUTO: 97 FL (ref 78–100)
MONOCYTES # BLD AUTO: 0.6 10E9/L (ref 0–1.3)
MONOCYTES NFR BLD AUTO: 12 %
NEUTROPHILS # BLD AUTO: 2.3 10E9/L (ref 1.6–8.3)
NEUTROPHILS NFR BLD AUTO: 45.2 %
NRBC # BLD AUTO: 0 10*3/UL
NRBC BLD AUTO-RTO: 0 /100
NT-PROBNP SERPL-MCNC: 452 PG/ML (ref 0–1800)
PLATELET # BLD AUTO: 208 10E9/L (ref 150–450)
POTASSIUM SERPL-SCNC: 3.6 MMOL/L (ref 3.4–5.3)
RBC # BLD AUTO: 3.77 10E12/L (ref 3.8–5.2)
SODIUM SERPL-SCNC: 135 MMOL/L (ref 133–144)
TROPONIN I SERPL-MCNC: <0.015 UG/L (ref 0–0.04)
WBC # BLD AUTO: 5 10E9/L (ref 4–11)

## 2021-01-06 PROCEDURE — 96361 HYDRATE IV INFUSION ADD-ON: CPT

## 2021-01-06 PROCEDURE — 99285 EMERGENCY DEPT VISIT HI MDM: CPT | Mod: 25

## 2021-01-06 PROCEDURE — 258N000003 HC RX IP 258 OP 636: Performed by: EMERGENCY MEDICINE

## 2021-01-06 PROCEDURE — 93005 ELECTROCARDIOGRAM TRACING: CPT

## 2021-01-06 PROCEDURE — 84484 ASSAY OF TROPONIN QUANT: CPT | Performed by: EMERGENCY MEDICINE

## 2021-01-06 PROCEDURE — 96376 TX/PRO/DX INJ SAME DRUG ADON: CPT

## 2021-01-06 PROCEDURE — 96365 THER/PROPH/DIAG IV INF INIT: CPT | Mod: 59

## 2021-01-06 PROCEDURE — 80048 BASIC METABOLIC PNL TOTAL CA: CPT | Performed by: EMERGENCY MEDICINE

## 2021-01-06 PROCEDURE — 250N000011 HC RX IP 250 OP 636: Performed by: EMERGENCY MEDICINE

## 2021-01-06 PROCEDURE — 85379 FIBRIN DEGRADATION QUANT: CPT | Performed by: EMERGENCY MEDICINE

## 2021-01-06 PROCEDURE — 96367 TX/PROPH/DG ADDL SEQ IV INF: CPT

## 2021-01-06 PROCEDURE — 83880 ASSAY OF NATRIURETIC PEPTIDE: CPT | Performed by: EMERGENCY MEDICINE

## 2021-01-06 PROCEDURE — 250N000009 HC RX 250: Performed by: EMERGENCY MEDICINE

## 2021-01-06 PROCEDURE — 96375 TX/PRO/DX INJ NEW DRUG ADDON: CPT

## 2021-01-06 PROCEDURE — 250N000013 HC RX MED GY IP 250 OP 250 PS 637: Performed by: EMERGENCY MEDICINE

## 2021-01-06 PROCEDURE — 71275 CT ANGIOGRAPHY CHEST: CPT | Mod: ME

## 2021-01-06 PROCEDURE — 99223 1ST HOSP IP/OBS HIGH 75: CPT | Mod: AI | Performed by: INTERNAL MEDICINE

## 2021-01-06 PROCEDURE — 120N000001 HC R&B MED SURG/OB

## 2021-01-06 PROCEDURE — 85025 COMPLETE CBC W/AUTO DIFF WBC: CPT | Performed by: EMERGENCY MEDICINE

## 2021-01-06 RX ORDER — METOPROLOL TARTRATE 1 MG/ML
5 INJECTION, SOLUTION INTRAVENOUS ONCE
Status: COMPLETED | OUTPATIENT
Start: 2021-01-06 | End: 2021-01-06

## 2021-01-06 RX ORDER — CEFTRIAXONE 1 G/1
1 INJECTION, POWDER, FOR SOLUTION INTRAMUSCULAR; INTRAVENOUS ONCE
Status: COMPLETED | OUTPATIENT
Start: 2021-01-06 | End: 2021-01-07

## 2021-01-06 RX ORDER — MAGNESIUM SULFATE HEPTAHYDRATE 40 MG/ML
2 INJECTION, SOLUTION INTRAVENOUS ONCE
Status: COMPLETED | OUTPATIENT
Start: 2021-01-06 | End: 2021-01-06

## 2021-01-06 RX ORDER — IOPAMIDOL 755 MG/ML
500 INJECTION, SOLUTION INTRAVASCULAR ONCE
Status: COMPLETED | OUTPATIENT
Start: 2021-01-06 | End: 2021-01-06

## 2021-01-06 RX ORDER — ACETAMINOPHEN 500 MG
1000 TABLET ORAL EVERY 4 HOURS PRN
Status: DISCONTINUED | OUTPATIENT
Start: 2021-01-06 | End: 2021-01-07

## 2021-01-06 RX ADMIN — METOPROLOL TARTRATE 5 MG: 5 INJECTION INTRAVENOUS at 20:50

## 2021-01-06 RX ADMIN — CEFTRIAXONE 1 G: 1 INJECTION, POWDER, FOR SOLUTION INTRAMUSCULAR; INTRAVENOUS at 23:03

## 2021-01-06 RX ADMIN — SODIUM CHLORIDE 79 ML: 9 INJECTION, SOLUTION INTRAVENOUS at 20:15

## 2021-01-06 RX ADMIN — IOPAMIDOL 59 ML: 755 INJECTION, SOLUTION INTRAVENOUS at 20:14

## 2021-01-06 RX ADMIN — AMIODARONE HYDROCHLORIDE 150 MG: 1.5 INJECTION, SOLUTION INTRAVENOUS at 23:09

## 2021-01-06 RX ADMIN — SODIUM CHLORIDE 1000 ML: 9 INJECTION, SOLUTION INTRAVENOUS at 19:36

## 2021-01-06 RX ADMIN — MAGNESIUM SULFATE 2 G: 2 INJECTION INTRAVENOUS at 19:33

## 2021-01-06 RX ADMIN — METOPROLOL TARTRATE 5 MG: 5 INJECTION INTRAVENOUS at 22:22

## 2021-01-06 RX ADMIN — ACETAMINOPHEN 1000 MG: 500 TABLET, FILM COATED ORAL at 23:03

## 2021-01-06 NOTE — ED PROVIDER NOTES
History   Chief Complaint:  Shortness of Breath     HPI   Linsey Díaz is a 87 year old female with history of SVT who presents with shortness of breath and weakness.  Patient explains that she has history of paroxysmal SVT that used to be quite uncommon.  She was able to treat this with vagal maneuvers however over the last couple weeks it has become increasingly more frequent.  Over the last 24 to 48 hours it seems to come on every few minutes.  She is experiencing shortness of breath and weakness with these episodes along with palpitations.  She also notes she was scheduled to have an ablation around the new year unfortunately was diagnosed with Covid therefore her ablation was rescheduled till January 12.  She denies any significant cough, fevers, shortness of breath, headaches, chest pain, nausea or vomiting.  She notes that she takes carvedilol for her blood pressure.    Review of Systems   Constitutional: Positive for fatigue. Negative for chills and fever.   Respiratory: Positive for chest tightness and shortness of breath.    Cardiovascular: Positive for palpitations. Negative for chest pain.   All other systems reviewed and are negative.    Allergies:  Amlodipine  Fosamax [Alendronic Acid]  Lisinopril  Pravastatin  Simvastatin     Medications:  Coreg   Lasix   Gabapentin   myrbetriq      Past Medical History:    Anxiety              CKD (chronic kidney disease) stage 3  Hematuria         Hyperlipidemia              Hyperparathyroidism     Hypertension     Incontinence      Leg weakness, bilateral            Mumps              Neck pain, chronic        Osteoarthritis     Osteopenia        Peripheral neuropathy                 Past Surgical History:    Appendectomy   Arthrodesis foot  Bladder surgery   Cholecystectomy   Excise mass foot    hysterectomy   Remove hardware foot  Repair hammer toe   Reverse arthroplasty shoulder      Family History:    Lipids   Diabetes  Kidney disease  Breast cancer    Alcohol/drug     Social History:  Resides in independent living    Physical Exam     Patient Vitals for the past 24 hrs:   BP Temp Temp src Pulse Resp SpO2 Weight   01/06/21 2150 -- -- -- 144 18 96 % --   01/06/21 2145 129/70 -- -- 144 16 95 % --   01/06/21 2140 -- -- -- 96 22 96 % --   01/06/21 2135 -- -- -- 141 18 95 % --   01/06/21 2130 111/65 -- -- 146 14 97 % --   01/06/21 2125 -- -- -- 144 15 96 % --   01/06/21 2120 -- -- -- 87 13 96 % --   01/06/21 2115 127/61 -- -- 144 12 96 % --   01/06/21 2110 -- -- -- 146 17 92 % --   01/06/21 2105 -- -- -- 92 14 98 % --   01/06/21 2100 133/71 -- -- 149 17 95 % --   01/06/21 2055 -- -- -- 146 19 95 % --   01/06/21 2050 (!) 143/72 -- -- 96 26 98 % --   01/06/21 2045 91/71 -- -- 98 (!) 31 96 % --   01/06/21 2040 -- -- -- 99 17 98 % --   01/06/21 2005 -- -- -- 89 13 98 % --   01/06/21 2000 (!) 141/73 -- -- 87 27 98 % --   01/06/21 1955 -- -- -- 90 27 97 % --   01/06/21 1950 -- -- -- 89 14 98 % --   01/06/21 1945 138/79 -- -- 90 24 97 % --   01/06/21 1940 -- -- -- 147 30 96 % --   01/06/21 1939 116/84 -- -- 151 -- 96 % --   01/06/21 1938 -- -- -- -- -- -- 70.8 kg (156 lb)   01/06/21 1936 97/77 -- -- 152 16 98 % --   01/1935 116/84 -- -- 149 24 97 % --   01/06/21 1930 97/77 -- -- 149 (!) 32 95 % --   01/06/21 1925 -- -- -- 95 28 97 % --   01/06/21 1920 -- -- -- 151 27 100 % --   01/06/21 1915 112/81 -- -- 151 22 99 % --   01/06/21 1910 -- -- -- 89 25 98 % --   01/06/21 1905 -- -- -- 161 (!) 36 99 % --   01/06/21 1900 129/78 -- -- 161 23 100 % --   01/06/21 1756 (!) 120/92 98.6  F (37  C) Oral 85 24 97 % --       Physical Exam  General: Patient is awake, alert and interactive when I enter the room  Head: The scalp, face, and head appear normal  Eyes: The pupils are equal, round, and reactive to light. Conjunctivae and sclerae are normal  CV: Tachycardic with regular rhythm  Resp: Lungs are clear without wheezes or rales. No respiratory distress.   GI: Abdomen is soft,  no rigidity, guarding, or rebound. No distension. No tenderness to palpation in any quadrant.     MS: Normal tone. Joints grossly normal without effusions. No asymmetric leg swelling, calf or thigh tenderness.    Skin: No rash or lesions noted. Normal capillary refill noted  Neuro: Speech is normal and fluent. Face is symmetric. Moving all extremities.   Psych:  Normal affect.  Appropriate interactions.    Emergency Department Course     ECG:  ECG taken at 1802, ECG read at 1807  Supraventricular tachycardia   Left axis deviation   Left ventricular hypertrophy with repolarization abnormality   Abnormal ECG  Rate 153 bpm. PA interval *. QRS duration 88. QT/QTc 336/536. P-R-T axes * -46 111.    Imaging:  CT Chest Pulmonary Embolism w Contrast:  1.  No pulmonary emboli.  2.  Mild bibasilar groundglass opacities could represent pneumonia or  edema.  3.  Mild bibasilar bronchial wall thickening and mucous plugging could  be due to infection or aspiration.  4.  Sequela of prior granulomatous disease.  5.  Partly visualized possible mild left hydronephrosis. Consider an  ultrasound or CT of the abdomen and pelvis for better evaluation.  Reading per radiology    Laboratory:  CBC: WBC 5.0, HGB 11.8,   BMP: Glucose 101 (H), Creatinine 1.25 (H), GFR 39 (L), o/w WNL     Troponin (Collected 1803): <0.015  D Dimer (Collected 1803): 1.4 (H)  BNP: 452      Emergency Department Course:    Reviewed:  1744 I reviewed nursing notes    Assessments:  1749 Patient arrives via EMS.   1749 I assessed the patient as stated above.     Consults:   2247   I spoke with Dr. Frankel of the cardiologist service regarding patient's presentation, findings, and plan of care.     2307   I spoke with Dr. Galan of the hospitalist service who agrees to accept the patient.     Interventions:  1933 Magnesium Sulfate 2 g IV  1936 0.9% NaCl bolus 1000 mL IV  2050 Metoprolol 5 mg IV  2303 Tylenol 1000 mg PO  2309 Amiodarone 150 mg IV    Disposition:  The  patient was admitted to the hospital under the care of Dr. Galan.     Impression & Plan   Covid-19  Linsey Díaz was evaluated during a global COVID-19 pandemic, which necessitated consideration that the patient might be at risk for infection with the SARS-CoV-2 virus that causes COVID-19.   Applicable protocols for evaluation were followed during the patient's care.   COVID-19 was considered as part of the patient's evaluation. The plan for testing is:  a test was obtained at a previous visit and reviewed & considered today.    Medical Decision Making:  Glendy is a very pleasant 87-year-old woman with past medical history of paroxysmal SVT who presents emergency department today with weakness and shortness of breath.  Although the patient has been recently diagnosed with Covid I believe her weakness and shortness of breath more closely aligns with her episodes of SVT which unfortunately have become more frequent.  She is scheduled to have a ablation done on the 12th which was previously scheduled for around the new year but was delayed due to her Covid diagnosis.  Here in the emergency department she continues to have repeat runs of SVT with rates in the 150s.  I initially attempted to treat this with magnesium and metoprolol but was unsuccessful as she still had frequent episodes.  She is able to abort these occasionally with vagal maneuvers however she is tiring due to frequency of the episodes.  I called and spoke with cardiology who recommended starting the patient on amiodarone.  I discussed transfer to [facility] for possible ablation however given the patient's Covid diagnosis they will hold off on this at this time.  If we are unsuccessful in controlling her SVT with amiodarone we will attempt to use diltiazem following this.  Patient remained hemodynamically stable otherwise during her stay in the emergency department but will be admitted to a cardiac telemetry floor for medical management.  Patient did  have an elevated D-dimer therefore CT PE study was also obtained which was negative for any evidence of pulmonary embolism.  There was evidence of possible pneumonia which could be Covid pneumonia versus bacterial pneumonia.  I will cover the patient with antibiotics.    Diagnosis:    ICD-10-CM    1. SVT (supraventricular tachycardia) (H)  I47.1        Scribe Disclosure:  I, Shayan De La Cruz, am serving as a scribe at 5:56 PM on 1/6/2021 to document services personally performed by Nir Walker MD based on my observations and the provider's statements to me.     Scribe Disclosure:  I, Chad Singh, am serving as a scribe at 11:09 PM on 1/6/2021 to document services personally performed by Nir Walker MD based on my observations and the provider's statements to me.      MD Denise Kennedy Christopher Joseph, MD  01/07/21 0009

## 2021-01-06 NOTE — ED NOTES
Bed: ED14  Expected date:   Expected time:   Means of arrival:   Comments:  BV1 87F resp diff.  COVID +

## 2021-01-07 LAB
ANION GAP SERPL CALCULATED.3IONS-SCNC: 3 MMOL/L (ref 3–14)
BUN SERPL-MCNC: 18 MG/DL (ref 7–30)
CALCIUM SERPL-MCNC: 8.3 MG/DL (ref 8.5–10.1)
CHLORIDE SERPL-SCNC: 109 MMOL/L (ref 94–109)
CO2 SERPL-SCNC: 29 MMOL/L (ref 20–32)
CREAT SERPL-MCNC: 1.05 MG/DL (ref 0.52–1.04)
GFR SERPL CREATININE-BSD FRML MDRD: 48 ML/MIN/{1.73_M2}
GLUCOSE SERPL-MCNC: 81 MG/DL (ref 70–99)
INTERPRETATION ECG - MUSE: NORMAL
MAGNESIUM SERPL-MCNC: 2.8 MG/DL (ref 1.6–2.3)
POTASSIUM SERPL-SCNC: 3.3 MMOL/L (ref 3.4–5.3)
POTASSIUM SERPL-SCNC: 4.3 MMOL/L (ref 3.4–5.3)
PROCALCITONIN SERPL-MCNC: <0.05 NG/ML
SODIUM SERPL-SCNC: 141 MMOL/L (ref 133–144)

## 2021-01-07 PROCEDURE — 258N000003 HC RX IP 258 OP 636: Performed by: INTERNAL MEDICINE

## 2021-01-07 PROCEDURE — 36415 COLL VENOUS BLD VENIPUNCTURE: CPT | Performed by: INTERNAL MEDICINE

## 2021-01-07 PROCEDURE — 250N000013 HC RX MED GY IP 250 OP 250 PS 637: Performed by: INTERNAL MEDICINE

## 2021-01-07 PROCEDURE — 99233 SBSQ HOSP IP/OBS HIGH 50: CPT | Performed by: INTERNAL MEDICINE

## 2021-01-07 PROCEDURE — 84132 ASSAY OF SERUM POTASSIUM: CPT | Performed by: INTERNAL MEDICINE

## 2021-01-07 PROCEDURE — 80048 BASIC METABOLIC PNL TOTAL CA: CPT | Performed by: INTERNAL MEDICINE

## 2021-01-07 PROCEDURE — 250N000011 HC RX IP 250 OP 636: Performed by: INTERNAL MEDICINE

## 2021-01-07 PROCEDURE — 258N000003 HC RX IP 258 OP 636: Performed by: EMERGENCY MEDICINE

## 2021-01-07 PROCEDURE — 120N000001 HC R&B MED SURG/OB

## 2021-01-07 PROCEDURE — 83735 ASSAY OF MAGNESIUM: CPT | Performed by: INTERNAL MEDICINE

## 2021-01-07 PROCEDURE — 84145 PROCALCITONIN (PCT): CPT | Performed by: INTERNAL MEDICINE

## 2021-01-07 PROCEDURE — 99222 1ST HOSP IP/OBS MODERATE 55: CPT | Performed by: INTERNAL MEDICINE

## 2021-01-07 RX ORDER — POLYETHYLENE GLYCOL 3350 17 G/17G
17 POWDER, FOR SOLUTION ORAL DAILY PRN
Status: DISCONTINUED | OUTPATIENT
Start: 2021-01-07 | End: 2021-01-11 | Stop reason: HOSPADM

## 2021-01-07 RX ORDER — NALOXONE HYDROCHLORIDE 0.4 MG/ML
0.4 INJECTION, SOLUTION INTRAMUSCULAR; INTRAVENOUS; SUBCUTANEOUS
Status: DISCONTINUED | OUTPATIENT
Start: 2021-01-07 | End: 2021-01-11 | Stop reason: HOSPADM

## 2021-01-07 RX ORDER — GABAPENTIN 100 MG/1
100 CAPSULE ORAL EVERY 24 HOURS
Status: DISCONTINUED | OUTPATIENT
Start: 2021-01-07 | End: 2021-01-11 | Stop reason: HOSPADM

## 2021-01-07 RX ORDER — AMOXICILLIN 250 MG
1 CAPSULE ORAL DAILY PRN
Status: ON HOLD | COMMUNITY
End: 2021-01-12

## 2021-01-07 RX ORDER — GABAPENTIN 100 MG/1
200 CAPSULE ORAL AT BEDTIME
Status: ON HOLD | COMMUNITY
End: 2021-01-11

## 2021-01-07 RX ORDER — TRAMADOL HYDROCHLORIDE 50 MG/1
50 TABLET ORAL 2 TIMES DAILY PRN
Status: DISCONTINUED | OUTPATIENT
Start: 2021-01-07 | End: 2021-01-11 | Stop reason: HOSPADM

## 2021-01-07 RX ORDER — GABAPENTIN 100 MG/1
200 CAPSULE ORAL AT BEDTIME
Status: DISCONTINUED | OUTPATIENT
Start: 2021-01-07 | End: 2021-01-08

## 2021-01-07 RX ORDER — ALBUTEROL SULFATE 90 UG/1
2 AEROSOL, METERED RESPIRATORY (INHALATION) EVERY 6 HOURS PRN
Status: DISCONTINUED | OUTPATIENT
Start: 2021-01-07 | End: 2021-01-11 | Stop reason: HOSPADM

## 2021-01-07 RX ORDER — SODIUM CHLORIDE 9 MG/ML
INJECTION, SOLUTION INTRAVENOUS CONTINUOUS
Status: DISCONTINUED | OUTPATIENT
Start: 2021-01-07 | End: 2021-01-07

## 2021-01-07 RX ORDER — AMOXICILLIN 250 MG
1 CAPSULE ORAL 2 TIMES DAILY PRN
Status: DISCONTINUED | OUTPATIENT
Start: 2021-01-07 | End: 2021-01-11 | Stop reason: HOSPADM

## 2021-01-07 RX ORDER — MIRABEGRON 50 MG/1
50 TABLET, EXTENDED RELEASE ORAL DAILY
Status: DISCONTINUED | OUTPATIENT
Start: 2021-01-07 | End: 2021-01-11 | Stop reason: HOSPADM

## 2021-01-07 RX ORDER — ESTRADIOL 0.1 MG/G
2 CREAM VAGINAL
Status: ON HOLD | COMMUNITY
End: 2021-01-12

## 2021-01-07 RX ORDER — CETIRIZINE HYDROCHLORIDE 10 MG/1
10 TABLET ORAL DAILY PRN
Status: DISCONTINUED | OUTPATIENT
Start: 2021-01-07 | End: 2021-01-11 | Stop reason: HOSPADM

## 2021-01-07 RX ORDER — BENZONATATE 100 MG/1
100 CAPSULE ORAL 3 TIMES DAILY PRN
Status: DISCONTINUED | OUTPATIENT
Start: 2021-01-07 | End: 2021-01-11 | Stop reason: HOSPADM

## 2021-01-07 RX ORDER — SODIUM CHLORIDE 9 MG/ML
INJECTION, SOLUTION INTRAVENOUS CONTINUOUS
Status: DISCONTINUED | OUTPATIENT
Start: 2021-01-07 | End: 2021-01-07 | Stop reason: CLARIF

## 2021-01-07 RX ORDER — CARVEDILOL 6.25 MG/1
9.38 TABLET ORAL 2 TIMES DAILY WITH MEALS
Status: ON HOLD | COMMUNITY
End: 2021-03-05

## 2021-01-07 RX ORDER — PROCHLORPERAZINE MALEATE 5 MG
5 TABLET ORAL EVERY 6 HOURS PRN
Status: DISCONTINUED | OUTPATIENT
Start: 2021-01-07 | End: 2021-01-11 | Stop reason: HOSPADM

## 2021-01-07 RX ORDER — MIRABEGRON 50 MG/1
50 TABLET, EXTENDED RELEASE ORAL DAILY
Status: ON HOLD | COMMUNITY
End: 2021-01-12

## 2021-01-07 RX ORDER — LIDOCAINE 40 MG/G
CREAM TOPICAL
Status: DISCONTINUED | OUTPATIENT
Start: 2021-01-07 | End: 2021-01-11 | Stop reason: HOSPADM

## 2021-01-07 RX ORDER — FLUTICASONE PROPIONATE 50 MCG
1 SPRAY, SUSPENSION (ML) NASAL DAILY PRN
Status: ON HOLD | COMMUNITY
End: 2021-01-12

## 2021-01-07 RX ORDER — TRAMADOL HYDROCHLORIDE 50 MG/1
50 TABLET ORAL 2 TIMES DAILY PRN
Status: ON HOLD | COMMUNITY
End: 2021-01-11

## 2021-01-07 RX ORDER — POTASSIUM CHLORIDE 1.5 G/1.58G
40 POWDER, FOR SOLUTION ORAL ONCE
Status: COMPLETED | OUTPATIENT
Start: 2021-01-07 | End: 2021-01-07

## 2021-01-07 RX ORDER — NALOXONE HYDROCHLORIDE 0.4 MG/ML
0.2 INJECTION, SOLUTION INTRAMUSCULAR; INTRAVENOUS; SUBCUTANEOUS
Status: DISCONTINUED | OUTPATIENT
Start: 2021-01-07 | End: 2021-01-11 | Stop reason: HOSPADM

## 2021-01-07 RX ORDER — GABAPENTIN 100 MG/1
100 CAPSULE ORAL
Status: ON HOLD | COMMUNITY
End: 2021-01-12

## 2021-01-07 RX ORDER — ASPIRIN 81 MG/1
81 TABLET ORAL DAILY
Status: DISCONTINUED | OUTPATIENT
Start: 2021-01-07 | End: 2021-01-11 | Stop reason: HOSPADM

## 2021-01-07 RX ORDER — POLYETHYLENE GLYCOL 3350 17 G/17G
1 POWDER, FOR SOLUTION ORAL DAILY PRN
COMMUNITY
End: 2021-01-14

## 2021-01-07 RX ORDER — CHOLECALCIFEROL (VITAMIN D3) 50 MCG
50 TABLET ORAL DAILY
COMMUNITY
End: 2021-01-14

## 2021-01-07 RX ORDER — PROCHLORPERAZINE 25 MG
12.5 SUPPOSITORY, RECTAL RECTAL EVERY 12 HOURS PRN
Status: DISCONTINUED | OUTPATIENT
Start: 2021-01-07 | End: 2021-01-11 | Stop reason: HOSPADM

## 2021-01-07 RX ORDER — NITROGLYCERIN 0.4 MG/1
0.4 TABLET SUBLINGUAL EVERY 5 MIN PRN
Status: DISCONTINUED | OUTPATIENT
Start: 2021-01-07 | End: 2021-01-11 | Stop reason: HOSPADM

## 2021-01-07 RX ORDER — AMIODARONE HYDROCHLORIDE 200 MG/1
200 TABLET ORAL 2 TIMES DAILY
Status: DISCONTINUED | OUTPATIENT
Start: 2021-01-08 | End: 2021-01-11 | Stop reason: HOSPADM

## 2021-01-07 RX ORDER — AMIODARONE HYDROCHLORIDE 200 MG/1
400 TABLET ORAL 2 TIMES DAILY
Status: DISCONTINUED | OUTPATIENT
Start: 2021-01-08 | End: 2021-01-07

## 2021-01-07 RX ORDER — POTASSIUM CHLORIDE 1.5 G/1.58G
20 POWDER, FOR SOLUTION ORAL ONCE
Status: COMPLETED | OUTPATIENT
Start: 2021-01-07 | End: 2021-01-07

## 2021-01-07 RX ORDER — CETIRIZINE HYDROCHLORIDE 10 MG/1
10 TABLET ORAL DAILY
Status: ON HOLD | COMMUNITY
End: 2021-01-12

## 2021-01-07 RX ORDER — AMOXICILLIN 250 MG
2 CAPSULE ORAL 2 TIMES DAILY PRN
Status: DISCONTINUED | OUTPATIENT
Start: 2021-01-07 | End: 2021-01-11 | Stop reason: HOSPADM

## 2021-01-07 RX ORDER — ACETAMINOPHEN 325 MG/1
650 TABLET ORAL EVERY 4 HOURS PRN
Status: DISCONTINUED | OUTPATIENT
Start: 2021-01-07 | End: 2021-01-11 | Stop reason: HOSPADM

## 2021-01-07 RX ADMIN — POTASSIUM CHLORIDE 40 MEQ: 1.5 POWDER, FOR SOLUTION ORAL at 13:50

## 2021-01-07 RX ADMIN — SODIUM CHLORIDE: 900 INJECTION INTRAVENOUS at 01:20

## 2021-01-07 RX ADMIN — GABAPENTIN 100 MG: 100 CAPSULE ORAL at 13:51

## 2021-01-07 RX ADMIN — POTASSIUM CHLORIDE 20 MEQ: 1.5 POWDER, FOR SOLUTION ORAL at 09:33

## 2021-01-07 RX ADMIN — ENOXAPARIN SODIUM 40 MG: 40 INJECTION SUBCUTANEOUS at 09:34

## 2021-01-07 RX ADMIN — ACETAMINOPHEN 650 MG: 325 TABLET, FILM COATED ORAL at 16:19

## 2021-01-07 RX ADMIN — GABAPENTIN 200 MG: 100 CAPSULE ORAL at 21:51

## 2021-01-07 RX ADMIN — AMIODARONE HYDROCHLORIDE 0.5 MG/MIN: 50 INJECTION, SOLUTION INTRAVENOUS at 16:15

## 2021-01-07 RX ADMIN — CARVEDILOL 9.38 MG: 6.25 TABLET, FILM COATED ORAL at 09:34

## 2021-01-07 RX ADMIN — ASPIRIN 81 MG: 81 TABLET ORAL at 09:34

## 2021-01-07 ASSESSMENT — ACTIVITIES OF DAILY LIVING (ADL)
ADLS_ACUITY_SCORE: 16

## 2021-01-07 ASSESSMENT — ENCOUNTER SYMPTOMS
PALPITATIONS: 1
CHILLS: 0
FATIGUE: 1
CHEST TIGHTNESS: 1
SHORTNESS OF BREATH: 1
FEVER: 0

## 2021-01-07 ASSESSMENT — MIFFLIN-ST. JEOR: SCORE: 1125.8

## 2021-01-07 NOTE — PLAN OF CARE
For vital signs and complete assessments, please see documentation flowsheets.     Pertinent assessments:   Major Shift Events: COVID (+) Pt remained in SR/SB. Amio gtt not started. No episodes of SVT. RA: SpO2 93%. Assist x1.  Plan (Upcoming Events): Cardiology consult today  Discharge/Transfer Needs: TBD    Bedside Shift Report Completed : Y  Bedside Safety Check Completed: ANTHONY Askew RN

## 2021-01-07 NOTE — PLAN OF CARE
"/66 (BP Location: Right arm)   Pulse 60   Temp 98.5  F (36.9  C) (Oral)   Resp 20   Ht 1.626 m (5' 4\")   Wt 70.6 kg (155 lb 9.6 oz)   SpO2 96%   Breastfeeding No   BMI 26.71 kg/m    NEURO: A/OX4, pt [place]-bilateral HAs in place  VS:HR josé miguel at times in the 50s  PAIN:C/O HA 7/10- PRN tylenol given x1  TELE:SB/SR w/ prolonged QT  IV:SL RA, LA infusing amio gtt @ 30 ml//hr  RESPIRATORY:LS-diminished, denies SOB, tolerating RA, frequent congested cough  GI:+BS, 1 loose BM this shift, denies nausea  :Voiding appropriately  SKIN:Bruised, blanchable redness to coccyx, pale, trace edema BLE  ACTIVITY:Ax1 gait belt and cane  DIET:Cardiac  LABS: K+ replaced on day shift - recheck 4.3  PLAN:Continue amio gtt per cardiology, pt will start PO amio tomorrow, PO coreg, possible discharge tomorrow, continue POC.    "

## 2021-01-07 NOTE — ED NOTES
[facility]  ED Nurse Handoff Report    Glendy Díaz is a 87 year old female   ED Chief complaint: Shortness of Breath  . ED Diagnosis:   Final diagnoses:   None     Allergies: No Known Allergies    Code Status: not on file  Activity level - Baseline/Home:  Independent w/ assistance of cane Activity Level - Current:   Assist X 1. Lift room needed: No. Bariatric: No   Needed: No   Isolation: Yes. Infection: Not Applicable  COVID r/o and special precautions.     Vital Signs:   Vitals:    01/06/21 2135 01/06/21 2140 01/06/21 2145 01/06/21 2150   BP:   129/70    Pulse: 141 96 144 144   Resp: 18 22 16 18   Temp:       TempSrc:       SpO2: 95% 96% 95% 96%   Weight:           Cardiac Rhythm:  ,      Pain level:    Patient confused: No. Patient Falls Risk: Yes.   Elimination Status: Has voided   Patient Report - Initial Complaint: SOB. Focused Assessment: Pt arrives from home via EMS w/ complaints of SOB and known COVID +. Pt reports having increasing weakness today, making it difficult for her to perform basic ADLs, causing her to come to the ED. Some SOB noted as well as productive cough. Pt has been in and out of SVT, converting on her own to sinus tachycardia, pt given 2x dose of metoprolol for this. Pt A&O x 4, hard of hearing, has hearing aids removed due to discomfort of wearing them with mask.   Tests Performed: EKG, labs, CT. Abnormal Results:   Labs Ordered and Resulted from Time of ED Arrival Up to the Time of Departure from the ED   CBC WITH PLATELETS DIFFERENTIAL - Abnormal; Notable for the following components:       Result Value    RBC Count 3.77 (*)     All other components within normal limits   D DIMER QUANTITATIVE - Abnormal; Notable for the following components:    D Dimer 1.4 (*)     All other components within normal limits   BASIC METABOLIC PANEL - Abnormal; Notable for the following components:    Glucose 101 (*)     Creatinine 1.25 (*)     GFR Estimate 39 (*)     GFR  Estimate If Black 45 (*)     All other components within normal limits   TROPONIN I   NT PROBNP INPATIENT     CT Chest Pulmonary Embolism w Contrast    (Results Pending)      Treatments provided: see MAR  Family Comments: DaughterDenisse, updated at [phone]  OBS brochure/video discussed/provided to patient:  No  ED Medications:   Medications   acetaminophen (TYLENOL) tablet 1,000 mg (1,000 mg Oral Given 1/6/21 2303)   cefTRIAXone (ROCEPHIN) 1 g vial to attach to  mL bag for ADULTS or NS 50 mL bag for PEDS (1 g Intravenous New Bag 1/6/21 2303)   magnesium sulfate 2 g in water intermittent infusion (0 g Intravenous Stopped 1/6/21 2042)   0.9% sodium chloride BOLUS (0 mLs Intravenous Stopped 1/6/21 2126)   CT scan flush (79 mLs Intravenous Given 1/6/21 2015)   iopamidol (ISOVUE-370) solution 500 mL (59 mLs Intravenous Given 1/6/21 2014)   metoprolol (LOPRESSOR) injection 5 mg (5 mg Intravenous Given 1/6/21 2050)   metoprolol (LOPRESSOR) injection 5 mg (5 mg Intravenous Given 1/6/21 2222)   amiodarone (NEXTERONE) bolus 150 mg (150 mg Intravenous Given 1/6/21 2309)     Drips infusing:  No  For the majority of the shift, the patient's behavior Green. Interventions performed were N/A.    Sepsis treatment initiated: No     Patient tested for COVID 19 prior to admission: NO    ED Nurse Name/Phone Number: Kathie East RN,   10:07 PM

## 2021-01-07 NOTE — CONSULTS
Consult Date:  01/07/2021      CARDIOLOGY CONSULTATION      CONSULT INDICATION:  Paroxysmal SVT.      HISTORY OF PRESENT ILLNESS:      I had the opportunity to see the patient, Glendy Díaz, today at [facility] for a Cardiology consultation.  As you know, she is an 87-year-old female with a past medical history significant for hypertension, CKD, weakness, hyperlipidemia, paroxysmal SVT (plan for EP study and possible ablation 01/12/2020 at [facility]), and recently diagnosed COVID-19 pneumonia (admitted 12/29/2020-12/30/2020, who presents for further evaluation and management of recurrent paroxysmal SVT.      The patient was previously seen by my colleague, Dr. Truong, in Cardiology Clinic, regarding paroxysmal SVT.  Ultimately, she was referred for an EP study and possible SVT ablation procedure; however, this was postponed due to COVID-19 pneumonia for which she was hospitalized 12/29/2020-12/30/2020.  She had been recovering from this; however, unfortunately, she has had frequent episodes of recurrent paroxysmal SVT over the last week or so.  She had recurrence of palpitations consistent with paroxysmal SVT that persisted, and so she presented to the Emergency Department for further evaluation and management.      In the Emergency Department, initial ECG showed narrow complex tachycardia, regular, rate 153 beats per minute, left axis deviation, LVH with repolarization abnormalities, P-wave appears to be retrograde.  She was administered metoprolol IV, as well as amiodarone IV, and has since been in normal sinus rhythm.  In the ED, per chart review, my colleague, Dr. Hester, with Cardiology EP was contacted regarding her presentation.      She was admitted to the Internal Medicine Service for further evaluation and management.  Since admission, on review of telemetry, she has not had recurrence of paroxysmal SVT.  She remains in normal sinus rhythm, rate around 60 beats per minute.   Evaluation so far has included a CT PE study that was done due to a positive D-dimer.  The study showed some mild ground-glass opacities and several linear opacities scattered in the periphery of both lungs.  Labs so far are notable for potassium 3.6, which has since decreased to 3.3, creatinine 1.25, which has since decreased to 1.05.  Troponin negative, procalcitonin negative, NT-proBNP 452, which is within the limits of normal.  D-dimer is elevated, as aforementioned.      ASSESSMENT AND PLAN/RECOMMENDATIONS:     1.  Paroxysmal supraventricular tachycardia:   Initially had been planned for EP study with possible ablation at [facility]; however, this was postponed due to COVID-19 pneumonia, now planned for 1/12/21.  Presented to the Emergency Department 01/06/2021 with recurrent symptomatic paroxysmal supraventricular tachycardia, which resolved with metoprolol and amiodarone GTT.   2.  COVID-19 pneumonia:  Recently hospitalized 12/29-12/30/2020.   3.  Chronic kidney disease, stage III.   4.  Hypertension.   5.  Former smoking.      -- Discussed case with EP cardiologist, Dr. Hester; appreciate his assistance.   -- Agree with amiodarone GTT, 24-hour load.  Heart rate is now in the 60s beats per minute range, and so we will start amiodarone 200 mg p.o. b.i.d. tomorrow,  I am concerned a higher dosage may lead to symptomatic bradycardia.   -- Continue remainder of cardiac regimen, including carvedilol 9.375 mg b.i.d.   -- Maintain potassium greater than 4, magnesium greater than 2.   -- Continue cardiac telemetry.   -- Unfortunately, due to COVID-19 pneumonia, as well as abnormal findings on CT PE study 01/06/2021, the risks outweigh benefits of expediting SVT ablation procedure that is still planned for 01/12 at [facility].   -- If she has not had any recurrence of SVT for 24 hours, can discharge home.   -- Advised the patient to return to the ED if she has any recurrent symptoms of  palpitations, chest pain/chest pressure, abnormal shortness of breath, or any other symptoms that are concerning for  her.   -- Cardiology will sign off; however, please page our team if we can be of any further assistance.     Thank you for allowing our team to participate in the care of the patient, Glendy Henriquez.  Please do not hesitate to page or call with any questions or concerns.     Ludwin Vickers MD, [facility]  Cardiology  Text Page   2021        D: 2021   T: 2021   MT: SONYA      Name:     GLENDY HENRIQUEZ   MRN:      [id number]        Account:       [id number]   :      1933           Consult Date:  2021      Document: [id number]

## 2021-01-07 NOTE — CONSULTS
Cardiology Consult Note-- PLEASE SEE ASSOCIATED DICTATION    Glendy Díaz MRN#: [id number]   YOB: 1933 Age: 87 year old     Date of Admission: 1/6/2021  Consult indication: paroxysmal SVT         HPI and Assessment and Recommendations/Plan:      Please refer to the associated dictation: #[id number]    - Pt has a different (main) MRN [id number]    - discussed case with EP cardiology Dr. Hester, appreciate assistance  - agree with amiodarone GTT 24h load, HR now in the 60s bpm range on amiodarone GTT and home carvedilol 9.375mg BID, and so will start amiodarone 200mg PO BID I am concerned higher dose may lead to bradycardia   - continue remainder of cardiac regimen  - unfortunately due to COVID pneumonia and abnormal findings on CT PE study, risks likely outweigh benefits of expediting SVT ablation procedure planned for 1/12 at [facility] to [facility]  - recommend continued monitoring on telemetry for now, if no recurrence of SVT for at least 24h then can discharge   - advised Pt to return to the ED if she has recurrent symptoms or any chest pain/pressure, abnormal dyspnea, or any other concerning symptoms  - cardiology will sign off, but please page our team if we can be of any further assistance    Thank you for allowing our team to participate in the care of Glendy Díaz.  Please do not hesitate to page me with any questions or concerns.     Ludwin Vickers MD, [facility]  Cardiology  Text Page   January 7, 2021      Past Medical History reviewed:  Past Medical History[]Expand by Default        Past Medical History:   Diagnosis Date     Anxiety       CKD (chronic kidney disease) stage 3, GFR 30-59 ml/min       Hematuria       Hyperlipidemia LDL goal <100       Hyperparathyroidism (H)       Hypertension       No Cardiologist     Incontinence       Leg weakness, bilateral       Mumps       Neck pain, chronic       Osteoarthritis       Osteopenia       Peripheral neuropathy           Past Surgical History  reviewed:  Past Surgical History[]Expand by Default         Past Surgical History:   Procedure Laterality Date     APPENDECTOMY         ARTHRODESIS FOOT   2014     Procedure: ARTHRODESIS FOOT;  Surgeon: Sid Marks DPM;  Location: [facility] OR     ARTHROSCOPY ANKLE, OPEN REPAIR LIGAMENT, COMBINED   2012     Procedure:COMBINED ARTHROSCOPY ANKLE, OPEN REPAIR LIGAMENT; LEFT ANKLE ARTHROSCOPY, LATERAL LIGAMENT RECONSTRUCTION, ENDOSCOPIC DRISS, BUNION SECOND TOE RAY CORRECTION    LEFT KNEE Marcaine AND CORTIZONE INJECTION, 5 CC Marcaine, 1 CC CELESTONE, ; Surgeon:VARSHA GERMAN; Location:[facility]     BLADDER SURGERY         CATARACT IOL, RT/LT         CHOLECYSTECTOMY         CYSTOSCOPY         EXCISE MASS FOOT   2012     Procedure: EXCISE MASS FOOT;;  Surgeon: Varsha German MD;  Location: [facility]     HYSTERECTOMY TOTAL ABDOMINAL         ovaries are in     RELEASE TENDON TOE   2014     Procedure: RELEASE TENDON TOE;  Surgeon: Sid Marks DPM;  Location: [facility] OR     REMOVE HARDWARE FOOT   2012     Procedure: REMOVE HARDWARE FOOT;  REMOVAL HARDWARE(SYNTHES SCREW) LEFT FOOT, EXCISION OF INCLUSION CYST;  Surgeon: Varsha German MD;  Location: [facility]     REPAIR HAMMER TOE   2014     Procedure: REPAIR HAMMER TOE;  Surgeon: Sid Marks DPM;  Location: [facility] OR     REVERSE ARTHROPLASTY SHOULDER Right 2016     Procedure: REVERSE ARTHROPLASTY SHOULDER;  Surgeon: Marlo Alejandro MD;  Location: [facility] OR         Social History reviewed:  Social History            Tobacco Use     Smoking status: Former Smoker       Packs/day: 1.00       Years: 20.00       Pack years: 20.00       Types: Cigarettes       Start date:        Quit date:        Years since quittin.0     Smokeless tobacco: Never Used   Substance Use Topics     Alcohol use: No      Social History          Social History Narrative     Not on file      Family History reviewed:  Family  History[]Expand by Default         Family History   Problem Relation Age of Onset     Lipids Mother       Diabetes Mother       Circulatory Mother           Kidney disease     Diabetes Brother       Cancer - colorectal Brother       Breast Cancer Sister       Thyroid Disease Daughter       Cancer - colorectal Brother       Alcohol/Drug Brother           Allergies:        Allergies   Allergen Reactions     Amlodipine         Leg edema with 5 mg     Fosamax [Alendronic Acid]         Bone pain     Lisinopril         Increased potassium     Pravastatin         Muscle aches      Simvastatin         Muscle aches            Current medications:  Current Facility-Administered Medications Ordered in Epic   Medication Dose Route Frequency Last Rate Last Admin     acetaminophen (TYLENOL) tablet 650 mg  650 mg Oral Q4H PRN         albuterol (PROAIR HFA/PROVENTIL HFA/VENTOLIN HFA) 108 (90 Base) MCG/ACT inhaler 2 puff  2 puff Inhalation Q6H PRN         [START ON 1/8/2021] amiodarone (PACERONE) tablet 400 mg  400 mg Oral BID         amiodarone in D5W adult drip (NEXTERONE) 250 MG/250ML IV infusion  0.5 mg/min Intravenous Continuous   Stopped at 01/07/21 0600     aspirin EC tablet 81 mg  81 mg Oral Daily   81 mg at 01/07/21 0934     benzonatate (TESSALON) capsule 100 mg  100 mg Oral TID PRN         carvedilol (COREG) tablet 9.375 mg  9.375 mg Oral BID w/meals   9.375 mg at 01/07/21 0934     cetirizine (zyrTEC) tablet 10 mg  10 mg Oral Daily PRN         enoxaparin ANTICOAGULANT (LOVENOX) injection 40 mg  40 mg Subcutaneous Q24H   40 mg at 01/07/21 0934     gabapentin (NEURONTIN) capsule 100 mg  100 mg Oral Q24H         gabapentin (NEURONTIN) capsule 200 mg  200 mg Oral At Bedtime         guaiFENesin (ROBITUSSIN) 20 mg/mL solution 10 mL  10 mL Oral Q4H PRN         lidocaine (LMX4) cream   Topical Q1H PRN         lidocaine 1 % 0.1-1 mL  0.1-1 mL Other Q1H PRN         melatonin tablet 1 mg  1 mg Oral At Bedtime PRN         mirabegron  (MYRBETRIQ) 24 hr tablet 50 mg  50 mg Oral Daily         naloxone (NARCAN) injection 0.2 mg  0.2 mg Intravenous Q2 Min PRN        Or     naloxone (NARCAN) injection 0.4 mg  0.4 mg Intravenous Q2 Min PRN        Or     naloxone (NARCAN) injection 0.2 mg  0.2 mg Intramuscular Q2 Min PRN        Or     naloxone (NARCAN) injection 0.4 mg  0.4 mg Intramuscular Q2 Min PRN         polyethylene glycol (MIRALAX) Packet 17 g  17 g Oral Daily PRN         prochlorperazine (COMPAZINE) injection 5 mg  5 mg Intravenous Q6H PRN        Or     prochlorperazine (COMPAZINE) tablet 5 mg  5 mg Oral Q6H PRN        Or     prochlorperazine (COMPAZINE) suppository 12.5 mg  12.5 mg Rectal Q12H PRN         senna-docusate (SENOKOT-S/PERICOLACE) 8.6-50 MG per tablet 1 tablet  1 tablet Oral BID PRN        Or     senna-docusate (SENOKOT-S/PERICOLACE) 8.6-50 MG per tablet 2 tablet  2 tablet Oral BID PRN         sodium chloride (PF) 0.9% PF flush 3 mL  3 mL Intracatheter Q8H   3 mL at 21 0343     sodium chloride (PF) 0.9% PF flush 3 mL  3 mL Intracatheter q1 min prn         traMADol (ULTRAM) tablet 50 mg  50 mg Oral BID PRN         No current Epic-ordered outpatient medications on file.             Review of Systems:   A complete review of systems was performed and was negative except as mentioned in the HPI.          Physical Exam:   Vital signs were personally reviewed:  Temperatures:  Current - Temp: 98.1  F (36.7  C); Max - Temp  Av.4  F (36.9  C)  Min: 98.1  F (36.7  C)  Max: 98.6  F (37  C)  Respiration range: Resp  Av.9  Min: 10  Max: 36  Pulse range: Pulse  Av.3  Min: 60  Max: 161  Blood pressure range: Systolic (24hrs), Av , Min:89 , Max:143   ; Diastolic (24hrs), Av, Min:51, Max:92    Pulse oximetry range: SpO2  Av.9 %  Min: 91 %  Max: 100 %    Intake/Output Summary (Last 24 hours) at 2021 1141  Last data filed at 2021 1028  Gross per 24 hour   Intake 1150 ml   Output 750 ml   Net 400 ml     155  lbs 9.6 oz  Body mass index is 26.71 kg/m .   Body surface area is 1.79 meters squared.    Constitutional: appears stated age, in no apparent distress, appears to be well nourished, hard of hearing  Eyes: sclera anicteric, conjunctiva normal  ENT: normocephalic, without obvious abnormality, atraumatic  Pulmonary: mildly coarse bilaterally   Cardiovascular: JVP normal, regular rate, regular rhythm, no murmur appreciated, no lower extremity edema  Gastrointestinal: abdominal exam benign  Neurologic: awake, alert, face symmetrical, moves all extremities  Skin: no jaundice  Psychiatric: affect is normal, answers questions appropriately, oriented to self and place         Laboratory tests:   Laboratory tests personally reviewed:   CMP  Recent Labs   Lab 01/07/21  0623 01/06/21  1803    135   POTASSIUM 3.3* 3.6   CHLORIDE 109 102   CO2 29 29   ANIONGAP 3 4   GLC 81 101*   BUN 18 24   CR 1.05* 1.25*   GFRESTIMATED 48* 39*   GFRESTBLACK 55* 45*   MUMTAZ 8.3* 8.9   MAG 2.8*  --      CBC  Recent Labs   Lab 01/06/21  1803   WBC 5.0   RBC 3.77*   HGB 11.8   HCT 36.5   MCV 97   MCH 31.3   MCHC 32.3   RDW 13.5        INRNo lab results found in last 7 days.  Lab Results   Component Value Date    TROPI <0.015 01/06/2021

## 2021-01-07 NOTE — PROGRESS NOTES
[facility]  Patient is currently open to home care services with [facility]. The patient is currently receiving RN PT OT SW HHA services.  [facility]  and team have been notified of patient admission.  [facility] liaison will continue to follow patient during stay.  If appropriate provide orders to resume home care at time of discharge.

## 2021-01-07 NOTE — ED NOTES
Reg called regarding pt's name being entered into the system. Req made for charts to be merged following time in ER. First name is LORETTA RAHMAN.

## 2021-01-07 NOTE — ED NOTES
"Following going on break, my partner breaking me informed me the pt's BP had been hypotensive x 2 w/ pressures in the upper 80s systolic. MD made aware. Verbal order for 500 mL fluids placed w/ readback. Pt reports feeling \"a little dizzy\", head of bed brought down, fluids started. No other sx noted.   "

## 2021-01-07 NOTE — PROGRESS NOTES
Pt arrived to 3rd floor Room 312 at 0200. Pt A/Ox4. SR/SB 58-60bpm. /70. RA SpO2 94%.    Ambulates to bathroom with cane and assist x1. Bed alarm activated.     Amio gtt ordered. Holding for now. If pt starts to have any episodes of SVT then start amio gtt per Dr. Galan.   Notify provider if SVT episodes occur and gtt needs to be started.     Dhara Askew RN

## 2021-01-07 NOTE — PHARMACY-ADMISSION MEDICATION HISTORY
Admission medication history interview status for this patient is complete. See [facility] admission navigator for allergy information, prior to admission medications and immunization status.     Medication history interview done via telephone during Covid-19 pandemic, indicate source(s): Patient  Medication history resources (including written lists, pill bottles, clinic record): Epic list (Note-has duplicate charts-see also MR #     [id number])  Chart review.  [facility]    Changes made to PTA medication list:  Added: all  Deleted:    Changed:      Actions taken by pharmacist (provider contacted, etc):None     Additional medication history information:None    Medication reconciliation/reorder completed by provider prior to medication history?  Y   (Y/N)       Prior to Admission medications    Medication Sig Last Dose Taking? Auth Provider   ASPIRIN 81 PO Take 81 mg by mouth At Bedtime 1/5/2021 at Unknown time Yes Unknown, Entered By History   carvedilol (COREG) 6.25 MG tablet Take 9.375 mg by mouth 2 times daily (with meals) 1/6/2021 at Unknown time Yes Unknown, Entered By History   estradiol (ESTRACE) 0.1 MG/GM vaginal cream Place 2 g vaginally three times a week Apply small amount to the vaginal opening and urethra M,W,F.    MWF Past Week at Unknown time Yes Unknown, Entered By History   gabapentin (NEURONTIN) 100 MG capsule Take 200 mg by mouth At Bedtime 1/5/2021 at Unknown time Yes Unknown, Entered By History   gabapentin (NEURONTIN) 100 MG capsule Take 100 mg by mouth daily at 2 pm In the afternoon at 1400. 1/6/2021 at Unknown time Yes Unknown, Entered By History   mirabegron (MYRBETRIQ) 50 MG 24 hr tablet Take 50 mg by mouth daily 1/6/2021 at Unknown time Yes Unknown, Entered By History   polyethylene glycol (MIRALAX) 17 g packet Take 1 packet by mouth daily as needed for constipation Unknown Yes Unknown, Entered By History   senna-docusate (SENOKOT-S/PERICOLACE)  8.6-50 MG tablet Take 1 tablet by mouth daily as needed for constipation Unknown Yes Unknown, Entered By History   traMADol (ULTRAM) 50 MG tablet Take 50 mg by mouth 2 times daily as needed for severe pain (rarely takes tramadol) More than a month Yes Unknown, Entered By History   vitamin D3 (CHOLECALCIFEROL) 50 mcg (2000 units) tablet Take 50 mcg by mouth daily 1/6/2021 at Unknown time Yes Unknown, Entered By History   cetirizine (ZYRTEC) 10 MG tablet Take 10 mg by mouth daily More than a month at Unknown time  Unknown, Entered By History   fluticasone (FLONASE) 50 MCG/ACT nasal spray Spray 1 spray into both nostrils daily as needed for rhinitis or allergies More than a month at Unknown time  Unknown, Entered By History

## 2021-01-07 NOTE — ED NOTES
Pt reports headache (8/10 pain) and requesting pain med, acetaminophen requested to MD w/ readback.

## 2021-01-07 NOTE — H&P
[facility]  Hospitalist Admission Note  Name: Glendy Díaz    MRN: [id number]  YOB: 1933    Age: 87 year old  Date of admission: 1/6/2021  Primary care provider: Veronica Davis      Patient has alternate chart with MRN [id number]    Chief Complaint: Palpitations, shortness of breath    Assessment and Plan:   Paroxysmal SVT: Episodes of proximal SVT where she is symptomatic with palpitations and shortness of breath with some occasional chest pressure.  EP study/ablation delayed due to positive COVID-19 test and is now scheduled for January 12, 2021.  Currently on aspirin 81 mg daily and carvedilol 9.375 mg daily.  Recurrent episodes throughout the day occasionally responsive to Valsalva maneuver.  Shortness of breath resolves when she is in a normal rhythm.  TTE from 11/17/2020 showed EF 55-60% and 1+ AR.  TSH checked and normal last week.  IV metoprolol 5 mg x 2 did help terminate the rhythm in the ER, but now back in SVT with rates 140-150.  -Cardiology consulted from ER, did not recommend transfer to [facility] still due to no plans to move up EP study/ablation due to timing of positive Covid test.  Recommended IV amiodarone and if this fails diltiazem  -Received 150 mg IV amiodarone, continue IV amiodarone load overnight  -Continue carvedilol 9.375 mg twice daily  -Formal cardiology consult tomorrow  -Telemetry  Addendum: Since admission heart rate has been in the mid 50s and NSR with no SVT.  So far only received the 150 mg IV bolus of amiodarone in the ER.  Given the mild bradycardia and no recurrence SVT yet have held initiation of amiodarone infusion, but would start if any recurrent episodes of SVT rest of the night.    COVID-19 infection: Diagnosed with COVID-19 on 12/27/2020.  Hospitalized here for 1 day on 12/29 for shortness of breath and received a dose of dexamethasone.  Discharged home without any dexamethasone.  Has a cough occasionally productive of sputum  and temperatures in the 99 range.  Reportsintermittent shortness of breath when in SVT that then resolves.  Not hypoxic here.  CT shows some very mild bilateral groundglass opacities and bronchial wall thickening.  -Roughly 10 days since positive test and only 11 days since symptom onset, given the low-grade temperatures and ongoing cough will keep her in isolation here  -Doubt at this point she would benefit significantly from dexamethasone, 2 latent course of remdesivir and is not clinically that ill now  -Tessalon Perles and Robitussin as needed for cough    Generalized weakness:  reports generalized weakness since diagnosed with COVID-19 on 12/27/2020.  Lives independent living facility.  No recent falls.  Home therapy ordered, reports a nurse was out to set up things but PT has not been out that she is aware of.   -Consult PT     CKD stage IIIa: Creatinine at baseline 1.1-1.2.     HTN: Continue carvedilol 9.375 mg daily.  In the past was on Lasix, but has since stopped.  Has not noted any increase in leg swelling.  No history of CHF.  Has trace edema on exam.     Peripheral neuropathy: Continue gabapentin 100 mg afternoon and 200 mg at night along with tramadol 50 mg twice daily as needed.     Overactive bladder: Continue PTA mirabegron 50 mg daily.     Former smoker: 20-pack-year history, quit in 1985.      DVT Prophylaxis: Enoxaparin (Lovenox) SQ  Code Status: Full Code  FEN: Regular diet, no caffeine  Discharge Dispo: Home to independent living with resumption of home care  Estimated Disch Date / # of Days until Disch: Admit inpatient for persistent SVT requiring IV amiodarone.  Anticipate 2 night hospitalization.      History of Present Illness:  Glendy Díaz is a 87 year old female former smoker with PMH including paroxysmal SVT, CKD stage IIIa, HTN, neuropathy, overactive bladder, and recent hospitalization 12/29/2020 overnight for COVID-19 infection causing shortness of breath and SVT who presents  with persistent episodes of SVT and shortness of breath.  She was hospitalized for 1 night for COVID-19 infection and felt better by the next day with just 1 dose of dexamethasone.  She was not hypoxic at any time and did not discharged any dexamethasone.  Did not receive Remdesevir.  Since discharge home with home care she has felt persistently weak all over.  She has not had any falls.  She has had progressively more frequent and longer episodes of her SVT that are symptomatic.  She feels palpitation along with some mild chest pressure and significant shortness of breath when she is in the abnormal rhythm.  She often is able to do a Valsalva maneuver to terminate the rhythm, but is coming back much more frequently.  Due to these episodes she has felt even more weak and this is why she is coming in for evaluation today.  She was due to have an EP study with likely ablation, however this was postponed until 1/12/2021 given the positive COVID-19 test.  She does continue to have a cough occasionally productive of sputum and low-grade temperatures in the 99 range.  She denies any leg pain or swelling.  No nausea, vomiting, diarrhea.    History obtained from patient, medical record, and from Dr. Walker in the emergency department.  Blood pressure 120/92, heart rate in the 80s when in normal rhythm in the 140-150 range when SVT.  Temperature 98.6  F.  Oxygen is 97% on room air at rest.  Creatinine is 1.25, magnesium 3.6, sodium 135.  CBC within normal limits.  BNP is 452 and troponin is undetectable.  EKG shows SVT with rate 150 and LVH.  D-dimer elevated at 1.4.  CTPA did not show PE, but shows some very subtle bilateral groundglass opacities along with some bronchial wall thickening.  She received 2 doses of IV metoprolol which helped terminate the rhythm, but she is now back in SVT.  She also received 2 g of IV magnesium and 1 L of normal saline.  She received 1000 mg of acetaminophen for headache.  Cardiology was  consulted who recommended admission and amiodarone infusion.  They did not recommend transfer to [facility] as her EP study would not be pushed forward given the positive COVID-19 status.  Admit inpatient cardiac telemetry unit.     Past Medical History reviewed:    Anxiety       CKD (chronic kidney disease) stage 3, GFR 30-59 ml/min       Hematuria       Hyperlipidemia LDL goal <100       Hyperparathyroidism (H)       Hypertension       No Cardiologist     Incontinence       Leg weakness, bilateral       Mumps       Neck pain, chronic       Osteoarthritis       Paroxysmal SVT       Peripheral neuropathy      Past Surgical History reviewed:    APPENDECTOMY         ARTHRODESIS FOOT   5/1/2014     Procedure: ARTHRODESIS FOOT;  Surgeon: Sid Marks DPM;  Location: [facility] OR     ARTHROSCOPY ANKLE, OPEN REPAIR LIGAMENT, COMBINED   5/31/2012     Procedure:COMBINED ARTHROSCOPY ANKLE, OPEN REPAIR LIGAMENT; LEFT ANKLE ARTHROSCOPY, LATERAL LIGAMENT RECONSTRUCTION, ENDOSCOPIC KIRIT NAQVI SECOND TOE RAY CORRECTION    LEFT KNEE Marcaine AND CORTIZONE INJECTION, 5 CC Marcaine, 1 CC CELESTONE, ; Surgeon:AKSHAT GERMAN; Location:[facility]     BLADDER SURGERY         CATARACT IOL, RT/LT         CHOLECYSTECTOMY         CYSTOSCOPY         EXCISE MASS FOOT   12/4/2012     Procedure: EXCISE MASS FOOT;;  Surgeon: Akshat German MD;  Location: [facility]     HYSTERECTOMY TOTAL ABDOMINAL         ovaries are in     RELEASE TENDON TOE   5/1/2014     Procedure: RELEASE TENDON TOE;  Surgeon: Sid Marks DPM;  Location: [facility] OR     REMOVE HARDWARE FOOT   12/4/2012     Procedure: REMOVE HARDWARE FOOT;  REMOVAL HARDWARE(SYNTHES SCREW) LEFT FOOT, EXCISION OF INCLUSION CYST;  Surgeon: Akshat German MD;  Location: [facility]     REPAIR HAMMER TOE   5/1/2014     Procedure: REPAIR HAMMER TOE;  Surgeon: Sid Marks DPM;  Location: [facility] OR     REVERSE ARTHROPLASTY SHOULDER Right 7/18/2016     Procedure:  REVERSE ARTHROPLASTY SHOULDER;  Surgeon: Marlo Alejandro MD;  Location: [facility] OR       Social History reviewed:  Social History            Tobacco Use     Smoking status: Former Smoker       Packs/day: 1.00       Years: 20.00       Pack years: 20.00       Types: Cigarettes       Start date:        Quit date:        Years since quittin.0     Smokeless tobacco: Never Used   Substance Use Topics     Alcohol use: No       Family History reviewed:    Lipids Mother       Diabetes Mother       Circulatory Mother           Kidney disease     Diabetes Brother       Cancer - colorectal Brother       Breast Cancer Sister       Thyroid Disease Daughter       Cancer - colorectal Brother       Alcohol/Drug Brother        Allergies:  No Known Allergies  Medications:  Albuterol HFA  Flonase  Carvedilol 9.375 mg twice daily  Gabapentin 100 mg at 2 PM and 200 mg at bedtime  Tramadol 50 mg twice daily as needed  Myrbetriq 50 mg daily  Aspirin 81 mg daily  Zyrtec 10 mg daily as needed  Estradiol cream MWF    Review of Systems:  A Comprehensive greater than 10 system review of systems was carried out.  Pertinent positives and negatives are noted above.  Otherwise negative.     Physical Exam:  Blood pressure 127/69, pulse 76, temperature 98.6  F (37  C), temperature source Oral, resp. rate 19, weight 70.8 kg (156 lb), SpO2 96 %, not currently breastfeeding.  Wt Readings from Last 1 Encounters:   21 70.8 kg (156 lb)     Exam:  Constitutional: Awake, NAD  Eyes: sclera white, PERRL  HEENT: atraumatic, MMM  Respiratory: no respiratory distress, bibasilar crackles present, slight expiratory wheeze on the right, on room air  Cardiovascular: RRR.  No murmur   GI: non-tender, not distended, bowel sounds present  Skin: no rash or lesions, acyanotic  Musculoskeletal/extremities: atraumatic, no major deformities.  Trace bilateral lower extremity edema  Neurologic: A&O, speech clear, moves extremities equally  Psychiatric: calm,  cooperative, normal affect    Lab and imaging data personally reviewed:  Labs:  Recent Labs   Lab 01/06/21  1803   WBC 5.0   HGB 11.8   HCT 36.5   MCV 97        Recent Labs   Lab 01/06/21  1803      POTASSIUM 3.6   CHLORIDE 102   CO2 29   ANIONGAP 4   *   BUN 24   CR 1.25*   GFRESTIMATED 39*   GFRESTBLACK 45*   MUMTAZ 8.9     Recent Labs   Lab 01/06/21  1803   NTBNPI 452     Recent Labs   Lab 01/06/21  1803   DD 1.4*     Recent Labs   Lab 01/06/21  1803   TROPI <0.015       EKG: SVT, heart rates 150s, LVH    Imaging:  Recent Results (from the past 24 hour(s))   CT Chest Pulmonary Embolism w Contrast    Narrative    EXAM: CT CHEST WITH CONTRAST - PULMONARY EMBOLISM PROTOCOL   LOCATION: [facility]  DATE/TIME: 1/6/2021 8:13 PM    INDICATION: Shortness of breath. Positive D-dimer.  COMPARISON: None.    TECHNIQUE: CT angiogram chest during pulmonary arterial phase injection IV contrast. Dose reduction techniques were used.   CONTRAST: 59 mL Isovue-370.    FINDINGS:    ANGIOGRAM CHEST: No visualized pulmonary embolus. The thoracic aorta is normal in caliber without dissection. Atherosclerotic calcification in the thoracic aorta and coronary arteries.    LUNGS AND PLEURA: Several linear opacities scattered in the periphery of both lungs. A few ground-glass opacities present in the dependent aspects of bilateral lower lungs. A few small calcified granulomas in the lungs. Opacification of a few segmental   bronchi in the left lower lobe.    MEDIASTINUM/AXILLAE: A few calcified mediastinal and left hilar lymph nodes, likely related to prior granulomatous infection.    MUSCULOSKELETAL: Partial visualization of a right shoulder arthroplasty.     UPPER ABDOMEN: Numerous tiny calcified granulomas scattered within the liver and spleen. Partial visualization of a mildly dilated left intrarenal collecting system. Mild elevation of the left hemidiaphragm.      Impression    IMPRESSION:   1. No  visualized pulmonary embolus.  2. Several linear opacities scattered in the periphery of both lungs in addition to mild ground-glass opacities in the dependent aspects of bilateral lower lungs. These findings are nonspecific, but could be inflammatory or infectious in etiology.   Interstitial lung disease is a possibility. A follow-up CT scan could be considered in 3 months for reevaluation.  3. Opacification of a few segmental bronchi in the left lower lobe, likely representing aspirated oral secretions or mucous.        Reese Galan MD  Hospitalist  [facility]

## 2021-01-07 NOTE — ED NOTES
Pt PIV started, flash occurred, flushed well. Pain with inserting, no pain noted with flushing. Checked by DARCY Wong, flushed well. No pain noted.

## 2021-01-07 NOTE — PROGRESS NOTES
[facility]  Hospitalist Progress Note  Name: Glendy Díaz    MRN: [id number]  YOB: 1933    Age: 87 year old  Date of admission: 1/6/2021  Primary care provider: Veronica Davis      Patient has alternate chart with MRN [id number]    Chief Complaint: Palpitations, shortness of breath    Glendy Díaz is a 87 year old female former smoker with PMH including paroxysmal SVT, CKD stage IIIa, HTN, neuropathy, overactive bladder, and recent hospitalization 12/29/2020 overnight for COVID-19 infection causing shortness of breath and SVT who presents with persistent episodes of SVT and shortness of breath.  She was hospitalized for 1 night for COVID-19 infection and felt better by the next day with just 1 dose of dexamethasone.  She was not hypoxic at any time and did not discharged any dexamethasone.  Did not receive Remdesevir.  Since discharge home with home care she had progressively more frequent and longer episodes of her SVT that were symptomatic.       She was due to have an EP study with likely ablation, however this was postponed until 1/12/2021 given the positive COVID-19 test.  She does continue to have a cough occasionally productive of sputum and low-grade temperatures in the 99 range.      Here in the emergency room her heart rate in the 80s when in normal rhythm in the 140-150 range when SVT.  EKG shows SVT with rate 150 and LVH.  D-dimer elevated at 1.4.  CTPA did not show PE, but shows some very subtle bilateral groundglass opacities along with some bronchial wall thickening.  She received 2 doses of IV metoprolol which helped terminate the rhythm.       Cardiology was consulted who recommended admission and amiodarone infusion.  They did not recommend transfer to [facility] as her EP study would not be pushed forward given the positive COVID-19 status.      At this point she remains admitted here for ongoing amiodarone infusion.  She remains in sinus  rhythm but she will undergo amiodarone load and then transition to oral, likely then discharging home prior to her EP study on January 12.      Assessment and Plan:   Paroxysmal SVT: As above.  -Cardiology consult appreciated  -Received 150 mg IV amiodarone in the ER, continue IV amiodarone load for now followed by oral amiodarone.  -Continue carvedilol 9.375 mg twice daily  -Telemetry    COVID-19 infection: Diagnosed with COVID-19 on 12/27/2020.  Hospitalized here for 1 day on 12/29 for shortness of breath and received a dose of dexamethasone.  Discharged home without any dexamethasone.  Has a cough occasionally productive of sputum and temperatures in the 99 range.  Reportsintermittent shortness of breath when in SVT that then resolves.  Not hypoxic here.  CT shows some very mild bilateral groundglass opacities and bronchial wall thickening.  -Roughly 12 days since symptom onset, given the low-grade temperatures and ongoing cough will keep her in isolation here  -Doubt at this point she would benefit significantly from dexamethasone  -Tessalon Perles and Robitussin as needed for cough    Generalized weakness:  reports generalized weakness since diagnosed with COVID-19 on 12/27/2020.  Lives independent living facility.  No recent falls.  Home therapy ordered, reports a nurse was out to set up things but PT has not been out that she is aware of.   -Consult PT     CKD stage IIIa: Creatinine at baseline 1.1-1.2.     HTN: Continue carvedilol 9.375 mg daily.  In the past was on Lasix, but has since stopped.  Has not noted any increase in leg swelling.  No history of CHF.  Has trace edema on exam.     Peripheral neuropathy: Continue gabapentin 100 mg afternoon and 200 mg at night along with tramadol 50 mg twice daily as needed.     Overactive bladder: Continue PTA mirabegron 50 mg daily.     Former smoker: 20-pack-year history, quit in 1985.      DVT Prophylaxis: Enoxaparin (Lovenox) SQ  Code Status: Full Code  FEN:  "Regular diet, no caffeine  Discharge Dispo: Home to independent living with resumption of home care  Estimated Disch Date / # of Days until Disch: ?home tomorrow if stable on amiodarone      Interval History    Patient admitted last night, sinus rhythm today  Discussed with cardiology, planning on amiodarone load with infusion and oral amiodarone  Denies cardiopulmonary complaints at this point.       Physical Exam:  Blood pressure 136/83, pulse 64, temperature 98.1  F (36.7  C), temperature source Oral, resp. rate 22, height 1.626 m (5' 4\"), weight 70.6 kg (155 lb 9.6 oz), SpO2 98 %, not currently breastfeeding.  Wt Readings from Last 1 Encounters:   01/07/21 70.6 kg (155 lb 9.6 oz)     Exam:  Constitutional: Awake, NAD.  Thin elderly woman with a mask in place.  Eyes: sclera white, PERRL  Respiratory: no respiratory distress, clear to auscultation, on room air  Cardiovascular: RRR.  No murmur   GI: non-tender, not distended, bowel sounds present  Skin: no rash or lesions, acyanotic  Musculoskeletal/extremities: atraumatic, no major deformities.  Trace bilateral lower extremity edema  Neurologic: A&O, speech clear, moves extremities equally  Psychiatric: calm, cooperative, normal affect    Lab and imaging data personally reviewed:  Labs:  Recent Labs   Lab 01/06/21  1803   WBC 5.0   HGB 11.8   HCT 36.5   MCV 97        Recent Labs   Lab 01/07/21  0623 01/06/21  1803    135   POTASSIUM 3.3* 3.6   CHLORIDE 109 102   CO2 29 29   ANIONGAP 3 4   GLC 81 101*   BUN 18 24   CR 1.05* 1.25*   GFRESTIMATED 48* 39*   GFRESTBLACK 55* 45*   MUMTAZ 8.3* 8.9     Recent Labs   Lab 01/06/21  1803   NTBNPI 452     Recent Labs   Lab 01/06/21  1803   DD 1.4*     Recent Labs   Lab 01/06/21  1803   TROPI <0.015       EKG: SVT, heart rates 150s, LVH    Imaging:  Recent Results (from the past 24 hour(s))   CT Chest Pulmonary Embolism w Contrast    Narrative    EXAM: CT CHEST WITH CONTRAST - PULMONARY EMBOLISM PROTOCOL   LOCATION: " [facility]  DATE/TIME: 1/6/2021 8:13 PM    INDICATION: Shortness of breath. Positive D-dimer.  COMPARISON: None.    TECHNIQUE: CT angiogram chest during pulmonary arterial phase injection IV contrast. Dose reduction techniques were used.   CONTRAST: 59 mL Isovue-370.    FINDINGS:    ANGIOGRAM CHEST: No visualized pulmonary embolus. The thoracic aorta is normal in caliber without dissection. Atherosclerotic calcification in the thoracic aorta and coronary arteries.    LUNGS AND PLEURA: Several linear opacities scattered in the periphery of both lungs. A few ground-glass opacities present in the dependent aspects of bilateral lower lungs. A few small calcified granulomas in the lungs. Opacification of a few segmental   bronchi in the left lower lobe.    MEDIASTINUM/AXILLAE: A few calcified mediastinal and left hilar lymph nodes, likely related to prior granulomatous infection.    MUSCULOSKELETAL: Partial visualization of a right shoulder arthroplasty.     UPPER ABDOMEN: Numerous tiny calcified granulomas scattered within the liver and spleen. Partial visualization of a mildly dilated left intrarenal collecting system. Mild elevation of the left hemidiaphragm.      Impression    IMPRESSION:   1. No visualized pulmonary embolus.  2. Several linear opacities scattered in the periphery of both lungs in addition to mild ground-glass opacities in the dependent aspects of bilateral lower lungs. These findings are nonspecific, but could be inflammatory or infectious in etiology.   Interstitial lung disease is a possibility. A follow-up CT scan could be considered in 3 months for reevaluation.  3. Opacification of a few segmental bronchi in the left lower lobe, likely representing aspirated oral secretions or mucous.

## 2021-01-07 NOTE — PLAN OF CARE
VSS. Tele SB, HR 55-59bpm. RA.  K 3.3, replaced 20mEq per protocol, 40mEq per MD, redraw at 1800. Cardiology goal for K above 4, Mag above 2 (current Mag at 2.8).   A/O X4, heard of hearing has hearing aids. Pt denies CP, SOB. LS diminished. Frequent dry cough, nonproductive. No SVT on shift. Pt denies dizziness/lightheadedness. Pt denies N/V. Small BM today.  Edema 1+ LEbilat. Assist 1 gb/cane. PIV X2 SL. Continue to monitor and POC.

## 2021-01-08 ENCOUNTER — TELEPHONE (OUTPATIENT)
Dept: CARDIOLOGY | Facility: CLINIC | Age: 86
End: 2021-01-08

## 2021-01-08 DIAGNOSIS — I47.10 SVT (SUPRAVENTRICULAR TACHYCARDIA) (H): Primary | ICD-10-CM

## 2021-01-08 PROCEDURE — 99233 SBSQ HOSP IP/OBS HIGH 50: CPT | Performed by: INTERNAL MEDICINE

## 2021-01-08 PROCEDURE — 120N000001 HC R&B MED SURG/OB

## 2021-01-08 PROCEDURE — 258N000003 HC RX IP 258 OP 636: Performed by: INTERNAL MEDICINE

## 2021-01-08 PROCEDURE — 250N000009 HC RX 250: Performed by: INTERNAL MEDICINE

## 2021-01-08 PROCEDURE — 250N000011 HC RX IP 250 OP 636: Performed by: INTERNAL MEDICINE

## 2021-01-08 PROCEDURE — 250N000013 HC RX MED GY IP 250 OP 250 PS 637: Performed by: HOSPITALIST

## 2021-01-08 PROCEDURE — 250N000013 HC RX MED GY IP 250 OP 250 PS 637: Performed by: INTERNAL MEDICINE

## 2021-01-08 RX ORDER — GABAPENTIN 300 MG/1
300 CAPSULE ORAL AT BEDTIME
Status: DISCONTINUED | OUTPATIENT
Start: 2021-01-08 | End: 2021-01-11 | Stop reason: HOSPADM

## 2021-01-08 RX ORDER — SODIUM CHLORIDE, SODIUM LACTATE, POTASSIUM CHLORIDE, CALCIUM CHLORIDE 600; 310; 30; 20 MG/100ML; MG/100ML; MG/100ML; MG/100ML
INJECTION, SOLUTION INTRAVENOUS CONTINUOUS
Status: CANCELLED | OUTPATIENT
Start: 2021-01-08

## 2021-01-08 RX ORDER — LIDOCAINE 40 MG/G
CREAM TOPICAL
Status: CANCELLED | OUTPATIENT
Start: 2021-01-08

## 2021-01-08 RX ADMIN — AMIODARONE HYDROCHLORIDE 200 MG: 200 TABLET ORAL at 21:49

## 2021-01-08 RX ADMIN — GABAPENTIN 300 MG: 300 CAPSULE ORAL at 21:49

## 2021-01-08 RX ADMIN — CARVEDILOL 9.38 MG: 6.25 TABLET, FILM COATED ORAL at 18:48

## 2021-01-08 RX ADMIN — CARVEDILOL 9.38 MG: 6.25 TABLET, FILM COATED ORAL at 08:47

## 2021-01-08 RX ADMIN — AMIODARONE HYDROCHLORIDE 0.5 MG/MIN: 50 INJECTION, SOLUTION INTRAVENOUS at 00:01

## 2021-01-08 RX ADMIN — ENOXAPARIN SODIUM 40 MG: 40 INJECTION SUBCUTANEOUS at 08:47

## 2021-01-08 RX ADMIN — TRAMADOL HYDROCHLORIDE 50 MG: 50 TABLET, FILM COATED ORAL at 18:51

## 2021-01-08 RX ADMIN — LIDOCAINE 5 G: 40 CREAM TOPICAL at 18:47

## 2021-01-08 RX ADMIN — AMIODARONE HYDROCHLORIDE 0.5 MG/MIN: 50 INJECTION, SOLUTION INTRAVENOUS at 07:56

## 2021-01-08 RX ADMIN — ACETAMINOPHEN 650 MG: 325 TABLET, FILM COATED ORAL at 06:21

## 2021-01-08 RX ADMIN — GABAPENTIN 100 MG: 100 CAPSULE ORAL at 13:45

## 2021-01-08 RX ADMIN — AMIODARONE HYDROCHLORIDE 200 MG: 200 TABLET ORAL at 08:47

## 2021-01-08 RX ADMIN — MIRABEGRON 50 MG: 50 TABLET, FILM COATED, EXTENDED RELEASE ORAL at 08:47

## 2021-01-08 RX ADMIN — Medication 1 MG: at 21:56

## 2021-01-08 RX ADMIN — ASPIRIN 81 MG: 81 TABLET ORAL at 08:47

## 2021-01-08 ASSESSMENT — ACTIVITIES OF DAILY LIVING (ADL)
ADLS_ACUITY_SCORE: 18
ADLS_ACUITY_SCORE: 16
ADLS_ACUITY_SCORE: 18

## 2021-01-08 ASSESSMENT — MIFFLIN-ST. JEOR: SCORE: 1101.76

## 2021-01-08 NOTE — PROGRESS NOTES
[facility]  Hospitalist Progress Note  Name: Glendy Díaz    MRN: [id number]  YOB: 1933    Age: 87 year old  Date of admission: 1/6/2021  Primary care provider: Veronica Davis      Patient has alternate chart with MRN [id number]    Chief Complaint: Palpitations, shortness of breath    Glendy Díaz is a 87 year old female former smoker with PMH including paroxysmal SVT, CKD stage IIIa, HTN, neuropathy, overactive bladder, and recent hospitalization 12/29/2020 overnight for COVID-19 infection causing shortness of breath and SVT who presents with persistent episodes of SVT and shortness of breath.  She was hospitalized for 1 night for COVID-19 infection and felt better by the next day with just 1 dose of dexamethasone.  She was not hypoxic at any time and did not discharged any dexamethasone.  Did not receive Remdesevir.  Since discharge home with home care she had progressively more frequent and longer episodes of her SVT that were symptomatic.       She was due to have an EP study with likely ablation, however this was postponed until 1/12/2021 given the positive COVID-19 test.  She does continue to have a cough occasionally productive of sputum and low-grade temperatures in the 99 range.      Here in the emergency room her heart rate in the 80s when in normal rhythm in the 140-150 range when SVT.  EKG shows SVT with rate 150 and LVH.  D-dimer elevated at 1.4.  CTPA did not show PE, but shows some very subtle bilateral groundglass opacities along with some bronchial wall thickening.  She received 2 doses of IV metoprolol which helped terminate the rhythm.       Cardiology was consulted who recommended admission and amiodarone infusion.  They did not recommend transfer to [facility] as her EP study would not be pushed forward given the positive COVID-19 status.      At this point she remains admitted here for ongoing amiodarone infusion.  She remained in  sinus rhythm but she did undergo amiodarone load and then transition to oral, likely then discharging home prior to her EP study on January 12 as long as she is not having recurring arrhythmias.    ?discharge to TCU rather than independent living.  Resides at [facility], will consult PT and SW given recent falls, worsened weakness.  Discussed w/ her daughter.    Assessment and Plan:   Paroxysmal SVT: As above.  -Cardiology consult appreciated  -Received amiodarone load/infusion, now changed to 200 mg twice daily.  -Continue carvedilol 9.375 mg twice daily  -Telemetry  -EP study at [facility] scheduled for 1/12/2021.    COVID-19 infection: Diagnosed with COVID-19 on 12/27/2020.  Hospitalized here for 1 day on 12/29 for shortness of breath and received a dose of dexamethasone.  Discharged home without any dexamethasone.  Has a cough occasionally productive of sputum and temperatures in the 99 range.  Reportsintermittent shortness of breath when in SVT that then resolves.  Not hypoxic here.  CT shows some very mild bilateral groundglass opacities and bronchial wall thickening.  -Roughly 13 days since symptom onset, given the low-grade temperatures and ongoing cough will keep her in isolation here  -Doubt at this point she would benefit significantly from dexamethasone  -Tessalon Perles and Robitussin as needed for cough    Generalized weakness:  reports generalized weakness since diagnosed with COVID-19 on 12/27/2020.  Lives independent living facility.  No recent falls.  Home therapy ordered, reports a nurse was out to set up things but PT has not been out that she is aware of.   -Consult PT     CKD stage IIIa: Creatinine at baseline 1.1-1.2.     HTN: Continue carvedilol 9.375 mg daily.  In the past was on Lasix, but has since stopped.  Has not noted any increase in leg swelling.  No history of CHF.  Has trace edema on exam.     Peripheral neuropathy: Continue gabapentin 100 mg afternoon and 200 mg at  "night along with tramadol 50 mg twice daily as needed.     Overactive bladder: Continue PTA mirabegron 50 mg daily.     Former smoker: 20-pack-year history, quit in 1985.      DVT Prophylaxis: Enoxaparin (Lovenox) SQ  Code Status: Full Code  FEN: Regular diet, no caffeine  Discharge Dispo: Home to independent living with resumption of home care  Estimated Disch Date / # of Days until Disch:?discharge to TCU rather than independent living.  Resides at [facility], will consult PT and SW given recent falls, worsened weakness.  Discussed w/ her daughter.  ?on 1/9.        Interval History    No further arrhythmias noted  Finishing amiodarone drip, changing to oral today  Contacted her daughter Abigail for an update, ?need for tcu.           Physical Exam:  Blood pressure 135/66, pulse 65, temperature 97.9  F (36.6  C), temperature source Oral, resp. rate 16, height 1.626 m (5' 4\"), weight 68.2 kg (150 lb 4.8 oz), SpO2 98 %, not currently breastfeeding.  Wt Readings from Last 1 Encounters:   01/08/21 68.2 kg (150 lb 4.8 oz)     Exam:  Constitutional: Awake, NAD.  Thin elderly woman with a mask in place.  Eyes: sclera white, PERRL  Respiratory: no respiratory distress, clear to auscultation, on room air  Cardiovascular: RRR.  No murmur   GI: non-tender, not distended, bowel sounds present  Skin: no rash or lesions, acyanotic  Musculoskeletal/extremities: atraumatic, no major deformities.  Trace bilateral lower extremity edema  Neurologic: A&O, speech clear, moves extremities equally  Psychiatric: calm, cooperative, normal affect    Lab and imaging data personally reviewed:  Labs:  Recent Labs   Lab 01/06/21  1803   WBC 5.0   HGB 11.8   HCT 36.5   MCV 97        Recent Labs   Lab 01/07/21  1848 01/07/21  0623 01/06/21  1803   NA  --  141 135   POTASSIUM 4.3 3.3* 3.6   CHLORIDE  --  109 102   CO2  --  29 29   ANIONGAP  --  3 4   GLC  --  81 101*   BUN  --  18 24   CR  --  1.05* 1.25*   GFRESTIMATED  --  " 48* 39*   GFRESTBLACK  --  55* 45*   MUMTAZ  --  8.3* 8.9     Recent Labs   Lab 01/06/21 1803   NTBNPI 452     Recent Labs   Lab 01/06/21 1803   DD 1.4*     Recent Labs   Lab 01/06/21 1803   TROPI <0.015       EKG: SVT, heart rates 150s, LVH    Imaging:  Recent Results (from the past 24 hour(s))   CT Chest Pulmonary Embolism w Contrast    Narrative    EXAM: CT CHEST WITH CONTRAST - PULMONARY EMBOLISM PROTOCOL   LOCATION: [facility]  DATE/TIME: 1/6/2021 8:13 PM    INDICATION: Shortness of breath. Positive D-dimer.  COMPARISON: None.    TECHNIQUE: CT angiogram chest during pulmonary arterial phase injection IV contrast. Dose reduction techniques were used.   CONTRAST: 59 mL Isovue-370.    FINDINGS:    ANGIOGRAM CHEST: No visualized pulmonary embolus. The thoracic aorta is normal in caliber without dissection. Atherosclerotic calcification in the thoracic aorta and coronary arteries.    LUNGS AND PLEURA: Several linear opacities scattered in the periphery of both lungs. A few ground-glass opacities present in the dependent aspects of bilateral lower lungs. A few small calcified granulomas in the lungs. Opacification of a few segmental   bronchi in the left lower lobe.    MEDIASTINUM/AXILLAE: A few calcified mediastinal and left hilar lymph nodes, likely related to prior granulomatous infection.    MUSCULOSKELETAL: Partial visualization of a right shoulder arthroplasty.     UPPER ABDOMEN: Numerous tiny calcified granulomas scattered within the liver and spleen. Partial visualization of a mildly dilated left intrarenal collecting system. Mild elevation of the left hemidiaphragm.      Impression    IMPRESSION:   1. No visualized pulmonary embolus.  2. Several linear opacities scattered in the periphery of both lungs in addition to mild ground-glass opacities in the dependent aspects of bilateral lower lungs. These findings are nonspecific, but could be inflammatory or infectious in etiology.   Interstitial  lung disease is a possibility. A follow-up CT scan could be considered in 3 months for reevaluation.  3. Opacification of a few segmental bronchi in the left lower lobe, likely representing aspirated oral secretions or mucous.

## 2021-01-08 NOTE — PLAN OF CARE
VSS. Tele SR, prolonged QT. RA. A/OX4. Pt denies CP/SOB. LS diminshed, cough infrequent, productive, good. Amnio drip discontinued per MD at 1024, for transition to PO. Cardiac monitoring continued. BM during shift, audible BS. Edema 1+ LE bilaterally. Generalized weakness, pt encouraged to ambulated. Walked in room X2 with RN.   Covid positive, precautions maintained.   2 gram sodium diet. Assist 1 gb/walker  Continue to monitor and POC.

## 2021-01-08 NOTE — PLAN OF CARE
ICU End of Shift Summary.  For vital signs and complete assessments, please see documentation flowsheets.     Pertinent assessments: COVID +, A&Ox4, LS diminished, productive cough. BS+ in all quadrants. HR 60-70's. SR- Amio gtt. Infusing at 30 mL/hr. Skin ecchymotic, BLLE +1 trace edema. Ax1 GB with a cane. Tylenol given for headache.    Major Shift Events: Continued Amio gtt.  Plan (Upcoming Events): Bridge gtt with PO Amio, maintain normal vital signs  Discharge/Transfer Needs: Maintain heart rate    Bedside Shift Report Completed : Y  Bedside Safety Check Completed: Y

## 2021-01-09 ENCOUNTER — APPOINTMENT (OUTPATIENT)
Dept: PHYSICAL THERAPY | Facility: CLINIC | Age: 86
DRG: 308 | End: 2021-01-09
Attending: INTERNAL MEDICINE
Payer: MEDICARE

## 2021-01-09 LAB
CREAT SERPL-MCNC: 1.13 MG/DL (ref 0.52–1.04)
GFR SERPL CREATININE-BSD FRML MDRD: 44 ML/MIN/{1.73_M2}
MAGNESIUM SERPL-MCNC: 2.5 MG/DL (ref 1.6–2.3)
PLATELET # BLD AUTO: 187 10E9/L (ref 150–450)
POTASSIUM SERPL-SCNC: 4.6 MMOL/L (ref 3.4–5.3)

## 2021-01-09 PROCEDURE — 99233 SBSQ HOSP IP/OBS HIGH 50: CPT | Performed by: INTERNAL MEDICINE

## 2021-01-09 PROCEDURE — 97116 GAIT TRAINING THERAPY: CPT | Mod: GP | Performed by: PHYSICAL THERAPIST

## 2021-01-09 PROCEDURE — 250N000011 HC RX IP 250 OP 636: Performed by: INTERNAL MEDICINE

## 2021-01-09 PROCEDURE — 120N000001 HC R&B MED SURG/OB

## 2021-01-09 PROCEDURE — 250N000013 HC RX MED GY IP 250 OP 250 PS 637: Performed by: INTERNAL MEDICINE

## 2021-01-09 PROCEDURE — 84132 ASSAY OF SERUM POTASSIUM: CPT | Performed by: INTERNAL MEDICINE

## 2021-01-09 PROCEDURE — 85049 AUTOMATED PLATELET COUNT: CPT | Performed by: INTERNAL MEDICINE

## 2021-01-09 PROCEDURE — 36415 COLL VENOUS BLD VENIPUNCTURE: CPT | Performed by: INTERNAL MEDICINE

## 2021-01-09 PROCEDURE — 82565 ASSAY OF CREATININE: CPT | Performed by: INTERNAL MEDICINE

## 2021-01-09 PROCEDURE — 97162 PT EVAL MOD COMPLEX 30 MIN: CPT | Mod: GP | Performed by: PHYSICAL THERAPIST

## 2021-01-09 PROCEDURE — 250N000013 HC RX MED GY IP 250 OP 250 PS 637: Performed by: HOSPITALIST

## 2021-01-09 PROCEDURE — 97530 THERAPEUTIC ACTIVITIES: CPT | Mod: GP | Performed by: PHYSICAL THERAPIST

## 2021-01-09 PROCEDURE — 83735 ASSAY OF MAGNESIUM: CPT | Performed by: INTERNAL MEDICINE

## 2021-01-09 RX ADMIN — ASPIRIN 81 MG: 81 TABLET ORAL at 08:42

## 2021-01-09 RX ADMIN — MIRABEGRON 50 MG: 50 TABLET, FILM COATED, EXTENDED RELEASE ORAL at 08:42

## 2021-01-09 RX ADMIN — CARVEDILOL 9.38 MG: 6.25 TABLET, FILM COATED ORAL at 19:21

## 2021-01-09 RX ADMIN — ACETAMINOPHEN 650 MG: 325 TABLET, FILM COATED ORAL at 16:35

## 2021-01-09 RX ADMIN — AMIODARONE HYDROCHLORIDE 200 MG: 200 TABLET ORAL at 22:34

## 2021-01-09 RX ADMIN — Medication 1 MG: at 22:34

## 2021-01-09 RX ADMIN — GABAPENTIN 100 MG: 100 CAPSULE ORAL at 13:28

## 2021-01-09 RX ADMIN — GABAPENTIN 300 MG: 300 CAPSULE ORAL at 22:34

## 2021-01-09 RX ADMIN — ENOXAPARIN SODIUM 40 MG: 40 INJECTION SUBCUTANEOUS at 08:44

## 2021-01-09 RX ADMIN — CARVEDILOL 9.38 MG: 6.25 TABLET, FILM COATED ORAL at 08:42

## 2021-01-09 RX ADMIN — AMIODARONE HYDROCHLORIDE 200 MG: 200 TABLET ORAL at 08:42

## 2021-01-09 ASSESSMENT — ACTIVITIES OF DAILY LIVING (ADL)
ADLS_ACUITY_SCORE: 18
ADLS_ACUITY_SCORE: 16
ADLS_ACUITY_SCORE: 18

## 2021-01-09 ASSESSMENT — MIFFLIN-ST. JEOR: SCORE: 1094.05

## 2021-01-09 NOTE — CONSULTS
Care Management Initial Consult    General Information  Assessment completed with: Patient, Children, (Pt and daughter Abigail)  Type of CM/SW Visit: Initial Assessment    Primary Care Provider verified and updated as needed:     Readmission within the last 30 days:        Reason for Consult: discharge planning  Advance Care Planning: No documents on file           Communication Assessment  Patient's communication style: spoken language (English or Bilingual)    Hearing Difficulty or Deaf: no   Wear Glasses or Blind: no    Cognitive  Cognitive/Neuro/Behavioral: WDL                      Living Environment:   People in home: alone, facility resident     Current living Arrangements: independent living facility      Able to return to prior arrangements: other (see comments)(TBD)       Family/Social Support:  Marital Status:   Children: daughter Abigail         Description of Support System: Supportive, Involved    Support Assessment: Adequate family and caregiver support, Adequate social supports    Current Resources:   Skilled Home Care Services:  [facility] RN/PT/OT/SW/HHA   Equipment currently used at home: walker, standard      Lifestyle & Psychosocial Needs:        Socioeconomic History     Marital status:      Spouse name: Not on file     Number of children: Not on file     Years of education: Not on file     Highest education level: Not on file         Additional Information:  SW consult was for discharge planning. SW called pt's room (due to COVID precautions). SW chatted with pt about a potential discharge plan. Per pt, her daughter doesn't want her to return until after the ablation on Tuesday. Pt requested that SW call pt's daughter to discuss the discharge plan. ASAEL called Abigail. Abigail doesn't want pt to return until after the ablation on Tuesday. Pt is independent at the facility and does not get any services through the facility. Per Abigail, pt was quite weak while at home and couldn't  the  kitchen long enough to prepare a meal. Abigail thinks that pt needs TCU at discharge. If TCU is recommended Abigail would like pt to go to [facility]. Abigail is not familiar with any other facilities. ASAEL explained that [facility] may not have a bed for pt and facilities taking COVID positive or recovered patients is limited.     Abigail has COVID too and thinks its best that pt get a medical transport. SW informed Abigail of the potential cost of transportation, Abigail was agreeable.     SW will continue to follow to coordinate discharge.    Update: PT recommends 24hr assist or TCU. PT discovered that when pt dsays she is week that it is lightheadedness due to decreased oxygen levels. Pt mentioned to PT that pt will be going to an [facility]. SW called pt's daughter Abigail. They have been working to get [facility] into the [facility]. There are no [facility] at the [facility] at this time. Pt will need to discharge to TCU.     ABHISHEK Galvan, LGSW  Casual    [facility]  [phone]

## 2021-01-09 NOTE — PLAN OF CARE
VSS. Tele SR prolonged QT interval. RA. A/O X4. Pt denies CP, SOB, pain. No telemetry changes during shift. Neuropathy pain better in legs this AM. No headache. LS diminished, infrequent cough. Trace edema in legs, ambulation in room with NA/RN promoted throughout shift. Pt has generalized weakness. No IV access, MD aware. Cardiac diet. Assist X1, gb/cane. Discharge pending TCU placement. Continue to monitor and POC.

## 2021-01-09 NOTE — PLAN OF CARE
Pt A&Ox4, VSS, RA, denies pain, LS diminished, cardiac diet.  Is an a1 with a gait belt and cane. Tele SR w prolonged QT. Getting PO amio and Coreg. PT/SW/cards following. Discharge plan to TCU is TBD

## 2021-01-09 NOTE — PROGRESS NOTES
Paged re significant peripheral neuropathy.  Topicals already ordered.    Increased night time gabapentin from 200 mg at bedtime to 300 mg at bedtime.     Sid Pugh MD

## 2021-01-09 NOTE — PROGRESS NOTES
"   01/09/21 1430   Quick Adds   Type of Visit Initial PT Evaluation       Present no   Language English   Living Environment   Living Environment Comments Pt reports she lives alone in ind living facility with plan to move into assisted living (unclear as to when, 1-2 wks?). Pt reports she owns 2WW and SEC (cane is in with pt in room).    Self-Care   Usual Activity Tolerance moderate   Current Activity Tolerance fair   Regular Exercise No   Equipment Currently Used at Home cane, straight  (2WW, )   Disability/Function   Fall history within last six months yes   Number of times patient has fallen within last six months 3   General Information   Onset of Illness/Injury or Date of Surgery 01/06/20   Referring Physician Mao Dewey MD   Patient/Family Therapy Goals Statement (PT) Feel better, \"get back home\" per pt   Pertinent History of Current Problem (include personal factors and/or comorbidities that impact the POC) Pt is 86 yo female admitted with symptomatic runs of SVT, symmptoms of shortness of breath, and palpitations. Pateint was diagnosed with Covid-19 on 12/27/2020, hospitalized for 1 day here, d/c home. \"Home therapy ordered, reports a nurse was out to set up things but PT has not been out that she is aware of.\" PMH including COVID-19, paroxysmal SVT, CKD stage IIIa, HTN, neuropathy, overactive bladder.   General Observations PT endorses 3 falls in last few months, reports \"I feel weak\", but when asked to elaborate, pt endorsing \"It's lightheaded that I feel. That's what I mean by weak. I feel it in my head and then I'm out. That's how I fall.\"   Cognition   Orientation Status (Cognition) oriented x 4   Affect/Mental Status (Cognition) WFL   Follows Commands (Cognition) 75-90% accuracy;follows two-step commands   Safety Deficit (Cognition) minimal deficit   Pain Assessment   Patient Currently in Pain No   Strength   Strength Comments B LE 4+/5, mildly deconditioned, " pt somewhat self limiting during MMT    Bed Mobility   Bed Mobility supine-sit   Supine-Sit Jay (Bed Mobility) supervision   Transfers   Transfers sit-stand transfer;bed-chair transfer   Bed-Chair Transfer   Bed-Chair Jay (Transfers) contact guard   Assistive Device (Bed-Chair Transfers) cane, straight   Bed/Chair Transfer Comments No 2WW in room at time of eval, pt uses personal SEC. Pt mildly unsteady with cane, no overt LOB, would benefit from use of 2WW to reduce risk of falling 2/2 baseline balance impairments    Sit-Stand Transfer   Sit-Stand Jay (Transfers) supervision   Assistive Device (Sit-Stand Transfers) cane, straight   Sit/Stand Transfer Comments Needs B UE to stand   Gait/Stairs (Locomotion)   Jay Level (Gait) supervision   Assistive Device (Gait) cane, straight   Distance in Feet (Required for LE Total Joints) 35   Pattern (Gait) step-through   Deviations/Abnormal Patterns (Gait) base of support, narrow;gait speed decreased;weight shifting decreased   Comment (Gait/Stairs) No 2WW in room at time of eval, pt uses personal SEC. Pt mildly unsteady with cane, no overt LOB, would benefit from use of 2WW to reduce risk of falling 2/2 baseline balance impairments. Pt on room air, O2 sat decreased to 91% following ambulation, improved to 95% with seated rest break.    Balance   Balance Comments Standing static balance fair with no UE, standing static/dynamic balance improved with HHA from therapist alongside use of cane. Pt would benefit from 2WW to reduce risk of falling.    Sensory Examination   Sensory Perception WFL   Clinical Impression   Criteria for Skilled Therapeutic Intervention yes, treatment indicated   PT Diagnosis (PT) Weakness    Influenced by the following impairments decreased strength, decreased cardiovascular endurance, baseline balance impairments, mild cognitive impairments    Functional limitations due to impairments Limited ambulation distance,  "increased risk of falls, decreased independence with transfers/ambulation    Clinical Presentation Evolving/Changing   Clinical Presentation Rationale Moderate complexity with 3+ comorbidities, changing nature of heart palpitations/HR/SVR on EKG, needing evaluation of strength, balance, cardiovascular endurance, using standardized patient assessment of 5xSST instrument as measurable assessment of functional outcome   Clinical Decision Making (Complexity) moderate complexity   Therapy Frequency (PT) Daily   Predicted Duration of Therapy Intervention (days/wks) 3 days   Planned Therapy Interventions (PT) balance training;gait training;home exercise program;patient/family education;strengthening;transfer training;neuromuscular re-education   Risk & Benefits of therapy have been explained evaluation/treatment results reviewed;care plan/treatment goals reviewed;patient;participants included;participants voiced agreement with care plan   PT Discharge Planning    PT Discharge Recommendation (DC Rec) Long term care facility;Transitional Care Facility;home with assist   PT Rationale for DC Rec Pt performing upright mobility tasks with SBA including sit<>stand, ambulation 30+ft with personal SEC. O2 saturation dropped to 91% following short ambulation. Pt reporting \"weakness,\", further elaborates as \"lightheadedness\" and \"short of breath.\" BP WFL. O2 sat improved within 1min seated rest. Pt performed 5xSST as gross std measure of LE strength, balance, and cardiovascular endurance, only able to perform 4SST prior to O2 desat to 84%, pt symptomatic, taking 4min to return to >90%. Pt endorses she is planning on moving into assisted living side of her building, currently living alone in independent living. Recommend 24/7 supervision and Home PT, possible external O2 based on future assessments (spoke with RN).  Recommend 2WW use for all upright mobiltiy. Pt with increased unsteadiness using personal SEC. If unable to provide 24/7 " supervision, recommend TCU to reduce risk of falls and re-hospitalization.  Also recommend Outpt Pulmonary PT for pt once pt's living situation stabilized. Currently pt with significant taxing effort to leave house.   PT Brief overview of current status  SBA with all upright mobility, needs 2WW loaner while hospitalized, called rehab aide 1-2, not available (after hours).    Total Evaluation Time   Total Evaluation Time (Minutes) 10

## 2021-01-09 NOTE — PROGRESS NOTES
[facility]  Hospitalist Progress Note  Name: Glendy Díaz    MRN: [id number]  YOB: 1933    Age: 87 year old  Date of admission: 1/6/2021  Primary care provider: Veronica Davis      Patient has alternate chart with MRN [id number]    Chief Complaint: Palpitations, shortness of breath    Glendy Díaz is a 87 year old female former smoker with PMH including paroxysmal SVT, CKD stage IIIa, HTN, neuropathy, overactive bladder, and recent hospitalization 12/29/2020 overnight for COVID-19 infection causing shortness of breath and SVT who presents with persistent episodes of SVT and shortness of breath.  She was hospitalized for 1 night for COVID-19 infection and felt better by the next day with just 1 dose of dexamethasone.  She was not hypoxic at any time and did not discharged any dexamethasone.  Did not receive Remdesevir.  Since discharge home with home care she had progressively more frequent and longer episodes of her SVT that were symptomatic.       She was due to have an EP study with likely ablation, however this was postponed until 1/12/2021 given the positive COVID-19 test.  She does continue to have a cough occasionally productive of sputum and low-grade temperatures in the 99 range.      Here in the emergency room her heart rate in the 80s when in normal rhythm in the 140-150 range when SVT.  EKG shows SVT with rate 150 and LVH.  D-dimer elevated at 1.4.  CTPA did not show PE, but shows some very subtle bilateral groundglass opacities along with some bronchial wall thickening.  She received 2 doses of IV metoprolol which helped terminate the rhythm.       Cardiology was consulted who recommended admission and amiodarone infusion.  They did not recommend transfer to [facility] as her EP study would not be pushed forward given the positive COVID-19 status.       She remains in sinus rhythm but she did undergo amiodarone load and then transition to oral,  likely then discharging home prior to her EP study on January 12 as long as she is not having recurring arrhythmias.    ?discharge to TCU rather than independent living.  Resides at [facility], will consult PT and SW given recent falls, worsened weakness.  Discussed w/ her daughter.    Assessment and Plan:   Paroxysmal SVT: As above.  -Cardiology consult appreciated  -Received amiodarone load/infusion, now changed to 200 mg twice daily.  -Continue carvedilol 9.375 mg twice daily  -Telemetry  -EP study at [facility] scheduled for 1/12/2021.    COVID-19 infection: Diagnosed with COVID-19 on 12/27/2020.  Hospitalized here for 1 day on 12/29 for shortness of breath and received a dose of dexamethasone.  Discharged home without any dexamethasone.  Has a cough occasionally productive of sputum and temperatures in the 99 range.  Reportsintermittent shortness of breath when in SVT that then resolves.  Not hypoxic here.  CT shows some very mild bilateral groundglass opacities and bronchial wall thickening.  -Roughly 14 days since symptom onset, given the low-grade temperatures and ongoing cough will keep her in isolation here though probably can stop this soon.  -Doubt at this point she would benefit significantly from dexamethasone  -Tessalon Perles and Robitussin as needed for cough    Generalized weakness:  reports generalized weakness since diagnosed with COVID-19 on 12/27/2020.  Lives independent living facility.  No recent falls.  Home therapy ordered, reports a nurse was out to set up things but PT has not been out that she is aware of.   -Consult PT     CKD stage IIIa: Creatinine at baseline 1.1-1.2.     HTN: Continue carvedilol 9.375 mg daily.  In the past was on Lasix, but has since stopped.  Has not noted any increase in leg swelling.  No history of CHF.  Has trace edema on exam.     Peripheral neuropathy: Continue gabapentin 100 mg afternoon and 200 mg at night along with tramadol 50 mg twice daily  "as needed.     Overactive bladder: Continue PTA mirabegron 50 mg daily.     Former smoker: 20-pack-year history, quit in 1985.      DVT Prophylaxis: Enoxaparin (Lovenox) SQ  Code Status: Full Code  FEN: Regular diet, no caffeine  Discharge Dispo: Home to independent living with resumption of home care  Estimated Disch Date / # of Days until Disch:?discharge to TCU rather than independent living.  Resides at [facility], will consult PT and SW given recent falls, worsened weakness.  Discussed w/ her daughter.  Okay to discharge to TCU soon as placement is found.        Interval History    No further arrhythmias noted  Remained stable on oral amiodarone  Denies complaints  Discussed the follow-up plan with her daughter yesterday at length and with the patient today.  Anticipate likely discharging to TCU pending PT and social work involvement.           Physical Exam:  Blood pressure 111/63, pulse 70, temperature 98.7  F (37.1  C), temperature source Oral, resp. rate 16, height 1.626 m (5' 4\"), weight 67.4 kg (148 lb 9.6 oz), SpO2 95 %, not currently breastfeeding.  Wt Readings from Last 1 Encounters:   01/09/21 67.4 kg (148 lb 9.6 oz)     Exam:  Constitutional: Awake, NAD.  Thin elderly woman with a mask in place.  Eyes: sclera white, PERRL  Respiratory: no respiratory distress, clear to auscultation, on room air  Cardiovascular: RRR.  No murmur   GI: non-tender, not distended, bowel sounds present  Skin: no rash or lesions, acyanotic  Musculoskeletal/extremities: atraumatic, no major deformities.  Trace bilateral lower extremity edema  Neurologic: A&O, speech clear, moves extremities equally  Psychiatric: calm, cooperative, normal affect    Lab and imaging data personally reviewed:  Labs:  Recent Labs   Lab 01/09/21  0630 01/06/21  1803   WBC  --  5.0   HGB  --  11.8   HCT  --  36.5   MCV  --  97    208     Recent Labs   Lab 01/09/21  0630 01/07/21  1848 01/07/21  0623 01/06/21  1803   NA  --   " --  141 135   POTASSIUM 4.6 4.3 3.3* 3.6   CHLORIDE  --   --  109 102   CO2  --   --  29 29   ANIONGAP  --   --  3 4   GLC  --   --  81 101*   BUN  --   --  18 24   CR 1.13*  --  1.05* 1.25*   GFRESTIMATED 44*  --  48* 39*   GFRESTBLACK 51*  --  55* 45*   MUMTAZ  --   --  8.3* 8.9     Recent Labs   Lab 01/06/21  1803   NTBNPI 452     Recent Labs   Lab 01/06/21  1803   DD 1.4*     Recent Labs   Lab 01/06/21 1803   TROPI <0.015       EKG: SVT, heart rates 150s, LVH    Imaging:  Recent Results (from the past 24 hour(s))   CT Chest Pulmonary Embolism w Contrast    Narrative    EXAM: CT CHEST WITH CONTRAST - PULMONARY EMBOLISM PROTOCOL   LOCATION: [facility]  DATE/TIME: 1/6/2021 8:13 PM    INDICATION: Shortness of breath. Positive D-dimer.  COMPARISON: None.    TECHNIQUE: CT angiogram chest during pulmonary arterial phase injection IV contrast. Dose reduction techniques were used.   CONTRAST: 59 mL Isovue-370.    FINDINGS:    ANGIOGRAM CHEST: No visualized pulmonary embolus. The thoracic aorta is normal in caliber without dissection. Atherosclerotic calcification in the thoracic aorta and coronary arteries.    LUNGS AND PLEURA: Several linear opacities scattered in the periphery of both lungs. A few ground-glass opacities present in the dependent aspects of bilateral lower lungs. A few small calcified granulomas in the lungs. Opacification of a few segmental   bronchi in the left lower lobe.    MEDIASTINUM/AXILLAE: A few calcified mediastinal and left hilar lymph nodes, likely related to prior granulomatous infection.    MUSCULOSKELETAL: Partial visualization of a right shoulder arthroplasty.     UPPER ABDOMEN: Numerous tiny calcified granulomas scattered within the liver and spleen. Partial visualization of a mildly dilated left intrarenal collecting system. Mild elevation of the left hemidiaphragm.      Impression    IMPRESSION:   1. No visualized pulmonary embolus.  2. Several linear opacities scattered in  the periphery of both lungs in addition to mild ground-glass opacities in the dependent aspects of bilateral lower lungs. These findings are nonspecific, but could be inflammatory or infectious in etiology.   Interstitial lung disease is a possibility. A follow-up CT scan could be considered in 3 months for reevaluation.  3. Opacification of a few segmental bronchi in the left lower lobe, likely representing aspirated oral secretions or mucous.

## 2021-01-09 NOTE — PLAN OF CARE
VSS. Tele-SR w/ prolonged QT and BBB. Pain rated 9/10. PO ultram given once for pain. A/Ox4. LS-diminished/clear. N/T reported in feet. (per pt this is baseline). Continued bilateral +1 edema in lower extremities. Assist of 1 w/ cane. Voiding ok. Left PIV saline locked. PT/Social work. Low saturated fat/ low NA. Pt ate late lunch. Dinner refused. Melatonin given for sleep. Discharge TBD.

## 2021-01-10 PROCEDURE — 250N000013 HC RX MED GY IP 250 OP 250 PS 637: Performed by: HOSPITALIST

## 2021-01-10 PROCEDURE — 120N000001 HC R&B MED SURG/OB

## 2021-01-10 PROCEDURE — 250N000013 HC RX MED GY IP 250 OP 250 PS 637: Performed by: INTERNAL MEDICINE

## 2021-01-10 PROCEDURE — 250N000011 HC RX IP 250 OP 636: Performed by: INTERNAL MEDICINE

## 2021-01-10 PROCEDURE — 99233 SBSQ HOSP IP/OBS HIGH 50: CPT | Performed by: INTERNAL MEDICINE

## 2021-01-10 RX ADMIN — CARVEDILOL 9.38 MG: 6.25 TABLET, FILM COATED ORAL at 17:52

## 2021-01-10 RX ADMIN — ACETAMINOPHEN 650 MG: 325 TABLET, FILM COATED ORAL at 05:08

## 2021-01-10 RX ADMIN — CARVEDILOL 9.38 MG: 6.25 TABLET, FILM COATED ORAL at 09:24

## 2021-01-10 RX ADMIN — GABAPENTIN 100 MG: 100 CAPSULE ORAL at 14:00

## 2021-01-10 RX ADMIN — GABAPENTIN 300 MG: 300 CAPSULE ORAL at 21:41

## 2021-01-10 RX ADMIN — AMIODARONE HYDROCHLORIDE 200 MG: 200 TABLET ORAL at 21:41

## 2021-01-10 RX ADMIN — ASPIRIN 81 MG: 81 TABLET ORAL at 09:24

## 2021-01-10 RX ADMIN — ENOXAPARIN SODIUM 40 MG: 40 INJECTION SUBCUTANEOUS at 09:20

## 2021-01-10 RX ADMIN — AMIODARONE HYDROCHLORIDE 200 MG: 200 TABLET ORAL at 09:24

## 2021-01-10 RX ADMIN — MIRABEGRON 50 MG: 50 TABLET, FILM COATED, EXTENDED RELEASE ORAL at 09:24

## 2021-01-10 ASSESSMENT — ACTIVITIES OF DAILY LIVING (ADL)
ADLS_ACUITY_SCORE: 16

## 2021-01-10 NOTE — PLAN OF CARE
VSS. Tele-SR w/ prolonged QT. PO tylenol given for headache. Relief after intervention. A/OX4. No PIV access. MD steven w/ no PIV. Infrequent good/productive cough. LS-diminished. SBA w/ walker. Voiding ok. Continued bilateral +1 edema in lower extremities. N/T reported in lower extremities. Low saturated fat/ low NA diet. Pt refused dinner. Bed alarms on. Discharge TBD.

## 2021-01-10 NOTE — PLAN OF CARE
Pt a/o x4, up with assist of 1 and walker.  RA sating in the mid 90s.  Tylenol given x1 for lower back pain with some relief.  PT recommending TCU at discharge.  [place], hearing aids out and in case for sleep.  Pt can make needs known, calls appropriately.  Will continue POC.

## 2021-01-10 NOTE — PROGRESS NOTES
[facility]  Hospitalist Progress Note  Name: Glendy Díaz    MRN: [id number]  YOB: 1933    Age: 87 year old  Date of admission: 1/6/2021  Primary care provider: Veronica Davis      Patient has alternate chart with MRN [id number]    Chief Complaint: Palpitations, shortness of breath    Glendy Díaz is a 87 year old female former smoker with PMH including paroxysmal SVT, CKD stage IIIa, HTN, neuropathy, overactive bladder, and recent hospitalization 12/29/2020 overnight for COVID-19 infection causing shortness of breath and SVT who presents with persistent episodes of SVT and shortness of breath. She was hospitalized for 1 night for COVID-19 infection and felt better by the next day with just 1 dose of dexamethasone. She was not hypoxic at any time and did not discharged any dexamethasone. Did not receive Remdesevir. Since discharge home with home care she had progressively more frequent and longer episodes of her SVT that were symptomatic.      She was due to have an EP study with likely ablation, however this was postponed until 1/12/2021 given the positive COVID-19 test. She does continue to have a cough occasionally productive of sputum and low-grade temperatures in the 99 range.      Here in the emergency room her heart rate in the 80s when in normal rhythm in the 140-150 range when SVT. EKG shows SVT with rate 150 and LVH.  D-dimer elevated at 1.4.  CTPA did not show PE, but shows some very subtle bilateral groundglass opacities along with some bronchial wall thickening.  She received 2 doses of IV metoprolol which helped terminate the rhythm.      Cardiology was consulted who recommended admission and amiodarone infusion. They did not recommend transfer to [facility] as her EP study would not be pushed forward given the positive COVID-19 status.      She remains in sinus rhythm but she did undergo amiodarone load and then transition to oral, likely  then discharging home prior to her EP study on January 12 as long as she is not having recurring arrhythmias.      Assessment and Plan:   Paroxysmal SVT: As above.  -Cardiology consult appreciated  -Received amiodarone load/infusion, now changed to 200 mg twice daily.  -Continue carvedilol 9.375 mg twice daily  -EP study at [facility] scheduled for 1/12/2021.  -Will monitor on telemetry    COVID-19 infection: Diagnosed with COVID-19 on 12/27/2020.    -Hospitalized here for 1 day on 12/29 for shortness of breath and received a dose of dexamethasone.  Discharged home without dexamethasone.    Not hypoxic here.  CT shows some very mild bilateral groundglass opacities and bronchial wall thickening.  -Roughly 14 days since symptom onset, given the low-grade temperatures and ongoing cough will keep her in isolation here though probably can stop this soon.  -Not started on dexamethasone here as she is more than 14 days out, breathing improving. Not hypoxic here.   -Tessalon Perles and Robitussin as needed for cough    Generalized weakness:    -Has generalized weakness since diagnosed with COVID-19 on 12/27/2020.  Lives independent living facility.  No recent falls.  Home therapy ordered, reports a nurse was out to set up things but PT has not been out that she is aware of.   -PT recommending discharge to long term care facility;Transitional Care Facility;home with assist.  following for discharge planning     CKD stage IIIa: Creatinine at baseline 1.1-1.2.     HTN: Continue carvedilol 9.375 mg daily.   -No history of CHF. Currently breathing is stable.      Peripheral neuropathy: Continue gabapentin 100 mg afternoon and 200 mg at night along with tramadol 50 mg twice daily as needed.     Overactive bladder: Continue PTA mirabegron 50 mg daily.     Former smoker: 20-pack-year history, quit in 1985.    DVT Prophylaxis: Enoxaparin (Lovenox) SQ  Code Status: Full Code  FEN: Regular diet, no caffeine  Estimated  "Disch Date / # of Days until Disch: discharge to TCU when bed available     Interval History  Patient seen and examined. She stated that she is feeling better. She denies chest pain, fever. She has no cough or shortness of breath.     Physical Exam:  Blood pressure 134/74, pulse 88, temperature 98.1  F (36.7  C), temperature source Oral, resp. rate 16, height 1.626 m (5' 4\"), weight 67.4 kg (148 lb 9.6 oz), SpO2 96 %, not currently breastfeeding.  Wt Readings from Last 1 Encounters:   01/09/21 67.4 kg (148 lb 9.6 oz)     Exam:  Constitutional: Awake, NAD.  Thin elderly woman with a mask in place.  Eyes: sclera white, PERRL  Respiratory: no respiratory distress, clear to auscultation, on room air  Cardiovascular: RRR.  No murmur   GI: non-tender, not distended, bowel sounds present  Skin: no rash or lesions, acyanotic  Musculoskeletal/extremities: atraumatic, no major deformities.  Trace bilateral lower extremity edema  Neurologic: A&O, speech clear, moves extremities equally  Psychiatric: calm, cooperative, normal affect    Lab and imaging data personally reviewed:  Labs:  Recent Labs   Lab 01/09/21  0630 01/06/21  1803   WBC  --  5.0   HGB  --  11.8   HCT  --  36.5   MCV  --  97    208     Recent Labs   Lab 01/09/21  0630 01/07/21  1848 01/07/21  0623 01/06/21  1803   NA  --   --  141 135   POTASSIUM 4.6 4.3 3.3* 3.6   CHLORIDE  --   --  109 102   CO2  --   --  29 29   ANIONGAP  --   --  3 4   GLC  --   --  81 101*   BUN  --   --  18 24   CR 1.13*  --  1.05* 1.25*   GFRESTIMATED 44*  --  48* 39*   GFRESTBLACK 51*  --  55* 45*   UMMTAZ  --   --  8.3* 8.9     Recent Labs   Lab 01/06/21  1803   NTBNPI 452     Recent Labs   Lab 01/06/21  1803   DD 1.4*     Recent Labs   Lab 01/06/21  1803   TROPI <0.015       EKG: SVT, heart rates 150s, LVH    Imaging:  Recent Results (from the past 24 hour(s))   CT Chest Pulmonary Embolism w Contrast    Narrative    EXAM: CT CHEST WITH CONTRAST - PULMONARY EMBOLISM PROTOCOL "   LOCATION: [facility]  DATE/TIME: 1/6/2021 8:13 PM    INDICATION: Shortness of breath. Positive D-dimer.  COMPARISON: None.    TECHNIQUE: CT angiogram chest during pulmonary arterial phase injection IV contrast. Dose reduction techniques were used.   CONTRAST: 59 mL Isovue-370.    FINDINGS:    ANGIOGRAM CHEST: No visualized pulmonary embolus. The thoracic aorta is normal in caliber without dissection. Atherosclerotic calcification in the thoracic aorta and coronary arteries.    LUNGS AND PLEURA: Several linear opacities scattered in the periphery of both lungs. A few ground-glass opacities present in the dependent aspects of bilateral lower lungs. A few small calcified granulomas in the lungs. Opacification of a few segmental   bronchi in the left lower lobe.    MEDIASTINUM/AXILLAE: A few calcified mediastinal and left hilar lymph nodes, likely related to prior granulomatous infection.    MUSCULOSKELETAL: Partial visualization of a right shoulder arthroplasty.     UPPER ABDOMEN: Numerous tiny calcified granulomas scattered within the liver and spleen. Partial visualization of a mildly dilated left intrarenal collecting system. Mild elevation of the left hemidiaphragm.      Impression    IMPRESSION:   1. No visualized pulmonary embolus.  2. Several linear opacities scattered in the periphery of both lungs in addition to mild ground-glass opacities in the dependent aspects of bilateral lower lungs. These findings are nonspecific, but could be inflammatory or infectious in etiology.   Interstitial lung disease is a possibility. A follow-up CT scan could be considered in 3 months for reevaluation.  3. Opacification of a few segmental bronchi in the left lower lobe, likely representing aspirated oral secretions or mucous.

## 2021-01-10 NOTE — PROGRESS NOTES
Care Management Follow Up    Length of Stay (days): 4    Expected Discharge Date: 01/10/21     Concerns to be Addressed: Pt needs TCU bed       Patient plan of care discussed at interdisciplinary rounds: Yes    Anticipated Discharge Disposition:  TCU     Patient/Family in Agreement with the Plan:  yes    Referrals Placed by CM/SW:  [facility], [facility]    Additional Information:  Pt can discharge to TCU once a bed is secured. Pt's daughter Abigail called with questions about the TCU status. SW informed her that referrals have been sent but a bed has not been secured yet. Abigail had several questions about the ablation procedure, if it was still happening on 1/12, how pt would get there etc. SW informed Abigail that the care team at the TCU would assist with coordinating all medical needs. ASAEL explained that it may be challenging to find a TCU because its the weekend and pt is COVID positive. Abigail mentioned that pt is beyond the 10 day isolation; SW informed her that different facilities have different policies.       [facility], [facility] are [facility].     [facility] takes COVID positive patients. Per Jeanne, the nursing supervisor is reviewing the referral. If they can take the pt, it would have to be tomorrow.     [facility] has yet to respond. ASAEL called and lvm requesting a return call.     [facility] is taking COVID positive pt's after 14 days of isolation. ASAEL sent the referral and is awaiting a response.      ABHISHEK Galvan, [facility]  Casual    [facility]  [phone]

## 2021-01-10 NOTE — PLAN OF CARE
VSS. Tele SR prolonged QT. RA. A/O X4, forgetful. [place] of hearing-baseline.   Covid +, precautions maintained.   Pt denies pain, CP. Pt has dyspnea on exertion. LS diminished.   Neuropathy in legs/feet- baseline Edema 1+.  Generalized weakness.   Pt encouraged to use walker instead of cane.   No IV access.   Cardiac diet. Poor appetite, prefers soft foods.  Assist X1 gb/walker.  TCU placement v.s. home for discharge planning. PT/SW following. Cardiology consulted. EP study scheduled 1/12 @ [facility]    Continue to monitor and POC.

## 2021-01-11 ENCOUNTER — TELEPHONE (OUTPATIENT)
Dept: CARDIOLOGY | Facility: CLINIC | Age: 86
End: 2021-01-11

## 2021-01-11 VITALS
HEART RATE: 87 BPM | OXYGEN SATURATION: 98 % | WEIGHT: 148.6 LBS | SYSTOLIC BLOOD PRESSURE: 130 MMHG | HEIGHT: 64 IN | BODY MASS INDEX: 25.37 KG/M2 | DIASTOLIC BLOOD PRESSURE: 80 MMHG | RESPIRATION RATE: 18 BRPM | TEMPERATURE: 98 F

## 2021-01-11 DIAGNOSIS — Z53.9 DIAGNOSIS NOT YET DEFINED: Primary | ICD-10-CM

## 2021-01-11 LAB
MAGNESIUM SERPL-MCNC: 2.4 MG/DL (ref 1.6–2.3)
POTASSIUM SERPL-SCNC: 4 MMOL/L (ref 3.4–5.3)

## 2021-01-11 PROCEDURE — 36415 COLL VENOUS BLD VENIPUNCTURE: CPT | Performed by: INTERNAL MEDICINE

## 2021-01-11 PROCEDURE — 250N000013 HC RX MED GY IP 250 OP 250 PS 637: Performed by: INTERNAL MEDICINE

## 2021-01-11 PROCEDURE — 84132 ASSAY OF SERUM POTASSIUM: CPT | Performed by: INTERNAL MEDICINE

## 2021-01-11 PROCEDURE — 250N000011 HC RX IP 250 OP 636: Performed by: INTERNAL MEDICINE

## 2021-01-11 PROCEDURE — G0180 MD CERTIFICATION HHA PATIENT: HCPCS | Performed by: INTERNAL MEDICINE

## 2021-01-11 PROCEDURE — 99239 HOSP IP/OBS DSCHRG MGMT >30: CPT | Performed by: INTERNAL MEDICINE

## 2021-01-11 PROCEDURE — 83735 ASSAY OF MAGNESIUM: CPT | Performed by: INTERNAL MEDICINE

## 2021-01-11 RX ORDER — GABAPENTIN 100 MG/1
300 CAPSULE ORAL AT BEDTIME
DISCHARGE
Start: 2021-01-11 | End: 2021-01-14

## 2021-01-11 RX ORDER — AMIODARONE HYDROCHLORIDE 200 MG/1
200 TABLET ORAL 2 TIMES DAILY
DISCHARGE
Start: 2021-01-11 | End: 2021-01-21

## 2021-01-11 RX ORDER — TRAMADOL HYDROCHLORIDE 50 MG/1
50 TABLET ORAL 2 TIMES DAILY PRN
Qty: 6 TABLET | Refills: 0 | Status: ON HOLD | OUTPATIENT
Start: 2021-01-11 | End: 2021-01-12

## 2021-01-11 RX ADMIN — ASPIRIN 81 MG: 81 TABLET ORAL at 08:43

## 2021-01-11 RX ADMIN — AMIODARONE HYDROCHLORIDE 200 MG: 200 TABLET ORAL at 08:43

## 2021-01-11 RX ADMIN — MIRABEGRON 50 MG: 50 TABLET, FILM COATED, EXTENDED RELEASE ORAL at 08:43

## 2021-01-11 RX ADMIN — ENOXAPARIN SODIUM 40 MG: 40 INJECTION SUBCUTANEOUS at 08:43

## 2021-01-11 RX ADMIN — CARVEDILOL 9.38 MG: 6.25 TABLET, FILM COATED ORAL at 08:43

## 2021-01-11 ASSESSMENT — ACTIVITIES OF DAILY LIVING (ADL)
ADLS_ACUITY_SCORE: 16

## 2021-01-11 NOTE — PLAN OF CARE
VSS, denies pain. A&Ox4. Tele SR. On PO amiodarone. LS diminished. MONTOYA, infrequent productive cough. Trace BLE edema. No IV access. Up with SBA. Plan for discharge to TCU when placement secured. EP study scheduled for 1/12.

## 2021-01-11 NOTE — DISCHARGE SUMMARY
[facility]    Discharge Summary  Hospitalist    Date of Admission:  1/6/2021  Date of Discharge:  1/11/2021  Discharging Provider: Jaqui Salas MD  Date of Service (when I saw the patient): 01/11/21    Discharge Diagnoses      Paroxysmal SVT: As above.    COVID-19 infection: Diagnosed with COVID-19 on 12/27/2020.       Generalized weakness:       CKD stage IIIa: Creatinine at baseline 1.1-1.2.     HTN     Peripheral neuropathy     Overactive bladder     Former smoker    History of Present Illness   Glendy Díaz is an 87 year old female who presented with episodes of SVT.     Hospital Course   Chief Complaint: Palpitations, shortness of breath     Glendy Díaz is a 87 year old female former smoker with PMH including paroxysmal SVT, CKD stage IIIa, HTN, neuropathy, overactive bladder, and recent hospitalization 12/29/2020 overnight for COVID-19 infection causing shortness of breath and SVT who presents with persistent episodes of SVT and shortness of breath. She was hospitalized for 1 night for COVID-19 infection and felt better by the next day with just 1 dose of dexamethasone. She was not hypoxic at any time and did not discharged any dexamethasone. Did not receive Remdesevir. Since discharge home with home care she had progressively more frequent and longer episodes of her SVT that were symptomatic.       She was due to have an EP study with likely ablation, however this was postponed until 1/12/2021 given the positive COVID-19 test. She does continue to have a cough occasionally productive of sputum and low-grade temperatures in the 99 range.       Here in the emergency room her heart rate in the 80s when in normal rhythm in the 140-150 range when SVT. EKG shows SVT with rate 150 and LVH.  D-dimer elevated at 1.4.  CTPA did not show PE, but shows some very subtle bilateral groundglass opacities along with some bronchial wall thickening.  She received 2 doses of IV metoprolol  which helped terminate the rhythm.       Cardiology was consulted who recommended admission and amiodarone infusion. They did not recommend transfer to [facility] as her EP study would not be pushed forward given the positive COVID-19 status.       She remains in sinus rhythm but she did undergo amiodarone load and then transition to oral, likely then discharging home prior to her EP study on January 12 as long as she is not having recurring arrhythmias.        Assessment and Plan:   Paroxysmal SVT: As above.  -Cardiology consult appreciated  -Received amiodarone load/infusion, now changed to 200 mg twice daily.  -Continue carvedilol 9.375 mg twice daily  -EP study at [facility] scheduled for 1/12/2021.     COVID-19 infection: Diagnosed with COVID-19 on 12/27/2020.    -Hospitalized here for 1 day on 12/29 for shortness of breath and received a dose of dexamethasone.  Discharged home without dexamethasone.    Not hypoxic here.  CT shows some very mild bilateral groundglass opacities and bronchial wall thickening.  -Roughly 14 days since symptom onset, given the low-grade temperatures and ongoing cough will keep her in isolation here though probably can stop this soon.  -Not started on dexamethasone here as she is more than 14 days out, breathing improving. Not hypoxic here.   -Tessalon Perles and Robitussin as needed for cough     Generalized weakness:    -Has generalized weakness since diagnosed with COVID-19 on 12/27/2020.  Lives independent living facility.  No recent falls.  Home therapy ordered, reports a nurse was out to set up things but PT has not been out that she is aware of.   -PT recommending discharge to long term care facility;Transitional Care Facility;home with assist.  following for discharge planning     CKD stage IIIa: Creatinine at baseline 1.1-1.2.     HTN: Continue carvedilol 9.375 mg daily.   -No history of CHF. Currently breathing is stable.      Peripheral  neuropathy: Continue gabapentin 100 mg afternoon and 200 mg at night along with tramadol 50 mg twice daily as needed.     Overactive bladder: Continue PTA mirabegron 50 mg daily.     Former smoker: 20-pack-year history, quit in 1985.       Significant Results and Procedures   Results for orders placed or performed during the hospital encounter of 01/06/21   CT Chest Pulmonary Embolism w Contrast    Narrative    EXAM: CT CHEST WITH CONTRAST - PULMONARY EMBOLISM PROTOCOL   LOCATION: [facility]  DATE/TIME: 1/6/2021 8:13 PM    INDICATION: Shortness of breath. Positive D-dimer.  COMPARISON: None.    TECHNIQUE: CT angiogram chest during pulmonary arterial phase injection IV contrast. Dose reduction techniques were used.   CONTRAST: 59 mL Isovue-370.    FINDINGS:    ANGIOGRAM CHEST: No visualized pulmonary embolus. The thoracic aorta is normal in caliber without dissection. Atherosclerotic calcification in the thoracic aorta and coronary arteries.    LUNGS AND PLEURA: Several linear opacities scattered in the periphery of both lungs. A few ground-glass opacities present in the dependent aspects of bilateral lower lungs. A few small calcified granulomas in the lungs. Opacification of a few segmental   bronchi in the left lower lobe.    MEDIASTINUM/AXILLAE: A few calcified mediastinal and left hilar lymph nodes, likely related to prior granulomatous infection.    MUSCULOSKELETAL: Partial visualization of a right shoulder arthroplasty.     UPPER ABDOMEN: Numerous tiny calcified granulomas scattered within the liver and spleen. Partial visualization of a mildly dilated left intrarenal collecting system. Mild elevation of the left hemidiaphragm.      Impression    IMPRESSION:   1. No visualized pulmonary embolus.  2. Several linear opacities scattered in the periphery of both lungs in addition to mild ground-glass opacities in the dependent aspects of bilateral lower lungs. These findings are nonspecific, but  could be inflammatory or infectious in etiology.   Interstitial lung disease is a possibility. A follow-up CT scan could be considered in 3 months for reevaluation.  3. Opacification of a few segmental bronchi in the left lower lobe, likely representing aspirated oral secretions or mucous.          Pending Results   None  Code Status   Full Code       Primary Care Physician   Veronica Davis        Discharge Disposition   Transferred to nursing facility  Condition at discharge: Stable    Consultations This Hospital Stay   CARDIOLOGY IP CONSULT  PHYSICAL THERAPY ADULT IP CONSULT  SOCIAL WORK IP CONSULT    Time Spent on this Encounter   IJaqui MD, MD, personally saw the patient today and spent greater than 30 minutes discharging this patient.    Discharge Orders      General info for SNF    Length of Stay Estimate: Short Term Care: Estimated # of Days <30  Condition at Discharge: Stable  Level of care:skilled   Rehabilitation Potential: Good  Admission H&P remains valid and up-to-date: Yes  Recent Chemotherapy: N/A  Use Nursing Home Standing Orders: Yes     Mantoux instructions    Give two-step Mantoux (PPD) Per Facility Policy Yes     Reason for your hospital stay    SVT     Activity - Up ad james     Follow Up and recommended labs and tests    Follow up with Nursing home physician.   Follow up with cardiology for EP study     Advance Diet as Tolerated    Follow this diet upon discharge: Orders Placed This Encounter      Low Saturated Fat Na <2400 mg     Discharge Medications   Current Discharge Medication List      START taking these medications    Details   amiodarone (PACERONE) 200 MG tablet Take 1 tablet (200 mg) by mouth 2 times daily  Qty:      Associated Diagnoses: SVT (supraventricular tachycardia) (H)         CONTINUE these medications which have CHANGED    Details   !! gabapentin (NEURONTIN) 100 MG capsule Take 3 capsules (300 mg) by mouth At Bedtime    Associated Diagnoses:  Neuropathy      traMADol (ULTRAM) 50 MG tablet Take 1 tablet (50 mg) by mouth 2 times daily as needed for severe pain (rarely takes tramadol)  Qty: 6 tablet, Refills: 0    Associated Diagnoses: Pain       !! - Potential duplicate medications found. Please discuss with provider.      CONTINUE these medications which have NOT CHANGED    Details   ASPIRIN 81 PO Take 81 mg by mouth At Bedtime      carvedilol (COREG) 6.25 MG tablet Take 9.375 mg by mouth 2 times daily (with meals)      estradiol (ESTRACE) 0.1 MG/GM vaginal cream Place 2 g vaginally three times a week Apply small amount to the vaginal opening and urethra M,W,F.    MWF      !! gabapentin (NEURONTIN) 100 MG capsule Take 100 mg by mouth daily at 2 pm In the afternoon at 1400.      mirabegron (MYRBETRIQ) 50 MG 24 hr tablet Take 50 mg by mouth daily      polyethylene glycol (MIRALAX) 17 g packet Take 1 packet by mouth daily as needed for constipation      senna-docusate (SENOKOT-S/PERICOLACE) 8.6-50 MG tablet Take 1 tablet by mouth daily as needed for constipation      vitamin D3 (CHOLECALCIFEROL) 50 mcg (2000 units) tablet Take 50 mcg by mouth daily      cetirizine (ZYRTEC) 10 MG tablet Take 10 mg by mouth daily      fluticasone (FLONASE) 50 MCG/ACT nasal spray Spray 1 spray into both nostrils daily as needed for rhinitis or allergies       !! - Potential duplicate medications found. Please discuss with provider.            Allergies   No Known Allergies  Data   Most Recent 3 CBC's:  Recent Labs   Lab Test 01/09/21  0630 01/06/21  1803   WBC  --  5.0   HGB  --  11.8   MCV  --  97    208      Most Recent 3 BMP's:  Recent Labs   Lab Test 01/11/21  0821 01/09/21  0630 01/07/21  1848 01/07/21  0623 01/06/21  1803   NA  --   --   --  141 135   POTASSIUM 4.0 4.6 4.3 3.3* 3.6   CHLORIDE  --   --   --  109 102   CO2  --   --   --  29 29   BUN  --   --   --  18 24   CR  --  1.13*  --  1.05* 1.25*   ANIONGAP  --   --   --  3 4   MUMTAZ  --   --   --  8.3* 8.9   GLC   --   --   --  81 101*     Most Recent 2 LFT's:No lab results found.  Most Recent INR's and Anticoagulation Dosing History:  Anticoagulation Dose History     There is no flowsheet data to display.        Most Recent 3 Troponin's:  Recent Labs   Lab Test 01/06/21  1803   TROPI <0.015     Most Recent Cholesterol Panel:No lab results found.  Most Recent 6 Bacteria Isolates From Any Culture (See EPIC Reports for Culture Details):No lab results found.  Most Recent TSH, T4 and A1c Labs:No lab results found.

## 2021-01-11 NOTE — PLAN OF CARE
Physical Therapy Discharge Summary    Reason for therapy discharge:    Discharged to transitional care facility.    Progress towards therapy goal(s). See goals on Care Plan in [facility] electronic health record for goal details.  Goals not met.  Barriers to achieving goals:   discharge from facility.    Therapy recommendation(s):    Continued therapy is recommended.  Rationale/Recommendations:  TCU per prior PT recommendation.

## 2021-01-11 NOTE — PROGRESS NOTES
Care Coordination Follow up Discharge Plan:  TCU: [facility]  Discharge date: 1/11    RN CC Following along with SW for discharge plan of care. Pt has been accepted at [facility] TCU for today. Spoke to pt via phone to update her on plan of care. She is agreeable to going to [facility]. We discussed transportation on discharge and she would like CC to speak to her daughter, Abigail, about discharge plan.     Call placed to Abigail to review discharge plan. She is agreeable to [facility] TCU for her mother. She would like to provide transportation for pt to TCU. She has questions regarding pt's possible procedure that is planned for tomorrow at [facility] and how that will work with TCU and if it is still scheduled. Abigail provided the ablation nurse line phone number [phone]. Call placed to [facility] nurse line and spoke to Barbara. She verifies that pt still has ablation procedure planned for tomorrow 1/12 at 8:30am. She is wondering about TCU allowing procedure and process with that. Call placed to Kathie in admissions at [facility] to verify if pt will be able to go to procedure tomorrow as scheduled. She states pt will be able to go to procedure tomorrow morning as planned and they would try get their therapy evals done in the afternoon when pt returns. Informed TCU that pt has procedure at 830, she needs to be there at 630 (daughter will pick her up). Procedure will be 2 hrs followed by 4 hr bedrest and then will return mid afternoon. Updated Barbara RN at [facility] on TCU process. Updated daughter Abigail on procedure instructions and hospital discharge plan. She will be at the hospital at 1430 to transport pt to TCU. MD paged for orders.     Daughter also states that she has been working with pt's ILF and will be moving her into shelter upon return from the TCU. No further needs for discharge. Orders will be faxed when received.       Rocio Srinivasan RN BSN CM  Inpatient Care Coordination  [facility]  [phone]

## 2021-01-11 NOTE — PROGRESS NOTES
Your information has been submitted on January 11th, 2021 at 12:38:35 PM CST. The confirmation number is [id number]

## 2021-01-11 NOTE — PLAN OF CARE
A&O, VSS, [place], denies pain, up with A-1 GB and walker, no IV access, MD aware, declines supper, Tele SR, on po Amiodarone, PT following, plan to discharge to TCU, will continue with POC.   Statement Selected

## 2021-01-11 NOTE — PLAN OF CARE
"/80 (BP Location: Right arm)   Pulse 87   Temp 98  F (36.7  C) (Oral)   Resp 18   Ht 1.626 m (5' 4\")   Wt 67.4 kg (148 lb 9.6 oz)   SpO2 98%   Breastfeeding No   BMI 25.51 kg/m       Denies pain. A&Ox4. Tele: SR. On PO amiodarone. LS diminished. MONTOYA, infrequent productive cough. Trace BLE edema. No IV access-MD aware. SBA/IND in room per pt request. Plan for discharge to TCU when placement secured. EP study scheduled for 1/12.  "

## 2021-01-12 ENCOUNTER — PATIENT OUTREACH (OUTPATIENT)
Dept: CARE COORDINATION | Facility: CLINIC | Age: 86
End: 2021-01-12

## 2021-01-12 ENCOUNTER — HOSPITAL ENCOUNTER (OUTPATIENT)
Facility: CLINIC | Age: 86
Discharge: HOME | End: 2021-01-12
Admitting: INTERNAL MEDICINE
Payer: COMMERCIAL

## 2021-01-12 ENCOUNTER — TELEPHONE (OUTPATIENT)
Dept: CARDIOLOGY | Facility: CLINIC | Age: 86
End: 2021-01-12

## 2021-01-12 VITALS
OXYGEN SATURATION: 95 % | WEIGHT: 146 LBS | RESPIRATION RATE: 16 BRPM | HEART RATE: 73 BPM | DIASTOLIC BLOOD PRESSURE: 92 MMHG | HEIGHT: 64 IN | BODY MASS INDEX: 24.92 KG/M2 | SYSTOLIC BLOOD PRESSURE: 145 MMHG | TEMPERATURE: 98.4 F

## 2021-01-12 DIAGNOSIS — I47.10 SVT (SUPRAVENTRICULAR TACHYCARDIA) (H): ICD-10-CM

## 2021-01-12 LAB
ANION GAP SERPL CALCULATED.3IONS-SCNC: 8 MMOL/L (ref 3–14)
BUN SERPL-MCNC: 20 MG/DL (ref 7–30)
CALCIUM SERPL-MCNC: 9.6 MG/DL (ref 8.5–10.1)
CHLORIDE SERPL-SCNC: 106 MMOL/L (ref 94–109)
CO2 SERPL-SCNC: 23 MMOL/L (ref 20–32)
CREAT SERPL-MCNC: 1.2 MG/DL (ref 0.52–1.04)
ERYTHROCYTE [DISTWIDTH] IN BLOOD BY AUTOMATED COUNT: 13.3 % (ref 10–15)
GFR SERPL CREATININE-BSD FRML MDRD: 41 ML/MIN/{1.73_M2}
GLUCOSE SERPL-MCNC: 96 MG/DL (ref 70–99)
HCT VFR BLD AUTO: 39.1 % (ref 35–47)
HGB BLD-MCNC: 12.6 G/DL (ref 11.7–15.7)
MCH RBC QN AUTO: 30.5 PG (ref 26.5–33)
MCHC RBC AUTO-ENTMCNC: 32.2 G/DL (ref 31.5–36.5)
MCV RBC AUTO: 95 FL (ref 78–100)
PLATELET # BLD AUTO: 254 10E9/L (ref 150–450)
POTASSIUM SERPL-SCNC: 4.3 MMOL/L (ref 3.4–5.3)
RBC # BLD AUTO: 4.13 10E12/L (ref 3.8–5.2)
SODIUM SERPL-SCNC: 137 MMOL/L (ref 133–144)
WBC # BLD AUTO: 6.8 10E9/L (ref 4–11)

## 2021-01-12 PROCEDURE — 93005 ELECTROCARDIOGRAM TRACING: CPT

## 2021-01-12 PROCEDURE — 999N000071 HC STATISTIC HEART CATH LAB OR EP LAB

## 2021-01-12 PROCEDURE — 36415 COLL VENOUS BLD VENIPUNCTURE: CPT

## 2021-01-12 PROCEDURE — 80048 BASIC METABOLIC PNL TOTAL CA: CPT

## 2021-01-12 PROCEDURE — 93010 ELECTROCARDIOGRAM REPORT: CPT | Performed by: INTERNAL MEDICINE

## 2021-01-12 PROCEDURE — 999N000184 HC STATISTIC TELEMETRY

## 2021-01-12 PROCEDURE — 85027 COMPLETE CBC AUTOMATED: CPT

## 2021-01-12 PROCEDURE — 258N000003 HC RX IP 258 OP 636: Performed by: INTERNAL MEDICINE

## 2021-01-12 RX ORDER — SODIUM CHLORIDE, SODIUM LACTATE, POTASSIUM CHLORIDE, CALCIUM CHLORIDE 600; 310; 30; 20 MG/100ML; MG/100ML; MG/100ML; MG/100ML
INJECTION, SOLUTION INTRAVENOUS CONTINUOUS
Status: DISCONTINUED | OUTPATIENT
Start: 2021-01-12 | End: 2021-01-12 | Stop reason: HOSPADM

## 2021-01-12 RX ORDER — LIDOCAINE 40 MG/G
CREAM TOPICAL
Status: DISCONTINUED | OUTPATIENT
Start: 2021-01-12 | End: 2021-01-12 | Stop reason: HOSPADM

## 2021-01-12 RX ADMIN — SODIUM CHLORIDE, POTASSIUM CHLORIDE, SODIUM LACTATE AND CALCIUM CHLORIDE: 600; 310; 30; 20 INJECTION, SOLUTION INTRAVENOUS at 07:52

## 2021-01-12 ASSESSMENT — MIFFLIN-ST. JEOR: SCORE: 1082.25

## 2021-01-12 ASSESSMENT — ACTIVITIES OF DAILY LIVING (ADL): DEPENDENT_IADLS:: INDEPENDENT

## 2021-01-12 NOTE — PROGRESS NOTES
Clinic Care Coordination Contact  Care Coordination Transition Communication    Referral Source: ED Follow-Up    Clinical Data: Patient was hospitalized at the [facility] from 1.6.2021 to 1.11.2021 with diagnoses of:  Paroxysmal SVT: As above.  COVID-19 infection: Diagnosed with COVID-19 on 12/27/2020.    Generalized weakness:    CKD stage IIIa: Creatinine at baseline 1.1-1.2.  HTN  Peripheral neuropathy  Overactive bladder  Former smoker    Transition to Facility:              Facility Name: [facility]              Contact name and phone number/fax:    Ph. [phone], Fax. [phone].    Plan: SW Care Coordinator will await notification from facility staff informing SW Care Coordinator of patient's discharge plans/needs.  Care Coordinator will review chart and outreach to facility staff every 4 weeks and as needed.     Henri Hairston [facility]  Clinic Care Coordinator  Ph. [phone]  [email or web address]

## 2021-01-12 NOTE — PROGRESS NOTES
Reviewed record carefully.  No evidence of regular SVT. Three ECGs all showed irregular SVT, with the one on 11-4-2020 looked more like regular SVT but careful measurement of the RR intervals  Showed irregularity. Ziopatch clearly showed irregular SVT.  I believe this pt has AF equivalent irregular atrial tachycardia.   I do not think she is a simple ablation candidate.  I have recommended cancellation of EP study because of its invasive nature.  Agreed to continue amiodarone therapy. Change the dose of amiodarone to 200 mg daily next week.   Repeat Ziopatch for 2 weeks next week and see me in 1-2 months (ordered).  Anticoagulation will be addressed during future clinic visit, pending on the Ziopatch results.

## 2021-01-12 NOTE — LETTER
[facility]   To:   [facility] TCBRITT[facility]          Please give to facility    From:   Henri Hairston  [facility]  Care Coordinator   [facility]     Patient Name:  Glendy Díaz YOB: 1933   Admit date: 1/11/2021      *Information Needed:  Please contact me when the patient will discharge (or if they will move to long term care)- include the discharge date, disposition, and main diagnosis   - If the patient is discharged with home care services, please provide the name of the agency    Also- Please inform me if a care conference is being held.   Phone, Fax or Email with information       Henri Hairston, [facility]  Clinic Care Coordinator  Ph. [phone]  [email or web address]

## 2021-01-12 NOTE — TELEPHONE ENCOUNTER
Pt daughter called and had questions about procedure that did not occur today and Medication changes.  First medication to change next week. Per Dr Rey:    Agreed to continue amiodarone therapy. Change the dose of amiodarone to 200 mg daily next week.   Repeat Ziopatch for 2 weeks next week and see me in 1-2 months (ordered).  Anticoagulation will be addressed during future clinic visit, pending on the Ziopatch results.    Daughter aware and will call [facility] at [phone] to make aware of this change. Will find out if ZP can be mailed to TCU to be placed or if pt needs to come in. Daughter will bring to BV if needed.     Daughter asked about the charges for today and this writer explained that will need to be discussed with someone tomorrow to see what we can do.No further questions at this time. Rashmi

## 2021-01-12 NOTE — PROGRESS NOTES
Care Suites Admission Nursing Note    Patient Information  Name: Glendy Díaz  Age: 87 year old  Reason for admission: ablation   Care Suites arrival time: 0645    Patient Admission/Assessment   Pre-procedure assessment complete: Yes  If abnormal assessment/labs, provider notified: N/A  NPO: Yes  Medications held per instructions/orders: Yes  Consent: deferred  If applicable, pregnancy test status: deferred  Patient oriented to room: Yes  Education/questions answered: Yes  Plan/other: ablation     Discharge Planning  Discharge name/phone number: Abigail [phone]  Overnight post sedation caregiver: abigail  Discharge location: home    Nichole Mejia RN

## 2021-01-12 NOTE — PROGRESS NOTES
Patients daughter Abigail is wanting me to reach out to Dr. Rey's nurse to make it aware that they feel they should not be charged for services today given that this information should have been looked at ahead of time.  Daughter has many questions for the heart clinic.  Will give daughter phone number for  to resolve this.

## 2021-01-13 ENCOUNTER — HOSPITAL ENCOUNTER (OUTPATIENT)
Dept: CARDIOLOGY | Facility: CLINIC | Age: 86
End: 2021-01-13
Attending: INTERNAL MEDICINE
Payer: MEDICARE

## 2021-01-13 ENCOUNTER — DOCUMENTATION ONLY (OUTPATIENT)
Dept: OTHER | Facility: CLINIC | Age: 86
End: 2021-01-13

## 2021-01-13 ENCOUNTER — NURSING HOME VISIT (OUTPATIENT)
Dept: GERIATRICS | Facility: CLINIC | Age: 86
End: 2021-01-13
Payer: MEDICARE

## 2021-01-13 DIAGNOSIS — I47.10 SVT (SUPRAVENTRICULAR TACHYCARDIA) (H): ICD-10-CM

## 2021-01-13 DIAGNOSIS — I47.10 SVT (SUPRAVENTRICULAR TACHYCARDIA) (H): Primary | ICD-10-CM

## 2021-01-13 DIAGNOSIS — R53.81 PHYSICAL DECONDITIONING: ICD-10-CM

## 2021-01-13 DIAGNOSIS — N18.31 STAGE 3A CHRONIC KIDNEY DISEASE (H): ICD-10-CM

## 2021-01-13 DIAGNOSIS — N32.81 OVERACTIVE BLADDER: ICD-10-CM

## 2021-01-13 DIAGNOSIS — G89.29 CHRONIC NECK PAIN: ICD-10-CM

## 2021-01-13 DIAGNOSIS — I10 BENIGN ESSENTIAL HYPERTENSION: ICD-10-CM

## 2021-01-13 DIAGNOSIS — M54.2 CHRONIC NECK PAIN: ICD-10-CM

## 2021-01-13 DIAGNOSIS — G62.9 PERIPHERAL POLYNEUROPATHY: ICD-10-CM

## 2021-01-13 PROCEDURE — 99305 1ST NF CARE MODERATE MDM 35: CPT | Performed by: NURSE PRACTITIONER

## 2021-01-13 PROCEDURE — 93248 EXT ECG>7D<15D REV&INTERPJ: CPT | Performed by: INTERNAL MEDICINE

## 2021-01-13 ASSESSMENT — MIFFLIN-ST. JEOR: SCORE: 1081.34

## 2021-01-13 NOTE — LETTER
1/13/2021        RE: Glendy Díaz  [address]  [facility] [address]        [facility]    PRIMARY CARE PROVIDER AND CLINIC:  Veronica Davis MD[facility] [address] [facility] [address]  Chief Complaint   Patient presents with     Establish Care     [facility] Medical Record Number:  [id number]  Place of Service where encounter took place:  [facility] ([facility] [[id number]]    Glendy Díaz  is a 87 year old  (11/16/1933), admitted to the above facility from  [facility]. Hospital stay 1/6/21 through 1/12/21..  Admitted to this facility for  rehab, medical management and nursing care.    HPI:    HPI information obtained from: facility chart records, facility staff, patient report, [facility] chart review     Brief Summary of Hospital Course:     PMH: paroxysmal SVT, CKD stage IIIa, HTN, neuropathy, overactive bladder    Previously tested + COVID on 12/27/20 with prior hospitalization 12/29 due to COVID infection and SVT, was given one time dose of dexamethasone.      Patient admitted to [facility] 1/6-1/11 due to SOB and weakness. EKG + SVT w/ and LVH. CTPA negative for PE, but found groundglass opacities. received 2 doses of IV metoprolol. Cardiology consulted, started on amiodarone infusion and transitioned to amiodarone 200mg BID and continued on coreg 9.375mg BID. Outpatient EP study scheduled on 1/12.       [facility] [place] [facility] on [facility]11.       Updates on Status Since Skilled nursing Admission:     During exam, patient seen resting in bed. Reports doing well. Fatigued from yesterday, had planned procedure at [facility] and when she arrived procedure was cancelled by Cardiologist since it was not needed. Has been participating in therapy. Ambulates w/walker. Admits to good appetite. Sleeping well at night. Denies chest pain, SOB, headache, syncope, constipation, diarrhea.         CODE STATUS/ADVANCE DIRECTIVES DISCUSSION:   DNR/DNI  Patient's living condition: lives alone  ALLERGIES: Amlodipine, Fosamax [alendronic acid], Lisinopril, Pravastatin, and Simvastatin  PAST MEDICAL HISTORY:  has a past medical history of CKD (chronic kidney disease) stage 3, GFR 30-59 ml/min, Hematuria, Hyperlipidemia LDL goal <100, Hyperparathyroidism (H), Hypertension, Incontinence, Leg weakness, bilateral, Mumps, Neck pain, chronic, Osteoarthritis, Osteopenia, Paroxysmal atrial fibrillation (H), and Peripheral neuropathy. She also has no past medical history of Asymptomatic human immunodeficiency virus (HIV) infection status (H), Cerebral palsy (H), Chronic infection, Complication of anesthesia, Congenital renal agenesis and dysgenesis, Goiter, Gout, Hernia, abdominal, History of spinal cord injury, History of thrombophlebitis, Horseshoe kidney, Hydrocephalus (H), Malignant hyperthermia, Palpitations, Parkinsons disease (H), Sleep apnea, Spider veins, Spina bifida (H), STD (sexually transmitted disease), Tethered cord (H), or Tuberculosis.  PAST SURGICAL HISTORY:   has a past surgical history that includes appendectomy; Cholecystectomy; Hysterectomy total abdominal; cataract iol, rt/lt; Arthroscopy ankle, open repair ligament, combined (5/31/2012); Remove hardware foot (12/4/2012); Excise mass foot (12/4/2012); Arthrodesis foot (5/1/2014); Repair hammer toe (5/1/2014); Release tendon toe (5/1/2014); Reverse arthroplasty shoulder (Right, 7/18/2016); Bladder surgery; and Cystoscopy.  FAMILY HISTORY: family history includes Alcohol/Drug in her brother; Breast Cancer in her sister; Cancer - colorectal in her brother and brother; Circulatory in her mother; Diabetes in her brother and mother; Lipids in her mother; Thyroid Disease in her daughter.  SOCIAL HISTORY:   reports that she quit smoking about 36 years ago. Her smoking use included cigarettes. She started smoking about 56 years ago. She has a 20.00 pack-year smoking history. She has never used smokeless  "tobacco. She reports that she does not drink alcohol or use drugs.    Post Discharge Medication Reconciliation Status: discharge medications reconciled and changed, per note/orders    Current Outpatient Medications   Medication Sig Dispense Refill     amiodarone (PACERONE) 200 MG tablet Take 1 tablet (200 mg) by mouth 2 times daily       ASPIRIN 81 PO Take 81 mg by mouth At Bedtime       carvedilol (COREG) 6.25 MG tablet Take 9.375 mg by mouth 2 times daily (with meals)       cetirizine (ZYRTEC) 10 MG tablet Take 10 mg by mouth daily as needed  90 tablet 3     Cholecalciferol (VITAMIN D) 2000 UNITS tablet Take 2,000 Units by mouth daily 100 tablet 3     estradiol (ESTRACE VAGINAL) 0.1 MG/GM vaginal cream Apply small amount to the vaginal opening and urethra M, W, F @ h.s. 42.5 g 3     fluticasone (FLONASE) 50 MCG/ACT nasal spray Spray 1 spray into both nostrils daily as needed for rhinitis or allergies       gabapentin (NEURONTIN) 100 MG capsule Give 100 mg @ 1400 and 300 mg @ HS       mirabegron (MYRBETRIQ) 50 MG 24 hr tablet Take 1 tablet (50 mg) by mouth daily 90 tablet 0     polyethylene glycol (MIRALAX) 17 g packet Take 1 packet by mouth daily as needed for constipation       senna-docusate (SENOKOT-S/PERICOLACE) 8.6-50 MG tablet Take 1 tablet by mouth daily as needed for constipation       traMADol (ULTRAM) 50 MG tablet Take 50 mg by mouth 2 times daily as needed for severe pain           ROS:  10 point ROS of systems including Constitutional, Eyes, Respiratory, Cardiovascular, Gastroenterology, Genitourinary, Integumentary, Musculoskeletal, Psychiatric were all negative except for pertinent positives noted in my HPI.      Vitals:  /64   Pulse 64   Temp 97.6  F (36.4  C)   Resp 16   Ht 1.626 m (5' 4\")   Wt 66.1 kg (145 lb 12.8 oz)   SpO2 92%   BMI 25.03 kg/m    Exam:  Limited observational exam due to COVID19 precautions  GENERAL APPEARANCE:  Alert, in no distress, appears healthy, oriented, " cooperative  ENT:  Mouth and posterior oropharynx normal, moist mucous membranes, [place]  EYES:  EOM, conjunctivae, lids, pupils and irises normal, PERRL  NECK:  No adenopathy,masses or thyromegaly  RESP:  no respiratory distress, no coughing  CV:  no edema  M/S:   Gait and station abnormal, uses walker. Digits and nails normal  SKIN:  Inspection of skin and subcutaneous tissue baseline  NEURO:   Cranial nerves 2-12 are normal tested and grossly at patient's baseline  PSYCH:  oriented X 3, affect and mood normal    Wt Readings from Last 4 Encounters:   01/13/21 66.1 kg (145 lb 12.8 oz)   01/12/21 66.2 kg (146 lb)   01/09/21 67.4 kg (148 lb 9.6 oz)   12/29/20 74.4 kg (164 lb)       Lab/Diagnostic data:  Recent labs in [facility] reviewed by me today.  and   Most Recent 3 CBC's:  Recent Labs   Lab Test 01/12/21  0746 01/09/21  0630 01/06/21  1803 12/30/20  0718   WBC 6.8  --  5.0 1.7*   HGB 12.6  --  11.8 10.9*   MCV 95  --  97 99    187 208 158     Most Recent 3 BMP's:  Recent Labs   Lab Test 01/12/21  0746 01/11/21  0821 01/09/21  0630 01/07/21  0623 01/07/21  0623 01/06/21  1803     --   --   --  141 135   POTASSIUM 4.3 4.0 4.6   < > 3.3* 3.6   CHLORIDE 106  --   --   --  109 102   CO2 23  --   --   --  29 29   BUN 20  --   --   --  18 24   CR 1.20*  --  1.13*  --  1.05* 1.25*   ANIONGAP 8  --   --   --  3 4   MUMTAZ 9.6  --   --   --  8.3* 8.9   GLC 96  --   --   --  81 101*    < > = values in this interval not displayed.            ASSESSMENT/PLAN:    (I47.1) SVT (supraventricular tachycardia) (H)  (primary encounter diagnosis)  Comment: Paroxysmal SVT, controlled w/amiodarone  Plan:   - Continue amiodarone 200mg BID, coreg  - Start ziopatch x 2 weeks, RN assisting with coordinating ziopatch with Cardiology clinic  - Monitor BP/HR  - Patient to follow-up with Cardiologist as directed  - Patient verbalizes understanding and agrees with treatment plan.     (N18.31) Stage 3a chronic kidney disease  Comment: Leslye  baseline 1.1-1.2 Creat stable, last creat 1.2 on 1/12.   Plan:   - Check BMP 1/18/21 dx CKD  - Patient verbalizes understanding and agrees with treatment plan.     (I10) Benign essential hypertension  Comment: Controlled  Plan:   - Continue coreg, ASA  - Monitor BP/HR, avoid hypotension  - Patient verbalizes understanding and agrees with treatment plan.     (N32.81) Overactive bladder  Comment: Chronic, controlled  Plan:   - Continue myrbetriq  - Patient verbalizes understanding and agrees with treatment plan.     (G62.9) Peripheral polyneuropathy  Comment: Chronic  Plan:   - Continue gabapentin, tramadol prn    (M54.2,  G89.29) Chronic neck pain  Comment: Chronic  Plan:   - Continue tramadol prn    (R53.81) Physical deconditioning  Comment: Ongoing physical deconditioning d/t recent COVID infection and hospital stay r/t SVT. Ambulates w/walker.   Plan:   - Encourage active participation in therapy session to increase strength and promote independence in activities and ADLs.   - Cognitive testing to be done in therapy.  - SW following for discharge planning. Goal is to discharge home.   - Confirms code status as DNR/DNI  - Patient verbalizes understanding and agrees with treatment plan.               Total time spent with patient visit at the skilled nursing facility was 38 minutes including patient visit and review of past records. Greater than 50% of total time (20 minutes) spent with counseling patient regarding treatment plan, medication management, follow-up labs. Patient verbalizes understanding and agrees with treatment plan. Coordinating care with RN regarding plan to start ziopatch.     Electronically signed by:  SHRUTI Duran CNP                           Sincerely,        SHRUTI Duran CNP

## 2021-01-14 ENCOUNTER — TELEPHONE (OUTPATIENT)
Dept: CARDIOLOGY | Facility: CLINIC | Age: 86
End: 2021-01-14

## 2021-01-14 VITALS
HEIGHT: 64 IN | SYSTOLIC BLOOD PRESSURE: 102 MMHG | HEART RATE: 64 BPM | WEIGHT: 145.8 LBS | RESPIRATION RATE: 16 BRPM | DIASTOLIC BLOOD PRESSURE: 64 MMHG | TEMPERATURE: 97.6 F | OXYGEN SATURATION: 92 % | BODY MASS INDEX: 24.89 KG/M2

## 2021-01-14 LAB — INTERPRETATION ECG - MUSE: NORMAL

## 2021-01-14 NOTE — TELEPHONE ENCOUNTER
Received call from cardio testing Anastasiya to let me know that leadless monitor was mailed to the facility [facility].  Called and spoke to Candy to let her know.  DARCY Gutiérrez

## 2021-01-14 NOTE — TELEPHONE ENCOUNTER
01/14/21 Call recd from Rob Mendiola ( ph [phone]).  Pts HR is 54 and she is calling to ask whether she should give Amiodarone.  Pt is asymptomatic and feeling well. She was able to complete PT this afternoon without difficulty.  Other vitals are  /66  Pulse range has been 62-64  O2 sat 97%  Advised Marlena to continue meds as ordered and will check with Dr Rey on parameters. Pt will be decreasing Amiodarone to 200 mg every day (currently taking 200 mg BID) on 1/18 per Dr Rey hospital note from 1/12/21.Marlena voiced understanding and agreement w zachary De La Cruz 430 pm

## 2021-01-14 NOTE — TELEPHONE ENCOUNTER
Received a call from Gracy nurse taking care of patient at [facility] asking for clear instructions on when to start the lower dose of amiodarone   Per note from Dr Rey he wanted her to decrease dose to 200mg po every day.  Instructed Gracy to start the 200mg po every day on 1/18.  She also asked about the leadless monitor.  Looks like it was mailed out yesterday however did not know where it was mailed to.  Called and left message at [facility] rony Whitaker to obtain this information.  I informed Gracy that I would call her with information on leadless monitor once I heard back.  DARCY Gutiérrez

## 2021-01-14 NOTE — PROGRESS NOTES
[facility]    PRIMARY CARE PROVIDER AND CLINIC:  [facility] Davis MD[facility] [address] [facility] [address]  Chief Complaint   Patient presents with     Establish Care     [facility] Medical Record Number:  [id number]  Place of Service where encounter took place:  [facility] ([facility] [[id number]]    Glendy Díaz  is a 87 year old  (11/16/1933), admitted to the above facility from  [facility]. Hospital stay 1/6/21 through 1/12/21..  Admitted to this facility for  rehab, medical management and nursing care.    HPI:    HPI information obtained from: facility chart records, facility staff, patient report, [facility] chart review     Brief Summary of Hospital Course:     PMH: paroxysmal SVT, CKD stage IIIa, HTN, neuropathy, overactive bladder    Previously tested + COVID on 12/27/20 with prior hospitalization 12/29 due to COVID infection and SVT, was given one time dose of dexamethasone.      Patient admitted to [facility] 1/6-1/11 due to SOB and weakness. EKG + SVT w/ and LVH. CTPA negative for PE, but found groundglass opacities. received 2 doses of IV metoprolol. Cardiology consulted, started on amiodarone infusion and transitioned to amiodarone 200mg BID and continued on coreg 9.375mg BID. Outpatient EP study scheduled on 1/12.       Transferred to [facility] on 1/11.       Updates on Status Since Skilled nursing Admission:     During exam, patient seen resting in bed. Reports doing well. Fatigued from yesterday, had planned procedure at [facility] and when she arrived procedure was cancelled by Cardiologist since it was not needed. Has been participating in therapy. Ambulates w/walker. Admits to good appetite. Sleeping well at night. Denies chest pain, SOB, headache, syncope, constipation, diarrhea.         CODE STATUS/ADVANCE DIRECTIVES DISCUSSION:  DNR/DNI  Patient's living condition: lives alone  ALLERGIES: Amlodipine, Fosamax [alendronic acid],  Lisinopril, Pravastatin, and Simvastatin  PAST MEDICAL HISTORY:  has a past medical history of CKD (chronic kidney disease) stage 3, GFR 30-59 ml/min, Hematuria, Hyperlipidemia LDL goal <100, Hyperparathyroidism (H), Hypertension, Incontinence, Leg weakness, bilateral, Mumps, Neck pain, chronic, Osteoarthritis, Osteopenia, Paroxysmal atrial fibrillation (H), and Peripheral neuropathy. She also has no past medical history of Asymptomatic human immunodeficiency virus (HIV) infection status (H), Cerebral palsy (H), Chronic infection, Complication of anesthesia, Congenital renal agenesis and dysgenesis, Goiter, Gout, Hernia, abdominal, History of spinal cord injury, History of thrombophlebitis, Horseshoe kidney, Hydrocephalus (H), Malignant hyperthermia, Palpitations, Parkinsons disease (H), Sleep apnea, Spider veins, Spina bifida (H), STD (sexually transmitted disease), Tethered cord (H), or Tuberculosis.  PAST SURGICAL HISTORY:   has a past surgical history that includes appendectomy; Cholecystectomy; Hysterectomy total abdominal; cataract iol, rt/lt; Arthroscopy ankle, open repair ligament, combined (5/31/2012); Remove hardware foot (12/4/2012); Excise mass foot (12/4/2012); Arthrodesis foot (5/1/2014); Repair hammer toe (5/1/2014); Release tendon toe (5/1/2014); Reverse arthroplasty shoulder (Right, 7/18/2016); Bladder surgery; and Cystoscopy.  FAMILY HISTORY: family history includes Alcohol/Drug in her brother; Breast Cancer in her sister; Cancer - colorectal in her brother and brother; Circulatory in her mother; Diabetes in her brother and mother; Lipids in her mother; Thyroid Disease in her daughter.  SOCIAL HISTORY:   reports that she quit smoking about 36 years ago. Her smoking use included cigarettes. She started smoking about 56 years ago. She has a 20.00 pack-year smoking history. She has never used smokeless tobacco. She reports that she does not drink alcohol or use drugs.    Post Discharge Medication  "Reconciliation Status: discharge medications reconciled and changed, per note/orders    Current Outpatient Medications   Medication Sig Dispense Refill     amiodarone (PACERONE) 200 MG tablet Take 1 tablet (200 mg) by mouth 2 times daily       ASPIRIN 81 PO Take 81 mg by mouth At Bedtime       carvedilol (COREG) 6.25 MG tablet Take 9.375 mg by mouth 2 times daily (with meals)       cetirizine (ZYRTEC) 10 MG tablet Take 10 mg by mouth daily as needed  90 tablet 3     Cholecalciferol (VITAMIN D) 2000 UNITS tablet Take 2,000 Units by mouth daily 100 tablet 3     estradiol (ESTRACE VAGINAL) 0.1 MG/GM vaginal cream Apply small amount to the vaginal opening and urethra M, W, F @ h.s. 42.5 g 3     fluticasone (FLONASE) 50 MCG/ACT nasal spray Spray 1 spray into both nostrils daily as needed for rhinitis or allergies       gabapentin (NEURONTIN) 100 MG capsule Give 100 mg @ 1400 and 300 mg @ HS       mirabegron (MYRBETRIQ) 50 MG 24 hr tablet Take 1 tablet (50 mg) by mouth daily 90 tablet 0     polyethylene glycol (MIRALAX) 17 g packet Take 1 packet by mouth daily as needed for constipation       senna-docusate (SENOKOT-S/PERICOLACE) 8.6-50 MG tablet Take 1 tablet by mouth daily as needed for constipation       traMADol (ULTRAM) 50 MG tablet Take 50 mg by mouth 2 times daily as needed for severe pain           ROS:  10 point ROS of systems including Constitutional, Eyes, Respiratory, Cardiovascular, Gastroenterology, Genitourinary, Integumentary, Musculoskeletal, Psychiatric were all negative except for pertinent positives noted in my HPI.      Vitals:  /64   Pulse 64   Temp 97.6  F (36.4  C)   Resp 16   Ht 1.626 m (5' 4\")   Wt 66.1 kg (145 lb 12.8 oz)   SpO2 92%   BMI 25.03 kg/m    Exam:  Limited observational exam due to COVID19 precautions  GENERAL APPEARANCE:  Alert, in no distress, appears healthy, oriented, cooperative  ENT:  Mouth and posterior oropharynx normal, moist mucous membranes, [place]  EYES:  EOM, " conjunctivae, lids, pupils and irises normal, PERRL  NECK:  No adenopathy,masses or thyromegaly  RESP:  no respiratory distress, no coughing  CV:  no edema  M/S:   Gait and station abnormal, uses walker. Digits and nails normal  SKIN:  Inspection of skin and subcutaneous tissue baseline  NEURO:   Cranial nerves 2-12 are normal tested and grossly at patient's baseline  PSYCH:  oriented X 3, affect and mood normal    Wt Readings from Last 4 Encounters:   01/13/21 66.1 kg (145 lb 12.8 oz)   01/12/21 66.2 kg (146 lb)   01/09/21 67.4 kg (148 lb 9.6 oz)   12/29/20 74.4 kg (164 lb)       Lab/Diagnostic data:  Recent labs in [facility] reviewed by me today.  and   Most Recent 3 CBC's:  Recent Labs   Lab Test 01/12/21  0746 01/09/21  0630 01/06/21  1803 12/30/20  0718   WBC 6.8  --  5.0 1.7*   HGB 12.6  --  11.8 10.9*   MCV 95  --  97 99    187 208 158     Most Recent 3 BMP's:  Recent Labs   Lab Test 01/12/21  0746 01/11/21  0821 01/09/21  0630 01/07/21  0623 01/07/21  0623 01/06/21  1803     --   --   --  141 135   POTASSIUM 4.3 4.0 4.6   < > 3.3* 3.6   CHLORIDE 106  --   --   --  109 102   CO2 23  --   --   --  29 29   BUN 20  --   --   --  18 24   CR 1.20*  --  1.13*  --  1.05* 1.25*   ANIONGAP 8  --   --   --  3 4   MUMTAZ 9.6  --   --   --  8.3* 8.9   GLC 96  --   --   --  81 101*    < > = values in this interval not displayed.            ASSESSMENT/PLAN:    (I47.1) SVT (supraventricular tachycardia) (H)  (primary encounter diagnosis)  Comment: Paroxysmal SVT, controlled w/amiodarone  Plan:   - Continue amiodarone 200mg BID, coreg  - Start ziopatch x 2 weeks, RN assisting with coordinating ziopatch with Cardiology clinic  - Monitor BP/HR  - Patient to follow-up with Cardiologist as directed  - Patient verbalizes understanding and agrees with treatment plan.     (N18.31) Stage 3a chronic kidney disease  Comment: Creat baseline 1.1-1.2 Creat stable, last creat 1.2 on 1/12.   Plan:   - Check BMP 1/18/21 dx CKD  -  Patient verbalizes understanding and agrees with treatment plan.     (I10) Benign essential hypertension  Comment: Controlled  Plan:   - Continue coreg, ASA  - Monitor BP/HR, avoid hypotension  - Patient verbalizes understanding and agrees with treatment plan.     (N32.81) Overactive bladder  Comment: Chronic, controlled  Plan:   - Continue myrbetriq  - Patient verbalizes understanding and agrees with treatment plan.     (G62.9) Peripheral polyneuropathy  Comment: Chronic  Plan:   - Continue gabapentin, tramadol prn    (M54.2,  G89.29) Chronic neck pain  Comment: Chronic  Plan:   - Continue tramadol prn    (R53.81) Physical deconditioning  Comment: Ongoing physical deconditioning d/t recent COVID infection and hospital stay r/t SVT. Ambulates w/walker.   Plan:   - Encourage active participation in therapy session to increase strength and promote independence in activities and ADLs.   - Cognitive testing to be done in therapy.  - SW following for discharge planning. Goal is to discharge home.   - Confirms code status as DNR/DNI  - Patient verbalizes understanding and agrees with treatment plan.               Total time spent with patient visit at the skilled nursing facility was 38 minutes including patient visit and review of past records. Greater than 50% of total time (20 minutes) spent with counseling patient regarding treatment plan, medication management, follow-up labs. Patient verbalizes understanding and agrees with treatment plan. Coordinating care with RN regarding plan to start ziopatch.     Electronically signed by:  SHRUTI Duran CNP

## 2021-01-15 NOTE — TELEPHONE ENCOUNTER
01/15/21 Msg received from Dr Rey  Yes, ok.  Monitor HR closely in the next few days.  Attempted to call Rob Gruber @ [phone]. No answer and no VM. Will try again later.  Jono 1140 am

## 2021-01-18 NOTE — TELEPHONE ENCOUNTER
01/18/21  Recd call from DARCY Bonilla at [facility] stating they have not received pts Ziopatch yet and wondering where it is.  Called [facility] [employer] ( [phone]) and spoke with dawna Wu who tracked package as delivered on 1/16/21 at 10:30 am to  at [address] [place].  Attempted to call Irene back, reached Candy and gave her above information. She will look for package and call back if not found.  Jono 1120 am

## 2021-01-19 ENCOUNTER — TELEPHONE (OUTPATIENT)
Dept: INTERNAL MEDICINE | Facility: CLINIC | Age: 86
End: 2021-01-19

## 2021-01-19 ENCOUNTER — NURSING HOME VISIT (OUTPATIENT)
Dept: GERIATRICS | Facility: CLINIC | Age: 86
End: 2021-01-19
Payer: MEDICARE

## 2021-01-19 DIAGNOSIS — N18.31 STAGE 3A CHRONIC KIDNEY DISEASE (H): ICD-10-CM

## 2021-01-19 DIAGNOSIS — I10 BENIGN ESSENTIAL HYPERTENSION: ICD-10-CM

## 2021-01-19 DIAGNOSIS — I47.10 SVT (SUPRAVENTRICULAR TACHYCARDIA) (H): Primary | ICD-10-CM

## 2021-01-19 PROCEDURE — 99308 SBSQ NF CARE LOW MDM 20: CPT | Performed by: INTERNAL MEDICINE

## 2021-01-19 NOTE — PROGRESS NOTES
[place] [facility] DISCHARGE SUMMARY    PATIENT'S NAME: Glendy Díaz  YOB: 1933  MEDICAL RECORD NUMBER:  [id number]  Place of Service where encounter took place:  [facility] ([facility]) [[id number]]    PRIMARY CARE PROVIDER AND CLINIC RESPONSIBLE AFTER TRANSFER:   Veronica Davis MD, [address] [facility] [address]    Non-FMG Provider     Transferring providers: SHRUTI Duran CNP; Ki Bass MD  Recent Hospitalization/ED:  [facility] Hospital stay 1/6/21 to 1/12/21.  Date of SNF Admission: January / 12 / 2021  Date of SNF (anticipated) Discharge: January / 23 / 2021  Discharged to: [facility]  Cognitive Scores: SLUMS: 18/30  Physical Function: Ambulating 300 ft with walker. Requires SBA w/ADLs.  DME: Walker    CODE STATUS/ADVANCE DIRECTIVES DISCUSSION:  DNR / DNI   ALLERGIES: Amlodipine, Fosamax [alendronic acid], Lisinopril, Pravastatin, and Simvastatin      DISCHARGE DIAGNOSIS/NURSING FACILITY COURSE:     PMH: paroxysmal SVT, CKD stage IIIa, HTN, neuropathy, overactive bladder     Previously tested + COVID on 12/27/20 with prior hospitalization 12/29 due to COVID infection and SVT, was given one time dose of dexamethasone.       Patient admitted to [facility] 1/6-1/11 due to SOB and weakness. EKG + SVT w/ and LVH. CTPA negative for PE, but found groundglass opacities. received 2 doses of IV metoprolol. Cardiology consulted, started on amiodarone infusion and transitioned to amiodarone 200mg BID and continued on coreg 9.375mg BID. Outpatient EP study scheduled on 1/12.        Transferred to [facility] on 1/11.       SVT (supraventricular tachycardia) (H)  Paroxysmal SVT, controlled w/amiodarone and coreg. Currently wearing ziopatch, plans to send in after 2 weeks. Denies chest pain, SOB, headache, syncope. Patient to follow-up with Cardiologist as directed.     Stage 3a chronic kidney  disease  Creat baseline 1.1-1.2 Creat stable, last creat 1.2 on 1/12.     Benign essential hypertension  Currently taking coreg and ASA.     Overactive bladder  Currently taking myrbetriq.    Peripheral polyneuropathy  Currently taking gabapentin, tramadol prn.     Neck pain, chronic  Currently taking tramadol prn.     Physical deconditioning  Cognitive impairment  PTA lived in [facility]. SLUMS 18/30. Ambulating 300 ft with walker. Requires SBA w/ADLs. Patient discharging to [facility], as well as  home care services, including home PT, OT, RN, HHA.           Past Medical History:  has a past medical history of CKD (chronic kidney disease) stage 3, GFR 30-59 ml/min, Hematuria, Hyperlipidemia LDL goal <100, Hyperparathyroidism (H), Hypertension, Incontinence, Leg weakness, bilateral, Mumps, Neck pain, chronic, Osteoarthritis, Osteopenia, Paroxysmal atrial fibrillation (H), and Peripheral neuropathy. She also has no past medical history of Asymptomatic human immunodeficiency virus (HIV) infection status (H), Cerebral palsy (H), Chronic infection, Complication of anesthesia, Congenital renal agenesis and dysgenesis, Goiter, Gout, Hernia, abdominal, History of spinal cord injury, History of thrombophlebitis, Horseshoe kidney, Hydrocephalus (H), Malignant hyperthermia, Palpitations, Parkinsons disease (H), Sleep apnea, Spider veins, Spina bifida (H), STD (sexually transmitted disease), Tethered cord (H), or Tuberculosis.    Discharge Medications:    Current Outpatient Medications   Medication Sig Dispense Refill     amiodarone (PACERONE) 200 MG tablet Take 200 mg by mouth daily       ASPIRIN 81 PO Take 81 mg by mouth At Bedtime       carvedilol (COREG) 6.25 MG tablet Take 9.375 mg by mouth 2 times daily (with meals)       cetirizine (ZYRTEC) 10 MG tablet Take 10 mg by mouth daily as needed  90 tablet 3     Cholecalciferol (VITAMIN D) 2000 UNITS tablet Take 2,000 Units by mouth daily 100 tablet 3     fluticasone  "(FLONASE) 50 MCG/ACT nasal spray Spray 1 spray into both nostrils daily as needed for rhinitis or allergies       gabapentin (NEURONTIN) 100 MG capsule Give 100 mg @ 1400 and 300 mg @ HS       mirabegron (MYRBETRIQ) 50 MG 24 hr tablet Take 1 tablet (50 mg) by mouth daily 90 tablet 0     polyethylene glycol (MIRALAX) 17 g packet Take 1 packet by mouth daily as needed for constipation       senna-docusate (SENOKOT-S/PERICOLACE) 8.6-50 MG tablet Take 1 tablet by mouth daily as needed for constipation       traMADol (ULTRAM) 50 MG tablet Take 50 mg by mouth 2 times daily as needed for severe pain         Medication Changes/Rationale:     Discontinued estradiol cream, patient not using    Controlled medications sent with patient:   No narcotics given upon discharge         ROS:   10 point ROS of systems including Constitutional, Eyes, Respiratory, Cardiovascular, Gastroenterology, Genitourinary, Integumentary, Musculoskeletal, Psychiatric were all negative except for pertinent positives noted in my HPI.      Physical Exam:   Vitals: /80   Pulse 100   Temp 97.5  F (36.4  C)   Resp 16   Ht 1.626 m (5' 4\")   Wt 66.8 kg (147 lb 3.2 oz)   SpO2 97%   BMI 25.27 kg/m    BMI= Body mass index is 25.27 kg/m .  Limited observational exam due to COVID19 precautions  GENERAL APPEARANCE:  Alert, in no distress, appears healthy, oriented, cooperative  ENT:  Mouth and posterior oropharynx normal, moist mucous membranes, [place]  EYES:  EOM, conjunctivae, lids, pupils and irises normal, PERRL  NECK:  No adenopathy,masses or thyromegaly  RESP:  no respiratory distress, no coughing  CV:  no edema  M/S:   Gait and station abnormal, uses walker. Digits and nails normal  SKIN:  Inspection of skin and subcutaneous tissue baseline  NEURO:   Cranial nerves 2-12 are normal tested and grossly at patient's baseline  PSYCH:  oriented X 3, affect and mood normal    Wt Readings from Last 4 Encounters:   01/20/21 66.8 kg (147 lb 3.2 oz) "   01/13/21 66.1 kg (145 lb 12.8 oz)   01/12/21 66.2 kg (146 lb)   01/09/21 67.4 kg (148 lb 9.6 oz)       SNF labs: Labs done in SNF are in [facility]. Please refer to them using EPIC/Care Everywhere., Recent labs in EPIC reviewed by me today.  and Most Recent 3 CBC's:  Recent Labs   Lab Test 01/12/21  0746 01/09/21  0630 01/06/21  1803 12/30/20  0718   WBC 6.8  --  5.0 1.7*   HGB 12.6  --  11.8 10.9*   MCV 95  --  97 99    187 208 158     Most Recent 3 BMP's:  Recent Labs   Lab Test 01/12/21  0746 01/11/21  0821 01/09/21  0630 01/07/21  0623 01/07/21  0623 01/06/21  1803     --   --   --  141 135   POTASSIUM 4.3 4.0 4.6   < > 3.3* 3.6   CHLORIDE 106  --   --   --  109 102   CO2 23  --   --   --  29 29   BUN 20  --   --   --  18 24   CR 1.20*  --  1.13*  --  1.05* 1.25*   ANIONGAP 8  --   --   --  3 4   MUMTAZ 9.6  --   --   --  8.3* 8.9   GLC 96  --   --   --  81 101*    < > = values in this interval not displayed.           DISCHARGE PLAN:    Follow up labs: No labs orders/due      Medical Follow Up:     Follow up with primary care provider in 1-2 weeks   Follow up with specialist: Cardiologist       Discharge Services: Home Care:  Occupational Therapy, Physical Therapy, Registered Nurse, Home Health Aide and From:  [facility]      Discharge Instructions Verbalized to Patient at Discharge:     Weigh yourself daily in the morning and keep a record. Call your primary clinic: a) if you are more short of breath, or b) if your weight changes more than 3 pounds in one day or more than 5 pounds in one week.     Notify PCP if you have a fever greater than 100.5 degrees.     24-hour supervision is recommended for safety.           TOTAL DISCHARGE TIME:   Greater than 30 minutes    Electronically signed by:  SHRUTI Duran CNP

## 2021-01-20 VITALS
SYSTOLIC BLOOD PRESSURE: 119 MMHG | HEART RATE: 100 BPM | HEIGHT: 64 IN | TEMPERATURE: 97.5 F | DIASTOLIC BLOOD PRESSURE: 80 MMHG | RESPIRATION RATE: 16 BRPM | OXYGEN SATURATION: 97 % | WEIGHT: 147.2 LBS | BODY MASS INDEX: 25.13 KG/M2

## 2021-01-20 RX ORDER — AMIODARONE HYDROCHLORIDE 200 MG/1
200 TABLET ORAL DAILY
COMMUNITY
End: 2021-02-24

## 2021-01-20 ASSESSMENT — MIFFLIN-ST. JEOR: SCORE: 1087.69

## 2021-01-21 ENCOUNTER — DISCHARGE SUMMARY NURSING HOME (OUTPATIENT)
Dept: GERIATRICS | Facility: CLINIC | Age: 86
End: 2021-01-21
Payer: MEDICARE

## 2021-01-21 DIAGNOSIS — I47.10 SVT (SUPRAVENTRICULAR TACHYCARDIA) (H): Primary | ICD-10-CM

## 2021-01-21 DIAGNOSIS — M54.2 NECK PAIN, CHRONIC: ICD-10-CM

## 2021-01-21 DIAGNOSIS — I10 BENIGN ESSENTIAL HYPERTENSION: ICD-10-CM

## 2021-01-21 DIAGNOSIS — R53.81 PHYSICAL DECONDITIONING: ICD-10-CM

## 2021-01-21 DIAGNOSIS — N18.31 STAGE 3A CHRONIC KIDNEY DISEASE (H): ICD-10-CM

## 2021-01-21 DIAGNOSIS — G62.9 PERIPHERAL POLYNEUROPATHY: ICD-10-CM

## 2021-01-21 DIAGNOSIS — N32.81 OVERACTIVE BLADDER: ICD-10-CM

## 2021-01-21 DIAGNOSIS — G89.29 NECK PAIN, CHRONIC: ICD-10-CM

## 2021-01-21 DIAGNOSIS — R41.89 COGNITIVE IMPAIRMENT: ICD-10-CM

## 2021-01-21 PROCEDURE — 99316 NF DSCHRG MGMT 30 MIN+: CPT | Performed by: NURSE PRACTITIONER

## 2021-01-21 NOTE — LETTER
2021        RE: Glendy Díaz  [address]  [place] [address]        [place] [facility] DISCHARGE SUMMARY  PATIENT'S NAME: Glendy Díaz  YOB: 1933  MEDICAL RECORD NUMBER:  [id number]  Place of Service where encounter took place:  No question data found.    PRIMARY CARE PROVIDER AND CLINIC RESPONSIBLE AFTER TRANSFER:   Veronica Davis MD, [address] / [facility] [address]    Non-FMG Provider     Transferring providers: SHRUTI Duran CNP; Ki Bass MD  Recent Hospitalization/ED:  [facility] stay 21 to 21.  Date of SNF Admission:   Date of SNF (anticipated) Discharge:   Discharged to: new skilled nursing facility [facility]  Cognitive Scores: {fgscog1:364846}  Physical Function: {fgsphysicalfunction:570503}  DME: {fgsdmedc:513591}    CODE STATUS/ADVANCE DIRECTIVES DISCUSSION:  DNR / DNI {Provider, verify code status is accurate as an order in Epic}  ALLERGIES: Amlodipine, Fosamax [alendronic acid], Lisinopril, Pravastatin, and Simvastatin    DISCHARGE DIAGNOSIS/NURSING FACILITY COURSE:   {fgsdia}    Past Medical History:  has a past medical history of CKD (chronic kidney disease) stage 3, GFR 30-59 ml/min, Hematuria, Hyperlipidemia LDL goal <100, Hyperparathyroidism (H), Hypertension, Incontinence, Leg weakness, bilateral, Mumps, Neck pain, chronic, Osteoarthritis, Osteopenia, Paroxysmal atrial fibrillation (H), and Peripheral neuropathy. She also has no past medical history of Asymptomatic human immunodeficiency virus (HIV) infection status (H), Cerebral palsy (H), Chronic infection, Complication of anesthesia, Congenital renal agenesis and dysgenesis, Goiter, Gout, Hernia, abdominal, History of spinal cord injury, History of thrombophlebitis, Horseshoe kidney, Hydrocephalus (H), Malignant hyperthermia, Palpitations, Parkinsons disease  (H), Sleep apnea, Spider veins, Spina bifida (H), STD (sexually transmitted disease), Tethered cord (H), or Tuberculosis.    Discharge Medications:  {Providers Please double check the med list (in the plan section >> meds & orders tab) and Discontinue any of the meds flagged by the TC to be discontinued}  Current Outpatient Medications   Medication Sig Dispense Refill     amiodarone (PACERONE) 200 MG tablet Take 1 tablet (200 mg) by mouth 2 times daily       ASPIRIN 81 PO Take 81 mg by mouth At Bedtime       carvedilol (COREG) 6.25 MG tablet Take 9.375 mg by mouth 2 times daily (with meals)       cetirizine (ZYRTEC) 10 MG tablet Take 10 mg by mouth daily as needed  90 tablet 3     Cholecalciferol (VITAMIN D) 2000 UNITS tablet Take 2,000 Units by mouth daily 100 tablet 3     estradiol (ESTRACE VAGINAL) 0.1 MG/GM vaginal cream Apply small amount to the vaginal opening and urethra M, W, F @ h.s. 42.5 g 3     fluticasone (FLONASE) 50 MCG/ACT nasal spray Spray 1 spray into both nostrils daily as needed for rhinitis or allergies       gabapentin (NEURONTIN) 100 MG capsule Give 100 mg @ 1400 and 300 mg @ HS       mirabegron (MYRBETRIQ) 50 MG 24 hr tablet Take 1 tablet (50 mg) by mouth daily 90 tablet 0     polyethylene glycol (MIRALAX) 17 g packet Take 1 packet by mouth daily as needed for constipation       senna-docusate (SENOKOT-S/PERICOLACE) 8.6-50 MG tablet Take 1 tablet by mouth daily as needed for constipation       traMADol (ULTRAM) 50 MG tablet Take 50 mg by mouth 2 times daily as needed for severe pain       ***  Medication Changes/Rationale:     ***    Controlled medications sent with patient:   {fgscontrolledmeds1:778050}     ROS:   {ROS FGS:908940:::0}    Physical Exam:   Vitals: There were no vitals taken for this visit.  BMI= There is no height or weight on file to calculate BMI.  {NURSING HOME PHYSICAL EXAM:440348}     SNF labs: {fgslabdischarge:623357}  {fgsinrtable:302555:x}    DISCHARGE PLAN:    Follow  up labs: {fgsfuturelabs1:514274}    Medical Follow Up:      {fgsdischargefollowup:368509}    MTM referral needed and placed by this provider: {YES (EXPLAIN)/NO:317196}    Current [facility] scheduled appointments:   ***    Discharge Services: Home Care:  Occupational Therapy, Physical Therapy, Registered Nurse, Home Health Aide and From:  [facility]    Discharge Instructions Verbalized to Patient at Discharge:     {sdischargeinstructions1:356878}      TOTAL DISCHARGE TIME:   {TIME:490051}  Electronically signed by:  Bridgette Young ***    {shomecare F2F:190616}  {Providers Please double check the med list (in the plan section >> meds & orders tab) and Discontinue any of the meds flagged by the TC to be discontinued}                    Sincerely,        SHRUTI Duran CNP

## 2021-01-22 NOTE — PROGRESS NOTES
[facility] Discharge Orders    Name: Glendy Díaz  : 1933  Planned Discharge Date: 21  Discharged to: new assisted living for patient - [facility]      MEDICAL FOLLOW UP   Follow up with primary care provider in 1-2 weeks              Follow up with specialist: Cardiologist       FUTURE LABS: No labs orders/due      ORDER CHANGES:    Discontinued estradiol cream, patient not using    DISCHARGE MEDICATIONS:  The patient s pharmacy is authorized to dispense a 30-day supply of medications. Refill requests should be directed to the primary provider, Veronica Davis.   No narcotics are prescribed at time of discharge.     Current Outpatient Medications   Medication Sig Dispense Refill     amiodarone (PACERONE) 200 MG tablet Take 200 mg by mouth daily       ASPIRIN 81 PO Take 81 mg by mouth At Bedtime       carvedilol (COREG) 6.25 MG tablet Take 9.375 mg by mouth 2 times daily (with meals)       cetirizine (ZYRTEC) 10 MG tablet Take 10 mg by mouth daily as needed  90 tablet 3     Cholecalciferol (VITAMIN D) 2000 UNITS tablet Take 2,000 Units by mouth daily 100 tablet 3     fluticasone (FLONASE) 50 MCG/ACT nasal spray Spray 1 spray into both nostrils daily as needed for rhinitis or allergies       gabapentin (NEURONTIN) 100 MG capsule Give 100 mg @ 1400 and 300 mg @ HS       mirabegron (MYRBETRIQ) 50 MG 24 hr tablet Take 1 tablet (50 mg) by mouth daily 90 tablet 0     polyethylene glycol (MIRALAX) 17 g packet Take 1 packet by mouth daily as needed for constipation       senna-docusate (SENOKOT-S/PERICOLACE) 8.6-50 MG tablet Take 1 tablet by mouth daily as needed for constipation       traMADol (ULTRAM) 50 MG tablet Take 50 mg by mouth 2 times daily as needed for severe pain         SERVICES:  Home Care:  Occupational Therapy, Physical Therapy, Registered Nurse, Home Health Aide and From:  [facility]    ADDITIONAL INSTRUCTIONS:  ? Weigh yourself daily in the  morning and keep a record. Call your primary clinic: a) if you are more short of breath, or b) if your weight changes more than 3 pounds in one day or more than 5 pounds in one week.   ? Notify PCP if you have a fever greater than 100.5 degrees.   ? 24-hour supervision is recommended for safety.       SHRUTI Duran CNP  This document was electronically signed on January 21, 2021

## 2021-01-23 NOTE — PROGRESS NOTES
[place] [facility]    PRIMARY CARE PROVIDER AND CLINIC:  Veronica Davis MD, [address] [facility] [address]    Pt was seen by Dr Bass on 1/19/2021 at the [facility] for a hospital f/u visit      Glendy Díaz  is a 87 year old  (11/16/1933), admitted to the above facility from  [facility]. Hospital stay 1/6/21 through 1/12/21..      Admitted to this facility for  rehab, medical management and nursing care.      Hospital course was reviewed by me, is as per the hospital discharge summary and NP note.    PMH: paroxysmal SVT, CKD stage IIIa, HTN, neuropathy, overactive bladder, cognitive impairment.    Previously tested + COVID on 12/27/20 with prior hospitalization 12/29 due to COVID infection and SVT, was given one time dose of dexamethasone.      Patient admitted to [facility] 1/6-1/11 due to SOB and weakness. EKG + SVT w/ and LVH. CTPA negative for PE, but found groundglass opacities. received 2 doses of IV metoprolol. Cardiology consulted, started on amiodarone infusion and transitioned to amiodarone 200mg BID and continued on coreg 9.375mg BID.       Transferred to [facility] on 1/11.     Pt has felt well since admission to the TCU  She denies cough, chest pain, SOB, fevers, chills, dizziness, palpitations.    Vitals have been stable  Cardiology has reduced Amiodarone to 200 mg po daily    She is progressing in therapy and hopes to discharge to home in a few days.    [facility] 18/30      CODE STATUS/ADVANCE DIRECTIVES DISCUSSION:  DNR/DNI  Patient's living condition: lives alone  ALLERGIES: Amlodipine, Fosamax [alendronic acid], Lisinopril, Pravastatin, and Simvastatin  PAST MEDICAL HISTORY:  has a past medical history of CKD (chronic kidney disease) stage 3, GFR 30-59 ml/min, Hematuria, Hyperlipidemia LDL goal <100, Hyperparathyroidism (H), Hypertension, Incontinence, Leg weakness, bilateral, Mumps, Neck pain, chronic, Osteoarthritis, Osteopenia,  Paroxysmal atrial fibrillation (H), and Peripheral neuropathy. She also has no past medical history of Asymptomatic human immunodeficiency virus (HIV) infection status (H), Cerebral palsy (H), Chronic infection, Complication of anesthesia, Congenital renal agenesis and dysgenesis, Goiter, Gout, Hernia, abdominal, History of spinal cord injury, History of thrombophlebitis, Horseshoe kidney, Hydrocephalus (H), Malignant hyperthermia, Palpitations, Parkinsons disease (H), Sleep apnea, Spider veins, Spina bifida (H), STD (sexually transmitted disease), Tethered cord (H), or Tuberculosis.  PAST SURGICAL HISTORY:   has a past surgical history that includes appendectomy; Cholecystectomy; Hysterectomy total abdominal; cataract iol, rt/lt; Arthroscopy ankle, open repair ligament, combined (5/31/2012); Remove hardware foot (12/4/2012); Excise mass foot (12/4/2012); Arthrodesis foot (5/1/2014); Repair hammer toe (5/1/2014); Release tendon toe (5/1/2014); Reverse arthroplasty shoulder (Right, 7/18/2016); Bladder surgery; and Cystoscopy.  FAMILY HISTORY: family history includes Alcohol/Drug in her brother; Breast Cancer in her sister; Cancer - colorectal in her brother and brother; Circulatory in her mother; Diabetes in her brother and mother; Lipids in her mother; Thyroid Disease in her daughter.  SOCIAL HISTORY:   reports that she quit smoking about 36 years ago. Her smoking use included cigarettes. She started smoking about 56 years ago. She has a 20.00 pack-year smoking history. She has never used smokeless tobacco. She reports that she does not drink alcohol or use drugs.        Current Outpatient Medications   Medication Sig Dispense Refill     amiodarone (PACERONE) 200 MG tablet Take 200 mg by mouth daily       ASPIRIN 81 PO Take 81 mg by mouth At Bedtime       carvedilol (COREG) 6.25 MG tablet Take 9.375 mg by mouth 2 times daily (with meals)       cetirizine (ZYRTEC) 10 MG tablet Take 10 mg by mouth daily as needed  90  tablet 3     Cholecalciferol (VITAMIN D) 2000 UNITS tablet Take 2,000 Units by mouth daily 100 tablet 3     fluticasone (FLONASE) 50 MCG/ACT nasal spray Spray 1 spray into both nostrils daily as needed for rhinitis or allergies       gabapentin (NEURONTIN) 100 MG capsule Give 100 mg @ 1400 and 300 mg @ HS       mirabegron (MYRBETRIQ) 50 MG 24 hr tablet Take 1 tablet (50 mg) by mouth daily 90 tablet 0     polyethylene glycol (MIRALAX) 17 g packet Take 1 packet by mouth daily as needed for constipation       senna-docusate (SENOKOT-S/PERICOLACE) 8.6-50 MG tablet Take 1 tablet by mouth daily as needed for constipation       traMADol (ULTRAM) 50 MG tablet Take 50 mg by mouth 2 times daily as needed for severe pain                     Exam:  Alert, sitting in chair in room  Well appearing, very pleasant, [place]  ENT:  Oral mucosa moist,  [place]  EYES:  No eye redness or drainage  NECK:  supple  RESP:  Lungs clear  CV:  RRR, HR 100s  M/S:   No LE edema  SKIN:  No rash  NEURO:   Alert, fully oriented, no tremors, no focal weakness. Gait was not assessed        Most Recent 3 CBC's:  Recent Labs   Lab Test 01/12/21  0746 01/09/21  0630 01/06/21  1803 12/30/20  0718   WBC 6.8  --  5.0 1.7*   HGB 12.6  --  11.8 10.9*   MCV 95  --  97 99    187 208 158     Most Recent 3 BMP's:  Recent Labs   Lab Test 01/12/21  0746 01/11/21  0821 01/09/21  0630 01/07/21  0623 01/07/21  0623 01/06/21  1803     --   --   --  141 135   POTASSIUM 4.3 4.0 4.6   < > 3.3* 3.6   CHLORIDE 106  --   --   --  109 102   CO2 23  --   --   --  29 29   BUN 20  --   --   --  18 24   CR 1.20*  --  1.13*  --  1.05* 1.25*   ANIONGAP 8  --   --   --  3 4   MUMTAZ 9.6  --   --   --  8.3* 8.9   GLC 96  --   --   --  81 101*    < > = values in this interval not displayed.            ASSESSMENT/PLAN:    (I47.1) SVT (supraventricular tachycardia) (H)  (primary encounter diagnosis)  Comment: Paroxysmal SVT, controlled w/amiodarone and Metoprolol  Amiodarone dose  reduced to 200 mg daily  Plan: Monitor vitals.  Cardiology f/u      (N18.31) Stage 3a chronic kidney disease  Comment: Creat baseline 1.1-1.2 Creat stable, last creat 1.2 on 1/12.   Plan: monitor BMP      (I10) Benign essential hypertension  Comment: Controlled  Plan: continue Coreg      (N32.81) Overactive bladder  Comment: Chronic, controlled  Plan: Continue myrbetriq    (G62.9) Peripheral polyneuropathy  Comment: Chronic  Plan: Continue gabapentin, tramadol prn      (R53.81) Physical deconditioning  Comment: Ongoing physical deconditioning d/t recent COVID infection and hospital stay r/t SVT. Ambulates w/walker.   Plan: continue therapies.  Anticipate discharge within the next week        Ki aBss MD

## 2021-01-25 ENCOUNTER — TELEPHONE (OUTPATIENT)
Dept: INTERNAL MEDICINE | Facility: CLINIC | Age: 86
End: 2021-01-25

## 2021-01-25 NOTE — TELEPHONE ENCOUNTER
IP F/U    Date: 1/23/21  Diagnosis: Svt (Supraventricular Tachycardia) (H)  Is patient active in care coordination? No  Was patient in TCU? Yes - Route to Care Coordination (P [id number])    Routed to CC.

## 2021-01-26 ENCOUNTER — TELEPHONE (OUTPATIENT)
Dept: CARDIOLOGY | Facility: CLINIC | Age: 86
End: 2021-01-26

## 2021-01-26 NOTE — TELEPHONE ENCOUNTER
Spoke to daughter Abigail regarding upcoming scheduled appt on 2/3 with Dr Kay.  Daughter reports patient recently discharged from [facility] to a new AL facility.  She seems to be adjusting well.  Currently wearing 14 day leadless monitor that was placed on 1/18 due to come off on 2/1.  Reviewed with daughter results of monitor will not be available for 2/3.  Daughter wants to follow with Dr Rey who initially was to do SVT ablation and identified that monitor needed to be worn for more data as he felt she was also having AF.  Daughter is requesting [place] location.  Next available appt with Dr Rey in [place] is 2/24.  Daughter indicated her sister will be bringing her mom as she is currently in [place].  Scheduled appt for 2/24 at 12:45pm.  Daughter will call if they want to move appt up sooner and know that location would change.  Provided daughter with direct phone number to clinic.  DARCY Gutiérrez

## 2021-01-27 ENCOUNTER — TELEPHONE (OUTPATIENT)
Dept: INTERNAL MEDICINE | Facility: CLINIC | Age: 86
End: 2021-01-27

## 2021-01-27 NOTE — TELEPHONE ENCOUNTER
PHYSICAL THERAPY / OCCUPATIONAL THERAPY /SKILLED NURSING order received via fax. Form in your mailbox to be signed.

## 2021-01-28 ENCOUNTER — TELEPHONE (OUTPATIENT)
Dept: INTERNAL MEDICINE | Facility: CLINIC | Age: 86
End: 2021-01-28

## 2021-01-28 NOTE — TELEPHONE ENCOUNTER
[facility] now requests orders and shares plan of care/discharge summaries for some patients through [employer].  Please REPLY TO THIS MESSAGE OR ROUTE BACK TO THE AUTHOR in order to give authorization for orders when needed.  This is considered a verbal order, you will still receive a faxed copy of orders for signature.  Thank you for your assistance in improving collaboration for our patients.    ORDER    Skilled PT 2w2, 1w1 to include safety with functional mobility, strengthening, improving gait tolerance for access in her [facility] community.  Thank you,  Anel Lau PT  [email or web address]  [phone]

## 2021-02-08 DIAGNOSIS — N32.81 OAB (OVERACTIVE BLADDER): ICD-10-CM

## 2021-02-08 RX ORDER — MIRABEGRON 50 MG/1
TABLET, FILM COATED, EXTENDED RELEASE ORAL
Qty: 90 TABLET | Refills: 3 | Status: SHIPPED | OUTPATIENT
Start: 2021-02-08 | End: 2021-04-01

## 2021-02-10 ENCOUNTER — TELEPHONE (OUTPATIENT)
Dept: INTERNAL MEDICINE | Facility: CLINIC | Age: 86
End: 2021-02-10

## 2021-02-12 ENCOUNTER — TELEPHONE (OUTPATIENT)
Dept: INTERNAL MEDICINE | Facility: CLINIC | Age: 86
End: 2021-02-12

## 2021-02-12 ENCOUNTER — TELEPHONE (OUTPATIENT)
Dept: CARDIOLOGY | Facility: CLINIC | Age: 86
End: 2021-02-12

## 2021-02-12 NOTE — TELEPHONE ENCOUNTER
Spoke to pt daughter Sharyn and made her aware of the ZioPatch results.  Explained that pt was in A Fib 45% of the time and had one pause that occurred on conversion to a normal rhythm.  Explained that Dr Rey would like to see pt and discuss the possibly of a PPM and also to discuss stopping Amiodarone as it is not working and changing medications to rate control.  Daughter states that pt had told her that she does not think that she has had any arrhythmias since the hospital.  Pt had an episode where she was in a Fib for 5 days. Pt has an OV on 2/24 with Dr Rey already.  Will verify the need to move up and if so will let daughter know. If ok to leave will NOT call daughter back. Rashmi

## 2021-02-16 ENCOUNTER — RECORDS - HEALTHEAST (OUTPATIENT)
Dept: LAB | Facility: CLINIC | Age: 86
End: 2021-02-16

## 2021-02-16 LAB
ALBUMIN UR-MCNC: NEGATIVE MG/DL
APPEARANCE UR: ABNORMAL
BACTERIA #/AREA URNS HPF: ABNORMAL HPF
BILIRUB UR QL STRIP: NEGATIVE
COLOR UR AUTO: ABNORMAL
GLUCOSE UR STRIP-MCNC: NEGATIVE MG/DL
HGB UR QL STRIP: ABNORMAL
KETONES UR STRIP-MCNC: NEGATIVE MG/DL
LEUKOCYTE ESTERASE UR QL STRIP: ABNORMAL
NITRATE UR QL: NEGATIVE
PH UR STRIP: 6 [PH] (ref 4.5–8)
RBC #/AREA URNS AUTO: ABNORMAL HPF
SP GR UR STRIP: 1.01 (ref 1–1.03)
SQUAMOUS #/AREA URNS AUTO: ABNORMAL LPF
UROBILINOGEN UR STRIP-ACNC: ABNORMAL
WBC #/AREA URNS AUTO: ABNORMAL HPF

## 2021-02-18 ENCOUNTER — TELEPHONE (OUTPATIENT)
Dept: INTERNAL MEDICINE | Facility: CLINIC | Age: 86
End: 2021-02-18

## 2021-02-18 LAB — BACTERIA SPEC CULT: ABNORMAL

## 2021-02-22 ENCOUNTER — PATIENT OUTREACH (OUTPATIENT)
Dept: CARE COORDINATION | Facility: CLINIC | Age: 86
End: 2021-02-22

## 2021-02-22 NOTE — PROGRESS NOTES
Clinic Care Coordination Contact  Care Coordination Communication         Clinical Data: Patient was receiving restorative services at [facility] and was discharged on 1.23.2021 to [facility] with [facility].    Home Care Contact:              Home Care Agency: [facility]              Contact name () and phone number: HAFSA Cates.              Care Coordination contacted home care: No              Anticipated start of care date: In progress.    Plan:  Care Coordinator will await notification from home care staff informing  Care Coordinator of patients discharge plans/needs.  Care Coordinator will review chart and outreach to home care every 4 weeks and as needed.      Henri Hairston [facility]  Clinic Care Coordinator  Ph. [phone]  [email or web address]

## 2021-02-24 ENCOUNTER — OFFICE VISIT (OUTPATIENT)
Dept: CARDIOLOGY | Facility: CLINIC | Age: 86
End: 2021-02-24
Payer: MEDICARE

## 2021-02-24 VITALS
HEIGHT: 64 IN | HEART RATE: 56 BPM | DIASTOLIC BLOOD PRESSURE: 60 MMHG | BODY MASS INDEX: 25.95 KG/M2 | SYSTOLIC BLOOD PRESSURE: 126 MMHG | WEIGHT: 152 LBS

## 2021-02-24 DIAGNOSIS — I48.0 PAROXYSMAL ATRIAL FIBRILLATION (H): Primary | ICD-10-CM

## 2021-02-24 PROCEDURE — 99214 OFFICE O/P EST MOD 30 MIN: CPT | Performed by: INTERNAL MEDICINE

## 2021-02-24 RX ORDER — DILTIAZEM HYDROCHLORIDE 180 MG/1
180 CAPSULE, COATED, EXTENDED RELEASE ORAL DAILY
Qty: 60 CAPSULE | Refills: 3 | Status: SHIPPED | OUTPATIENT
Start: 2021-02-24 | End: 2021-03-02

## 2021-02-24 RX ORDER — AMOXICILLIN 500 MG/1
500 CAPSULE ORAL 3 TIMES DAILY
Status: ON HOLD | COMMUNITY
Start: 2021-02-19 | End: 2021-03-02

## 2021-02-24 RX ORDER — HYDROCODONE BITARTRATE AND ACETAMINOPHEN 5; 325 MG/1; MG/1
0.5 TABLET ORAL EVERY 4 HOURS PRN
Status: ON HOLD | COMMUNITY
Start: 2021-02-19 | End: 2021-03-05

## 2021-02-24 RX ORDER — CHLORHEXIDINE GLUCONATE ORAL RINSE 1.2 MG/ML
15 SOLUTION DENTAL 2 TIMES DAILY
COMMUNITY
Start: 2021-02-19 | End: 2021-10-18

## 2021-02-24 RX ORDER — IBUPROFEN 400 MG/1
800 TABLET, FILM COATED ORAL EVERY 6 HOURS PRN
COMMUNITY
Start: 2021-02-19 | End: 2021-03-16

## 2021-02-24 RX ORDER — SULFAMETHOXAZOLE/TRIMETHOPRIM 800-160 MG
1 TABLET ORAL 2 TIMES DAILY
Status: ON HOLD | COMMUNITY
Start: 2021-02-19 | End: 2021-03-02

## 2021-02-24 ASSESSMENT — MIFFLIN-ST. JEOR: SCORE: 1101.53

## 2021-02-24 NOTE — PROGRESS NOTES
Service Date: 02/24/2021      CLINIC NOTE      HISTORY OF PRESENT ILLNESS:  I saw Ms. Díaz for followup of atrial fibrillation.  She is an 87-year-old white female who was scheduled for an EP study and SVT ablation on 01/12.  I reviewed her EKG and suspected atrial fibrillation instead of SVT.  The EP procedure was canceled.  The patient was put back on amiodarone and had a Zio Patch monitor.  The Zio Patch monitor confirmed atrial fibrillation.  Unfortunately, while on amiodarone, she continued to have 40% of atrial fibrillation.  On the other hand, during atrial fibrillation while on amiodarone, her heart rate was much slower; ranged from  beats per minute.  She was found to have 1 episode of sinus asystole up to 5.6 seconds upon conversion of atrial fibrillation.  She did not have apparent symptoms during that time.  She is currently living in an assisted-living facility with a complaint of fatigue.  She has no shortness of breath and does not feel palpitations.  There is no syncope or near syncope.  She denies dizziness.      PHYSICAL EXAMINATION:   VITAL SIGNS:  Blood pressure was 126/60, heart rate 63 beats per minute, body weight 152 pounds.   GENERAL:  She came to the clinic with a walker, accompanied by her daughter.   CARDIOVASCULAR EXAMINATION:  Showed no remarkable abnormalities.      ASSESSMENT AND RECOMMENDATIONS:  Ms. Díaz is an 87-year-old white female with paroxysmal atrial fibrillation.  She could not maintain sinus rhythm on amiodarone therapy.  I asked her to discontinue amiodarone.  I added Cardizem- mg p.o. daily for rate control.  She will have a repeat Zio Patch monitor in 1 week.  If the rate is controlled without severe bradycardia, we may continue that as a long-term therapy.  If she continues to have sinus asystole, we may recommend pacemaker implantation with continuation of rate control medication.  The patient has had frequent falls, according to the daughter.   Therefore, the consideration of anticoagulation is deferred, pending her stability in the near future.  I plan to see her for followup in about 1 month.      cc:   Veronica Davis MD    [facility]    [address], [address]   [place] [address]         IZZY ORELLANA MD             D: 2021   T: 2021   MT: LIS      Name:     LORETTA HENRIQUEZ   MRN:      [id number]        Account:      [id number]   :      1933           Service Date: 2021      Document: [id number]

## 2021-02-24 NOTE — PROGRESS NOTES
HPI and Plan:   See dictation    Orders Placed This Encounter   Procedures     Follow-Up with Electrophysiologist     EKG 12-lead complete w/read (Future)- to be scheduled     Leadless EKG Monitor 8 to 14 Days       Orders Placed This Encounter   Medications     amoxicillin (AMOXIL) 500 MG capsule     Sig: Take 500 mg by mouth 3 times daily      HYDROcodone-acetaminophen (NORCO) 5-325 MG tablet     Sig: Take 1 tablet by mouth every 4 hours as needed      ibuprofen (ADVIL/MOTRIN) 400 MG tablet     Sig: Take 400 mg by mouth every 6 hours as needed      chlorhexidine (PERIDEX) 0.12 % solution     Sig: Take 15 mLs by mouth 2 times daily      sulfamethoxazole-trimethoprim (BACTRIM DS) 800-160 MG tablet     Sig: Take 1 tablet by mouth 2 times daily      diltiazem ER COATED BEADS (CARDIZEM CD) 180 MG 24 hr capsule     Sig: Take 1 capsule (180 mg) by mouth daily     Dispense:  60 capsule     Refill:  3       Medications Discontinued During This Encounter   Medication Reason     amiodarone (PACERONE) 200 MG tablet          Encounter Diagnosis   Name Primary?     Paroxysmal atrial fibrillation (H) Yes       CURRENT MEDICATIONS:  Current Outpatient Medications   Medication Sig Dispense Refill     amoxicillin (AMOXIL) 500 MG capsule Take 500 mg by mouth 3 times daily        ASPIRIN 81 PO Take 81 mg by mouth At Bedtime       carvedilol (COREG) 6.25 MG tablet Take 9.375 mg by mouth 2 times daily (with meals)       cetirizine (ZYRTEC) 10 MG tablet Take 10 mg by mouth daily as needed  90 tablet 3     chlorhexidine (PERIDEX) 0.12 % solution Take 15 mLs by mouth 2 times daily        Cholecalciferol (VITAMIN D) 2000 UNITS tablet Take 2,000 Units by mouth daily 100 tablet 3     diltiazem ER COATED BEADS (CARDIZEM CD) 180 MG 24 hr capsule Take 1 capsule (180 mg) by mouth daily 60 capsule 3     fluticasone (FLONASE) 50 MCG/ACT nasal spray Spray 1 spray into both nostrils daily as needed for rhinitis or allergies       gabapentin  (NEURONTIN) 100 MG capsule Give 100 mg @ 1400 and 200 mg @ HS       HYDROcodone-acetaminophen (NORCO) 5-325 MG tablet Take 1 tablet by mouth every 4 hours as needed        ibuprofen (ADVIL/MOTRIN) 400 MG tablet Take 400 mg by mouth every 6 hours as needed        MYRBETRIQ 50 MG 24 hr tablet TAKE 1 TABLET BY MOUTH  DAILY 90 tablet 3     polyethylene glycol (MIRALAX) 17 g packet Take 1 packet by mouth daily as needed for constipation       senna-docusate (SENOKOT-S/PERICOLACE) 8.6-50 MG tablet Take 1 tablet by mouth daily as needed for constipation       sulfamethoxazole-trimethoprim (BACTRIM DS) 800-160 MG tablet Take 1 tablet by mouth 2 times daily        traMADol (ULTRAM) 50 MG tablet Take 50 mg by mouth 2 times daily as needed for severe pain         ALLERGIES     Allergies   Allergen Reactions     Amlodipine      Leg edema with 5 mg     Fosamax [Alendronic Acid]      Bone pain     Lisinopril      Increased potassium     Pravastatin      Muscle aches      Simvastatin      Muscle aches        PAST MEDICAL HISTORY:  Past Medical History:   Diagnosis Date     CKD (chronic kidney disease) stage 3, GFR 30-59 ml/min      Falls frequently      Hematuria      Hyperlipidemia LDL goal <100      Hyperparathyroidism (H)      Hypertension     No Cardiologist     Incontinence      Mumps      Neck pain, chronic      Osteoarthritis      Paroxysmal atrial fibrillation (H)      Peripheral neuropathy      Sinus node dysfunction (H)        PAST SURGICAL HISTORY:  Past Surgical History:   Procedure Laterality Date     APPENDECTOMY       ARTHRODESIS FOOT  5/1/2014    Procedure: ARTHRODESIS FOOT;  Surgeon: Sid Marks DPM;  Location: [facility] OR     ARTHROSCOPY ANKLE, OPEN REPAIR LIGAMENT, COMBINED  5/31/2012    Procedure:COMBINED ARTHROSCOPY ANKLE, OPEN REPAIR LIGAMENT; LEFT ANKLE ARTHROSCOPY, LATERAL LIGAMENT RECONSTRUCTION, ENDOSCOPIC KIRIT NAQVI SECOND TOE RAY CORRECTION    LEFT KNEE Marcaine AND CORTIZONE INJECTION, 5 CC  Panfilo, 1 CC CELESTONE, ; Surgeon:VARSHA GERMAN; Location:[facility]     BLADDER SURGERY       CATARACT IOL, RT/LT       CHOLECYSTECTOMY       CYSTOSCOPY       EXCISE MASS FOOT  2012    Procedure: EXCISE MASS FOOT;;  Surgeon: Varsha German MD;  Location: [facility]     HYSTERECTOMY TOTAL ABDOMINAL      ovaries are in     RELEASE TENDON TOE  2014    Procedure: RELEASE TENDON TOE;  Surgeon: Sid Marks DPM;  Location: [facility] OR     REMOVE HARDWARE FOOT  2012    Procedure: REMOVE HARDWARE FOOT;  REMOVAL HARDWARE(SYNTHES SCREW) LEFT FOOT, EXCISION OF INCLUSION CYST;  Surgeon: Varsha German MD;  Location: [facility]     REPAIR HAMMER TOE  2014    Procedure: REPAIR HAMMER TOE;  Surgeon: Sid Marks DPM;  Location: [facility] OR     REVERSE ARTHROPLASTY SHOULDER Right 2016    Procedure: REVERSE ARTHROPLASTY SHOULDER;  Surgeon: Marlo Alejandro MD;  Location: [facility] OR       FAMILY HISTORY:  Family History   Problem Relation Age of Onset     Lipids Mother      Diabetes Mother      Circulatory Mother         Kidney disease     Diabetes Brother      Cancer - colorectal Brother      Breast Cancer Sister      Thyroid Disease Daughter      Cancer - colorectal Brother      Alcohol/Drug Brother        SOCIAL HISTORY:  Social History     Socioeconomic History     Marital status:      Spouse name: None     Number of children: None     Years of education: None     Highest education level: None   Occupational History     None   Social Needs     Financial resource strain: None     Food insecurity     Worry: None     Inability: None     Transportation needs     Medical: None     Non-medical: None   Tobacco Use     Smoking status: Former Smoker     Packs/day: 1.00     Years: 20.00     Pack years: 20.00     Types: Cigarettes     Start date:      Quit date:      Years since quittin.1     Smokeless tobacco: Never Used   Substance and Sexual Activity     Alcohol use: No      "Drug use: No     Sexual activity: Never   Lifestyle     Physical activity     Days per week: None     Minutes per session: None     Stress: None   Relationships     Social connections     Talks on phone: None     Gets together: None     Attends Jain service: None     Active member of club or organization: None     Attends meetings of clubs or organizations: None     Relationship status: None     Intimate partner violence     Fear of current or ex partner: None     Emotionally abused: None     Physically abused: None     Forced sexual activity: None   Other Topics Concern     Parent/sibling w/ CABG, MI or angioplasty before 65F 55M? Not Asked   Social History Narrative     None       Review of Systems:  Skin:  Negative       Eyes:  Positive for glasses    ENT:  Positive for hearing loss wears hearing aids  Respiratory:  Negative       Cardiovascular:    Positive for;palpitations;lightheadedness;edema;fatigue had a couple falls  Gastroenterology: Negative      Genitourinary:  Positive for urinary tract infection    Musculoskeletal:  Positive for muscular weakness;arthritis    Neurologic:  Positive for headaches;numbness or tingling of hands;numbness or tingling of feet    Psychiatric:  Positive for sleep disturbances;anxiety    Heme/Lymph/Imm:  Positive for allergies    Endocrine:  Negative        Physical Exam:  Vitals: /60   Pulse 56   Ht 1.613 m (5' 3.5\")   Wt 68.9 kg (152 lb)   BMI 26.50 kg/m      Constitutional:  cooperative;well nourished        Skin:  warm and dry to the touch, no apparent skin lesions or masses noted venous stasis changes        Head:  normocephalic, no masses or lesions        Eyes:  pupils equal and round        Lymph:No Cervical lymphadenopathy present     ENT:  no pallor or cyanosis, dentition good        Neck:  carotid pulses are full and equal bilaterally, JVP normal, no carotid bruit        Respiratory:  normal breath sounds, clear to auscultation, normal A-P diameter, " normal symmetry, normal respiratory excursion, no use of accessory muscles         Cardiac: regular rhythm, normal S1/S2, no S3 or S4, apical impulse not displaced, no murmurs, gallops or rubs                  pulses below the femoral arteries are diminished                                      GI:  abdomen soft, non-tender, BS normoactive, no mass, no HSM, no bruits        Extremities and Muscular Skeletal:  no deformities, clubbing, cyanosis, erythema observed              Neurological:  affect appropriate        Psych:  Alert and Oriented x 3        CC  No referring provider defined for this encounter.

## 2021-02-24 NOTE — LETTER
2/24/2021    Veronica Davis MD  [address] [facility] [address]    RE: Glendy Díaz       Dear Colleague,    I had the pleasure of seeing Glendy Díaz in the [facility].    HPI and Plan:   See dictation    Orders Placed This Encounter   Procedures     Follow-Up with Electrophysiologist     EKG 12-lead complete w/read (Future)- to be scheduled     Leadless EKG Monitor 8 to 14 Days       Orders Placed This Encounter   Medications     amoxicillin (AMOXIL) 500 MG capsule     Sig: Take 500 mg by mouth 3 times daily      HYDROcodone-acetaminophen (NORCO) 5-325 MG tablet     Sig: Take 1 tablet by mouth every 4 hours as needed      ibuprofen (ADVIL/MOTRIN) 400 MG tablet     Sig: Take 400 mg by mouth every 6 hours as needed      chlorhexidine (PERIDEX) 0.12 % solution     Sig: Take 15 mLs by mouth 2 times daily      sulfamethoxazole-trimethoprim (BACTRIM DS) 800-160 MG tablet     Sig: Take 1 tablet by mouth 2 times daily      diltiazem ER COATED BEADS (CARDIZEM CD) 180 MG 24 hr capsule     Sig: Take 1 capsule (180 mg) by mouth daily     Dispense:  60 capsule     Refill:  3       Medications Discontinued During This Encounter   Medication Reason     amiodarone (PACERONE) 200 MG tablet          Encounter Diagnosis   Name Primary?     Paroxysmal atrial fibrillation (H) Yes       CURRENT MEDICATIONS:  Current Outpatient Medications   Medication Sig Dispense Refill     amoxicillin (AMOXIL) 500 MG capsule Take 500 mg by mouth 3 times daily        ASPIRIN 81 PO Take 81 mg by mouth At Bedtime       carvedilol (COREG) 6.25 MG tablet Take 9.375 mg by mouth 2 times daily (with meals)       cetirizine (ZYRTEC) 10 MG tablet Take 10 mg by mouth daily as needed  90 tablet 3     chlorhexidine (PERIDEX) 0.12 % solution Take 15 mLs by mouth 2 times daily        Cholecalciferol (VITAMIN D) 2000 UNITS tablet Take 2,000 Units by mouth daily 100 tablet 3      diltiazem ER COATED BEADS (CARDIZEM CD) 180 MG 24 hr capsule Take 1 capsule (180 mg) by mouth daily 60 capsule 3     fluticasone (FLONASE) 50 MCG/ACT nasal spray Spray 1 spray into both nostrils daily as needed for rhinitis or allergies       gabapentin (NEURONTIN) 100 MG capsule Give 100 mg @ 1400 and 200 mg @ HS       HYDROcodone-acetaminophen (NORCO) 5-325 MG tablet Take 1 tablet by mouth every 4 hours as needed        ibuprofen (ADVIL/MOTRIN) 400 MG tablet Take 400 mg by mouth every 6 hours as needed        MYRBETRIQ 50 MG 24 hr tablet TAKE 1 TABLET BY MOUTH  DAILY 90 tablet 3     polyethylene glycol (MIRALAX) 17 g packet Take 1 packet by mouth daily as needed for constipation       senna-docusate (SENOKOT-S/PERICOLACE) 8.6-50 MG tablet Take 1 tablet by mouth daily as needed for constipation       sulfamethoxazole-trimethoprim (BACTRIM DS) 800-160 MG tablet Take 1 tablet by mouth 2 times daily        traMADol (ULTRAM) 50 MG tablet Take 50 mg by mouth 2 times daily as needed for severe pain         ALLERGIES     Allergies   Allergen Reactions     Amlodipine      Leg edema with 5 mg     Fosamax [Alendronic Acid]      Bone pain     Lisinopril      Increased potassium     Pravastatin      Muscle aches      Simvastatin      Muscle aches        PAST MEDICAL HISTORY:  Past Medical History:   Diagnosis Date     CKD (chronic kidney disease) stage 3, GFR 30-59 ml/min      Falls frequently      Hematuria      Hyperlipidemia LDL goal <100      Hyperparathyroidism (H)      Hypertension     No Cardiologist     Incontinence      Mumps      Neck pain, chronic      Osteoarthritis      Paroxysmal atrial fibrillation (H)      Peripheral neuropathy      Sinus node dysfunction (H)        PAST SURGICAL HISTORY:  Past Surgical History:   Procedure Laterality Date     APPENDECTOMY       ARTHRODESIS FOOT  5/1/2014    Procedure: ARTHRODESIS FOOT;  Surgeon: Sid Marks DPM;  Location: [facility] OR     ARTHROSCOPY ANKLE, OPEN REPAIR  LIGAMENT, COMBINED  5/31/2012    Procedure:COMBINED ARTHROSCOPY ANKLE, OPEN REPAIR LIGAMENT; LEFT ANKLE ARTHROSCOPY, LATERAL LIGAMENT RECONSTRUCTION, ENDOSCOPIC KIRIT NAQVI SECOND TOE RAY CORRECTION    LEFT KNEE Marcaine AND CORTIZONE INJECTION, 5 CC Marcaine, 1 CC CELESTONE, ; Surgeon:VARSHA WILDER; Location:[facility]     BLADDER SURGERY       CATARACT IOL, RT/LT       CHOLECYSTECTOMY       CYSTOSCOPY       EXCISE MASS FOOT  12/4/2012    Procedure: EXCISE MASS FOOT;;  Surgeon: Varsha Wilder MD;  Location: [facility]     HYSTERECTOMY TOTAL ABDOMINAL      ovaries are in     RELEASE TENDON TOE  5/1/2014    Procedure: RELEASE TENDON TOE;  Surgeon: Sid Marks DPM;  Location: [facility]     REMOVE HARDWARE FOOT  12/4/2012    Procedure: REMOVE HARDWARE FOOT;  REMOVAL HARDWARE(SYNTHES SCREW) LEFT FOOT, EXCISION OF INCLUSION CYST;  Surgeon: Varsha Wilder MD;  Location: [facility]     REPAIR HAMMER TOE  5/1/2014    Procedure: REPAIR HAMMER TOE;  Surgeon: Sid Marks DPM;  Location: [facility]     REVERSE ARTHROPLASTY SHOULDER Right 7/18/2016    Procedure: REVERSE ARTHROPLASTY SHOULDER;  Surgeon: Marlo Alejandro MD;  Location: [facility] OR       FAMILY HISTORY:  Family History   Problem Relation Age of Onset     Lipids Mother      Diabetes Mother      Circulatory Mother         Kidney disease     Diabetes Brother      Cancer - colorectal Brother      Breast Cancer Sister      Thyroid Disease Daughter      Cancer - colorectal Brother      Alcohol/Drug Brother        SOCIAL HISTORY:  Social History     Socioeconomic History     Marital status:      Spouse name: None     Number of children: None     Years of education: None     Highest education level: None   Occupational History     None   Social Needs     Financial resource strain: None     Food insecurity     Worry: None     Inability: None     Transportation needs     Medical: None     Non-medical: None   Tobacco Use     Smoking status:  "Former Smoker     Packs/day: 1.00     Years: 20.00     Pack years: 20.00     Types: Cigarettes     Start date:      Quit date:      Years since quittin.1     Smokeless tobacco: Never Used   Substance and Sexual Activity     Alcohol use: No     Drug use: No     Sexual activity: Never   Lifestyle     Physical activity     Days per week: None     Minutes per session: None     Stress: None   Relationships     Social connections     Talks on phone: None     Gets together: None     Attends Anabaptism service: None     Active member of club or organization: None     Attends meetings of clubs or organizations: None     Relationship status: None     Intimate partner violence     Fear of current or ex partner: None     Emotionally abused: None     Physically abused: None     Forced sexual activity: None   Other Topics Concern     Parent/sibling w/ CABG, MI or angioplasty before 65F 55M? Not Asked   Social History Narrative     None       Review of Systems:  Skin:  Negative       Eyes:  Positive for glasses    ENT:  Positive for hearing loss wears hearing aids  Respiratory:  Negative       Cardiovascular:    Positive for;palpitations;lightheadedness;edema;fatigue had a couple falls  Gastroenterology: Negative      Genitourinary:  Positive for urinary tract infection    Musculoskeletal:  Positive for muscular weakness;arthritis    Neurologic:  Positive for headaches;numbness or tingling of hands;numbness or tingling of feet    Psychiatric:  Positive for sleep disturbances;anxiety    Heme/Lymph/Imm:  Positive for allergies    Endocrine:  Negative        Physical Exam:  Vitals: /60   Pulse 56   Ht 1.613 m (5' 3.5\")   Wt 68.9 kg (152 lb)   BMI 26.50 kg/m      Constitutional:  cooperative;well nourished        Skin:  warm and dry to the touch, no apparent skin lesions or masses noted venous stasis changes        Head:  normocephalic, no masses or lesions        Eyes:  pupils equal and round        Lymph:No " Cervical lymphadenopathy present     ENT:  no pallor or cyanosis, dentition good        Neck:  carotid pulses are full and equal bilaterally, JVP normal, no carotid bruit        Respiratory:  normal breath sounds, clear to auscultation, normal A-P diameter, normal symmetry, normal respiratory excursion, no use of accessory muscles         Cardiac: regular rhythm, normal S1/S2, no S3 or S4, apical impulse not displaced, no murmurs, gallops or rubs                  pulses below the femoral arteries are diminished                                      GI:  abdomen soft, non-tender, BS normoactive, no mass, no HSM, no bruits        Extremities and Muscular Skeletal:  no deformities, clubbing, cyanosis, erythema observed              Neurological:  affect appropriate        Psych:  Alert and Oriented x 3        CC  No referring provider defined for this encounter.                  Thank you for allowing me to participate in the care of your patient.      Sincerely,     Patito Rey MD     [facility]  cc:   No referring provider defined for this encounter.

## 2021-02-24 NOTE — LETTER
2/24/2021      Veronica Davis MD  [address] [facility] [id number]      RE: Glendy Howardcolin       Dear Colleague,    I had the pleasure of seeing Glendy Díaz in the [facility].    Service Date: 02/24/2021      CLINIC NOTE      HISTORY OF PRESENT ILLNESS:  I saw Ms. Díaz for followup of atrial fibrillation.  She is an 87-year-old white female who was scheduled for an EP study and SVT ablation on 01/12.  I reviewed her EKG and suspected atrial fibrillation instead of SVT.  The EP procedure was canceled.  The patient was put back on amiodarone and had a Zio Patch monitor.  The Zio Patch monitor confirmed atrial fibrillation.  Unfortunately, while on amiodarone, she continued to have 40% of atrial fibrillation.  On the other hand, during atrial fibrillation while on amiodarone, her heart rate was much slower; ranged from  beats per minute.  She was found to have 1 episode of sinus asystole up to 5.6 seconds upon conversion of atrial fibrillation.  She did not have apparent symptoms during that time.  She is currently living in an assisted-living facility with a complaint of fatigue.  She has no shortness of breath and does not feel palpitations.  There is no syncope or near syncope.  She denies dizziness.      PHYSICAL EXAMINATION:   VITAL SIGNS:  Blood pressure was 126/60, heart rate 63 beats per minute, body weight 152 pounds.   GENERAL:  She came to the clinic with a walker, accompanied by her daughter.   CARDIOVASCULAR EXAMINATION:  Showed no remarkable abnormalities.      ASSESSMENT AND RECOMMENDATIONS:  Ms. Díaz is an 87-year-old white female with paroxysmal atrial fibrillation.  She could not maintain sinus rhythm on amiodarone therapy.  I asked her to discontinue amiodarone.  I added Cardizem- mg p.o. daily for rate control.  She will have a repeat Zio Patch monitor in 1 week.  If the rate is controlled without  severe bradycardia, we may continue that as a long-term therapy.  If she continues to have sinus asystole, we may recommend pacemaker implantation with continuation of rate control medication.  The patient has had frequent falls, according to the daughter.  Therefore, the consideration of anticoagulation is deferred, pending her stability in the near future.  I plan to see her for followup in about 1 month.      cc:   Veronica Davis MD    [facility]    [address], [address]   [place] [address]         IZZY ORELLANA MD             D: 2021   T: 2021   MT: LIS      Name:     LORETTA HENRIQUEZ   MRN:      [id number]        Account:      [id number]   :      1933           Service Date: 2021      Document: [id number]        Outpatient Encounter Medications as of 2021   Medication Sig Dispense Refill     amoxicillin (AMOXIL) 500 MG capsule Take 500 mg by mouth 3 times daily        ASPIRIN 81 PO Take 81 mg by mouth At Bedtime       carvedilol (COREG) 6.25 MG tablet Take 9.375 mg by mouth 2 times daily (with meals)       cetirizine (ZYRTEC) 10 MG tablet Take 10 mg by mouth daily as needed  90 tablet 3     chlorhexidine (PERIDEX) 0.12 % solution Take 15 mLs by mouth 2 times daily        Cholecalciferol (VITAMIN D) 2000 UNITS tablet Take 2,000 Units by mouth daily 100 tablet 3     diltiazem ER COATED BEADS (CARDIZEM CD) 180 MG 24 hr capsule Take 1 capsule (180 mg) by mouth daily 60 capsule 3     fluticasone (FLONASE) 50 MCG/ACT nasal spray Spray 1 spray into both nostrils daily as needed for rhinitis or allergies       gabapentin (NEURONTIN) 100 MG capsule Give 100 mg @ 1400 and 200 mg @ HS       HYDROcodone-acetaminophen (NORCO) 5-325 MG tablet Take 1 tablet by mouth every 4 hours as needed        ibuprofen (ADVIL/MOTRIN) 400 MG tablet Take 400 mg by mouth every 6 hours as needed        MYRBETRIQ 50 MG 24 hr tablet TAKE 1 TABLET BY MOUTH  DAILY 90 tablet 3      polyethylene glycol (MIRALAX) 17 g packet Take 1 packet by mouth daily as needed for constipation       senna-docusate (SENOKOT-S/PERICOLACE) 8.6-50 MG tablet Take 1 tablet by mouth daily as needed for constipation       sulfamethoxazole-trimethoprim (BACTRIM DS) 800-160 MG tablet Take 1 tablet by mouth 2 times daily        traMADol (ULTRAM) 50 MG tablet Take 50 mg by mouth 2 times daily as needed for severe pain       [DISCONTINUED] amiodarone (PACERONE) 200 MG tablet Take 200 mg by mouth daily       No facility-administered encounter medications on file as of 2/24/2021.        Again, thank you for allowing me to participate in the care of your patient.      Sincerely,    Patito Rey MD     [facility]

## 2021-03-02 ENCOUNTER — APPOINTMENT (OUTPATIENT)
Dept: GENERAL RADIOLOGY | Facility: CLINIC | Age: 86
End: 2021-03-02
Attending: EMERGENCY MEDICINE
Payer: MEDICARE

## 2021-03-02 ENCOUNTER — TELEPHONE (OUTPATIENT)
Dept: CARDIOLOGY | Facility: CLINIC | Age: 86
End: 2021-03-02

## 2021-03-02 ENCOUNTER — HOSPITAL ENCOUNTER (EMERGENCY)
Facility: CLINIC | Age: 86
Discharge: ANOTHER HOSPITAL | End: 2021-03-02
Attending: EMERGENCY MEDICINE | Admitting: EMERGENCY MEDICINE
Payer: MEDICARE

## 2021-03-02 ENCOUNTER — HOSPITAL ENCOUNTER (INPATIENT)
Facility: CLINIC | Age: 86
LOS: 3 days | Discharge: NURSING, REHAB OR LONG-TERM CARE | DRG: 242 | End: 2021-03-05
Attending: STUDENT IN AN ORGANIZED HEALTH CARE EDUCATION/TRAINING PROGRAM | Admitting: HOSPITALIST
Payer: MEDICARE

## 2021-03-02 ENCOUNTER — MEDICAL CORRESPONDENCE (OUTPATIENT)
Dept: HEALTH INFORMATION MANAGEMENT | Facility: CLINIC | Age: 86
End: 2021-03-02

## 2021-03-02 VITALS
HEART RATE: 30 BPM | OXYGEN SATURATION: 94 % | DIASTOLIC BLOOD PRESSURE: 50 MMHG | SYSTOLIC BLOOD PRESSURE: 107 MMHG | RESPIRATION RATE: 15 BRPM | TEMPERATURE: 98 F

## 2021-03-02 DIAGNOSIS — R00.1 BRADYCARDIA: ICD-10-CM

## 2021-03-02 DIAGNOSIS — M62.81 GENERALIZED MUSCLE WEAKNESS: ICD-10-CM

## 2021-03-02 DIAGNOSIS — R00.1 BRADYCARDIA: Primary | ICD-10-CM

## 2021-03-02 DIAGNOSIS — I48.0 PAROXYSMAL ATRIAL FIBRILLATION (H): ICD-10-CM

## 2021-03-02 DIAGNOSIS — M54.2 NECK PAIN, CHRONIC: ICD-10-CM

## 2021-03-02 DIAGNOSIS — G89.29 NECK PAIN, CHRONIC: ICD-10-CM

## 2021-03-02 DIAGNOSIS — I48.0 PAROXYSMAL A-FIB (H): ICD-10-CM

## 2021-03-02 LAB
ABO + RH BLD: NORMAL
ABO + RH BLD: NORMAL
ANION GAP SERPL CALCULATED.3IONS-SCNC: 7 MMOL/L (ref 3–14)
BASOPHILS # BLD AUTO: 0.1 10E9/L (ref 0–0.2)
BASOPHILS NFR BLD AUTO: 0.6 %
BLD GP AB SCN SERPL QL: NORMAL
BLOOD BANK CMNT PATIENT-IMP: NORMAL
BUN SERPL-MCNC: 30 MG/DL (ref 7–30)
CALCIUM SERPL-MCNC: 8.8 MG/DL (ref 8.5–10.1)
CHLORIDE SERPL-SCNC: 105 MMOL/L (ref 94–109)
CO2 SERPL-SCNC: 22 MMOL/L (ref 20–32)
CREAT SERPL-MCNC: 2.16 MG/DL (ref 0.52–1.04)
DIFFERENTIAL METHOD BLD: ABNORMAL
EOSINOPHIL # BLD AUTO: 0.2 10E9/L (ref 0–0.7)
EOSINOPHIL NFR BLD AUTO: 2.2 %
ERYTHROCYTE [DISTWIDTH] IN BLOOD BY AUTOMATED COUNT: 15.6 % (ref 10–15)
GFR SERPL CREATININE-BSD FRML MDRD: 20 ML/MIN/{1.73_M2}
GLUCOSE BLDC GLUCOMTR-MCNC: 103 MG/DL (ref 70–99)
GLUCOSE SERPL-MCNC: 119 MG/DL (ref 70–99)
HCT VFR BLD AUTO: 32.4 % (ref 35–47)
HGB BLD-MCNC: 9.5 G/DL (ref 11.7–15.7)
IMM GRANULOCYTES # BLD: 0 10E9/L (ref 0–0.4)
IMM GRANULOCYTES NFR BLD: 0.4 %
INR PPP: 1.03 (ref 0.86–1.14)
LABORATORY COMMENT REPORT: ABNORMAL
LACTATE BLD-SCNC: 2 MMOL/L (ref 0.7–2)
LYMPHOCYTES # BLD AUTO: 3 10E9/L (ref 0.8–5.3)
LYMPHOCYTES NFR BLD AUTO: 38.5 %
MAGNESIUM SERPL-MCNC: 2.3 MG/DL (ref 1.6–2.3)
MCH RBC QN AUTO: 31.3 PG (ref 26.5–33)
MCHC RBC AUTO-ENTMCNC: 29.3 G/DL (ref 31.5–36.5)
MCV RBC AUTO: 107 FL (ref 78–100)
MONOCYTES # BLD AUTO: 0.7 10E9/L (ref 0–1.3)
MONOCYTES NFR BLD AUTO: 8.7 %
NEUTROPHILS # BLD AUTO: 3.9 10E9/L (ref 1.6–8.3)
NEUTROPHILS NFR BLD AUTO: 49.6 %
NRBC # BLD AUTO: 0 10*3/UL
NRBC BLD AUTO-RTO: 0 /100
PLATELET # BLD AUTO: 224 10E9/L (ref 150–450)
POTASSIUM SERPL-SCNC: 4.8 MMOL/L (ref 3.4–5.3)
RBC # BLD AUTO: 3.04 10E12/L (ref 3.8–5.2)
SARS-COV-2 RNA RESP QL NAA+PROBE: POSITIVE
SODIUM SERPL-SCNC: 134 MMOL/L (ref 133–144)
SPECIMEN EXP DATE BLD: NORMAL
SPECIMEN SOURCE: ABNORMAL
TROPONIN I SERPL-MCNC: <0.015 UG/L (ref 0–0.04)
WBC # BLD AUTO: 7.8 10E9/L (ref 4–11)

## 2021-03-02 PROCEDURE — C9113 INJ PANTOPRAZOLE SODIUM, VIA: HCPCS | Performed by: EMERGENCY MEDICINE

## 2021-03-02 PROCEDURE — 250N000011 HC RX IP 250 OP 636: Performed by: EMERGENCY MEDICINE

## 2021-03-02 PROCEDURE — 86900 BLOOD TYPING SEROLOGIC ABO: CPT | Performed by: EMERGENCY MEDICINE

## 2021-03-02 PROCEDURE — 86901 BLOOD TYPING SEROLOGIC RH(D): CPT | Performed by: EMERGENCY MEDICINE

## 2021-03-02 PROCEDURE — 84443 ASSAY THYROID STIM HORMONE: CPT | Performed by: HOSPITALIST

## 2021-03-02 PROCEDURE — 200N000001 HC R&B ICU

## 2021-03-02 PROCEDURE — 87635 SARS-COV-2 COVID-19 AMP PRB: CPT | Performed by: EMERGENCY MEDICINE

## 2021-03-02 PROCEDURE — 250N000011 HC RX IP 250 OP 636: Performed by: INTERNAL MEDICINE

## 2021-03-02 PROCEDURE — 83735 ASSAY OF MAGNESIUM: CPT | Performed by: EMERGENCY MEDICINE

## 2021-03-02 PROCEDURE — 85610 PROTHROMBIN TIME: CPT | Performed by: EMERGENCY MEDICINE

## 2021-03-02 PROCEDURE — 250N000011 HC RX IP 250 OP 636

## 2021-03-02 PROCEDURE — 96361 HYDRATE IV INFUSION ADD-ON: CPT

## 2021-03-02 PROCEDURE — 36415 COLL VENOUS BLD VENIPUNCTURE: CPT | Performed by: EMERGENCY MEDICINE

## 2021-03-02 PROCEDURE — 96375 TX/PRO/DX INJ NEW DRUG ADDON: CPT

## 2021-03-02 PROCEDURE — 99285 EMERGENCY DEPT VISIT HI MDM: CPT | Mod: 25

## 2021-03-02 PROCEDURE — 80048 BASIC METABOLIC PNL TOTAL CA: CPT | Performed by: EMERGENCY MEDICINE

## 2021-03-02 PROCEDURE — 258N000003 HC RX IP 258 OP 636: Performed by: EMERGENCY MEDICINE

## 2021-03-02 PROCEDURE — 86850 RBC ANTIBODY SCREEN: CPT | Performed by: EMERGENCY MEDICINE

## 2021-03-02 PROCEDURE — 96374 THER/PROPH/DIAG INJ IV PUSH: CPT

## 2021-03-02 PROCEDURE — 258N000003 HC RX IP 258 OP 636: Performed by: HOSPITALIST

## 2021-03-02 PROCEDURE — 93005 ELECTROCARDIOGRAM TRACING: CPT

## 2021-03-02 PROCEDURE — 999N001017 HC STATISTIC GLUCOSE BY METER IP

## 2021-03-02 PROCEDURE — 250N000013 HC RX MED GY IP 250 OP 250 PS 637: Performed by: HOSPITALIST

## 2021-03-02 PROCEDURE — 85025 COMPLETE CBC W/AUTO DIFF WBC: CPT | Performed by: EMERGENCY MEDICINE

## 2021-03-02 PROCEDURE — 36415 COLL VENOUS BLD VENIPUNCTURE: CPT | Performed by: HOSPITALIST

## 2021-03-02 PROCEDURE — 83605 ASSAY OF LACTIC ACID: CPT | Performed by: EMERGENCY MEDICINE

## 2021-03-02 PROCEDURE — 71045 X-RAY EXAM CHEST 1 VIEW: CPT

## 2021-03-02 PROCEDURE — C9803 HOPD COVID-19 SPEC COLLECT: HCPCS

## 2021-03-02 PROCEDURE — 99223 1ST HOSP IP/OBS HIGH 75: CPT | Mod: AI | Performed by: HOSPITALIST

## 2021-03-02 PROCEDURE — 84484 ASSAY OF TROPONIN QUANT: CPT | Performed by: EMERGENCY MEDICINE

## 2021-03-02 RX ORDER — ASPIRIN 81 MG/1
81 TABLET ORAL AT BEDTIME
COMMUNITY
End: 2022-01-01

## 2021-03-02 RX ORDER — GABAPENTIN 100 MG/1
100 CAPSULE ORAL EVERY MORNING
Status: DISCONTINUED | OUTPATIENT
Start: 2021-03-03 | End: 2021-03-05 | Stop reason: HOSPADM

## 2021-03-02 RX ORDER — ONDANSETRON 4 MG/1
4 TABLET, ORALLY DISINTEGRATING ORAL EVERY 6 HOURS PRN
Status: DISCONTINUED | OUTPATIENT
Start: 2021-03-02 | End: 2021-03-05 | Stop reason: HOSPADM

## 2021-03-02 RX ORDER — ACETAMINOPHEN 500 MG
1000 TABLET ORAL EVERY 8 HOURS PRN
COMMUNITY
End: 2022-01-01

## 2021-03-02 RX ORDER — ATROPINE SULFATE 0.1 MG/ML
0.5 INJECTION INTRAVENOUS ONCE
Status: COMPLETED | OUTPATIENT
Start: 2021-03-02 | End: 2021-03-02

## 2021-03-02 RX ORDER — ASPIRIN 81 MG/1
81 TABLET ORAL AT BEDTIME
Status: DISCONTINUED | OUTPATIENT
Start: 2021-03-02 | End: 2021-03-05 | Stop reason: HOSPADM

## 2021-03-02 RX ORDER — LIDOCAINE 40 MG/G
CREAM TOPICAL
Status: DISCONTINUED | OUTPATIENT
Start: 2021-03-02 | End: 2021-03-05 | Stop reason: HOSPADM

## 2021-03-02 RX ORDER — DILTIAZEM HYDROCHLORIDE 180 MG/1
180 CAPSULE, EXTENDED RELEASE ORAL DAILY
Status: ON HOLD | COMMUNITY
End: 2021-03-05

## 2021-03-02 RX ORDER — GABAPENTIN 100 MG/1
200 CAPSULE ORAL AT BEDTIME
Status: DISCONTINUED | OUTPATIENT
Start: 2021-03-02 | End: 2021-03-05 | Stop reason: HOSPADM

## 2021-03-02 RX ORDER — LORAZEPAM 2 MG/ML
0.5 INJECTION INTRAMUSCULAR ONCE
Status: COMPLETED | OUTPATIENT
Start: 2021-03-03 | End: 2021-03-02

## 2021-03-02 RX ORDER — ATROPINE SULFATE 0.1 MG/ML
INJECTION INTRAVENOUS
Status: COMPLETED
Start: 2021-03-02 | End: 2021-03-02

## 2021-03-02 RX ORDER — CYANOCOBALAMIN (VITAMIN B-12) 2500 MCG
2500 TABLET, SUBLINGUAL SUBLINGUAL DAILY
COMMUNITY
End: 2022-01-01

## 2021-03-02 RX ORDER — DILTIAZEM HYDROCHLORIDE 120 MG/1
120 CAPSULE, COATED, EXTENDED RELEASE ORAL DAILY
Status: ON HOLD | COMMUNITY
Start: 2021-03-02 | End: 2021-03-02

## 2021-03-02 RX ORDER — GABAPENTIN 100 MG/1
200 CAPSULE ORAL AT BEDTIME
COMMUNITY
End: 2021-10-18

## 2021-03-02 RX ORDER — ACETAMINOPHEN 325 MG/1
650 TABLET ORAL EVERY 8 HOURS PRN
Status: DISCONTINUED | OUTPATIENT
Start: 2021-03-02 | End: 2021-03-05 | Stop reason: HOSPADM

## 2021-03-02 RX ORDER — ONDANSETRON 2 MG/ML
4 INJECTION INTRAMUSCULAR; INTRAVENOUS EVERY 6 HOURS PRN
Status: DISCONTINUED | OUTPATIENT
Start: 2021-03-02 | End: 2021-03-05 | Stop reason: HOSPADM

## 2021-03-02 RX ADMIN — ATROPINE SULFATE 0.5 MG: 0.1 INJECTION INTRAVENOUS at 17:41

## 2021-03-02 RX ADMIN — SODIUM CHLORIDE 250 ML: 9 INJECTION, SOLUTION INTRAVENOUS at 22:15

## 2021-03-02 RX ADMIN — ATROPINE SULFATE 0.5 MG: 0.1 INJECTION, SOLUTION ENDOTRACHEAL; INTRAMUSCULAR; INTRAVENOUS; SUBCUTANEOUS at 17:41

## 2021-03-02 RX ADMIN — LORAZEPAM 0.5 MG: 2 INJECTION INTRAMUSCULAR; INTRAVENOUS at 23:53

## 2021-03-02 RX ADMIN — PANTOPRAZOLE SODIUM 40 MG: 40 INJECTION, POWDER, FOR SOLUTION INTRAVENOUS at 18:27

## 2021-03-02 RX ADMIN — ATROPINE SULFATE 1 MG: 0.1 INJECTION PARENTERAL at 20:36

## 2021-03-02 RX ADMIN — GABAPENTIN 200 MG: 100 CAPSULE ORAL at 22:15

## 2021-03-02 RX ADMIN — ASPIRIN 81 MG: 81 TABLET ORAL at 22:15

## 2021-03-02 RX ADMIN — SODIUM CHLORIDE 1000 ML: 9 INJECTION, SOLUTION INTRAVENOUS at 17:44

## 2021-03-02 ASSESSMENT — ENCOUNTER SYMPTOMS
SHORTNESS OF BREATH: 0
WEAKNESS: 1
BACK PAIN: 1
DIZZINESS: 1

## 2021-03-02 NOTE — ED PROVIDER NOTES
History     Chief Complaint:  Bradycardia and Dizziness       HPI  Glendy Díaz is a 87 year old female with a history of atrial fibrillation, CKD, HTN, and hyperlipidemia who presents for evaluation of bradycardia and dizziness via EMS. The patient arrives from a senior living facility after not feeling well for the past three days. Patient was started on diltiazem on Friday for atrial fibrillation. He has been feeling weak and has had difficulty ambulating. She denies any shortness of breath or chest pain, but has back pain. Per EMS, the staff was having low blood pressures and notices her heart rate to her in the 30's.     Review of Systems   Respiratory: Negative for shortness of breath.    Cardiovascular: Negative for chest pain.   Musculoskeletal: Positive for back pain and gait problem.   Neurological: Positive for dizziness and weakness.   All other systems reviewed and are negative.    Allergies:  Amlodipine  Fosamax   Lisinopril  Pravastatin  Simvastatin    Medications:   Amoxil   Aspirin 81   Zyrtec  Peridex   Vitamin D  Cardizem   Flonase  Neurotonin   Norco  Senna-docusate  Ultram  Bactrim  Miralax     Medical History:   Leg weakness, bilateral  Osteopenia  Hyperlipidemia  Chronic kidney disease stage III  Peripheral neuropathy  Hematuria  Anxiety  Osteoarthrosis of knee  Medial meniscus tear  Closed fracture of tibial plateau  Hyperparathyroidism   Anemia due to vitamin B12 deficiency, unspecified B12 deficiency type  Hypertension  Subclinical hyperthyroidism  Supraventricular tachycardia  Generalized muscle weakness  2019 novel coronavirus disease  Supraventricular tachycardia  Pneumonia due to infectious organism, unspecified laterality, unspecified part of lung  Incontinence  Hematuria  Mumps     Past Surgical History:    Appendectomy  Foot arthrodesis  Ankle arthroscopy, open repair ligament, combined  Bladder surgery  Cataract surgery, bilateral  Cholecystectomy  Cystoscopy  Foot mass  excision  Hysterectomy total abdominal  Release tendon toe  Removal of foot hardware  Repair hammer toe  Reverse arthroplasty shoulder      Family History:    Diabetes  Lipids  Kidney disease  Colorectal cancer  Thyroid disease  Breast cancer   Alcohol/drug    Social History:  The patient was accompanied to the ED by EMS.  The patient lives in a senior living facility.   PCP: Veronica Davis     Physical Exam     Patient Vitals for the past 24 hrs:   BP Temp Temp src Pulse Resp SpO2   03/02/21 1840 97/54 -- -- (!) 33 19 96 %   03/02/21 1830 118/68 -- -- (!) 34 18 95 %   03/02/21 1820 127/55 -- -- -- -- --   03/02/21 1810 119/52 -- -- (!) 35 14 95 %   03/02/21 1800 116/54 -- -- (!) 36 12 95 %   03/02/21 1750 130/59 -- -- -- 17 94 %   03/02/21 1740 137/62 -- -- (!) 41 -- 97 %   03/02/21 1731 (!) 85/54 98  F (36.7  C) Oral (!) 31 24 97 %   03/02/21 1730 (!) 85/54 -- -- (!) 31 -- --      Physical Exam  General: Sitting up in bed  Eyes:  The pupils are equal and round    Conjunctivae and sclerae are normal  ENT:    Wearing a mask  Neck:  Normal range of motion  CV:  Bradycardic rate, regular rhythm    Skin warm and well perfused   Resp:  Non labored breathing on room air    No tachypnea    No cough heard  GI:  Abdomen is soft, there is no rigidity    No distension    No rebound tenderness     No abdominal tenderness  MS:  Normal muscular tone  Skin:  No rash or acute skin lesions noted  Neuro:   Awake, alert.      Speech is normal and fluent.    Face is symmetric.     Moves all extremities equally  Psych: Normal affect.  Appropriate interactions.    Emergency Department Course   ECG  ECG taken at 1736  Junctional bradycardia  Left anterior fascicular block  Minimal voltage criteria for LVH, may be normal variant  Possible anterior infarct, age undetermined  Abnormal ECG  Rate 30 bpm. WA interval * ms. QRS duration 104 ms. QT/QTc 566/399 ms. P-R-T axes * -46 18.     Imaging:  XR Chest Port 1 View  Upper  normal heart size. No evidence for CHF or pneumonia. Low lung volumes with mild atelectasis at the lung bases. No pleural effusion or pneumothorax. Prior right shoulder replacement.  Reading per radiology     Laboratory:  CBC: WBC 7.8, HGB 9.5 (L),   BMP: glucose 119 (H), creatinine 2.16 (H), GFR estimate 2.16 (H) o/w WNL   Troponin (Collected 137): <0.015  Lactic acid (Resulted 1737): 2.0  INR: 1.03  Magnesium: 2.3  ABO/Rh type and screen: AB positive  Asymptomatic COVID-19 PCR: Positive (!)    Emergency Department Course:  Reviewed:  1735 I reviewed nursing notes, vitals, past medical history and care everywhere    Assessments:  1815 I rechecked the patient.     1900 I rechecked the patient and her blood pressure was 130 and her heart rate was 35 bpm.    Consults:   1801 I spoke with Dr. Abel of the cardiology service from [facility] regarding patient's presentation, findings, and plan of care.     1818 I spoke with Dr. Castillo of the hospitalist service from [facility] regarding patient's presentation, findings, and plan of care.     Interventions:  1741 atropine infection 0.5 mg IV  1744 0.9% NS 1000 mL IV  1827 Protonix 40 mg IV    Disposition:  The patient was transferred to [facility]. Dr. Castillo accepted the patient for transfer.   Impression & Plan   Covid-19  Glendy Díaz was evaluated during a global COVID-19 pandemic, which necessitated consideration that the patient might be at risk for infection with the SARS-CoV-2 virus that causes COVID-19.   Applicable protocols for evaluation were followed during the patient's care.   COVID-19 was considered as part of the patient's evaluation. The plan for testing is:  a test was obtained during this visit.    Medical Decision Making:  Glendy Díaz is a 87 year old female who presented to the ED with bradycardia. Patient with bradycardia after starting diltiazem. Also on coreg. EKG with findings concerning for 3rd degree block versus junctional escape  rhythm. Blood pressure mildly soft initially that improved with 1x atropine and IV fluids. Labs with mild anemia and mild elevation of creatinine. Daughter reports some dark stools a few weeks ago. She is not on any anticoagulation. No abdominal tenderness. Spoke to cardiology who recommended transfer to [facility] for likely pacemaker. Discussed with hospitalist who accepted her for transfer. Pacer pads on patient while in ED but did not require pacing in ED.     Critical Care time was 30 minutes for this patient excluding procedures.     Diagnosis:     ICD-10-CM    1. Bradycardia  R00.1 CBC with platelets differential     INR     Basic metabolic panel     Troponin I     Lactic acid whole blood     Asymptomatic SARS-CoV-2 COVID-19 Virus (Coronavirus) by PCR     ABO/Rh type and screen     Magnesium     Magnesium     CANCELED: Magnesium   2. Generalized muscle weakness  M62.81       Scribe Disclosure:  I, Breana Rob, am serving as a scribe at 5:33 PM on 3/2/2021 to document services personally performed by Marah Rodas MD based on my observations and the provider's statements to me.     [facility] Marah Stoner MD  03/03/21 0009

## 2021-03-02 NOTE — ED TRIAGE NOTES
"Pt arrives to the ED via EMS from senior living facility due to feeling \"unwell\" for past 3 days. Pt states she was put on diltiazem on Friday for SVT. Pt states that today she felt very weak at home and had difficulty ambulating. Pt did have some dizziness when standing for EMS. Denies SOB or chest pain. C/o of pain in back. Pt AOx4. Per EMS, staff was having some low BP's and noticed her HR to be in the 30's. Pt's HR in the 30's upon arrival.   "

## 2021-03-02 NOTE — TELEPHONE ENCOUNTER
Left detailed message on nurse line at AL facility for DARCY Wood with changes.  Sent fax signed order regarding changes to AL facility [phone].  Asked nursing staff to call in one week with update on how patient is feeling on lower dose. Medication list updated in epic  Will send faxed order to HIM to scan.  DARCY Gutiérrez

## 2021-03-02 NOTE — ED NOTES
Bed: ED33  Expected date:   Expected time:   Means of arrival:   Comments:  A593 86yo F bradycardia

## 2021-03-02 NOTE — ED NOTES
Pads placed onto patients chest with zoll at bedside due to pt's HR being in the 30's and initial low BP's. MD at bedside.

## 2021-03-02 NOTE — TELEPHONE ENCOUNTER
Received call from DARCY Wood to report that patient has been feeling unwell since starting diltiazem on 2/26, patient reports feeling nauseated and weak.  BP 95/45 HR 60 R 22.  Will update Dr Rey regarding above.  DARCY Gutiérrez

## 2021-03-03 ENCOUNTER — APPOINTMENT (OUTPATIENT)
Dept: GENERAL RADIOLOGY | Facility: CLINIC | Age: 86
DRG: 242 | End: 2021-03-03
Attending: INTERNAL MEDICINE
Payer: MEDICARE

## 2021-03-03 LAB
ANION GAP SERPL CALCULATED.3IONS-SCNC: 4 MMOL/L (ref 3–14)
BASE DEFICIT BLDA-SCNC: 2.7 MMOL/L
BASOPHILS # BLD AUTO: 0 10E9/L (ref 0–0.2)
BASOPHILS NFR BLD AUTO: 0.4 %
BUN SERPL-MCNC: 30 MG/DL (ref 7–30)
CALCIUM SERPL-MCNC: 9 MG/DL (ref 8.5–10.1)
CHLORIDE SERPL-SCNC: 110 MMOL/L (ref 94–109)
CO2 SERPL-SCNC: 25 MMOL/L (ref 20–32)
CREAT SERPL-MCNC: 1.93 MG/DL (ref 0.52–1.04)
DIFFERENTIAL METHOD BLD: ABNORMAL
EOSINOPHIL # BLD AUTO: 0.2 10E9/L (ref 0–0.7)
EOSINOPHIL NFR BLD AUTO: 2.1 %
ERYTHROCYTE [DISTWIDTH] IN BLOOD BY AUTOMATED COUNT: 15.3 % (ref 10–15)
GFR SERPL CREATININE-BSD FRML MDRD: 23 ML/MIN/{1.73_M2}
GLUCOSE SERPL-MCNC: 88 MG/DL (ref 70–99)
HCO3 BLD-SCNC: 23 MMOL/L (ref 21–28)
HCT VFR BLD AUTO: 32.5 % (ref 35–47)
HGB BLD-MCNC: 10.3 G/DL (ref 11.7–15.7)
IMM GRANULOCYTES # BLD: 0 10E9/L (ref 0–0.4)
IMM GRANULOCYTES NFR BLD: 0.5 %
INTERPRETATION ECG - MUSE: NORMAL
LYMPHOCYTES # BLD AUTO: 2.2 10E9/L (ref 0.8–5.3)
LYMPHOCYTES NFR BLD AUTO: 27.2 %
MCH RBC QN AUTO: 31.7 PG (ref 26.5–33)
MCHC RBC AUTO-ENTMCNC: 31.7 G/DL (ref 31.5–36.5)
MCV RBC AUTO: 100 FL (ref 78–100)
MONOCYTES # BLD AUTO: 0.6 10E9/L (ref 0–1.3)
MONOCYTES NFR BLD AUTO: 7.5 %
NEUTROPHILS # BLD AUTO: 5 10E9/L (ref 1.6–8.3)
NEUTROPHILS NFR BLD AUTO: 62.3 %
NRBC # BLD AUTO: 0 10*3/UL
NRBC BLD AUTO-RTO: 0 /100
OXYHGB MFR BLD: 94 % (ref 92–100)
PCO2 BLD: 41 MM HG (ref 35–45)
PH BLD: 7.35 PH (ref 7.35–7.45)
PLATELET # BLD AUTO: 236 10E9/L (ref 150–450)
PO2 BLD: 73 MM HG (ref 80–105)
POTASSIUM SERPL-SCNC: 4.4 MMOL/L (ref 3.4–5.3)
RBC # BLD AUTO: 3.25 10E12/L (ref 3.8–5.2)
SODIUM SERPL-SCNC: 139 MMOL/L (ref 133–144)
TSH SERPL DL<=0.005 MIU/L-ACNC: 3.32 MU/L (ref 0.4–4)
WBC # BLD AUTO: 8 10E9/L (ref 4–11)

## 2021-03-03 PROCEDURE — 80048 BASIC METABOLIC PNL TOTAL CA: CPT | Performed by: HOSPITALIST

## 2021-03-03 PROCEDURE — C1785 PMKR, DUAL, RATE-RESP: HCPCS | Performed by: INTERNAL MEDICINE

## 2021-03-03 PROCEDURE — 250N000011 HC RX IP 250 OP 636: Performed by: INTERNAL MEDICINE

## 2021-03-03 PROCEDURE — 250N000013 HC RX MED GY IP 250 OP 250 PS 637: Performed by: INTERNAL MEDICINE

## 2021-03-03 PROCEDURE — C1898 LEAD, PMKR, OTHER THAN TRANS: HCPCS | Performed by: INTERNAL MEDICINE

## 2021-03-03 PROCEDURE — 210N000002 HC R&B HEART CARE

## 2021-03-03 PROCEDURE — 0JH606Z INSERTION OF PACEMAKER, DUAL CHAMBER INTO CHEST SUBCUTANEOUS TISSUE AND FASCIA, OPEN APPROACH: ICD-10-PCS | Performed by: INTERNAL MEDICINE

## 2021-03-03 PROCEDURE — 999N000157 HC STATISTIC RCP TIME EA 10 MIN

## 2021-03-03 PROCEDURE — C1769 GUIDE WIRE: HCPCS | Performed by: INTERNAL MEDICINE

## 2021-03-03 PROCEDURE — 99233 SBSQ HOSP IP/OBS HIGH 50: CPT | Performed by: HOSPITALIST

## 2021-03-03 PROCEDURE — 99152 MOD SED SAME PHYS/QHP 5/>YRS: CPT | Performed by: INTERNAL MEDICINE

## 2021-03-03 PROCEDURE — 33208 INSRT HEART PM ATRIAL & VENT: CPT | Mod: KX | Performed by: INTERNAL MEDICINE

## 2021-03-03 PROCEDURE — 250N000009 HC RX 250: Performed by: INTERNAL MEDICINE

## 2021-03-03 PROCEDURE — 99153 MOD SED SAME PHYS/QHP EA: CPT | Performed by: INTERNAL MEDICINE

## 2021-03-03 PROCEDURE — 82805 BLOOD GASES W/O2 SATURATION: CPT | Performed by: INTERNAL MEDICINE

## 2021-03-03 PROCEDURE — 272N000001 HC OR GENERAL SUPPLY STERILE: Performed by: INTERNAL MEDICINE

## 2021-03-03 PROCEDURE — 02HK3JZ INSERTION OF PACEMAKER LEAD INTO RIGHT VENTRICLE, PERCUTANEOUS APPROACH: ICD-10-PCS | Performed by: INTERNAL MEDICINE

## 2021-03-03 PROCEDURE — 36415 COLL VENOUS BLD VENIPUNCTURE: CPT | Performed by: HOSPITALIST

## 2021-03-03 PROCEDURE — 02H63JZ INSERTION OF PACEMAKER LEAD INTO RIGHT ATRIUM, PERCUTANEOUS APPROACH: ICD-10-PCS | Performed by: INTERNAL MEDICINE

## 2021-03-03 PROCEDURE — 250N000013 HC RX MED GY IP 250 OP 250 PS 637: Performed by: HOSPITALIST

## 2021-03-03 PROCEDURE — 250N000011 HC RX IP 250 OP 636: Performed by: HOSPITALIST

## 2021-03-03 PROCEDURE — C1894 INTRO/SHEATH, NON-LASER: HCPCS | Performed by: INTERNAL MEDICINE

## 2021-03-03 PROCEDURE — 94640 AIRWAY INHALATION TREATMENT: CPT

## 2021-03-03 PROCEDURE — 85025 COMPLETE CBC W/AUTO DIFF WBC: CPT | Performed by: HOSPITALIST

## 2021-03-03 PROCEDURE — 99223 1ST HOSP IP/OBS HIGH 75: CPT | Mod: 25 | Performed by: INTERNAL MEDICINE

## 2021-03-03 PROCEDURE — 94640 AIRWAY INHALATION TREATMENT: CPT | Mod: 76

## 2021-03-03 PROCEDURE — 71045 X-RAY EXAM CHEST 1 VIEW: CPT

## 2021-03-03 DEVICE — LEAD INGEVITY+ AF IS1 7840 4CM: Type: IMPLANTABLE DEVICE | Status: FUNCTIONAL

## 2021-03-03 DEVICE — LEAD INGEVITY+ AF IS1 7841 52CM: Type: IMPLANTABLE DEVICE | Status: FUNCTIONAL

## 2021-03-03 DEVICE — PCMKR CARD ACCOLADE MRI EL DR: Type: IMPLANTABLE DEVICE | Status: FUNCTIONAL

## 2021-03-03 RX ORDER — ALBUTEROL SULFATE 90 UG/1
2 AEROSOL, METERED RESPIRATORY (INHALATION) EVERY 4 HOURS PRN
Status: DISCONTINUED | OUTPATIENT
Start: 2021-03-03 | End: 2021-03-05 | Stop reason: HOSPADM

## 2021-03-03 RX ORDER — SODIUM CHLORIDE 450 MG/100ML
INJECTION, SOLUTION INTRAVENOUS CONTINUOUS
Status: DISCONTINUED | OUTPATIENT
Start: 2021-03-03 | End: 2021-03-05 | Stop reason: HOSPADM

## 2021-03-03 RX ORDER — NALOXONE HYDROCHLORIDE 0.4 MG/ML
0.4 INJECTION, SOLUTION INTRAMUSCULAR; INTRAVENOUS; SUBCUTANEOUS
Status: DISCONTINUED | OUTPATIENT
Start: 2021-03-03 | End: 2021-03-05 | Stop reason: HOSPADM

## 2021-03-03 RX ORDER — HEPARIN SODIUM 200 [USP'U]/100ML
100-600 INJECTION, SOLUTION INTRAVENOUS CONTINUOUS PRN
Status: DISCONTINUED | OUTPATIENT
Start: 2021-03-03 | End: 2021-03-03 | Stop reason: HOSPADM

## 2021-03-03 RX ORDER — IPRATROPIUM BROMIDE AND ALBUTEROL SULFATE 2.5; .5 MG/3ML; MG/3ML
3 SOLUTION RESPIRATORY (INHALATION) EVERY 4 HOURS
Status: DISCONTINUED | OUTPATIENT
Start: 2021-03-03 | End: 2021-03-03

## 2021-03-03 RX ORDER — FUROSEMIDE 10 MG/ML
10 INJECTION INTRAMUSCULAR; INTRAVENOUS ONCE
Status: COMPLETED | OUTPATIENT
Start: 2021-03-03 | End: 2021-03-03

## 2021-03-03 RX ORDER — DOBUTAMINE HYDROCHLORIDE 200 MG/100ML
5-40 INJECTION INTRAVENOUS CONTINUOUS PRN
Status: DISCONTINUED | OUTPATIENT
Start: 2021-03-03 | End: 2021-03-03 | Stop reason: HOSPADM

## 2021-03-03 RX ORDER — FENTANYL CITRATE 50 UG/ML
INJECTION, SOLUTION INTRAMUSCULAR; INTRAVENOUS
Status: DISCONTINUED | OUTPATIENT
Start: 2021-03-03 | End: 2021-03-03 | Stop reason: HOSPADM

## 2021-03-03 RX ORDER — HEPARIN SODIUM 200 [USP'U]/100ML
100-500 INJECTION, SOLUTION INTRAVENOUS CONTINUOUS PRN
Status: DISCONTINUED | OUTPATIENT
Start: 2021-03-03 | End: 2021-03-03 | Stop reason: HOSPADM

## 2021-03-03 RX ORDER — NALOXONE HYDROCHLORIDE 0.4 MG/ML
0.2 INJECTION, SOLUTION INTRAMUSCULAR; INTRAVENOUS; SUBCUTANEOUS
Status: DISCONTINUED | OUTPATIENT
Start: 2021-03-03 | End: 2021-03-05 | Stop reason: HOSPADM

## 2021-03-03 RX ORDER — CEFAZOLIN SODIUM 1 G/3ML
1 INJECTION, POWDER, FOR SOLUTION INTRAMUSCULAR; INTRAVENOUS
Status: DISCONTINUED | OUTPATIENT
Start: 2021-03-03 | End: 2021-03-03 | Stop reason: HOSPADM

## 2021-03-03 RX ORDER — OXYCODONE AND ACETAMINOPHEN 5; 325 MG/1; MG/1
1 TABLET ORAL EVERY 4 HOURS PRN
Status: DISCONTINUED | OUTPATIENT
Start: 2021-03-03 | End: 2021-03-05 | Stop reason: HOSPADM

## 2021-03-03 RX ORDER — LIDOCAINE 40 MG/G
CREAM TOPICAL
Status: DISCONTINUED | OUTPATIENT
Start: 2021-03-03 | End: 2021-03-03

## 2021-03-03 RX ORDER — CEFAZOLIN SODIUM 2 G/100ML
2 INJECTION, SOLUTION INTRAVENOUS
Status: DISCONTINUED | OUTPATIENT
Start: 2021-03-03 | End: 2021-03-05 | Stop reason: HOSPADM

## 2021-03-03 RX ORDER — BUPIVACAINE HYDROCHLORIDE 2.5 MG/ML
INJECTION, SOLUTION EPIDURAL; INFILTRATION; INTRACAUDAL
Status: DISCONTINUED | OUTPATIENT
Start: 2021-03-03 | End: 2021-03-03 | Stop reason: HOSPADM

## 2021-03-03 RX ADMIN — GABAPENTIN 100 MG: 100 CAPSULE ORAL at 08:33

## 2021-03-03 RX ADMIN — FUROSEMIDE 10 MG: 10 INJECTION, SOLUTION INTRAVENOUS at 02:00

## 2021-03-03 RX ADMIN — IPRATROPIUM BROMIDE AND ALBUTEROL SULFATE 3 ML: .5; 3 SOLUTION RESPIRATORY (INHALATION) at 07:31

## 2021-03-03 RX ADMIN — IPRATROPIUM BROMIDE AND ALBUTEROL SULFATE 3 ML: .5; 3 SOLUTION RESPIRATORY (INHALATION) at 02:19

## 2021-03-03 RX ADMIN — ASPIRIN 81 MG: 81 TABLET ORAL at 22:17

## 2021-03-03 RX ADMIN — ACETAMINOPHEN 650 MG: 325 TABLET, FILM COATED ORAL at 13:57

## 2021-03-03 RX ADMIN — ONDANSETRON 4 MG: 4 TABLET, ORALLY DISINTEGRATING ORAL at 07:42

## 2021-03-03 RX ADMIN — QUETIAPINE 12.5 MG: 25 TABLET, FILM COATED ORAL at 13:57

## 2021-03-03 RX ADMIN — OXYCODONE HYDROCHLORIDE AND ACETAMINOPHEN 1 TABLET: 5; 325 TABLET ORAL at 22:17

## 2021-03-03 RX ADMIN — CEFAZOLIN 1 G: 1 INJECTION, POWDER, FOR SOLUTION INTRAMUSCULAR; INTRAVENOUS at 11:28

## 2021-03-03 RX ADMIN — GABAPENTIN 200 MG: 100 CAPSULE ORAL at 22:17

## 2021-03-03 ASSESSMENT — ACTIVITIES OF DAILY LIVING (ADL)
HEARING_DIFFICULTY_OR_DEAF: YES
DIFFICULTY_COMMUNICATING: YES
COMMUNICATION: DIFFICULTY SPEAKING
ADLS_ACUITY_SCORE: 20
DESCRIBE_HEARING_LOSS: BILATERAL HEARING LOSS
TOILETING_ISSUES: YES
EQUIPMENT_CURRENTLY_USED_AT_HOME: WALKER, ROLLING
ADLS_ACUITY_SCORE: 19
CONCENTRATING,_REMEMBERING_OR_MAKING_DECISIONS_DIFFICULTY: YES
WALKING_OR_CLIMBING_STAIRS_DIFFICULTY: YES
ADLS_ACUITY_SCORE: 20
DRESSING/BATHING_DIFFICULTY: YES
DRESSING/BATHING: BATHING DIFFICULTY, DEPENDENT;DRESSING DIFFICULTY, REQUIRES EQUIPMENT
PATIENT_/_FAMILY_COMMUNICATION_STYLE: SPOKEN LANGUAGE (ENGLISH OR BILINGUAL)
WEAR_GLASSES_OR_BLIND: YES
DOING_ERRANDS_INDEPENDENTLY_DIFFICULTY: YES
FALL_HISTORY_WITHIN_LAST_SIX_MONTHS: YES
VISION_MANAGEMENT: GLASSES
PATIENT'S_PREFERRED_MEANS_OF_COMMUNICATION: VERBAL
WERE_AUXILIARY_AIDS_OFFERED?: NO
ADLS_ACUITY_SCORE: 20
DIFFICULTY_EATING/SWALLOWING: NO
ADLS_ACUITY_SCORE: 19
THE_FOLLOWING_AIDS_WERE_PROVIDED;: PATIENT DECLINED OFFER OF AUXILIARY AIDS
WALKING_OR_CLIMBING_STAIRS: AMBULATION DIFFICULTY, REQUIRES EQUIPMENT

## 2021-03-03 NOTE — PROGRESS NOTES
Pt tele: SR w/ 1st degree block. LS coarse with crackles in bases. Tolerating 1L per NC well. Alert but confused to situation, pulled out two IVs, soft mitts applied. Up with Ax2 and gait belt to bedside commode x1, no stool. Good UOP per purewick catheter. Daughter Abigail at bedside updated on POC.    Pt sent to cath lab for pacemaker at 1100. To be transferred to heart center when procedure complete. Report called to intaking nurse.

## 2021-03-03 NOTE — CONSULTS
"CLINICAL NUTRITION SERVICES  -  ASSESSMENT NOTE      Recommendations Ordered by Registered Dietitian (RD):   Ordered Berry Boost Breeze and Cherry Gelatein for pt to trial   Future/Additional Recommendations:   Order shakes and/or smoothies once diet advanced >CL   Malnutrition:  % Weight Loss:  Weight loss does not meet criteria for malnutrition  % Intake:  No decreased intake noted  Subcutaneous Fat Loss:  Orbital region mild depletion  Muscle Loss:  Temporal region mild depletion  Fluid Retention:  None noted    Malnutrition Diagnosis: Patient does not meet two of the above criteria necessary for diagnosing malnutrition        REASON FOR ASSESSMENT  Glendy Díaz is a 87 year old female seen by Registered Dietitian for Provider Order - edentulous patient, likely needs supplements/ shakes    DX:    PMH:  Recovered COVID  A-fib  HTN  CKD  Hypothyroidism    NUTRITION HISTORY  - Information obtained from pt's daughter  Pt's daughter states she recently had to get all her teeth pulled out and is awaiting dentures so hasn't been able to eat normal textured foods for awhile. Pt comes from an [facility] and gets one meal provided to her but can't eat much of it due to her dentition. Pt's daughter has been provided some food to her (mashed potatoes, soups, applesauce, Jell-O) but is looking for more ideas to maximize her nutrition.       CURRENT NUTRITION ORDERS  Diet Order:     Clear liquids    Current Intake/Tolerance:  Pt's daughter reports that pt's appetite is good right now.      NUTRITION FOCUSED PHYSICAL ASSESSMENT FOR DIAGNOSING MALNUTRITION)  Yes               Observed:    Muscle wasting (refer to documentation in Malnutrition section) and Subcutaneous fat loss (refer to documentation in Malnutrition section)      ANTHROPOMETRICS  Height: 5' 3.5\"  Weight: 156 lbs 1.37 oz (70.8 kg)   Body mass index is 27.22 kg/m .  Weight Status:  Overweight BMI 25-29.9  IBW: 52 kg  % IBW: 136%  Weight History: Weight is increasing - " pt's daughter reports she lost about 25 lbs when she had COVID but is starting to regain it.   Wt Readings from Last 20 Encounters:   03/03/21 70.8 kg (156 lb 1.4 oz)   02/24/21 68.9 kg (152 lb)   01/20/21 66.8 kg (147 lb 3.2 oz)   01/13/21 66.1 kg (145 lb 12.8 oz)   01/12/21 66.2 kg (146 lb)   01/09/21 67.4 kg (148 lb 9.6 oz)   12/29/20 74.4 kg (164 lb)   12/21/20 74.6 kg (164 lb 8 oz)   11/04/20 76.7 kg (169 lb)   11/04/20 76.7 kg (169 lb)   10/16/20 76.7 kg (169 lb)   10/05/20 76.7 kg (169 lb)   09/30/20 77.1 kg (170 lb)   09/12/20 77.1 kg (170 lb)   06/02/20 78.1 kg (172 lb 3.2 oz)       LABS  Labs reviewed    MEDICATIONS  Medications reviewed      ASSESSED NUTRITION NEEDS PER APPROVED PRACTICE GUIDELINES:    Dosing Weight 57 kg - adjusted based on admit weight  Estimated Energy Needs: 1425 - 1710 kcals (25-30 Kcal/Kg)  Justification: overweight  Estimated Protein Needs: 57 - 68 grams protein (1-1.2 g pro/Kg)  Justification: maintenance  Estimated Fluid Needs: 1425 - 1710 mL (1 mL/Kcal)  Justification: maintenance    MALNUTRITION:  % Weight Loss:  Weight loss does not meet criteria for malnutrition  % Intake:  No decreased intake noted  Subcutaneous Fat Loss:  Orbital region mild depletion  Muscle Loss:  Temporal region mild depletion  Fluid Retention:  None noted    Malnutrition Diagnosis: Patient does not meet two of the above criteria necessary for diagnosing malnutrition    NUTRITION DIAGNOSIS:  Inadequate oral intake related to recent dental surgery and missing teeth as evidenced by pt's daughter reports difficulty knowing what patient should be eating and 25 lb weight loss in December.       NUTRITION INTERVENTIONS  Recommendations / Nutrition Prescription  Ordered Boost Breeze and Gelatein for pt to trial.      Implementation  Nutrition education: Provided education on how to optimize nutrition with inadequate dentition.  Provided handouts - Tips to Increase the Protein in Your Diet, Modification in  Consistency, Difficulty Eating Nutrition Therapy and Nutrition Recommendations After Oral Surgery.  Medical Food Supplement.      Nutrition Goals  Pt to consume >75% nutritionally adequate meals TID.  Diet advance >CL within 48 hours.      MONITORING AND EVALUATION:  Progress towards goals will be monitored and evaluated per protocol and Practice Guidelines    Jeanne Bruno  Dietetic Intern

## 2021-03-03 NOTE — H&P
H & P dictated # [id number]    Briefly,  Transfer from [facility]   86 yo with hx of paroxysmal atrial fibrillation. followed by cardiology. recently started on diltiazem. now presenting with weakness, generally feeling unwell for the past 3 days. some dizziness as well.   Found to have HRs in the 30s.   BPs are stable on softer side systolic 90s--100s    - 250 ml NS fluid bolus  - hold PTA coreg and dilatiazem  - pacer pads in place  - continue telemetry; NPO after midnight  - cardiology consult for pacemaker  - also signed out to Intensivist on call    Recovered COVID-- no need for isolation

## 2021-03-03 NOTE — PROGRESS NOTES
[facility]    Hospitalist Progress Note    Date of Admission:  3/2/2021    Assessment & Plan     Ms. Glendy Díaz is an 87-year-old female with a past medical history significant for paroxysmal atrial fibrillation, recovered COVID, chronic kidney disease stage III, hypertension, peripheral neuropathy, overactive bladder, subclinical hypothyroidism, cognitive impairment who was transferred from [facility] for bradycardia.      1.  Junctional bradycardia in the setting of paroxysmal atrial fibrillation:    Admitted as inpatient to ICU.  As noted in HPI, she was recently started on Cardizem on 02/24 by her cardiologist after discontinuing the amiodarone.  Per last Cardiology note, she was noted with sinus pauses on a Zio patch monitor.    Received some IV fluids at admission as blood pressure was soft.  Held PTA Coreg and diltiazem.  She is not on anticoagulation, due to history of frequent falls.  This was discussed during last Cardiology visit with the family.   Cardiology consulted.  Plan for pacemaker placement today.  2.  Acute on chronic kidney disease.    Admission her baseline creatinine is around 1-1.2.   creatinine is 2.16, likely some hypoperfusion due to severe bradycardia.  Received 250 mL normal saline fluid bolus.  Received 10 mg IV Lasix shortly after admission as apparently had wheezes on auscultation.  3.  Mild anemia:    Her baseline hemoglobin is around 11-12, admission hemoglobin is 9.5.  No clear suggestion of acute blood loss.  She denies any hematemesis or melena, no hematuria.  We will monitor hemoglobin.  Hb this morning was 10.3.  4.  Recovered COVID:  She was diagnosed with COVID on 12/27/2020.  She was hospitalized for 1 day during that time and had received a dose of dexamethasone.  Her symptoms had subsided, and she was quarantined at assisted living facility for 2 weeks.  She currently has no COVID-related symptoms.  No need for isolation at this time.    5.  Peripheral neuropathy:  We will continue with her gabapentin.   6.  Subclinical hypothyroidism, not on any thyroid supplements:   TSH normal at admission at 3.3.  7.  Overactive bladder:  We will hold off on PTA mirabegron at this time.  Can resume at the time of discharge.   8.  Likely undiagnosed dementia/cognitive impairment at baseline with hospital delirium with agitation.  -Delirium precautions.  Minimize sedating meds/narcotics.  -PT/OT consult.  Apparently came from [facility].  -As needed 12.5 mg of Seroquel at bedtime for agitation/sleep     DEEP VENOUS THROMBOSIS PROPHYLAXIS:  Mechanical with PCD boots.   CODE STATUS:  DNR/DNI.   Disposition: Anticipate ready to discharge in 1 to 2 days when cleared by cardiology.  Pending therapy evaluations.      Traci Duncan MD  Text Page (7am - 6pm, M-F)    Interval History   Overnight events noted.  Patient apparently became quite delirious and confused overnight.  Also received 10 mg IV Lasix as apparently she sounded quite wheezy.  Did receive Ativan for restlessness/agitation last night.  This morning is alert and laying in her bed.  Daughter Abigail is at bedside.  States that patient is quite confused still this morning.  At baseline she is normally appropriate and with it.  Lives at [facility].  Was hospitalized in January and at that time did have OT evaluation and noted to have cognitive impairment on memory testing.    -Data reviewed today: I reviewed all new labs and imaging results over the last 24 hours. I personally reviewed EKG with result as noted above and CXR with result as noted above    Physical Exam   Temp: 97.9  F (36.6  C) Temp src: Oral BP: 117/78 Pulse: 70   Resp: 18 SpO2: 99 % O2 Device: Nasal cannula Oxygen Delivery: 2 LPM  Vitals:    03/03/21 0400   Weight: 70.8 kg (156 lb 1.4 oz)     Vital Signs with Ranges  Temp:  [97.8  F (36.6  C)-98  F (36.7  C)] 97.9  F (36.6  C)  Pulse:  [30-80] 70  Resp:  [0-28] 18  BP: ()/(50-97) 117/78  SpO2:  [93  %-100 %] 99 %  I/O last 3 completed shifts:  In: 250 [IV Piggyback:250]  Out: 868 [Urine:868]    Constitutional: Alert, appears comfortable, in no acute distress, appears somewhat confused, appears stated age  Respiratory: Non labored breathing, clear to auscultation bilaterally, no crackles or wheezes  Cardiovascular: Heart sounds regular rate and rhythm, no murmurs, trace leg edema  GI: Abdomen is soft, non distended, non tender. Normal BS  Skin/Integumen: no rashes  Neuro: alert, somewhat confused, speech is difficult to understand, moving all extremities, could name her daughter Abigail who was in the room  Psych: Calm and pleasant    Medications       aspirin  81 mg Oral At Bedtime     gabapentin  100 mg Oral QAM     gabapentin  200 mg Oral At Bedtime       Data   Recent Labs   Lab 03/03/21  0449 03/02/21  1737   WBC 8.0 7.8   HGB 10.3* 9.5*    107*    224   INR  --  1.03    134   POTASSIUM 4.4 4.8   CHLORIDE 110* 105   CO2 25 22   BUN 30 30   CR 1.93* 2.16*   ANIONGAP 4 7   MUMTAZ 9.0 8.8   GLC 88 119*   TROPI  --  <0.015       Imaging  Recent Results (from the past 24 hour(s))   XR Chest Port 1 View    Narrative    EXAM: XR CHEST PORT 1 VW  LOCATION: [facility]  DATE/TIME: 3/2/2021 5:41 PM    INDICATION: Bradycardia  COMPARISON: 12/29/2020      Impression    IMPRESSION: Upper normal heart size. No evidence for CHF or pneumonia. Low lung volumes with mild atelectasis at the lung bases. No pleural effusion or pneumothorax. Prior right shoulder replacement.   XR Chest Port 1 View    Narrative    EXAM: XR CHEST PORT 1 VW  LOCATION: [facility]  DATE/TIME: 3/3/2021 1:17 AM    INDICATION: Hypoxia.  COMPARISON: 03/02/2021.    FINDINGS: The heart is at the upper limits normal in size. There is pulmonary vascular congestion without pulmonary edema. Mild bibasilar infiltrates. No pneumothorax. Right shoulder arthroplasty.      Impression    IMPRESSION: Pulmonary vascular  congestion. Bibasilar atelectasis or pneumonia.

## 2021-03-03 NOTE — CONSULTS
Electrophysiology/ Cardiology- Consult Note         H&P and Plan:     Reason for consult: Sinus node dysfunction.      History of present illness: 87-year old lady with a history of hypertension, recovered COVID (December 2020), chronic kidney disease and paroxysmal atrial fibrillation, who was admitted with symptomatic bradycardia/junctional rhythm.    Patient has a long history of paroxysmal atrial fibrillation and had previous monitor showing sinus of sinus node dysfunction.  She was initially treated with amiodarone, however she continues to have episodes of A. fib despite of amiodarone therapy (40% burden).  Additionally, on monitor she was found to have significant postconversion pauses.  Amiodarone was then discontinue and she was a started on a small dose of diltiazem.    In the last couple days, patient developed significant lightheadedness and weakness.  She was found to have slow heart rates at the assisted living facility.  She was transferred to [facility] for evaluation, and in the ED an EKG confirmed symptomatic bradycardia with a junctional rhythm in the 30s.    She was admitted for observation.  And overnight heart rate improved.  She is now back in normal sinus rhythm.    At the moment, she is doing well.  She seems to be somewhat confused today.  She denies any symptoms such as pain, near-syncope or syncope.    Plan:  1.  Paroxysmal atrial fibrillation/sinus node dysfunction.  I recommend pacemaker but this to facilitate therapy.  I explained the procedure in details.  Patient daughter understand that is a 1-2% risk associated procedure.  Consent was obtained.  We will make arrangements to have it done today.    2.  Paroxysmal atrial fibrillation.  We will resume AV node agents after pacemaker implanted.  3.  Embolic prevention.  Chads vas score of 4.  Patient was not previously on anticoagulation due to frequent episodes of fall and confusion.      Al Kay MD    Physical Exam:  Vitals: BP (!)  150/75 (BP Location: Right arm)   Pulse 75   Temp 97.9  F (36.6  C) (Oral)   Resp 19   Wt 70.8 kg (156 lb 1.4 oz)   SpO2 93%   BMI 27.22 kg/m        Intake/Output Summary (Last 24 hours) at 3/3/2021 0925  Last data filed at 3/3/2021 0800  Gross per 24 hour   Intake 250 ml   Output 1218 ml   Net -968 ml     Vitals:    03/03/21 0400   Weight: 70.8 kg (156 lb 1.4 oz)       Constitutional:  AAO x3.  Pt is in NAD.  HEAD: Normocephalic.  SKIN: Skin normal color, texture and turgor with no lesions or eruptions.  Eyes: PERRL, EOMI.  ENT:  Supple, normal JVP. No lymphadenopathy or thyroid enlargement.  Chest:  CTAB.  Cardiac:    RRR, normal  S1 and S2.  No murmurs rubs or gallop.    Abdomen:  Normal BS.  Soft, non-tender and non-distended.  No rebound or guarding.    Extremities:  Pedious pulses palpable B/L.  No LE edema noticed.   Neurological: Strength and sensation grossly symmetric and intact throughout.         Review of Systems:  Complete review of system is otherwise negative with the exception of what was described above.     CURRENT MEDICATIONS:    aspirin  81 mg Oral At Bedtime     gabapentin  100 mg Oral QAM     gabapentin  200 mg Oral At Bedtime     ipratropium - albuterol 0.5 mg/2.5 mg/3 mL  3 mL Nebulization Q4H     PRN Meds: acetaminophen, lidocaine 4%, lidocaine (buffered or not buffered), melatonin, ondansetron **OR** ondansetron, sodium chloride (PF), sodium chloride (PF)    ALLERGIES     Allergies   Allergen Reactions     Amlodipine      Leg edema with 5 mg     Fosamax [Alendronic Acid]      Bone pain     Lisinopril      Increased potassium     Pravastatin      Muscle aches      Simvastatin      Muscle aches        PAST MEDICAL HISTORY:  Past Medical History:   Diagnosis Date     CKD (chronic kidney disease) stage 3, GFR 30-59 ml/min      Falls frequently      Hematuria      Hyperlipidemia LDL goal <100      Hyperparathyroidism (H)      Hypertension     No Cardiologist     Incontinence      Mumps       Neck pain, chronic      Osteoarthritis      Paroxysmal atrial fibrillation (H)      Peripheral neuropathy      Sinus node dysfunction (H)        PAST SURGICAL HISTORY:  Past Surgical History:   Procedure Laterality Date     APPENDECTOMY       ARTHRODESIS FOOT  5/1/2014    Procedure: ARTHRODESIS FOOT;  Surgeon: Sid Marks DPM;  Location: [facility] OR     ARTHROSCOPY ANKLE, OPEN REPAIR LIGAMENT, COMBINED  5/31/2012    Procedure:COMBINED ARTHROSCOPY ANKLE, OPEN REPAIR LIGAMENT; LEFT ANKLE ARTHROSCOPY, LATERAL LIGAMENT RECONSTRUCTION, ENDOSCOPIC DRISS, NATALIION SECOND TOE RAY CORRECTION    LEFT KNEE Marcaine AND CORTIZONE INJECTION, 5 CC Marcaine, 1 CC CELESTONE, ; Surgeon:VARSHA GERMAN; Location:[facility]     BLADDER SURGERY       CATARACT IOL, RT/LT       CHOLECYSTECTOMY       CYSTOSCOPY       EXCISE MASS FOOT  12/4/2012    Procedure: EXCISE MASS FOOT;;  Surgeon: Varsha German MD;  Location: [facility]     HYSTERECTOMY TOTAL ABDOMINAL      ovaries are in     RELEASE TENDON TOE  5/1/2014    Procedure: RELEASE TENDON TOE;  Surgeon: Sid Marks DPM;  Location: [facility] OR     REMOVE HARDWARE FOOT  12/4/2012    Procedure: REMOVE HARDWARE FOOT;  REMOVAL HARDWARE(SYNTHES SCREW) LEFT FOOT, EXCISION OF INCLUSION CYST;  Surgeon: Varsha German MD;  Location: [facility]     REPAIR HAMMER TOE  5/1/2014    Procedure: REPAIR HAMMER TOE;  Surgeon: Sid Marks DPM;  Location: [facility] OR     REVERSE ARTHROPLASTY SHOULDER Right 7/18/2016    Procedure: REVERSE ARTHROPLASTY SHOULDER;  Surgeon: Marlo Alejandro MD;  Location: [facility] OR       FAMILY HISTORY:  Family History   Problem Relation Age of Onset     Lipids Mother      Diabetes Mother      Circulatory Mother         Kidney disease     Diabetes Brother      Cancer - colorectal Brother      Breast Cancer Sister      Thyroid Disease Daughter      Cancer - colorectal Brother      Alcohol/Drug Brother        SOCIAL HISTORY:  Social History     Socioeconomic  History     Marital status:      Spouse name: Not on file     Number of children: Not on file     Years of education: Not on file     Highest education level: Not on file   Occupational History     Not on file   Social Needs     Financial resource strain: Not on file     Food insecurity     Worry: Not on file     Inability: Not on file     Transportation needs     Medical: Not on file     Non-medical: Not on file   Tobacco Use     Smoking status: Former Smoker     Packs/day: 1.00     Years: 20.00     Pack years: 20.00     Types: Cigarettes     Start date:      Quit date:      Years since quittin.1     Smokeless tobacco: Never Used   Substance and Sexual Activity     Alcohol use: No     Drug use: No     Sexual activity: Never   Lifestyle     Physical activity     Days per week: Not on file     Minutes per session: Not on file     Stress: Not on file   Relationships     Social connections     Talks on phone: Not on file     Gets together: Not on file     Attends Restoration service: Not on file     Active member of club or organization: Not on file     Attends meetings of clubs or organizations: Not on file     Relationship status: Not on file     Intimate partner violence     Fear of current or ex partner: Not on file     Emotionally abused: Not on file     Physically abused: Not on file     Forced sexual activity: Not on file   Other Topics Concern     Parent/sibling w/ CABG, MI or angioplasty before 65F 55M? Not Asked   Social History Narrative     Not on file         Recent Lab Results:  Recent Labs   Lab 21  0449 21  1737   WBC 8.0 7.8   HGB 10.3* 9.5*    107*    224   INR  --  1.03    134   POTASSIUM 4.4 4.8   CHLORIDE 110* 105   CO2 25 22   BUN 30 30   CR 1.93* 2.16*   ANIONGAP 4 7   MUMTAZ 9.0 8.8   GLC 88 119*   TROPI  --  <0.015

## 2021-03-03 NOTE — PROGRESS NOTES
Lasix and neb helped to resolve breathing issues patient had, see previous note. Patient back to baseline of admission. Still agitated. [place] and hard to understand due to missing teeth. Patient VS stable. HR increased to 70s. Patient BP WNL, Purewick in place for continued incontinence. Good Output. NPO since 0000 for pacemaker today.  Bed alarm on and frequent checks. Continuing to monitor.     Gena Todd RN

## 2021-03-03 NOTE — PHARMACY-ADMISSION MEDICATION HISTORY
Pharmacy Medication History  Admission medication history interview status for the 3/2/2021  admission is complete. See EPIC admission navigator for prior to admission medications     Location of Interview: Outside patient room but on unit  Medication history sources: med list from Karen at [facility]    Significant changes made to the medication list:  Changed dtz, norco, and ibuprofen  Added apap  Discontinued bactrim and amoxil    In the past week, patient estimated taking medication this percent of the time: greater than 90% as at NH, but unable to confirm    Additional medication history information:   I do not have last dose info    Medication reconciliation completed by provider prior to medication history? No    Time spent in this activity: 10 minutes    Prior to Admission medications    Medication Sig Last Dose Taking? Auth Provider   acetaminophen (TYLENOL) 325 MG tablet Take 650 mg by mouth every 8 hours as needed for mild pain or fever prn Yes Unknown, Entered By History   aspirin 81 MG EC tablet Take 81 mg by mouth At Bedtime  Yes Unknown, Entered By History   carvedilol (COREG) 6.25 MG tablet Take 9.375 mg by mouth 2 times daily (with meals)  Yes Unknown, Entered By History   cetirizine (ZYRTEC) 10 MG tablet Take 10 mg by mouth daily as needed for allergies  prn Yes Doctor, None, MD   chlorhexidine (PERIDEX) 0.12 % solution Swish and spit 15 mLs in mouth 2 times daily Hold 2 minutes then expectorate. For 14 days, end date 3/5/21  Yes Reported, Patient   Cholecalciferol (VITAMIN D) 2000 UNITS tablet Take 2,000 Units by mouth daily  Yes Veronica Davis MD   diltiazem ER (DILT-XR) 180 MG 24 hr capsule Take 180 mg by mouth daily  Yes Unknown, Entered By History   fluticasone (FLONASE) 50 MCG/ACT nasal spray Spray 1 spray into both nostrils daily as needed for rhinitis or allergies prn Yes Unknown, Entered By History   gabapentin (NEURONTIN) 100 MG capsule Take 200 mg by mouth At  Bedtime  Yes Unknown, Entered By History   gabapentin (NEURONTIN) 100 MG capsule Take 100 mg by mouth every morning   at Unknown time Yes Unknown, Entered By History   HYDROcodone-acetaminophen (NORCO) 5-325 MG tablet Take 0.5 tablets by mouth every 4 hours as needed Do not exceed 8 tablets per day prn Yes Reported, Patient   ibuprofen (ADVIL/MOTRIN) 400 MG tablet Take 800 mg by mouth every 6 hours as needed  prn Yes Reported, Patient   MYRBETRIQ 50 MG 24 hr tablet TAKE 1 TABLET BY MOUTH  DAILY  Yes Magalys Escobedo PA-C   polyethylene glycol (MIRALAX) 17 g packet Take 1 packet by mouth daily as needed for constipation Mix in 8 ounces of water until completely dissolved prn Yes Unknown, Entered By History   senna-docusate (SENOKOT-S/PERICOLACE) 8.6-50 MG tablet Take 1 tablet by mouth daily as needed for constipation prn Yes Reported, Patient   traMADol (ULTRAM) 50 MG tablet Take 50 mg by mouth 2 times daily as needed for severe pain prn Yes Reported, Patient   vitamin B-12 (CYANOCOBALAMIN) 2500 MCG sublingual tablet Take 2,500 mcg by mouth daily  Yes Unknown, Entered By History       The information provided in this note is only as accurate as the sources available at the time of update(s)

## 2021-03-03 NOTE — H&P
Admitted:     03/02/2021      PRIMARY CARE PHYSICIAN:  Veronica Davis MD      CHIEF COMPLAINT:  Transfer from [facility] for bradycardia, dizziness with heart rate in 30s.      HISTORY OF PRESENT ILLNESS:  History is slightly limited from the patient, as she is extremely hard of hearing, lacks her dentures, making communication a little difficult, but she is able to converse and answers appropriately.  History was reviewed from the chart and also from her daughter, Denisse, over the phone.  Ms. Glendy Díaz is an 87-year-old female with a history of paroxysmal atrial fibrillation, recovered COVID, hypertension, CKD, subclinical hypothyroidism, who was sent to ER from her assisted living facility for dizziness and bradycardia.  She was admitted from 01/06 to 01/11 with paroxysmal supraventricular tachycardia and was started on amiodarone at that time, and then she was scheduled for ablation on 01/12.  The ablation was delayed due to her diagnosis of COVID on 12/27/2020.  On 01/12, however, the ablation was canceled, as EP thought her episodes were more of atrial fibrillation.  She was started on Zio patch monitoring, which confirmed atrial fibrillation, but she was in 40% atrial fibrillation despite being on amiodarone.  She followed up with EP on 02/24 when her amiodarone was discontinued; she was noted with sinus pause on Zio patch monitor.  Amiodarone was switched to diltiazem 180 mg, and Dr. Rey was planning for a pacemaker placement if she had further episodes of sinus pauses.      Over the past couple days at her senior living facility, she has not been feeling well, has been feeling weak with difficulty ambulating, and today the staff noted that her heart rate was in the 30s, so she was sent to ED for further evaluation.  At [facility] ED, EKG was noted with junctional bradycardia, rate in 30s.  Cardiology was contacted, and she was transferred to [facility] for a pacemaker placement.   The patient denies any fever, chills or rigors.  No chest pain or shortness of breath.  Denies pain in abdomen.  Reports a normal bowel and bladder habit.  She denies any hematemesis or melena.      REVIEW OF SYSTEMS:  A 10-point review of system was done and was negative apart from those mentioned in history of present illness.      PAST MEDICAL HISTORY:   1.  Paroxysmal atrial fibrillation, not on anticoagulation.   2.  Recovered COVID.   3.  Chronic kidney disease, stage III.   4.  Hypertension.   5.  Subclinical hypothyroidism.   6.  Peripheral neuropathy.   7.  History of reverse total shoulder arthroplasty.      MEDICATIONS PRIOR TO ADMISSION:  Medications Prior to Admission   Medication Sig Dispense Refill Last Dose     acetaminophen (TYLENOL) 325 MG tablet Take 650 mg by mouth every 8 hours as needed for mild pain or fever   prn     aspirin 81 MG EC tablet Take 81 mg by mouth At Bedtime        carvedilol (COREG) 6.25 MG tablet Take 9.375 mg by mouth 2 times daily (with meals)        cetirizine (ZYRTEC) 10 MG tablet Take 10 mg by mouth daily as needed for allergies  90 tablet 3 prn     chlorhexidine (PERIDEX) 0.12 % solution Swish and spit 15 mLs in mouth 2 times daily Hold 2 minutes then expectorate. For 14 days, end date 3/5/21        Cholecalciferol (VITAMIN D) 2000 UNITS tablet Take 2,000 Units by mouth daily 100 tablet 3      diltiazem ER (DILT-XR) 180 MG 24 hr capsule Take 180 mg by mouth daily        fluticasone (FLONASE) 50 MCG/ACT nasal spray Spray 1 spray into both nostrils daily as needed for rhinitis or allergies   prn     gabapentin (NEURONTIN) 100 MG capsule Take 200 mg by mouth At Bedtime        gabapentin (NEURONTIN) 100 MG capsule Take 100 mg by mouth every morning     at Unknown time     HYDROcodone-acetaminophen (NORCO) 5-325 MG tablet Take 0.5 tablets by mouth every 4 hours as needed Do not exceed 8 tablets per day   prn     ibuprofen (ADVIL/MOTRIN) 400 MG tablet Take 800 mg by mouth every  6 hours as needed    prn     MYRBETRIQ 50 MG 24 hr tablet TAKE 1 TABLET BY MOUTH  DAILY 90 tablet 3      polyethylene glycol (MIRALAX) 17 g packet Take 1 packet by mouth daily as needed for constipation Mix in 8 ounces of water until completely dissolved   prn     senna-docusate (SENOKOT-S/PERICOLACE) 8.6-50 MG tablet Take 1 tablet by mouth daily as needed for constipation   prn     traMADol (ULTRAM) 50 MG tablet Take 50 mg by mouth 2 times daily as needed for severe pain   prn     vitamin B-12 (CYANOCOBALAMIN) 2500 MCG sublingual tablet Take 2,500 mcg by mouth daily           ALLERGIES:  REVIEWED IN EPIC.  LISTED ALLERGIC TO AMLODIPINE, FOSAMAX, LISINOPRIL, PRAVASTATIN AND SIMVASTATIN.      SOCIAL HISTORY:  She lives in an assisted living facility.  Quit smoking in 1985.  Denies alcohol use.  No illicit drug use.      FAMILY HISTORY:  Reviewed and not pertinent to current presentation.      PHYSICAL EXAMINATION:   GENERAL:  The patient is conscious, alert, awake, oriented x 3, lying comfortably in bed in no apparent distress.  She is very hard of hearing.   VITAL SIGNS:  Temperature 97.8, heart rate of 35, blood pressure 97/70, saturation 93% on room air.   HEENT:  Pupils are equal and reactive to light and accommodation.  Extraocular movements are intact.  Oral mucosa is moist.   NECK:  Supple, no raised JVD.   RESPIRATORY:  Lung sounds bilaterally clear to auscultation, no wheezes or crepitation.   CARDIOVASCULAR:  Normal S1-S2.  Bradycardic, no murmur.   ABDOMEN:  Soft, nontender, nondistended, no guarding, rigidity or rebound tenderness.   EXTREMITIES:  Lower extremities with mild bilateral edema.   NEUROLOGIC:  No focal neurological deficits noted.  Cranial nerves II-XII grossly intact.   PSYCHIATRIC:  Normal mood and affect.      LABORATORY DATA:  BMP notable for BUN 30, creatinine of 2.16, lactate within normal limits at 2.0, negative troponin.  CBC with WBC 7.8, hemoglobin of 9.5, platelet 224,000.  INR  1.03.  COVID listed positive.  Chest x-ray reviewed by me shows no acute infiltrate, effusion or pneumothorax.  EKG reviewed by me shows a junctional bradycardia, left anterior fascicular block, heart rate in 30s.      ASSESSMENT AND PLAN:  Ms. Glendy Díaz is an 87-year-old female with a past medical history significant for paroxysmal atrial fibrillation, recovered COVID, chronic kidney disease stage III, hypertension, peripheral neuropathy, overactive bladder, subclinical hypothyroidism, who was transferred from [facility] for bradycardia.      1.  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  As noted in HPI, she was recently started on Cardizem on 02/24 by her cardiologist after discontinuing the amiodarone.  Per last Cardiology note, she was noted with sinus pauses on a Zio patch monitor.  Her blood pressure is on the softer side, systolic 90s.  We will give her 250 mL IV fluid bolus.  We will consult Cardiology.  We will keep n.p.o. after midnight.  We will hold off on her PTA Coreg and diltiazem.  She is not on anticoagulation, due to history of frequent falls.  This was discussed during last Cardiology visit with the family.     2.  Acute on chronic kidney disease.    - Her baseline creatinine is around 1-1.2.  Current creatinine is 2.16, likely some hypoperfusion due to severe bradycardia.  We will give her 250 mL normal saline fluid bolus and will repeat BMP in a.m.     3.  Mild anemia:    - Her baseline hemoglobin is around 11-12, current hemoglobin is 9.5.  No clear suggestion of acute blood loss.  She denies any hematemesis or melena, no hematuria.  We will monitor hemoglobin.     4.  Recovered COVID:    - She was diagnosed with COVID on 12/27/2020.  She was hospitalized for 1 day during that time and had received a dose of dexamethasone.  Her symptoms had subsided, and she was quarantined at  assisted living facility for 2 weeks.  She currently has no COVID-related symptoms.  No need for isolation at this time.     5.  Peripheral neuropathy:  We will continue with her gabapentin.   6.  Subclinical hypothyroidism, not on any thyroid supplements:  We will check a thyroid function test.   7.  Overactive bladder:  We will hold off on PTA mirabegron at this time.  Can resume at the time of discharge.      DEEP VENOUS THROMBOSIS PROPHYLAXIS:  Mechanical with PCD boots.      CODE STATUS:  This was discussed with the patient, and she wishes to be a DNR/DNI.         RICHELLE ROSEN MD             D: 2021   T: 2021   MT: HYUN      Name:     LORETTA HENRIQUEZ   MRN:      [id number]        Account:      [id number]   :      1933        Admitted:     2021                   Document: [id number]       cc: Veronica Davis MD

## 2021-03-03 NOTE — PROGRESS NOTES
Care Coordination:    [facility] called for an update. Writer updated and gave number if they have any questions.    Dorina Dupree RN  BSN, Care Coordinator    [facility]/ Care Transitions          [email or web address]                Phone [phone]

## 2021-03-04 ENCOUNTER — TELEPHONE (OUTPATIENT)
Dept: CARDIOLOGY | Facility: CLINIC | Age: 86
End: 2021-03-04

## 2021-03-04 ENCOUNTER — APPOINTMENT (OUTPATIENT)
Dept: OCCUPATIONAL THERAPY | Facility: CLINIC | Age: 86
DRG: 242 | End: 2021-03-04
Attending: HOSPITALIST
Payer: MEDICARE

## 2021-03-04 ENCOUNTER — APPOINTMENT (OUTPATIENT)
Dept: GENERAL RADIOLOGY | Facility: CLINIC | Age: 86
DRG: 242 | End: 2021-03-04
Attending: INTERNAL MEDICINE
Payer: MEDICARE

## 2021-03-04 ENCOUNTER — PATIENT OUTREACH (OUTPATIENT)
Dept: CARE COORDINATION | Facility: CLINIC | Age: 86
End: 2021-03-04

## 2021-03-04 ENCOUNTER — APPOINTMENT (OUTPATIENT)
Dept: PHYSICAL THERAPY | Facility: CLINIC | Age: 86
DRG: 242 | End: 2021-03-04
Attending: HOSPITALIST
Payer: MEDICARE

## 2021-03-04 ENCOUNTER — APPOINTMENT (OUTPATIENT)
Dept: CARDIOLOGY | Facility: CLINIC | Age: 86
DRG: 242 | End: 2021-03-04
Attending: INTERNAL MEDICINE
Payer: MEDICARE

## 2021-03-04 DIAGNOSIS — Z95.0 CARDIAC PACEMAKER IN SITU: Primary | ICD-10-CM

## 2021-03-04 LAB
ANION GAP SERPL CALCULATED.3IONS-SCNC: 3 MMOL/L (ref 3–14)
BUN SERPL-MCNC: 24 MG/DL (ref 7–30)
CALCIUM SERPL-MCNC: 8.6 MG/DL (ref 8.5–10.1)
CHLORIDE SERPL-SCNC: 111 MMOL/L (ref 94–109)
CO2 SERPL-SCNC: 26 MMOL/L (ref 20–32)
CREAT SERPL-MCNC: 1.51 MG/DL (ref 0.52–1.04)
ERYTHROCYTE [DISTWIDTH] IN BLOOD BY AUTOMATED COUNT: 15.5 % (ref 10–15)
GFR SERPL CREATININE-BSD FRML MDRD: 31 ML/MIN/{1.73_M2}
GLUCOSE SERPL-MCNC: 79 MG/DL (ref 70–99)
HCT VFR BLD AUTO: 28.3 % (ref 35–47)
HGB BLD-MCNC: 8.8 G/DL (ref 11.7–15.7)
MCH RBC QN AUTO: 31.2 PG (ref 26.5–33)
MCHC RBC AUTO-ENTMCNC: 31.1 G/DL (ref 31.5–36.5)
MCV RBC AUTO: 100 FL (ref 78–100)
PLATELET # BLD AUTO: 176 10E9/L (ref 150–450)
POTASSIUM SERPL-SCNC: 4.2 MMOL/L (ref 3.4–5.3)
RBC # BLD AUTO: 2.82 10E12/L (ref 3.8–5.2)
SODIUM SERPL-SCNC: 140 MMOL/L (ref 133–144)
WBC # BLD AUTO: 6.6 10E9/L (ref 4–11)

## 2021-03-04 PROCEDURE — 97161 PT EVAL LOW COMPLEX 20 MIN: CPT | Mod: GP | Performed by: PHYSICAL THERAPIST

## 2021-03-04 PROCEDURE — 99233 SBSQ HOSP IP/OBS HIGH 50: CPT | Performed by: HOSPITALIST

## 2021-03-04 PROCEDURE — 999N000065 XR CHEST 2 VW

## 2021-03-04 PROCEDURE — 250N000013 HC RX MED GY IP 250 OP 250 PS 637: Performed by: HOSPITALIST

## 2021-03-04 PROCEDURE — 210N000002 HC R&B HEART CARE

## 2021-03-04 PROCEDURE — 97165 OT EVAL LOW COMPLEX 30 MIN: CPT | Mod: GO

## 2021-03-04 PROCEDURE — 85027 COMPLETE CBC AUTOMATED: CPT | Performed by: INTERNAL MEDICINE

## 2021-03-04 PROCEDURE — 99231 SBSQ HOSP IP/OBS SF/LOW 25: CPT | Mod: 25 | Performed by: INTERNAL MEDICINE

## 2021-03-04 PROCEDURE — 80048 BASIC METABOLIC PNL TOTAL CA: CPT | Performed by: INTERNAL MEDICINE

## 2021-03-04 PROCEDURE — 93010 ELECTROCARDIOGRAM REPORT: CPT | Performed by: INTERNAL MEDICINE

## 2021-03-04 PROCEDURE — 97116 GAIT TRAINING THERAPY: CPT | Mod: GP | Performed by: PHYSICAL THERAPIST

## 2021-03-04 PROCEDURE — 97530 THERAPEUTIC ACTIVITIES: CPT | Mod: GO

## 2021-03-04 PROCEDURE — 93288 INTERROG EVL PM/LDLS PM IP: CPT

## 2021-03-04 PROCEDURE — 93005 ELECTROCARDIOGRAM TRACING: CPT

## 2021-03-04 PROCEDURE — 97535 SELF CARE MNGMENT TRAINING: CPT | Mod: GO

## 2021-03-04 PROCEDURE — 250N000013 HC RX MED GY IP 250 OP 250 PS 637: Performed by: INTERNAL MEDICINE

## 2021-03-04 PROCEDURE — 97530 THERAPEUTIC ACTIVITIES: CPT | Mod: GP | Performed by: PHYSICAL THERAPIST

## 2021-03-04 PROCEDURE — 36415 COLL VENOUS BLD VENIPUNCTURE: CPT | Performed by: INTERNAL MEDICINE

## 2021-03-04 RX ORDER — AMOXICILLIN 250 MG
1 CAPSULE ORAL AT BEDTIME
Status: DISCONTINUED | OUTPATIENT
Start: 2021-03-04 | End: 2021-03-05 | Stop reason: HOSPADM

## 2021-03-04 RX ORDER — DILTIAZEM HYDROCHLORIDE 120 MG/1
120 CAPSULE, COATED, EXTENDED RELEASE ORAL DAILY
Status: DISCONTINUED | OUTPATIENT
Start: 2021-03-04 | End: 2021-03-05 | Stop reason: HOSPADM

## 2021-03-04 RX ORDER — POLYETHYLENE GLYCOL 3350 17 G/17G
17 POWDER, FOR SOLUTION ORAL DAILY
Status: DISCONTINUED | OUTPATIENT
Start: 2021-03-04 | End: 2021-03-05 | Stop reason: HOSPADM

## 2021-03-04 RX ADMIN — ASPIRIN 81 MG: 81 TABLET ORAL at 20:58

## 2021-03-04 RX ADMIN — GABAPENTIN 200 MG: 100 CAPSULE ORAL at 20:57

## 2021-03-04 RX ADMIN — DOCUSATE SODIUM 50 MG AND SENNOSIDES 8.6 MG 1 TABLET: 8.6; 5 TABLET, FILM COATED ORAL at 20:59

## 2021-03-04 RX ADMIN — POLYETHYLENE GLYCOL 3350 17 G: 17 POWDER, FOR SOLUTION ORAL at 20:57

## 2021-03-04 RX ADMIN — ACETAMINOPHEN 650 MG: 325 TABLET, FILM COATED ORAL at 01:52

## 2021-03-04 RX ADMIN — GABAPENTIN 100 MG: 100 CAPSULE ORAL at 09:07

## 2021-03-04 RX ADMIN — ACETAMINOPHEN 650 MG: 325 TABLET, FILM COATED ORAL at 10:09

## 2021-03-04 RX ADMIN — ACETAMINOPHEN 650 MG: 325 TABLET, FILM COATED ORAL at 20:57

## 2021-03-04 ASSESSMENT — ACTIVITIES OF DAILY LIVING (ADL)
ADLS_ACUITY_SCORE: 23
ADLS_ACUITY_SCORE: 18
ADLS_ACUITY_SCORE: 23
ADLS_ACUITY_SCORE: 18
ADLS_ACUITY_SCORE: 23
ADLS_ACUITY_SCORE: 18

## 2021-03-04 NOTE — PROGRESS NOTES
Pt had auditory and visual hallucinations. Complains of pain, declined pain medication or ice pack. Desats to 88% on RA, refused oxygen, refused repositioned.Purewick in place, pt refused to let staff  change purewick, requesting to be left alone. Alert to self.

## 2021-03-04 NOTE — PLAN OF CARE
Confused, A&O to self only, repeated orientation. Tele A paced. Calm & cooperative. VSS on RA desat to 80s. 90s with oxymask on 4L. Encouraged Is use. Now low 90s on RA. Pacer site drsg CDI, no hematoma. Tylenol for pain management. 1 to 2 assist with GB & walker. Urine output via purewick. Sitter at bedside. Continue to monitor.

## 2021-03-04 NOTE — TELEPHONE ENCOUNTER
Post device implant discharge phone call.    Reviewed the following:  No raising arm above shoulder on the side of implant for 3 weeks  Remove outer dressing 3 days after implant. May shower after outer dressing removed. Leave steri-strips in place, will be removed at 1 week device check  Limit driving for: N/A  Watch for redness, drainage, warmth, or fever. Call device clinic if any signs of infection.     1 week device check scheduled: 3/12/21 at 11:00am  Reviewed latitude monitor and they will plug this in when patient returns home.     Discharge instructions included in patient's AVS.     Pt's daughter Abigail states understanding of all instructions.

## 2021-03-04 NOTE — PROGRESS NOTES
[facility]    Hospitalist Progress Note    Date of Admission:  3/2/2021    Assessment & Plan     Ms. Glendy Díaz is an 87-year-old female with a past medical history significant for paroxysmal atrial fibrillation, recovered COVID, chronic kidney disease stage III, hypertension, peripheral neuropathy, overactive bladder, subclinical hypothyroidism, cognitive impairment who was transferred from [facility] for bradycardia.      1.  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  As noted in HPI, she was recently started on Cardizem on 02/24 by her cardiologist after discontinuing the amiodarone.  Per last Cardiology note, she was noted with sinus pauses on a Zio patch monitor.    Received some IV fluids at admission as blood pressure was soft.  Held PTA Coreg and diltiazem.  She is not on anticoagulation, due to history of frequent falls.  This was discussed during last Cardiology visit with the family.   Cardiology consulted. She underwent PPM implantation on 3/3/21 with no complications. Following day device confirmed to be in good position on CXR, no hematoma, device interrogation showed good lead parameters  -Device care. Follow up in device clinic in 7-10 days  -Resume diltiazem (120 g daily).  -follow-up in EP clinic in a month    2.  Acute on chronic kidney disease.    Admission her baseline creatinine is around 1-1.2.   creatinine is 2.16, likely some hypoperfusion due to severe bradycardia.  Received 250 mL normal saline fluid bolus.  Received 10 mg IV Lasix shortly after admission as apparently had wheezes on auscultation.  -creat improved to 1.51. Continue to monitor.     3.  Mild anemia:    Her baseline hemoglobin is around 11-12, admission hemoglobin is 9.5.  No clear suggestion of acute blood loss.  She denies any hematemesis or melena, no hematuria.  We will monitor hemoglobin.  Hb this morning was  8.8, monitor.     4.  Recovered COVID:  She was diagnosed with COVID on 12/27/2020.  She was hospitalized for 1 day during that time and had received a dose of dexamethasone.  Her symptoms had subsided, and she was quarantined at assisted living facility for 2 weeks.  She currently has no COVID-related symptoms.  No need for isolation at this time.     5.  Peripheral neuropathy:  We will continue with her gabapentin.   6.  Subclinical hypothyroidism, not on any thyroid supplements:   TSH normal at admission at 3.3.  7.  Overactive bladder:  We will hold off on PTA mirabegron at this time.  Can resume at the time of discharge.     8.  Likely undiagnosed dementia/cognitive impairment at baseline with hospital delirium with agitation. Improving.   -Delirium precautions.  Minimize sedating meds/narcotics.  -PT/OT consult.  Apparently came from long term.  -As needed 12.5 mg of Seroquel at bedtime for agitation/sleep     DEEP VENOUS THROMBOSIS PROPHYLAXIS:  Mechanical with PCD boots.   CODE STATUS:  DNR/DNI.   Disposition: Anticipate ready to discharge tomorrow,  Pending therapy evaluations.      Traci Duncan MD  Text Page (7am - 6pm, M-F)    Interval History   Stable overnight. Pleasant and interactive this morning. Denies pain. Difficult to understand her speech as she has no teeth. Seems more oriented today. No sob, fever.     -Data reviewed today: I reviewed all new labs and imaging results over the last 24 hours. I personally reviewed EKG with result as noted above and CXR with result as noted above    Physical Exam   Temp: 98.4  F (36.9  C) Temp src: Oral BP: 108/65 Pulse: 70   Resp: 16 SpO2: 92 % O2 Device: None (Room air) Oxygen Delivery: 2 LPM  Vitals:    03/03/21 0400 03/04/21 0700   Weight: 70.8 kg (156 lb 1.4 oz) 71.2 kg (156 lb 14.4 oz)     Vital Signs with Ranges  Temp:  [97.9  F (36.6  C)-98.7  F (37.1  C)] 98.4  F (36.9  C)  Pulse:  [60-74] 70  Resp:  [15-18] 16  BP: ()/(48-86) 108/65  SpO2:  [86 %-100  %] 92 %  I/O last 3 completed shifts:  In: 600 [P.O.:600]  Out: 900 [Urine:900]    Constitutional: Alert, appears comfortable, in no acute distress, appears more oriented, appears stated age  Respiratory: Non labored breathing, clear to auscultation bilaterally, no crackles or wheezes  Cardiovascular: Heart sounds regular rate and rhythm, no murmurs, trace leg edema  GI: Abdomen is soft, non distended, non tender. Normal BS  Skin/Integumen: no rashes  Neuro: alert, angages in more appropriate conversation today, speech is difficult to understand, moving all extremities  Psych: Calm and pleasant    Medications     NaCl         aspirin  81 mg Oral At Bedtime     ceFAZolin  2 g Intravenous Pre-Op/Pre-procedure x 1 dose     diltiazem ER COATED BEADS  120 mg Oral Daily     gabapentin  100 mg Oral QAM     gabapentin  200 mg Oral At Bedtime       Data   Recent Labs   Lab 03/04/21  0540 03/03/21  0449 03/02/21  1737   WBC 6.6 8.0 7.8   HGB 8.8* 10.3* 9.5*    100 107*    236 224   INR  --   --  1.03    139 134   POTASSIUM 4.2 4.4 4.8   CHLORIDE 111* 110* 105   CO2 26 25 22   BUN 24 30 30   CR 1.51* 1.93* 2.16*   ANIONGAP 3 4 7   MUMTAZ 8.6 9.0 8.8   GLC 79 88 119*   TROPI  --   --  <0.015       Imaging  Recent Results (from the past 24 hour(s))   X-ray Chest 2 vws*    Narrative    CHEST TWO VIEWS  3/4/2021 8:02 AM     HISTORY: Status post pacer/ICD.    COMPARISON: Chest x-ray 3/3/2021      Impression    IMPRESSION: Interval placement of left subclavian dual-lead pacemaker.  No evidence of pneumothorax. Atrial and ventricular leads in adequate  position. Small bilateral pleural effusions. Pulmonary vascularity  remains prominent, suggestive of volume overload. Improved aeration  both lung bases. No alveolar infiltrates. Calcified left hilar lymph  nodes. Cardiac size remains at upper limits of normal, but stable.  Postop changes right shoulder.    MITCH BENITEZ MD

## 2021-03-04 NOTE — PLAN OF CARE
Pt arrived from procedure at 1215. Vital Signs stable, BP slightly soft 90s/40s. Telemetry 100% A-paced. Alert and Oriented to self and date. Lung sounds crackles in the bases. Bowel sounds hypoactive. Not passing flatus. Clear liquid diet. Adequate urinary output via purewick in place - no redness or skin breakdown present. CMS intact, pulses palpable. Left chest dressing is clean dry and intact - site is soft with no sign of hematoma. Up with assist of 1-2 gait belt and walker. Pain controlled with Tylenol. Seroquel given once for restlessness with good results.

## 2021-03-04 NOTE — PROGRESS NOTES
[id number]: Pt AOself, hard to assess due to garbled speech and no teeth.  Clear liquid diet.  VSS, BP soft.  Tele A paced.  Gave percocet x1 for shoulder pain over PPM site.  +CMS.  A1-2 GB W.  Purewick in place.  DNR/DNI.

## 2021-03-04 NOTE — DISCHARGE INSTRUCTIONS
Discharge Instructions for Pacemaker Implantation  You have had a procedure to insert a pacemaker. Once inside your body, this small electronic device helps keep your heart from beating too slowly. A pacemaker can t fix existing heart problems. But it can help you feel better and have more energy. As you recover, follow all of the instructions you are given, including those below.  Activity    Follow the instructions you are given about limiting your activity.    Do not raise your arm on the incision side above shoulder level for 3 weeks. This gives the device lead wires time to attach securely inside your heart.    Ask your doctor when you can expect to return to work.    You can still exercise. It s good for your body and your heart. Talk with your doctor about an exercise plan.  Other Precautions    Follow your doctor's directions carefully for wound care. You may remove the outer dressing in 3 - 4 days. Leave the steri-strips in place; these will be removed at your 1 week follow-up. Never put any creams, lotions, or products like peroxide on an incision unless your doctor tells you to.     You may shower once the outer dressing has been removed.     Before you receive any treatment, tell all health care providers (including your dentist) that you have a pacemaker.    You will be given an ID card that contains information about your pacemaker. Always carry this card with you. You can show this card if your pacemaker sets off a metal detector. You should also show it to avoid screening with a hand-held security wand.    Keep your cell phone away from your pacemaker. Don t carry the phone in your shirt pocket, even when it s turned off.    Avoid strong magnets. Examples are those used in MRIs or in hand-held security wands.    Avoid strong electrical fields. Examples are those made by radio transmitting towers,  ham  radios, and heavy-duty electrical equipment.    Avoid leaning over the open nunn of a running car.  A running engine creates an electrical field. Most household and yard appliances will not cause any problems. If you use any large power tools, such as an industrial , talk with your doctor.   Follow-Up    Follow up in the device clinic as scheduled. Your appointment is scheduled for Friday 3/12/21 at 11:00am in [place].     Make regular follow-up appointments with your device clinic. They will check the pacemaker to make sure it s working properly.  When to Call Your Device Clinic [phone]  Call your doctor immediately if you have any of the following:    Dizziness    Chest pain    Lack of energy    Fainting spells    Rapid pulse or pounding heartbeat    Shortness of breath    Pain around your pacemaker    Fever above 100.4 F (38 C) or other signs of infection (redness, swelling, drainage, or warmth at the incision site)     5614-3262 [employer]. [address] [place] [address]. All rights reserved. This information is not intended as a substitute for professional medical care. Always follow your healthcare professional's instructions.    [facility] in [place]: [phone]  Device Clinic (Monday to Friday, 8am-4pm): [phone]  *The device clinic is closed on weekends and holidays.  Any calls received during this time will be answered on the next business  day. For any urgent questions after hours, please call the main clinic number and you will be put in contact with the cardiologist on call.

## 2021-03-04 NOTE — PROGRESS NOTES
Clinic Care Coordination Contact  Ambulatory Care Coordination to Inpatient Care Management   Hand-In Communication    Date:  March 4, 2021  Name: Glendy Díaz is enrolled in Ambulatory Care Coordination program and I am the Lead Care Coordinator.  CC Contact Information: Epic In Basket + phone  Payor Source: Payor: MEDICARE / Plan: MEDICARE / Product Type: Medicare /   Current services in place:     Please see the CC Snapshot and Care Management Flowsheets for specific  details of this Glendy Díaz care plan.   Additional details/specific concerns r/t this admission:    Readmission Risk Multiple hospitalizations.     I will follow this admission in Epic. Please feel free to contact me with questions or for further collaboration in discharge planning.      Henri Hairston [facility]  Clinic Care Coordinator  Ph. [phone]  [email or web address]

## 2021-03-04 NOTE — PROGRESS NOTES
Called daughter Sharyn and reviewed plans    1. Will try to use Diltiazem 120 mg daily to prevent rapid HR (HRs been up to 120s on event monitor).    2. She'll be going to TCU after discharge, which will be good as can have hold parameters for BP <100   3. Reviewed plans for [facility] Device check 3/12, Roxanna in [place] 3/26 to review meds/BPs and possibly will keep 4/7 appt with Dr. Krissy White, EP PA

## 2021-03-04 NOTE — PROGRESS NOTES
[facility]    EP Progress Note    Date of Service (when I saw the patient): 03/04/2021     Assessment & Plan   Glendy Díaz is a 87 year old female with h/o pAFib, nl EF, CKD, HTN, cognitive impairment, and recent COVID19 infection 12/2020 who was admitted on 3/2/2021 d/t lightheadedness and weakness a/w bradycardia. EP Consult by Dr. Kay (pt of Dr. Rey's)    1. Paroxysmal AFib/Sinus Node Dysfunction  Nl EF 55-60% 11/2020 -  - Has h/o pAFib; most recently saw Dr. Rey 2/24/2021  - Amiodarone started; follow-up ZioPatch showed continued 40% AFib burden as well as post-conversion pauses 4.5s.  She was started on low dose Cardizem 180 mg daily and amiodarone stopped 2/24/2021  - PTA had been on Diltiazem 180 and Coreg 9.375 BID, both held d/t bradycardia    - Admitted with jxn'l heat rhythm in 30s, with lightheadedness and weakness.     - After d/w daughter, pt now s/p [employer] dual chamber PPM 3/3/2021 with Dr. Hester  - Device interrogation today (Yun) with stable leads; 21% AP and ,1%  in DDDR 60/130  - CXR without PTX  - Tele with APVS 61 bpm  - EKG PENDING     PLAN:   1. OK to be discharged from EP standpoint.   2. Device RN has arranged for follow-up/teaching - Patient Instructions in AVS   3. SPBs 90-100s. Will restart Diltiazem but at 120 mg for HR control if she goes back into AFib. Hold off on restarting Coreg until SBP more robust   4. No AC at this point despite CHADSVASc 4 (HTN, age, sex) given frailty/falls/confusion        Leana White PA-C    Interval History   Difficult to understand d/t lack of teeth (notes she's due to get implants)  Minimal discomfort at inferior aspect of PPM incision. Minimal ecchymosis. Bandage c/d    Physical Exam   Temp: 98.6  F (37  C) Temp src: Axillary BP: 99/48 Pulse: 71   Resp: 16 SpO2: 90 % O2 Device: None (Room air)(refused oxygen) Oxygen Delivery: 2 LPM  Vitals:    03/03/21 0400 03/04/21 0700   Weight: 70.8 kg (156 lb  1.4 oz) 71.2 kg (156 lb 14.4 oz)     Vital Signs with Ranges  Temp:  [97.7  F (36.5  C)-98.7  F (37.1  C)] 98.6  F (37  C)  Pulse:  [60-76] 71  Resp:  [12-23] 16  BP: ()/() 99/48  SpO2:  [86 %-100 %] 90 %  I/O last 3 completed shifts:  In: -   Out: 1475 [Urine:1475]    Telemetry: Occasional AP/VS. Otherwise SR    Constitutional: awake, alert, cooperative, no apparent distress, and appears stated age   Respiratory: Crackles bilateral bases  Cardiovascular: Regular  Musculoskeletal: Trace LE edema    Medications     NaCl         aspirin  81 mg Oral At Bedtime     ceFAZolin  2 g Intravenous Pre-Op/Pre-procedure x 1 dose     gabapentin  100 mg Oral QAM     gabapentin  200 mg Oral At Bedtime       Data   I personally reviewed the EKG tracing showing PENDING.  Results for orders placed or performed during the hospital encounter of 03/02/21 (from the past 24 hour(s))   Nutrition Services Adult IP Consult    Narrative    Jeanne Bruno     3/3/2021  3:32 PM  CLINICAL NUTRITION SERVICES  -  ASSESSMENT NOTE      Recommendations Ordered by Registered Dietitian (RD):   Ordered Berry Boost Breeze and Cherry Gelatein for pt to trial   Future/Additional Recommendations:   Order shakes and/or smoothies once diet advanced >CL   Malnutrition:  % Weight Loss:  Weight loss does not meet criteria for   malnutrition  % Intake:  No decreased intake noted  Subcutaneous Fat Loss:  Orbital region mild depletion  Muscle Loss:  Temporal region mild depletion  Fluid Retention:  None noted    Malnutrition Diagnosis: Patient does not meet two of the above   criteria necessary for diagnosing malnutrition        REASON FOR ASSESSMENT  Glendy Díaz is a 87 year old female seen by Registered   Dietitian for Provider Order - edentulous patient, likely needs   supplements/ shakes    DX:    PMH:  Recovered COVID  A-fib  HTN  CKD  Hypothyroidism    NUTRITION HISTORY  - Information obtained from pt's daughter  Pt's daughter states  "she recently had to get all her teeth pulled   out and is awaiting dentures so hasn't been able to eat normal   textured foods for awhile. Pt comes from an [facility] and gets one meal   provided to her but can't eat much of it due to her dentition.   Pt's daughter has been provided some food to her (mashed   potatoes, soups, applesauce, Jell-O) but is looking for more   ideas to maximize her nutrition.       CURRENT NUTRITION ORDERS  Diet Order:     Clear liquids    Current Intake/Tolerance:  Pt's daughter reports that pt's appetite is good right now.      NUTRITION FOCUSED PHYSICAL ASSESSMENT FOR DIAGNOSING   MALNUTRITION)  Yes               Observed:    Muscle wasting (refer to documentation in Malnutrition section)   and Subcutaneous fat loss (refer to documentation in Malnutrition   section)      ANTHROPOMETRICS  Height: 5' 3.5\"  Weight: 156 lbs 1.37 oz (70.8 kg)   Body mass index is 27.22 kg/m .  Weight Status:  Overweight BMI 25-29.9  IBW: 52 kg  % IBW: 136%  Weight History: Weight is increasing - pt's daughter reports she   lost about 25 lbs when she had COVID but is starting to regain   it.   Wt Readings from Last 20 Encounters:   03/03/21 70.8 kg (156 lb 1.4 oz)   02/24/21 68.9 kg (152 lb)   01/20/21 66.8 kg (147 lb 3.2 oz)   01/13/21 66.1 kg (145 lb 12.8 oz)   01/12/21 66.2 kg (146 lb)   01/09/21 67.4 kg (148 lb 9.6 oz)   12/29/20 74.4 kg (164 lb)   12/21/20 74.6 kg (164 lb 8 oz)   11/04/20 76.7 kg (169 lb)   11/04/20 76.7 kg (169 lb)   10/16/20 76.7 kg (169 lb)   10/05/20 76.7 kg (169 lb)   09/30/20 77.1 kg (170 lb)   09/12/20 77.1 kg (170 lb)   06/02/20 78.1 kg (172 lb 3.2 oz)       LABS  Labs reviewed    MEDICATIONS  Medications reviewed      ASSESSED NUTRITION NEEDS PER APPROVED PRACTICE GUIDELINES:    Dosing Weight 57 kg - adjusted based on admit weight  Estimated Energy Needs: 1425 - 1710 kcals (25-30 Kcal/Kg)  Justification: overweight  Estimated Protein Needs: 57 - 68 grams protein (1-1.2 g " pro/Kg)  Justification: maintenance  Estimated Fluid Needs: 1425 - 1710 mL (1 mL/Kcal)  Justification: maintenance    MALNUTRITION:  % Weight Loss:  Weight loss does not meet criteria for   malnutrition  % Intake:  No decreased intake noted  Subcutaneous Fat Loss:  Orbital region mild depletion  Muscle Loss:  Temporal region mild depletion  Fluid Retention:  None noted    Malnutrition Diagnosis: Patient does not meet two of the above   criteria necessary for diagnosing malnutrition    NUTRITION DIAGNOSIS:  Inadequate oral intake related to recent dental surgery and   missing teeth as evidenced by pt's daughter reports difficulty   knowing what patient should be eating and 25 lb weight loss in   December.       NUTRITION INTERVENTIONS  Recommendations / Nutrition Prescription  Ordered Boost Breeze and Gelatein for pt to trial.      Implementation  Nutrition education: Provided education on how to optimize   nutrition with inadequate dentition.  Provided handouts - Tips to Increase the Protein in Your Diet,   Modification in Consistency, Difficulty Eating Nutrition Therapy   and Nutrition Recommendations After Oral Surgery.  Medical Food Supplement.      Nutrition Goals  Pt to consume >75% nutritionally adequate meals TID.  Diet advance >CL within 48 hours.      MONITORING AND EVALUATION:  Progress towards goals will be monitored and evaluated per   protocol and Practice Guidelines    Jeanne Bruno  Dietetic Intern     Basic metabolic panel   Result Value Ref Range    Sodium 140 133 - 144 mmol/L    Potassium 4.2 3.4 - 5.3 mmol/L    Chloride 111 (H) 94 - 109 mmol/L    Carbon Dioxide 26 20 - 32 mmol/L    Anion Gap 3 3 - 14 mmol/L    Glucose 79 70 - 99 mg/dL    Urea Nitrogen 24 7 - 30 mg/dL    Creatinine 1.51 (H) 0.52 - 1.04 mg/dL    GFR Estimate 31 (L) >60 mL/min/[1.73_m2]    GFR Estimate If Black 36 (L) >60 mL/min/[1.73_m2]    Calcium 8.6 8.5 - 10.1 mg/dL   CBC with platelets   Result Value Ref Range    WBC 6.6  4.0 - 11.0 10e9/L    RBC Count 2.82 (L) 3.8 - 5.2 10e12/L    Hemoglobin 8.8 (L) 11.7 - 15.7 g/dL    Hematocrit 28.3 (L) 35.0 - 47.0 %     78 - 100 fl    MCH 31.2 26.5 - 33.0 pg    MCHC 31.1 (L) 31.5 - 36.5 g/dL    RDW 15.5 (H) 10.0 - 15.0 %    Platelet Count 176 150 - 450 10e9/L

## 2021-03-05 VITALS
OXYGEN SATURATION: 94 % | SYSTOLIC BLOOD PRESSURE: 122 MMHG | WEIGHT: 156.9 LBS | TEMPERATURE: 98.3 F | BODY MASS INDEX: 27.36 KG/M2 | HEART RATE: 82 BPM | RESPIRATION RATE: 18 BRPM | DIASTOLIC BLOOD PRESSURE: 59 MMHG

## 2021-03-05 LAB
CREAT SERPL-MCNC: 1.41 MG/DL (ref 0.52–1.04)
GFR SERPL CREATININE-BSD FRML MDRD: 33 ML/MIN/{1.73_M2}
HGB BLD-MCNC: 9.4 G/DL (ref 11.7–15.7)

## 2021-03-05 PROCEDURE — 82565 ASSAY OF CREATININE: CPT | Performed by: HOSPITALIST

## 2021-03-05 PROCEDURE — 250N000013 HC RX MED GY IP 250 OP 250 PS 637: Performed by: INTERNAL MEDICINE

## 2021-03-05 PROCEDURE — 85018 HEMOGLOBIN: CPT | Performed by: HOSPITALIST

## 2021-03-05 PROCEDURE — 250N000013 HC RX MED GY IP 250 OP 250 PS 637: Performed by: PHYSICIAN ASSISTANT

## 2021-03-05 PROCEDURE — 99239 HOSP IP/OBS DSCHRG MGMT >30: CPT | Performed by: HOSPITALIST

## 2021-03-05 PROCEDURE — 36415 COLL VENOUS BLD VENIPUNCTURE: CPT | Performed by: HOSPITALIST

## 2021-03-05 PROCEDURE — 250N000013 HC RX MED GY IP 250 OP 250 PS 637: Performed by: HOSPITALIST

## 2021-03-05 RX ORDER — DILTIAZEM HYDROCHLORIDE 120 MG/1
120 CAPSULE, COATED, EXTENDED RELEASE ORAL DAILY
DISCHARGE
Start: 2021-03-05 | End: 2021-03-26

## 2021-03-05 RX ORDER — OXYCODONE AND ACETAMINOPHEN 5; 325 MG/1; MG/1
1 TABLET ORAL EVERY 6 HOURS PRN
Qty: 30 TABLET | Refills: 0 | Status: SHIPPED | OUTPATIENT
Start: 2021-03-05 | End: 2021-03-16

## 2021-03-05 RX ADMIN — POLYETHYLENE GLYCOL 3350 17 G: 17 POWDER, FOR SOLUTION ORAL at 08:39

## 2021-03-05 RX ADMIN — ACETAMINOPHEN 650 MG: 325 TABLET, FILM COATED ORAL at 04:35

## 2021-03-05 RX ADMIN — GABAPENTIN 100 MG: 100 CAPSULE ORAL at 08:39

## 2021-03-05 RX ADMIN — DILTIAZEM HYDROCHLORIDE 120 MG: 120 CAPSULE, COATED, EXTENDED RELEASE ORAL at 08:39

## 2021-03-05 RX ADMIN — OXYCODONE HYDROCHLORIDE AND ACETAMINOPHEN 1 TABLET: 5; 325 TABLET ORAL at 00:03

## 2021-03-05 ASSESSMENT — ACTIVITIES OF DAILY LIVING (ADL)
ADLS_ACUITY_SCORE: 16
ADLS_ACUITY_SCORE: 17
ADLS_ACUITY_SCORE: 16
ADLS_ACUITY_SCORE: 23

## 2021-03-05 NOTE — PLAN OF CARE
Pt a/o, forgetul. VSS on RA. PRN Percocet x1 given x1 for c/o headache. Up w/ assist of 1, gait belt, & walker. Left chest dressing c/d/I. Lung sounds diminished with crackles to LLL. Plan for discharge to TCU pending.

## 2021-03-05 NOTE — PROGRESS NOTES
Care Management Discharge Note    Discharge Date: 03/05/21  Expected Time of Departure: 13:00    Discharge Disposition: Transitional Care    Discharge Services:  therapy    Discharge DME:  N/A    Discharge Transportation: family or friend will provide    Private pay costs discussed: Not applicable    PAS Confirmation Code: [id number]  Patient/family educated on Medicare website which has current facility and service quality ratings: no    Education Provided on the Discharge Plan:  yes  Persons Notified of Discharge Plans: patient and daughter Abigail  Patient/Family in Agreement with the Plan: yes    Handoff Referral Completed: Yes    Additional Information:  Received discharge orders for patient.  Bed available at [facility] for today.  Call placed to update patient's daughter Abigail and she will transport around 13:00 today.  Patient and daughter informed of the plan and in agreement to the plan.  Call placed to update [facility] and faxed the orders and the PAS.      PAS-RR    D: Per DHS regulation, SW completed and submitted PAS-RR to [place] [employer] Direct Connect via the Senior LinkAge Line.  PAS-RR confirmation # is : [id number].    I: SW spoke with patient and daughter and they are aware a PAS-RR has been submitted.  SW reviewed with patient and daughter that they may be contacted for a follow up appointment within 10 days of hospital discharge if their SNF stay is < 30 days.  Contact information for [facility] LinkAge Line was also provided.    A: Patient and daughter  verbalized understanding.    P: Further questions may be directed to [facility] LinkAge Line at #[phone], option #4 for PAS-RR staff.        ABHISHEK Estevez, [facility]    [phone]  [facility]

## 2021-03-05 NOTE — DISCHARGE SUMMARY
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 Improving    History of Present Illness   Please refer H & P for details.      Hospital Course   Ms. Glendy Díaz is an 87-year-old female with a past medical history significant for paroxysmal atrial fibrillation, recovered COVID, chronic kidney disease stage III, hypertension, peripheral neuropathy, overactive bladder, subclinical hypothyroidism, cognitive impairment who was transferred from [facility] for bradycardia.      1.  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  As noted in HPI, she was recently started on Cardizem on 02/24 by her cardiologist after discontinuing the amiodarone.  Per last Cardiology note, she was noted with sinus pauses on a Zio patch monitor.    Received some IV fluids at admission as blood pressure was soft.  Held PTA Coreg and diltiazem.  She is not on anticoagulation, due to history of frequent falls.  This was discussed during last Cardiology visit with the family.   Cardiology consulted. She underwent PPM implantation on 3/3/21 with no complications. Following day device confirmed to be in good position on CXR, no hematoma, device interrogation showed good lead parameters  -Device care. Follow up in device clinic in 7-10 days  -Resume diltiazem (120 g daily).  -follow-up in EP clinic in a month     2.  ADELA on CKD 3, improving  Her baseline creatinine is around 1-1.2.  At admission,creatinine is 2.16, likely some hypoperfusion due to severe bradycardia.  Received 250 mL normal saline  fluid bolus.  Received 10 mg IV Lasix shortly after admission as apparently had wheezes on auscultation.  -creat improved to 1.41. Continue to monitor.      3.  Mild anemia:    Her baseline hemoglobin is around 11-12, admission hemoglobin is 9.5.  No clear suggestion of acute blood loss.  She denies any hematemesis or melena, no hematuria.  We will monitor hemoglobin.  Hb this morning was 9.4.  At discharge     4.  Recovered COVID:  She was diagnosed with COVID on 12/27/2020.  She was hospitalized for 1 day during that time and had received a dose of dexamethasone.  Her symptoms had subsided, and she was quarantined at assisted living facility for 2 weeks.  She currently has no COVID-related symptoms.  No need for isolation at this time.      5.  Peripheral neuropathy:  We will continue with her gabapentin.   6.  Subclinical hypothyroidism, not on any thyroid supplements:   TSH normal at admission at 3.3.  7.  Overactive bladder:  We will hold off on PTA mirabegron at this time.  Can resume at the time of discharge.      8.  Likely undiagnosed dementia/cognitive impairment at baseline with hospital delirium with agitation. Improving.   -Delirium precautions.  Minimize sedating meds/narcotics.  -PT/OT consult.  Apparently came from assisted.  -As needed 12.5 mg of Seroquel at bedtime for agitation/sleep.  She received only 1 dose of this.  Not continued at discharge.  -We will discharge to TCU for further rehab.      Discharge to TCU in stable condition.  Patient voiced frustration and disappointment that she would need to go back to TCU as she apparently was just in there for a couple of weeks.  However is agreeable to going to TCU.    Traci Duncan MD, MD      Pending Results   These results will be followed up by Hospitalist team.  Unresulted Labs Ordered in the Past 30 Days of this Admission     No orders found from 1/31/2021 to 3/3/2021.          Code Status   DNR / DNI       Primary Care Physician   Veronica Cortes  Susan    Follow-ups Needed After Discharge   Follow-up Appointments     Follow Up and recommended labs and tests      Follow up with Nursing home physician.  No follow up labs or test are   needed. Follow up with Cardiology and device clinic as arranged by them .               Physical Exam   Temp: 98.3  F (36.8  C) Temp src: Oral BP: 122/59 Pulse: 82   Resp: 18 SpO2: 94 % O2 Device: None (Room air)    Vitals:    03/03/21 0400 03/04/21 0700   Weight: 70.8 kg (156 lb 1.4 oz) 71.2 kg (156 lb 14.4 oz)     Vital Signs with Ranges  Temp:  [98.3  F (36.8  C)-98.7  F (37.1  C)] 98.3  F (36.8  C)  Pulse:  [61-82] 82  Resp:  [15-20] 18  BP: ()/(51-69) 122/59  SpO2:  [92 %-96 %] 94 %  I/O last 3 completed shifts:  In: 880 [P.O.:880]  Out: 200 [Urine:200]    Constitutional: Alert, appears comfortable, in no acute distress, appears more oriented, appears stated age  Respiratory: Non labored breathing, clear to auscultation bilaterally, no crackles or wheezes  Cardiovascular: Heart sounds regular rate and rhythm, no murmurs, trace leg edema  GI: Abdomen is soft, non distended, non tender. Normal BS  Skin/Integumen: no rashes  Neuro: alert, angages in more appropriate conversation today, speech is difficult to understand, moving all extremities  Psych: Calm, somewhat depressed and frustrated about going to TCU      Discharge Disposition   Discharged to short-term care facility  Condition at discharge: Stable    Consultations This Hospital Stay   CARDIOLOGY IP CONSULT  NUTRITION SERVICES ADULT IP CONSULT  PHYSICAL THERAPY ADULT IP CONSULT  OCCUPATIONAL THERAPY ADULT IP CONSULT  CARE MANAGEMENT / SOCIAL WORK IP CONSULT  PHYSICAL THERAPY ADULT IP CONSULT  OCCUPATIONAL THERAPY ADULT IP CONSULT    Time Spent on this Encounter   Traci MERCEDES MD, personally saw the patient today and spent greater than 30 minutes discharging this patient.    Discharge Orders      Discharge Order: F/U with Cardiac  KATELYN      General  info for SNF    Length of Stay Estimate: Short Term Care: Estimated # of Days <30  Condition at Discharge: Stable  Level of care:skilled   Rehabilitation Potential: Good  Admission H&P remains valid and up-to-date: Yes  Recent Chemotherapy: N/A  Use Nursing Home Standing Orders: Yes     Mantoux instructions    Give two-step Mantoux (PPD) Per Facility Policy Yes     Reason for your hospital stay    You were hospitalized with symptomatic low heart rate and had pacemaker placed.     Follow Up and recommended labs and tests    Follow up with Nursing home physician.  No follow up labs or test are needed. Follow up with Cardiology and device clinic as arranged by them .     Activity - Up with nursing assistance     Physical Therapy Adult Consult    Evaluate and treat as clinically indicated.    Reason:  Physical deconditioning     Occupational Therapy Adult Consult    Evaluate and treat as clinically indicated.    Reason:  Physical deconditioning     Fall precautions     Advance Diet as Tolerated    Follow this diet upon discharge: Orders Placed This Encounter      Snacks/Supplements Adult: Other; boost plus; Between Meals      Regular Diet Adult     Discharge Medications   Current Discharge Medication List      START taking these medications    Details   diltiazem ER COATED BEADS (CARDIZEM CD/CARTIA XT) 120 MG 24 hr capsule Take 1 capsule (120 mg) by mouth daily  Qty:      Associated Diagnoses: Paroxysmal A-fib (H)      oxyCODONE-acetaminophen (PERCOCET) 5-325 MG tablet Take 1 tablet by mouth every 6 hours as needed for moderate to severe pain  Qty: 30 tablet, Refills: 0    Associated Diagnoses: Neck pain, chronic         CONTINUE these medications which have NOT CHANGED    Details   acetaminophen (TYLENOL) 325 MG tablet Take 650 mg by mouth every 8 hours as needed for mild pain or fever      aspirin 81 MG EC tablet Take 81 mg by mouth At Bedtime      cetirizine (ZYRTEC) 10 MG tablet Take 10 mg by mouth daily as needed  for allergies   Qty: 90 tablet, Refills: 3      chlorhexidine (PERIDEX) 0.12 % solution Swish and spit 15 mLs in mouth 2 times daily Hold 2 minutes then expectorate. For 14 days, end date 3/5/21      Cholecalciferol (VITAMIN D) 2000 UNITS tablet Take 2,000 Units by mouth daily  Qty: 100 tablet, Refills: 3    Associated Diagnoses: Vitamin D deficiency; Hyperparathyroidism (H)      fluticasone (FLONASE) 50 MCG/ACT nasal spray Spray 1 spray into both nostrils daily as needed for rhinitis or allergies      !! gabapentin (NEURONTIN) 100 MG capsule Take 200 mg by mouth At Bedtime      !! gabapentin (NEURONTIN) 100 MG capsule Take 100 mg by mouth every morning       ibuprofen (ADVIL/MOTRIN) 400 MG tablet Take 800 mg by mouth every 6 hours as needed       MYRBETRIQ 50 MG 24 hr tablet TAKE 1 TABLET BY MOUTH  DAILY  Qty: 90 tablet, Refills: 3    Comments: Requesting 1 year supply  Associated Diagnoses: OAB (overactive bladder)      polyethylene glycol (MIRALAX) 17 g packet Take 1 packet by mouth daily as needed for constipation Mix in 8 ounces of water until completely dissolved      senna-docusate (SENOKOT-S/PERICOLACE) 8.6-50 MG tablet Take 1 tablet by mouth daily as needed for constipation      vitamin B-12 (CYANOCOBALAMIN) 2500 MCG sublingual tablet Take 2,500 mcg by mouth daily       !! - Potential duplicate medications found. Please discuss with provider.      STOP taking these medications       carvedilol (COREG) 6.25 MG tablet Comments:   Reason for Stopping:         diltiazem ER (DILT-XR) 180 MG 24 hr capsule Comments:   Reason for Stopping:         HYDROcodone-acetaminophen (NORCO) 5-325 MG tablet Comments:   Reason for Stopping:         traMADol (ULTRAM) 50 MG tablet Comments:   Reason for Stopping:             Allergies   Allergies   Allergen Reactions     Amlodipine      Leg edema with 5 mg     Fosamax [Alendronic Acid]      Bone pain     Lisinopril      Increased potassium     Pravastatin      Muscle aches       Simvastatin      Muscle aches      Data   Most Recent 3 CBC's:  Recent Labs   Lab Test 03/05/21  0607 03/04/21  0540 03/03/21  0449 03/02/21  1737   WBC  --  6.6 8.0 7.8   HGB 9.4* 8.8* 10.3* 9.5*   MCV  --  100 100 107*   PLT  --  176 236 224      Most Recent 3 BMP's:  Recent Labs   Lab Test 03/05/21  0607 03/04/21  0540 03/03/21  0449 03/02/21  1737   NA  --  140 139 134   POTASSIUM  --  4.2 4.4 4.8   CHLORIDE  --  111* 110* 105   CO2  --  26 25 22   BUN  --  24 30 30   CR 1.41* 1.51* 1.93* 2.16*   ANIONGAP  --  3 4 7   MUMTAZ  --  8.6 9.0 8.8   GLC  --  79 88 119*     Most Recent 2 LFT's:  Recent Labs   Lab Test 12/29/20  1234 05/26/20  1238   AST 31 18   ALT 19 15   ALKPHOS 77 74   BILITOTAL 0.3 0.4     Most Recent INR's and Anticoagulation Dosing History:  Anticoagulation Dose History     Recent Dosing and Labs Latest Ref Rng & Units 7/12/2019 3/2/2021    INR 0.86 - 1.14 0.91 1.03        Most Recent 3 Troponin's:  Recent Labs   Lab Test 03/02/21  1737 01/06/21  1803 12/30/20  0718 07/12/19  0936 07/12/19  0936   TROPI <0.015 <0.015 <0.015   < >  --    TROPONIN  --   --   --   --  0.00    < > = values in this interval not displayed.     Most Recent Cholesterol Panel:  Recent Labs   Lab Test 09/24/19  0959   CHOL 196   *   HDL 53   TRIG 139     Most Recent 6 Bacteria Isolates From Any Culture (See EPIC Reports for Culture Details):  Recent Labs   Lab Test 02/09/18  1624 02/05/13  1047   CULT No growth >100,000 colonies/mL Escherichia coli     Most Recent TSH, T4 and A1c Labs:  Recent Labs   Lab Test 03/02/21  2336 09/24/19  0959 09/24/19  0959 03/30/18  0816 03/30/18  0816   TSH 3.32   < > 0.25*   < >  --    T4  --   --  1.28  --   --    A1C  --   --   --   --  5.0    < > = values in this interval not displayed.       Results for orders placed or performed during the hospital encounter of 03/02/21   XR Chest Port 1 View    Narrative    EXAM: XR CHEST PORT 1 VW  LOCATION: [facility]  DATE/TIME:  3/3/2021 1:17 AM    INDICATION: Hypoxia.  COMPARISON: 03/02/2021.    FINDINGS: The heart is at the upper limits normal in size. There is pulmonary vascular congestion without pulmonary edema. Mild bibasilar infiltrates. No pneumothorax. Right shoulder arthroplasty.      Impression    IMPRESSION: Pulmonary vascular congestion. Bibasilar atelectasis or pneumonia.   X-ray Chest 2 vws*    Narrative    CHEST TWO VIEWS  3/4/2021 8:02 AM     HISTORY: Status post pacer/ICD.    COMPARISON: Chest x-ray 3/3/2021      Impression    IMPRESSION: Interval placement of left subclavian dual-lead pacemaker.  No evidence of pneumothorax. Atrial and ventricular leads in adequate  position. Small bilateral pleural effusions. Pulmonary vascularity  remains prominent, suggestive of volume overload. Improved aeration  both lung bases. No alveolar infiltrates. Calcified left hilar lymph  nodes. Cardiac size remains at upper limits of normal, but stable.  Postop changes right shoulder.    MITCH BENITEZ MD   EP Device    Narrative    PROCEDURES PERFORMED:   1. Implantation of a dual-chamber pacemaker, MRI compatible  2. Conscious sedation.   3. 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 Risks of the procedure was discussed before the   procedure including but not limited to vascular injury, infection,   pneumothorax, and cardiac perforation.    METHOD: I determined this patient to be an appropriate candidate for the   planned sedation and procedure and have reassessed the patient immediately   prior to sedation and procedure. Intravenous antibiotic was given prior to   the procedure. The patient was prepped and draped in the usual manner. 1%   lidocaine was infiltrated into  the left axillary vein area.  An incision   was then made with a #15 blade. The left cephalic vein was identified and   a venotomy was created. A guidewire was unable to advance through the vein   requiring a Glidewire.  A sheath was then glide over the wire into the   vein. A lead was then advanced into the heart and was fixed into the right   ventricular  area. Appropriate sensing and threshold were obtained. There   was no diaphragmatic stimulation with high output pacing. Another sheath   was glided over the wire into the vein. A lead was advanced to the right   atrium and fixed into the right atrial wall. There was no diaphragmatic   stimulation with high output pacing. The leads were then secured to the   pectoralis muscle fascia using O-Ethibond sutures.     A pocket was then fashioned. The leads were then attached to the device   and placed in the pocket.  The pocket was then closed with 2-0 and 4-0   Vicryl sutures. Steri-Strips and an OpSite dressing were then placed over   the incision.  [facility].     DEVICE INFORMATION:  Implant Name Type Inv. Item Serial No.  Lot No. LRB No. Used   Action   LEAD INGEVITY+ AF IS1 [id number] 52CM Leads LEAD INGEVITY+ AF IS1 [id number] 52CM   [id number] [employer] [id number]  1 Implanted   LEAD INGEVITY+ AF IS1 7840 4CM Leads LEAD INGEVITY+ AF IS1 7840 4CM   [id number] [employer] [id number]  1 Implanted   PCMKR CARD ACCOLADE MRI JOSELITO DR Pacemaker PCMKR CARD ACCOLADE MRI EL DR   [id number] [employer] [id number]  1 Implanted       STIMULATION THRESHOLDS:  RA -  Sense: 2.0 mV, Threshold: 1.2 V @ 0.5 ms, Impedance: 447 ohms  RV -  Sense:7.5 mV, Threshold: 0.04 V @ 0.5 ms, Impedance: 573 ohms    COMPLICATIONS: None.              CONCLUSION:    1. Uneventful implantation of a dual-chamber pacemaker, MRI compatible    Bobby Hester MD

## 2021-03-05 NOTE — PLAN OF CARE
VSS on RA. Tele- 100% APaced. Alert and oriented but forgetful. Assist x1 +walker/GB. [place] [facility] [place] Daughter providing transportation. Packet sent with family for TCU. PIV removed. All belongings sent home with patient. Adequate for discharge.

## 2021-03-05 NOTE — PLAN OF CARE
Vital Signs stable, pt on room air. Telemetry A-Paced. Alert and Oriented, at time slightly forgetful. Mentation much better today, pt had lucid conversations. Lung sounds diminished in the bases and on the right side. Bowel sounds active and audible. Passing flatus. Regular diet, pt eats only soft food as pt has no teeth. Denies nausea. Adequate urinary output, at times incontinent. CMS to baseline, numbness and tingling present.   Pacemaker dressing is clean dry and intact, small amount of ecchymosis surrounds site, no sign of hematoma present. Up with assist of 1 gait belt and walker. Pain controlled with Tylenol and Ice. Supportive daughter at bedside during the afternoon.

## 2021-03-05 NOTE — PLAN OF CARE
Occupational Therapy Discharge Summary    Reason for therapy discharge:    Discharged to transitional care facility.    Progress towards therapy goal(s). See goals on Care Plan in [facility] electronic health record for goal details.  Goals partially met.  Barriers to achieving goals:   discharge from facility.    Therapy recommendation(s):    Continued therapy is recommended.  Rationale/Recommendations:  increase independence in ADLs/IADLS.

## 2021-03-05 NOTE — PLAN OF CARE
Physical Therapy Discharge Summary    Reason for therapy discharge:    Discharged to transitional care facility.    Progress towards therapy goal(s). See goals on Care Plan in [facility] electronic health record for goal details.  Goals partially met.  Barriers to achieving goals:   discharge from facility.    Therapy recommendation(s):    Continued therapy is recommended.  Rationale/Recommendations:  progress functional strength and mobility prior to returning to assisted living facility.

## 2021-03-05 NOTE — PROGRESS NOTES
"   03/04/21 1530   Quick Adds   Type of Visit Initial PT Evaluation   Living Environment   People in home facility resident;alone   Current Living Arrangements assisted living   Transportation Anticipated family or friend will provide   Self-Care   Usual Activity Tolerance moderate   Current Activity Tolerance fair   Activity/Exercise/Self-Care Comment Pt/dtr reports pt IND/Mod IND with ADLs, uses shower chair, Mod IND with 4WW for mobility, gets 1 meal/day, has housekeeping and laundry assist; family provides transportation   Disability/Function   Walking or Climbing Stairs Difficulty yes   Mobility Management 4WW   Doing Errands Independently Difficulty (such as shopping) yes   Errands Management family assist   Fall history within last six months yes   Number of times patient has fallen within last six months   (\"a lot\")   Change in Functional Status Since Onset of Current Illness/Injury yes   General Information   Onset of Illness/Injury or Date of Surgery 03/02/21   Referring Physician Traci Duncan MD   Patient/Family Therapy Goals Statement (PT) pt wants to return to snf, family in support of TCU   Pertinent History of Current Problem (include personal factors and/or comorbidities that impact the POC) 87-year-old female with a past medical history significant for paroxysmal atrial fibrillation, recovered COVID, chronic kidney disease stage III, hypertension, peripheral neuropathy, overactive bladder, subclinical hypothyroidism, cognitive impairment who was transferred from [facility] for bradycardia, found to have junctional bradycardia in setting of paroxismal afib, s/p PPM 3/3/21   Existing Precautions/Restrictions pacemaker;cardiac;fall   Weight-Bearing Status - LUE   (pacemaker precuations)   General Observations Pt resting in bed, dtr at bedside   Cognition   Orientation Status (Cognition) oriented x 3   Affect/Mental Status (Cognition) WFL   Follows Commands (Cognition) 75-90% " accuracy;follows one-step commands   Cognitive Status Comments cognitive impairment/memory impairment at baseline   Pain Assessment   Patient Currently in Pain   (LUE/shoulder by PPM site)   Posture    Posture Forward head position   Range of Motion (ROM)   ROM Comment BLE WFL   Strength   Strength Comments functional weakness noted wtih transfers and ambulation   Bed Mobility   Comment (Bed Mobility) Tejinder supine>sit   Transfers   Transfer Safety Comments Tejinder sit>stand to FWW   Gait/Stairs (Locomotion)   Comment (Gait/Stairs) Tejinder 10' with FWW, unsteady, increased lateral weight shift, picking up walker at times, runs into obstacles when distracted   Balance   Balance Comments impaired static and dynamic standing balance   Sensory Examination   Sensory Perception Comments denies new N/T   Clinical Impression   Criteria for Skilled Therapeutic Intervention yes, treatment indicated   PT Diagnosis (PT) Difficulty ambulating   Influenced by the following impairments Weakness, decreased activity tolerance, PPM precautions, impaired balance, decreased safety awareness   Functional limitations due to impairments Decreased safety and IND with functional transfers and ambulation   Clinical Presentation Stable/Uncomplicated   Clinical Decision Making (Complexity) low complexity   Therapy Frequency (PT) Daily   Predicted Duration of Therapy Intervention (days/wks) 4 days   Planned Therapy Interventions (PT) balance training;bed mobility training;gait training;patient/family education;strengthening;transfer training  (progressive aerobic exercise)   Risk & Benefits of therapy have been explained evaluation/treatment results reviewed;care plan/treatment goals reviewed;risks/benefits reviewed;current/potential barriers reviewed;participants voiced agreement with care plan;participants included;patient;daughter   PT Discharge Planning    PT Discharge Recommendation (DC Rec) Transitional Care Facility   PT Rationale for DC Rec Pt  lives alone, currently requires Ax1 for all mobility and is at high risk for falls; recommend cont PT at TCU to progress functional strength, balance, activity tolerance, safety and IND with functional mobility so pt can return home safely   PT Brief overview of current status  Tejinder for bed mobility, transfers, and 180' ambulation with FWW; unsteady, frequent LOB   Total Evaluation Time   Total Evaluation Time (Minutes) 10   Coping Strategies   Trust Relationship/Rapport care explained;empathic listening provided;emotional support provided;questions answered;reassurance provided;thoughts/feelings acknowledged

## 2021-03-05 NOTE — CONSULTS
Care Management Initial Consult    General Information  Assessment completed with: Patient, Family, (daughter Abigail)  Type of CM/SW Visit: Initial Assessment    Primary Care Provider verified and updated as needed:     Readmission within the last 30 days:        Reason for Consult: discharge planning  Advance Care Planning: Advance Care Planning Reviewed: present on chart          Communication Assessment  Patient's communication style: spoken language (English or Bilingual)    Hearing Difficulty or Deaf: yes   Wear Glasses or Blind: yes    Cognitive  Cognitive/Neuro/Behavioral: WDL  Level of Consciousness: alert  Arousal Level: opens eyes spontaneously  Orientation: oriented x 4  Mood/Behavior: cooperative, calm  Best Language: 0 - No aphasia  Speech: rambling    Living Environment:   People in home: alone, facility resident     Current living Arrangements: assisted living  Name of Facility: ([facility]   Able to return to prior arrangements:         Family/Social Support:  Care provided by: self, other (see comments)(Facility Staff)  Provides care for: no one  Marital Status:              Description of Support System: Supportive, Involved         Current Resources:   Patient receiving home care services: No     Community Resources: None  Equipment currently used at home: walker, rolling, grab bar, toilet, grab bar, tub/shower, shower chair  Supplies currently used at home: None    Employment/Financial:  Employment Status: retired        Financial Concerns: No concerns identified           Lifestyle & Psychosocial Needs:        Socioeconomic History     Marital status:      Spouse name: Not on file     Number of children: Not on file     Years of education: Not on file     Highest education level: Not on file     Tobacco Use     Smoking status: Former Smoker     Packs/day: 1.00     Years: 20.00     Pack years: 20.00     Types: Cigarettes     Start date: 1965     Quit date: 1985     Years  since quittin.1     Smokeless tobacco: Never Used   Substance and Sexual Activity     Alcohol use: No     Drug use: No     Sexual activity: Never       Functional Status:  Prior to admission patient needed assistance:              Mental Health Status:          Chemical Dependency Status:                Values/Beliefs:  Spiritual, Cultural Beliefs, Episcopal Practices, Values that affect care: no               Additional Information:  Per care management/social work consult for discharge planning and TCU placement on discharge.  Patient was admitted on 3-2-21 with bradycardia.  The tentative date of discharge is 3-8-21.  Patient was transferred from [facility].  Reviewed chart.  Patient lives alone in an apartment at [facility].  Patient is moderately independent at baseline with a 4 wheeled walker.  Patient is independent to moderately independent with ADL's.  Patient receives 1 meal a day.  Patient has a shower chair and grab bars in the bathroom.  Patient receives assistance with laundry and housekeeping.  Reviewed the therapy discharge recommendations of TCU placement on discharge and patient and daughter Abigail are in agreement.  Patient and daughter state that patient has been to [facility] in the past and this would be the first choice.  Referral sent, via discharge on the double, to check bed availability.  Received a message back, via discharge on the double.  [facility] has a bed available for patient today.    Will continue to follow.      ABHISHEK Estevez, [facility]    [phone]  [facility]

## 2021-03-06 LAB — INTERPRETATION ECG - MUSE: NORMAL

## 2021-03-08 ENCOUNTER — HOSPITAL LABORATORY (OUTPATIENT)
Dept: OTHER | Facility: CLINIC | Age: 86
End: 2021-03-08

## 2021-03-08 ENCOUNTER — NURSING HOME VISIT (OUTPATIENT)
Dept: GERIATRICS | Facility: CLINIC | Age: 86
End: 2021-03-08
Payer: MEDICARE

## 2021-03-08 ENCOUNTER — PATIENT OUTREACH (OUTPATIENT)
Dept: CARE COORDINATION | Facility: CLINIC | Age: 86
End: 2021-03-08

## 2021-03-08 DIAGNOSIS — D64.9 ANEMIA, UNSPECIFIED TYPE: ICD-10-CM

## 2021-03-08 DIAGNOSIS — K08.9 POOR DENTITION: ICD-10-CM

## 2021-03-08 DIAGNOSIS — I48.0 PAROXYSMAL ATRIAL FIBRILLATION (H): ICD-10-CM

## 2021-03-08 DIAGNOSIS — N18.32 STAGE 3B CHRONIC KIDNEY DISEASE (H): ICD-10-CM

## 2021-03-08 DIAGNOSIS — I10 BENIGN ESSENTIAL HYPERTENSION: ICD-10-CM

## 2021-03-08 DIAGNOSIS — Z95.0 S/P PLACEMENT OF CARDIAC PACEMAKER: Primary | ICD-10-CM

## 2021-03-08 DIAGNOSIS — J44.9 CHRONIC OBSTRUCTIVE PULMONARY DISEASE, UNSPECIFIED COPD TYPE (H): ICD-10-CM

## 2021-03-08 DIAGNOSIS — N32.81 OVERACTIVE BLADDER: ICD-10-CM

## 2021-03-08 DIAGNOSIS — R53.81 PHYSICAL DECONDITIONING: ICD-10-CM

## 2021-03-08 DIAGNOSIS — G62.9 PERIPHERAL POLYNEUROPATHY: ICD-10-CM

## 2021-03-08 PROCEDURE — 99310 SBSQ NF CARE HIGH MDM 45: CPT | Performed by: NURSE PRACTITIONER

## 2021-03-08 ASSESSMENT — MIFFLIN-ST. JEOR: SCORE: 1077.72

## 2021-03-08 NOTE — PROGRESS NOTES
Clinic Care Coordination Contact  Care Coordination Transition Communication         Clinical Data: Patient was hospitalized at [facility] from 3.2.2021 to 3.5.2021 with diagnoses of:  Junctional bradycardia in the setting of paroxysmal atrial fibrillation, s/p PPM 3/3/21  ADELA on CKD 3, improving  Likely undiagnosed dementia/cognitive impairment at baseline with hospital delirium with agitation. Improving    Transition to Facility:              Facility Name: [facility]              Contact name and phone number/fax:    Ph. [phone], Fax. [phone].    Plan: SW Care Coordinator will await notification from facility staff informing SW Care Coordinator of patient's discharge plans/needs. SW Care Coordinator will review chart and outreach to facility staff every 4 weeks and as needed.     Henri Hairston [facility]  Clinic Care Coordinator  Ph. [phone]  [email or web address]

## 2021-03-08 NOTE — LETTER
[facility]   To:  [facility]          Please give to facility    From:   Henri MARIN  Care Coordinator   [facility]     Patient Name:  Glendy Díaz YOB: 1933   Admit date: 3.5.2021      *Information Needed:  Please contact me when the patient will discharge (or if they will move to long term care)- include the discharge date, disposition, and main diagnosis   - If the patient is discharged with home care services, please provide the name of the agency    Also- Please inform me if a care conference is being held.   Phone, Fax or Email with information       Henri Hairston, [facility]  Clinic Care Coordinator  Ph. [phone]  [email or web address]

## 2021-03-08 NOTE — PROGRESS NOTES
[facility]  PRIMARY CARE PROVIDER AND CLINIC:  Veronica Davis MD[facility] [address] [facility] [address]  Chief Complaint   Patient presents with     Establish Care     [place] Medical Record Number:  [id number]  Place of Service where encounter took [facility] [[id number]]    Glendy Díaz  is a 87 year old  (11/16/1933), admitted to the above facility from  [facility]. Hospital stay 3/2/21 through 3/5/21.  Admitted to this facility for  rehab, medical management and nursing care.    HPI:    HPI information obtained from: facility chart records, facility staff, patient report and [facility] chart review.     Brief Summary of Hospital Course:     PMH: paroxysmal atrial fibrillation, chronic kidney disease stage III, hypertension, peripheral neuropathy, overactive bladder, subclinical hypothyroidism, cognitive impairment. Hx COVID (12/27/20).     Patient initially evaluated at [facility] 3/2 due to bradycardia and dizziness. Was recently started on diltiazem on 2/24. EKG concerning for 3rd degree block vs junctional escape rhythm. Transferred to [facility] 3/2-3/5/21 for pacemaker placement. S/p PPM on 3/3/21. Was restarted on diltiazem and plan to follow-up in device clinic in 7-10 days. Not on anticoagulation due to hx recurrent falls. Noted to have ADELA, creat peaked to 2.16 which improved with IV fluids. Hgb baseline 11-12, admission Hgb 9.5 with no sx of acute blood loss.     Transferred to [facility] on 3/5/21.       Updates on Status Since Skilled nursing Admission:     RN reports diet recently downgraded to DD2 w/regular liquids due to no teeth.    During exam, patient seen resting in bed. Reports doing well, no incisional pain to pacemaker site. Patient endorses having multiple upcoming appointments, including follow-up with oral surgeon and dentist regarding in process of getting dentures. States tolerating current diet  well. Endorses decreased intake due to no dentures at this time. Sleeping well at night. Denies constipation or diarrhea. Denies chest pain, SOB, headache, syncope. [place] lives in [place], goal is to [place] back to [place] following completion of therapy sessions. SW following for discharge planning.         CODE STATUS/ADVANCE DIRECTIVES DISCUSSION:   DNR / DNI  Patient's living condition: lives in an assisted living facility  ALLERGIES: Amlodipine, Fosamax [alendronic acid], Lisinopril, Pravastatin, and Simvastatin  PAST MEDICAL HISTORY:  has a past medical history of CKD (chronic kidney disease) stage 3, GFR 30-59 ml/min, Falls frequently, Hematuria, Hyperlipidemia LDL goal <100, Hyperparathyroidism (H), Hypertension, Incontinence, Mumps, Neck pain, chronic, Osteoarthritis, Paroxysmal atrial fibrillation (H), Peripheral neuropathy, and Sinus node dysfunction (H). She also has no past medical history of Asymptomatic human immunodeficiency virus (HIV) infection status (H), Cerebral palsy (H), Chronic infection, Complication of anesthesia, Congenital renal agenesis and dysgenesis, Goiter, Gout, Hernia, abdominal, History of spinal cord injury, History of thrombophlebitis, Horseshoe kidney, Hydrocephalus (H), Malignant hyperthermia, Palpitations, Parkinsons disease (H), Sleep apnea, Spider veins, Spina bifida (H), STD (sexually transmitted disease), Tethered cord (H), or Tuberculosis.  PAST SURGICAL HISTORY:   has a past surgical history that includes appendectomy; Cholecystectomy; Hysterectomy total abdominal; cataract iol, rt/lt; Arthroscopy ankle, open repair ligament, combined (5/31/2012); Remove hardware foot (12/4/2012); Excise mass foot (12/4/2012); Arthrodesis foot (5/1/2014); Repair hammer toe (5/1/2014); Release tendon toe (5/1/2014); Reverse arthroplasty shoulder (Right, 7/18/2016); Bladder surgery; Cystoscopy; and Permanent Pacemakers  Insert of New or Replacement with Vent Lead (N/A, 3/3/2021).  FAMILY HISTORY:  family history includes Alcohol/Drug in her brother; Breast Cancer in her sister; Cancer - colorectal in her brother and brother; Circulatory in her mother; Diabetes in her brother and mother; Lipids in her mother; Thyroid Disease in her daughter.  SOCIAL HISTORY:   reports that she quit smoking about 36 years ago. Her smoking use included cigarettes. She started smoking about 56 years ago. She has a 20.00 pack-year smoking history. She has never used smokeless tobacco. She reports that she does not drink alcohol or use drugs.    Post Discharge Medication Reconciliation Status: discharge medications reconciled and changed, per note/orders    Current Outpatient Medications   Medication Sig Dispense Refill     acetaminophen (TYLENOL) 325 MG tablet Take 650 mg by mouth every 8 hours as needed for mild pain or fever       aspirin 81 MG EC tablet Take 81 mg by mouth At Bedtime       cetirizine (ZYRTEC) 10 MG tablet Take 10 mg by mouth daily as needed for allergies  90 tablet 3     chlorhexidine (PERIDEX) 0.12 % solution Swish and spit 15 mLs in mouth 2 times daily Hold 2 minutes then expectorate.       Cholecalciferol (VITAMIN D) 2000 UNITS tablet Take 2,000 Units by mouth daily 100 tablet 3     diltiazem ER COATED BEADS (CARDIZEM CD/CARTIA XT) 120 MG 24 hr capsule Take 1 capsule (120 mg) by mouth daily       fluticasone (FLONASE) 50 MCG/ACT nasal spray Spray 1 spray into both nostrils daily as needed for rhinitis or allergies       gabapentin (NEURONTIN) 100 MG capsule Take 200 mg by mouth At Bedtime       gabapentin (NEURONTIN) 100 MG capsule Take 100 mg by mouth every morning        ibuprofen (ADVIL/MOTRIN) 400 MG tablet Take 800 mg by mouth every 6 hours as needed        MYRBETRIQ 50 MG 24 hr tablet TAKE 1 TABLET BY MOUTH  DAILY 90 tablet 3     oxyCODONE-acetaminophen (PERCOCET) 5-325 MG tablet Take 1 tablet by mouth every 6 hours as needed for moderate to severe pain 30 tablet 0     polyethylene glycol (MIRALAX) 17  "g packet Take 1 packet by mouth daily as needed for constipation Mix in 8 ounces of water until completely dissolved       senna-docusate (SENOKOT-S/PERICOLACE) 8.6-50 MG tablet Take 1 tablet by mouth daily as needed for constipation       vitamin B-12 (CYANOCOBALAMIN) 2500 MCG sublingual tablet Take 2,500 mcg by mouth daily           ROS:  10 point ROS of systems including Constitutional, Eyes, Respiratory, Cardiovascular, Gastroenterology, Genitourinary, Integumentary, Musculoskeletal, Psychiatric were all negative except for pertinent positives noted in my HPI.      Vitals:  /71   Pulse 76   Temp 97.8  F (36.6  C)   Resp 18   Ht 1.626 m (5' 4\")   Wt 65.8 kg (145 lb)   SpO2 98%   BMI 24.89 kg/m    Exam:  Limited observational exam due to COVID19 precautions  GENERAL APPEARANCE:  Alert, in no distress, appears healthy, oriented, cooperative  ENT:  Mouth and posterior oropharynx normal, moist mucous membranes, normal hearing acuity  EYES:  EOM, conjunctivae, lids, pupils and irises normal, PERRL  NECK:  No adenopathy,masses or thyromegaly  RESP:  no respiratory distress  CV:  no edema  M/S:   Gait and station abnormal, uses walker. Digits and nails normal  SKIN:  Inspection of skin and subcutaneous tissue baseline  NEURO:   Cranial nerves 2-12 are normal tested and grossly at patient's baseline  PSYCH:  oriented X 3, affect and mood normal    Wt Readings from Last 4 Encounters:   03/08/21 65.8 kg (145 lb)   03/04/21 71.2 kg (156 lb 14.4 oz)   02/24/21 68.9 kg (152 lb)   01/20/21 66.8 kg (147 lb 3.2 oz)       Lab/Diagnostic data:  Recent labs in [facility] reviewed by me today.  and   Most Recent 3 CBC's:  Recent Labs   Lab Test 03/09/21  0555 03/05/21  0607 03/04/21  0540 03/03/21  0449 03/02/21  1737   WBC  --   --  6.6 8.0 7.8   HGB 8.9* 9.4* 8.8* 10.3* 9.5*   MCV  --   --  100 100 107*   PLT  --   --  176 236 224     Most Recent 3 BMP's:  Recent Labs   Lab Test 03/09/21  0555 03/05/21  0607 03/04/21  0540 " 03/03/21  0449     --  140 139   POTASSIUM 4.4  --  4.2 4.4   CHLORIDE 111*  --  111* 110*   CO2 27  --  26 25   BUN 20  --  24 30   CR 1.32* 1.41* 1.51* 1.93*   ANIONGAP 5  --  3 4   MUMTAZ 9.0  --  8.6 9.0   GLC 75  --  79 88         ASSESSMENT/PLAN:    (Z95.0) S/P placement of cardiac pacemaker  (primary encounter diagnosis)  Comment: EKG concerning for 3rd degree block vs junctional escape rhythm, s/p PPM on 3/3/21.  Plan:   - Check BMP, Hgb on 3/9/21   - Continue diltiazem, ASA  - Continue tylenol PRN, ibuprofen PRN, percocet PRN  - Monitor pacemaker site for sx of infection  - Patient to follow-up with Cardiologist on 3/12    (I48.0) Paroxysmal atrial fibrillation (H)  Comment: Chronic PAF  Plan:   - Not on anticoagulation due to hx recurrent falls    (I10) Benign essential hypertension  Comment: Controlled  Plan:   - Continue diltiazem  - Monitor BP/HR    (N18.32) Stage 3b chronic kidney disease  Comment: Creat baseline 1.1-1.3, creat stable.  Plan:   - Monitor BMP, avoid nephrotoxic medications    (J44.9) Chronic obstructive pulmonary disease, unspecified COPD type (H)  Comment: Controlled  Plan:   - Monitor respiratory status and O2 sats  - Patient verbalizes understanding and agrees with treatment plan.     (G62.9) Peripheral polyneuropathy  Comment: Chronic  Plan:   - Continue gabapentin    (N32.81) Overactive bladder  Comment: Chronic  Plan:   - Continue myrbetriq    (R53.81) Physical deconditioning  Comment: Ongoing physical deconditioning due to recent hospitalization. PTA lives in [facility]. Ambulates w/walker. Requires assistance with ADLs.   Plan:   - Encourage active participation in therapy session to increase strength and promote independence in activities and ADLs.   - Cognitive testing to be done in therapy.  - SW following for discharge planning. Goal is to discharge back to [facility].   - Patient verbalizes understanding and agrees with treatment plan.     (K08.9) Poor dentition  Comment: Patient  reports having dental extractions prior to recent hospitalization. Diet downgraded to DD2 w/regular liquids  Plan:   - Continue DD2 w/regular liquids, ST to follow   - Patient to follow-up with Oral Surgeon on 3/8  - Patient to follow-up with Dentist on 3/11  - Patient verbalizes understanding and agrees with treatment plan.     (D64.9) Anemia, unspecified type  Comment: Hgb baseline prior to recent hospital stay was 11-12, recent Hgb 9.4 with no active sx of bleeding.   Plan:   - Monitor Hgb  - Continue vitamin B12          Total time spent with patient visit at the [facility] nursing [facility] was 38 mins including patient visit and review of past records. Greater than 50% of total time (20 minutes) spent with counseling patient regarding treatment plan, medication management, follow-up labs, and follow-up appointments. Patient verbalizes understanding and agrees with treatment plan. Coordinating care with SW regarding discharge planning.       Electronically signed by:  SHRUTI Duran CNP

## 2021-03-08 NOTE — LETTER
3/8/2021        RE: Glendy Díaz  [address]  [facility] [address]        [facility]  PRIMARY CARE PROVIDER AND CLINIC:  Veronica Davis MD, [address] / [facility] [place] [address]  Chief Complaint   Patient presents with     Establish Care     [facility] Medical Record Number:  [id number]  Place of Service where encounter took place:  [facility] (TCU) [[id number]]    Glendy Díaz  is a 87 year old  (11/16/1933), admitted to the above facility from  [facility]. Hospital stay 3/2/21 through 3/5/21.  Admitted to this facility for  rehab, medical management and nursing care.    HPI:    HPI information obtained from: facility chart records, facility staff, patient report and [facility] chart review.     Brief Summary of Hospital Course:     PMH: paroxysmal atrial fibrillation, chronic kidney disease stage III, hypertension, peripheral neuropathy, overactive bladder, subclinical hypothyroidism, cognitive impairment. Hx COVID (12/27/20).     Patient initially evaluated at [facility] 3/2 due to bradycardia and dizziness. Was recently started on diltiazem on 2/24. EKG concerning for 3rd degree block vs junctional escape rhythm. Transferred to [facility] 3/2-3/5/21 for pacemaker placement. S/p PPM on 3/3/21. Was restarted on diltiazem and plan to follow-up in device clinic in 7-10 days. Not on anticoagulation due to hx recurrent falls. Noted to have ADELA, creat peaked to 2.16 which improved with IV fluids. Hgb baseline 11-12, admission Hgb 9.5 with no sx of acute blood loss.     [facility] [facility] on 3/5/21.       Updates on Status Since Skilled nursing Admission:     RN reports diet recently downgraded to DD2 w/regular liquids due to no teeth.    During exam, patient seen resting in bed. Reports doing well, no incisional pain to pacemaker site. Patient endorses having multiple upcoming appointments, including follow-up with oral  surgeon and dentist regarding in process of getting dentures. States tolerating current diet well. Endorses decreased intake due to no dentures at this time. Sleeping well at night. Denies constipation or diarrhea. Denies chest pain, SOB, headache, syncope. PTA lives in longterm, goal is to discharge back to [facility] following completion of therapy sessions. SW following for discharge planning.         CODE STATUS/ADVANCE DIRECTIVES DISCUSSION:   DNR / DNI  Patient's living condition: lives in an assisted living facility  ALLERGIES: Amlodipine, Fosamax [alendronic acid], Lisinopril, Pravastatin, and Simvastatin  PAST MEDICAL HISTORY:  has a past medical history of CKD (chronic kidney disease) stage 3, GFR 30-59 ml/min, Falls frequently, Hematuria, Hyperlipidemia LDL goal <100, Hyperparathyroidism (H), Hypertension, Incontinence, Mumps, Neck pain, chronic, Osteoarthritis, Paroxysmal atrial fibrillation (H), Peripheral neuropathy, and Sinus node dysfunction (H). She also has no past medical history of Asymptomatic human immunodeficiency virus (HIV) infection status (H), Cerebral palsy (H), Chronic infection, Complication of anesthesia, Congenital renal agenesis and dysgenesis, Goiter, Gout, Hernia, abdominal, History of spinal cord injury, History of thrombophlebitis, Horseshoe kidney, Hydrocephalus (H), Malignant hyperthermia, Palpitations, Parkinsons disease (H), Sleep apnea, Spider veins, Spina bifida (H), STD (sexually transmitted disease), Tethered cord (H), or Tuberculosis.  PAST SURGICAL HISTORY:   has a past surgical history that includes appendectomy; Cholecystectomy; Hysterectomy total abdominal; cataract iol, rt/lt; Arthroscopy ankle, open repair ligament, combined (5/31/2012); Remove hardware foot (12/4/2012); Excise mass foot (12/4/2012); Arthrodesis foot (5/1/2014); Repair hammer toe (5/1/2014); Release tendon toe (5/1/2014); Reverse arthroplasty shoulder (Right, 7/18/2016); Bladder surgery; Cystoscopy; and  Permanent Pacemakers  Insert of New or Replacement with Vent Lead (N/A, 3/3/2021).  FAMILY HISTORY: family history includes Alcohol/Drug in her brother; Breast Cancer in her sister; Cancer - colorectal in her brother and brother; Circulatory in her mother; Diabetes in her brother and mother; Lipids in her mother; Thyroid Disease in her daughter.  SOCIAL HISTORY:   reports that she quit smoking about 36 years ago. Her smoking use included cigarettes. She started smoking about 56 years ago. She has a 20.00 pack-year smoking history. She has never used smokeless tobacco. She reports that she does not drink alcohol or use drugs.    Post Discharge Medication Reconciliation Status: discharge medications reconciled and changed, per note/orders    Current Outpatient Medications   Medication Sig Dispense Refill     acetaminophen (TYLENOL) 325 MG tablet Take 650 mg by mouth every 8 hours as needed for mild pain or fever       aspirin 81 MG EC tablet Take 81 mg by mouth At Bedtime       cetirizine (ZYRTEC) 10 MG tablet Take 10 mg by mouth daily as needed for allergies  90 tablet 3     chlorhexidine (PERIDEX) 0.12 % solution Swish and spit 15 mLs in mouth 2 times daily Hold 2 minutes then expectorate.       Cholecalciferol (VITAMIN D) 2000 UNITS tablet Take 2,000 Units by mouth daily 100 tablet 3     diltiazem ER COATED BEADS (CARDIZEM CD/CARTIA XT) 120 MG 24 hr capsule Take 1 capsule (120 mg) by mouth daily       fluticasone (FLONASE) 50 MCG/ACT nasal spray Spray 1 spray into both nostrils daily as needed for rhinitis or allergies       gabapentin (NEURONTIN) 100 MG capsule Take 200 mg by mouth At Bedtime       gabapentin (NEURONTIN) 100 MG capsule Take 100 mg by mouth every morning        ibuprofen (ADVIL/MOTRIN) 400 MG tablet Take 800 mg by mouth every 6 hours as needed        MYRBETRIQ 50 MG 24 hr tablet TAKE 1 TABLET BY MOUTH  DAILY 90 tablet 3     oxyCODONE-acetaminophen (PERCOCET) 5-325 MG tablet Take 1 tablet by mouth  "every 6 hours as needed for moderate to severe pain 30 tablet 0     polyethylene glycol (MIRALAX) 17 g packet Take 1 packet by mouth daily as needed for constipation Mix in 8 ounces of water until completely dissolved       senna-docusate (SENOKOT-S/PERICOLACE) 8.6-50 MG tablet Take 1 tablet by mouth daily as needed for constipation       vitamin B-12 (CYANOCOBALAMIN) 2500 MCG sublingual tablet Take 2,500 mcg by mouth daily           ROS:  10 point ROS of systems including Constitutional, Eyes, Respiratory, Cardiovascular, Gastroenterology, Genitourinary, Integumentary, Musculoskeletal, Psychiatric were all negative except for pertinent positives noted in my HPI.      Vitals:  /71   Pulse 76   Temp 97.8  F (36.6  C)   Resp 18   Ht 1.626 m (5' 4\")   Wt 65.8 kg (145 lb)   SpO2 98%   BMI 24.89 kg/m    Exam:  Limited observational exam due to COVID19 precautions  GENERAL APPEARANCE:  Alert, in no distress, appears healthy, oriented, cooperative  ENT:  Mouth and posterior oropharynx normal, moist mucous membranes, normal hearing acuity  EYES:  EOM, conjunctivae, lids, pupils and irises normal, PERRL  NECK:  No adenopathy,masses or thyromegaly  RESP:  no respiratory distress  CV:  no edema  M/S:   Gait and station abnormal, uses walker. Digits and nails normal  SKIN:  Inspection of skin and subcutaneous tissue baseline  NEURO:   Cranial nerves 2-12 are normal tested and grossly at patient's baseline  PSYCH:  oriented X 3, affect and mood normal    Wt Readings from Last 4 Encounters:   03/08/21 65.8 kg (145 lb)   03/04/21 71.2 kg (156 lb 14.4 oz)   02/24/21 68.9 kg (152 lb)   01/20/21 66.8 kg (147 lb 3.2 oz)       Lab/Diagnostic data:  Recent labs in [facility] reviewed by me today.  and   Most Recent 3 CBC's:  Recent Labs   Lab Test 03/09/21  0555 03/05/21  0607 03/04/21  0540 03/03/21  0449 03/02/21  1737   WBC  --   --  6.6 8.0 7.8   HGB 8.9* 9.4* 8.8* 10.3* 9.5*   MCV  --   --  100 100 107*   PLT  --   --  176 " 236 224     Most Recent 3 BMP's:  Recent Labs   Lab Test 03/09/21  0555 03/05/21  0607 03/04/21  0540 03/03/21  0449     --  140 139   POTASSIUM 4.4  --  4.2 4.4   CHLORIDE 111*  --  111* 110*   CO2 27  --  26 25   BUN 20  --  24 30   CR 1.32* 1.41* 1.51* 1.93*   ANIONGAP 5  --  3 4   MUMTAZ 9.0  --  8.6 9.0   GLC 75  --  79 88         ASSESSMENT/PLAN:    (Z95.0) S/P placement of cardiac pacemaker  (primary encounter diagnosis)  Comment: EKG concerning for 3rd degree block vs junctional escape rhythm, s/p PPM on 3/3/21.  Plan:   - Check BMP, Hgb on 3/9/21   - Continue diltiazem, ASA  - Continue tylenol PRN, ibuprofen PRN, percocet PRN  - Monitor pacemaker site for sx of infection  - Patient to follow-up with Cardiologist on 3/12    (I48.0) Paroxysmal atrial fibrillation (H)  Comment: Chronic PAF  Plan:   - Not on anticoagulation due to hx recurrent falls    (I10) Benign essential hypertension  Comment: Controlled  Plan:   - Continue diltiazem  - Monitor BP/HR    (N18.32) Stage 3b chronic kidney disease  Comment: Creat baseline 1.1-1.3, creat stable.  Plan:   - Monitor BMP, avoid nephrotoxic medications    (J44.9) Chronic obstructive pulmonary disease, unspecified COPD type (H)  Comment: Controlled  Plan:   - Monitor respiratory status and O2 sats  - Patient verbalizes understanding and agrees with treatment plan.     (G62.9) Peripheral polyneuropathy  Comment: Chronic  Plan:   - Continue gabapentin    (N32.81) Overactive bladder  Comment: Chronic  Plan:   - Continue myrbetriq    (R53.81) Physical deconditioning  Comment: Ongoing physical deconditioning due to recent hospitalization. PTA lives in [facility]. Ambulates w/walker. Requires assistance with ADLs.   Plan:   - Encourage active participation in therapy session to increase strength and promote independence in activities and ADLs.   - Cognitive testing to be done in therapy.  - SW following for discharge planning. Goal is to discharge back to [facility].   - Patient  verbalizes understanding and agrees with treatment plan.     (K08.9) Poor dentition  Comment: Patient reports having dental extractions prior to recent hospitalization. Diet downgraded to DD2 w/regular liquids  Plan:   - Continue DD2 w/regular liquids, ST to follow   - Patient to follow-up with Oral Surgeon on 3/8  - Patient to follow-up with Dentist on 3/11  - Patient verbalizes understanding and agrees with treatment plan.     (D64.9) Anemia, unspecified type  Comment: Hgb baseline prior to recent hospital stay was 11-12, recent Hgb 9.4 with no active sx of bleeding.   Plan:   - Monitor Hgb  - Continue vitamin B12          Total time spent with patient visit at the [facility] nursing [facility] was 38 mins including patient visit and review of past records. Greater than 50% of total time (20 minutes) spent with counseling patient regarding treatment plan, medication management, follow-up labs, and follow-up appointments. Patient verbalizes understanding and agrees with treatment plan. Coordinating care with  regarding discharge planning.       Electronically signed by:  SHRUTI Duran CNP                           Sincerely,        SHRUTI Duran CNP

## 2021-03-09 ENCOUNTER — TELEPHONE (OUTPATIENT)
Dept: CARDIOLOGY | Facility: CLINIC | Age: 86
End: 2021-03-09

## 2021-03-09 ENCOUNTER — HOSPITAL LABORATORY (OUTPATIENT)
Dept: OTHER | Facility: CLINIC | Age: 86
End: 2021-03-09

## 2021-03-09 LAB
ANION GAP SERPL CALCULATED.3IONS-SCNC: 5 MMOL/L (ref 3–14)
BUN SERPL-MCNC: 20 MG/DL (ref 7–30)
CALCIUM SERPL-MCNC: 9 MG/DL (ref 8.5–10.1)
CHLORIDE SERPL-SCNC: 111 MMOL/L (ref 94–109)
CO2 SERPL-SCNC: 27 MMOL/L (ref 20–32)
CREAT SERPL-MCNC: 1.32 MG/DL (ref 0.52–1.04)
GAMMA INTERFERON BACKGROUND BLD IA-ACNC: 0.15 IU/ML
GFR SERPL CREATININE-BSD FRML MDRD: 36 ML/MIN/{1.73_M2}
GLUCOSE SERPL-MCNC: 75 MG/DL (ref 70–99)
HGB BLD-MCNC: 8.9 G/DL (ref 11.7–15.7)
M TB IFN-G CD4+ BCKGRND COR BLD-ACNC: 9.85 IU/ML
M TB TUBERC IFN-G BLD QL: POSITIVE
MITOGEN IGNF BCKGRD COR BLD-ACNC: 2.58 IU/ML
MITOGEN IGNF BCKGRD COR BLD-ACNC: 2.63 IU/ML
POTASSIUM SERPL-SCNC: 4.4 MMOL/L (ref 3.4–5.3)
SODIUM SERPL-SCNC: 143 MMOL/L (ref 133–144)

## 2021-03-10 ENCOUNTER — NURSING HOME VISIT (OUTPATIENT)
Dept: GERIATRICS | Facility: CLINIC | Age: 86
End: 2021-03-10
Payer: MEDICARE

## 2021-03-10 ENCOUNTER — DOCUMENTATION ONLY (OUTPATIENT)
Dept: OTHER | Facility: CLINIC | Age: 86
End: 2021-03-10

## 2021-03-10 DIAGNOSIS — D64.9 ANEMIA, UNSPECIFIED TYPE: ICD-10-CM

## 2021-03-10 DIAGNOSIS — Z53.9 ERRONEOUS ENCOUNTER--DISREGARD: Primary | ICD-10-CM

## 2021-03-10 DIAGNOSIS — I48.0 PAROXYSMAL ATRIAL FIBRILLATION (H): ICD-10-CM

## 2021-03-10 DIAGNOSIS — I10 BENIGN ESSENTIAL HYPERTENSION: ICD-10-CM

## 2021-03-10 DIAGNOSIS — Z95.0 S/P PLACEMENT OF CARDIAC PACEMAKER: Primary | ICD-10-CM

## 2021-03-10 PROCEDURE — 99308 SBSQ NF CARE LOW MDM 20: CPT | Performed by: INTERNAL MEDICINE

## 2021-03-11 VITALS
HEIGHT: 64 IN | SYSTOLIC BLOOD PRESSURE: 127 MMHG | TEMPERATURE: 97.8 F | WEIGHT: 145 LBS | OXYGEN SATURATION: 98 % | DIASTOLIC BLOOD PRESSURE: 71 MMHG | RESPIRATION RATE: 18 BRPM | BODY MASS INDEX: 24.75 KG/M2 | HEART RATE: 76 BPM

## 2021-03-11 PROBLEM — N18.30 CHRONIC KIDNEY DISEASE, STAGE 3 (H): Status: ACTIVE | Noted: 2021-03-11

## 2021-03-11 PROBLEM — J44.9 COPD (CHRONIC OBSTRUCTIVE PULMONARY DISEASE) (H): Status: ACTIVE | Noted: 2021-03-11

## 2021-03-12 ENCOUNTER — ANCILLARY PROCEDURE (OUTPATIENT)
Dept: CARDIOLOGY | Facility: CLINIC | Age: 86
End: 2021-03-12
Attending: INTERNAL MEDICINE
Payer: COMMERCIAL

## 2021-03-12 DIAGNOSIS — I49.5 SICK SINUS SYNDROME (H): ICD-10-CM

## 2021-03-12 DIAGNOSIS — Z95.0 CARDIAC PACEMAKER IN SITU: Primary | ICD-10-CM

## 2021-03-12 DIAGNOSIS — Z95.0 CARDIAC PACEMAKER IN SITU: ICD-10-CM

## 2021-03-12 PROCEDURE — 93280 PM DEVICE PROGR EVAL DUAL: CPT | Performed by: INTERNAL MEDICINE

## 2021-03-12 NOTE — PROGRESS NOTES
[facility]  PRIMARY CARE PROVIDER AND CLINIC:  Veronica Davis MD, [address] / [facility] [address]    Pt was seen by Dr Bass on 3/10/21 for a hospital f/u visit      Pt is known to me from recent TCU visit      PMH: paroxysmal atrial fibrillation, chronic kidney disease stage III, hypertension, peripheral neuropathy, overactive bladder, subclinical hypothyroidism, cognitive impairment. Hx COVID (12/27/20).     Patient initially evaluated at [facility] 3/2 due to bradycardia and dizziness. Was recently started on diltiazem on 2/24. EKG concerning for 3rd degree block vs junctional escape rhythm. Transferred to [facility] 3/2-3/5/21 for pacemaker placement. S/p PPM on 3/3/21. Was restarted on diltiazem and plan to follow-up in device clinic in 7-10 days. Not on anticoagulation due to hx recurrent falls. Noted to have ADELA, creat peaked to 2.16 which improved with IV fluids. Hgb baseline 11-12, admission Hgb 9.5 with no sx of acute blood loss.     Transferred to [facility] on 3/5/21.         Pt reports feeling well  She has mild chest discomfort at pacemaker site  She denies cough, chest pain, SOB, dizziness, abd pain        CODE STATUS/ADVANCE DIRECTIVES DISCUSSION:   DNR / DNI  Patient's living condition: lives in an assisted living facility  ALLERGIES: Amlodipine, Fosamax [alendronic acid], Lisinopril, Pravastatin, and Simvastatin  PAST MEDICAL HISTORY:  has a past medical history of CKD (chronic kidney disease) stage 3, GFR 30-59 ml/min, Falls frequently, Hematuria, Hyperlipidemia LDL goal <100, Hyperparathyroidism (H), Hypertension, Incontinence, Mumps, Neck pain, chronic, Osteoarthritis, Paroxysmal atrial fibrillation (H), Peripheral neuropathy, and Sinus node dysfunction (H). She also has no past medical history of Asymptomatic human immunodeficiency virus (HIV) infection status (H), Cerebral palsy (H), Chronic infection, Complication of anesthesia, Congenital renal agenesis and  dysgenesis, Goiter, Gout, Hernia, abdominal, History of spinal cord injury, History of thrombophlebitis, Horseshoe kidney, Hydrocephalus (H), Malignant hyperthermia, Palpitations, Parkinsons disease (H), Sleep apnea, Spider veins, Spina bifida (H), STD (sexually transmitted disease), Tethered cord (H), or Tuberculosis.  PAST SURGICAL HISTORY:   has a past surgical history that includes appendectomy; Cholecystectomy; Hysterectomy total abdominal; cataract iol, rt/lt; Arthroscopy ankle, open repair ligament, combined (5/31/2012); Remove hardware foot (12/4/2012); Excise mass foot (12/4/2012); Arthrodesis foot (5/1/2014); Repair hammer toe (5/1/2014); Release tendon toe (5/1/2014); Reverse arthroplasty shoulder (Right, 7/18/2016); Bladder surgery; Cystoscopy; and Permanent Pacemakers  Insert of New or Replacement with Vent Lead (N/A, 3/3/2021).  FAMILY HISTORY: family history includes Alcohol/Drug in her brother; Breast Cancer in her sister; Cancer - colorectal in her brother and brother; Circulatory in her mother; Diabetes in her brother and mother; Lipids in her mother; Thyroid Disease in her daughter.  SOCIAL HISTORY:   reports that she quit smoking about 36 years ago. Her smoking use included cigarettes. She started smoking about 56 years ago. She has a 20.00 pack-year smoking history. She has never used smokeless tobacco. She reports that she does not drink alcohol or use drugs.      Current Outpatient Medications   Medication Sig Dispense Refill     acetaminophen (TYLENOL) 325 MG tablet Take 650 mg by mouth every 8 hours as needed for mild pain or fever       aspirin 81 MG EC tablet Take 81 mg by mouth At Bedtime       cetirizine (ZYRTEC) 10 MG tablet Take 10 mg by mouth daily as needed for allergies  90 tablet 3     chlorhexidine (PERIDEX) 0.12 % solution Swish and spit 15 mLs in mouth 2 times daily Hold 2 minutes then expectorate.       Cholecalciferol (VITAMIN D) 2000 UNITS tablet Take 2,000 Units by mouth  daily 100 tablet 3     diltiazem ER COATED BEADS (CARDIZEM CD/CARTIA XT) 120 MG 24 hr capsule Take 1 capsule (120 mg) by mouth daily       fluticasone (FLONASE) 50 MCG/ACT nasal spray Spray 1 spray into both nostrils daily as needed for rhinitis or allergies       gabapentin (NEURONTIN) 100 MG capsule Take 200 mg by mouth At Bedtime       gabapentin (NEURONTIN) 100 MG capsule Take 100 mg by mouth every morning        ibuprofen (ADVIL/MOTRIN) 400 MG tablet Take 800 mg by mouth every 6 hours as needed        MYRBETRIQ 50 MG 24 hr tablet TAKE 1 TABLET BY MOUTH  DAILY 90 tablet 3     oxyCODONE-acetaminophen (PERCOCET) 5-325 MG tablet Take 1 tablet by mouth every 6 hours as needed for moderate to severe pain 30 tablet 0     polyethylene glycol (MIRALAX) 17 g packet Take 1 packet by mouth daily as needed for constipation Mix in 8 ounces of water until completely dissolved       senna-docusate (SENOKOT-S/PERICOLACE) 8.6-50 MG tablet Take 1 tablet by mouth daily as needed for constipation       vitamin B-12 (CYANOCOBALAMIN) 2500 MCG sublingual tablet Take 2,500 mcg by mouth daily                     GENERAL APPEARANCE:  Alert, pleasant, lying in bed    Lungs clear  CV rrr  L upper chest pacemaker site covered, dressing was not removed  Abd soft  No LE edema  Alert, fully oriented      Most Recent 3 CBC's:  Recent Labs   Lab Test 03/09/21  0555 03/05/21  0607 03/04/21  0540 03/03/21 0449 03/02/21  1737   WBC  --   --  6.6 8.0 7.8   HGB 8.9* 9.4* 8.8* 10.3* 9.5*   MCV  --   --  100 100 107*   PLT  --   --  176 236 224     Most Recent 3 BMP's:  Recent Labs   Lab Test 03/09/21  0555 03/05/21  0607 03/04/21  0540 03/03/21 0449     --  140 139   POTASSIUM 4.4  --  4.2 4.4   CHLORIDE 111*  --  111* 110*   CO2 27  --  26 25   BUN 20  --  24 30   CR 1.32* 1.41* 1.51* 1.93*   ANIONGAP 5  --  3 4   MUMTAZ 9.0  --  8.6 9.0   GLC 75  --  79 88         ASSESSMENT/PLAN:    (Z95.0) S/P placement of cardiac pacemaker  (primary  encounter diagnosis)  Comment: EKG concerning for 3rd degree block vs junctional escape rhythm, s/p PPM on 3/3/21.  CV status stable  Plan: Continue diltiazem, ASA, monitor pacemaker site. Cardiology f/u this week    (I48.0) Paroxysmal atrial fibrillation (H)  Comment: no tachycardia diagnosed since admission  Plan: Diltiazem.  No AC secondary to falls    Anemia  Likely related to recent acute infection and procedure  Plan monitor Hgb      (I10) Benign essential hypertension  Comment: Controlled  Plan: continue current medications, monitor vitals      (N18.32) Stage 3b chronic kidney disease  Comment: Creat baseline 1.1-1.3  Plan: monitor renal function      Ki Bass MD

## 2021-03-15 ENCOUNTER — HOSPITAL LABORATORY (OUTPATIENT)
Dept: OTHER | Facility: CLINIC | Age: 86
End: 2021-03-15

## 2021-03-15 LAB
ANION GAP SERPL CALCULATED.3IONS-SCNC: 3 MMOL/L (ref 3–14)
BUN SERPL-MCNC: 21 MG/DL (ref 7–30)
CALCIUM SERPL-MCNC: 9.4 MG/DL (ref 8.5–10.1)
CHLORIDE SERPL-SCNC: 109 MMOL/L (ref 94–109)
CO2 SERPL-SCNC: 28 MMOL/L (ref 20–32)
CREAT SERPL-MCNC: 1.19 MG/DL (ref 0.52–1.04)
FERRITIN SERPL-MCNC: 525 NG/ML (ref 8–252)
FOLATE SERPL-MCNC: 5.5 NG/ML
GFR SERPL CREATININE-BSD FRML MDRD: 41 ML/MIN/{1.73_M2}
GLUCOSE SERPL-MCNC: 72 MG/DL (ref 70–99)
HCT VFR BLD AUTO: 33.4 % (ref 35–47)
HGB BLD-MCNC: 10.2 G/DL (ref 11.7–15.7)
IRON SATN MFR SERPL: 17 % (ref 15–46)
IRON SERPL-MCNC: 43 UG/DL (ref 35–180)
MDC_IDC_LEAD_IMPLANT_DT: NORMAL
MDC_IDC_LEAD_IMPLANT_DT: NORMAL
MDC_IDC_LEAD_LOCATION: NORMAL
MDC_IDC_LEAD_LOCATION: NORMAL
MDC_IDC_LEAD_LOCATION_DETAIL_1: NORMAL
MDC_IDC_LEAD_LOCATION_DETAIL_1: NORMAL
MDC_IDC_LEAD_MFG: NORMAL
MDC_IDC_LEAD_MFG: NORMAL
MDC_IDC_LEAD_MODEL: NORMAL
MDC_IDC_LEAD_MODEL: NORMAL
MDC_IDC_LEAD_POLARITY_TYPE: NORMAL
MDC_IDC_LEAD_POLARITY_TYPE: NORMAL
MDC_IDC_LEAD_SERIAL: NORMAL
MDC_IDC_LEAD_SERIAL: NORMAL
MDC_IDC_MSMT_BATTERY_STATUS: NORMAL
MDC_IDC_MSMT_LEADCHNL_RA_IMPEDANCE_VALUE: 558 OHM
MDC_IDC_MSMT_LEADCHNL_RA_PACING_THRESHOLD_AMPLITUDE: 0.9 V
MDC_IDC_MSMT_LEADCHNL_RA_PACING_THRESHOLD_PULSEWIDTH: 0.4 MS
MDC_IDC_MSMT_LEADCHNL_RA_SENSING_INTR_AMPL: 1.2 MV
MDC_IDC_MSMT_LEADCHNL_RV_IMPEDANCE_VALUE: 754 OHM
MDC_IDC_MSMT_LEADCHNL_RV_PACING_THRESHOLD_AMPLITUDE: 0.8 V
MDC_IDC_MSMT_LEADCHNL_RV_PACING_THRESHOLD_PULSEWIDTH: 0.4 MS
MDC_IDC_MSMT_LEADCHNL_RV_SENSING_INTR_AMPL: 10 MV
MDC_IDC_PG_IMPLANT_DTM: NORMAL
MDC_IDC_PG_MFG: NORMAL
MDC_IDC_PG_MODEL: NORMAL
MDC_IDC_PG_SERIAL: NORMAL
MDC_IDC_PG_TYPE: NORMAL
MDC_IDC_SESS_CLINIC_NAME: NORMAL
MDC_IDC_SESS_DTM: NORMAL
MDC_IDC_SESS_TYPE: NORMAL
MDC_IDC_SET_BRADY_AT_MODE_SWITCH_MODE: NORMAL
MDC_IDC_SET_BRADY_AT_MODE_SWITCH_RATE: 170 {BEATS}/MIN
MDC_IDC_SET_BRADY_LOWRATE: 60 {BEATS}/MIN
MDC_IDC_SET_BRADY_MAX_SENSOR_RATE: 130 {BEATS}/MIN
MDC_IDC_SET_BRADY_MAX_TRACKING_RATE: 130 {BEATS}/MIN
MDC_IDC_SET_BRADY_MODE: NORMAL
MDC_IDC_SET_BRADY_PAV_DELAY_HIGH: 150 MS
MDC_IDC_SET_BRADY_PAV_DELAY_LOW: 200 MS
MDC_IDC_SET_BRADY_SAV_DELAY_HIGH: 150 MS
MDC_IDC_SET_BRADY_SAV_DELAY_LOW: 200 MS
MDC_IDC_SET_LEADCHNL_RA_PACING_AMPLITUDE: 3.5 V
MDC_IDC_SET_LEADCHNL_RA_PACING_CAPTURE_MODE: NORMAL
MDC_IDC_SET_LEADCHNL_RA_PACING_POLARITY: NORMAL
MDC_IDC_SET_LEADCHNL_RA_PACING_PULSEWIDTH: 0.4 MS
MDC_IDC_SET_LEADCHNL_RA_SENSING_ADAPTATION_MODE: NORMAL
MDC_IDC_SET_LEADCHNL_RA_SENSING_POLARITY: NORMAL
MDC_IDC_SET_LEADCHNL_RA_SENSING_SENSITIVITY: 0.25 MV
MDC_IDC_SET_LEADCHNL_RV_PACING_AMPLITUDE: 1.4 V
MDC_IDC_SET_LEADCHNL_RV_PACING_CAPTURE_MODE: NORMAL
MDC_IDC_SET_LEADCHNL_RV_PACING_POLARITY: NORMAL
MDC_IDC_SET_LEADCHNL_RV_PACING_PULSEWIDTH: 0.4 MS
MDC_IDC_SET_LEADCHNL_RV_SENSING_ADAPTATION_MODE: NORMAL
MDC_IDC_SET_LEADCHNL_RV_SENSING_POLARITY: NORMAL
MDC_IDC_SET_LEADCHNL_RV_SENSING_SENSITIVITY: 1.5 MV
MDC_IDC_SET_ZONE_DETECTION_INTERVAL: 375 MS
MDC_IDC_SET_ZONE_TYPE: NORMAL
MDC_IDC_SET_ZONE_VENDOR_TYPE: NORMAL
MDC_IDC_STAT_EPISODE_RECENT_COUNT: 0
MDC_IDC_STAT_EPISODE_RECENT_COUNT_DTM_END: NORMAL
MDC_IDC_STAT_EPISODE_RECENT_COUNT_DTM_START: NORMAL
MDC_IDC_STAT_EPISODE_TOTAL_COUNT: 0
MDC_IDC_STAT_EPISODE_TOTAL_COUNT_DTM_END: NORMAL
MDC_IDC_STAT_EPISODE_TYPE: NORMAL
MDC_IDC_STAT_EPISODE_TYPE: NORMAL
MDC_IDC_STAT_EPISODE_VENDOR_TYPE: NORMAL
MDC_IDC_STAT_EPISODE_VENDOR_TYPE: NORMAL
POTASSIUM SERPL-SCNC: 4.4 MMOL/L (ref 3.4–5.3)
SODIUM SERPL-SCNC: 140 MMOL/L (ref 133–144)
TIBC SERPL-MCNC: 247 UG/DL (ref 240–430)
VIT B12 SERPL-MCNC: 1279 PG/ML (ref 193–986)

## 2021-03-16 ENCOUNTER — DISCHARGE SUMMARY NURSING HOME (OUTPATIENT)
Dept: GERIATRICS | Facility: CLINIC | Age: 86
End: 2021-03-16
Payer: MEDICARE

## 2021-03-16 VITALS
TEMPERATURE: 97.3 F | BODY MASS INDEX: 24.02 KG/M2 | DIASTOLIC BLOOD PRESSURE: 63 MMHG | HEIGHT: 64 IN | WEIGHT: 140.7 LBS | SYSTOLIC BLOOD PRESSURE: 133 MMHG | HEART RATE: 72 BPM | RESPIRATION RATE: 18 BRPM | OXYGEN SATURATION: 96 %

## 2021-03-16 DIAGNOSIS — K08.9 POOR DENTITION: ICD-10-CM

## 2021-03-16 DIAGNOSIS — D64.9 ANEMIA, UNSPECIFIED TYPE: ICD-10-CM

## 2021-03-16 DIAGNOSIS — I10 BENIGN ESSENTIAL HYPERTENSION: ICD-10-CM

## 2021-03-16 DIAGNOSIS — R53.81 PHYSICAL DECONDITIONING: ICD-10-CM

## 2021-03-16 DIAGNOSIS — G62.9 PERIPHERAL POLYNEUROPATHY: ICD-10-CM

## 2021-03-16 DIAGNOSIS — Z95.0 S/P PLACEMENT OF CARDIAC PACEMAKER: ICD-10-CM

## 2021-03-16 DIAGNOSIS — R41.89 COGNITIVE IMPAIRMENT: ICD-10-CM

## 2021-03-16 DIAGNOSIS — N18.32 STAGE 3B CHRONIC KIDNEY DISEASE (H): ICD-10-CM

## 2021-03-16 DIAGNOSIS — N32.81 OVERACTIVE BLADDER: ICD-10-CM

## 2021-03-16 DIAGNOSIS — I48.0 PAROXYSMAL ATRIAL FIBRILLATION (H): Primary | ICD-10-CM

## 2021-03-16 DIAGNOSIS — R00.1 BRADYCARDIA: ICD-10-CM

## 2021-03-16 DIAGNOSIS — N17.9 AKI (ACUTE KIDNEY INJURY) (H): ICD-10-CM

## 2021-03-16 PROCEDURE — 99316 NF DSCHRG MGMT 30 MIN+: CPT | Performed by: NURSE PRACTITIONER

## 2021-03-16 ASSESSMENT — MIFFLIN-ST. JEOR: SCORE: 1058.21

## 2021-03-16 NOTE — LETTER
3/16/2021        RE: Glendy Díaz  [address]  [facility] [address]        [facility] DISCHARGE SUMMARY  PATIENT'S NAME: Glendy Díaz  YOB: 1933  MEDICAL RECORD NUMBER:  [id number]  [id number]    PRIMARY CARE PROVIDER AND CLINIC RESPONSIBLE AFTER TRANSFER:   Veronica Davis MD, [address] / [facility] [address]    [facility]     Transferring providers: Daryl BAHENA CNP; Ki Bass MD  Recent Hospitalization/ED:  [facility] stay 3/2/21 to 3/5/21.  Date of SNF Admission: March/05/2021  Date of SNF (anticipated) Discharge: March/17/2021  Discharged to: [facility]  Cognitive Scores: no cognitive testing was done   DME: Walker    CODE STATUS/ADVANCE DIRECTIVES DISCUSSION:  DNR / DNI   ALLERGIES: Amlodipine, Fosamax [alendronic acid], Lisinopril, Pravastatin, and Simvastatin    DISCHARGE DIAGNOSIS/NURSING FACILITY COURSE:   She came to this facility for short term rehab and medical management following hospitalization after [facility] 3/2/2021 with bradycardia and dizziness. She had been started on diltiazem 2/24/2021. EKG concerning for 3rd degree block vs junctional escape rhythm. She was transferred to [facility] for pacemaker placement, which was done 3/3/2021. Diltiazem was restarted. She had ADELA with creatinine 2.16, improved with IVF to 1.41 at hospital discharge.     She has worked with PHYSICAL THERAPY and OT with good progress. Ambulating 150 ft with walker and supervision.  Independent with toileting and dressing. Requires stand by to min assist with bathing.   ASSESSMENT / PLAN:  (I48.0) Paroxysmal atrial fibrillation (H)  (primary encounter diagnosis)  (R00.1) Bradycardia  (Z95.0) S/P placement of cardiac pacemaker  Comment: EKG concerning for 3rd degree block vs junctional  escape rhythm, s/p PPM 3/3/2021. Incision healed and no pain at site. Rate controlled: 62-83. She is not anticoagulated due to falls. She is feeling well and eager to return home.   Plan: discharge home as planned. Continue diltiazem, ASA. Home care services and follow up as below.     (N17.9) ADELA (acute kidney injury) (H)  (N18.32) Stage 3b chronic kidney disease  Comment: renal function improved and is back to baseline at 1.19 on 3/15/2021  Plan: discontinue ibuprofen. Follow up labs per PCP. Avoid nephrotoxins     (D64.9) Anemia, unspecified type  Comment: Hgb 9.4 at hospital discharge, improved to 10.2 on 3/15/2021. Iron studies, folate and vitamin B12 were normal 3/15/2021. Baseline Hgb 11-12.   Plan: follow up labs per PCP     (I10) Benign essential hypertension  Comment: controlled with BPs: 134/65, 138/76, 130/75   Plan: continue diltiazem. Avoid hypotension due to advanced age and fall risk     (G62.9) Peripheral polyneuropathy  Comment: pain controlled   Plan: continue gabapentin     (N32.81) Overactive bladder  Comment: no acute issues   Plan: continue Myrbetriq     (K08.9) Poor dentition  Comment: she is in the process of getting dental implants and was seen by the oral surgeon 3/8/2021  Plan: continue peridex. Continue DD2 textures.     (R41.89) Cognitive impairment  Comment: appears to have mild deficits. No cognitive testing was done.   Plan: daughter assists with med management. Home care referral.     (R53.81) Physical deconditioning  Comment: progress in therapies as above   Plan: home therapies for ongoing gait training, strengthening and ADL safety         Past Medical History:  has a past medical history of CKD (chronic kidney disease) stage 3, GFR 30-59 ml/min, Falls frequently, Hematuria, Hyperlipidemia LDL goal <100, Hyperparathyroidism (H), Hypertension, Incontinence, Mumps, Neck pain, chronic, Osteoarthritis, Paroxysmal atrial fibrillation (H), Peripheral neuropathy, and Sinus node  dysfunction (H). She also has no past medical history of Asymptomatic human immunodeficiency virus (HIV) infection status (H), Cerebral palsy (H), Chronic infection, Complication of anesthesia, Congenital renal agenesis and dysgenesis, Goiter, Gout, Hernia, abdominal, History of spinal cord injury, History of thrombophlebitis, Horseshoe kidney, Hydrocephalus (H), Malignant hyperthermia, Palpitations, Parkinsons disease (H), Sleep apnea, Spider veins, Spina bifida (H), STD (sexually transmitted disease), Tethered cord (H), or Tuberculosis.    Discharge Medications:    Current Outpatient Medications   Medication Sig Dispense Refill     acetaminophen (TYLENOL) 325 MG tablet Take 650 mg by mouth every 8 hours as needed for mild pain or fever       aspirin 81 MG EC tablet Take 81 mg by mouth At Bedtime       cetirizine (ZYRTEC) 10 MG tablet Take 10 mg by mouth daily as needed for allergies  90 tablet 3     chlorhexidine (PERIDEX) 0.12 % solution Swish and spit 15 mLs in mouth 2 times daily Hold 2 minutes then expectorate.       Cholecalciferol (VITAMIN D) 2000 UNITS tablet Take 2,000 Units by mouth daily 100 tablet 3     diltiazem ER COATED BEADS (CARDIZEM CD/CARTIA XT) 120 MG 24 hr capsule Take 1 capsule (120 mg) by mouth daily       fluticasone (FLONASE) 50 MCG/ACT nasal spray Spray 1 spray into both nostrils daily as needed for rhinitis or allergies       gabapentin (NEURONTIN) 100 MG capsule Take 200 mg by mouth At Bedtime       gabapentin (NEURONTIN) 100 MG capsule Take 100 mg by mouth every morning        MYRBETRIQ 50 MG 24 hr tablet TAKE 1 TABLET BY MOUTH  DAILY 90 tablet 3     polyethylene glycol (MIRALAX) 17 g packet Take 1 packet by mouth daily as needed for constipation Mix in 8 ounces of water until completely dissolved       senna-docusate (SENOKOT-S/PERICOLACE) 8.6-50 MG tablet Take 1 tablet by mouth daily as needed for constipation       vitamin B-12 (CYANOCOBALAMIN) 2500 MCG sublingual tablet Take 2,500  "mcg by mouth daily         Medication Changes/Rationale:     Ibuprofen discontinued due to ADELA/CKD    Percocet discontinued-not used for >1 week     Controlled medications sent with patient:   not applicable/none     ROS:   4 point ROS including Respiratory, CV, GI and , other than that noted in the HPI,  is negative    Physical Exam:   Vitals: /63   Pulse 72   Temp 97.3  F (36.3  C)   Resp 18   Ht 1.626 m (5' 4\")   Wt 63.8 kg (140 lb 11.2 oz)   SpO2 96%   BMI 24.15 kg/m    BMI= Body mass index is 24.15 kg/m .  GENERAL APPEARANCE:  Alert, in no distress  ENT:  [place], oropharynx clear  EYES:  EOM normal, conjunctiva and lids normal  NECK:  No adenopathy,masses or thyromegaly  RESP:  respiratory effort and palpation of chest normal, lungs clear to auscultation , no respiratory distress  CV:  Palpation and auscultation of heart done , regular rate and rhythm, no murmur, no edema, +2 pedal pulses  ABDOMEN:  soft, non-tender, no distension, no masses  M/S:   up in chair. BRIONES with good strength. No joint inflammation  SKIN:  pacemaker incision healed, no erythema. No rashes or open areas  PSYCH:  oriented X 3, memory impaired , affect and mood normal     SNF labs: Recent labs in [facility] reviewed by me today.       DISCHARGE PLAN:    Follow up labs: per PCP    Medical Follow Up:      Follow up with primary care provider within 1-2 weeks   Follow up with Cardiology as scheduled     MTM referral needed and placed by this provider: No    Discharge Services: Home Care:  Occupational Therapy, Physical Therapy, Registered Nurse, Home Health Aide and From:  [facility]      TOTAL DISCHARGE TIME:   Greater than 30 minutes  Electronically signed by:  SHRUTI Blackmon CNP                      Sincerely,        SHRUTI Blackmon CNP      "

## 2021-03-16 NOTE — PROGRESS NOTES
[place] [facility] DISCHARGE SUMMARY  PATIENT'S NAME: Glendy Díaz  YOB: 1933  MEDICAL RECORD NUMBER:  [id number]  Place of Service where encounter took place:  [facility] [[id number]]    PRIMARY CARE PROVIDER AND CLINIC RESPONSIBLE AFTER TRANSFER:   Veronica Davis MD, [address] [facility] [address]    G Provider     Transferring providers: Daryl BAHENA CNP; Ki Bass MD  Recent Hospitalization/ED:  [facility] stay 3/2/21 to 3/5/21.  Date of SNF Admission: March/05/2021  Date of SNF (anticipated) Discharge: March/17/2021  Discharged to: [facility]  Cognitive Scores: no cognitive testing was done   DME: Walker    CODE STATUS/ADVANCE DIRECTIVES DISCUSSION:  DNR / DNI   ALLERGIES: Amlodipine, Fosamax [alendronic acid], Lisinopril, Pravastatin, and Simvastatin    DISCHARGE DIAGNOSIS/NURSING FACILITY COURSE:   She came to this facility for short term rehab and medical management following hospitalization after initially presenting to [facility] 3/2/2021 with bradycardia and dizziness. She had been started on diltiazem 2/24/2021. EKG concerning for 3rd degree block vs junctional escape rhythm. She was transferred to [facility] for pacemaker placement, which was done 3/3/2021. Diltiazem was restarted. She had ADELA with creatinine 2.16, improved with IVF to 1.41 at hospital discharge.     She has worked with PHYSICAL THERAPY and OT with good progress. Ambulating 150 ft with walker and supervision.  Independent with toileting and dressing. Requires stand by to min assist with bathing.   ASSESSMENT / PLAN:  (I48.0) Paroxysmal atrial fibrillation (H)  (primary encounter diagnosis)  (R00.1) Bradycardia  (Z95.0) S/P placement of cardiac pacemaker  Comment: EKG concerning for 3rd degree block vs junctional escape rhythm, s/p PPM 3/3/2021. Incision healed and no pain at site. Rate controlled: 62-83.  She is not anticoagulated due to falls. She is feeling well and eager to return home.   Plan: discharge home as planned. Continue diltiazem, ASA. Home care services and follow up as below.     (N17.9) ADELA (acute kidney injury) (H)  (N18.32) Stage 3b chronic kidney disease  Comment: renal function improved and is back to baseline at 1.19 on 3/15/2021  Plan: discontinue ibuprofen. Follow up labs per PCP. Avoid nephrotoxins     (D64.9) Anemia, unspecified type  Comment: Hgb 9.4 at hospital discharge, improved to 10.2 on 3/15/2021. Iron studies, folate and vitamin B12 were normal 3/15/2021. Baseline Hgb 11-12.   Plan: follow up labs per PCP     (I10) Benign essential hypertension  Comment: controlled with BPs: 134/65, 138/76, 130/75   Plan: continue diltiazem. Avoid hypotension due to advanced age and fall risk     (G62.9) Peripheral polyneuropathy  Comment: pain controlled   Plan: continue gabapentin     (N32.81) Overactive bladder  Comment: no acute issues   Plan: continue Myrbetriq     (K08.9) Poor dentition  Comment: she is in the process of getting dental implants and was seen by the oral surgeon 3/8/2021  Plan: continue peridex. Continue DD2 textures.     (R41.89) Cognitive impairment  Comment: appears to have mild deficits. No cognitive testing was done.   Plan: daughter assists with med management. Home care referral.     (R53.81) Physical deconditioning  Comment: progress in therapies as above   Plan: home therapies for ongoing gait training, strengthening and ADL safety         Past Medical History:  has a past medical history of CKD (chronic kidney disease) stage 3, GFR 30-59 ml/min, Falls frequently, Hematuria, Hyperlipidemia LDL goal <100, Hyperparathyroidism (H), Hypertension, Incontinence, Mumps, Neck pain, chronic, Osteoarthritis, Paroxysmal atrial fibrillation (H), Peripheral neuropathy, and Sinus node dysfunction (H). She also has no past medical history of Asymptomatic human immunodeficiency virus  (HIV) infection status (H), Cerebral palsy (H), Chronic infection, Complication of anesthesia, Congenital renal agenesis and dysgenesis, Goiter, Gout, Hernia, abdominal, History of spinal cord injury, History of thrombophlebitis, Horseshoe kidney, Hydrocephalus (H), Malignant hyperthermia, Palpitations, Parkinsons disease (H), Sleep apnea, Spider veins, Spina bifida (H), STD (sexually transmitted disease), Tethered cord (H), or Tuberculosis.    Discharge Medications:    Current Outpatient Medications   Medication Sig Dispense Refill     acetaminophen (TYLENOL) 325 MG tablet Take 650 mg by mouth every 8 hours as needed for mild pain or fever       aspirin 81 MG EC tablet Take 81 mg by mouth At Bedtime       cetirizine (ZYRTEC) 10 MG tablet Take 10 mg by mouth daily as needed for allergies  90 tablet 3     chlorhexidine (PERIDEX) 0.12 % solution Swish and spit 15 mLs in mouth 2 times daily Hold 2 minutes then expectorate.       Cholecalciferol (VITAMIN D) 2000 UNITS tablet Take 2,000 Units by mouth daily 100 tablet 3     diltiazem ER COATED BEADS (CARDIZEM CD/CARTIA XT) 120 MG 24 hr capsule Take 1 capsule (120 mg) by mouth daily       fluticasone (FLONASE) 50 MCG/ACT nasal spray Spray 1 spray into both nostrils daily as needed for rhinitis or allergies       gabapentin (NEURONTIN) 100 MG capsule Take 200 mg by mouth At Bedtime       gabapentin (NEURONTIN) 100 MG capsule Take 100 mg by mouth every morning        MYRBETRIQ 50 MG 24 hr tablet TAKE 1 TABLET BY MOUTH  DAILY 90 tablet 3     polyethylene glycol (MIRALAX) 17 g packet Take 1 packet by mouth daily as needed for constipation Mix in 8 ounces of water until completely dissolved       senna-docusate (SENOKOT-S/PERICOLACE) 8.6-50 MG tablet Take 1 tablet by mouth daily as needed for constipation       vitamin B-12 (CYANOCOBALAMIN) 2500 MCG sublingual tablet Take 2,500 mcg by mouth daily         Medication Changes/Rationale:     Ibuprofen discontinued due to  "ADELA/CKD    Percocet discontinued-not used for >1 week     Controlled medications sent with patient:   not applicable/none     ROS:   4 point ROS including Respiratory, CV, GI and , other than that noted in the HPI,  is negative    Physical Exam:   Vitals: /63   Pulse 72   Temp 97.3  F (36.3  C)   Resp 18   Ht 1.626 m (5' 4\")   Wt 63.8 kg (140 lb 11.2 oz)   SpO2 96%   BMI 24.15 kg/m    BMI= Body mass index is 24.15 kg/m .  GENERAL APPEARANCE:  Alert, in no distress  ENT:  [place], oropharynx clear  EYES:  EOM normal, conjunctiva and lids normal  NECK:  No adenopathy,masses or thyromegaly  RESP:  respiratory effort and palpation of chest normal, lungs clear to auscultation , no respiratory distress  CV:  Palpation and auscultation of heart done , regular rate and rhythm, no murmur, no edema, +2 pedal pulses  ABDOMEN:  soft, non-tender, no distension, no masses  M/S:   up in chair. BRIONES with good strength. No joint inflammation  SKIN:  pacemaker incision healed, no erythema. No rashes or open areas  PSYCH:  oriented X 3, memory impaired , affect and mood normal     SNF labs: Recent labs in [facility] reviewed by me today.       DISCHARGE PLAN:    Follow up labs: per PCP    Medical Follow Up:      Follow up with primary care provider within 1-2 weeks   Follow up with Cardiology as scheduled     MTM referral needed and placed by this provider: No    Discharge Services: Home Care:  Occupational Therapy, Physical Therapy, Registered Nurse, Home Health Aide and From:  [facility]      TOTAL DISCHARGE TIME:   Greater than 30 minutes  Electronically signed by:  SHRUTI Blackmon CNP                "

## 2021-03-16 NOTE — PATIENT INSTRUCTIONS
[facility] Discharge Orders    Name: Glendy Díaz  : 1933  Planned Discharge Date: 3/17/2021  Discharged to: previous assisted living    MEDICAL FOLLOW UP  Follow up with Dr Davis  within 1-2 weeks   Follow up with Specialists: Cardiology 3/26/2021      FUTURE LABS: per PCP  DISCHARGE MEDICATIONS:  The patient s pharmacy is authorized to dispense a 30-day supply of medications. Refill requests should be directed to the primary provider, Veronica Davis.     Current Outpatient Medications   Medication Sig Dispense Refill     acetaminophen (TYLENOL) 325 MG tablet Take 650 mg by mouth every 8 hours as needed for mild pain or fever       aspirin 81 MG EC tablet Take 81 mg by mouth At Bedtime       cetirizine (ZYRTEC) 10 MG tablet Take 10 mg by mouth daily as needed for allergies  90 tablet 3     chlorhexidine (PERIDEX) 0.12 % solution Swish and spit 15 mLs in mouth 2 times daily Hold 2 minutes then expectorate.       Cholecalciferol (VITAMIN D) 2000 UNITS tablet Take 2,000 Units by mouth daily 100 tablet 3     diltiazem ER COATED BEADS (CARDIZEM CD/CARTIA XT) 120 MG 24 hr capsule Take 1 capsule (120 mg) by mouth daily       fluticasone (FLONASE) 50 MCG/ACT nasal spray Spray 1 spray into both nostrils daily as needed for rhinitis or allergies       gabapentin (NEURONTIN) 100 MG capsule Take 200 mg by mouth At Bedtime       gabapentin (NEURONTIN) 100 MG capsule Take 100 mg by mouth every morning        MYRBETRIQ 50 MG 24 hr tablet TAKE 1 TABLET BY MOUTH  DAILY 90 tablet 3     polyethylene glycol (MIRALAX) 17 g packet Take 1 packet by mouth daily as needed for constipation Mix in 8 ounces of water until completely dissolved       senna-docusate (SENOKOT-S/PERICOLACE) 8.6-50 MG tablet Take 1 tablet by mouth daily as needed for constipation       vitamin B-12 (CYANOCOBALAMIN) 2500 MCG sublingual tablet Take 2,500 mcg by mouth daily         SERVICES:  Home Care:   Occupational Therapy, Physical Therapy, Registered Nurse, Home Health Aide and From:  [facility]    ADDITIONAL INSTRUCTIONS:  Keep pacemaker site clean and dry       SHRUTI Blackmon CNP  This document was electronically signed on March 16, 2021

## 2021-03-21 ENCOUNTER — NURSE TRIAGE (OUTPATIENT)
Dept: NURSING | Facility: CLINIC | Age: 86
End: 2021-03-21

## 2021-03-21 ENCOUNTER — MEDICAL CORRESPONDENCE (OUTPATIENT)
Dept: HEALTH INFORMATION MANAGEMENT | Facility: CLINIC | Age: 86
End: 2021-03-21

## 2021-03-21 NOTE — TELEPHONE ENCOUNTER
Jose from [facility] called ,requesting verbal order to delay  start of Skilled Nurse Orders request related to weekend staffing. Done.

## 2021-03-22 ENCOUNTER — VIRTUAL VISIT (OUTPATIENT)
Dept: UROLOGY | Facility: CLINIC | Age: 86
End: 2021-03-22
Payer: MEDICARE

## 2021-03-22 VITALS — HEIGHT: 64 IN | BODY MASS INDEX: 24.41 KG/M2 | WEIGHT: 143 LBS

## 2021-03-22 DIAGNOSIS — N32.81 OAB (OVERACTIVE BLADDER): Primary | ICD-10-CM

## 2021-03-22 PROCEDURE — 99213 OFFICE O/P EST LOW 20 MIN: CPT | Mod: 95 | Performed by: PHYSICIAN ASSISTANT

## 2021-03-22 RX ORDER — MIRABEGRON 25 MG/1
50 TABLET, FILM COATED, EXTENDED RELEASE ORAL DAILY
Qty: 90 TABLET | Refills: 4 | Status: SHIPPED | OUTPATIENT
Start: 2021-03-22 | End: 2021-10-18

## 2021-03-22 ASSESSMENT — PAIN SCALES - GENERAL: PAINLEVEL: NO PAIN (0)

## 2021-03-22 ASSESSMENT — MIFFLIN-ST. JEOR: SCORE: 1068.64

## 2021-03-22 NOTE — LETTER
3/22/2021       RE: Glendy Díaz  [address] [place] [id number]     Dear Colleague,    Thank you for referring your patient, Glendy Díaz, to the [facility] [place] at [facility]. Please see a copy of my visit note below.    *SEND LINK TO CELL PHONE*    CELL PHONE IS DAUGHTERS PHONE... BUT SHE IS WITH THE PT.  PT NOW HAS A PACEMAKER.  PT HAS HAD A UTI SINCE SHE SAW YOU LAST.  PT HAS HEART TROUBLE.  HEART RATE DROPS LOW.  PT HAD KIDNEY DAMAGE... ALL NOTES IN Epic.  PT HAS HAD COVID.  PT IS IN THE PROCESS OF GETTING NEW TEETH.  PT HAS HAD BOTH VACCINES FOR COVID.      Glendy is a 87 year old who is being evaluated via a billable video visit.      How would you like to obtain your AVS? Mail a copy  If the video visit is dropped, the invitation should be resent by: Text to cell phone: [phone]  Will anyone else be joining your video visit? Yes: with the pt. How would they like to receive their invitation? Other e-mail: with the pt      Video Start Time: 1:23 PM  Video-Visit Details    Type of service:  Video Visit    Video End Time:1:45 PM    Originating Location (pt. Location): Home    Distant Location (provider location):  [facility] [place]     Platform used for Video Visit: "[employer]      CC: Urinary incontinence.   HPI: It is a pleasure to see Ms. Glendy Díaz, a pleasant 87 year old female seen today via billable video visit for follow-up of urinary incontinence. This problem has been going on for 5 years. Incontinence is not associated with laughing, sneezing, coughing, and bending at the waist. She has 1-2 episodes of incontinence per day and has been using 2-3 pads per day.   Ms. Díaz had tried Myrbetriq 50 mg (since Aug) in the past for her condition, which has helped some (and Lasix is more rarely used now). She denies any dysuria, nocturia, hematuria, pyuria, hesitancy, intermittency, feeling of  incomplete emptying, or any recent history of or stones.   The patient Struggles with constipation. She has been hospitalized for cardiac issues. Did have a UTI in the interim and is now voiding back to baseline   Current Outpatient Medications   Medication     acetaminophen (TYLENOL) 325 MG tablet     aspirin 81 MG EC tablet     cetirizine (ZYRTEC) 10 MG tablet     chlorhexidine (PERIDEX) 0.12 % solution     Cholecalciferol (VITAMIN D) 2000 UNITS tablet     diltiazem ER (TIAZAC) 120 MG 24 hr ER beaded capsule     diltiazem ER COATED BEADS (CARDIZEM CD/CARTIA XT) 120 MG 24 hr capsule     fluticasone (FLONASE) 50 MCG/ACT nasal spray     gabapentin (NEURONTIN) 100 MG capsule     gabapentin (NEURONTIN) 100 MG capsule     MYRBETRIQ 50 MG 24 hr tablet     polyethylene glycol (MIRALAX) 17 g packet     senna-docusate (SENOKOT-S/PERICOLACE) 8.6-50 MG tablet     vitamin B-12 (CYANOCOBALAMIN) 2500 MCG sublingual tablet     No current facility-administered medications for this visit.                Allergies   Allergen Reactions     Amlodipine      Leg edema with 5 mg     Fosamax [Alendronic Acid]      Bone pain     Lisinopril      Increased potassium     Pravastatin      Muscle aches      Simvastatin      Muscle aches      FAMILY HISTORY: The patient denies any history of genitourinary carcinoma and no history of urolithiasis.   SOCIAL HISTORY: The patient does not smoke cigarettes, denies EtOH and illicit drug abuse.   ROS: A comprehensive 14 point ROS was obtained and negative except for that outlined above in the HPI.       PHYSICAL EXAM:     GENERAL: NAD  NEURO: Alert and oriented x 3.             Admission on 09/12/2020, Discharged on 09/12/2020   Component Date Value Ref Range Status     WBC 09/12/2020 5.9  4.0 - 11.0 10e9/L Final     RBC Count 09/12/2020 3.80  3.8 - 5.2 10e12/L Final     Hemoglobin 09/12/2020 11.9  11.7 - 15.7 g/dL Final     Hematocrit 09/12/2020 38.2  35.0 - 47.0 % Final     MCV 09/12/2020 101* 78 -  100 fl Final     MCH 09/12/2020 31.3  26.5 - 33.0 pg Final     MCHC 09/12/2020 31.2* 31.5 - 36.5 g/dL Final     RDW 09/12/2020 13.2  10.0 - 15.0 % Final     Platelet Count 09/12/2020 224  150 - 450 10e9/L Final     Diff Method 09/12/2020 Automated Method   Final     % Neutrophils 09/12/2020 54.4  % Final     % Lymphocytes 09/12/2020 34.2  % Final     % Monocytes 09/12/2020 7.7  % Final     % Eosinophils 09/12/2020 2.9  % Final     % Basophils 09/12/2020 0.5  % Final     % Immature Granulocytes 09/12/2020 0.3  % Final     Nucleated RBCs 09/12/2020 0  0 /100 Final     Absolute Neutrophil 09/12/2020 3.2  1.6 - 8.3 10e9/L Final     Absolute Lymphocytes 09/12/2020 2.0  0.8 - 5.3 10e9/L Final     Absolute Monocytes 09/12/2020 0.5  0.0 - 1.3 10e9/L Final     Absolute Eosinophils 09/12/2020 0.2  0.0 - 0.7 10e9/L Final     Absolute Basophils 09/12/2020 0.0  0.0 - 0.2 10e9/L Final     Abs Immature Granulocytes 09/12/2020 0.0  0 - 0.4 10e9/L Final     Absolute Nucleated RBC 09/12/2020 0.0   Final     Sodium 09/12/2020 138  133 - 144 mmol/L Final     Potassium 09/12/2020 4.5  3.4 - 5.3 mmol/L Final    Specimen slightly hemolyzed, potassium may be falsely elevated     Chloride 09/12/2020 109  94 - 109 mmol/L Final     Carbon Dioxide 09/12/2020 24  20 - 32 mmol/L Final     Anion Gap 09/12/2020 5  3 - 14 mmol/L Final     Glucose 09/12/2020 92  70 - 99 mg/dL Final     Urea Nitrogen 09/12/2020 27  7 - 30 mg/dL Final     Creatinine 09/12/2020 1.23* 0.52 - 1.04 mg/dL Final     GFR Estimate 09/12/2020 39* >60 mL/min/[1.73_m2] Final    Comment: Non  GFR Calc   Starting 12/18/2018, serum creatinine based estimated GFR (eGFR) will be   calculated using the Chronic Kidney Disease Epidemiology Collaboration   (CKD-EPI) equation.      GFR Estimate If Black 09/12/2020 46* >60 mL/min/[1.73_m2] Final    Comment:  GFR Calc   Starting 12/18/2018, serum creatinine based estimated GFR (eGFR) will be   calculated  using the Chronic Kidney Disease Epidemiology Collaboration   (CKD-EPI) equation.      Calcium 09/12/2020 9.0  8.5 - 10.1 mg/dL Final     Troponin I ES 09/12/2020 <0.015  0.000 - 0.045 ug/L Final    Comment: The 99th percentile for upper reference range is 0.045 ug/L. Troponin values   in the range of 0.045 - 0.120 ug/L may be associated with risks of adverse   clinical events.      Interpretation ECG 09/12/2020 Click View Image link to view waveform and result   Final     Color Urine 09/12/2020 Straw   Final     Appearance Urine 09/12/2020 Clear   Final     Glucose Urine 09/12/2020 Negative  NEG^Negative mg/dL Final     Bilirubin Urine 09/12/2020 Negative  NEG^Negative Final     Ketones Urine 09/12/2020 Negative  NEG^Negative mg/dL Final     Specific Gravity Urine 09/12/2020 1.006  1.003 - 1.035 Final     Blood Urine 09/12/2020 Negative  NEG^Negative Final     pH Urine 09/12/2020 6.5  5.0 - 7.0 pH Final     Protein Albumin Urine 09/12/2020 Negative  NEG^Negative mg/dL Final     Urobilinogen mg/dL 09/12/2020 Normal  0.0 - 2.0 mg/dL Final     Nitrite Urine 09/12/2020 Negative  NEG^Negative Final     Leukocyte Esterase Urine 09/12/2020 Negative  NEG^Negative Final     Source 09/12/2020 Midstream Urine   Final     WBC Urine 09/12/2020 1  0 - 5 /HPF Final     RBC Urine 09/12/2020 <1  0 - 2 /HPF Final     Squamous Epithelial /HPF Urine 09/12/2020 <1  0 - 1 /HPF Final   IMAGING: No recent   ASSESSMENT and PLAN:   Ms. Glendy Díaz is a 87 year old female with urge incontinence.   - Continue with the increase of Myrbetriq to 50mg daily.   -Restart Estrogen cream three times a week near urethra (pea-sized amount)  -Eliminate irritants     RTO PRN and annually.    Magalys Escobedo PA-C   [facility]

## 2021-03-22 NOTE — PROGRESS NOTES
*SEND LINK TO CELL PHONE*    CELL PHONE IS [employer] PHONE... BUT SHE IS WITH THE PT.  PT NOW HAS A PACEMAKER.  PT HAS HAD A UTI SINCE SHE SAW YOU LAST.  PT HAS HEART TROUBLE.  HEART RATE DROPS LOW.  PT HAD KIDNEY DAMAGE... ALL NOTES IN Epic.  PT HAS HAD COVID.  PT IS IN THE PROCESS OF GETTING NEW TEETH.  PT HAS HAD BOTH VACCINES FOR COVID.      Glendy is a 87 year old who is being evaluated via a billable video visit.      How would you like to obtain your AVS? Mail a copy  If the video visit is dropped, the invitation should be resent by: Text to cell phone: [phone]  Will anyone else be joining your video visit? Yes: with the pt. How would they like to receive their invitation? Other e-mail: with the pt      Video Start Time: 1:23 PM  Video-Visit Details    Type of service:  Video Visit    Video End Time:1:45 PM    Originating Location (pt. Location): Home    Distant Location (provider location):  [facility] [place]     Platform used for Video Visit: [employer]      CC: Urinary incontinence.   HPI: It is a pleasure to see Ms. Glendy Díaz, a pleasant 87 year old female seen today via billable video visit for follow-up of urinary incontinence. This problem has been going on for 5 years. Incontinence is not associated with laughing, sneezing, coughing, and bending at the waist. She has 1-2 episodes of incontinence per day and has been using 2-3 pads per day.   Ms. Díaz had tried Myrbetriq 50 mg (since Aug) in the past for her condition, which has helped some (and Lasix is more rarely used now). She denies any dysuria, nocturia, hematuria, pyuria, hesitancy, intermittency, feeling of incomplete emptying, or any recent history of or stones.   The patient Struggles with constipation. She has been hospitalized for cardiac issues. Did have a UTI in the interim and is now voiding back to baseline   Current Outpatient Medications   Medication     acetaminophen (TYLENOL) 325 MG tablet     aspirin  81 MG EC tablet     cetirizine (ZYRTEC) 10 MG tablet     chlorhexidine (PERIDEX) 0.12 % solution     Cholecalciferol (VITAMIN D) 2000 UNITS tablet     diltiazem ER (TIAZAC) 120 MG 24 hr ER beaded capsule     diltiazem ER COATED BEADS (CARDIZEM CD/CARTIA XT) 120 MG 24 hr capsule     fluticasone (FLONASE) 50 MCG/ACT nasal spray     gabapentin (NEURONTIN) 100 MG capsule     gabapentin (NEURONTIN) 100 MG capsule     MYRBETRIQ 50 MG 24 hr tablet     polyethylene glycol (MIRALAX) 17 g packet     senna-docusate (SENOKOT-S/PERICOLACE) 8.6-50 MG tablet     vitamin B-12 (CYANOCOBALAMIN) 2500 MCG sublingual tablet     No current facility-administered medications for this visit.                Allergies   Allergen Reactions     Amlodipine      Leg edema with 5 mg     Fosamax [Alendronic Acid]      Bone pain     Lisinopril      Increased potassium     Pravastatin      Muscle aches      Simvastatin      Muscle aches      FAMILY HISTORY: The patient denies any history of genitourinary carcinoma and no history of urolithiasis.   SOCIAL HISTORY: The patient does not smoke cigarettes, denies EtOH and illicit drug abuse.   ROS: A comprehensive 14 point ROS was obtained and negative except for that outlined above in the HPI.       PHYSICAL EXAM:     GENERAL: NAD  NEURO: Alert and oriented x 3.             Admission on 09/12/2020, Discharged on 09/12/2020   Component Date Value Ref Range Status     WBC 09/12/2020 5.9  4.0 - 11.0 10e9/L Final     RBC Count 09/12/2020 3.80  3.8 - 5.2 10e12/L Final     Hemoglobin 09/12/2020 11.9  11.7 - 15.7 g/dL Final     Hematocrit 09/12/2020 38.2  35.0 - 47.0 % Final     MCV 09/12/2020 101* 78 - 100 fl Final     MCH 09/12/2020 31.3  26.5 - 33.0 pg Final     MCHC 09/12/2020 31.2* 31.5 - 36.5 g/dL Final     RDW 09/12/2020 13.2  10.0 - 15.0 % Final     Platelet Count 09/12/2020 224  150 - 450 10e9/L Final     Diff Method 09/12/2020 Automated Method   Final     % Neutrophils 09/12/2020 54.4  % Final     %  Lymphocytes 09/12/2020 34.2  % Final     % Monocytes 09/12/2020 7.7  % Final     % Eosinophils 09/12/2020 2.9  % Final     % Basophils 09/12/2020 0.5  % Final     % Immature Granulocytes 09/12/2020 0.3  % Final     Nucleated RBCs 09/12/2020 0  0 /100 Final     Absolute Neutrophil 09/12/2020 3.2  1.6 - 8.3 10e9/L Final     Absolute Lymphocytes 09/12/2020 2.0  0.8 - 5.3 10e9/L Final     Absolute Monocytes 09/12/2020 0.5  0.0 - 1.3 10e9/L Final     Absolute Eosinophils 09/12/2020 0.2  0.0 - 0.7 10e9/L Final     Absolute Basophils 09/12/2020 0.0  0.0 - 0.2 10e9/L Final     Abs Immature Granulocytes 09/12/2020 0.0  0 - 0.4 10e9/L Final     Absolute Nucleated RBC 09/12/2020 0.0   Final     Sodium 09/12/2020 138  133 - 144 mmol/L Final     Potassium 09/12/2020 4.5  3.4 - 5.3 mmol/L Final    Specimen slightly hemolyzed, potassium may be falsely elevated     Chloride 09/12/2020 109  94 - 109 mmol/L Final     Carbon Dioxide 09/12/2020 24  20 - 32 mmol/L Final     Anion Gap 09/12/2020 5  3 - 14 mmol/L Final     Glucose 09/12/2020 92  70 - 99 mg/dL Final     Urea Nitrogen 09/12/2020 27  7 - 30 mg/dL Final     Creatinine 09/12/2020 1.23* 0.52 - 1.04 mg/dL Final     GFR Estimate 09/12/2020 39* >60 mL/min/[1.73_m2] Final    Comment: Non  GFR Calc   Starting 12/18/2018, serum creatinine based estimated GFR (eGFR) will be   calculated using the Chronic Kidney Disease Epidemiology Collaboration   (CKD-EPI) equation.      GFR Estimate If Black 09/12/2020 46* >60 mL/min/[1.73_m2] Final    Comment:  GFR Calc   Starting 12/18/2018, serum creatinine based estimated GFR (eGFR) will be   calculated using the Chronic Kidney Disease Epidemiology Collaboration   (CKD-EPI) equation.      Calcium 09/12/2020 9.0  8.5 - 10.1 mg/dL Final     Troponin I ES 09/12/2020 <0.015  0.000 - 0.045 ug/L Final    Comment: The 99th percentile for upper reference range is 0.045 ug/L. Troponin values   in the range of 0.045 - 0.120  ug/L may be associated with risks of adverse   clinical events.      Interpretation ECG 09/12/2020 Click View Image link to view waveform and result   Final     Color Urine 09/12/2020 Straw   Final     Appearance Urine 09/12/2020 Clear   Final     Glucose Urine 09/12/2020 Negative  NEG^Negative mg/dL Final     Bilirubin Urine 09/12/2020 Negative  NEG^Negative Final     Ketones Urine 09/12/2020 Negative  NEG^Negative mg/dL Final     Specific Gravity Urine 09/12/2020 1.006  1.003 - 1.035 Final     Blood Urine 09/12/2020 Negative  NEG^Negative Final     pH Urine 09/12/2020 6.5  5.0 - 7.0 pH Final     Protein Albumin Urine 09/12/2020 Negative  NEG^Negative mg/dL Final     Urobilinogen mg/dL 09/12/2020 Normal  0.0 - 2.0 mg/dL Final     Nitrite Urine 09/12/2020 Negative  NEG^Negative Final     Leukocyte Esterase Urine 09/12/2020 Negative  NEG^Negative Final     Source 09/12/2020 Midstream Urine   Final     WBC Urine 09/12/2020 1  0 - 5 /HPF Final     RBC Urine 09/12/2020 <1  0 - 2 /HPF Final     Squamous Epithelial /HPF Urine 09/12/2020 <1  0 - 1 /HPF Final   IMAGING: No recent   ASSESSMENT and PLAN:   Ms. Glendy Díaz is a 87 year old female with urge incontinence.   - Continue with the increase of Myrbetriq to 50mg daily.   -Restart Estrogen cream three times a week near urethra (pea-sized amount)  -Eliminate irritants     RTO PRN and annually.    KAI Atkinson [facility]

## 2021-03-23 ENCOUNTER — TELEPHONE (OUTPATIENT)
Dept: INTERNAL MEDICINE | Facility: CLINIC | Age: 86
End: 2021-03-23

## 2021-03-23 NOTE — TELEPHONE ENCOUNTER
[facility] now requests orders and shares plan of care/discharge summaries for some patients through [facility].  Please REPLY TO THIS MESSAGE OR ROUTE BACK TO THE AUTHOR in order to give authorization for orders when needed.  This is considered a verbal order, you will still receive a faxed copy of orders for signature.  Thank you for your assistance in improving collaboration for our patients.    ORDER  PT 1W1, 2W1 for LE strengthening, balance, endurance, gait training.

## 2021-03-25 ENCOUNTER — TELEPHONE (OUTPATIENT)
Dept: INTERNAL MEDICINE | Facility: CLINIC | Age: 86
End: 2021-03-25

## 2021-03-25 NOTE — TELEPHONE ENCOUNTER
[facility] now requests orders and shares plan of care/discharge summaries for some patients through [employer].  Please REPLY TO THIS MESSAGE OR ROUTE BACK TO THE AUTHOR in order to give authorization for orders when needed.  This is considered a verbal order, you will still receive a faxed copy of orders for signature.  Thank you for your assistance in improving collaboration for our patients.    ORDER  2w1, 1w1  Skilled OT for safety with ADLs/IADLs, baseline cognitive testing and safety/service and equipment recommendations.   [email or web address]  [phone]

## 2021-03-25 NOTE — PROGRESS NOTES
"HPI:  Glendy Díaz is a 87 year old female who presents to clinic today after being hospitalized and is accompanied by her daughter.   She is a patient of Dr. Truong and Dr Rey and has a medical history of     1. Sinus node dysfunction.  S/p [employer] dual-chamber permanent pacemaker (3/3/2021  2. paroxysmal atrial fibrillation.  Refractory to atrial fibrillation  3. hypertension  4. Chronic kidney disease  5. Anemia    A monitor showed sinus node dysfunction and she was treated with amiodarone which she continued to have episodes of atrial fibrillation amiodarone was discontinued and she was treated with diltiazem.  She started having lightheadedness and weakness and found to have bradycardia.  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  She underwent a permanent pacemaker and diltiazem 120 mg/day was added to prevent tachycardia.    Device check (3/12/2021) revealed A-paced  42%  : Less than 1%    Stable leads.  Battery life 14.5.   Atrial Arrhythmia: None ventricular Arrhthymia none     Today she reports no concerns.  Her pacemaker incision has healed nicely she is not having any \"spells\" and overall she denies chest pain or pressure, dizziness, dyspnea at rest or with exertion, palpitations, orthopnea, PND, or edema.  She takes her medications as prescribed.    ASSESSMENT AND PLAN    Paroxysmal atrial fibrillation    For rate control she is currently taking diltiazem 120 mg daily.  Her carvedilol was held due to hypotension while hospitalized..      For anticoagulation for CHADS VASC 4 (hypertension, age++, female) she he is currently aspirin and not taking oral anticoagulation given her frailty/fall/confusion.    Her last hgb was 10.2 (3/15/2021)/         Sick sinus syndrome    S/p [employer] dual chamber permanent pacemaker (3/2021)    I personally reviewed the device check on 3/12/2021 which " revealed normal device function and stable leads    Follow with the device clinic on a quarterly basis.     Plan was reviewed with Glendy and her daughter    Continue with current medications    Follow-up with the device clinic as scheduled    Cancel Dr. Rey's appointment and if there are problems on the device check which is scheduled in a few weeks will either address via phone or in a follow-up with KATELYN    Follow-up with KATELYN in June 2021 and Dr. Truong in 6/2022        Patient expresses understanding and agreement with the plan.     I appreciate the chance to help with Glendy Díaz Please let me know if you have any questions or concerns.    Roxanna Gunderson, APRN, CNP    This note was completed in part using Dragon voice recognition software. Although reviewed after completion, some word and grammatical errors may occur.    Orders Placed This Encounter   Procedures     Follow-Up with Cardiac Advanced Practice Provider     Follow-Up with Cardiologist     Orders Placed This Encounter   Medications     diltiazem ER (TIAZAC) 120 MG 24 hr ER beaded capsule     Sig: Take 1 capsule (120 mg) by mouth daily     Dispense:  30 capsule     Refill:  4     Pt will call for refills     Medications Discontinued During This Encounter   Medication Reason     diltiazem ER COATED BEADS (CARDIZEM CD/CARTIA XT) 120 MG 24 hr capsule Medication Reconciliation Clean Up     diltiazem ER (TIAZAC) 120 MG 24 hr ER beaded capsule Reorder         Encounter Diagnoses   Name Primary?     Paroxysmal A-fib (H) Yes     SVT (supraventricular tachycardia) (H)      Cardiac pacemaker in situ      Sick sinus syndrome (H)      Presence of permanent cardiac pacemaker        CURRENT MEDICATIONS:  Current Outpatient Medications   Medication Sig Dispense Refill     acetaminophen (TYLENOL) 325 MG tablet Take 650 mg by mouth every 8 hours as needed for mild pain or fever       aspirin 81 MG EC tablet Take 81 mg by mouth At Bedtime       cetirizine (ZYRTEC) 10 MG  tablet Take 10 mg by mouth daily as needed for allergies  90 tablet 3     Cholecalciferol (VITAMIN D) 2000 UNITS tablet Take 2,000 Units by mouth daily (Patient taking differently: Take 2,000 Units by mouth daily Vitamin d3) 100 tablet 3     diltiazem ER (TIAZAC) 120 MG 24 hr ER beaded capsule Take 1 capsule (120 mg) by mouth daily 30 capsule 4     fluticasone (FLONASE) 50 MCG/ACT nasal spray SPRAY 1 SPRAY INTO BOTH NOSTRILS DAILY AS NEEDED FOR RHINITIS OR ALLERGIES 16 g 0     gabapentin (NEURONTIN) 100 MG capsule Take 200 mg by mouth At Bedtime       gabapentin (NEURONTIN) 100 MG capsule Take 100 mg by mouth every morning        mirabegron (MYRBETRIQ) 25 MG 24 hr tablet Take 2 tablets (50 mg) by mouth daily 90 tablet 4     polyethylene glycol (MIRALAX) 17 g packet Take 1 packet by mouth daily as needed for constipation Mix in 8 ounces of water until completely dissolved       senna-docusate (SENOKOT-S/PERICOLACE) 8.6-50 MG tablet Take 1 tablet by mouth daily as needed for constipation       vitamin B-12 (CYANOCOBALAMIN) 2500 MCG sublingual tablet Take 2,500 mcg by mouth daily       chlorhexidine (PERIDEX) 0.12 % solution Swish and spit 15 mLs in mouth 2 times daily Hold 2 minutes then expectorate.       MYRBETRIQ 50 MG 24 hr tablet TAKE 1 TABLET BY MOUTH  DAILY (Patient not taking: Reported on 3/26/2021) 90 tablet 3       ALLERGIES     Allergies   Allergen Reactions     Amlodipine      Leg edema with 5 mg     Fosamax [Alendronic Acid]      Bone pain     Lisinopril      Increased potassium     Pravastatin      Muscle aches      Simvastatin      Muscle aches        PAST MEDICAL HISTORY   Past Medical History:   Diagnosis Date     CKD (chronic kidney disease) stage 3, GFR 30-59 ml/min      Falls frequently      Hematuria      Hyperlipidemia LDL goal <100      Hyperparathyroidism (H)      Hypertension     No Cardiologist     Incontinence      Mumps      Neck pain, chronic      Osteoarthritis      Paroxysmal atrial  "fibrillation (H)      Peripheral neuropathy      Sinus node dysfunction (H)        Review of Systems:  Skin:  Positive for bruising   Eyes:  Positive for glasses  ENT:  Positive for hearing loss  Respiratory:  Negative    Cardiovascular:  Negative    Gastroenterology: Negative    Genitourinary:  Negative    Musculoskeletal:  Positive for muscular weakness;arthritis;foot pain  Neurologic:  Positive for numbness or tingling of hands;numbness or tingling of feet  Psychiatric:  Positive for sleep disturbances  Heme/Lymph/Imm:  Positive for allergies  Endocrine:  Negative      Physical Exam:  Vitals: /68   Pulse 64   Ht 1.613 m (5' 3.5\")   Wt 64.2 kg (141 lb 9.6 oz)   BMI 24.69 kg/m    Body mass index is 24.69 kg/m .    Physical Exam   Constitutional: She is oriented to person, place, and time. She appears well-developed and well-nourished.   Thin, elderly   HENT:   Head: Normocephalic.   Neck: Normal range of motion.   Cardiovascular: Normal rate and regular rhythm.   Pulmonary/Chest: Effort normal and breath sounds normal.   Abdominal: Soft.   Musculoskeletal: Normal range of motion.   Neurological: She is alert and oriented to person, place, and time.   Skin: Skin is warm and dry.   Healed PPM incision   Psychiatric: She has a normal mood and affect. Her behavior is normal. Judgment and thought content normal.   Nursing note and vitals reviewed.       basic metabolic panel  Lab Results   Component Value Date    WBC 6.6 03/04/2021     Lab Results   Component Value Date    RBC 2.82 03/04/2021     Lab Results   Component Value Date    HGB 10.2 03/15/2021     Lab Results   Component Value Date    HCT 33.4 03/15/2021     No components found for: MCT  Lab Results   Component Value Date     03/04/2021     Lab Results   Component Value Date    MCH 31.2 03/04/2021     Lab Results   Component Value Date    MCHC 31.1 03/04/2021     Lab Results   Component Value Date    RDW 15.5 03/04/2021     Lab Results "   Component Value Date     03/04/2021     Last Comprehensive Metabolic Panel:  Sodium   Date Value Ref Range Status   03/15/2021 140 133 - 144 mmol/L Final     Potassium   Date Value Ref Range Status   03/15/2021 4.4 3.4 - 5.3 mmol/L Final     Chloride   Date Value Ref Range Status   03/15/2021 109 94 - 109 mmol/L Final     Carbon Dioxide   Date Value Ref Range Status   03/15/2021 28 20 - 32 mmol/L Final     Anion Gap   Date Value Ref Range Status   03/15/2021 3 3 - 14 mmol/L Final     Glucose   Date Value Ref Range Status   03/15/2021 72 70 - 99 mg/dL Final     Urea Nitrogen   Date Value Ref Range Status   03/15/2021 21 7 - 30 mg/dL Final     Creatinine   Date Value Ref Range Status   03/15/2021 1.19 (H) 0.52 - 1.04 mg/dL Final     GFR Estimate   Date Value Ref Range Status   03/15/2021 41 (L) >60 mL/min/[1.73_m2] Final     Comment:     Non  GFR Calc  Starting 12/18/2018, serum creatinine based estimated GFR (eGFR) will be   calculated using the Chronic Kidney Disease Epidemiology Collaboration   (CKD-EPI) equation.       Calcium   Date Value Ref Range Status   03/15/2021 9.4 8.5 - 10.1 mg/dL Final       CC  Veronica Davis MD  [address] [place] [address]

## 2021-03-26 ENCOUNTER — OFFICE VISIT (OUTPATIENT)
Dept: CARDIOLOGY | Facility: CLINIC | Age: 86
End: 2021-03-26
Payer: MEDICARE

## 2021-03-26 VITALS
HEART RATE: 64 BPM | WEIGHT: 141.6 LBS | DIASTOLIC BLOOD PRESSURE: 68 MMHG | BODY MASS INDEX: 24.17 KG/M2 | HEIGHT: 64 IN | SYSTOLIC BLOOD PRESSURE: 138 MMHG

## 2021-03-26 DIAGNOSIS — I47.10 SVT (SUPRAVENTRICULAR TACHYCARDIA) (H): ICD-10-CM

## 2021-03-26 DIAGNOSIS — I48.0 PAROXYSMAL A-FIB (H): Primary | ICD-10-CM

## 2021-03-26 DIAGNOSIS — Z95.0 PRESENCE OF PERMANENT CARDIAC PACEMAKER: ICD-10-CM

## 2021-03-26 DIAGNOSIS — I49.5 SICK SINUS SYNDROME (H): ICD-10-CM

## 2021-03-26 DIAGNOSIS — Z95.0 CARDIAC PACEMAKER IN SITU: ICD-10-CM

## 2021-03-26 PROCEDURE — 99214 OFFICE O/P EST MOD 30 MIN: CPT | Performed by: NURSE PRACTITIONER

## 2021-03-26 RX ORDER — DILTIAZEM HYDROCHLORIDE 120 MG/1
120 CAPSULE, EXTENDED RELEASE ORAL DAILY
Qty: 30 CAPSULE | Refills: 4 | Status: SHIPPED | OUTPATIENT
Start: 2021-03-26 | End: 2021-06-21

## 2021-03-26 ASSESSMENT — MIFFLIN-ST. JEOR: SCORE: 1054.35

## 2021-03-26 NOTE — LETTER
"3/26/2021    Veronica Davis MD  [address] [facility] [address]    RE: Glendy Díaz       Dear Colleague,    I had the pleasure of seeing Glendy Díaz in the [facility].    HPI:  Glendy Díaz is a 87 year old female who presents to clinic today after being hospitalized and is accompanied by her daughter.   She is a patient of Dr. Truong and Dr Rey and has a medical history of     1. Sinus node dysfunction.  S/p [employer] dual-chamber permanent pacemaker (3/3/2021  2. paroxysmal atrial fibrillation.  Refractory to atrial fibrillation  3. hypertension  4. Chronic kidney disease  5. Anemia    A monitor showed sinus node dysfunction and she was treated with amiodarone which she continued to have episodes of atrial fibrillation amiodarone was discontinued and she was treated with diltiazem.  She started having lightheadedness and weakness and found to have bradycardia.  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  She underwent a permanent pacemaker and diltiazem 120 mg/day was added to prevent tachycardia.    Device check (3/12/2021) revealed A-paced  42%  : Less than 1%    Stable leads.  Battery life 14.5.   Atrial Arrhythmia: None ventricular Arrhthymia none     Today she reports no concerns.  Her pacemaker incision has healed nicely she is not having any \"spells\" and overall she denies chest pain or pressure, dizziness, dyspnea at rest or with exertion, palpitations, orthopnea, PND, or edema.  She takes her medications as prescribed.    ASSESSMENT AND PLAN    Paroxysmal atrial fibrillation    For rate control she is currently taking diltiazem 120 mg daily.  Her carvedilol was held due to hypotension while hospitalized..      For anticoagulation for CHADS VASC 4 (hypertension, age++, female) she he is currently aspirin and " not taking oral anticoagulation given her frailty/fall/confusion.    Her last hgb was 10.2 (3/15/2021)/         Sick sinus syndrome    S/p [employer] dual chamber permanent pacemaker (3/2021)    I personally reviewed the device check on 3/12/2021 which revealed normal device function and stable leads    Follow with the device clinic on a quarterly basis.     Plan was reviewed with Glendy and her daughter    Continue with current medications    Follow-up with the device clinic as scheduled    Cancel Dr. Rey's appointment and if there are problems on the device check which is scheduled in a few weeks will either address via phone or in a follow-up with KATELYN    Follow-up with KATELYN in June 2021 and Dr. Truong in 6/2022        Patient expresses understanding and agreement with the plan.     I appreciate the chance to help with Glendy Díaz Please let me know if you have any questions or concerns.    SHRUTI Rouse, CNP    This note was completed in part using Dragon voice recognition software. Although reviewed after completion, some word and grammatical errors may occur.    Orders Placed This Encounter   Procedures     Follow-Up with Cardiac Advanced Practice Provider     Follow-Up with Cardiologist     Orders Placed This Encounter   Medications     diltiazem ER (TIAZAC) 120 MG 24 hr ER beaded capsule     Sig: Take 1 capsule (120 mg) by mouth daily     Dispense:  30 capsule     Refill:  4     Pt will call for refills     Medications Discontinued During This Encounter   Medication Reason     diltiazem ER COATED BEADS (CARDIZEM CD/CARTIA XT) 120 MG 24 hr capsule Medication Reconciliation Clean Up     diltiazem ER (TIAZAC) 120 MG 24 hr ER beaded capsule Reorder         Encounter Diagnoses   Name Primary?     Paroxysmal A-fib (H) Yes     SVT (supraventricular tachycardia) (H)      Cardiac pacemaker in situ      Sick sinus syndrome (H)      Presence of permanent cardiac pacemaker        CURRENT MEDICATIONS:  Current  Outpatient Medications   Medication Sig Dispense Refill     acetaminophen (TYLENOL) 325 MG tablet Take 650 mg by mouth every 8 hours as needed for mild pain or fever       aspirin 81 MG EC tablet Take 81 mg by mouth At Bedtime       cetirizine (ZYRTEC) 10 MG tablet Take 10 mg by mouth daily as needed for allergies  90 tablet 3     Cholecalciferol (VITAMIN D) 2000 UNITS tablet Take 2,000 Units by mouth daily (Patient taking differently: Take 2,000 Units by mouth daily Vitamin d3) 100 tablet 3     diltiazem ER (TIAZAC) 120 MG 24 hr ER beaded capsule Take 1 capsule (120 mg) by mouth daily 30 capsule 4     fluticasone (FLONASE) 50 MCG/ACT nasal spray SPRAY 1 SPRAY INTO BOTH NOSTRILS DAILY AS NEEDED FOR RHINITIS OR ALLERGIES 16 g 0     gabapentin (NEURONTIN) 100 MG capsule Take 200 mg by mouth At Bedtime       gabapentin (NEURONTIN) 100 MG capsule Take 100 mg by mouth every morning        mirabegron (MYRBETRIQ) 25 MG 24 hr tablet Take 2 tablets (50 mg) by mouth daily 90 tablet 4     polyethylene glycol (MIRALAX) 17 g packet Take 1 packet by mouth daily as needed for constipation Mix in 8 ounces of water until completely dissolved       senna-docusate (SENOKOT-S/PERICOLACE) 8.6-50 MG tablet Take 1 tablet by mouth daily as needed for constipation       vitamin B-12 (CYANOCOBALAMIN) 2500 MCG sublingual tablet Take 2,500 mcg by mouth daily       chlorhexidine (PERIDEX) 0.12 % solution Swish and spit 15 mLs in mouth 2 times daily Hold 2 minutes then expectorate.       MYRBETRIQ 50 MG 24 hr tablet TAKE 1 TABLET BY MOUTH  DAILY (Patient not taking: Reported on 3/26/2021) 90 tablet 3       ALLERGIES     Allergies   Allergen Reactions     Amlodipine      Leg edema with 5 mg     Fosamax [Alendronic Acid]      Bone pain     Lisinopril      Increased potassium     Pravastatin      Muscle aches      Simvastatin      Muscle aches        PAST MEDICAL HISTORY   Past Medical History:   Diagnosis Date     CKD (chronic kidney disease)  "stage 3, GFR 30-59 ml/min      Falls frequently      Hematuria      Hyperlipidemia LDL goal <100      Hyperparathyroidism (H)      Hypertension     No Cardiologist     Incontinence      Mumps      Neck pain, chronic      Osteoarthritis      Paroxysmal atrial fibrillation (H)      Peripheral neuropathy      Sinus node dysfunction (H)        Review of Systems:  Skin:  Positive for bruising   Eyes:  Positive for glasses  ENT:  Positive for hearing loss  Respiratory:  Negative    Cardiovascular:  Negative    Gastroenterology: Negative    Genitourinary:  Negative    Musculoskeletal:  Positive for muscular weakness;arthritis;foot pain  Neurologic:  Positive for numbness or tingling of hands;numbness or tingling of feet  Psychiatric:  Positive for sleep disturbances  Heme/Lymph/Imm:  Positive for allergies  Endocrine:  Negative      Physical Exam:  Vitals: /68   Pulse 64   Ht 1.613 m (5' 3.5\")   Wt 64.2 kg (141 lb 9.6 oz)   BMI 24.69 kg/m    Body mass index is 24.69 kg/m .    Physical Exam   Constitutional: She is oriented to person, place, and time. She appears well-developed and well-nourished.   Thin, elderly   HENT:   Head: Normocephalic.   Neck: Normal range of motion.   Cardiovascular: Normal rate and regular rhythm.   Pulmonary/Chest: Effort normal and breath sounds normal.   Abdominal: Soft.   Musculoskeletal: Normal range of motion.   Neurological: She is alert and oriented to person, place, and time.   Skin: Skin is warm and dry.   Healed PPM incision   Psychiatric: She has a normal mood and affect. Her behavior is normal. Judgment and thought content normal.   Nursing note and vitals reviewed.       basic metabolic panel  Lab Results   Component Value Date    WBC 6.6 03/04/2021     Lab Results   Component Value Date    RBC 2.82 03/04/2021     Lab Results   Component Value Date    HGB 10.2 03/15/2021     Lab Results   Component Value Date    HCT 33.4 03/15/2021     No components found for: MCT  Lab " Results   Component Value Date     03/04/2021     Lab Results   Component Value Date    MCH 31.2 03/04/2021     Lab Results   Component Value Date    MCHC 31.1 03/04/2021     Lab Results   Component Value Date    RDW 15.5 03/04/2021     Lab Results   Component Value Date     03/04/2021     Last Comprehensive Metabolic Panel:  Sodium   Date Value Ref Range Status   03/15/2021 140 133 - 144 mmol/L Final     Potassium   Date Value Ref Range Status   03/15/2021 4.4 3.4 - 5.3 mmol/L Final     Chloride   Date Value Ref Range Status   03/15/2021 109 94 - 109 mmol/L Final     Carbon Dioxide   Date Value Ref Range Status   03/15/2021 28 20 - 32 mmol/L Final     Anion Gap   Date Value Ref Range Status   03/15/2021 3 3 - 14 mmol/L Final     Glucose   Date Value Ref Range Status   03/15/2021 72 70 - 99 mg/dL Final     Urea Nitrogen   Date Value Ref Range Status   03/15/2021 21 7 - 30 mg/dL Final     Creatinine   Date Value Ref Range Status   03/15/2021 1.19 (H) 0.52 - 1.04 mg/dL Final     GFR Estimate   Date Value Ref Range Status   03/15/2021 41 (L) >60 mL/min/[1.73_m2] Final     Comment:     Non  GFR Calc  Starting 12/18/2018, serum creatinine based estimated GFR (eGFR) will be   calculated using the Chronic Kidney Disease Epidemiology Collaboration   (CKD-EPI) equation.       Calcium   Date Value Ref Range Status   03/15/2021 9.4 8.5 - 10.1 mg/dL Final     Thank you for allowing me to participate in the care of your patient.      Sincerely,     SHRUTI [facility]    cc:   Veronica Davis MD  [address] [place] [address]

## 2021-03-26 NOTE — PATIENT INSTRUCTIONS
Cancel Dr. Rey's appointment  If you have problems with your heart then send I a remote check and we can see if this is afib related.     Lets have you have the device check and follow up with Roxanna at the end of June.       If you have pacemaker or ICD and have questions or concerns please call the device clinic at [phone]

## 2021-03-28 PROBLEM — I49.5 SICK SINUS SYNDROME (H): Status: ACTIVE | Noted: 2021-03-28

## 2021-03-28 PROBLEM — Z95.0 PRESENCE OF PERMANENT CARDIAC PACEMAKER: Status: ACTIVE | Noted: 2021-03-28

## 2021-03-29 ENCOUNTER — TELEPHONE (OUTPATIENT)
Dept: INTERNAL MEDICINE | Facility: CLINIC | Age: 86
End: 2021-03-29

## 2021-04-01 ENCOUNTER — OFFICE VISIT (OUTPATIENT)
Dept: INTERNAL MEDICINE | Facility: CLINIC | Age: 86
End: 2021-04-01
Payer: MEDICARE

## 2021-04-01 VITALS
BODY MASS INDEX: 23.89 KG/M2 | OXYGEN SATURATION: 98 % | HEART RATE: 84 BPM | RESPIRATION RATE: 18 BRPM | DIASTOLIC BLOOD PRESSURE: 75 MMHG | WEIGHT: 137 LBS | SYSTOLIC BLOOD PRESSURE: 142 MMHG

## 2021-04-01 DIAGNOSIS — D64.9 ANEMIA, UNSPECIFIED TYPE: ICD-10-CM

## 2021-04-01 DIAGNOSIS — I48.0 PAROXYSMAL ATRIAL FIBRILLATION (H): ICD-10-CM

## 2021-04-01 DIAGNOSIS — R60.0 BILATERAL LEG EDEMA: ICD-10-CM

## 2021-04-01 DIAGNOSIS — R60.0 BILATERAL LEG EDEMA: Primary | ICD-10-CM

## 2021-04-01 DIAGNOSIS — R63.4 WEIGHT LOSS: ICD-10-CM

## 2021-04-01 DIAGNOSIS — G62.9 PERIPHERAL POLYNEUROPATHY: Chronic | ICD-10-CM

## 2021-04-01 DIAGNOSIS — R93.89 ABNORMAL CHEST CT: ICD-10-CM

## 2021-04-01 DIAGNOSIS — N18.30 STAGE 3 CHRONIC KIDNEY DISEASE, UNSPECIFIED WHETHER STAGE 3A OR 3B CKD (H): ICD-10-CM

## 2021-04-01 PROCEDURE — 99214 OFFICE O/P EST MOD 30 MIN: CPT | Performed by: INTERNAL MEDICINE

## 2021-04-01 RX ORDER — FUROSEMIDE 20 MG
20 TABLET ORAL DAILY PRN
Qty: 30 TABLET | Refills: 0 | Status: SHIPPED | OUTPATIENT
Start: 2021-04-01 | End: 2021-07-02

## 2021-04-01 RX ORDER — GABAPENTIN 100 MG/1
CAPSULE ORAL
Qty: 90 CAPSULE | Refills: 0
Start: 2021-04-01 | End: 2021-09-15

## 2021-04-01 NOTE — PATIENT INSTRUCTIONS
Plan:  1. Continue same meds, same doses for now   2. If the weight keeps dropping - make an appointment in 1-2 months - video is ok  3. Otherwise follow up after Oct 5 for annual exam

## 2021-04-01 NOTE — PROGRESS NOTES
Dr Steiner's note      Patient's instructions / PLAN:                                                        Plan:  1. Continue same meds, same doses for now   2. If the weight keeps dropping - make an appointment in 1-2 months  3. Otherwise follow up after Oct 5 for annual exam          ASSESSMENT & PLAN:                                                      Complex medical problems: Paroxysmal atrial fibrillation CKD three, hypertension, peripheral neuropathy, overactive bladder, hypothyroidism.  She received pacemaker March 2021 for junctional bradycardia in the setting of paroxysmal atrial fibrillation.      (R60.0) Bilateral leg edema  (primary encounter diagnosis)  Comment:   Plan: furosemide (LASIX) 20 MG tablet            (G62.9) Peripheral polyneuropathy  Comment:   Plan: gabapentin (NEURONTIN) 100 MG capsule            (N18.30) Stage 3 chronic kidney disease, unspecified whether stage 3a or 3b CKD  Comment:   Plan:     (R63.4) Weight loss  Comment: See below discussion  Plan: Monitor weight    (R93.89) Abnormal chest CT  Comment:   Plan: See below discussion    (D64.9) Anemia, unspecified type  Comment: Improving  Plan: Monitor labs    (I48.0) Paroxysmal atrial fibrillation (H)  Comment: Rate controlled  Plan: Continue same medications       Chief complaint:                                                      Follow up chronic medical problems      SUBJECTIVE:                                                    History of present illness:    She was recent in the hospital for Parox AFib  bradyc and she received PPM. Overall she feels better.    Chart reviewed     Anemia  -- improving 10.2 <--8.9. normal B12 and iron    CKD  -- Cr is stable at 1.19    Cardio: recent cardio appointment Note reviewed:      1. Sinus node dysfunction.  S/p [employer] dual-chamber permanent pacemaker (3/3/2021  2. paroxysmal atrial fibrillation.  Refractory to atrial fibrillation  3. hypertension  4. Chronic kidney  disease  5. Anemia       Wt loss  -- she thinks it is related with missing  Teeth - she just got the dentures and it seems she eats better    Abnormal CT chest  -- she prefers to wait another 3 m        Hyperlipidemia Follow-Up      Are you regularly taking any medication or supplement to lower your cholesterol?   No    Are you having muscle aches or other side effects that you think could be caused by your cholesterol lowering medication?  No    Hypertension Follow-up      Do you check your blood pressure regularly outside of the clinic? Yes     Are you following a low salt diet? No    Are your blood pressures ever more than 140 on the top number (systolic) OR more   than 90 on the bottom number (diastolic), for example 140/90? Yes      How many servings of fruits and vegetables do you eat daily?  2-3    On average, how many sweetened beverages do you drink each day (Examples: soda, juice, sweet tea, etc.  Do NOT count diet or artificially sweetened beverages)?   0    How many days per week do you exercise enough to make your heart beat faster? 6    How many minutes a day do you exercise enough to make your heart beat faster? 30 - 60    How many days per week do you miss taking your medication? 0        Review of Systems:                                                      ROS: negative for fever, chills, cough, wheezes, chest pain, shortness of breath, vomiting, abdominal pain, leg swelling      OBJECTIVE:             Physical exam:     Blood pressure (!) 142/75, pulse 84, resp. rate 18, weight 62.1 kg (137 lb), SpO2 98 %, not currently breastfeeding.     NAD, appears comfortable  Skin: no rashes   Neck: supple, no JVD,  No thyroidmegaly. Lymph nodes nonpalpable cervical and supraclavicular.  Chest: clear to auscultation bilaterally, good respiratory effort  Heart: S1 S2, RRR, no mgr appreciated  Abdomen: soft, not tender, no hepatosplenomegaly or masses appreciated, no abdominal bruit, present bowel  sounds  Extremities: no edema,  Neurologic: A, Ox3, no focal signs appreciated    PMHx: reviewed  Past Medical History:   Diagnosis Date     CKD (chronic kidney disease) stage 3, GFR 30-59 ml/min      Falls frequently      Hematuria      Hyperlipidemia LDL goal <100      Hyperparathyroidism (H)      Hypertension     No Cardiologist     Incontinence      Mumps      Neck pain, chronic      Osteoarthritis      Paroxysmal atrial fibrillation (H)      Peripheral neuropathy      Sinus node dysfunction (H)       PSHx: reviewed  Past Surgical History:   Procedure Laterality Date     APPENDECTOMY       ARTHRODESIS FOOT  5/1/2014    Procedure: ARTHRODESIS FOOT;  Surgeon: Sid Marks DPM;  Location: [facility] OR     ARTHROSCOPY ANKLE, OPEN REPAIR LIGAMENT, COMBINED  5/31/2012    Procedure:COMBINED ARTHROSCOPY ANKLE, OPEN REPAIR LIGAMENT; LEFT ANKLE ARTHROSCOPY, LATERAL LIGAMENT RECONSTRUCTION, ENDOSCOPIC KIRIT NAQVI SECOND TOE RAY CORRECTION    LEFT KNEE Marcaine AND CORTIZONE INJECTION, 5 CC Marcaine, 1 CC CELESTONE, ; Surgeon:VARSHA WILDER; Location:[facility]     BLADDER SURGERY       CATARACT IOL, RT/LT       CHOLECYSTECTOMY       CYSTOSCOPY       EP PPM INSERT OF NEW OR REPL W/VENT LEAD N/A 3/3/2021    Procedure: EP Pacemaker;  Surgeon: Bobby Hester MD;  Location:  [facility]     EXCISE MASS FOOT  12/4/2012    Procedure: EXCISE MASS FOOT;;  Surgeon: Varsha Wilder MD;  Location: [facility]     HYSTERECTOMY TOTAL ABDOMINAL      ovaries are in     RELEASE TENDON TOE  5/1/2014    Procedure: RELEASE TENDON TOE;  Surgeon: Sid Marks DPM;  Location: [facility] OR     REMOVE HARDWARE FOOT  12/4/2012    Procedure: REMOVE HARDWARE FOOT;  REMOVAL HARDWARE(SYNTHES SCREW) LEFT FOOT, EXCISION OF INCLUSION CYST;  Surgeon: Varsha Wilder MD;  Location: [facility]     REPAIR HAMMER TOE  5/1/2014    Procedure: REPAIR HAMMER TOE;  Surgeon: Sid Marks DPM;  Location: [facility] OR     REVERSE  ARTHROPLASTY SHOULDER Right 7/18/2016    Procedure: REVERSE ARTHROPLASTY SHOULDER;  Surgeon: Marlo Alejandro MD;  Location: [facility] OR        Meds: reviewed  Current Outpatient Medications   Medication Sig Dispense Refill     acetaminophen (TYLENOL) 325 MG tablet Take 650 mg by mouth every 8 hours as needed for mild pain or fever       aspirin 81 MG EC tablet Take 81 mg by mouth At Bedtime       cetirizine (ZYRTEC) 10 MG tablet Take 10 mg by mouth daily as needed for allergies  90 tablet 3     chlorhexidine (PERIDEX) 0.12 % solution Swish and spit 15 mLs in mouth 2 times daily Hold 2 minutes then expectorate.       Cholecalciferol (VITAMIN D) 2000 UNITS tablet Take 2,000 Units by mouth daily (Patient taking differently: Take 2,000 Units by mouth daily Vitamin d3) 100 tablet 3     diltiazem ER (TIAZAC) 120 MG 24 hr ER beaded capsule Take 1 capsule (120 mg) by mouth daily 30 capsule 4     fluticasone (FLONASE) 50 MCG/ACT nasal spray SPRAY 1 SPRAY INTO BOTH NOSTRILS DAILY AS NEEDED FOR RHINITIS OR ALLERGIES 16 g 0     gabapentin (NEURONTIN) 100 MG capsule Take 200 mg by mouth At Bedtime       gabapentin (NEURONTIN) 100 MG capsule Take 100 mg by mouth every morning        mirabegron (MYRBETRIQ) 25 MG 24 hr tablet Take 2 tablets (50 mg) by mouth daily 90 tablet 4     MYRBETRIQ 50 MG 24 hr tablet TAKE 1 TABLET BY MOUTH  DAILY (Patient not taking: Reported on 3/26/2021) 90 tablet 3     polyethylene glycol (MIRALAX) 17 g packet Take 1 packet by mouth daily as needed for constipation Mix in 8 ounces of water until completely dissolved       senna-docusate (SENOKOT-S/PERICOLACE) 8.6-50 MG tablet Take 1 tablet by mouth daily as needed for constipation       vitamin B-12 (CYANOCOBALAMIN) 2500 MCG sublingual tablet Take 2,500 mcg by mouth daily         Soc Hx: reviewed  Fam Hx: reviewed          Veronica Steiner MD  Internal Medicine

## 2021-04-05 RX ORDER — FUROSEMIDE 20 MG
20 TABLET ORAL DAILY PRN
Qty: 30 TABLET | Refills: 0 | OUTPATIENT
Start: 2021-04-05

## 2021-04-09 ENCOUNTER — TELEPHONE (OUTPATIENT)
Dept: INTERNAL MEDICINE | Facility: CLINIC | Age: 86
End: 2021-04-09

## 2021-04-09 NOTE — TELEPHONE ENCOUNTER
[facility] now requests orders and shares plan of care/discharge summaries for some patients through [employer].  Please REPLY TO THIS MESSAGE OR ROUTE BACK TO THE AUTHOR in order to give authorization for orders when needed.  This is considered a verbal order, you will still receive a faxed copy of orders for signature.  Thank you for your assistance in improving collaboration for our patients.    ORDER  Ok for SN 1 week 1, 1 PRN with plan to discharge next week.

## 2021-04-12 ENCOUNTER — TELEPHONE (OUTPATIENT)
Dept: INTERNAL MEDICINE | Facility: CLINIC | Age: 86
End: 2021-04-12

## 2021-04-20 ENCOUNTER — TELEPHONE (OUTPATIENT)
Dept: INTERNAL MEDICINE | Facility: CLINIC | Age: 86
End: 2021-04-20

## 2021-04-20 DIAGNOSIS — Z53.9 DIAGNOSIS NOT YET DEFINED: Primary | ICD-10-CM

## 2021-04-20 PROCEDURE — G0180 MD CERTIFICATION HHA PATIENT: HCPCS | Performed by: INTERNAL MEDICINE

## 2021-04-27 ENCOUNTER — RECORDS - HEALTHEAST (OUTPATIENT)
Dept: LAB | Facility: CLINIC | Age: 86
End: 2021-04-27

## 2021-04-27 LAB
SARS-COV-2 PCR COMMENT: NORMAL
SARS-COV-2 RNA SPEC QL NAA+PROBE: NEGATIVE
SARS-COV-2 VIRUS SPECIMEN SOURCE: NORMAL

## 2021-05-03 ENCOUNTER — PATIENT OUTREACH (OUTPATIENT)
Dept: CARE COORDINATION | Facility: CLINIC | Age: 86
End: 2021-05-03

## 2021-05-03 NOTE — PROGRESS NOTES
Clinic Care Coordination Contact  Care Coordination Communication         Clinical Data: Patient was receiving restorative services at [facility] and was discharged on 3.17.2021 with [facility].    Home Care Contact:              Home Care Agency: [facility]              Contact name () and phone number: Unavailable.              Care Coordination contacted home care: No              Anticipated start of care date: In Progress.     Assessment: Patient is currently followed by [facility] and has moved to an Assisted Living. Patient has continued to follow the plan of care and assessment is negative for any new needs or concerns.    Enrollment status: Graduated.      Plan: No further outreaches at this time.  Needs will be managed by [facility] care team and [facility]. Patient will continue to follow the plan of care.  If new needs arise a new Care Coordination referral may be placed.  FYI to PCP    EMY Chen  Clinic Care Coordinator  Ph. [phone]  [email or web address]

## 2021-05-05 ENCOUNTER — ANCILLARY PROCEDURE (OUTPATIENT)
Dept: CARDIOLOGY | Facility: CLINIC | Age: 86
End: 2021-05-05
Attending: INTERNAL MEDICINE
Payer: MEDICARE

## 2021-05-05 DIAGNOSIS — Z95.0 CARDIAC PACEMAKER IN SITU: ICD-10-CM

## 2021-05-05 DIAGNOSIS — I49.5 SICK SINUS SYNDROME (H): ICD-10-CM

## 2021-05-05 PROCEDURE — 93280 PM DEVICE PROGR EVAL DUAL: CPT | Performed by: INTERNAL MEDICINE

## 2021-05-10 LAB
MDC_IDC_LEAD_IMPLANT_DT: NORMAL
MDC_IDC_LEAD_IMPLANT_DT: NORMAL
MDC_IDC_LEAD_LOCATION: NORMAL
MDC_IDC_LEAD_LOCATION: NORMAL
MDC_IDC_LEAD_LOCATION_DETAIL_1: NORMAL
MDC_IDC_LEAD_LOCATION_DETAIL_1: NORMAL
MDC_IDC_LEAD_MFG: NORMAL
MDC_IDC_LEAD_MFG: NORMAL
MDC_IDC_LEAD_MODEL: NORMAL
MDC_IDC_LEAD_MODEL: NORMAL
MDC_IDC_LEAD_POLARITY_TYPE: NORMAL
MDC_IDC_LEAD_POLARITY_TYPE: NORMAL
MDC_IDC_LEAD_SERIAL: NORMAL
MDC_IDC_LEAD_SERIAL: NORMAL
MDC_IDC_MSMT_BATTERY_STATUS: NORMAL
MDC_IDC_MSMT_LEADCHNL_RA_IMPEDANCE_VALUE: 705 OHM
MDC_IDC_MSMT_LEADCHNL_RA_PACING_THRESHOLD_AMPLITUDE: 1.3 V
MDC_IDC_MSMT_LEADCHNL_RA_PACING_THRESHOLD_PULSEWIDTH: 0.4 MS
MDC_IDC_MSMT_LEADCHNL_RA_SENSING_INTR_AMPL: 2.9 MV
MDC_IDC_MSMT_LEADCHNL_RV_IMPEDANCE_VALUE: 890 OHM
MDC_IDC_MSMT_LEADCHNL_RV_PACING_THRESHOLD_AMPLITUDE: 1.2 V
MDC_IDC_MSMT_LEADCHNL_RV_PACING_THRESHOLD_PULSEWIDTH: 0.4 MS
MDC_IDC_MSMT_LEADCHNL_RV_SENSING_INTR_AMPL: 14 MV
MDC_IDC_PG_IMPLANT_DTM: NORMAL
MDC_IDC_PG_MFG: NORMAL
MDC_IDC_PG_MODEL: NORMAL
MDC_IDC_PG_SERIAL: NORMAL
MDC_IDC_PG_TYPE: NORMAL
MDC_IDC_SESS_CLINIC_NAME: NORMAL
MDC_IDC_SESS_DTM: NORMAL
MDC_IDC_SESS_TYPE: NORMAL
MDC_IDC_SET_BRADY_AT_MODE_SWITCH_MODE: NORMAL
MDC_IDC_SET_BRADY_AT_MODE_SWITCH_RATE: 170 {BEATS}/MIN
MDC_IDC_SET_BRADY_LOWRATE: 60 {BEATS}/MIN
MDC_IDC_SET_BRADY_MAX_SENSOR_RATE: 130 {BEATS}/MIN
MDC_IDC_SET_BRADY_MAX_TRACKING_RATE: 130 {BEATS}/MIN
MDC_IDC_SET_BRADY_MODE: NORMAL
MDC_IDC_SET_BRADY_PAV_DELAY_HIGH: 150 MS
MDC_IDC_SET_BRADY_PAV_DELAY_LOW: 200 MS
MDC_IDC_SET_BRADY_SAV_DELAY_HIGH: 150 MS
MDC_IDC_SET_BRADY_SAV_DELAY_LOW: 200 MS
MDC_IDC_SET_LEADCHNL_RA_PACING_AMPLITUDE: 2 V
MDC_IDC_SET_LEADCHNL_RA_PACING_CAPTURE_MODE: NORMAL
MDC_IDC_SET_LEADCHNL_RA_PACING_POLARITY: NORMAL
MDC_IDC_SET_LEADCHNL_RA_PACING_PULSEWIDTH: 0.4 MS
MDC_IDC_SET_LEADCHNL_RA_SENSING_ADAPTATION_MODE: NORMAL
MDC_IDC_SET_LEADCHNL_RA_SENSING_POLARITY: NORMAL
MDC_IDC_SET_LEADCHNL_RA_SENSING_SENSITIVITY: 0.25 MV
MDC_IDC_SET_LEADCHNL_RV_PACING_AMPLITUDE: 1.6 V
MDC_IDC_SET_LEADCHNL_RV_PACING_CAPTURE_MODE: NORMAL
MDC_IDC_SET_LEADCHNL_RV_PACING_POLARITY: NORMAL
MDC_IDC_SET_LEADCHNL_RV_PACING_PULSEWIDTH: 0.4 MS
MDC_IDC_SET_LEADCHNL_RV_SENSING_ADAPTATION_MODE: NORMAL
MDC_IDC_SET_LEADCHNL_RV_SENSING_POLARITY: NORMAL
MDC_IDC_SET_LEADCHNL_RV_SENSING_SENSITIVITY: 1.5 MV
MDC_IDC_SET_ZONE_DETECTION_INTERVAL: 375 MS
MDC_IDC_SET_ZONE_TYPE: NORMAL
MDC_IDC_SET_ZONE_VENDOR_TYPE: NORMAL
MDC_IDC_STAT_EPISODE_RECENT_COUNT: 0
MDC_IDC_STAT_EPISODE_RECENT_COUNT_DTM_END: NORMAL
MDC_IDC_STAT_EPISODE_RECENT_COUNT_DTM_START: NORMAL
MDC_IDC_STAT_EPISODE_TOTAL_COUNT: 0
MDC_IDC_STAT_EPISODE_TOTAL_COUNT_DTM_END: NORMAL
MDC_IDC_STAT_EPISODE_TYPE: NORMAL
MDC_IDC_STAT_EPISODE_TYPE: NORMAL
MDC_IDC_STAT_EPISODE_VENDOR_TYPE: NORMAL
MDC_IDC_STAT_EPISODE_VENDOR_TYPE: NORMAL

## 2021-06-01 ENCOUNTER — TELEPHONE (OUTPATIENT)
Dept: CARDIOLOGY | Facility: CLINIC | Age: 86
End: 2021-06-01

## 2021-06-01 DIAGNOSIS — I47.19 ATRIAL TACHYCARDIA (H): ICD-10-CM

## 2021-06-01 DIAGNOSIS — Z95.0 CARDIAC PACEMAKER IN SITU: Primary | ICD-10-CM

## 2021-06-01 NOTE — TELEPHONE ENCOUNTER
Pt's daughter Abigail left . She said pt had an episode on Saturday (5/29) and she wants to talk about it.     Called Abigail back. She said pt had an episode that lasted for a few hours. It came on in the morning when she was getting ready to get out of bed. She felt weak, she could hardly walk, and she felt nauseated. They just want to know if anything serious happened with pt's heart, and if so what they should do if it happens again.     Instructed Abigail how to send a manual remote check. Pt will send this afternoon, I told Abigail we are nearing the end of the day, so we will call her with results tomorrow. Abigail agrees with this plan.

## 2021-06-02 NOTE — TELEPHONE ENCOUNTER
Remote transmission received. Pt had symptoms Saturday morning (5/29) of weakness and nausea.     Pt has history of PAF, she tried amiodarone but it was discontinued. She is not on AC due to frailty/fall risk. Pt has a f/u OV with Roxanna LABOY on 6/21/2021.     [employer] (D) Remote PPM Device Check  AP: 7 %    : 0 %    Mode: DDDR     Presenting Rhythm: AS/VS    Heart Rate: excellent variability     Sensing: WNL    Pacing Threshold: WNL    Impedance: WNL    Battery Status: 15 yrs estimated longevity     Atrial Arrhythmia: 3 episodes, 1-7 seconds in duration, all from 5/29 between 6:45am - 8:40am. 2 EGMs show brief atrial arrhythmia lasting <10 seconds. 1 EGM shows AS/VS at 171 bpm (mode switch trigger rate is 170 bpm)  Ventricular Arrhythmia: 5 episodes, all from 5/29 between 6:15am - 8:10am. All 5 EGMs look the same, they show AVNRT vs SVT vs VT with retrograde conduction in the 160's. Durations are 13 seconds - 3 minutes.     Care Plan: will review EGMs with Dr. Rey.     Onset EGM:      Termination EGM:

## 2021-06-02 NOTE — TELEPHONE ENCOUNTER
It is likely atrial tachycardia.  She failed to maintain sinus rhythm on amiodarone in the past.  Suggest try Toprol  mg daily.  Consider AV node ablation if she does not do well on Toprol XL.

## 2021-06-02 NOTE — TELEPHONE ENCOUNTER
Called daughter Abigail, no answer, left detailed message that we got the remote transmission and it did show some fast heart rates during the time when pt had symptoms on Saturday. I told her it was not a dangerous rhythm, it was an atrial tachycardia. Dr. Rey suggested starting a new medication, so I asked Abigail to call us back so we can discuss further and find out where to send the prescription. Awaiting return call from Abigail.

## 2021-06-10 ENCOUNTER — TELEPHONE (OUTPATIENT)
Dept: INTERNAL MEDICINE | Facility: CLINIC | Age: 86
End: 2021-06-10

## 2021-06-10 RX ORDER — METOPROLOL SUCCINATE 100 MG/1
100 TABLET, EXTENDED RELEASE ORAL DAILY
Qty: 30 TABLET | Refills: 0 | Status: SHIPPED | OUTPATIENT
Start: 2021-06-10 | End: 2021-06-11

## 2021-06-10 NOTE — TELEPHONE ENCOUNTER
"Daughter Abigail calls requesting primary care provider's opinion on patient starting metoprolol 100 mg.    Per 6/1/21 telephone encounter, Dr. Rey would like patient to start on metoprolol for atrial tachycardia.     Per 6/2/20 office visit note:  \"HTN  -- she can't tolerate ACEI and ARB because she had increased K while on Lisinopril   -- she had leg edema with Amlodipine 10 mg   -- /60, 140/80, few times in the 150s\"    Per chart, metoprolol and amlodipine were discontinued on 6/1/20. Daughter and patient cannot recall if patient had side effects on metoprolol.     Call Abigail back at [phone]  "

## 2021-06-10 NOTE — TELEPHONE ENCOUNTER
Called and spoke with patient's daughter Abigail. She said she had gotten the VM last week but didn't know if they should start the new medication now or wait until OV in a couple weeks. I suggested we start now so we can see how she feels, and further adjustments can be made at the OV if necessary. Abigail agreed.     Sent prescription to pt's local pharmacy of choice for 30 days only at this time per Abigail's request.

## 2021-06-10 NOTE — TELEPHONE ENCOUNTER
Patient's daughter Abigail, called and left message asking if it was ok if patient took the new Metoprolol medication in the AM and additional wondering if this medication with make patient's leg swell.    Called Abigail back and informed her that patient can take the Metoprolol in the AM with her other medications and that per chart review it looks like Amlodipine was making patient's leg swell previously. Encouraged Abigail to call if patient does experience leg swelling after starting medication. Abigail stated she understood.

## 2021-06-11 DIAGNOSIS — I47.19 ATRIAL TACHYCARDIA (H): ICD-10-CM

## 2021-06-11 RX ORDER — METOPROLOL SUCCINATE 100 MG/1
100 TABLET, EXTENDED RELEASE ORAL DAILY
Qty: 90 TABLET | Refills: 2 | Status: SHIPPED | OUTPATIENT
Start: 2021-06-11 | End: 2021-06-21

## 2021-06-18 NOTE — PROGRESS NOTES
Cardiology Clinic Progress Note    Service Date: 06/21/21    Primary Cardiologist: Dr. Truong and Dr Rey       Reason for Visit:  6/21/2021    HPI:   I had the pleasure of seeing Ms. Glendy Díaz in the clinic today. she is a very pleasant 87 year old female with a past medical history notable for    1. Sinus node dysfunction.  S/p [employer] dual-chamber permanent pacemaker (3/3/2021  2. paroxysmal atrial fibrillation.  Refractory to amiodarone  3. hypertension  4. Chronic kidney disease  5. Anemia    Diagnostics  I have personally reviewed the following reports    Device check (6/2/2021) revealed A-paced 7%  : 0 %   Stable leads.  Battery life 15 years.   Atrial Arrhythmia:  3 episodes, 1-7 seconds in duration, all from 5/29 between 6:45am - 8:40am. 2 EGMs show brief atrial arrhythmia lasting <10 seconds. 1 EGM shows AS/VS at 171 bpm (mode switch trigger rate is 170 bpm).   Ventricular Arrhthymia:    5 episodes, all from 5/29 between 6:15am - 8:10am. All 5 EGMs look the same, they show AVNRT vs SVT vs VT with retrograde conduction in the 160's. Durations are 13 seconds - 3 minutes.       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  She underwent a permanent pacemaker and diltiazem 120 mg/day was added to prevent tachycardia.    On 5/29/2021, patient had symptoms of weakness and nausea and Dr. Rey thought the rhythm was likely atrial tachycardia. Pt failed to maintain SR on amiodarone in the past and recommended Toprol XL 100mg daily and to consider AV node ablation if does not do well on Toprol XL.     Today, she presents with her daughter and reports on 5/29/2021 describing thumping, nausea, weakness.  Episode lasted 2 hours. When she is not having these episodes she denies chest pain, shortness of breath, dyspnea on exertion, orthopnea, PND, lower extremity edema, palpitations, dizziness, presyncope,  or syncope.   Reports taking medications as prescribed     ASSESSMENT AND PLAN:  Paroxysmal atrial fibrillation/atrial tachycardia    For rate control she is currently taking diltiazem 120 mg daily and metoprolol succinate 100 mg daily.    On 5/29/2021, she had an episode of symptomatic atrial tachycardia while taking diltiazem 120 mg daily and subsequently metoprolol succinate 100 mg was added.   I had a long conversation with her and her daughter regarding the progression of paroxysmal atrial fibrillation/atrial tachycardia.  Likely the rhythm will return and hopefully with the addition of metoprolol succinate the rates will be lower and she will be able to tolerate the arrhythmia.  If she does not tolerate the arrhythmia may need to consider AV node ablation as she currently has a permanent pacemaker.       For anticoagulation for CHADS VASC 4 (hypertension, age++, female) she he is currently aspirin and not taking oral anticoagulation given her frailty/fall/confusion.    Her last hgb was 10.2 (3/15/2021)/        Sick sinus syndrome    S/p [employer] dual chamber permanent pacemaker (3/2021)    I personally reviewed the device check on 3/12/2021 which revealed normal device function and stable leads    As noted above an episode of atrial tachycardia    Follow with the device clinic on a quarterly basis.       Plan:    Continue with current medications     Take blood pressure approximately 3 times per week.    With EP KATELYN in 6 months     please call the clinic if questions or problems arise      Thank you for the opportunity to participate in this pleasant patient's care. We would be happy to see her sooner if needed for any concerns in the meantime.         SHRUTI [facility]  Text Page  (M-F 8:00 am - 4:30 pm)    Orders this Visit:  Orders Placed This Encounter   Procedures     Follow-Up with Cardiac Advanced Practice Provider     Orders Placed This Encounter   Medications      metoprolol succinate ER (TOPROL-XL) 100 MG 24 hr tablet     Sig: Take 1 tablet (100 mg) by mouth daily     Dispense:  90 tablet     Refill:  3     diltiazem ER (TIAZAC) 120 MG 24 hr ER beaded capsule     Sig: Take 1 capsule (120 mg) by mouth daily     Dispense:  90 capsule     Refill:  3     Medications Discontinued During This Encounter   Medication Reason     diltiazem ER (TIAZAC) 120 MG 24 hr ER beaded capsule Reorder     metoprolol succinate ER (TOPROL-XL) 100 MG 24 hr tablet Reorder     Encounter Diagnoses   Name Primary?     Paroxysmal A-fib (H)      Atrial tachycardia (H)      SVT (supraventricular tachycardia) (H)        CURRENT MEDICATIONS:  Current Outpatient Medications   Medication Sig Dispense Refill     acetaminophen (TYLENOL) 325 MG tablet Take 650 mg by mouth every 8 hours as needed for mild pain or fever       aspirin 81 MG EC tablet Take 81 mg by mouth At Bedtime       cetirizine (ZYRTEC) 10 MG tablet Take 10 mg by mouth daily as needed for allergies  90 tablet 3     Cholecalciferol (VITAMIN D) 2000 UNITS tablet Take 2,000 Units by mouth daily (Patient taking differently: Take 2,000 Units by mouth daily Vitamin d3) 100 tablet 3     diltiazem ER (TIAZAC) 120 MG 24 hr ER beaded capsule Take 1 capsule (120 mg) by mouth daily 90 capsule 3     fluticasone (FLONASE) 50 MCG/ACT nasal spray SPRAY 1 SPRAY INTO BOTH NOSTRILS DAILY AS NEEDED FOR RHINITIS OR ALLERGIES 16 g 0     furosemide (LASIX) 20 MG tablet Take 1 tablet (20 mg) by mouth daily as needed (swollen legs) 30 tablet 0     gabapentin (NEURONTIN) 100 MG capsule Take 1 capsule in am and 2 capsules at bed time 90 capsule 0     metoprolol succinate ER (TOPROL-XL) 100 MG 24 hr tablet Take 1 tablet (100 mg) by mouth daily 90 tablet 3     mirabegron (MYRBETRIQ) 25 MG 24 hr tablet Take 2 tablets (50 mg) by mouth daily 90 tablet 4     polyethylene glycol (MIRALAX) 17 g packet Take 1 packet by mouth daily as needed for constipation Mix in 8 ounces of water  until completely dissolved       senna-docusate (SENOKOT-S/PERICOLACE) 8.6-50 MG tablet Take 1 tablet by mouth daily as needed for constipation       vitamin B-12 (CYANOCOBALAMIN) 2500 MCG sublingual tablet Take 2,500 mcg by mouth daily       chlorhexidine (PERIDEX) 0.12 % solution Swish and spit 15 mLs in mouth 2 times daily Hold 2 minutes then expectorate.       gabapentin (NEURONTIN) 100 MG capsule Take 200 mg by mouth At Bedtime         ALLERGIES  Allergies   Allergen Reactions     Amlodipine      Leg edema with 5 mg     Fosamax [Alendronic Acid]      Bone pain     Lisinopril      Increased potassium     Pravastatin      Muscle aches      Simvastatin      Muscle aches        PAST MEDICAL, SURGICAL, FAMILY HISTORY:  History was reviewed and updated as needed, see medical record.    SOCIAL HISTORY:  Social History     Socioeconomic History     Marital status:      Spouse name: Not on file     Number of children: Not on file     Years of education: Not on file     Highest education level: Not on file   Occupational History     Not on file   Social Needs     Financial resource strain: Not on file     Food insecurity     Worry: Not on file     Inability: Not on file     Transportation needs     Medical: Not on file     Non-medical: Not on file   Tobacco Use     Smoking status: Former Smoker     Packs/day: 1.00     Years: 20.00     Pack years: 20.00     Types: Cigarettes     Start date:      Quit date:      Years since quittin.4     Smokeless tobacco: Never Used   Substance and Sexual Activity     Alcohol use: No     Drug use: No     Sexual activity: Never   Lifestyle     Physical activity     Days per week: Not on file     Minutes per session: Not on file     Stress: Not on file   Relationships     Social connections     Talks on phone: Not on file     Gets together: Not on file     Attends Worship service: Not on file     Active member of club or organization: Not on file     Attends meetings  "of clubs or organizations: Not on file     Relationship status: Not on file     Intimate partner violence     Fear of current or ex partner: Not on file     Emotionally abused: Not on file     Physically abused: Not on file     Forced sexual activity: Not on file   Other Topics Concern     Parent/sibling w/ CABG, MI or angioplasty before 65F 55M? Not Asked   Social History Narrative     Not on file       Review of Systems:  Skin:  Positive for bruising   Eyes:  Positive for glasses  ENT:  Positive for hearing loss  Respiratory:  Negative    Cardiovascular:    Positive for;chest pain;edema  Gastroenterology: Negative    Genitourinary:  not assessed    Musculoskeletal:  Positive for muscular weakness;arthritis;foot pain  Neurologic:  Positive for numbness or tingling of hands;numbness or tingling of feet  Psychiatric:  Positive for sleep disturbances  Heme/Lymph/Imm:  Positive for allergies  Endocrine:  Negative       Physical Exam:  Vitals: /64   Pulse 64   Ht 1.613 m (5' 3.5\")   Wt 67.1 kg (147 lb 14.4 oz)   BMI 25.79 kg/m     Wt Readings from Last 4 Encounters:   06/21/21 67.1 kg (147 lb 14.4 oz)   04/01/21 62.1 kg (137 lb)   03/26/21 64.2 kg (141 lb 9.6 oz)   03/22/21 64.9 kg (143 lb)     CONSTITUTIONAL: Appears her stated age, well nourished, and in no acute distress.  HEENT: Pupils equal, round. Sclerae nonicteric.    NECK: Supple, no masses appreciated.    C/V:  Regular rate and rhythm.   RESP: Respirations are unlabored. Lungs are clear to auscultation bilaterally without wheezing, rales, or rhonchi.  GI: Abdomen soft, non-tender, non-distended.  EXTREM:  No clubbing, cyanosis, or lower extremity edema bilaterally.   NEURO: Alert and oriented, cooperative. Gait steady. No gross focal deficits.   PSYCH: Affect appropriate. Mentation normal. Responds to questions appropriately.  SKIN: Warm and dry. No apparent rashes or bruising.     Recent Lab Results:    CBC RESULTS:  Lab Results   Component Value " Date    WBC 6.6 03/04/2021    RBC 2.82 (L) 03/04/2021    HGB 10.2 (L) 03/15/2021    HCT 33.4 (L) 03/15/2021     03/04/2021    MCH 31.2 03/04/2021    MCHC 31.1 (L) 03/04/2021    RDW 15.5 (H) 03/04/2021     03/04/2021     BMP RESULTS:  Lab Results   Component Value Date     03/15/2021    POTASSIUM 4.4 03/15/2021    CHLORIDE 109 03/15/2021    CO2 28 03/15/2021    ANIONGAP 3 03/15/2021    GLC 72 03/15/2021    BUN 21 03/15/2021    CR 1.19 (H) 03/15/2021    GFRESTIMATED 41 (L) 03/15/2021    GFRESTBLACK 47 (L) 03/15/2021    MUMTAZ 9.4 03/15/2021       CC  Roxanna Gunderson, APRN CNP  [address]  [place] [address]    This note was completed in part using Dragon voice recognition software. Although reviewed after completion, some word and grammatical errors may occur.

## 2021-06-21 ENCOUNTER — OFFICE VISIT (OUTPATIENT)
Dept: CARDIOLOGY | Facility: CLINIC | Age: 86
End: 2021-06-21
Attending: NURSE PRACTITIONER
Payer: MEDICARE

## 2021-06-21 VITALS
SYSTOLIC BLOOD PRESSURE: 126 MMHG | DIASTOLIC BLOOD PRESSURE: 64 MMHG | WEIGHT: 147.9 LBS | HEART RATE: 64 BPM | HEIGHT: 64 IN | BODY MASS INDEX: 25.25 KG/M2

## 2021-06-21 DIAGNOSIS — I47.19 ATRIAL TACHYCARDIA (H): Primary | ICD-10-CM

## 2021-06-21 DIAGNOSIS — I48.0 PAROXYSMAL A-FIB (H): ICD-10-CM

## 2021-06-21 DIAGNOSIS — Z95.0 CARDIAC PACEMAKER IN SITU: ICD-10-CM

## 2021-06-21 PROCEDURE — 99214 OFFICE O/P EST MOD 30 MIN: CPT | Performed by: NURSE PRACTITIONER

## 2021-06-21 RX ORDER — DILTIAZEM HYDROCHLORIDE 120 MG/1
120 CAPSULE, EXTENDED RELEASE ORAL DAILY
Qty: 90 CAPSULE | Refills: 3 | Status: SHIPPED | OUTPATIENT
Start: 2021-06-21 | End: 2021-10-18

## 2021-06-21 RX ORDER — METOPROLOL SUCCINATE 100 MG/1
100 TABLET, EXTENDED RELEASE ORAL DAILY
Qty: 90 TABLET | Refills: 3 | Status: SHIPPED | OUTPATIENT
Start: 2021-06-21 | End: 2021-10-18

## 2021-06-21 ASSESSMENT — MIFFLIN-ST. JEOR: SCORE: 1082.93

## 2021-06-21 NOTE — LETTER
6/21/2021    Veronica Davis MD  [address] [facility] [address]    RE: Glendy Díaz       Dear Colleague,    I had the pleasure of seeing Glendy Díaz in the [facility].    Cardiology Clinic Progress Note    Service Date: 06/21/21    Primary Cardiologist: Dr. Truong and Dr Rey       Reason for Visit:  6/21/2021    HPI:   I had the pleasure of seeing Ms. Glendy Díaz in the clinic today. she is a very pleasant 87 year old female with a past medical history notable for    1. Sinus node dysfunction.  S/p [employer] dual-chamber permanent pacemaker (3/3/2021  2. paroxysmal atrial fibrillation.  Refractory to amiodarone  3. hypertension  4. Chronic kidney disease  5. Anemia    Diagnostics  I have personally reviewed the following reports    Device check (6/2/2021) revealed A-paced 7%  : 0 %   Stable leads.  Battery life 15 years.   Atrial Arrhythmia:  3 episodes, 1-7 seconds in duration, all from 5/29 between 6:45am - 8:40am. 2 EGMs show brief atrial arrhythmia lasting <10 seconds. 1 EGM shows AS/VS at 171 bpm (mode switch trigger rate is 170 bpm).   Ventricular Arrhthymia:    5 episodes, all from 5/29 between 6:15am - 8:10am. All 5 EGMs look the same, they show AVNRT vs SVT vs VT with retrograde conduction in the 160's. Durations are 13 seconds - 3 minutes.       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  She underwent a permanent pacemaker and diltiazem 120 mg/day was added to prevent tachycardia.    On 5/29/2021, patient had symptoms of weakness and nausea and Dr. Rey thought the rhythm was likely atrial tachycardia. Pt failed to maintain SR on amiodarone in the past and recommended Toprol XL 100mg daily and to consider AV node ablation if does not do well on Toprol XL.     Today, she presents with her daughter and reports  on 5/29/2021 describing thumping, nausea, weakness.  Episode lasted 2 hours. When she is not having these episodes she denies chest pain, shortness of breath, dyspnea on exertion, orthopnea, PND, lower extremity edema, palpitations, dizziness, presyncope, or syncope.   Reports taking medications as prescribed     ASSESSMENT AND PLAN:  Paroxysmal atrial fibrillation/atrial tachycardia    For rate control she is currently taking diltiazem 120 mg daily and metoprolol succinate 100 mg daily.    On 5/29/2021, she had an episode of symptomatic atrial tachycardia while taking diltiazem 120 mg daily and subsequently metoprolol succinate 100 mg was added.   I had a long conversation with her and her daughter regarding the progression of paroxysmal atrial fibrillation/atrial tachycardia.  Likely the rhythm will return and hopefully with the addition of metoprolol succinate the rates will be lower and she will be able to tolerate the arrhythmia.  If she does not tolerate the arrhythmia may need to consider AV node ablation as she currently has a permanent pacemaker.       For anticoagulation for CHADS VASC 4 (hypertension, age++, female) she he is currently aspirin and not taking oral anticoagulation given her frailty/fall/confusion.    Her last hgb was 10.2 (3/15/2021)/        Sick sinus syndrome    S/p [employer] dual chamber permanent pacemaker (3/2021)    I personally reviewed the device check on 3/12/2021 which revealed normal device function and stable leads    As noted above an episode of atrial tachycardia    Follow with the device clinic on a quarterly basis.       Plan:    Continue with current medications     Take blood pressure approximately 3 times per week.    With EP KATELYN in 6 months     please call the clinic if questions or problems arise      Thank you for the opportunity to participate in this pleasant patient's care. We would be happy to see her sooner if needed for any concerns in the meantime.          SHRUTI [facility]  Text Page  (M-F 8:00 am - 4:30 pm)    Orders this Visit:  Orders Placed This Encounter   Procedures     Follow-Up with Cardiac Advanced Practice Provider     Orders Placed This Encounter   Medications     metoprolol succinate ER (TOPROL-XL) 100 MG 24 hr tablet     Sig: Take 1 tablet (100 mg) by mouth daily     Dispense:  90 tablet     Refill:  3     diltiazem ER (TIAZAC) 120 MG 24 hr ER beaded capsule     Sig: Take 1 capsule (120 mg) by mouth daily     Dispense:  90 capsule     Refill:  3     Medications Discontinued During This Encounter   Medication Reason     diltiazem ER (TIAZAC) 120 MG 24 hr ER beaded capsule Reorder     metoprolol succinate ER (TOPROL-XL) 100 MG 24 hr tablet Reorder     Encounter Diagnoses   Name Primary?     Paroxysmal A-fib (H)      Atrial tachycardia (H)      SVT (supraventricular tachycardia) (H)        CURRENT MEDICATIONS:  Current Outpatient Medications   Medication Sig Dispense Refill     acetaminophen (TYLENOL) 325 MG tablet Take 650 mg by mouth every 8 hours as needed for mild pain or fever       aspirin 81 MG EC tablet Take 81 mg by mouth At Bedtime       cetirizine (ZYRTEC) 10 MG tablet Take 10 mg by mouth daily as needed for allergies  90 tablet 3     Cholecalciferol (VITAMIN D) 2000 UNITS tablet Take 2,000 Units by mouth daily (Patient taking differently: Take 2,000 Units by mouth daily Vitamin d3) 100 tablet 3     diltiazem ER (TIAZAC) 120 MG 24 hr ER beaded capsule Take 1 capsule (120 mg) by mouth daily 90 capsule 3     fluticasone (FLONASE) 50 MCG/ACT nasal spray SPRAY 1 SPRAY INTO BOTH NOSTRILS DAILY AS NEEDED FOR RHINITIS OR ALLERGIES 16 g 0     furosemide (LASIX) 20 MG tablet Take 1 tablet (20 mg) by mouth daily as needed (swollen legs) 30 tablet 0     gabapentin (NEURONTIN) 100 MG capsule Take 1 capsule in am and 2 capsules at bed time 90 capsule 0     metoprolol succinate ER (TOPROL-XL) 100 MG 24 hr tablet Take 1 tablet (100  mg) by mouth daily 90 tablet 3     mirabegron (MYRBETRIQ) 25 MG 24 hr tablet Take 2 tablets (50 mg) by mouth daily 90 tablet 4     polyethylene glycol (MIRALAX) 17 g packet Take 1 packet by mouth daily as needed for constipation Mix in 8 ounces of water until completely dissolved       senna-docusate (SENOKOT-S/PERICOLACE) 8.6-50 MG tablet Take 1 tablet by mouth daily as needed for constipation       vitamin B-12 (CYANOCOBALAMIN) 2500 MCG sublingual tablet Take 2,500 mcg by mouth daily       chlorhexidine (PERIDEX) 0.12 % solution Swish and spit 15 mLs in mouth 2 times daily Hold 2 minutes then expectorate.       gabapentin (NEURONTIN) 100 MG capsule Take 200 mg by mouth At Bedtime         ALLERGIES  Allergies   Allergen Reactions     Amlodipine      Leg edema with 5 mg     Fosamax [Alendronic Acid]      Bone pain     Lisinopril      Increased potassium     Pravastatin      Muscle aches      Simvastatin      Muscle aches        PAST MEDICAL, SURGICAL, FAMILY HISTORY:  History was reviewed and updated as needed, see medical record.    SOCIAL HISTORY:  Social History     Socioeconomic History     Marital status:      Spouse name: Not on file     Number of children: Not on file     Years of education: Not on file     Highest education level: Not on file   Occupational History     Not on file   Social Needs     Financial resource strain: Not on file     Food insecurity     Worry: Not on file     Inability: Not on file     Transportation needs     Medical: Not on file     Non-medical: Not on file   Tobacco Use     Smoking status: Former Smoker     Packs/day: 1.00     Years: 20.00     Pack years: 20.00     Types: Cigarettes     Start date:      Quit date:      Years since quittin.4     Smokeless tobacco: Never Used   Substance and Sexual Activity     Alcohol use: No     Drug use: No     Sexual activity: Never   Lifestyle     Physical activity     Days per week: Not on file     Minutes per session: Not  "on file     Stress: Not on file   Relationships     Social connections     Talks on phone: Not on file     Gets together: Not on file     Attends Ca[employer] service: Not on file     Active member of club or organization: Not on file     Attends meetings of clubs or organizations: Not on file     Relationship status: Not on file     Intimate partner violence     Fear of current or ex partner: Not on file     Emotionally abused: Not on file     Physically abused: Not on file     Forced sexual activity: Not on file   Other Topics Concern     Parent/sibling w/ CABG, MI or angioplasty before 65F 55M? Not Asked   Social History Narrative     Not on file       Review of Systems:  Skin:  Positive for bruising   Eyes:  Positive for glasses  ENT:  Positive for hearing loss  Respiratory:  Negative    Cardiovascular:    Positive for;chest pain;edema  Gastroenterology: Negative    Genitourinary:  not assessed    Musculoskeletal:  Positive for muscular weakness;arthritis;foot pain  Neurologic:  Positive for numbness or tingling of hands;numbness or tingling of feet  Psychiatric:  Positive for sleep disturbances  Heme/Lymph/Imm:  Positive for allergies  Endocrine:  Negative       Physical Exam:  Vitals: /64   Pulse 64   Ht 1.613 m (5' 3.5\")   Wt 67.1 kg (147 lb 14.4 oz)   BMI 25.79 kg/m     Wt Readings from Last 4 Encounters:   06/21/21 67.1 kg (147 lb 14.4 oz)   04/01/21 62.1 kg (137 lb)   03/26/21 64.2 kg (141 lb 9.6 oz)   03/22/21 64.9 kg (143 lb)     CONSTITUTIONAL: Appears her stated age, well nourished, and in no acute distress.  HEENT: Pupils equal, round. Sclerae nonicteric.    NECK: Supple, no masses appreciated.    C/V:  Regular rate and rhythm.   RESP: Respirations are unlabored. Lungs are clear to auscultation bilaterally without wheezing, rales, or rhonchi.  GI: Abdomen soft, non-tender, non-distended.  EXTREM:  No clubbing, cyanosis, or lower extremity edema bilaterally.   NEURO: Alert and oriented, " cooperative. Gait steady. No gross focal deficits.   PSYCH: Affect appropriate. Mentation normal. Responds to questions appropriately.  SKIN: Warm and dry. No apparent rashes or bruising.     Recent Lab Results:    CBC RESULTS:  Lab Results   Component Value Date    WBC 6.6 03/04/2021    RBC 2.82 (L) 03/04/2021    HGB 10.2 (L) 03/15/2021    HCT 33.4 (L) 03/15/2021     03/04/2021    MCH 31.2 03/04/2021    MCHC 31.1 (L) 03/04/2021    RDW 15.5 (H) 03/04/2021     03/04/2021     BMP RESULTS:  Lab Results   Component Value Date     03/15/2021    POTASSIUM 4.4 03/15/2021    CHLORIDE 109 03/15/2021    CO2 28 03/15/2021    ANIONGAP 3 03/15/2021    GLC 72 03/15/2021    BUN 21 03/15/2021    CR 1.19 (H) 03/15/2021    GFRESTIMATED 41 (L) 03/15/2021    GFRESTBLACK 47 (L) 03/15/2021    MUMTAZ 9.4 03/15/2021     This note was completed in part using Dragon voice recognition software. Although reviewed after completion, some word and grammatical errors may occur.    Thank you for allowing me to participate in the care of your patient.      Sincerely,     SHRUTI South CNP     [facility]    cc:   SHRUTI Gilliam CNP  [address]  [place] [id number]

## 2021-06-21 NOTE — PATIENT INSTRUCTIONS
Take your blood pressure at least 3 times per week.   You will have some more episodes of fast heart rate.  Hopefully we can slow the heart rate with medications.  If you have more episodes     Continue current medications    Follow up with Roxanna in October 2021    If you have pacemaker or ICD and have questions or concerns please call the device clinic at [phone]

## 2021-07-01 DIAGNOSIS — R60.0 BILATERAL LEG EDEMA: ICD-10-CM

## 2021-07-02 RX ORDER — FUROSEMIDE 20 MG
20 TABLET ORAL DAILY PRN
Qty: 30 TABLET | Refills: 1 | Status: SHIPPED | OUTPATIENT
Start: 2021-07-02 | End: 2022-05-31

## 2021-07-02 NOTE — TELEPHONE ENCOUNTER
Pending Prescriptions:                       Disp   Refills    furosemide (LASIX) 20 MG tablet            30 tab*0        Sig: Take 1 tablet (20 mg) by mouth daily as needed           (swollen legs)    Routing refill request to provider for review/approval because:  Creatinine   Date Value Ref Range Status   03/15/2021 1.19 (H) 0.52 - 1.04 mg/dL Final

## 2021-07-06 ENCOUNTER — TELEPHONE (OUTPATIENT)
Dept: UROLOGY | Facility: CLINIC | Age: 86
End: 2021-07-06

## 2021-07-06 DIAGNOSIS — N39.41 URGE INCONTINENCE OF URINE: Primary | ICD-10-CM

## 2021-07-06 RX ORDER — SOLIFENACIN SUCCINATE 10 MG/1
10 TABLET, FILM COATED ORAL DAILY
Qty: 90 TABLET | Refills: 4 | Status: SHIPPED | OUTPATIENT
Start: 2021-07-06 | End: 2022-01-01

## 2021-07-06 NOTE — TELEPHONE ENCOUNTER
[facility] Call Center    Phone Message    May a detailed message be left on voicemail: yes     Reason for Call: Other: Pt and daughter requesting Solisenacin Succinate, please call to discuss at .     Action Taken: Message routed to:  [facility] Urology    Travel Screening: Not Applicable

## 2021-07-06 NOTE — TELEPHONE ENCOUNTER
"Per PA- \"Yes, 10 mg. #90 with 4 refills.\"    Sent this into patient's preferred pharmacy according to daughter. Daughter is aware this has been sent in.  She is aware of possible side effects and instructed to call our office if any occur.     Myra Mary LPN     "

## 2021-07-06 NOTE — TELEPHONE ENCOUNTER
[facility] Call Center    Phone Message    May a detailed message be left on voicemail: yes     Reason for Call: Other: Pt daughter would like a call back to discuss her moms medication as her co pay was 150 and now has jumped to 500 and she was wondering if there was a different type of medication she can switch to     Action Taken: Message routed to:  [facility] Uro    Travel Screening: Not Applicable

## 2021-07-06 NOTE — TELEPHONE ENCOUNTER
Called patient's daughter and LM. Informed her to check with patient's formulary to see what is covered under alternative medication. Instructed her to call back and we would be happy to send in something different.     Myra Mary LPN

## 2021-08-09 ENCOUNTER — ANCILLARY PROCEDURE (OUTPATIENT)
Dept: CARDIOLOGY | Facility: CLINIC | Age: 86
End: 2021-08-09
Attending: INTERNAL MEDICINE
Payer: MEDICARE

## 2021-08-09 DIAGNOSIS — Z95.0 CARDIAC PACEMAKER IN SITU: ICD-10-CM

## 2021-08-09 DIAGNOSIS — I49.5 SICK SINUS SYNDROME (H): ICD-10-CM

## 2021-08-09 PROCEDURE — 93294 REM INTERROG EVL PM/LDLS PM: CPT | Performed by: INTERNAL MEDICINE

## 2021-08-09 PROCEDURE — 93296 REM INTERROG EVL PM/IDS: CPT | Performed by: INTERNAL MEDICINE

## 2021-08-26 LAB
MDC_IDC_EPISODE_DTM: NORMAL
MDC_IDC_EPISODE_DURATION: 12 S
MDC_IDC_EPISODE_DURATION: 17 S
MDC_IDC_EPISODE_DURATION: 180 S
MDC_IDC_EPISODE_DURATION: 19 S
MDC_IDC_EPISODE_DURATION: 25 S
MDC_IDC_EPISODE_DURATION: 40 S
MDC_IDC_EPISODE_DURATION: 6 S
MDC_IDC_EPISODE_ID: NORMAL
MDC_IDC_EPISODE_TYPE: NORMAL
MDC_IDC_LEAD_IMPLANT_DT: NORMAL
MDC_IDC_LEAD_IMPLANT_DT: NORMAL
MDC_IDC_LEAD_LOCATION: NORMAL
MDC_IDC_LEAD_LOCATION: NORMAL
MDC_IDC_LEAD_LOCATION_DETAIL_1: NORMAL
MDC_IDC_LEAD_LOCATION_DETAIL_1: NORMAL
MDC_IDC_LEAD_MFG: NORMAL
MDC_IDC_LEAD_MFG: NORMAL
MDC_IDC_LEAD_MODEL: NORMAL
MDC_IDC_LEAD_MODEL: NORMAL
MDC_IDC_LEAD_POLARITY_TYPE: NORMAL
MDC_IDC_LEAD_POLARITY_TYPE: NORMAL
MDC_IDC_LEAD_SERIAL: NORMAL
MDC_IDC_LEAD_SERIAL: NORMAL
MDC_IDC_MSMT_BATTERY_DTM: NORMAL
MDC_IDC_MSMT_BATTERY_REMAINING_LONGEVITY: 174 MO
MDC_IDC_MSMT_BATTERY_REMAINING_PERCENTAGE: 100 %
MDC_IDC_MSMT_BATTERY_STATUS: NORMAL
MDC_IDC_MSMT_LEADCHNL_RA_IMPEDANCE_VALUE: 616 OHM
MDC_IDC_MSMT_LEADCHNL_RA_PACING_THRESHOLD_AMPLITUDE: 0.7 V
MDC_IDC_MSMT_LEADCHNL_RA_PACING_THRESHOLD_PULSEWIDTH: 0.4 MS
MDC_IDC_MSMT_LEADCHNL_RV_IMPEDANCE_VALUE: 805 OHM
MDC_IDC_MSMT_LEADCHNL_RV_PACING_THRESHOLD_AMPLITUDE: 1.1 V
MDC_IDC_MSMT_LEADCHNL_RV_PACING_THRESHOLD_PULSEWIDTH: 0.4 MS
MDC_IDC_PG_IMPLANT_DTM: NORMAL
MDC_IDC_PG_MFG: NORMAL
MDC_IDC_PG_MODEL: NORMAL
MDC_IDC_PG_SERIAL: NORMAL
MDC_IDC_PG_TYPE: NORMAL
MDC_IDC_SESS_CLINIC_NAME: NORMAL
MDC_IDC_SESS_DTM: NORMAL
MDC_IDC_SESS_TYPE: NORMAL
MDC_IDC_SET_BRADY_AT_MODE_SWITCH_MODE: NORMAL
MDC_IDC_SET_BRADY_AT_MODE_SWITCH_RATE: 170 {BEATS}/MIN
MDC_IDC_SET_BRADY_LOWRATE: 60 {BEATS}/MIN
MDC_IDC_SET_BRADY_MAX_SENSOR_RATE: 130 {BEATS}/MIN
MDC_IDC_SET_BRADY_MAX_TRACKING_RATE: 130 {BEATS}/MIN
MDC_IDC_SET_BRADY_MODE: NORMAL
MDC_IDC_SET_BRADY_PAV_DELAY_HIGH: 150 MS
MDC_IDC_SET_BRADY_PAV_DELAY_LOW: 200 MS
MDC_IDC_SET_BRADY_SAV_DELAY_HIGH: 150 MS
MDC_IDC_SET_BRADY_SAV_DELAY_LOW: 200 MS
MDC_IDC_SET_LEADCHNL_RA_PACING_AMPLITUDE: 2 V
MDC_IDC_SET_LEADCHNL_RA_PACING_CAPTURE_MODE: NORMAL
MDC_IDC_SET_LEADCHNL_RA_PACING_POLARITY: NORMAL
MDC_IDC_SET_LEADCHNL_RA_PACING_PULSEWIDTH: 0.4 MS
MDC_IDC_SET_LEADCHNL_RA_SENSING_ADAPTATION_MODE: NORMAL
MDC_IDC_SET_LEADCHNL_RA_SENSING_POLARITY: NORMAL
MDC_IDC_SET_LEADCHNL_RA_SENSING_SENSITIVITY: 0.25 MV
MDC_IDC_SET_LEADCHNL_RV_PACING_AMPLITUDE: 1.6 V
MDC_IDC_SET_LEADCHNL_RV_PACING_CAPTURE_MODE: NORMAL
MDC_IDC_SET_LEADCHNL_RV_PACING_POLARITY: NORMAL
MDC_IDC_SET_LEADCHNL_RV_PACING_PULSEWIDTH: 0.4 MS
MDC_IDC_SET_LEADCHNL_RV_SENSING_ADAPTATION_MODE: NORMAL
MDC_IDC_SET_LEADCHNL_RV_SENSING_POLARITY: NORMAL
MDC_IDC_SET_LEADCHNL_RV_SENSING_SENSITIVITY: 1.5 MV
MDC_IDC_SET_ZONE_DETECTION_INTERVAL: 375 MS
MDC_IDC_SET_ZONE_TYPE: NORMAL
MDC_IDC_SET_ZONE_VENDOR_TYPE: NORMAL
MDC_IDC_STAT_AT_BURDEN_PERCENT: 1 %
MDC_IDC_STAT_AT_DTM_END: NORMAL
MDC_IDC_STAT_AT_DTM_START: NORMAL
MDC_IDC_STAT_BRADY_DTM_END: NORMAL
MDC_IDC_STAT_BRADY_DTM_START: NORMAL
MDC_IDC_STAT_BRADY_RA_PERCENT_PACED: 45 %
MDC_IDC_STAT_BRADY_RV_PERCENT_PACED: 1 %
MDC_IDC_STAT_EPISODE_RECENT_COUNT: 0
MDC_IDC_STAT_EPISODE_RECENT_COUNT: 0
MDC_IDC_STAT_EPISODE_RECENT_COUNT: 11
MDC_IDC_STAT_EPISODE_RECENT_COUNT: 5
MDC_IDC_STAT_EPISODE_RECENT_COUNT: 9
MDC_IDC_STAT_EPISODE_RECENT_COUNT_DTM_END: NORMAL
MDC_IDC_STAT_EPISODE_RECENT_COUNT_DTM_START: NORMAL
MDC_IDC_STAT_EPISODE_TYPE: NORMAL
MDC_IDC_STAT_EPISODE_VENDOR_TYPE: NORMAL

## 2021-08-31 DIAGNOSIS — G62.9 PERIPHERAL POLYNEUROPATHY: Primary | ICD-10-CM

## 2021-08-31 NOTE — TELEPHONE ENCOUNTER
Patient is to schedule annual exam after October 5, then I would consider signing the prescription

## 2021-09-01 NOTE — TELEPHONE ENCOUNTER
Pt daughter Sharyn called to ask if pt is able to have her COVID vaccine on Friday (3/12). Pt had her device implant last week and per Device nurse ok to proceed.  No other questions at this time. Rashmi    [Time Spent: ___ minutes] : I have spent [unfilled] minutes of time on the encounter. [>50% of the face to face encounter time was spent on counseling and/or coordination of care for ___] : Greater than 50% of the face to face encounter time was spent on counseling and/or coordination of care for [unfilled]

## 2021-09-02 NOTE — TELEPHONE ENCOUNTER
Pt is schedule November 1st which is the first available for when daughter can bring patient in    Michael Frankel RN, BSN

## 2021-09-03 RX ORDER — GABAPENTIN 100 MG/1
CAPSULE ORAL
Qty: 270 CAPSULE | Refills: 0 | Status: SHIPPED | OUTPATIENT
Start: 2021-09-03 | End: 2021-11-01

## 2021-09-13 ENCOUNTER — TELEPHONE (OUTPATIENT)
Dept: INTERNAL MEDICINE | Facility: CLINIC | Age: 86
End: 2021-09-13

## 2021-09-13 NOTE — TELEPHONE ENCOUNTER
Patient has a lump on her buttocks. She wants to know if she can wait until her scheduled appt 11-1-2021 or if she needs to be seen sooner. Please advise. Ok to call and loi [phone] which is her daughter Abigail's number

## 2021-09-14 NOTE — TELEPHONE ENCOUNTER
Painful lump near anus first noted yesterday 9/13/21.  She states she has not known history of hemorrhoids and doesn't know if this may be one.  Area is hard and painful about the size of a grape.  There was some bleeding from it yesterday with a hard BM.  She is now using the Sennokot, no longer uses the Miralax, advised that she should resume that if her stools are hard.  She denies fever or other drainage noted from the painful area.  Scheduled to see Dr. Lopes at 8:40 a.m. Wed. 9/15/21.  Patient and daughter aware of appt.  KEVIN Zaidi R.N.

## 2021-09-15 ENCOUNTER — OFFICE VISIT (OUTPATIENT)
Dept: INTERNAL MEDICINE | Facility: CLINIC | Age: 86
End: 2021-09-15
Payer: MEDICARE

## 2021-09-15 VITALS
WEIGHT: 143.44 LBS | TEMPERATURE: 98.9 F | HEART RATE: 64 BPM | SYSTOLIC BLOOD PRESSURE: 118 MMHG | HEIGHT: 64 IN | OXYGEN SATURATION: 95 % | DIASTOLIC BLOOD PRESSURE: 67 MMHG | BODY MASS INDEX: 24.49 KG/M2

## 2021-09-15 DIAGNOSIS — K64.4 EXTERNAL HEMORRHOIDS: Primary | ICD-10-CM

## 2021-09-15 PROCEDURE — 99213 OFFICE O/P EST LOW 20 MIN: CPT | Performed by: INTERNAL MEDICINE

## 2021-09-15 RX ORDER — HYDROCORTISONE 25 MG/G
CREAM TOPICAL 2 TIMES DAILY PRN
Qty: 30 G | Refills: 1 | Status: SHIPPED | OUTPATIENT
Start: 2021-09-15 | End: 2022-03-03

## 2021-09-15 ASSESSMENT — MIFFLIN-ST. JEOR: SCORE: 1062.69

## 2021-09-15 NOTE — PATIENT INSTRUCTIONS
Use Anusol - hemorrhoidal cream two times daily  Use daily fiber supplement and as needed Senna to help with constipation

## 2021-09-15 NOTE — PROGRESS NOTES
"    Assessment & Plan     External hemorrhoids  Advised for topical treatment  Bowel regimen    - hydrocortisone, Perianal, (HYDROCORTISONE) 2.5 % cream; Place rectally 2 times daily as needed for hemorrhoids             BMI:   Estimated body mass index is 25.01 kg/m  as calculated from the following:    Height as of this encounter: 1.613 m (5' 3.5\").    Weight as of this encounter: 65.1 kg (143 lb 7 oz).       See Patient Instructions    Return in about 4 weeks (around 10/13/2021) for follow up on acute problem if persists.    Morris Lopes MD  [facility] is a 87 year old who presents for the following health issues  accompanied by her daughter:    HPI   Chief Complaint   Patient presents with     Rectal Problem     Bump near rectal area         Presents with concerns for a painful lump in the anus, noted 2 weeks ago.   No bleeding. Gets irritated, painful. Has not used any medications.   Has h/o constipation, reports BM every 2 weeks. No abdominal pain or distention.         Review of Systems   Constitutional, HEENT, cardiovascular, pulmonary, gi and gu systems are negative, except as otherwise noted.      Objective    /67 (BP Location: Right arm, Patient Position: Sitting, Cuff Size: Adult Large)   Pulse 64   Temp 98.9  F (37.2  C) (Oral)   Ht 1.613 m (5' 3.5\")   Wt 65.1 kg (143 lb 7 oz)   SpO2 95%   BMI 25.01 kg/m    Body mass index is 25.01 kg/m .  Physical Exam   GENERAL: healthy, alert and no distress  RECTAL (female): hemorrhoid at 4 o'clock, engorged, palpatory tender, not bleeding   MS: no gross musculoskeletal defects noted, no edema    Ancillary Procedure on 08/09/2021   Component Date Value Ref Range Status     Date Time Interrogation Session 08/09/2021 [id number]   Final     Implantable Pulse Generator Manufa* 08/09/2021 [employer]   Final     Implantable Pulse Generator Model 08/09/2021 L331 ACCOLADE MRI EL   Final     " Implantable Pulse Generator Serial* 08/09/2021 [id number]   Final     Type Interrogation Session 08/09/2021 Remote    Final     Clinic Name 08/09/2021 [facility]   Final     Implantable Pulse Generator Type 08/09/2021 Pacemaker   Final     Implantable Pulse Generator Implan* 08/09/2021 [id number]   Final     Implantable Lead  08/09/2021 [employer]   Final     Implantable Lead Model 08/09/2021 [id number] Ingevity + MRI   Final     Implantable Lead Serial Number 08/09/2021 [id number]   Final     Implantable Lead Implant Date 08/09/2021 [id number]   Final     Implantable Lead Polarity Type 08/09/2021 Bipolar Lead   Final     Implantable Lead Location Detail 1 08/09/2021 UNKNOWN   Final     Implantable Lead Location 08/09/2021 Right Atrium   Final     Implantable Lead  08/09/2021 [employer]   Final     Implantable Lead Model 08/09/2021 7841 Ingevity + MRI   Final     Implantable Lead Serial Number 08/09/2021 [id number]   Final     Implantable Lead Implant Date 08/09/2021 [id number]   Final     Implantable Lead Polarity Type 08/09/2021 Bipolar Lead   Final     Implantable Lead Location Detail 1 08/09/2021 UNKNOWN   Final     Implantable Lead Location 08/09/2021 Right Ventricle   Final     Hitesh Setting Mode (NBG Code) 08/09/2021 DDDR   Final     Hitesh Setting Lower Rate Limit 08/09/2021 60  [beats]/min Final     Hitesh Setting Maximum Tracking Rate 08/09/2021 130  [beats]/min Final     Hitesh Setting Maximum Sensor Rate 08/09/2021 130  [beats]/min Final     Hitesh Setting TOI Delay Low 08/09/2021 200  ms Final     Hitesh Setting PAV Delay Low 08/09/2021 200  ms Final     Hitesh Setting PAV Delay High 08/09/2021 150  ms Final     Hitesh Setting TOI Delay High 08/09/2021 150  ms Final     Hitesh Setting AT Mode Switch Rate 08/09/2021 170  [beats]/min Final     Hitesh Setting AT Mode Switch Mode 08/09/2021 VDIR   Final     Lead Channel Setting Sensing Polar* 08/09/2021 Bipolar   Final     Lead Channel Setting Sensing  Sensi* 08/09/2021 0.25  mV Final     Lead Channel Setting Sensing Adapt* 08/09/2021 Adaptive   Final     Lead Channel Setting Sensing Polar* 08/09/2021 Bipolar   Final     Lead Channel Setting Sensing Sensi* 08/09/2021 1.5  mV Final     Lead Channel Setting Sensing Adapt* 08/09/2021 Adaptive   Final     Lead Channel Setting Pacing Polari* 08/09/2021 Bipolar   Final     Lead Channel Setting Pacing Pulse * 08/09/2021 0.4  ms Final     Lead Channel Setting Pacing Amplit* 08/09/2021 2.0  V Final     Lead Channel Setting Pacing Captur* 08/09/2021 Adaptive   Final     Lead Channel Setting Pacing Polari* 08/09/2021 Bipolar   Final     Lead Channel Setting Pacing Pulse * 08/09/2021 0.4  ms Final     Lead Channel Setting Pacing Amplit* 08/09/2021 1.6  V Final     Lead Channel Setting Pacing Captur* 08/09/2021 Adaptive   Final     Zone Setting Type Category 08/09/2021 VT   Final     Zone Setting Vendor Type Category 08/09/2021 VT   Final     Zone Setting Detection Interval 08/09/2021 375  ms Final     Lead Channel Impedance Value 08/09/2021 616  ohm Final     Lead Channel Pacing Threshold Ampl* 08/09/2021 0.7  V Final     Lead Channel Pacing Threshold Puls* 08/09/2021 0.4  ms Final     Lead Channel Impedance Value 08/09/2021 805  ohm Final     Lead Channel Pacing Threshold Ampl* 08/09/2021 1.1  V Final     Lead Channel Pacing Threshold Puls* 08/09/2021 0.4  ms Final     Battery Date Time of Measurements 08/09/2021 20210809040100   Final     Battery Status 08/09/2021 Beginning of Service   Final     Battery Remaining Longevity 08/09/2021 174  mo Final     Battery Remaining Percentage 08/09/2021 100  % Final     Hitesh Statistic Date Time Start 08/09/2021 20210505000000   Final     Hitesh Statistic Date Time End 08/09/2021 20210809000000   Final     Hitesh Statistic RA Percent Paced 08/09/2021 45  % Final     Hitesh Statistic RV Percent Paced 08/09/2021 1  % Final     Atrial Tachy Statistic Date Time S* 08/09/2021 20210507000000    Final     Atrial Tachy Statistic Date Time E* 08/09/2021 5019353813   Final     Atrial Tachy Statistic AT/AF Burde* 08/09/2021 1  % Final     Episode Statistic Recent Count 08/09/2021 11   Final     Episode Statistic Type Category 08/09/2021 AT/AF   Final     Episode Statistic Vendor Type Tara* 08/09/2021 AF   Final     Episode Statistic Recent Count 08/09/2021 0   Final     Episode Statistic Type Category 08/09/2021 Other   Final     Episode Statistic Recent Count 08/09/2021 9   Final     Episode Statistic Type Category 08/09/2021 SVT   Final     Episode Statistic Vendor Type Tara* 08/09/2021 SVT   Final     Episode Statistic Recent Count 08/09/2021 5   Final     Episode Statistic Type Category 08/09/2021 VT   Final     Episode Statistic Vendor Type Tara* 08/09/2021 NSVT   Final     Episode Statistic Recent Count 08/09/2021 0   Final     Episode Statistic Type Category 08/09/2021 VT   Final     Episode Statistic Vendor Type Tara* 08/09/2021 VT   Final     Episode Statistic Recent Date Time* 08/09/2021 [id number]   Final     Episode Statistic Recent Date Time* 08/09/2021 19225004180592   Final     Episode Statistic Recent Date Time* 08/09/2021 11727005285139   Final     Episode Statistic Recent Date Time* 08/09/2021 [id number]   Final     Episode Statistic Recent Date Time* 08/09/2021 00181767870438   Final     Episode Statistic Recent Date Time* 08/09/2021 44061469392554   Final     Episode Statistic Recent Date Time* 08/09/2021 [id number]   Final     Episode Statistic Recent Date Time* 08/09/2021 59358606669877   Final     Episode Statistic Recent Date Time* 08/09/2021 [id number]   Final     Episode Statistic Recent Date Time* 08/09/2021 44496095599380   Final     Episode Identifier 08/09/2021 APM-7   Final     Episode Type Category 08/09/2021 Periodic EGM   Final     Episode Date Time 08/09/2021 [id number]   Final     Episode Identifier 08/09/2021 RAAT-182   Final     Episode Type Category  08/09/2021 Other   Final     Episode Date Time 08/09/2021 [id number]   Final     Episode Identifier 08/09/2021 RVAT-189   Final     Episode Type Category 08/09/2021 Other   Final     Episode Date Time 08/09/2021 [id number]   Final     Episode Identifier 08/09/2021 ATR-11   Final     Episode Type Category 08/09/2021 AT/AF   Final     Episode Date Time 08/09/2021 [id number]   Final     Episode Identifier 08/09/2021 ATR-10   Final     Episode Type Category 08/09/2021 AT/AF   Final     Episode Date Time 08/09/2021 [id number]   Final     Episode Duration 08/09/2021 25  s Final     Episode Identifier 08/09/2021 ATR-9   Final     Episode Type Category 08/09/2021 AT/AF   Final     Episode Date Time 08/09/2021 [id number]   Final     Episode Duration 08/09/2021 180  s Final     Episode Identifier 08/09/2021 ATR-8   Final     Episode Type Category 08/09/2021 AT/AF   Final     Episode Date Time 08/09/2021 [id number]   Final     Episode Duration 08/09/2021 19  s Final     Episode Identifier 08/09/2021 ATR-7   Final     Episode Type Category 08/09/2021 AT/AF   Final     Episode Date Time 08/09/2021 [id number]   Final     Episode Duration 08/09/2021 12  s Final     Episode Identifier 08/09/2021 ATR-6   Final     Episode Type Category 08/09/2021 AT/AF   Final     Episode Date Time 08/09/2021 [id number]   Final     Episode Duration 08/09/2021 40  s Final     Episode Identifier 08/09/2021 ATR-5   Final     Episode Type Category 08/09/2021 AT/AF   Final     Episode Date Time 08/09/2021 [id number]   Final     Episode Duration 08/09/2021 6  s Final     Episode Identifier 08/09/2021 ATR-4   Final     Episode Type Category 08/09/2021 AT/AF   Final     Episode Date Time 08/09/2021 [id number]   Final     Episode Duration 08/09/2021 17  s Final

## 2021-10-18 ENCOUNTER — OFFICE VISIT (OUTPATIENT)
Dept: CARDIOLOGY | Facility: CLINIC | Age: 86
End: 2021-10-18
Attending: NURSE PRACTITIONER
Payer: MEDICARE

## 2021-10-18 VITALS
BODY MASS INDEX: 24.92 KG/M2 | SYSTOLIC BLOOD PRESSURE: 158 MMHG | WEIGHT: 146 LBS | DIASTOLIC BLOOD PRESSURE: 68 MMHG | HEIGHT: 64 IN | HEART RATE: 68 BPM

## 2021-10-18 DIAGNOSIS — Z95.0 PRESENCE OF PERMANENT CARDIAC PACEMAKER: ICD-10-CM

## 2021-10-18 DIAGNOSIS — I49.5 SICK SINUS SYNDROME (H): Primary | ICD-10-CM

## 2021-10-18 DIAGNOSIS — I10 ESSENTIAL HYPERTENSION WITH GOAL BLOOD PRESSURE LESS THAN 140/90: Chronic | ICD-10-CM

## 2021-10-18 DIAGNOSIS — I48.0 PAROXYSMAL A-FIB (H): ICD-10-CM

## 2021-10-18 PROCEDURE — 99214 OFFICE O/P EST MOD 30 MIN: CPT | Performed by: NURSE PRACTITIONER

## 2021-10-18 RX ORDER — DILTIAZEM HYDROCHLORIDE 120 MG/1
120 CAPSULE, EXTENDED RELEASE ORAL DAILY
Qty: 90 CAPSULE | Refills: 3 | Status: SHIPPED | OUTPATIENT
Start: 2021-10-18 | End: 2022-03-09

## 2021-10-18 RX ORDER — METOPROLOL SUCCINATE 100 MG/1
100 TABLET, EXTENDED RELEASE ORAL DAILY
Qty: 90 TABLET | Refills: 3 | Status: SHIPPED | OUTPATIENT
Start: 2021-10-18 | End: 2022-01-01

## 2021-10-18 ASSESSMENT — MIFFLIN-ST. JEOR: SCORE: 1074.31

## 2021-10-18 NOTE — PROGRESS NOTES
"    Cardiology Clinic Progress Note    Service Date: 10/18/21    Primary Cardiologist: Dr. Truong and Dr Rey       Reason for Visit: 6-month follow-up    HPI:   I had the pleasure of seeing Ms. Glendy Díaz in the clinic today. she is a very pleasant 87 year old female with a past medical history notable for    1. Sinus node dysfunction.  S/p [employer] dual-chamber permanent pacemaker (3/3/2021  2. paroxysmal atrial fibrillation.  Refractory to amiodarone  3. hypertension  4. Chronic kidney disease  5. Anemia      Diagnostics  I have personally reviewed the following reports    Device check (8/2021) revealed A-paced  45%  : 1%     Stable leads.  Battery life 14.5 years.   Atrial Arrhythmia: 11 mode switch episodes comprising <1% of the time. 6 EGMs for review show As>Vs for an atrial arrhythmia. Longest episode lasting 3 minutes. Ventricular rates controlled.     Ventricular Arrhthymia:   14 ventricular high rates logged. EGMs were reviewed on 6/2 by DARCY WEBER and on 6/10 by DARCY ORDAZ. Dr. Rey confirmed these are episodes of atrial tach, rates in the 160s.      In March 2021, patient was hospitalized found to be bradycardic and underwent a permanent pacemaker and diltiazem was added to prevent tachycardia.    In May 2021, she had complaints of weakness and nausea associated with atrial tachycardia.  She was rate controlled with metoprolol succinate due to her history of failing amiodarone in the past.  It was suggested if she does not do well to consider AV node ablation    In June 2021, patient was seen in clinic no medications were changed long conversation regarding the progression of atrial fibrillation.    Today, she reports \"gurgling\" in her lower chest especially at night.   She complains of weakness in the morning and at times her BP is less than 100 mmHg.  She denies chest pain, shortness of breath, dyspnea on exertion, orthopnea, PND, lower extremity edema.   Reports taking medications as prescribed "     ASSESSMENT AND PLAN:    Paroxysmal atrial fibrillation/atrial tachycardia    For rate control she is currently taking diltiazem 120 mg daily and metoprolol succinate 100 mg daily.    On 5/29/2021, she had an episode of symptomatic atrial tachycardia while taking diltiazem 120 mg daily and subsequently metoprolol succinate 100 mg daily was added    For anticoagulation for CHADS VASC 4 (hypertension, age++, female) she he is currently aspirin and not taking oral anticoagulation given her frailty/fall/confusion.    Her last hgb was 10.2 (3/2021).   She is currently taking an aspirin 81 mg daily    Sick sinus syndrome    I personally reviewed the last device check dated 8/2021 which showed stable leads and normal device function.  Patient will follow with the device clinic on a quarterly basis.        Hypertension    elevated in clinic and controlled at home while taking diltiazem 120 mg daily, Lasix as needed, metoprolol succinate 100 mg daily.    Bring BP cuff to PCP visit in November 2021    Feeling unwell and gurgling    Try omeprazole OTC then follow up with PCP.     For the feeling unwell in the morning try fluids.     Plan:    Try omeprazole OTC then follow up with PCP.     For the feeling unwell in the morning try fluids.     Follow up with device clinic on a quarterly basis    Bring BP cuff to PCP visit in November 2021    Please call the clinic if questions or problems arise      Thank you for the opportunity to participate in this pleasant patient's care. We would be happy to see her sooner if needed for any concerns in the meantime.         SHRUTI South CNP  [facility]  Text Page  (M-F 8:00 am - 4:30 pm)    Orders this Visit:  Orders Placed This Encounter   Procedures     Follow-Up with Cardiac Advanced Practice Provider     Orders Placed This Encounter   Medications     metoprolol succinate ER (TOPROL-XL) 100 MG 24 hr tablet     Sig: Take 1 tablet (100 mg) by mouth daily     Dispense:  90  tablet     Refill:  3     diltiazem ER (TIAZAC) 120 MG 24 hr ER beaded capsule     Sig: Take 1 capsule (120 mg) by mouth daily     Dispense:  90 capsule     Refill:  3     Medications Discontinued During This Encounter   Medication Reason     chlorhexidine (PERIDEX) 0.12 % solution Therapy completed     gabapentin (NEURONTIN) 100 MG capsule Medication Reconciliation Clean Up     mirabegron (MYRBETRIQ) 25 MG 24 hr tablet Stopped by Patient     metoprolol succinate ER (TOPROL-XL) 100 MG 24 hr tablet Reorder     diltiazem ER (TIAZAC) 120 MG 24 hr ER beaded capsule Reorder     Encounter Diagnoses   Name Primary?     Paroxysmal A-fib (H)      Sick sinus syndrome (H) Yes     Atrial tachycardia (H)        CURRENT MEDICATIONS:  Current Outpatient Medications   Medication Sig Dispense Refill     acetaminophen (TYLENOL) 325 MG tablet Take 650 mg by mouth every 8 hours as needed for mild pain or fever       aspirin 81 MG EC tablet Take 81 mg by mouth At Bedtime       cetirizine (ZYRTEC) 10 MG tablet Take 10 mg by mouth daily as needed for allergies  90 tablet 3     Cholecalciferol (VITAMIN D) 2000 UNITS tablet Take 2,000 Units by mouth daily (Patient taking differently: Take 2,000 Units by mouth daily Vitamin d3) 100 tablet 3     diltiazem ER (TIAZAC) 120 MG 24 hr ER beaded capsule Take 1 capsule (120 mg) by mouth daily 90 capsule 3     fluticasone (FLONASE) 50 MCG/ACT nasal spray SPRAY 1 SPRAY INTO BOTH NOSTRILS DAILY AS NEEDED FOR RHINITIS OR ALLERGIES 16 g 0     furosemide (LASIX) 20 MG tablet Take 1 tablet (20 mg) by mouth daily as needed (swollen legs) 30 tablet 1     gabapentin (NEURONTIN) 100 MG capsule TAKE 1 CAPSULE BY MOUTH IN  THE AFTERNOON AND 2  CAPSULES AT BEDTIME (Patient taking differently: Pt takes 1 capsule (100mg) in the AM and 2 capsules (200mg) at bedtime) 270 capsule 0     hydrocortisone, Perianal, (HYDROCORTISONE) 2.5 % cream Place rectally 2 times daily as needed for hemorrhoids 30 g 1     metoprolol  succinate ER (TOPROL-XL) 100 MG 24 hr tablet Take 1 tablet (100 mg) by mouth daily 90 tablet 3     polyethylene glycol (MIRALAX) 17 g packet Take 1 packet by mouth daily as needed for constipation Mix in 8 ounces of water until completely dissolved       senna-docusate (SENOKOT-S/PERICOLACE) 8.6-50 MG tablet Take 1 tablet by mouth daily as needed for constipation       vitamin B-12 (CYANOCOBALAMIN) 2500 MCG sublingual tablet Take 2,500 mcg by mouth daily       solifenacin (VESICARE) 10 MG tablet Take 1 tablet (10 mg) by mouth daily (Patient not taking: Reported on 10/18/2021) 90 tablet 4       ALLERGIES  Allergies   Allergen Reactions     Amlodipine      Leg edema with 5 mg     Fosamax [Alendronic Acid]      Bone pain     Lisinopril      Increased potassium     Pravastatin      Muscle aches      Simvastatin      Muscle aches        PAST MEDICAL, SURGICAL, FAMILY HISTORY:  History was reviewed and updated as needed, see medical record.    SOCIAL HISTORY:  Social History     Socioeconomic History     Marital status:      Spouse name: Not on file     Number of children: Not on file     Years of education: Not on file     Highest education level: Not on file   Occupational History     Not on file   Tobacco Use     Smoking status: Former Smoker     Packs/day: 1.00     Years: 20.00     Pack years: 20.00     Types: Cigarettes     Start date:      Quit date:      Years since quittin.8     Smokeless tobacco: Never Used   Substance and Sexual Activity     Alcohol use: No     Drug use: No     Sexual activity: Never   Other Topics Concern     Parent/sibling w/ CABG, MI or angioplasty before 65F 55M? Not Asked   Social History Narrative     Not on file     Social Determinants of Health     Financial Resource Strain:      Difficulty of Paying Living Expenses:    Food Insecurity:      Worried About Running Out of Food in the Last Year:      Ran Out of Food in the Last Year:    Transportation Needs:      Lack of  "Transportation (Medical):      Lack of Transportation (Non-Medical):    Physical Activity:      Days of Exercise per Week:      Minutes of Exercise per Session:    Stress:      Feeling of Stress :    Social Connections:      Frequency of Communication with Friends and Family:      Frequency of Social Gatherings with Friends and Family:      Attends Anabaptism Services:      Active Member of Clubs or Organizations:      Attends Club or Organization Meetings:      Marital Status:    Intimate Partner Violence:      Fear of Current or Ex-Partner:      Emotionally Abused:      Physically Abused:      Sexually Abused:        Review of Systems:  Skin:  Negative     Eyes:  Positive for glasses  ENT:  Positive for hearing loss  Respiratory:  Negative    Cardiovascular:    fatigue;dizziness;lightheadedness;Positive for;palpitations          Physical Exam:  Vitals: BP (!) 158/68   Pulse 68   Ht 1.613 m (5' 3.5\")   Wt 66.2 kg (146 lb)   BMI 25.46 kg/m     Wt Readings from Last 4 Encounters:   10/18/21 66.2 kg (146 lb)   09/15/21 65.1 kg (143 lb 7 oz)   06/21/21 67.1 kg (147 lb 14.4 oz)   04/01/21 62.1 kg (137 lb)     CONSTITUTIONAL: Appears her stated age, well nourished, and in no acute distress.  HEENT: Pupils equal, round. Sclerae nonicteric.    NECK: Supple, no masses appreciated. Carotid pulses full and equal with no bruit bilaterally.  C/V:  Regular rate and rhythm, normal S1 and S2, no S3 or S4, no murmur, rub or gallop.   RESP: Respirations are unlabored. Lungs are clear to auscultation bilaterally without wheezing, rales, or rhonchi.  GI: Abdomen soft, non-tender, non-distended.  EXTREM:  No clubbing, cyanosis, or 1-2+ lower extremity edema bilaterally.   NEURO: Alert and oriented, cooperative. Gait steady. No gross focal deficits.   PSYCH: Affect appropriate. Mentation normal. Responds to questions appropriately.  SKIN: Warm and dry. Intact ppm incision.     Recent Lab Results:  LIPID RESULTS:  Lab Results "   Component Value Date    CHOL 196 09/24/2019    HDL 53 09/24/2019     (H) 09/24/2019    TRIG 139 09/24/2019    CHOLHDLRATIO 4.0 11/16/2015     LIVER ENZYME RESULTS:  Lab Results   Component Value Date    AST 31 12/29/2020    ALT 19 12/29/2020     CBC RESULTS:  Lab Results   Component Value Date    WBC 6.6 03/04/2021    RBC 2.82 (L) 03/04/2021    HGB 10.2 (L) 03/15/2021    HCT 33.4 (L) 03/15/2021     03/04/2021    MCH 31.2 03/04/2021    MCHC 31.1 (L) 03/04/2021    RDW 15.5 (H) 03/04/2021     03/04/2021     BMP RESULTS:  Lab Results   Component Value Date     03/15/2021    POTASSIUM 4.4 03/15/2021    CHLORIDE 109 03/15/2021    CO2 28 03/15/2021    ANIONGAP 3 03/15/2021    GLC 72 03/15/2021    BUN 21 03/15/2021    CR 1.19 (H) 03/15/2021    GFRESTIMATED 41 (L) 03/15/2021    GFRESTBLACK 47 (L) 03/15/2021    MUMTAZ 9.4 03/15/2021      CC  Roxanna Gunderson, APRN CNP  [address]  [place] [address]    This note was completed in part using Dragon voice recognition software. Although reviewed after completion, some word and grammatical errors may occur.

## 2021-10-18 NOTE — PATIENT INSTRUCTIONS
Omprezole over the counter for a few weeks and if you still have the gurgling then go to your primary doctor.     When you see Dr Steiner bring you blood pressure cuff.     Try fluids for the feeling unwell in the morning.

## 2021-10-18 NOTE — LETTER
"10/18/2021    Veronica Davis MD  [address] [facility] [address]    RE: Glendy Díaz       Dear Colleague,    I had the pleasure of seeing Glendy Díaz in the [facility].        Cardiology Clinic Progress Note    Service Date: 10/18/21    Primary Cardiologist: Dr. Truong and Dr Rey       Reason for Visit: 6-month follow-up    HPI:   I had the pleasure of seeing Ms. Glendy Díaz in the clinic today. she is a very pleasant 87 year old female with a past medical history notable for    1. Sinus node dysfunction.  S/p [employer] dual-chamber permanent pacemaker (3/3/2021  2. paroxysmal atrial fibrillation.  Refractory to amiodarone  3. hypertension  4. Chronic kidney disease  5. Anemia      Diagnostics  I have personally reviewed the following reports    Device check (8/2021) revealed A-paced  45%  : 1%     Stable leads.  Battery life 14.5 years.   Atrial Arrhythmia: 11 mode switch episodes comprising <1% of the time. 6 EGMs for review show As>Vs for an atrial arrhythmia. Longest episode lasting 3 minutes. Ventricular rates controlled.     Ventricular Arrhthymia:   14 ventricular high rates logged. EGMs were reviewed on 6/2 by DARCY WEBER and on 6/10 by DARCY ORDAZ. Dr. Rey confirmed these are episodes of atrial tach, rates in the 160s.      In March 2021, patient was hospitalized found to be bradycardic and underwent a permanent pacemaker and diltiazem was added to prevent tachycardia.    In May 2021, she had complaints of weakness and nausea associated with atrial tachycardia.  She was rate controlled with metoprolol succinate due to her history of failing amiodarone in the past.  It was suggested if she does not do well to consider AV node ablation    In June 2021, patient was seen in clinic no medications were changed long conversation regarding the progression of atrial fibrillation.    Today, she reports \"gurgling\" in her lower " chest especially at night.   She complains of weakness in the morning and at times her BP is less than 100 mmHg.  She denies chest pain, shortness of breath, dyspnea on exertion, orthopnea, PND, lower extremity edema.   Reports taking medications as prescribed     ASSESSMENT AND PLAN:    Paroxysmal atrial fibrillation/atrial tachycardia    For rate control she is currently taking diltiazem 120 mg daily and metoprolol succinate 100 mg daily.    On 5/29/2021, she had an episode of symptomatic atrial tachycardia while taking diltiazem 120 mg daily and subsequently metoprolol succinate 100 mg daily was added    For anticoagulation for CHADS VASC 4 (hypertension, age++, female) she he is currently aspirin and not taking oral anticoagulation given her frailty/fall/confusion.    Her last hgb was 10.2 (3/2021).   She is currently taking an aspirin 81 mg daily    Sick sinus syndrome    I personally reviewed the last device check dated 8/2021 which showed stable leads and normal device function.  Patient will follow with the device clinic on a quarterly basis.        Hypertension    elevated in clinic and controlled at home while taking diltiazem 120 mg daily, Lasix as needed, metoprolol succinate 100 mg daily.    Bring BP cuff to PCP visit in November 2021    Feeling unwell and gurgling    Try omeprazole OTC then follow up with PCP.     For the feeling unwell in the morning try fluids.     Plan:    Try omeprazole OTC then follow up with PCP.     For the feeling unwell in the morning try fluids.     Follow up with device clinic on a quarterly basis    Bring BP cuff to PCP visit in November 2021    Please call the clinic if questions or problems arise      Thank you for the opportunity to participate in this pleasant patient's care. We would be happy to see her sooner if needed for any concerns in the meantime.         SHRUTI [facility]  Text Page  (M-F 8:00 am - 4:30 pm)    Orders this Visit:  Orders  Placed This Encounter   Procedures     Follow-Up with Cardiac Advanced Practice Provider     Orders Placed This Encounter   Medications     metoprolol succinate ER (TOPROL-XL) 100 MG 24 hr tablet     Sig: Take 1 tablet (100 mg) by mouth daily     Dispense:  90 tablet     Refill:  3     diltiazem ER (TIAZAC) 120 MG 24 hr ER beaded capsule     Sig: Take 1 capsule (120 mg) by mouth daily     Dispense:  90 capsule     Refill:  3     Medications Discontinued During This Encounter   Medication Reason     chlorhexidine (PERIDEX) 0.12 % solution Therapy completed     gabapentin (NEURONTIN) 100 MG capsule Medication Reconciliation Clean Up     mirabegron (MYRBETRIQ) 25 MG 24 hr tablet Stopped by Patient     metoprolol succinate ER (TOPROL-XL) 100 MG 24 hr tablet Reorder     diltiazem ER (TIAZAC) 120 MG 24 hr ER beaded capsule Reorder     Encounter Diagnoses   Name Primary?     Paroxysmal A-fib (H)      Sick sinus syndrome (H) Yes     Atrial tachycardia (H)        CURRENT MEDICATIONS:  Current Outpatient Medications   Medication Sig Dispense Refill     acetaminophen (TYLENOL) 325 MG tablet Take 650 mg by mouth every 8 hours as needed for mild pain or fever       aspirin 81 MG EC tablet Take 81 mg by mouth At Bedtime       cetirizine (ZYRTEC) 10 MG tablet Take 10 mg by mouth daily as needed for allergies  90 tablet 3     Cholecalciferol (VITAMIN D) 2000 UNITS tablet Take 2,000 Units by mouth daily (Patient taking differently: Take 2,000 Units by mouth daily Vitamin d3) 100 tablet 3     diltiazem ER (TIAZAC) 120 MG 24 hr ER beaded capsule Take 1 capsule (120 mg) by mouth daily 90 capsule 3     fluticasone (FLONASE) 50 MCG/ACT nasal spray SPRAY 1 SPRAY INTO BOTH NOSTRILS DAILY AS NEEDED FOR RHINITIS OR ALLERGIES 16 g 0     furosemide (LASIX) 20 MG tablet Take 1 tablet (20 mg) by mouth daily as needed (swollen legs) 30 tablet 1     gabapentin (NEURONTIN) 100 MG capsule TAKE 1 CAPSULE BY MOUTH IN  THE AFTERNOON AND 2  CAPSULES AT  BEDTIME (Patient taking differently: Pt takes 1 capsule (100mg) in the AM and 2 capsules (200mg) at bedtime) 270 capsule 0     hydrocortisone, Perianal, (HYDROCORTISONE) 2.5 % cream Place rectally 2 times daily as needed for hemorrhoids 30 g 1     metoprolol succinate ER (TOPROL-XL) 100 MG 24 hr tablet Take 1 tablet (100 mg) by mouth daily 90 tablet 3     polyethylene glycol (MIRALAX) 17 g packet Take 1 packet by mouth daily as needed for constipation Mix in 8 ounces of water until completely dissolved       senna-docusate (SENOKOT-S/PERICOLACE) 8.6-50 MG tablet Take 1 tablet by mouth daily as needed for constipation       vitamin B-12 (CYANOCOBALAMIN) 2500 MCG sublingual tablet Take 2,500 mcg by mouth daily       solifenacin (VESICARE) 10 MG tablet Take 1 tablet (10 mg) by mouth daily (Patient not taking: Reported on 10/18/2021) 90 tablet 4       ALLERGIES  Allergies   Allergen Reactions     Amlodipine      Leg edema with 5 mg     Fosamax [Alendronic Acid]      Bone pain     Lisinopril      Increased potassium     Pravastatin      Muscle aches      Simvastatin      Muscle aches        PAST MEDICAL, SURGICAL, FAMILY HISTORY:  History was reviewed and updated as needed, see medical record.    SOCIAL HISTORY:  Social History     Socioeconomic History     Marital status:      Spouse name: Not on file     Number of children: Not on file     Years of education: Not on file     Highest education level: Not on file   Occupational History     Not on file   Tobacco Use     Smoking status: Former Smoker     Packs/day: 1.00     Years: 20.00     Pack years: 20.00     Types: Cigarettes     Start date:      Quit date:      Years since quittin.8     Smokeless tobacco: Never Used   Substance and Sexual Activity     Alcohol use: No     Drug use: No     Sexual activity: Never   Other Topics Concern     Parent/sibling w/ CABG, MI or angioplasty before 65F 55M? Not Asked   Social History Narrative     Not on file  "    Social Determinants of Health     Financial Resource Strain:      Difficulty of Paying Living Expenses:    Food Insecurity:      Worried About Running Out of Food in the Last Year:      Ran Out of Food in the Last Year:    Transportation Needs:      Lack of Transportation (Medical):      Lack of Transportation (Non-Medical):    Physical Activity:      Days of Exercise per Week:      Minutes of Exercise per Session:    Stress:      Feeling of Stress :    Social Connections:      Frequency of Communication with Friends and Family:      Frequency of Social Gatherings with Friends and Family:      Attends [employer] Services:      Active Member of Clubs or Organizations:      Attends Club or Organization Meetings:      Marital Status:    Intimate Partner Violence:      Fear of Current or Ex-Partner:      Emotionally Abused:      Physically Abused:      Sexually Abused:        Review of Systems:  Skin:  Negative     Eyes:  Positive for glasses  ENT:  Positive for hearing loss  Respiratory:  Negative    Cardiovascular:    fatigue;dizziness;lightheadedness;Positive for;palpitations          Physical Exam:  Vitals: BP (!) 158/68   Pulse 68   Ht 1.613 m (5' 3.5\")   Wt 66.2 kg (146 lb)   BMI 25.46 kg/m     Wt Readings from Last 4 Encounters:   10/18/21 66.2 kg (146 lb)   09/15/21 65.1 kg (143 lb 7 oz)   06/21/21 67.1 kg (147 lb 14.4 oz)   04/01/21 62.1 kg (137 lb)     CONSTITUTIONAL: Appears her stated age, well nourished, and in no acute distress.  HEENT: Pupils equal, round. Sclerae nonicteric.    NECK: Supple, no masses appreciated. Carotid pulses full and equal with no bruit bilaterally.  C/V:  Regular rate and rhythm, normal S1 and S2, no S3 or S4, no murmur, rub or gallop.   RESP: Respirations are unlabored. Lungs are clear to auscultation bilaterally without wheezing, rales, or rhonchi.  GI: Abdomen soft, non-tender, non-distended.  EXTREM:  No clubbing, cyanosis, or 1-2+ lower extremity edema bilaterally. "   NEURO: Alert and oriented, cooperative. Gait steady. No gross focal deficits.   PSYCH: Affect appropriate. Mentation normal. Responds to questions appropriately.  SKIN: Warm and dry. Intact ppm incision.     Recent Lab Results:  LIPID RESULTS:  Lab Results   Component Value Date    CHOL 196 09/24/2019    HDL 53 09/24/2019     (H) 09/24/2019    TRIG 139 09/24/2019    CHOLHDLRATIO 4.0 11/16/2015     LIVER ENZYME RESULTS:  Lab Results   Component Value Date    AST 31 12/29/2020    ALT 19 12/29/2020     CBC RESULTS:  Lab Results   Component Value Date    WBC 6.6 03/04/2021    RBC 2.82 (L) 03/04/2021    HGB 10.2 (L) 03/15/2021    HCT 33.4 (L) 03/15/2021     03/04/2021    MCH 31.2 03/04/2021    MCHC 31.1 (L) 03/04/2021    RDW 15.5 (H) 03/04/2021     03/04/2021     BMP RESULTS:  Lab Results   Component Value Date     03/15/2021    POTASSIUM 4.4 03/15/2021    CHLORIDE 109 03/15/2021    CO2 28 03/15/2021    ANIONGAP 3 03/15/2021    GLC 72 03/15/2021    BUN 21 03/15/2021    CR 1.19 (H) 03/15/2021    GFRESTIMATED 41 (L) 03/15/2021    GFRESTBLACK 47 (L) 03/15/2021    MUMTAZ 9.4 03/15/2021      CC  SHRUTI Gilliam CNP  [address]  [place] [address]    This note was completed in part using Dragon voice recognition software. Although reviewed after completion, some word and grammatical errors may occur.      Thank you for allowing me to participate in the care of your patient.      Sincerely,     SHRUTI [place] CNP      [facility] Care  cc:   SHRUTI Gilliam CNP  [address]  [place] [address]

## 2021-11-01 ENCOUNTER — LAB REQUISITION (OUTPATIENT)
Dept: LAB | Facility: CLINIC | Age: 86
End: 2021-11-01
Payer: MEDICARE

## 2021-11-01 ENCOUNTER — OFFICE VISIT (OUTPATIENT)
Dept: INTERNAL MEDICINE | Facility: CLINIC | Age: 86
End: 2021-11-01
Payer: MEDICARE

## 2021-11-01 VITALS
BODY MASS INDEX: 24.74 KG/M2 | SYSTOLIC BLOOD PRESSURE: 124 MMHG | DIASTOLIC BLOOD PRESSURE: 68 MMHG | WEIGHT: 144.9 LBS | OXYGEN SATURATION: 100 % | HEIGHT: 64 IN | RESPIRATION RATE: 18 BRPM | TEMPERATURE: 97.8 F | HEART RATE: 65 BPM

## 2021-11-01 DIAGNOSIS — N18.30 STAGE 3 CHRONIC KIDNEY DISEASE, UNSPECIFIED WHETHER STAGE 3A OR 3B CKD (H): ICD-10-CM

## 2021-11-01 DIAGNOSIS — I10 ESSENTIAL HYPERTENSION WITH GOAL BLOOD PRESSURE LESS THAN 140/90: Chronic | ICD-10-CM

## 2021-11-01 DIAGNOSIS — G62.9 PERIPHERAL POLYNEUROPATHY: Primary | ICD-10-CM

## 2021-11-01 DIAGNOSIS — Z03.818 ENCOUNTER FOR OBSERVATION FOR SUSPECTED EXPOSURE TO OTHER BIOLOGICAL AGENTS RULED OUT: ICD-10-CM

## 2021-11-01 DIAGNOSIS — R68.2 DRY MOUTH: ICD-10-CM

## 2021-11-01 DIAGNOSIS — D51.9 ANEMIA DUE TO VITAMIN B12 DEFICIENCY, UNSPECIFIED B12 DEFICIENCY TYPE: ICD-10-CM

## 2021-11-01 PROCEDURE — 99214 OFFICE O/P EST MOD 30 MIN: CPT | Performed by: INTERNAL MEDICINE

## 2021-11-01 RX ORDER — GABAPENTIN 100 MG/1
CAPSULE ORAL
Qty: 270 CAPSULE | Refills: 1 | Status: SHIPPED | OUTPATIENT
Start: 2021-11-01 | End: 2021-11-15

## 2021-11-01 RX ORDER — GABAPENTIN 100 MG/1
CAPSULE ORAL
Qty: 270 CAPSULE | Refills: 1 | Status: SHIPPED | OUTPATIENT
Start: 2021-11-01 | End: 2021-11-01

## 2021-11-01 RX ORDER — CEVIMELINE HYDROCHLORIDE 30 MG/1
30 CAPSULE ORAL 2 TIMES DAILY PRN
Qty: 60 CAPSULE | Refills: 11 | Status: SHIPPED | OUTPATIENT
Start: 2021-11-01 | End: 2022-03-03

## 2021-11-01 ASSESSMENT — MIFFLIN-ST. JEOR: SCORE: 1069.32

## 2021-11-01 NOTE — PATIENT INSTRUCTIONS
Plan:  1. Continue same meds, same doses for now   2. Please make a lab appointment for fasting labs  In 6 months  3. Please make an appointment few days after the labs for ANNUAL EXAM   4. Handicapped parking forms   5. Cevimeline 1 capsule twice a day as needed for very dry mouth

## 2021-11-01 NOTE — PROGRESS NOTES
Dr Steiner's note      Patient's instructions / PLAN:                                                        Plan:  1. Continue same meds, same doses for now   2. Please make a lab appointment for fasting labs  In 6 months  3. Please make an appointment few days after the labs for ANNUAL EXAM   4. Handicapped parking forms   5. Cevimeline 1 capsule twice a day as needed for very dry mouth          ASSESSMENT & PLAN:                                                      (G62.9) Peripheral polyneuropathy  (primary encounter diagnosis)  Comment: symptoms under control  Plan: gabapentin (NEURONTIN) 100 MG capsule, CBC with        platelets, Comprehensive metabolic panel, Lipid        panel reflex to direct LDL Fasting, Albumin         Random Urine Quantitative with Creat Ratio, TSH        with free T4 reflex, DISCONTINUED: gabapentin         (NEURONTIN) 100 MG capsule            (D51.9) Anemia due to vitamin B12 deficiency, unspecified B12 deficiency type  Comment:   Plan: CBC with platelets, Comprehensive metabolic         panel, Lipid panel reflex to direct LDL         Fasting, Albumin Random Urine Quantitative with        Creat Ratio, TSH with free T4 reflex            (N18.30) Stage 3 chronic kidney disease, unspecified whether stage 3a or 3b CKD (H)  Comment: stable   Plan: CBC with platelets, Comprehensive metabolic         panel, Lipid panel reflex to direct LDL         Fasting, Albumin Random Urine Quantitative with        Creat Ratio, TSH with free T4 reflex            (I10) HTN goal < 140/90  Comment: Controlled    Plan: CBC with platelets, Comprehensive metabolic         panel, Lipid panel reflex to direct LDL         Fasting, Albumin Random Urine Quantitative with        Creat Ratio, TSH with free T4 reflex  Diltiazem         \       Chief complaint:                                                      Follow up chronic medical problems       SUBJECTIVE:                                                   "  History of present illness:    [facility]    [facility]     Weight has been stable     Walks with the walker  -- wants handiccaped parking forms     Very dry mouth  -- holding the Vesicare for 2 months didn't make a differennce  -- it affects the speech and chewing  -- artificial saliva helps only for few minutes      LOV: Plan:  1. Continue same meds, same doses for now   2. If the weight keeps dropping - make an appointment in 1-2 months  3. Otherwise follow up after Oct 5 for annual exam      Hypertension Follow-up      Do you check your blood pressure regularly outside of the clinic? Yes     Are you following a low salt diet? No    Are your blood pressures ever more than 140 on the top number (systolic) OR more   than 90 on the bottom number (diastolic), for example 140/90? Yes      How many servings of fruits and vegetables do you eat daily?  2-3    On average, how many sweetened beverages do you drink each day (Examples: soda, juice, sweet tea, etc.  Do NOT count diet or artificially sweetened beverages)?   0    How many days per week do you exercise enough to make your heart beat faster? 3 or less    How many minutes a day do you exercise enough to make your heart beat faster? 9 or less    How many days per week do you miss taking your medication? 0        Review of Systems:                                                      ROS: negative for fever, chills, cough, wheezes, chest pain, shortness of breath, vomiting, abdominal pain, leg swelling         OBJECTIVE:             Physical exam:  Blood pressure 124/68, pulse 65, temperature 97.8  F (36.6  C), temperature source Tympanic, resp. rate 18, height 1.613 m (5' 3.5\"), weight 65.7 kg (144 lb 14.4 oz), SpO2 100 %, not currently breastfeeding.     NAD, appears comfortable  Skin: no rashes   Neck: supple, no JVD,  No thyroidmegaly. Lymph nodes nonpalpable cervical and supraclavicular.  Chest: clear to " auscultation bilaterally, good respiratory effort  Heart: S1 S2, RRR, no mgr appreciated  Abdomen: soft, not tender, no hepatosplenomegaly or masses appreciated, no abdominal bruit, present bowel sounds  Extremities: + 1 edema,  Neurologic: A, Ox3, no focal signs appreciated    PMHx: reviewed  Past Medical History:   Diagnosis Date     CKD (chronic kidney disease) stage 3, GFR 30-59 ml/min (H)      Falls frequently      Hematuria      Hyperlipidemia LDL goal <100      Hyperparathyroidism (H)      Hypertension     No Cardiologist     Incontinence      Mumps      Neck pain, chronic      Osteoarthritis      Paroxysmal atrial fibrillation (H)      Peripheral neuropathy      Sinus node dysfunction (H)       PSHx: reviewed  Past Surgical History:   Procedure Laterality Date     APPENDECTOMY       ARTHRODESIS FOOT  5/1/2014    Procedure: ARTHRODESIS FOOT;  Surgeon: Sid Marks DPM;  Location: [facility] OR     ARTHROSCOPY ANKLE, OPEN REPAIR LIGAMENT, COMBINED  5/31/2012    Procedure:COMBINED ARTHROSCOPY ANKLE, OPEN REPAIR LIGAMENT; LEFT ANKLE ARTHROSCOPY, LATERAL LIGAMENT RECONSTRUCTION, ENDOSCOPIC DRISS, BUNION SECOND TOE RAY CORRECTION    LEFT KNEE Marcaine AND CORTIZONE INJECTION, 5 CC Marcaine, 1 CC CELESTONE, ; Surgeon:VARSHA GERMAN; Location:[facility]     BLADDER SURGERY       CATARACT IOL, RT/LT       CHOLECYSTECTOMY       CYSTOSCOPY       EP PPM INSERT OF NEW OR REPL W/VENT LEAD N/A 3/3/2021    Procedure: EP Pacemaker;  Surgeon: Bobby Hester MD;  Location:  [facility]     EXCISE MASS FOOT  12/4/2012    Procedure: EXCISE MASS FOOT;;  Surgeon: Varsha German MD;  Location: [facility]     HYSTERECTOMY TOTAL ABDOMINAL      ovaries are in     RELEASE TENDON TOE  5/1/2014    Procedure: RELEASE TENDON TOE;  Surgeon: Sid Marks DPM;  Location: [facility]     REMOVE HARDWARE FOOT  12/4/2012    Procedure: REMOVE HARDWARE FOOT;  REMOVAL HARDWARE(SYNTHES SCREW) LEFT FOOT, EXCISION OF  INCLUSION CYST;  Surgeon: Akshat German MD;  Location: [facility]     REPAIR HAMMER TOE  5/1/2014    Procedure: REPAIR HAMMER TOE;  Surgeon: Sid Marks DPM;  Location: [facility]     REVERSE ARTHROPLASTY SHOULDER Right 7/18/2016    Procedure: REVERSE ARTHROPLASTY SHOULDER;  Surgeon: Marlo Alejandro MD;  Location: [facility] OR        Meds: reviewed  Current Outpatient Medications   Medication Sig Dispense Refill     acetaminophen (TYLENOL) 325 MG tablet Take 650 mg by mouth every 8 hours as needed for mild pain or fever       aspirin 81 MG EC tablet Take 81 mg by mouth At Bedtime       cetirizine (ZYRTEC) 10 MG tablet Take 10 mg by mouth daily as needed for allergies  90 tablet 3     Cholecalciferol (VITAMIN D) 2000 UNITS tablet Take 2,000 Units by mouth daily (Patient taking differently: Take 2,000 Units by mouth daily Vitamin d3) 100 tablet 3     diltiazem ER (TIAZAC) 120 MG 24 hr ER beaded capsule Take 1 capsule (120 mg) by mouth daily 90 capsule 3     fluticasone (FLONASE) 50 MCG/ACT nasal spray SPRAY 1 SPRAY INTO BOTH NOSTRILS DAILY AS NEEDED FOR RHINITIS OR ALLERGIES 16 g 0     furosemide (LASIX) 20 MG tablet Take 1 tablet (20 mg) by mouth daily as needed (swollen legs) 30 tablet 1     gabapentin (NEURONTIN) 100 MG capsule Pt takes 1 capsule (100mg) in the AM and 2 capsules (200mg) at bedtime 270 capsule 1     hydrocortisone, Perianal, (HYDROCORTISONE) 2.5 % cream Place rectally 2 times daily as needed for hemorrhoids 30 g 1     metoprolol succinate ER (TOPROL-XL) 100 MG 24 hr tablet Take 1 tablet (100 mg) by mouth daily 90 tablet 3     polyethylene glycol (MIRALAX) 17 g packet Take 1 packet by mouth daily as needed for constipation Mix in 8 ounces of water until completely dissolved       senna-docusate (SENOKOT-S/PERICOLACE) 8.6-50 MG tablet Take 1 tablet by mouth daily as needed for constipation       solifenacin (VESICARE) 10 MG tablet Take 1 tablet (10 mg) by mouth daily 90 tablet 4     vitamin B-12  (CYANOCOBALAMIN) 2500 MCG sublingual tablet Take 2,500 mcg by mouth daily         Soc Hx: reviewed  Fam Hx: reviewed          Veronica Steiner MD  Internal Medicine

## 2021-11-02 PROCEDURE — U0003 INFECTIOUS AGENT DETECTION BY NUCLEIC ACID (DNA OR RNA); SEVERE ACUTE RESPIRATORY SYNDROME CORONAVIRUS 2 (SARS-COV-2) (CORONAVIRUS DISEASE [COVID-19]), AMPLIFIED PROBE TECHNIQUE, MAKING USE OF HIGH THROUGHPUT TECHNOLOGIES AS DESCRIBED BY CMS-2020-01-R: HCPCS | Mod: ORL | Performed by: FAMILY MEDICINE

## 2021-11-05 ENCOUNTER — LAB REQUISITION (OUTPATIENT)
Dept: LAB | Facility: CLINIC | Age: 86
End: 2021-11-05
Payer: MEDICARE

## 2021-11-05 DIAGNOSIS — Z03.818 ENCOUNTER FOR OBSERVATION FOR SUSPECTED EXPOSURE TO OTHER BIOLOGICAL AGENTS RULED OUT: ICD-10-CM

## 2021-11-05 LAB — SARS-COV-2 RNA RESP QL NAA+PROBE: NEGATIVE

## 2021-11-10 ENCOUNTER — TELEPHONE (OUTPATIENT)
Dept: UROLOGY | Facility: CLINIC | Age: 86
End: 2021-11-10
Payer: MEDICARE

## 2021-11-10 DIAGNOSIS — N32.81 OAB (OVERACTIVE BLADDER): Primary | ICD-10-CM

## 2021-11-10 PROCEDURE — U0005 INFEC AGEN DETEC AMPLI PROBE: HCPCS | Mod: ORL | Performed by: FAMILY MEDICINE

## 2021-11-10 RX ORDER — MIRABEGRON 50 MG/1
50 TABLET, EXTENDED RELEASE ORAL DAILY
Qty: 90 TABLET | Refills: 1 | Status: SHIPPED | OUTPATIENT
Start: 2021-11-10 | End: 2022-03-03

## 2021-11-10 NOTE — TELEPHONE ENCOUNTER
Health Call Center    Phone Message    May a detailed message be left on voicemail: yes     Reason for Call: Medication Question or concern regarding medication   Prescription Clarification  Name of Medication: Myrbetriq   Prescribing Provider: Fanny   [facility]  Attn: Dr Batista   What on the order needs clarification? A new scrip is needed for Myrbetriq , [facility] The current med Vesicare is not effective. Please call Abigail with any concerns and when this med has been faxed. Thanks          Action Taken: Other: ua uro    Travel Screening: Not Applicable

## 2021-11-11 ENCOUNTER — LAB REQUISITION (OUTPATIENT)
Dept: LAB | Facility: CLINIC | Age: 86
End: 2021-11-11
Payer: MEDICARE

## 2021-11-11 DIAGNOSIS — Z03.818 ENCOUNTER FOR OBSERVATION FOR SUSPECTED EXPOSURE TO OTHER BIOLOGICAL AGENTS RULED OUT: ICD-10-CM

## 2021-11-11 LAB — SARS-COV-2 RNA RESP QL NAA+PROBE: NEGATIVE

## 2021-11-14 DIAGNOSIS — G62.9 PERIPHERAL POLYNEUROPATHY: ICD-10-CM

## 2021-11-15 ENCOUNTER — ANCILLARY PROCEDURE (OUTPATIENT)
Dept: CARDIOLOGY | Facility: CLINIC | Age: 86
End: 2021-11-15
Attending: INTERNAL MEDICINE
Payer: MEDICARE

## 2021-11-15 DIAGNOSIS — Z95.0 CARDIAC PACEMAKER IN SITU: ICD-10-CM

## 2021-11-15 LAB
MDC_IDC_EPISODE_DTM: NORMAL
MDC_IDC_EPISODE_DTM: NORMAL
MDC_IDC_EPISODE_ID: NORMAL
MDC_IDC_EPISODE_ID: NORMAL
MDC_IDC_EPISODE_TYPE: NORMAL
MDC_IDC_EPISODE_TYPE: NORMAL
MDC_IDC_LEAD_IMPLANT_DT: NORMAL
MDC_IDC_LEAD_IMPLANT_DT: NORMAL
MDC_IDC_LEAD_LOCATION: NORMAL
MDC_IDC_LEAD_LOCATION: NORMAL
MDC_IDC_LEAD_LOCATION_DETAIL_1: NORMAL
MDC_IDC_LEAD_LOCATION_DETAIL_1: NORMAL
MDC_IDC_LEAD_MFG: NORMAL
MDC_IDC_LEAD_MFG: NORMAL
MDC_IDC_LEAD_MODEL: NORMAL
MDC_IDC_LEAD_MODEL: NORMAL
MDC_IDC_LEAD_POLARITY_TYPE: NORMAL
MDC_IDC_LEAD_POLARITY_TYPE: NORMAL
MDC_IDC_LEAD_SERIAL: NORMAL
MDC_IDC_LEAD_SERIAL: NORMAL
MDC_IDC_MSMT_BATTERY_DTM: NORMAL
MDC_IDC_MSMT_BATTERY_REMAINING_LONGEVITY: 168 MO
MDC_IDC_MSMT_BATTERY_REMAINING_PERCENTAGE: 100 %
MDC_IDC_MSMT_BATTERY_STATUS: NORMAL
MDC_IDC_MSMT_LEADCHNL_RA_IMPEDANCE_VALUE: 623 OHM
MDC_IDC_MSMT_LEADCHNL_RA_PACING_THRESHOLD_AMPLITUDE: 0.7 V
MDC_IDC_MSMT_LEADCHNL_RA_PACING_THRESHOLD_PULSEWIDTH: 0.4 MS
MDC_IDC_MSMT_LEADCHNL_RV_IMPEDANCE_VALUE: 719 OHM
MDC_IDC_MSMT_LEADCHNL_RV_PACING_THRESHOLD_AMPLITUDE: 1.1 V
MDC_IDC_MSMT_LEADCHNL_RV_PACING_THRESHOLD_PULSEWIDTH: 0.4 MS
MDC_IDC_PG_IMPLANT_DTM: NORMAL
MDC_IDC_PG_MFG: NORMAL
MDC_IDC_PG_MODEL: NORMAL
MDC_IDC_PG_SERIAL: NORMAL
MDC_IDC_PG_TYPE: NORMAL
MDC_IDC_SESS_CLINIC_NAME: NORMAL
MDC_IDC_SESS_DTM: NORMAL
MDC_IDC_SESS_TYPE: NORMAL
MDC_IDC_SET_BRADY_AT_MODE_SWITCH_MODE: NORMAL
MDC_IDC_SET_BRADY_AT_MODE_SWITCH_RATE: 170 {BEATS}/MIN
MDC_IDC_SET_BRADY_LOWRATE: 60 {BEATS}/MIN
MDC_IDC_SET_BRADY_MAX_SENSOR_RATE: 130 {BEATS}/MIN
MDC_IDC_SET_BRADY_MAX_TRACKING_RATE: 130 {BEATS}/MIN
MDC_IDC_SET_BRADY_MODE: NORMAL
MDC_IDC_SET_BRADY_PAV_DELAY_HIGH: 150 MS
MDC_IDC_SET_BRADY_PAV_DELAY_LOW: 200 MS
MDC_IDC_SET_BRADY_SAV_DELAY_HIGH: 150 MS
MDC_IDC_SET_BRADY_SAV_DELAY_LOW: 200 MS
MDC_IDC_SET_LEADCHNL_RA_PACING_AMPLITUDE: 2 V
MDC_IDC_SET_LEADCHNL_RA_PACING_CAPTURE_MODE: NORMAL
MDC_IDC_SET_LEADCHNL_RA_PACING_POLARITY: NORMAL
MDC_IDC_SET_LEADCHNL_RA_PACING_PULSEWIDTH: 0.4 MS
MDC_IDC_SET_LEADCHNL_RA_SENSING_ADAPTATION_MODE: NORMAL
MDC_IDC_SET_LEADCHNL_RA_SENSING_POLARITY: NORMAL
MDC_IDC_SET_LEADCHNL_RA_SENSING_SENSITIVITY: 0.25 MV
MDC_IDC_SET_LEADCHNL_RV_PACING_AMPLITUDE: 1.6 V
MDC_IDC_SET_LEADCHNL_RV_PACING_CAPTURE_MODE: NORMAL
MDC_IDC_SET_LEADCHNL_RV_PACING_POLARITY: NORMAL
MDC_IDC_SET_LEADCHNL_RV_PACING_PULSEWIDTH: 0.4 MS
MDC_IDC_SET_LEADCHNL_RV_SENSING_ADAPTATION_MODE: NORMAL
MDC_IDC_SET_LEADCHNL_RV_SENSING_POLARITY: NORMAL
MDC_IDC_SET_LEADCHNL_RV_SENSING_SENSITIVITY: 1.5 MV
MDC_IDC_SET_ZONE_DETECTION_INTERVAL: 375 MS
MDC_IDC_SET_ZONE_TYPE: NORMAL
MDC_IDC_SET_ZONE_VENDOR_TYPE: NORMAL
MDC_IDC_STAT_AT_BURDEN_PERCENT: 1 %
MDC_IDC_STAT_AT_DTM_END: NORMAL
MDC_IDC_STAT_AT_DTM_START: NORMAL
MDC_IDC_STAT_BRADY_DTM_END: NORMAL
MDC_IDC_STAT_BRADY_DTM_START: NORMAL
MDC_IDC_STAT_BRADY_RA_PERCENT_PACED: 65 %
MDC_IDC_STAT_BRADY_RV_PERCENT_PACED: 3 %
MDC_IDC_STAT_EPISODE_RECENT_COUNT: 0
MDC_IDC_STAT_EPISODE_RECENT_COUNT: 0
MDC_IDC_STAT_EPISODE_RECENT_COUNT: 11
MDC_IDC_STAT_EPISODE_RECENT_COUNT: 5
MDC_IDC_STAT_EPISODE_RECENT_COUNT: 9
MDC_IDC_STAT_EPISODE_RECENT_COUNT_DTM_END: NORMAL
MDC_IDC_STAT_EPISODE_RECENT_COUNT_DTM_START: NORMAL
MDC_IDC_STAT_EPISODE_TYPE: NORMAL
MDC_IDC_STAT_EPISODE_VENDOR_TYPE: NORMAL

## 2021-11-15 PROCEDURE — 93294 REM INTERROG EVL PM/LDLS PM: CPT | Performed by: INTERNAL MEDICINE

## 2021-11-15 PROCEDURE — 93296 REM INTERROG EVL PM/IDS: CPT | Performed by: INTERNAL MEDICINE

## 2021-11-15 PROCEDURE — U0005 INFEC AGEN DETEC AMPLI PROBE: HCPCS | Mod: ORL | Performed by: FAMILY MEDICINE

## 2021-11-15 RX ORDER — GABAPENTIN 100 MG/1
CAPSULE ORAL
Qty: 270 CAPSULE | Refills: 0 | Status: SHIPPED | OUTPATIENT
Start: 2021-11-15 | End: 2022-05-31

## 2021-11-16 LAB
SARS-COV-2 RNA RESP QL NAA+PROBE: NORMAL
SARS-COV-2 RNA RESP QL NAA+PROBE: NOT DETECTED

## 2021-11-18 ENCOUNTER — LAB REQUISITION (OUTPATIENT)
Dept: LAB | Facility: CLINIC | Age: 86
End: 2021-11-18
Payer: MEDICARE

## 2021-11-18 DIAGNOSIS — Z03.818 ENCOUNTER FOR OBSERVATION FOR SUSPECTED EXPOSURE TO OTHER BIOLOGICAL AGENTS RULED OUT: ICD-10-CM

## 2021-11-22 PROCEDURE — U0003 INFECTIOUS AGENT DETECTION BY NUCLEIC ACID (DNA OR RNA); SEVERE ACUTE RESPIRATORY SYNDROME CORONAVIRUS 2 (SARS-COV-2) (CORONAVIRUS DISEASE [COVID-19]), AMPLIFIED PROBE TECHNIQUE, MAKING USE OF HIGH THROUGHPUT TECHNOLOGIES AS DESCRIBED BY CMS-2020-01-R: HCPCS | Mod: ORL | Performed by: FAMILY MEDICINE

## 2021-11-23 LAB — SARS-COV-2 RNA RESP QL NAA+PROBE: NEGATIVE

## 2021-11-24 ENCOUNTER — LAB REQUISITION (OUTPATIENT)
Dept: LAB | Facility: CLINIC | Age: 86
End: 2021-11-24
Payer: MEDICARE

## 2021-11-24 DIAGNOSIS — Z03.818 ENCOUNTER FOR OBSERVATION FOR SUSPECTED EXPOSURE TO OTHER BIOLOGICAL AGENTS RULED OUT: ICD-10-CM

## 2021-11-29 PROCEDURE — U0005 INFEC AGEN DETEC AMPLI PROBE: HCPCS | Mod: ORL | Performed by: FAMILY MEDICINE

## 2021-11-30 LAB — SARS-COV-2 RNA RESP QL NAA+PROBE: NEGATIVE

## 2022-01-01 ENCOUNTER — HOSPITAL ENCOUNTER (OUTPATIENT)
Facility: CLINIC | Age: 87
Setting detail: OBSERVATION
Discharge: NURSING, REHAB OR LONG-TERM CARE | End: 2023-01-01
Attending: EMERGENCY MEDICINE | Admitting: HOSPITALIST
Payer: MEDICARE

## 2022-01-01 ENCOUNTER — APPOINTMENT (OUTPATIENT)
Dept: ULTRASOUND IMAGING | Facility: CLINIC | Age: 87
End: 2022-01-01
Attending: EMERGENCY MEDICINE
Payer: MEDICARE

## 2022-01-01 ENCOUNTER — ANCILLARY PROCEDURE (OUTPATIENT)
Dept: CARDIOLOGY | Facility: CLINIC | Age: 87
End: 2022-01-01
Attending: INTERNAL MEDICINE
Payer: MEDICARE

## 2022-01-01 ENCOUNTER — APPOINTMENT (OUTPATIENT)
Dept: GENERAL RADIOLOGY | Facility: CLINIC | Age: 87
End: 2022-01-01
Attending: EMERGENCY MEDICINE
Payer: MEDICARE

## 2022-01-01 ENCOUNTER — APPOINTMENT (OUTPATIENT)
Dept: CT IMAGING | Facility: CLINIC | Age: 87
End: 2022-01-01
Attending: EMERGENCY MEDICINE
Payer: MEDICARE

## 2022-01-01 ENCOUNTER — LAB REQUISITION (OUTPATIENT)
Dept: LAB | Facility: CLINIC | Age: 87
End: 2022-01-01
Payer: MEDICARE

## 2022-01-01 DIAGNOSIS — N18.9 CHRONIC KIDNEY DISEASE, UNSPECIFIED CKD STAGE: ICD-10-CM

## 2022-01-01 DIAGNOSIS — R29.6 RECURRENT FALLS: ICD-10-CM

## 2022-01-01 DIAGNOSIS — F03.C0 SEVERE DEMENTIA, UNSPECIFIED DEMENTIA TYPE, UNSPECIFIED WHETHER BEHAVIORAL, PSYCHOTIC, OR MOOD DISTURBANCE OR ANXIETY (H): Primary | ICD-10-CM

## 2022-01-01 DIAGNOSIS — I50.9 CONGESTIVE HEART FAILURE, UNSPECIFIED HF CHRONICITY, UNSPECIFIED HEART FAILURE TYPE (H): ICD-10-CM

## 2022-01-01 DIAGNOSIS — R31.9 URINARY TRACT INFECTION WITH HEMATURIA, SITE UNSPECIFIED: ICD-10-CM

## 2022-01-01 DIAGNOSIS — G47.9 SLEEP DISORDER: ICD-10-CM

## 2022-01-01 DIAGNOSIS — I49.5 SICK SINUS SYNDROME (H): ICD-10-CM

## 2022-01-01 DIAGNOSIS — Z95.0 CARDIAC PACEMAKER IN SITU: ICD-10-CM

## 2022-01-01 DIAGNOSIS — N39.0 URINARY TRACT INFECTION WITH HEMATURIA, SITE UNSPECIFIED: ICD-10-CM

## 2022-01-01 DIAGNOSIS — S00.81XA ABRASION OF FOREHEAD, INITIAL ENCOUNTER: ICD-10-CM

## 2022-01-01 DIAGNOSIS — N30.00 ACUTE CYSTITIS WITHOUT HEMATURIA: ICD-10-CM

## 2022-01-01 DIAGNOSIS — I49.5 SICK SINUS SYNDROME (H): Primary | ICD-10-CM

## 2022-01-01 DIAGNOSIS — R79.89 ELEVATED TROPONIN: ICD-10-CM

## 2022-01-01 DIAGNOSIS — E21.4 OTHER SPECIFIED DISORDERS OF PARATHYROID GLAND (H): ICD-10-CM

## 2022-01-01 LAB
ALBUMIN SERPL BCG-MCNC: 4 G/DL (ref 3.5–5.2)
ALBUMIN SERPL BCG-MCNC: 4.1 G/DL (ref 3.5–5.2)
ALBUMIN UR-MCNC: 10 MG/DL
ALP SERPL-CCNC: 79 U/L (ref 35–104)
ALP SERPL-CCNC: 98 U/L (ref 35–104)
ALT SERPL W P-5'-P-CCNC: 14 U/L (ref 10–35)
ALT SERPL W P-5'-P-CCNC: 9 U/L (ref 10–35)
ANION GAP SERPL CALCULATED.3IONS-SCNC: 13 MMOL/L (ref 7–15)
ANION GAP SERPL CALCULATED.3IONS-SCNC: 23 MMOL/L (ref 7–15)
APPEARANCE UR: ABNORMAL
AST SERPL W P-5'-P-CCNC: 21 U/L (ref 10–35)
AST SERPL W P-5'-P-CCNC: 28 U/L (ref 10–35)
BACTERIA #/AREA URNS HPF: ABNORMAL /HPF
BASOPHILS # BLD AUTO: 0 10E3/UL (ref 0–0.2)
BASOPHILS NFR BLD AUTO: 0 %
BILIRUB DIRECT SERPL-MCNC: <0.2 MG/DL (ref 0–0.3)
BILIRUB SERPL-MCNC: 0.3 MG/DL
BILIRUB SERPL-MCNC: 0.4 MG/DL
BILIRUB UR QL STRIP: NEGATIVE
BUN SERPL-MCNC: 29 MG/DL (ref 8–23)
BUN SERPL-MCNC: 30.2 MG/DL (ref 8–23)
CALCIUM SERPL-MCNC: 10 MG/DL (ref 8.8–10.2)
CALCIUM SERPL-MCNC: 9.8 MG/DL (ref 8.8–10.2)
CHLORIDE SERPL-SCNC: 100 MMOL/L (ref 98–107)
CHLORIDE SERPL-SCNC: 102 MMOL/L (ref 98–107)
COLOR UR AUTO: ABNORMAL
CREAT SERPL-MCNC: 1.63 MG/DL (ref 0.51–0.95)
CREAT SERPL-MCNC: 1.96 MG/DL (ref 0.51–0.95)
D DIMER PPP FEU-MCNC: 1.28 UG/ML FEU (ref 0–0.5)
DEPRECATED HCO3 PLAS-SCNC: 15 MMOL/L (ref 22–29)
DEPRECATED HCO3 PLAS-SCNC: 28 MMOL/L (ref 22–29)
EOSINOPHIL # BLD AUTO: 0.1 10E3/UL (ref 0–0.7)
EOSINOPHIL NFR BLD AUTO: 1 %
ERYTHROCYTE [DISTWIDTH] IN BLOOD BY AUTOMATED COUNT: 13.5 % (ref 10–15)
FLUAV RNA SPEC QL NAA+PROBE: NEGATIVE
FLUBV RNA RESP QL NAA+PROBE: NEGATIVE
GFR SERPL CREATININE-BSD FRML MDRD: 24 ML/MIN/1.73M2
GFR SERPL CREATININE-BSD FRML MDRD: 30 ML/MIN/1.73M2
GLUCOSE SERPL-MCNC: 82 MG/DL (ref 70–99)
GLUCOSE SERPL-MCNC: 89 MG/DL (ref 70–99)
GLUCOSE UR STRIP-MCNC: NEGATIVE MG/DL
HCT VFR BLD AUTO: 35 % (ref 35–47)
HGB BLD-MCNC: 10.9 G/DL (ref 11.7–15.7)
HGB UR QL STRIP: NEGATIVE
HOLD SPECIMEN: NORMAL
HOLD SPECIMEN: NORMAL
IMM GRANULOCYTES # BLD: 0 10E3/UL
IMM GRANULOCYTES NFR BLD: 0 %
KETONES UR STRIP-MCNC: ABNORMAL MG/DL
LEUKOCYTE ESTERASE UR QL STRIP: NEGATIVE
LYMPHOCYTES # BLD AUTO: 2.4 10E3/UL (ref 0.8–5.3)
LYMPHOCYTES NFR BLD AUTO: 31 %
MAGNESIUM SERPL-MCNC: 2.5 MG/DL (ref 1.7–2.3)
MCH RBC QN AUTO: 31.7 PG (ref 26.5–33)
MCHC RBC AUTO-ENTMCNC: 31.1 G/DL (ref 31.5–36.5)
MCV RBC AUTO: 102 FL (ref 78–100)
MDC_IDC_EPISODE_DTM: NORMAL
MDC_IDC_EPISODE_DURATION: 20 S
MDC_IDC_EPISODE_ID: NORMAL
MDC_IDC_EPISODE_TYPE: NORMAL
MDC_IDC_EPISODE_VENDOR_TYPE: NORMAL
MDC_IDC_LEAD_IMPLANT_DT: NORMAL
MDC_IDC_LEAD_IMPLANT_DT: NORMAL
MDC_IDC_LEAD_LOCATION: NORMAL
MDC_IDC_LEAD_LOCATION: NORMAL
MDC_IDC_LEAD_LOCATION_DETAIL_1: NORMAL
MDC_IDC_LEAD_LOCATION_DETAIL_1: NORMAL
MDC_IDC_LEAD_MFG: NORMAL
MDC_IDC_LEAD_MFG: NORMAL
MDC_IDC_LEAD_MODEL: NORMAL
MDC_IDC_LEAD_MODEL: NORMAL
MDC_IDC_LEAD_POLARITY_TYPE: NORMAL
MDC_IDC_LEAD_POLARITY_TYPE: NORMAL
MDC_IDC_LEAD_SERIAL: NORMAL
MDC_IDC_LEAD_SERIAL: NORMAL
MDC_IDC_MSMT_BATTERY_DTM: NORMAL
MDC_IDC_MSMT_BATTERY_REMAINING_LONGEVITY: 162 MO
MDC_IDC_MSMT_BATTERY_REMAINING_PERCENTAGE: 100 %
MDC_IDC_MSMT_BATTERY_STATUS: NORMAL
MDC_IDC_MSMT_LEADCHNL_RA_IMPEDANCE_VALUE: 615 OHM
MDC_IDC_MSMT_LEADCHNL_RA_PACING_THRESHOLD_AMPLITUDE: 0.5 V
MDC_IDC_MSMT_LEADCHNL_RA_PACING_THRESHOLD_PULSEWIDTH: 0.4 MS
MDC_IDC_MSMT_LEADCHNL_RV_IMPEDANCE_VALUE: 536 OHM
MDC_IDC_MSMT_LEADCHNL_RV_PACING_THRESHOLD_AMPLITUDE: 1.1 V
MDC_IDC_MSMT_LEADCHNL_RV_PACING_THRESHOLD_PULSEWIDTH: 0.4 MS
MDC_IDC_PG_IMPLANT_DTM: NORMAL
MDC_IDC_PG_MFG: NORMAL
MDC_IDC_PG_MODEL: NORMAL
MDC_IDC_PG_SERIAL: NORMAL
MDC_IDC_PG_TYPE: NORMAL
MDC_IDC_SESS_CLINIC_NAME: NORMAL
MDC_IDC_SESS_DTM: NORMAL
MDC_IDC_SESS_TYPE: NORMAL
MDC_IDC_SET_BRADY_AT_MODE_SWITCH_MODE: NORMAL
MDC_IDC_SET_BRADY_AT_MODE_SWITCH_RATE: 170 {BEATS}/MIN
MDC_IDC_SET_BRADY_LOWRATE: 60 {BEATS}/MIN
MDC_IDC_SET_BRADY_MAX_SENSOR_RATE: 130 {BEATS}/MIN
MDC_IDC_SET_BRADY_MAX_TRACKING_RATE: 130 {BEATS}/MIN
MDC_IDC_SET_BRADY_MODE: NORMAL
MDC_IDC_SET_BRADY_PAV_DELAY_HIGH: 150 MS
MDC_IDC_SET_BRADY_PAV_DELAY_LOW: 200 MS
MDC_IDC_SET_BRADY_SAV_DELAY_HIGH: 150 MS
MDC_IDC_SET_BRADY_SAV_DELAY_LOW: 200 MS
MDC_IDC_SET_LEADCHNL_RA_PACING_AMPLITUDE: 2 V
MDC_IDC_SET_LEADCHNL_RA_PACING_CAPTURE_MODE: NORMAL
MDC_IDC_SET_LEADCHNL_RA_PACING_POLARITY: NORMAL
MDC_IDC_SET_LEADCHNL_RA_PACING_PULSEWIDTH: 0.4 MS
MDC_IDC_SET_LEADCHNL_RA_SENSING_ADAPTATION_MODE: NORMAL
MDC_IDC_SET_LEADCHNL_RA_SENSING_POLARITY: NORMAL
MDC_IDC_SET_LEADCHNL_RA_SENSING_SENSITIVITY: 0.25 MV
MDC_IDC_SET_LEADCHNL_RV_PACING_AMPLITUDE: 1.7 V
MDC_IDC_SET_LEADCHNL_RV_PACING_CAPTURE_MODE: NORMAL
MDC_IDC_SET_LEADCHNL_RV_PACING_POLARITY: NORMAL
MDC_IDC_SET_LEADCHNL_RV_PACING_PULSEWIDTH: 0.4 MS
MDC_IDC_SET_LEADCHNL_RV_SENSING_ADAPTATION_MODE: NORMAL
MDC_IDC_SET_LEADCHNL_RV_SENSING_POLARITY: NORMAL
MDC_IDC_SET_LEADCHNL_RV_SENSING_SENSITIVITY: 1.5 MV
MDC_IDC_SET_ZONE_DETECTION_INTERVAL: 375 MS
MDC_IDC_SET_ZONE_TYPE: NORMAL
MDC_IDC_SET_ZONE_VENDOR_TYPE: NORMAL
MDC_IDC_STAT_AT_BURDEN_PERCENT: 1 %
MDC_IDC_STAT_AT_DTM_END: NORMAL
MDC_IDC_STAT_AT_DTM_START: NORMAL
MDC_IDC_STAT_BRADY_DTM_END: NORMAL
MDC_IDC_STAT_BRADY_DTM_START: NORMAL
MDC_IDC_STAT_BRADY_RA_PERCENT_PACED: 48 %
MDC_IDC_STAT_BRADY_RV_PERCENT_PACED: 2 %
MDC_IDC_STAT_EPISODE_RECENT_COUNT: 2
MDC_IDC_STAT_EPISODE_RECENT_COUNT: 3
MDC_IDC_STAT_EPISODE_RECENT_COUNT: 345
MDC_IDC_STAT_EPISODE_RECENT_COUNT: 56
MDC_IDC_STAT_EPISODE_RECENT_COUNT: 91
MDC_IDC_STAT_EPISODE_RECENT_COUNT_DTM_END: NORMAL
MDC_IDC_STAT_EPISODE_RECENT_COUNT_DTM_START: NORMAL
MDC_IDC_STAT_EPISODE_TYPE: NORMAL
MDC_IDC_STAT_EPISODE_VENDOR_TYPE: NORMAL
MONOCYTES # BLD AUTO: 0.7 10E3/UL (ref 0–1.3)
MONOCYTES NFR BLD AUTO: 9 %
MUCOUS THREADS #/AREA URNS LPF: PRESENT /LPF
NEUTROPHILS # BLD AUTO: 4.4 10E3/UL (ref 1.6–8.3)
NEUTROPHILS NFR BLD AUTO: 59 %
NITRATE UR QL: POSITIVE
NRBC # BLD AUTO: 0 10E3/UL
NRBC BLD AUTO-RTO: 0 /100
NT-PROBNP SERPL-MCNC: 1129 PG/ML (ref 0–1800)
PH UR STRIP: 6 [PH] (ref 5–7)
PLATELET # BLD AUTO: 246 10E3/UL (ref 150–450)
POTASSIUM SERPL-SCNC: 4.1 MMOL/L (ref 3.4–5.3)
POTASSIUM SERPL-SCNC: 4.4 MMOL/L (ref 3.4–5.3)
PROT SERPL-MCNC: 7.2 G/DL (ref 6.4–8.3)
PROT SERPL-MCNC: 7.4 G/DL (ref 6.4–8.3)
RBC # BLD AUTO: 3.44 10E6/UL (ref 3.8–5.2)
RBC URINE: 2 /HPF
RSV RNA SPEC NAA+PROBE: NEGATIVE
SARS-COV-2 RNA RESP QL NAA+PROBE: NEGATIVE
SODIUM SERPL-SCNC: 140 MMOL/L (ref 136–145)
SODIUM SERPL-SCNC: 141 MMOL/L (ref 136–145)
SP GR UR STRIP: 1.02 (ref 1–1.03)
SQUAMOUS EPITHELIAL: <1 /HPF
T4 FREE SERPL-MCNC: 1.97 NG/DL (ref 0.9–1.7)
TROPONIN T SERPL HS-MCNC: 53 NG/L
TROPONIN T SERPL HS-MCNC: 54 NG/L
TSH SERPL DL<=0.005 MIU/L-ACNC: 1.22 UIU/ML (ref 0.3–4.2)
UROBILINOGEN UR STRIP-MCNC: NORMAL MG/DL
WBC # BLD AUTO: 7.7 10E3/UL (ref 4–11)
WBC URINE: 26 /HPF

## 2022-01-01 PROCEDURE — G0378 HOSPITAL OBSERVATION PER HR: HCPCS

## 2022-01-01 PROCEDURE — 36415 COLL VENOUS BLD VENIPUNCTURE: CPT | Performed by: EMERGENCY MEDICINE

## 2022-01-01 PROCEDURE — 84443 ASSAY THYROID STIM HORMONE: CPT | Mod: ORL | Performed by: INTERNAL MEDICINE

## 2022-01-01 PROCEDURE — G1010 CDSM STANSON: HCPCS

## 2022-01-01 PROCEDURE — 250N000011 HC RX IP 250 OP 636: Performed by: EMERGENCY MEDICINE

## 2022-01-01 PROCEDURE — 99207 PR NO CHARGE LOS: CPT | Performed by: INTERNAL MEDICINE

## 2022-01-01 PROCEDURE — 85379 FIBRIN DEGRADATION QUANT: CPT | Performed by: EMERGENCY MEDICINE

## 2022-01-01 PROCEDURE — 93294 REM INTERROG EVL PM/LDLS PM: CPT | Performed by: INTERNAL MEDICINE

## 2022-01-01 PROCEDURE — 82248 BILIRUBIN DIRECT: CPT | Mod: ORL | Performed by: INTERNAL MEDICINE

## 2022-01-01 PROCEDURE — 93970 EXTREMITY STUDY: CPT

## 2022-01-01 PROCEDURE — 87086 URINE CULTURE/COLONY COUNT: CPT | Performed by: EMERGENCY MEDICINE

## 2022-01-01 PROCEDURE — 80053 COMPREHEN METABOLIC PANEL: CPT | Performed by: EMERGENCY MEDICINE

## 2022-01-01 PROCEDURE — 84439 ASSAY OF FREE THYROXINE: CPT | Mod: ORL | Performed by: INTERNAL MEDICINE

## 2022-01-01 PROCEDURE — 85025 COMPLETE CBC W/AUTO DIFF WBC: CPT | Performed by: EMERGENCY MEDICINE

## 2022-01-01 PROCEDURE — 250N000013 HC RX MED GY IP 250 OP 250 PS 637: Performed by: INTERNAL MEDICINE

## 2022-01-01 PROCEDURE — C9803 HOPD COVID-19 SPEC COLLECT: HCPCS

## 2022-01-01 PROCEDURE — 250N000013 HC RX MED GY IP 250 OP 250 PS 637: Performed by: EMERGENCY MEDICINE

## 2022-01-01 PROCEDURE — 99220 PR INITIAL OBSERVATION CARE,LEVEL III: CPT | Performed by: HOSPITALIST

## 2022-01-01 PROCEDURE — 81001 URINALYSIS AUTO W/SCOPE: CPT | Performed by: EMERGENCY MEDICINE

## 2022-01-01 PROCEDURE — 93296 REM INTERROG EVL PM/IDS: CPT | Performed by: INTERNAL MEDICINE

## 2022-01-01 PROCEDURE — P9603 ONE-WAY ALLOW PRORATED MILES: HCPCS | Mod: ORL | Performed by: INTERNAL MEDICINE

## 2022-01-01 PROCEDURE — 93005 ELECTROCARDIOGRAM TRACING: CPT

## 2022-01-01 PROCEDURE — 87637 SARSCOV2&INF A&B&RSV AMP PRB: CPT | Performed by: EMERGENCY MEDICINE

## 2022-01-01 PROCEDURE — 84484 ASSAY OF TROPONIN QUANT: CPT | Performed by: EMERGENCY MEDICINE

## 2022-01-01 PROCEDURE — 36415 COLL VENOUS BLD VENIPUNCTURE: CPT | Mod: ORL | Performed by: INTERNAL MEDICINE

## 2022-01-01 PROCEDURE — 83880 ASSAY OF NATRIURETIC PEPTIDE: CPT | Performed by: EMERGENCY MEDICINE

## 2022-01-01 PROCEDURE — 71046 X-RAY EXAM CHEST 2 VIEWS: CPT

## 2022-01-01 PROCEDURE — 83735 ASSAY OF MAGNESIUM: CPT | Performed by: EMERGENCY MEDICINE

## 2022-01-01 PROCEDURE — 96374 THER/PROPH/DIAG INJ IV PUSH: CPT

## 2022-01-01 PROCEDURE — 99285 EMERGENCY DEPT VISIT HI MDM: CPT | Mod: 25

## 2022-01-01 RX ORDER — AMOXICILLIN 250 MG
1 CAPSULE ORAL 2 TIMES DAILY PRN
Status: DISCONTINUED | OUTPATIENT
Start: 2022-01-01 | End: 2023-01-01 | Stop reason: HOSPADM

## 2022-01-01 RX ORDER — MIRTAZAPINE 15 MG/1
15 TABLET, FILM COATED ORAL AT BEDTIME
Status: DISCONTINUED | OUTPATIENT
Start: 2022-01-01 | End: 2023-01-01 | Stop reason: HOSPADM

## 2022-01-01 RX ORDER — FUROSEMIDE 20 MG
20 TABLET ORAL DAILY
Status: DISCONTINUED | OUTPATIENT
Start: 2022-01-01 | End: 2023-01-01

## 2022-01-01 RX ORDER — TRAZODONE HYDROCHLORIDE 50 MG/1
25 TABLET, FILM COATED ORAL AT BEDTIME
COMMUNITY

## 2022-01-01 RX ORDER — AMOXICILLIN 250 MG
2 CAPSULE ORAL 2 TIMES DAILY PRN
Status: DISCONTINUED | OUTPATIENT
Start: 2022-01-01 | End: 2023-01-01 | Stop reason: HOSPADM

## 2022-01-01 RX ORDER — LANOLIN ALCOHOL/MO/W.PET/CERES
1000 CREAM (GRAM) TOPICAL DAILY
Status: DISCONTINUED | OUTPATIENT
Start: 2022-01-01 | End: 2023-01-01 | Stop reason: HOSPADM

## 2022-01-01 RX ORDER — POLYETHYLENE GLYCOL 3350 17 G/17G
17 POWDER, FOR SOLUTION ORAL DAILY PRN
Status: DISCONTINUED | OUTPATIENT
Start: 2022-01-01 | End: 2023-01-01 | Stop reason: HOSPADM

## 2022-01-01 RX ORDER — OLANZAPINE 2.5 MG/1
2.5 TABLET, FILM COATED ORAL DAILY PRN
Status: DISCONTINUED | OUTPATIENT
Start: 2022-01-01 | End: 2023-01-01 | Stop reason: HOSPADM

## 2022-01-01 RX ORDER — ONDANSETRON 2 MG/ML
4 INJECTION INTRAMUSCULAR; INTRAVENOUS EVERY 6 HOURS PRN
Status: DISCONTINUED | OUTPATIENT
Start: 2022-01-01 | End: 2023-01-01 | Stop reason: HOSPADM

## 2022-01-01 RX ORDER — ASPIRIN 81 MG/1
324 TABLET, CHEWABLE ORAL ONCE
Status: COMPLETED | OUTPATIENT
Start: 2022-01-01 | End: 2022-01-01

## 2022-01-01 RX ORDER — BISACODYL 10 MG
10 SUPPOSITORY, RECTAL RECTAL DAILY PRN
Status: DISCONTINUED | OUTPATIENT
Start: 2022-01-01 | End: 2023-01-01 | Stop reason: HOSPADM

## 2022-01-01 RX ORDER — ONDANSETRON 4 MG/1
4 TABLET, ORALLY DISINTEGRATING ORAL EVERY 6 HOURS PRN
Status: DISCONTINUED | OUTPATIENT
Start: 2022-01-01 | End: 2023-01-01 | Stop reason: HOSPADM

## 2022-01-01 RX ORDER — ACETAMINOPHEN 650 MG/1
650 SUPPOSITORY RECTAL EVERY 6 HOURS PRN
Status: DISCONTINUED | OUTPATIENT
Start: 2022-01-01 | End: 2023-01-01 | Stop reason: HOSPADM

## 2022-01-01 RX ORDER — OLANZAPINE 2.5 MG/1
2.5 TABLET, FILM COATED ORAL 2 TIMES DAILY
Status: DISCONTINUED | OUTPATIENT
Start: 2022-01-01 | End: 2023-01-01 | Stop reason: HOSPADM

## 2022-01-01 RX ORDER — CEFTRIAXONE 1 G/1
1 INJECTION, POWDER, FOR SOLUTION INTRAMUSCULAR; INTRAVENOUS ONCE
Status: COMPLETED | OUTPATIENT
Start: 2022-01-01 | End: 2022-01-01

## 2022-01-01 RX ORDER — OLANZAPINE 5 MG/1
2.5 TABLET ORAL DAILY PRN
COMMUNITY

## 2022-01-01 RX ORDER — ACETAMINOPHEN 325 MG/1
650 TABLET ORAL EVERY 6 HOURS PRN
Status: DISCONTINUED | OUTPATIENT
Start: 2022-01-01 | End: 2023-01-01 | Stop reason: HOSPADM

## 2022-01-01 RX ORDER — LANOLIN ALCOHOL/MO/W.PET/CERES
1000 CREAM (GRAM) TOPICAL DAILY
COMMUNITY

## 2022-01-01 RX ORDER — CEFTRIAXONE 1 G/1
1 INJECTION, POWDER, FOR SOLUTION INTRAMUSCULAR; INTRAVENOUS EVERY 24 HOURS
Status: DISCONTINUED | OUTPATIENT
Start: 2023-01-01 | End: 2023-01-01 | Stop reason: HOSPADM

## 2022-01-01 RX ORDER — ACETAMINOPHEN 500 MG
1000 TABLET ORAL 3 TIMES DAILY PRN
COMMUNITY

## 2022-01-01 RX ADMIN — CEFTRIAXONE 1 G: 1 INJECTION, POWDER, FOR SOLUTION INTRAMUSCULAR; INTRAVENOUS at 03:15

## 2022-01-01 RX ADMIN — OLANZAPINE 2.5 MG: 2.5 TABLET, FILM COATED ORAL at 20:59

## 2022-01-01 RX ADMIN — MIRTAZAPINE 15 MG: 7.5 TABLET, FILM COATED ORAL at 21:00

## 2022-01-01 RX ADMIN — FUROSEMIDE 20 MG: 20 TABLET ORAL at 14:47

## 2022-01-01 RX ADMIN — CYANOCOBALAMIN TAB 1000 MCG 1000 MCG: 1000 TAB at 14:47

## 2022-01-01 RX ADMIN — AMIODARONE HYDROCHLORIDE 100 MG: 200 TABLET ORAL at 14:47

## 2022-01-01 RX ADMIN — ASPIRIN 81 MG CHEWABLE TABLET 324 MG: 81 TABLET CHEWABLE at 02:14

## 2022-01-01 ASSESSMENT — ENCOUNTER SYMPTOMS
CHILLS: 0
WEAKNESS: 1
SHORTNESS OF BREATH: 0
VOMITING: 0
NAUSEA: 0
FEVER: 0
ABDOMINAL PAIN: 0
DYSURIA: 0

## 2022-01-01 ASSESSMENT — ACTIVITIES OF DAILY LIVING (ADL)
ADLS_ACUITY_SCORE: 35
ADLS_ACUITY_SCORE: 41
ADLS_ACUITY_SCORE: 35
ADLS_ACUITY_SCORE: 33
ADLS_ACUITY_SCORE: 41
ADLS_ACUITY_SCORE: 41
ADLS_ACUITY_SCORE: 40
ADLS_ACUITY_SCORE: 41
ADLS_ACUITY_SCORE: 35
ADLS_ACUITY_SCORE: 41
ADLS_ACUITY_SCORE: 41
ADLS_ACUITY_SCORE: 40
ADLS_ACUITY_SCORE: 41
ADLS_ACUITY_SCORE: 40
ADLS_ACUITY_SCORE: 40

## 2022-02-15 ENCOUNTER — ANCILLARY PROCEDURE (OUTPATIENT)
Dept: CARDIOLOGY | Facility: CLINIC | Age: 87
End: 2022-02-15
Attending: INTERNAL MEDICINE
Payer: MEDICARE

## 2022-02-15 DIAGNOSIS — Z95.0 CARDIAC PACEMAKER IN SITU: ICD-10-CM

## 2022-02-15 DIAGNOSIS — I49.5 SICK SINUS SYNDROME (H): Primary | ICD-10-CM

## 2022-02-15 PROCEDURE — 93294 REM INTERROG EVL PM/LDLS PM: CPT | Performed by: INTERNAL MEDICINE

## 2022-02-15 PROCEDURE — 93296 REM INTERROG EVL PM/IDS: CPT | Performed by: INTERNAL MEDICINE

## 2022-02-16 ENCOUNTER — APPOINTMENT (OUTPATIENT)
Dept: CT IMAGING | Facility: CLINIC | Age: 87
End: 2022-02-16
Attending: EMERGENCY MEDICINE
Payer: MEDICARE

## 2022-02-16 ENCOUNTER — HOSPITAL ENCOUNTER (EMERGENCY)
Facility: CLINIC | Age: 87
Discharge: HOME | End: 2022-02-16
Attending: EMERGENCY MEDICINE | Admitting: EMERGENCY MEDICINE
Payer: MEDICARE

## 2022-02-16 ENCOUNTER — MEDICAL CORRESPONDENCE (OUTPATIENT)
Dept: HEALTH INFORMATION MANAGEMENT | Facility: CLINIC | Age: 87
End: 2022-02-16
Payer: MEDICARE

## 2022-02-16 VITALS
DIASTOLIC BLOOD PRESSURE: 136 MMHG | RESPIRATION RATE: 17 BRPM | HEART RATE: 78 BPM | BODY MASS INDEX: 24.91 KG/M2 | WEIGHT: 142.86 LBS | TEMPERATURE: 97.9 F | OXYGEN SATURATION: 98 % | SYSTOLIC BLOOD PRESSURE: 156 MMHG

## 2022-02-16 DIAGNOSIS — R41.82 ALTERED MENTAL STATUS, UNSPECIFIED ALTERED MENTAL STATUS TYPE: ICD-10-CM

## 2022-02-16 LAB
ALBUMIN SERPL-MCNC: 3.8 G/DL (ref 3.4–5)
ALBUMIN UR-MCNC: NEGATIVE MG/DL
ALP SERPL-CCNC: 73 U/L (ref 40–150)
ALT SERPL W P-5'-P-CCNC: 16 U/L (ref 0–50)
ANION GAP SERPL CALCULATED.3IONS-SCNC: 3 MMOL/L (ref 3–14)
APPEARANCE UR: CLEAR
AST SERPL W P-5'-P-CCNC: 15 U/L (ref 0–45)
BASOPHILS # BLD AUTO: 0 10E3/UL (ref 0–0.2)
BASOPHILS NFR BLD AUTO: 0 %
BILIRUB SERPL-MCNC: 0.5 MG/DL (ref 0.2–1.3)
BILIRUB UR QL STRIP: NEGATIVE
BUN SERPL-MCNC: 32 MG/DL (ref 7–30)
CALCIUM SERPL-MCNC: 9.8 MG/DL (ref 8.5–10.1)
CHLORIDE BLD-SCNC: 106 MMOL/L (ref 94–109)
CO2 SERPL-SCNC: 28 MMOL/L (ref 20–32)
COLOR UR AUTO: ABNORMAL
CREAT SERPL-MCNC: 1.69 MG/DL (ref 0.52–1.04)
EOSINOPHIL # BLD AUTO: 0 10E3/UL (ref 0–0.7)
EOSINOPHIL NFR BLD AUTO: 0 %
ERYTHROCYTE [DISTWIDTH] IN BLOOD BY AUTOMATED COUNT: 13 % (ref 10–15)
GFR SERPL CREATININE-BSD FRML MDRD: 29 ML/MIN/1.73M2
GLUCOSE BLD-MCNC: 100 MG/DL (ref 70–99)
GLUCOSE BLDC GLUCOMTR-MCNC: 94 MG/DL (ref 70–99)
GLUCOSE UR STRIP-MCNC: NEGATIVE MG/DL
HCO3 BLDV-SCNC: 25 MMOL/L (ref 21–28)
HCT VFR BLD AUTO: 37.3 % (ref 35–47)
HGB BLD-MCNC: 11.9 G/DL (ref 11.7–15.7)
HGB UR QL STRIP: NEGATIVE
HOLD SPECIMEN: NORMAL
HYALINE CASTS: 1 /LPF
IMM GRANULOCYTES # BLD: 0.1 10E3/UL
IMM GRANULOCYTES NFR BLD: 1 %
INR PPP: 0.94 (ref 0.85–1.15)
KETONES UR STRIP-MCNC: NEGATIVE MG/DL
LACTATE BLD-SCNC: 1.2 MMOL/L
LEUKOCYTE ESTERASE UR QL STRIP: NEGATIVE
LYMPHOCYTES # BLD AUTO: 2.5 10E3/UL (ref 0.8–5.3)
LYMPHOCYTES NFR BLD AUTO: 25 %
MCH RBC QN AUTO: 31.6 PG (ref 26.5–33)
MCHC RBC AUTO-ENTMCNC: 31.9 G/DL (ref 31.5–36.5)
MCV RBC AUTO: 99 FL (ref 78–100)
MONOCYTES # BLD AUTO: 0.5 10E3/UL (ref 0–1.3)
MONOCYTES NFR BLD AUTO: 5 %
MUCOUS THREADS #/AREA URNS LPF: PRESENT /LPF
NEUTROPHILS # BLD AUTO: 6.7 10E3/UL (ref 1.6–8.3)
NEUTROPHILS NFR BLD AUTO: 69 %
NITRATE UR QL: NEGATIVE
NRBC # BLD AUTO: 0 10E3/UL
NRBC BLD AUTO-RTO: 0 /100
PCO2 BLDV: 46 MM HG (ref 40–50)
PH BLDV: 7.35 [PH] (ref 7.32–7.43)
PH UR STRIP: 6 [PH] (ref 5–7)
PLATELET # BLD AUTO: 223 10E3/UL (ref 150–450)
PO2 BLDV: 34 MM HG (ref 25–47)
POTASSIUM BLD-SCNC: 4.4 MMOL/L (ref 3.4–5.3)
PROT SERPL-MCNC: 7.8 G/DL (ref 6.8–8.8)
RBC # BLD AUTO: 3.76 10E6/UL (ref 3.8–5.2)
RBC URINE: 1 /HPF
SAO2 % BLDV: 63 % (ref 94–100)
SODIUM SERPL-SCNC: 137 MMOL/L (ref 133–144)
SP GR UR STRIP: 1.01 (ref 1–1.03)
TROPONIN I SERPL HS-MCNC: 12 NG/L
UROBILINOGEN UR STRIP-MCNC: NORMAL MG/DL
WBC # BLD AUTO: 9.8 10E3/UL (ref 4–11)
WBC URINE: 2 /HPF

## 2022-02-16 PROCEDURE — 85004 AUTOMATED DIFF WBC COUNT: CPT | Performed by: EMERGENCY MEDICINE

## 2022-02-16 PROCEDURE — 81003 URINALYSIS AUTO W/O SCOPE: CPT | Performed by: EMERGENCY MEDICINE

## 2022-02-16 PROCEDURE — 99285 EMERGENCY DEPT VISIT HI MDM: CPT | Mod: 25

## 2022-02-16 PROCEDURE — 93005 ELECTROCARDIOGRAM TRACING: CPT

## 2022-02-16 PROCEDURE — G1004 CDSM NDSC: HCPCS

## 2022-02-16 PROCEDURE — 82803 BLOOD GASES ANY COMBINATION: CPT

## 2022-02-16 PROCEDURE — 36415 COLL VENOUS BLD VENIPUNCTURE: CPT | Performed by: EMERGENCY MEDICINE

## 2022-02-16 PROCEDURE — 84484 ASSAY OF TROPONIN QUANT: CPT | Performed by: EMERGENCY MEDICINE

## 2022-02-16 PROCEDURE — 85610 PROTHROMBIN TIME: CPT | Performed by: EMERGENCY MEDICINE

## 2022-02-16 PROCEDURE — 80053 COMPREHEN METABOLIC PANEL: CPT | Performed by: EMERGENCY MEDICINE

## 2022-02-16 ASSESSMENT — ENCOUNTER SYMPTOMS
DIZZINESS: 0
CONFUSION: 1
FEVER: 0
LIGHT-HEADEDNESS: 0
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
## 2022-02-16 NOTE — ED PROVIDER NOTES
"  History   Chief Complaint:  Altered Mental Status       The history is provided by the patient and a relative.      Glendy Díaz is a 88 year old female with history of myocardial infarction, atrial fibrillation s/p pacemaker insertion, Stage 3 CKD, hyperlipidemia, and hypertension who presents with altered mental status. Per the patient's daughter, she received call from her mother's nursing facility around 1200, about 8 hours ago, stating that the patient was screaming, pounding on the walls, and yelling that her daughter was trying to kill her. She also reports that her mother complained of people trying to break in to her apartment last evening, prompting her to call the police. She further notes other unsubstantiated claims made by the patient including receiving $2 million from a family friend, a family friend attempting to steal her money, her daughter being shot in the head, and her nephew committing suicide. She reports that her mother does not have a history of delusions and has been unusually happy recently. She also notes a recent fall the patient had, hitting her head. The patient also had COVID-19 \"months ago\" and has not recovered her sense of smell. She endorses the patient's history of depression and anxiety. She denies fever. Per the patient, she is unsure why she is in the ED but that she knows people think she is \"crazy\" and \"seeing things.\" She does remember seeing something, but just thought it was a dream. She denies lightheadedness, dizziness, or taking blood thinners. She also notes a history of myocardial infarction and a pacemaker insertion.    Review of Systems   Constitutional: Negative for fever.   Neurological: Negative for dizziness and light-headedness.   Psychiatric/Behavioral: Positive for confusion.   All other systems reviewed and are negative.      Allergies:  Amlodipine  Fosamax [Alendronic Acid]  Lisinopril  Pravastatin  Simvastatin    Medications  Aspirin 81 " mg  Evoxac  Tiazac  Lasix  Neurotonin  Toprol Xl  Myrbetriq  Senokot-S/Pericolace  Vesicare    Past Medical History:    CKD (chronic kidney disease) stage 3   Hematuria   Hyperlipidemia  Hyperparathyroidism  Hypertension   Mumps   Neck pain, chronic   Osteoarthritis   Paroxysmal atrial fibrillation  Peripheral neuropathy   Sinus node dysfunction  Osteopenia  Peripheral neuropathy  Anxiety  Medial meniscus tear  Closed fracture of tibial plateau  Anemia due to vitamin B12 deficiency  SVT (supraventricular tachycardia)  COVID-19  Pneumonia  COPD (chronic obstructive pulmonary disease)  Sick sinus syndrome      Past Surgical History:    Appendectomy  Foot arthrodesis, right  Ankle arthroscopy and open ligament repair, left  Bladder surgery  Bilateral cataract IOL  Cholecystectomy  Cystoscopy  Pacemaker insertion  Foot mass excision  Hysterectomy  Foot hardware removal  Hammer toe repair  Reverse shoulder arthroplasty, right    Family History:    Mother: unspecified lipids issue, unspecified diabetes, kidney disease  Brother: unspecified diabetes, colorectal cancer, alcohol/drug use  Sister: breast cancer    Social History:  The patient presents to the ED with her daughter.    Physical Exam     Patient Vitals for the past 24 hrs:   BP Temp Temp src Pulse Resp SpO2 Weight   02/16/22 1800 (!) 156/136 -- -- 78 17 98 % --   02/16/22 1739 -- -- -- -- -- -- 64.8 kg (142 lb 13.7 oz)   02/16/22 1729 (!) 162/88 97.9  F (36.6  C) Temporal 79 18 99 % --       Physical Exam  Constitutional: Vital signs reviewed as above.   Head: No external signs of trauma noted.  Eyes: Pupils are equal, round, and reactive to light.   Neck: No JVD noted  Cardiovascular: Normal rate, regular rhythm and normal heart sounds.  No murmur heard. Equal B/L peripheral pulses.  Pulmonary/Chest: Effort normal and breath sounds normal. No respiratory distress. Patient has no wheezes. Patient has no rales.   Gastrointestinal: Soft. There is no tenderness.    Musculoskeletal/Extremities: No edema noted. Normal tone.  Neurological: Patient is alert. She follows commands. Seems to know where she is and that her daughter is present in the room with her.    Skin: Skin is warm and dry. There is no diaphoresis noted.   Psychiatric: The patient appears calm. No active hallucinations appreciated.      Emergency Department Course   ECG  ECG obtained at 1757, ECG read at 1810  Normal sinus rhythm. Left anterior fascicular block. Moderate voltage criteria for LVH, may be normal variant. Nonspecific ST abnormality. Prolonged QT.   No significant change as compared to prior, dated 03/04/2021 (left anterior fascicular block and prolonged QT).  Rate 80 bpm. KY interval 164 ms. QRS duration 92 ms. QT/QTc 436/502 ms. P-R-T axes 68 -50 60.     Imaging:  Head CT w/o contrast   Final Result   IMPRESSION:   1.  No acute intracranial process.        Report per radiology    Laboratory:  Labs Ordered and Resulted from Time of ED Arrival to Time of ED Departure   COMPREHENSIVE METABOLIC PANEL - Abnormal       Result Value    Sodium 137      Potassium 4.4      Chloride 106      Carbon Dioxide (CO2) 28      Anion Gap 3      Urea Nitrogen 32 (*)     Creatinine 1.69 (*)     Calcium 9.8      Glucose 100 (*)     Alkaline Phosphatase 73      AST 15      ALT 16      Protein Total 7.8      Albumin 3.8      Bilirubin Total 0.5      GFR Estimate 29 (*)    CBC WITH PLATELETS AND DIFFERENTIAL - Abnormal    WBC Count 9.8      RBC Count 3.76 (*)     Hemoglobin 11.9      Hematocrit 37.3      MCV 99      MCH 31.6      MCHC 31.9      RDW 13.0      Platelet Count 223      % Neutrophils 69      % Lymphocytes 25      % Monocytes 5      % Eosinophils 0      % Basophils 0      % Immature Granulocytes 1      NRBCs per 100 WBC 0      Absolute Neutrophils 6.7      Absolute Lymphocytes 2.5      Absolute Monocytes 0.5      Absolute Eosinophils 0.0      Absolute Basophils 0.0      Absolute Immature Granulocytes 0.1       Absolute NRBCs 0.0     ISTAT GASES LACTATE VENOUS POCT - Abnormal    Lactic Acid POCT 1.2      Bicarbonate Venous POCT 25      O2 Sat, Venous POCT 63 (*)     pCO2V Venous POCT 46      pH Venous POCT 7.35      pO2 Venous POCT 34     ROUTINE UA WITH MICROSCOPIC REFLEX TO CULTURE - Abnormal    Color Urine Light Yellow      Appearance Urine Clear      Glucose Urine Negative      Bilirubin Urine Negative      Ketones Urine Negative      Specific Gravity Urine 1.011      Blood Urine Negative      pH Urine 6.0      Protein Albumin Urine Negative      Urobilinogen Urine Normal      Nitrite Urine Negative      Leukocyte Esterase Urine Negative      Mucus Urine Present (*)     RBC Urine 1      WBC Urine 2      Hyaline Casts Urine 1     TROPONIN I - Normal    Troponin I High Sensitivity 12     INR - Normal    INR 0.94     GLUCOSE BY METER - Normal    GLUCOSE BY METER POCT 94        Emergency Department Course:             Reviewed:  I reviewed nursing notes, vitals, past medical history and Care Everywhere    Assessments/Consults:  ED Course as of 02/16/22 2010 Wed Feb 16, 2022 1739 Dr. Fry took patient history and examined the patient as noted above.   1933 Dr. Fry rechecked the patient.   1950 D/W Dr. Clements. MRI would be nice, but pt has pacemaker. Neurology visit at some point is ideal, but this can be accomplished outpatient.   1959 Updated patient and daughter again.     Disposition:  The patient was discharged to home.     Impression & Plan     Medical Decision Making:  This 88-year-old female patient presents to the ED with her daughter due to concerns for altered mental status.  Please see the HPI and exam for specifics.  At the time of my evaluation, the patient and her daughter believe the patient is at her baseline.  A broad differential was considered including organic infectious etiologies.  There is no fever and no other gross signs of infection on exam today.  A CT was ordered and no signs of  intracranial hemorrhage are noted.  The patient has a pacemaker so MRI imaging could not be performed though I do not perceive any gross neurological deficits.  At this time, I think the patient can be discharged (especially since she is at her baseline per the daughter).  I think following in the outpatient setting with neurology would be reasonable also contact information will be provided for [facility]  They can return at anytime with new or worsening symptoms.  Anticipatory guidance given to patient and daughter prior to discharge.    Diagnosis:    ICD-10-CM    1. Altered mental status, unspecified altered mental status type  R41.82        Scribe Disclosure:  I, Arnav Hayes, am serving as a scribe at 5:39 PM on 2/16/2022 to document services personally performed by Rubio Fry DO based on my observations and the provider's statements to me.          Rubio Fry DO  02/16/22 2100

## 2022-02-17 LAB
ATRIAL RATE - MUSE: 80 BPM
DIASTOLIC BLOOD PRESSURE - MUSE: NORMAL MMHG
INTERPRETATION ECG - MUSE: NORMAL
P AXIS - MUSE: 68 DEGREES
PR INTERVAL - MUSE: 164 MS
QRS DURATION - MUSE: 92 MS
QT - MUSE: 436 MS
QTC - MUSE: 502 MS
R AXIS - MUSE: -50 DEGREES
SYSTOLIC BLOOD PRESSURE - MUSE: NORMAL MMHG
T AXIS - MUSE: 60 DEGREES
VENTRICULAR RATE- MUSE: 80 BPM

## 2022-02-17 NOTE — DISCHARGE INSTRUCTIONS
What do you do next:   Continue your home medications unless we have specifically changed them  Follow up as indicated below    When do you return: If you have recurrent confusion, uncontrollable fevers, focal numbness or weakness of your face, arms, or legs, speech pattern changes, or any other symptoms that concern you, please return to the ED for reevaluation.    Thank you for allowing us to care for you today.

## 2022-02-18 ENCOUNTER — TELEPHONE (OUTPATIENT)
Dept: INTERNAL MEDICINE | Facility: CLINIC | Age: 87
End: 2022-02-18
Payer: MEDICARE

## 2022-02-18 NOTE — TELEPHONE ENCOUNTER
See ED notes from yesterday. Her daughter Abigail is calling.      Pt did clear up last night,  and her mentation was fine. They could not find any source causing her confusion.     Now today, her symptom started back. She will not let anyone in the home. She lives in assisted living place independent.   She pushed her life alert button this afternoon. The , director of facility, ambulance all came to her room. They tried to unlock the door but she would just lock it again. She was confused, saying someone was trying to kill Abigail. Abigail is POA and in [place] right now so she cannot get to pt.   She was not in danger of her self so the ambulance and police left.   Now the director doesn't want to leave her there all weekend. Abigail is asking if Dr Steiner or therapist can sign a 72 hr hold so the police can take her to the hospital.   Discussed with Dr Miller since Dr Steiner is out and Lakeisha is gone today.   Dr Miller states she doesn't know the pt and would be unable to sign the hold form. She doesn't feel comfortable doing this.     Discussed with Abigail. Abigail states her other sister and  are local, and they are going over to see pt, and to see if they can take pt to the ED again. Advised that they may want to take her to [facility] or [facility] instead. She agrees.

## 2022-02-22 LAB
MDC_IDC_EPISODE_DTM: NORMAL
MDC_IDC_EPISODE_ID: NORMAL
MDC_IDC_EPISODE_TYPE: NORMAL
MDC_IDC_LEAD_IMPLANT_DT: NORMAL
MDC_IDC_LEAD_IMPLANT_DT: NORMAL
MDC_IDC_LEAD_LOCATION: NORMAL
MDC_IDC_LEAD_LOCATION: NORMAL
MDC_IDC_LEAD_LOCATION_DETAIL_1: NORMAL
MDC_IDC_LEAD_LOCATION_DETAIL_1: NORMAL
MDC_IDC_LEAD_MFG: NORMAL
MDC_IDC_LEAD_MFG: NORMAL
MDC_IDC_LEAD_MODEL: NORMAL
MDC_IDC_LEAD_MODEL: NORMAL
MDC_IDC_LEAD_POLARITY_TYPE: NORMAL
MDC_IDC_LEAD_POLARITY_TYPE: NORMAL
MDC_IDC_LEAD_SERIAL: NORMAL
MDC_IDC_LEAD_SERIAL: NORMAL
MDC_IDC_MSMT_BATTERY_DTM: NORMAL
MDC_IDC_MSMT_BATTERY_REMAINING_LONGEVITY: 162 MO
MDC_IDC_MSMT_BATTERY_REMAINING_PERCENTAGE: 100 %
MDC_IDC_MSMT_BATTERY_STATUS: NORMAL
MDC_IDC_MSMT_LEADCHNL_RA_IMPEDANCE_VALUE: 620 OHM
MDC_IDC_MSMT_LEADCHNL_RA_PACING_THRESHOLD_AMPLITUDE: 0.7 V
MDC_IDC_MSMT_LEADCHNL_RA_PACING_THRESHOLD_PULSEWIDTH: 0.4 MS
MDC_IDC_MSMT_LEADCHNL_RV_IMPEDANCE_VALUE: 562 OHM
MDC_IDC_MSMT_LEADCHNL_RV_PACING_THRESHOLD_AMPLITUDE: 1 V
MDC_IDC_MSMT_LEADCHNL_RV_PACING_THRESHOLD_PULSEWIDTH: 0.4 MS
MDC_IDC_PG_IMPLANT_DTM: NORMAL
MDC_IDC_PG_MFG: NORMAL
MDC_IDC_PG_MODEL: NORMAL
MDC_IDC_PG_SERIAL: NORMAL
MDC_IDC_PG_TYPE: NORMAL
MDC_IDC_SESS_CLINIC_NAME: NORMAL
MDC_IDC_SESS_DTM: NORMAL
MDC_IDC_SESS_TYPE: NORMAL
MDC_IDC_SET_BRADY_AT_MODE_SWITCH_MODE: NORMAL
MDC_IDC_SET_BRADY_AT_MODE_SWITCH_RATE: 170 {BEATS}/MIN
MDC_IDC_SET_BRADY_LOWRATE: 60 {BEATS}/MIN
MDC_IDC_SET_BRADY_MAX_SENSOR_RATE: 130 {BEATS}/MIN
MDC_IDC_SET_BRADY_MAX_TRACKING_RATE: 130 {BEATS}/MIN
MDC_IDC_SET_BRADY_MODE: NORMAL
MDC_IDC_SET_BRADY_PAV_DELAY_HIGH: 150 MS
MDC_IDC_SET_BRADY_PAV_DELAY_LOW: 200 MS
MDC_IDC_SET_BRADY_SAV_DELAY_HIGH: 150 MS
MDC_IDC_SET_BRADY_SAV_DELAY_LOW: 200 MS
MDC_IDC_SET_LEADCHNL_RA_PACING_AMPLITUDE: 2 V
MDC_IDC_SET_LEADCHNL_RA_PACING_CAPTURE_MODE: NORMAL
MDC_IDC_SET_LEADCHNL_RA_PACING_POLARITY: NORMAL
MDC_IDC_SET_LEADCHNL_RA_PACING_PULSEWIDTH: 0.4 MS
MDC_IDC_SET_LEADCHNL_RA_SENSING_ADAPTATION_MODE: NORMAL
MDC_IDC_SET_LEADCHNL_RA_SENSING_POLARITY: NORMAL
MDC_IDC_SET_LEADCHNL_RA_SENSING_SENSITIVITY: 0.25 MV
MDC_IDC_SET_LEADCHNL_RV_PACING_AMPLITUDE: 1.5 V
MDC_IDC_SET_LEADCHNL_RV_PACING_CAPTURE_MODE: NORMAL
MDC_IDC_SET_LEADCHNL_RV_PACING_POLARITY: NORMAL
MDC_IDC_SET_LEADCHNL_RV_PACING_PULSEWIDTH: 0.4 MS
MDC_IDC_SET_LEADCHNL_RV_SENSING_ADAPTATION_MODE: NORMAL
MDC_IDC_SET_LEADCHNL_RV_SENSING_POLARITY: NORMAL
MDC_IDC_SET_LEADCHNL_RV_SENSING_SENSITIVITY: 1.5 MV
MDC_IDC_SET_ZONE_DETECTION_INTERVAL: 375 MS
MDC_IDC_SET_ZONE_TYPE: NORMAL
MDC_IDC_SET_ZONE_VENDOR_TYPE: NORMAL
MDC_IDC_STAT_AT_BURDEN_PERCENT: 1 %
MDC_IDC_STAT_AT_DTM_END: NORMAL
MDC_IDC_STAT_AT_DTM_START: NORMAL
MDC_IDC_STAT_BRADY_DTM_END: NORMAL
MDC_IDC_STAT_BRADY_DTM_START: NORMAL
MDC_IDC_STAT_BRADY_RA_PERCENT_PACED: 69 %
MDC_IDC_STAT_BRADY_RV_PERCENT_PACED: 3 %
MDC_IDC_STAT_EPISODE_RECENT_COUNT: 0
MDC_IDC_STAT_EPISODE_RECENT_COUNT: 0
MDC_IDC_STAT_EPISODE_RECENT_COUNT: 11
MDC_IDC_STAT_EPISODE_RECENT_COUNT: 5
MDC_IDC_STAT_EPISODE_RECENT_COUNT: 9
MDC_IDC_STAT_EPISODE_RECENT_COUNT_DTM_END: NORMAL
MDC_IDC_STAT_EPISODE_RECENT_COUNT_DTM_START: NORMAL
MDC_IDC_STAT_EPISODE_TYPE: NORMAL
MDC_IDC_STAT_EPISODE_VENDOR_TYPE: NORMAL

## 2022-02-24 ENCOUNTER — TELEPHONE (OUTPATIENT)
Dept: INTERNAL MEDICINE | Facility: CLINIC | Age: 87
End: 2022-02-24
Payer: MEDICARE

## 2022-02-24 DIAGNOSIS — N18.30 CHRONIC KIDNEY DISEASE, STAGE 3 (H): Primary | ICD-10-CM

## 2022-02-24 NOTE — TELEPHONE ENCOUNTER
Home Health Care [facility]. * Ella called    Would  be willing to follow patient in home care?    Best number to call back [phone]

## 2022-02-24 NOTE — TELEPHONE ENCOUNTER
Please see message below and advise. Last office visit with Dr. Steiner was 11/1/2021.     Winter STEELE RN   [facility]

## 2022-02-25 ENCOUNTER — TELEPHONE (OUTPATIENT)
Dept: INTERNAL MEDICINE | Facility: CLINIC | Age: 87
End: 2022-02-25
Payer: MEDICARE

## 2022-02-25 NOTE — TELEPHONE ENCOUNTER
Dr Buck Hendel -[place] calling from [facility] for an update.  They found a Lung nodule so repeat CT in 3 months.  Bump in creatinine needs this drawn Monday or Tuesday  Neurology follow up.  Dr Leonid Snow is the hospitalist and he will be off after Monday but his cell is [phone].

## 2022-02-25 NOTE — TELEPHONE ENCOUNTER
Abigail calls to report that her mom was discharge from [facility] She will have a home RN on 2- for PHYSICAL THERAPY and OCCUAPTIONAL THERAPY.   At this time Abigail will take direction from the home RN if she need to be seen.       This is FYI only.     Abigail daughter * [phone]

## 2022-02-25 NOTE — TELEPHONE ENCOUNTER
"Call to patient. Spoke with patient's daughter, Abigail (on consent to communicate).     Patient lives in independent living since last April.     Patient in hospital right now-[facility].  Sharyn states: \"She got psychotic. She thought I was trying to kill her. The things she was saying. She was put on an anti-depressant. They think she was sleep deprived and dehydrated. She should be discharged today if creatinine levels improve.\"    \"The doctor in the hospital made it sound like nursing would come out once to assess her when she was home. Send out occupational therapy or physical therapy to get her stronger. And send someone out to give her a quick once over assessment. Physical therapy/OT cleared her for independent living a couple of days ago.\"      Daughter reports she takes care of the patient's medications.     Last office visit with Dr. Steiner was on 11/1/2021.       Winter STEELE RN   [facility]    "

## 2022-02-28 NOTE — TELEPHONE ENCOUNTER
Spoke with daughter Abigail, patient is coming in for BMP 3/2 and will see primary care provider 3/3.  Abigail states patient was supposed to get a BMP rechecked this week.  KEVIN Zaidi R.N.

## 2022-03-01 ENCOUNTER — TELEPHONE (OUTPATIENT)
Dept: INTERNAL MEDICINE | Facility: CLINIC | Age: 87
End: 2022-03-01
Payer: MEDICARE

## 2022-03-02 ENCOUNTER — LAB (OUTPATIENT)
Dept: LAB | Facility: CLINIC | Age: 87
End: 2022-03-02
Payer: MEDICARE

## 2022-03-02 DIAGNOSIS — N18.30 CHRONIC KIDNEY DISEASE, STAGE 3 (H): ICD-10-CM

## 2022-03-02 PROCEDURE — 36415 COLL VENOUS BLD VENIPUNCTURE: CPT

## 2022-03-02 PROCEDURE — 80048 BASIC METABOLIC PNL TOTAL CA: CPT

## 2022-03-03 ENCOUNTER — MEDICAL CORRESPONDENCE (OUTPATIENT)
Dept: HEALTH INFORMATION MANAGEMENT | Facility: CLINIC | Age: 87
End: 2022-03-03

## 2022-03-03 ENCOUNTER — OFFICE VISIT (OUTPATIENT)
Dept: INTERNAL MEDICINE | Facility: CLINIC | Age: 87
End: 2022-03-03
Payer: MEDICARE

## 2022-03-03 VITALS
HEART RATE: 75 BPM | DIASTOLIC BLOOD PRESSURE: 72 MMHG | TEMPERATURE: 98.4 F | WEIGHT: 139.4 LBS | BODY MASS INDEX: 23.8 KG/M2 | SYSTOLIC BLOOD PRESSURE: 124 MMHG | OXYGEN SATURATION: 94 % | HEIGHT: 64 IN | RESPIRATION RATE: 18 BRPM

## 2022-03-03 DIAGNOSIS — G47.9 SLEEP DISORDER: ICD-10-CM

## 2022-03-03 DIAGNOSIS — I10 ESSENTIAL HYPERTENSION WITH GOAL BLOOD PRESSURE LESS THAN 140/90: Chronic | ICD-10-CM

## 2022-03-03 DIAGNOSIS — N18.32 CHRONIC KIDNEY DISEASE, STAGE 3B (H): ICD-10-CM

## 2022-03-03 DIAGNOSIS — R91.8 PULMONARY NODULES: ICD-10-CM

## 2022-03-03 DIAGNOSIS — G93.40 ACUTE ENCEPHALOPATHY: ICD-10-CM

## 2022-03-03 DIAGNOSIS — Z02.9 ADMINISTRATIVE ENCOUNTER: ICD-10-CM

## 2022-03-03 DIAGNOSIS — Z09 HOSPITAL DISCHARGE FOLLOW-UP: Primary | ICD-10-CM

## 2022-03-03 PROBLEM — G98.8 DISORDER OF NERVOUS SYSTEM DUE TO TYPE 2 DIABETES MELLITUS (H): Status: ACTIVE | Noted: 2022-03-03

## 2022-03-03 PROBLEM — E11.9 TYPE 2 DIABETES MELLITUS WITHOUT COMPLICATION (H): Status: ACTIVE | Noted: 2022-03-03

## 2022-03-03 PROBLEM — K21.9 GASTROESOPHAGEAL REFLUX DISEASE: Status: ACTIVE | Noted: 2022-03-03

## 2022-03-03 PROBLEM — G47.00 INSOMNIA: Status: ACTIVE | Noted: 2022-03-03

## 2022-03-03 PROBLEM — E11.69 DISORDER OF NERVOUS SYSTEM DUE TO TYPE 2 DIABETES MELLITUS (H): Status: ACTIVE | Noted: 2022-03-03

## 2022-03-03 LAB
ANION GAP SERPL CALCULATED.3IONS-SCNC: 3 MMOL/L (ref 3–14)
BUN SERPL-MCNC: 20 MG/DL (ref 7–30)
CALCIUM SERPL-MCNC: 9.6 MG/DL (ref 8.5–10.1)
CHLORIDE BLD-SCNC: 110 MMOL/L (ref 94–109)
CO2 SERPL-SCNC: 28 MMOL/L (ref 20–32)
CREAT SERPL-MCNC: 1.42 MG/DL (ref 0.52–1.04)
GFR SERPL CREATININE-BSD FRML MDRD: 35 ML/MIN/1.73M2
GLUCOSE BLD-MCNC: 88 MG/DL (ref 70–99)
POTASSIUM BLD-SCNC: 4.6 MMOL/L (ref 3.4–5.3)
SODIUM SERPL-SCNC: 141 MMOL/L (ref 133–144)

## 2022-03-03 PROCEDURE — 99214 OFFICE O/P EST MOD 30 MIN: CPT | Performed by: INTERNAL MEDICINE

## 2022-03-03 RX ORDER — DILTIAZEM HYDROCHLORIDE 120 MG/1
CAPSULE, EXTENDED RELEASE ORAL
COMMUNITY
Start: 2021-11-17 | End: 2022-03-03

## 2022-03-03 RX ORDER — LANOLIN ALCOHOL/MO/W.PET/CERES
CREAM (GRAM) TOPICAL
Qty: 60 TABLET | Refills: 3 | COMMUNITY
Start: 2022-03-03

## 2022-03-03 RX ORDER — MIRTAZAPINE 7.5 MG/1
7.5 TABLET, FILM COATED ORAL AT BEDTIME
Qty: 30 TABLET | Refills: 3 | Status: ON HOLD | OUTPATIENT
Start: 2022-03-03 | End: 2023-01-01

## 2022-03-03 NOTE — PROGRESS NOTES
Patient's instructions / PLAN:                                                        Plan:  1. Melatonin Take 6 mg at bed time for 2 weeks then take 3 mg at bed time  2. Continue the other meds, same doses for now.  3.  Labs today - [address]   4. Lung nodule  clinic referral   5. Follow up appointment Video March 18 at 09:00   6. Hold the furosemide until the next appointment   7. Make sure you drink plenty of water   8. Schedule nonfasting labs 2-3 days before the March 18 appointment           ASSESSMENT & PLAN:                                                      The patient was admitted at [facility] with mental status changes.  I reviewed the discharge summary and I discussed her case with Dr.Brett Olivo who discharged the patient.  She had extensive work-up for her  Acute encephalopathy without any specific findings.  It was concluded that the culprit might have been sleep deprivation.  There are still some pending results.  She will follow-up with the neurologist who evaluated her during the hospital stay.  Overall she feels much better.  The patient and her family plan to move to the [facility].  I filled out the papers for admission.  During the extensive work-up she had on this recent hospitalization, she was found with 1.5 cm RLL nodule.  I made a referral to the lung nodule clinic, any they do not see her in the next month, she will go 3 months follow-up chest CT.  Her creatinine was elevated during the hospital stay, they have decreased to 1.22 with the discharge time.  We repeated the BMP yesterday which showed again an increase of creatinine to 1.4.  We will hold the Lasix and I advised her to drink more fluids, as above.      (Z09) Hospital discharge follow-up  (primary encounter diagnosis)  (G93.40) Acute encephalopathy--   Plan: CBC with platelets, Comprehensive metabolic         panel,      (R91.8) Pulmonary nodules  Comment:   Plan: CT Chest w/o Contrast, Adult          Oncology/Hematology Referral, CANCELED: CBC         with platelets, CANCELED: Comprehensive         metabolic panel            (I10) HTN goal < 140/90  Comment: Controlled    Plan: CBC with platelets, Comprehensive metabolic         panel, CANCELED: CBC with platelets, CANCELED:         Comprehensive metabolic panel            (G47.9) Sleep disorder  Comment:   Plan: mirtazapine (REMERON) 7.5 MG tablet, melatonin         3 MG tablet            (N18.32) Chronic kidney disease, stage 3b (H)  Comment:   Plan: Monitor labs     (Z02.9) Administrative encounter  Comment:   Plan: Forms for assisted living       Chief Complaint:                                                       Hosp f/u   Daughter present    SUBJECTIVE:                                                    History of present illness     Recently discharged from the hospital.  She feels much better    DCDx:  Assessment and Plan: Glendy Díaz is a 88 y.o. female with pertinent pmhx of SSS s/p PPM, Paroxysmal Atrial Fibrillation not on Chronic AC, Hx of Paroxysmal SVT, Hypertension, Hyperlipidemia, CKD Stage III, Chronic Lower Extremity Edema on Loop Diuretic, COPD, Anxiety, Chronic Pain Syndrome admitted on 2/18/2022 with agitation and delusions    1. Acute Encephalopathy: Resolved, UA w/o concern, LFT's wnl, TSH wnl, CT as above   -Neurology consulted: unclear etiology: mild dementia un-masked by sleep deprivation, auto-immune encephalitis?   -Brain MRI (has PPM, reportedly compatible, but need to wait until 2/21 to complete)  -(2/19): EEG completed w/o concern, (2/20-2/21) extended EEG w/o concern   -(2/19): s/p IR LP, f/u outstanding labs, cell count w/o concern   -f/u outstanding serum encephalopathy labs   -Con CT C/A/P to eval for occult malignancy   -psychiatry consulted:   -72 hour hold not needed has [facility]  -cont 1-to-1  -Remeron 7.5 mg qhs, melatonin 9 mg hqs   -d/c scheduled anti-psychotic, cont zyprexa prn at lower dose   -PT/OT     2. Elevated  "Creatinine on CKD Stage III: Resolved, Cr peak of 1.54, BUN previously elevated, resolved with 500cc NS bolus. Cr Baseline 1.1-1.3, eGFR's: 30-40's, UA: 7 hyaline casts. Suspect pre-renal.     -hold pta lasix acutely    3. Paroxysmal Atrial Fibrillation: pta Diltiazem 120 mg qd, Metoprolol  mg qd, of note not on AC chronically.     4. Chronic Lower Extremity Edema: hold pta lasix 20 mg qd in acute setting, cont to re-eval     5. Chronic Neuropathy: hold pta Neurontin 100 mg qafternoon, 200 mg qhs in acute setting, cont to re-robyn         ROS:                                                      ROS: negative for fever, chills, cough, wheezes, chest pain, shortness of breath, vomiting, abdominal pain, leg swelling       OBJECTIVE:                                                    Physical Exam :    Blood pressure 124/72, pulse 75, temperature 98.4  F (36.9  C), temperature source Tympanic, resp. rate 18, height 1.613 m (5' 3.5\"), weight 63.2 kg (139 lb 6.4 oz), SpO2 94 %, not currently breastfeeding.   NAD, appears comfortable  Skin: no rashes   Neck: supple, no JVD, No thyroidmegaly. Lymph nodes nonpalpable cervical and supraclavicular.  Chest: clear to auscultation bilaterally, good respiratory effort  Heart: S1 S2, RRR, no mgr appreciated  Abdomen: soft, not tender,   Extremities: no edema,   Neurologic: A, Ox3, no focal signs appreciated    PMHx: reviewed  Past Medical History:   Diagnosis Date     CKD (chronic kidney disease) stage 3, GFR 30-59 ml/min (H)      Falls frequently      Hematuria      Hyperlipidemia LDL goal <100      Hyperparathyroidism (H)      Hypertension     No Cardiologist     Incontinence      Mumps      Neck pain, chronic      Osteoarthritis      Paroxysmal atrial fibrillation (H)      Peripheral neuropathy      Sinus node dysfunction (H)       PSHx: reviewed  Past Surgical History:   Procedure Laterality Date     APPENDECTOMY       ARTHRODESIS FOOT  5/1/2014    Procedure: ARTHRODESIS " FOOT;  Surgeon: Sid Marks DPM;  Location: [facility] OR     ARTHROSCOPY ANKLE, OPEN REPAIR LIGAMENT, COMBINED  5/31/2012    Procedure:COMBINED ARTHROSCOPY ANKLE, OPEN REPAIR LIGAMENT; LEFT ANKLE ARTHROSCOPY, LATERAL LIGAMENT RECONSTRUCTION, ENDOSCOPIC KIRIT NAQVI SECOND TOE RAY CORRECTION    LEFT KNEE Marcaine AND CORTIZONE INJECTION, 5 CC Marcaine, 1 CC CELESTONE, ; Surgeon:VARSHA WILDER; Location:[facility]     BLADDER SURGERY       CATARACT IOL, RT/LT       CHOLECYSTECTOMY       CYSTOSCOPY       EP PPM INSERT OF NEW OR REPL W/VENT LEAD N/A 3/3/2021    Procedure: EP Pacemaker;  Surgeon: Bobby Hester MD;  Location:  [facility]ATH [facility]     EXCISE MASS FOOT  12/4/2012    Procedure: EXCISE MASS FOOT;;  Surgeon: Varsha Wilder MD;  Location: [facility]     HYSTERECTOMY TOTAL ABDOMINAL      ovaries are in     RELEASE TENDON TOE  5/1/2014    Procedure: RELEASE TENDON TOE;  Surgeon: Sid Marks DPM;  Location: [facility] OR     REMOVE HARDWARE FOOT  12/4/2012    Procedure: REMOVE HARDWARE FOOT;  REMOVAL HARDWARE(SYNTHES SCREW) LEFT FOOT, EXCISION OF INCLUSION CYST;  Surgeon: Varsha Wilder MD;  Location: [facility]     REPAIR HAMMER TOE  5/1/2014    Procedure: REPAIR HAMMER TOE;  Surgeon: Sid Marks DPM;  Location: [facility] OR     REVERSE ARTHROPLASTY SHOULDER Right 7/18/2016    Procedure: REVERSE ARTHROPLASTY SHOULDER;  Surgeon: Marlo Alejandro MD;  Location: [facility] OR        Meds: reviewed  Current Outpatient Medications   Medication Sig Dispense Refill     acetaminophen (TYLENOL) 325 MG tablet Take 650 mg by mouth every 8 hours as needed for mild pain or fever       aspirin 81 MG EC tablet Take 81 mg by mouth At Bedtime       cetirizine (ZYRTEC) 10 MG tablet Take 10 mg by mouth daily as needed for allergies  90 tablet 3     cevimeline (EVOXAC) 30 MG capsule Take 1 capsule (30 mg) by mouth 2 times daily as needed (dry mouth) 60 capsule 11     Cholecalciferol (VITAMIN D) 2000 UNITS  tablet Take 2,000 Units by mouth daily (Patient taking differently: Take 2,000 Units by mouth daily Vitamin d3) 100 tablet 3     diltiazem ER (DILT-XR) 120 MG 24 hr capsule TAKE ONE CAPSULE BY MOUTH EVERY DAY FOR HEART RHYTHM       diltiazem ER (TIAZAC) 120 MG 24 hr ER beaded capsule Take 1 capsule (120 mg) by mouth daily 90 capsule 3     fluticasone (FLONASE) 50 MCG/ACT nasal spray SPRAY 1 SPRAY INTO BOTH NOSTRILS DAILY AS NEEDED FOR RHINITIS OR ALLERGIES 16 g 0     furosemide (LASIX) 20 MG tablet Take 1 tablet (20 mg) by mouth daily as needed (swollen legs) 30 tablet 1     gabapentin (NEURONTIN) 100 MG capsule TAKE 1 CAPSULE BY MOUTH IN  THE AFTERNOON AND 2  CAPSULES AT BEDTIME 270 capsule 0     hydrocortisone, Perianal, (HYDROCORTISONE) 2.5 % cream Place rectally 2 times daily as needed for hemorrhoids 30 g 1     metoprolol succinate ER (TOPROL-XL) 100 MG 24 hr tablet Take 1 tablet (100 mg) by mouth daily 90 tablet 3     mirabegron (MYRBETRIQ) 50 MG 24 hr tablet Take 1 tablet (50 mg) by mouth daily 90 tablet 1     polyethylene glycol (MIRALAX) 17 g packet Take 1 packet by mouth daily as needed for constipation Mix in 8 ounces of water until completely dissolved       senna-docusate (SENOKOT-S/PERICOLACE) 8.6-50 MG tablet Take 1 tablet by mouth daily as needed for constipation       solifenacin (VESICARE) 10 MG tablet Take 1 tablet (10 mg) by mouth daily 90 tablet 4     vitamin B-12 (CYANOCOBALAMIN) 2500 MCG sublingual tablet Take 2,500 mcg by mouth daily         Soc Hx: reviewed  Fam Hx: reviewed           Hospital Follow-up Visit:    Hospital/Nursing Home/IP Rehab Facility: [facility]  Date of Admission: February 18  Date of Discharge: February 25  Reason(s) for Admission: Mental status changes      Was your hospitalization related to COVID-19? No   Problems taking medications regularly:  None  Medication changes since discharge: as above   Problems adhering to non-medication therapy:  None    Summary of  hospitalization:  CareEverywhere information obtained and reviewed  Diagnostic Tests/Treatments reviewed.  Follow up needed: as above   Other Healthcare Providers Involved in Patient s Care:         neuro, cardio  Update since discharge: stable. Post Discharge Medication Reconciliation: discharge medications reconciled and changed, per note/orders.  Plan of care communicated with patient and family             65 minutes spent on the date of the encounter doing   chart review,   review of outside records,   review of test results,   interpretation of tests,   patient visit,   documentation,   discussion with other provider(s),   discussion with family and      Veronica Steiner MD  Internal Medicine

## 2022-03-03 NOTE — PATIENT INSTRUCTIONS
Plan:  1. Melatonin Take 6 mg at bed time for 2 weeks then take 3 mg at bed time  2. Continue the other meds, same doses for now.  3.  Labs today - [address]   4. Lung nodule  clinic referral   5. Follow up appointment Video March 18 at 09:00   6. Hold the furosemide until the next appointment   7. Make sure you drink plenty of water   8. Schedule nonfasting labs 2-3 days before the March 18 appointment

## 2022-03-04 ENCOUNTER — DOCUMENTATION ONLY (OUTPATIENT)
Dept: OTHER | Age: 87
End: 2022-03-04
Payer: MEDICARE

## 2022-03-04 ENCOUNTER — MEDICAL CORRESPONDENCE (OUTPATIENT)
Dept: HEALTH INFORMATION MANAGEMENT | Facility: CLINIC | Age: 87
End: 2022-03-04
Payer: MEDICARE

## 2022-03-04 ENCOUNTER — TRANSFERRED RECORDS (OUTPATIENT)
Dept: HEALTH INFORMATION MANAGEMENT | Facility: CLINIC | Age: 87
End: 2022-03-04
Payer: MEDICARE

## 2022-03-04 NOTE — PROGRESS NOTES
Action 03/04/2022@1129 cdk   Action Taken Ct requested from [facility]  Ck       Action 03/04/2022 1145am cdk   Action Taken Ct resolved from [facility]  ck

## 2022-03-07 ENCOUNTER — TELEPHONE (OUTPATIENT)
Dept: INTERNAL MEDICINE | Facility: CLINIC | Age: 87
End: 2022-03-07
Payer: MEDICARE

## 2022-03-08 ENCOUNTER — MEDICAL CORRESPONDENCE (OUTPATIENT)
Dept: HEALTH INFORMATION MANAGEMENT | Facility: CLINIC | Age: 87
End: 2022-03-08
Payer: MEDICARE

## 2022-03-09 ENCOUNTER — TELEPHONE (OUTPATIENT)
Dept: CARDIOLOGY | Facility: CLINIC | Age: 87
End: 2022-03-09
Payer: MEDICARE

## 2022-03-09 ENCOUNTER — TELEPHONE (OUTPATIENT)
Dept: INTERNAL MEDICINE | Facility: CLINIC | Age: 87
End: 2022-03-09
Payer: MEDICARE

## 2022-03-09 DIAGNOSIS — Z95.0 CARDIAC PACEMAKER IN SITU: ICD-10-CM

## 2022-03-09 DIAGNOSIS — I48.0 PAROXYSMAL A-FIB (H): Primary | ICD-10-CM

## 2022-03-09 RX ORDER — AMIODARONE HYDROCHLORIDE 200 MG/1
100 TABLET ORAL DAILY
Qty: 90 TABLET | Refills: 3 | Status: SHIPPED | OUTPATIENT
Start: 2022-03-09 | End: 2022-03-10

## 2022-03-09 RX ORDER — AMIODARONE HYDROCHLORIDE 200 MG/1
100 TABLET ORAL DAILY
Qty: 90 TABLET | Refills: 3 | Status: SHIPPED | OUTPATIENT
Start: 2022-03-09 | End: 2022-03-09

## 2022-03-09 NOTE — TELEPHONE ENCOUNTER
Remote alert received from [employer] dual chamber PPM for VT episode and NSVT episode.     Since last remote check on 2/15/22 patient had 2 VT episodes that occurred on 3/8/22 at 1906 and 2059. 1 of EGM shows ST/SVT with rates of 150 that progresses into VT lasting 22sec and then into AT with V rates of 120-130bpm. The other EGM shows Afib/Afl with RVR V rates of 100-170bpm. Episode lasted 47sec.      31 NSVT episodes logged since last remote check on 2/15/22 with 20 NSVT episodes in the logbook from 3/8/22. 4 EGMs available for review show Afib with V rates of 150-190bpm. Episodes lasted 13-23sec.     25 SVT episodes logged since last device check on 2/15/22. 9 EGMs available for review. 7 EGMs show ST/SVT that progresses into a VT lasting 22 sec with V rates of 200-230bpm. The other 2 EGMs show Afib/Afl with RVR with rates of 150-190bpm. Longest episode of lasted [id number].    Patient also had 61 AT/AF episodes logged since last remote check. Longest episode lasting [id number] and occurred on 3/8/225. Total AT/AF burden is <1%. Per heart rate histograms heart rates while in Afib are >100bpm around 75% of there time. (see bar graph below).        Presenting rhythm shows AS/VS 60-70bpm.     On Metoprolol 100mg daily and Diltiazem(not on since 3/4/22, see note below). Patient is not on OAC due to history of frailty/fall/confusion.         Spoke with patient's daughter Abigail regarding episodes. Abigail stated that patient recently was admitted to the hospital and evaluated due to hallucinations and paranoia.  At that time some of patient's medications were changed and she has not had diltiazem since Friday(3/4/22). Daughter stated that patient has stated that she feels something was going on with her heart, but not able to elaborate more.    VT episode on 3/8/22 at 1906:           SVT episode logged shows Vt with rates of 200-220bpm that progresses into Afib/Afl with RVR.

## 2022-03-09 NOTE — TELEPHONE ENCOUNTER
Called patient's daughter Abigail and reviewed Dr Rey's recommendations to start Amiodarone 100mg daily. Verified pharmacy and prescription was sent.     Updated medication list and removed diltiazem as daughter stated in previous conversation this was discontinued when she left the hospital.

## 2022-03-09 NOTE — TELEPHONE ENCOUNTER
Patient's daughter Abigail called stating that she changed mind on what pharmacy amiodarone should be sent to. [facility]bigail requesting to be sent to [facility] in [place]    [facility] and [facility] to [facility] in [place].     Daughter requesting order be sent to [facility] where Glendy resides. Spoke with Nurse at the [facility] and verified fax number [phone].    Will send fax.

## 2022-03-10 DIAGNOSIS — Z53.9 DIAGNOSIS NOT YET DEFINED: Primary | ICD-10-CM

## 2022-03-10 PROCEDURE — G0180 MD CERTIFICATION HHA PATIENT: HCPCS | Performed by: INTERNAL MEDICINE

## 2022-03-10 RX ORDER — AMIODARONE HYDROCHLORIDE 200 MG/1
100 TABLET ORAL DAILY
Qty: 90 TABLET | Refills: 3 | Status: SHIPPED | OUTPATIENT
Start: 2022-03-10

## 2022-03-10 NOTE — TELEPHONE ENCOUNTER
Received call from patient's daughter Abigail. She states that she picked up a 30 day supply of amiodarone from [facility]. She would like to get Glendy[facility] in the [facility] so it is free to them. [facility]. Patient will use her current supply from [facility] and then get all [facility].

## 2022-03-11 ENCOUNTER — TELEPHONE (OUTPATIENT)
Dept: INTERNAL MEDICINE | Facility: CLINIC | Age: 87
End: 2022-03-11
Payer: MEDICARE

## 2022-03-13 ENCOUNTER — MEDICAL CORRESPONDENCE (OUTPATIENT)
Dept: HEALTH INFORMATION MANAGEMENT | Facility: CLINIC | Age: 87
End: 2022-03-13
Payer: MEDICARE

## 2022-03-14 ENCOUNTER — MEDICAL CORRESPONDENCE (OUTPATIENT)
Dept: HEALTH INFORMATION MANAGEMENT | Facility: CLINIC | Age: 87
End: 2022-03-14
Payer: MEDICARE

## 2022-03-15 ENCOUNTER — DOCUMENTATION ONLY (OUTPATIENT)
Dept: OTHER | Facility: CLINIC | Age: 87
End: 2022-03-15
Payer: MEDICARE

## 2022-03-18 NOTE — TELEPHONE ENCOUNTER
RECORDS STATUS - ALL OTHER DIAGNOSIS      RECORDS RECEIVED FROM: [facility], [facility]   DATE RECEIVED:    NOTES STATUS DETAILS   OFFICE NOTE from referring provider Epic 03/03/22: Dr. Veronica Davis   DISCHARGE SUMMARY from hospital [facility] 02/18/22: [facility]   MEDICATION LIST [facility]    LABS     ANYTHING RELATED TO DIAGNOSIS Epic Most recent labs 03/02/22   IMAGING (NEED IMAGES & REPORT)     CT SCANS PACS  Req 3/18- CE- 01/06/21: CT Chest    02/21/22: CT Chest Abd Pel ([facility])   XRAYS PACS  Req 3/18- CE- 03/04/21-02/16/14: XR Chest  02/21/22: XR Chest

## 2022-03-24 ENCOUNTER — PRE VISIT (OUTPATIENT)
Dept: PULMONOLOGY | Facility: CLINIC | Age: 87
End: 2022-03-24
Payer: MEDICARE

## 2022-03-28 ENCOUNTER — MEDICAL CORRESPONDENCE (OUTPATIENT)
Dept: HEALTH INFORMATION MANAGEMENT | Facility: CLINIC | Age: 87
End: 2022-03-28
Payer: MEDICARE

## 2022-04-12 ENCOUNTER — LAB REQUISITION (OUTPATIENT)
Dept: LAB | Facility: CLINIC | Age: 87
End: 2022-04-12
Payer: MEDICARE

## 2022-04-12 DIAGNOSIS — Z03.818 ENCOUNTER FOR OBSERVATION FOR SUSPECTED EXPOSURE TO OTHER BIOLOGICAL AGENTS RULED OUT: ICD-10-CM

## 2022-04-12 PROCEDURE — U0005 INFEC AGEN DETEC AMPLI PROBE: HCPCS | Mod: ORL | Performed by: FAMILY MEDICINE

## 2022-04-13 LAB — SARS-COV-2 RNA RESP QL NAA+PROBE: NEGATIVE

## 2022-04-20 ENCOUNTER — TRANSFERRED RECORDS (OUTPATIENT)
Dept: HEALTH INFORMATION MANAGEMENT | Facility: CLINIC | Age: 87
End: 2022-04-20

## 2022-04-21 ENCOUNTER — TRANSFERRED RECORDS (OUTPATIENT)
Dept: HEALTH INFORMATION MANAGEMENT | Facility: CLINIC | Age: 87
End: 2022-04-21

## 2022-04-21 PROCEDURE — U0005 INFEC AGEN DETEC AMPLI PROBE: HCPCS | Mod: ORL | Performed by: FAMILY MEDICINE

## 2022-04-22 ENCOUNTER — LAB REQUISITION (OUTPATIENT)
Dept: LAB | Facility: CLINIC | Age: 87
End: 2022-04-22
Payer: MEDICARE

## 2022-04-22 DIAGNOSIS — Z03.818 ENCOUNTER FOR OBSERVATION FOR SUSPECTED EXPOSURE TO OTHER BIOLOGICAL AGENTS RULED OUT: ICD-10-CM

## 2022-04-22 LAB — SARS-COV-2 RNA RESP QL NAA+PROBE: NEGATIVE

## 2022-04-25 ENCOUNTER — TRANSFERRED RECORDS (OUTPATIENT)
Dept: HEALTH INFORMATION MANAGEMENT | Facility: CLINIC | Age: 87
End: 2022-04-25
Payer: MEDICARE

## 2022-05-13 ENCOUNTER — TELEPHONE (OUTPATIENT)
Dept: CARDIOLOGY | Facility: CLINIC | Age: 87
End: 2022-05-13
Payer: MEDICARE

## 2022-05-13 DIAGNOSIS — I48.0 PAROXYSMAL A-FIB (H): ICD-10-CM

## 2022-05-13 DIAGNOSIS — Z95.0 CARDIAC PACEMAKER IN SITU: Primary | ICD-10-CM

## 2022-05-16 ENCOUNTER — TRANSFERRED RECORDS (OUTPATIENT)
Dept: HEALTH INFORMATION MANAGEMENT | Facility: CLINIC | Age: 87
End: 2022-05-16
Payer: MEDICARE

## 2022-05-28 ENCOUNTER — APPOINTMENT (OUTPATIENT)
Dept: CT IMAGING | Facility: CLINIC | Age: 87
End: 2022-05-28
Attending: EMERGENCY MEDICINE
Payer: MEDICARE

## 2022-05-28 ENCOUNTER — APPOINTMENT (OUTPATIENT)
Dept: GENERAL RADIOLOGY | Facility: CLINIC | Age: 87
End: 2022-05-28
Attending: EMERGENCY MEDICINE
Payer: MEDICARE

## 2022-05-28 ENCOUNTER — HOSPITAL ENCOUNTER (EMERGENCY)
Facility: CLINIC | Age: 87
Discharge: HOME | End: 2022-05-28
Attending: EMERGENCY MEDICINE | Admitting: EMERGENCY MEDICINE
Payer: MEDICARE

## 2022-05-28 ENCOUNTER — MEDICAL CORRESPONDENCE (OUTPATIENT)
Dept: HEALTH INFORMATION MANAGEMENT | Facility: CLINIC | Age: 87
End: 2022-05-28

## 2022-05-28 VITALS
SYSTOLIC BLOOD PRESSURE: 100 MMHG | OXYGEN SATURATION: 99 % | RESPIRATION RATE: 15 BRPM | HEART RATE: 64 BPM | DIASTOLIC BLOOD PRESSURE: 75 MMHG | TEMPERATURE: 98 F

## 2022-05-28 DIAGNOSIS — S09.90XA CLOSED HEAD INJURY, INITIAL ENCOUNTER: ICD-10-CM

## 2022-05-28 DIAGNOSIS — S01.01XA LACERATION OF SCALP, INITIAL ENCOUNTER: ICD-10-CM
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
## 2022-05-28 PROCEDURE — 72170 X-RAY EXAM OF PELVIS: CPT

## 2022-05-28 PROCEDURE — G1004 CDSM NDSC: HCPCS

## 2022-05-28 PROCEDURE — 71045 X-RAY EXAM CHEST 1 VIEW: CPT

## 2022-05-28 PROCEDURE — 99285 EMERGENCY DEPT VISIT HI MDM: CPT | Mod: 25

## 2022-05-28 PROCEDURE — 12001 RPR S/N/AX/GEN/TRNK 2.5CM/<: CPT

## 2022-05-28 RX ORDER — LIDOCAINE HYDROCHLORIDE AND EPINEPHRINE 10; 10 MG/ML; UG/ML
INJECTION, SOLUTION INFILTRATION; PERINEURAL
Status: DISCONTINUED
Start: 2022-05-28 | End: 2022-05-28 | Stop reason: HOSPADM

## 2022-05-28 ASSESSMENT — ENCOUNTER SYMPTOMS
HEADACHES: 1
ABDOMINAL PAIN: 0
BACK PAIN: 0
ARTHRALGIAS: 1
NECK PAIN: 0

## 2022-05-28 NOTE — ED PROVIDER NOTES
History   Chief Complaint:  Fall and Head Injury    The history is provided by the patient. The history is limited by the condition of the patient.      Glendy Díaz is a 88 year old female with a history of frequent falls and hypertension and Afib who presents via EMS in a C-collar with fall and head injury.  Today at her memory care facility, she lost balance, fell and hit her head.  Reports no loss of consciousness.  She reports head pain and right elbow soreness.  Does not indicate abdominal, chest, right shoulder, back, neck, or right finger pain.  Of note, she is in a right forearm cast after fall 1 month ago (per daughter).  She is not on blood thinners other than one baby aspirin.     Daughter reports that patient has history of frequent falls.  She sleeps in a chair since moving to [facility] and as result has had increasing swelling in both of her legs.    Review of Systems   Cardiovascular: Negative for chest pain.   Gastrointestinal: Negative for abdominal pain.   Musculoskeletal: Positive for arthralgias. Negative for back pain and neck pain.   Neurological: Positive for headaches. Negative for syncope.   All other systems reviewed and are negative.    Allergies:  Amlodipine  Fosamax [Alendronic Acid]  Lisinopril  Pravastatin  Simvastatin    Medications:  Amiodarone  Aspirin 81 MG  Fluticasone  Furosemide  Gabapentin  Metoprolol Succinate ER  Mirtazapine  Polyethylene glycol  Solifenacin    Past Medical History:     Falls frequently  Hematuria  Hyperlipidemia  Hyperparathyroidism  Hypertension  Incontinence  Mumps  Neck pain, chronic  Osteoarthritis  Osteopenia   Paroxysmal atrial fibrillation  Peripheral neuropathy  Sinus node dysfunction  Presence of permanent cardiac pacemaker  Stage 3 chronic kidney disease  COPD  Bradycardia  SVT  Subclinical hyperthyroidism    Past Surgical History:    Appendectomy  Arthrodesis foot  Arthroscopy ankle, open repair ligament (L)  Bladder  surgery  Cholecystectomy  Cataract IOL  Insert pacemaker  Excise mass foot  Hysterectomy (total abdominal)  Release tendon toe  Remove hardware foot  Repair hammer toe  Reverse arthroplasty shoulder     Family History:    Mother - hyperlipidemia, diabetes, kidney disease    Social History:  Presents alone  Presents via EMS from [facility]    Physical Exam     Patient Vitals for the past 24 hrs:   BP Temp Temp src Pulse Resp SpO2   05/28/22 1100 128/59 -- -- 68 10 --   05/28/22 0945 127/60 -- -- 70 10 99 %   05/28/22 0930 127/61 -- -- 70 14 98 %   05/28/22 0915 135/59 -- -- 72 11 98 %   05/28/22 0914 130/64 98  F (36.7  C) Oral 76 20 99 %       Physical Exam  VS: Reviewed per above  HENT: Normal speech, 2 cm occipital scalp laceration.  No facial trauma.  C-collar in place.  No midline C-spine tenderness.  EYES: sclera anicteric  CV: Rate as noted, regular rhythm.   RESP: Effort normal. Breath sounds are normal bilaterally.  GI: no tenderness/rebound/guarding, not distended.  NEURO: GCS 14 (baseline), cranial nerves II through XII are intact, 5 out of 5 strength in all 4 extremities, sensation is intact light touch in all 4 extremities  MSK: No deformity of the extremities, symmetric lower extremity peripheral edema.  No midline C/T/L-spine tenderness.  No chest wall tenderness.  SKIN: Warm and dry      Emergency Department Course   Imaging:  XR Chest 1 View   Final Result   IMPRESSION: Dual-chamber cardiac pacer. Reversed right total shoulder arthroplasty. Calcified aortic atherosclerosis. No change from prior.      XR Pelvis 1/2 Views   Final Result   IMPRESSION: No fracture or dislocation. Osteopenia.      Head CT w/o contrast   Preliminary Result   IMPRESSION:   1.  Right posterior scalp injury without underlying fracture or acute intracranial abnormality.         Cervical spine CT w/o contrast   Final Result   IMPRESSION:   1.  No fracture or posttraumatic subluxation.   2.  Spondylosis with spinal  canal and foraminal stenoses at C5-C6.           Report per radiology    Procedures     Laceration Repair      Procedure: Laceration Repair    Indication: Laceration    Consent: Verbal    Location: Right Occipital Scalp     Length: 2 cm    Preparation: Irrigation with Sterile Saline.    Anesthesia: Lidocaine with Epinephrine - 1%   Anxiolysis: None  Sedation: No sedation.      Treatment/Exploration: Wound explored, no foreign bodies found     Closure: The wound was closed with 6 staples.    Patient Status: The patient tolerated the procedure well: Yes. There were no complications.    Emergency Department Course:         Reviewed:  I reviewed nursing notes, vitals, past medical history and Care Everywhere    Assessments:  0918 I obtained history and examined the patient as noted above.   1130    I repaired patient's laceration.  1156 I rechecked the patient and explained findings.     Disposition:  The patient was discharged to home.     Impression & Plan   Medical Decision Making:  Patient presents to the ER for evaluation of unwitnessed fall at [facility].  On arrival vital signs are reassuring.  History limited due to underlying cognitive impairment.  Patient's daughter explains the patient has history of frequent falls.  Daughter Abigail came to bedside.  She is the self-reported medical power of  for this patient.  She informs me that she thinks patient would like to pursue limited evaluation but not blood testing or urinalysis, given overall goals of care are seemingly more comfort focused at this point.  CT of the head and cervical spine did not show acute injury.  Chest x-ray and pelvis x-ray were also negative for acute injury.  Laceration of the scalp repaired with staples per procedure note above.  Plan for staple removal in 10 to 14 days.  Patient ambulated with walker with steady gait.  Patient was discharged in the care of her daughter back to [facility].    Diagnosis:    ICD-10-CM    1. Fall,  initial encounter  W19.XXXA    2. Closed head injury, initial encounter  S09.90XA    3. Laceration of scalp, initial encounter  S01.01XA        Discharge Medications:  New Prescriptions    No medications on file       Scribe Disclosure:  MAGO, Mary Ellen Lainez & Marah Mary, am serving as a scribe at 9:15 AM on 5/28/2022 to document services personally performed by Brian Reina MD based on my observations and the provider's statements to me.            Brian Reina MD  05/28/22 2288

## 2022-05-28 NOTE — ED TRIAGE NOTES
Arrives via EMS after a fall this morning. Patient states she lost her balance while getting dressed, falling backwards and hitting her head on the sink. Arrives with c-collar in place, has laceration to back of head, keeping eyes closed, but opens eyes to verbal stimulation and responds appropriately. ABCs intact at this time.     Triage Assessment     Row Name 05/28/22 0915       Triage Assessment (Adult)    Airway WDL WDL       Respiratory WDL    Respiratory WDL WDL       Cardiac WDL    Cardiac WDL WDL       Peripheral/Neurovascular WDL    Peripheral Neurovascular WDL WDL       Cognitive/Neuro/Behavioral WDL    Cognitive/Neuro/Behavioral WDL WDL

## 2022-05-28 NOTE — ED NOTES
Bed: ED13  Expected date: 5/28/22  Expected time:   Means of arrival: Ambulance  Comments:  Pablo Cornejo2

## 2022-05-31 ENCOUNTER — OFFICE VISIT (OUTPATIENT)
Dept: CARDIOLOGY | Facility: CLINIC | Age: 87
End: 2022-05-31
Payer: MEDICARE

## 2022-05-31 VITALS
HEART RATE: 64 BPM | DIASTOLIC BLOOD PRESSURE: 61 MMHG | SYSTOLIC BLOOD PRESSURE: 114 MMHG | BODY MASS INDEX: 24.8 KG/M2 | WEIGHT: 140 LBS | HEIGHT: 63 IN

## 2022-05-31 DIAGNOSIS — I49.5 SICK SINUS SYNDROME (H): ICD-10-CM

## 2022-05-31 DIAGNOSIS — I48.0 PAROXYSMAL A-FIB (H): ICD-10-CM

## 2022-05-31 DIAGNOSIS — Z95.0 PRESENCE OF PERMANENT CARDIAC PACEMAKER: ICD-10-CM

## 2022-05-31 DIAGNOSIS — R60.0 BILATERAL LEG EDEMA: ICD-10-CM

## 2022-05-31 PROCEDURE — 99214 OFFICE O/P EST MOD 30 MIN: CPT | Performed by: INTERNAL MEDICINE

## 2022-05-31 RX ORDER — FUROSEMIDE 20 MG
20 TABLET ORAL DAILY PRN
Qty: 90 TABLET | Refills: 3 | Status: ON HOLD | OUTPATIENT
Start: 2022-05-31 | End: 2023-01-01

## 2022-05-31 RX ORDER — OLANZAPINE 5 MG/1
2.5 TABLET ORAL 2 TIMES DAILY
COMMUNITY
Start: 2022-04-13

## 2022-05-31 NOTE — LETTER
5/31/2022    MD [facility] [address] [facility] [place] [address]    RE: Glendy Díaz       Dear Colleague,     I had the pleasure of seeing Glendy Díaz in the [facility].  HPI and Plan:   See dictation        Orders Placed This Encounter   Procedures     Hepatic panel     TSH with free T4 reflex     Basic metabolic panel     Follow-Up with Cardiology- EP     Follow-Up with Cardiology - KATELYN       Orders Placed This Encounter   Medications     OLANZapine (ZYPREXA) 5 MG tablet     Sig: Take 2.5 mg by mouth       There are no discontinued medications.      Encounter Diagnoses   Name Primary?     Paroxysmal A-fib (H)      Sick sinus syndrome (H)      Presence of permanent cardiac pacemaker        CURRENT MEDICATIONS:  Current Outpatient Medications   Medication Sig Dispense Refill     acetaminophen (TYLENOL) 325 MG tablet Take 650 mg by mouth every 8 hours as needed for mild pain or fever       amiodarone (PACERONE) 200 MG tablet Take 0.5 tablets (100 mg) by mouth daily 90 tablet 3     aspirin 81 MG EC tablet Take 81 mg by mouth At Bedtime Prn       Cholecalciferol (VITAMIN D) 2000 UNITS tablet Take 2,000 Units by mouth daily (Patient taking differently: Take 2,000 Units by mouth daily Vitamin d3) 100 tablet 3     fluticasone (FLONASE) 50 MCG/ACT nasal spray SPRAY 1 SPRAY INTO BOTH NOSTRILS DAILY AS NEEDED FOR RHINITIS OR ALLERGIES 16 g 0     melatonin 3 MG tablet Take 6 mg at bed time for 2 weeks then take 3 mg at bed time 60 tablet 3     metoprolol succinate ER (TOPROL-XL) 100 MG 24 hr tablet Take 1 tablet (100 mg) by mouth daily 90 tablet 3     mirtazapine (REMERON) 7.5 MG tablet Take 1 tablet (7.5 mg) by mouth At Bedtime 30 tablet 3     OLANZapine (ZYPREXA) 5 MG tablet Take 2.5 mg by mouth       polyethylene glycol (MIRALAX) 17 g packet Take 1 packet by mouth daily as needed for constipation Mix in 8 ounces of water until completely dissolved       solifenacin  (VESICARE) 10 MG tablet Take 1 tablet (10 mg) by mouth daily 90 tablet 4     vitamin B-12 (CYANOCOBALAMIN) 2500 MCG sublingual tablet Take 2,500 mcg by mouth daily       furosemide (LASIX) 20 MG tablet Take 1 tablet (20 mg) by mouth daily as needed (swollen legs) (Patient not taking: Reported on 5/31/2022) 30 tablet 1     gabapentin (NEURONTIN) 100 MG capsule TAKE 1 CAPSULE BY MOUTH IN  THE AFTERNOON AND 2  CAPSULES AT BEDTIME (Patient not taking: Reported on 5/31/2022) 270 capsule 0       ALLERGIES     Allergies   Allergen Reactions     Amlodipine      Leg edema with 5 mg     Fosamax [Alendronic Acid]      Bone pain     Lisinopril      Increased potassium     Pravastatin      Muscle aches      Simvastatin      Muscle aches        PAST MEDICAL HISTORY:  Past Medical History:   Diagnosis Date     CKD (chronic kidney disease) stage 3, GFR 30-59 ml/min (H)      Falls frequently      Hematuria      Hyperlipidemia LDL goal <100      Hyperparathyroidism (H)      Hypertension     No Cardiologist     Incontinence      Mumps      Neck pain, chronic      Osteoarthritis      Paroxysmal atrial fibrillation (H)      Peripheral neuropathy      Sinus node dysfunction (H)        PAST SURGICAL HISTORY:  Past Surgical History:   Procedure Laterality Date     APPENDECTOMY       ARTHRODESIS FOOT  5/1/2014    Procedure: ARTHRODESIS FOOT;  Surgeon: Sid Marks DPM;  Location: [facility] OR     ARTHROSCOPY ANKLE, OPEN REPAIR LIGAMENT, COMBINED  5/31/2012    Procedure:COMBINED ARTHROSCOPY ANKLE, OPEN REPAIR LIGAMENT; LEFT ANKLE ARTHROSCOPY, LATERAL LIGAMENT RECONSTRUCTION, ENDOSCOPIC DRISS, BUNION SECOND TOE RAY CORRECTION    LEFT KNEE Marcaine AND CORTIZONE INJECTION, 5 CC Marcaine, 1 CC CELESTONE, ; Surgeon:VARSHA WILDER; Location:[facility]     BLADDER SURGERY       CATARACT IOL, RT/LT       CHOLECYSTECTOMY       CYSTOSCOPY       EP PPM INSERT OF NEW OR REPL W/VENT LEAD N/A 3/3/2021    Procedure: EP Pacemaker;  Surgeon: Kacie  Bobby Barrera MD;  Location:  [facility]     EXCISE MASS FOOT  2012    Procedure: EXCISE MASS FOOT;;  Surgeon: Akshat German MD;  Location: [facility]     HYSTERECTOMY TOTAL ABDOMINAL      ovaries are in     RELEASE TENDON TOE  2014    Procedure: RELEASE TENDON TOE;  Surgeon: Sid Marks DPM;  Location: [facility] OR     REMOVE HARDWARE FOOT  2012    Procedure: REMOVE HARDWARE FOOT;  REMOVAL HARDWARE(SYNTHES SCREW) LEFT FOOT, EXCISION OF INCLUSION CYST;  Surgeon: Akshat German MD;  Location: [facility]     REPAIR HAMMER TOE  2014    Procedure: REPAIR HAMMER TOE;  Surgeon: Sid Marks DPM;  Location: [facility] OR     REVERSE ARTHROPLASTY SHOULDER Right 2016    Procedure: REVERSE ARTHROPLASTY SHOULDER;  Surgeon: Marlo Alejandro MD;  Location: [facility] OR       FAMILY HISTORY:  Family History   Problem Relation Age of Onset     Lipids Mother      Diabetes Mother      Circulatory Mother         Kidney disease     Diabetes Brother      Cancer - colorectal Brother      Breast Cancer Sister      Thyroid Disease Daughter      Cancer - colorectal Brother      Alcohol/Drug Brother        SOCIAL HISTORY:  Social History     Socioeconomic History     Marital status:      Spouse name: None     Number of children: None     Years of education: None     Highest education level: None   Tobacco Use     Smoking status: Former Smoker     Packs/day: 1.00     Years: 20.00     Pack years: 20.00     Types: Cigarettes     Start date:      Quit date:      Years since quittin.4     Smokeless tobacco: Never Used   Vaping Use     Vaping Use: Never used   Substance and Sexual Activity     Alcohol use: No     Drug use: No     Sexual activity: Never       Review of Systems:  Skin:          Eyes:         ENT:         Respiratory:          Cardiovascular:         Gastroenterology:        Genitourinary:         Musculoskeletal:         Neurologic:         Psychiatric:        "  Heme/Lymph/Imm:         Endocrine:           Physical Exam:  Vitals: /61   Pulse 64   Ht 1.6 m (5' 3\")   Wt 63.5 kg (140 lb)   BMI 24.80 kg/m      Constitutional:  cooperative;well nourished        Skin:  warm and dry to the touch, no apparent skin lesions or masses noted venous stasis changes        Head:  normocephalic, no masses or lesions        Eyes:  pupils equal and round        Lymph:No Cervical lymphadenopathy present     ENT:  no pallor or cyanosis, dentition good        Neck:  carotid pulses are full and equal bilaterally, JVP normal, no carotid bruit        Respiratory:  normal breath sounds, clear to auscultation, normal A-P diameter, normal symmetry, normal respiratory excursion, no use of accessory muscles         Cardiac: regular rhythm, normal S1/S2, no S3 or S4, apical impulse not displaced, no murmurs, gallops or rubs                  pulses below the femoral arteries are diminished                                      GI:  abdomen soft, non-tender, BS normoactive, no mass, no HSM, no bruits        Extremities and Muscular Skeletal:  no deformities, clubbing, cyanosis, erythema observed              Neurological:  affect appropriate        Psych:  Alert and Oriented x 3        CC  Roxanna Gunderson, APRN CNP  [address]  [place] [address]    Thank you for allowing me to participate in the care of your patient.      Sincerely,     Patito Rey MD     [facility]  "

## 2022-05-31 NOTE — PROGRESS NOTES
HPI and Plan:   See dictation        Orders Placed This Encounter   Procedures     Hepatic panel     TSH with free T4 reflex     Basic metabolic panel     Follow-Up with Cardiology- EP     Follow-Up with Cardiology - KATELYN       Orders Placed This Encounter   Medications     OLANZapine (ZYPREXA) 5 MG tablet     Sig: Take 2.5 mg by mouth       There are no discontinued medications.      Encounter Diagnoses   Name Primary?     Paroxysmal A-fib (H)      Sick sinus syndrome (H)      Presence of permanent cardiac pacemaker        CURRENT MEDICATIONS:  Current Outpatient Medications   Medication Sig Dispense Refill     acetaminophen (TYLENOL) 325 MG tablet Take 650 mg by mouth every 8 hours as needed for mild pain or fever       amiodarone (PACERONE) 200 MG tablet Take 0.5 tablets (100 mg) by mouth daily 90 tablet 3     aspirin 81 MG EC tablet Take 81 mg by mouth At Bedtime Prn       Cholecalciferol (VITAMIN D) 2000 UNITS tablet Take 2,000 Units by mouth daily (Patient taking differently: Take 2,000 Units by mouth daily Vitamin d3) 100 tablet 3     fluticasone (FLONASE) 50 MCG/ACT nasal spray SPRAY 1 SPRAY INTO BOTH NOSTRILS DAILY AS NEEDED FOR RHINITIS OR ALLERGIES 16 g 0     melatonin 3 MG tablet Take 6 mg at bed time for 2 weeks then take 3 mg at bed time 60 tablet 3     metoprolol succinate ER (TOPROL-XL) 100 MG 24 hr tablet Take 1 tablet (100 mg) by mouth daily 90 tablet 3     mirtazapine (REMERON) 7.5 MG tablet Take 1 tablet (7.5 mg) by mouth At Bedtime 30 tablet 3     OLANZapine (ZYPREXA) 5 MG tablet Take 2.5 mg by mouth       polyethylene glycol (MIRALAX) 17 g packet Take 1 packet by mouth daily as needed for constipation Mix in 8 ounces of water until completely dissolved       solifenacin (VESICARE) 10 MG tablet Take 1 tablet (10 mg) by mouth daily 90 tablet 4     vitamin B-12 (CYANOCOBALAMIN) 2500 MCG sublingual tablet Take 2,500 mcg by mouth daily       furosemide (LASIX) 20 MG tablet Take 1 tablet (20 mg) by  mouth daily as needed (swollen legs) (Patient not taking: Reported on 5/31/2022) 30 tablet 1     gabapentin (NEURONTIN) 100 MG capsule TAKE 1 CAPSULE BY MOUTH IN  THE AFTERNOON AND 2  CAPSULES AT BEDTIME (Patient not taking: Reported on 5/31/2022) 270 capsule 0       ALLERGIES     Allergies   Allergen Reactions     Amlodipine      Leg edema with 5 mg     Fosamax [Alendronic Acid]      Bone pain     Lisinopril      Increased potassium     Pravastatin      Muscle aches      Simvastatin      Muscle aches        PAST MEDICAL HISTORY:  Past Medical History:   Diagnosis Date     CKD (chronic kidney disease) stage 3, GFR 30-59 ml/min (H)      Falls frequently      Hematuria      Hyperlipidemia LDL goal <100      Hyperparathyroidism (H)      Hypertension     No Cardiologist     Incontinence      Mumps      Neck pain, chronic      Osteoarthritis      Paroxysmal atrial fibrillation (H)      Peripheral neuropathy      Sinus node dysfunction (H)        PAST SURGICAL HISTORY:  Past Surgical History:   Procedure Laterality Date     APPENDECTOMY       ARTHRODESIS FOOT  5/1/2014    Procedure: ARTHRODESIS FOOT;  Surgeon: Sid Marks DPM;  Location: [facility] OR     ARTHROSCOPY ANKLE, OPEN REPAIR LIGAMENT, COMBINED  5/31/2012    Procedure:COMBINED ARTHROSCOPY ANKLE, OPEN REPAIR LIGAMENT; LEFT ANKLE ARTHROSCOPY, LATERAL LIGAMENT RECONSTRUCTION, ENDOSCOPIC DRISS, BUNION SECOND TOE RAY CORRECTION    LEFT KNEE Marcaine AND CORTIZONE INJECTION, 5 CC Marcaine, 1 CC CELESTONE, ; Surgeon:VARSHA GERMAN; Location:[facility]     BLADDER SURGERY       CATARACT IOL, RT/LT       CHOLECYSTECTOMY       CYSTOSCOPY       EP PPM INSERT OF NEW OR REPL W/VENT LEAD N/A 3/3/2021    Procedure: EP Pacemaker;  Surgeon: Bobby Hester MD;  Location:  [facility]     EXCISE MASS FOOT  12/4/2012    Procedure: EXCISE MASS FOOT;;  Surgeon: Varsha German MD;  Location: [facility]     HYSTERECTOMY TOTAL ABDOMINAL      ovaries are in      "RELEASE TENDON TOE  2014    Procedure: RELEASE TENDON TOE;  Surgeon: Sid Marks DPM;  Location: [facility]     REMOVE HARDWARE FOOT  2012    Procedure: REMOVE HARDWARE FOOT;  REMOVAL HARDWARE(SYNTHES SCREW) LEFT FOOT, EXCISION OF INCLUSION CYST;  Surgeon: Akshat German MD;  Location: [facility]     REPAIR HAMMER TOE  2014    Procedure: REPAIR HAMMER TOE;  Surgeon: Sid Marks DPM;  Location: [facility] OR     REVERSE ARTHROPLASTY SHOULDER Right 2016    Procedure: REVERSE ARTHROPLASTY SHOULDER;  Surgeon: Marlo Alejandro MD;  Location: [facility] OR       FAMILY HISTORY:  Family History   Problem Relation Age of Onset     Lipids Mother      Diabetes Mother      Circulatory Mother         Kidney disease     Diabetes Brother      Cancer - colorectal Brother      Breast Cancer Sister      Thyroid Disease Daughter      Cancer - colorectal Brother      Alcohol/Drug Brother        SOCIAL HISTORY:  Social History     Socioeconomic History     Marital status:      Spouse name: None     Number of children: None     Years of education: None     Highest education level: None   Tobacco Use     Smoking status: Former Smoker     Packs/day: 1.00     Years: 20.00     Pack years: 20.00     Types: Cigarettes     Start date:      Quit date:      Years since quittin.4     Smokeless tobacco: Never Used   Vaping Use     Vaping Use: Never used   Substance and Sexual Activity     Alcohol use: No     Drug use: No     Sexual activity: Never       Review of Systems:  Skin:          Eyes:         ENT:         Respiratory:          Cardiovascular:         Gastroenterology:        Genitourinary:         Musculoskeletal:         Neurologic:         Psychiatric:         Heme/Lymph/Imm:         Endocrine:           Physical Exam:  Vitals: /61   Pulse 64   Ht 1.6 m (5' 3\")   Wt 63.5 kg (140 lb)   BMI 24.80 kg/m      Constitutional:  cooperative;well nourished        Skin:  warm and dry to the touch, " no apparent skin lesions or masses noted venous stasis changes        Head:  normocephalic, no masses or lesions        Eyes:  pupils equal and round        Lymph:No Cervical lymphadenopathy present     ENT:  no pallor or cyanosis, dentition good        Neck:  carotid pulses are full and equal bilaterally, JVP normal, no carotid bruit        Respiratory:  normal breath sounds, clear to auscultation, normal A-P diameter, normal symmetry, normal respiratory excursion, no use of accessory muscles         Cardiac: regular rhythm, normal S1/S2, no S3 or S4, apical impulse not displaced, no murmurs, gallops or rubs                  pulses below the femoral arteries are diminished                                      GI:  abdomen soft, non-tender, BS normoactive, no mass, no HSM, no bruits        Extremities and Muscular Skeletal:  no deformities, clubbing, cyanosis, erythema observed              Neurological:  affect appropriate        Psych:  Alert and Oriented x 3        CC  Roxanna Gunderson, APRN CNP  [address]  [place] [address]

## 2022-06-01 ENCOUNTER — DOCUMENTATION ONLY (OUTPATIENT)
Dept: OTHER | Facility: CLINIC | Age: 87
End: 2022-06-01
Payer: MEDICARE

## 2022-06-06 ENCOUNTER — TRANSFERRED RECORDS (OUTPATIENT)
Dept: HEALTH INFORMATION MANAGEMENT | Facility: CLINIC | Age: 87
End: 2022-06-06

## 2022-08-11 ENCOUNTER — ANCILLARY PROCEDURE (OUTPATIENT)
Dept: CARDIOLOGY | Facility: CLINIC | Age: 87
End: 2022-08-11
Attending: INTERNAL MEDICINE
Payer: MEDICARE

## 2022-08-11 DIAGNOSIS — Z95.0 CARDIAC PACEMAKER IN SITU: ICD-10-CM

## 2022-08-11 PROCEDURE — 93294 REM INTERROG EVL PM/LDLS PM: CPT | Performed by: INTERNAL MEDICINE

## 2022-08-11 PROCEDURE — 93296 REM INTERROG EVL PM/IDS: CPT | Performed by: INTERNAL MEDICINE

## 2022-08-14 LAB
MDC_IDC_EPISODE_DTM: NORMAL
MDC_IDC_EPISODE_DURATION: 14 S
MDC_IDC_EPISODE_DURATION: 20 S
MDC_IDC_EPISODE_DURATION: 31 S
MDC_IDC_EPISODE_DURATION: 46 S
MDC_IDC_EPISODE_DURATION: 463 S
MDC_IDC_EPISODE_ID: NORMAL
MDC_IDC_EPISODE_TYPE: NORMAL
MDC_IDC_EPISODE_VENDOR_TYPE: NORMAL
MDC_IDC_EPISODE_VENDOR_TYPE: NORMAL
MDC_IDC_LEAD_IMPLANT_DT: NORMAL
MDC_IDC_LEAD_IMPLANT_DT: NORMAL
MDC_IDC_LEAD_LOCATION: NORMAL
MDC_IDC_LEAD_LOCATION: NORMAL
MDC_IDC_LEAD_LOCATION_DETAIL_1: NORMAL
MDC_IDC_LEAD_LOCATION_DETAIL_1: NORMAL
MDC_IDC_LEAD_MFG: NORMAL
MDC_IDC_LEAD_MFG: NORMAL
MDC_IDC_LEAD_MODEL: NORMAL
MDC_IDC_LEAD_MODEL: NORMAL
MDC_IDC_LEAD_POLARITY_TYPE: NORMAL
MDC_IDC_LEAD_POLARITY_TYPE: NORMAL
MDC_IDC_LEAD_SERIAL: NORMAL
MDC_IDC_LEAD_SERIAL: NORMAL
MDC_IDC_MSMT_BATTERY_DTM: NORMAL
MDC_IDC_MSMT_BATTERY_REMAINING_LONGEVITY: 162 MO
MDC_IDC_MSMT_BATTERY_REMAINING_PERCENTAGE: 100 %
MDC_IDC_MSMT_BATTERY_STATUS: NORMAL
MDC_IDC_MSMT_LEADCHNL_RA_IMPEDANCE_VALUE: 671 OHM
MDC_IDC_MSMT_LEADCHNL_RA_PACING_THRESHOLD_AMPLITUDE: 0.6 V
MDC_IDC_MSMT_LEADCHNL_RA_PACING_THRESHOLD_PULSEWIDTH: 0.4 MS
MDC_IDC_MSMT_LEADCHNL_RV_IMPEDANCE_VALUE: 546 OHM
MDC_IDC_MSMT_LEADCHNL_RV_PACING_THRESHOLD_AMPLITUDE: 1.2 V
MDC_IDC_MSMT_LEADCHNL_RV_PACING_THRESHOLD_PULSEWIDTH: 0.4 MS
MDC_IDC_PG_IMPLANT_DTM: NORMAL
MDC_IDC_PG_MFG: NORMAL
MDC_IDC_PG_MODEL: NORMAL
MDC_IDC_PG_SERIAL: NORMAL
MDC_IDC_PG_TYPE: NORMAL
MDC_IDC_SESS_CLINIC_NAME: NORMAL
MDC_IDC_SESS_DTM: NORMAL
MDC_IDC_SESS_TYPE: NORMAL
MDC_IDC_SET_BRADY_AT_MODE_SWITCH_MODE: NORMAL
MDC_IDC_SET_BRADY_AT_MODE_SWITCH_RATE: 170 {BEATS}/MIN
MDC_IDC_SET_BRADY_LOWRATE: 60 {BEATS}/MIN
MDC_IDC_SET_BRADY_MAX_SENSOR_RATE: 130 {BEATS}/MIN
MDC_IDC_SET_BRADY_MAX_TRACKING_RATE: 130 {BEATS}/MIN
MDC_IDC_SET_BRADY_MODE: NORMAL
MDC_IDC_SET_BRADY_PAV_DELAY_HIGH: 150 MS
MDC_IDC_SET_BRADY_PAV_DELAY_LOW: 200 MS
MDC_IDC_SET_BRADY_SAV_DELAY_HIGH: 150 MS
MDC_IDC_SET_BRADY_SAV_DELAY_LOW: 200 MS
MDC_IDC_SET_LEADCHNL_RA_PACING_AMPLITUDE: 2 V
MDC_IDC_SET_LEADCHNL_RA_PACING_CAPTURE_MODE: NORMAL
MDC_IDC_SET_LEADCHNL_RA_PACING_POLARITY: NORMAL
MDC_IDC_SET_LEADCHNL_RA_PACING_PULSEWIDTH: 0.4 MS
MDC_IDC_SET_LEADCHNL_RA_SENSING_ADAPTATION_MODE: NORMAL
MDC_IDC_SET_LEADCHNL_RA_SENSING_POLARITY: NORMAL
MDC_IDC_SET_LEADCHNL_RA_SENSING_SENSITIVITY: 0.25 MV
MDC_IDC_SET_LEADCHNL_RV_PACING_AMPLITUDE: 1.7 V
MDC_IDC_SET_LEADCHNL_RV_PACING_CAPTURE_MODE: NORMAL
MDC_IDC_SET_LEADCHNL_RV_PACING_POLARITY: NORMAL
MDC_IDC_SET_LEADCHNL_RV_PACING_PULSEWIDTH: 0.4 MS
MDC_IDC_SET_LEADCHNL_RV_SENSING_ADAPTATION_MODE: NORMAL
MDC_IDC_SET_LEADCHNL_RV_SENSING_POLARITY: NORMAL
MDC_IDC_SET_LEADCHNL_RV_SENSING_SENSITIVITY: 1.5 MV
MDC_IDC_SET_ZONE_DETECTION_INTERVAL: 375 MS
MDC_IDC_SET_ZONE_TYPE: NORMAL
MDC_IDC_SET_ZONE_VENDOR_TYPE: NORMAL
MDC_IDC_STAT_AT_BURDEN_PERCENT: 1 %
MDC_IDC_STAT_AT_DTM_END: NORMAL
MDC_IDC_STAT_AT_DTM_START: NORMAL
MDC_IDC_STAT_BRADY_DTM_END: NORMAL
MDC_IDC_STAT_BRADY_DTM_START: NORMAL
MDC_IDC_STAT_BRADY_RA_PERCENT_PACED: 52 %
MDC_IDC_STAT_BRADY_RV_PERCENT_PACED: 2 %
MDC_IDC_STAT_EPISODE_RECENT_COUNT: 2
MDC_IDC_STAT_EPISODE_RECENT_COUNT: 2
MDC_IDC_STAT_EPISODE_RECENT_COUNT: 342
MDC_IDC_STAT_EPISODE_RECENT_COUNT: 54
MDC_IDC_STAT_EPISODE_RECENT_COUNT: 89
MDC_IDC_STAT_EPISODE_RECENT_COUNT_DTM_END: NORMAL
MDC_IDC_STAT_EPISODE_RECENT_COUNT_DTM_START: NORMAL
MDC_IDC_STAT_EPISODE_TYPE: NORMAL
MDC_IDC_STAT_EPISODE_VENDOR_TYPE: NORMAL

## 2022-09-20 ENCOUNTER — LAB REQUISITION (OUTPATIENT)
Dept: LAB | Facility: CLINIC | Age: 87
End: 2022-09-20
Payer: MEDICARE

## 2022-09-20 DIAGNOSIS — E53.8 DEFICIENCY OF OTHER SPECIFIED B GROUP VITAMINS: ICD-10-CM

## 2022-09-20 DIAGNOSIS — E55.9 VITAMIN D DEFICIENCY, UNSPECIFIED: ICD-10-CM

## 2022-09-20 DIAGNOSIS — I10 ESSENTIAL (PRIMARY) HYPERTENSION: ICD-10-CM

## 2022-09-22 LAB
ALBUMIN SERPL BCG-MCNC: 4.1 G/DL (ref 3.5–5.2)
ALP SERPL-CCNC: 80 U/L (ref 35–104)
ALT SERPL W P-5'-P-CCNC: 8 U/L (ref 10–35)
ANION GAP SERPL CALCULATED.3IONS-SCNC: 12 MMOL/L (ref 7–15)
AST SERPL W P-5'-P-CCNC: 19 U/L (ref 10–35)
BASOPHILS # BLD AUTO: 0 10E3/UL (ref 0–0.2)
BASOPHILS NFR BLD AUTO: 0 %
BILIRUB SERPL-MCNC: 0.4 MG/DL
BUN SERPL-MCNC: 34.5 MG/DL (ref 8–23)
CALCIUM SERPL-MCNC: 9.8 MG/DL (ref 8.8–10.2)
CHLORIDE SERPL-SCNC: 101 MMOL/L (ref 98–107)
CREAT SERPL-MCNC: 1.73 MG/DL (ref 0.51–0.95)
DEPRECATED CALCIDIOL+CALCIFEROL SERPL-MC: 50 UG/L (ref 20–75)
DEPRECATED HCO3 PLAS-SCNC: 26 MMOL/L (ref 22–29)
EOSINOPHIL # BLD AUTO: 0.1 10E3/UL (ref 0–0.7)
EOSINOPHIL NFR BLD AUTO: 1 %
ERYTHROCYTE [DISTWIDTH] IN BLOOD BY AUTOMATED COUNT: 14 % (ref 10–15)
GFR SERPL CREATININE-BSD FRML MDRD: 28 ML/MIN/1.73M2
GLUCOSE SERPL-MCNC: 92 MG/DL (ref 70–99)
HCT VFR BLD AUTO: 35.4 % (ref 35–47)
HGB BLD-MCNC: 11.2 G/DL (ref 11.7–15.7)
IMM GRANULOCYTES # BLD: 0 10E3/UL
IMM GRANULOCYTES NFR BLD: 0 %
LYMPHOCYTES # BLD AUTO: 2.5 10E3/UL (ref 0.8–5.3)
LYMPHOCYTES NFR BLD AUTO: 29 %
MCH RBC QN AUTO: 30.9 PG (ref 26.5–33)
MCHC RBC AUTO-ENTMCNC: 31.6 G/DL (ref 31.5–36.5)
MCV RBC AUTO: 98 FL (ref 78–100)
MONOCYTES # BLD AUTO: 0.5 10E3/UL (ref 0–1.3)
MONOCYTES NFR BLD AUTO: 6 %
NEUTROPHILS # BLD AUTO: 5.5 10E3/UL (ref 1.6–8.3)
NEUTROPHILS NFR BLD AUTO: 64 %
NRBC # BLD AUTO: 0 10E3/UL
NRBC BLD AUTO-RTO: 0 /100
PLATELET # BLD AUTO: 252 10E3/UL (ref 150–450)
POTASSIUM SERPL-SCNC: 4.7 MMOL/L (ref 3.4–5.3)
PROT SERPL-MCNC: 7.3 G/DL (ref 6.4–8.3)
RBC # BLD AUTO: 3.62 10E6/UL (ref 3.8–5.2)
SODIUM SERPL-SCNC: 139 MMOL/L (ref 136–145)
TSH SERPL DL<=0.005 MIU/L-ACNC: 0.79 UIU/ML (ref 0.3–4.2)
WBC # BLD AUTO: 8.6 10E3/UL (ref 4–11)

## 2022-09-22 PROCEDURE — 82306 VITAMIN D 25 HYDROXY: CPT | Mod: ORL

## 2022-09-22 PROCEDURE — 84443 ASSAY THYROID STIM HORMONE: CPT | Mod: ORL

## 2022-09-22 PROCEDURE — P9604 ONE-WAY ALLOW PRORATED TRIP: HCPCS | Mod: ORL

## 2022-09-22 PROCEDURE — 36415 COLL VENOUS BLD VENIPUNCTURE: CPT | Mod: ORL

## 2022-09-22 PROCEDURE — 85025 COMPLETE CBC W/AUTO DIFF WBC: CPT | Mod: ORL

## 2022-09-22 PROCEDURE — 80053 COMPREHEN METABOLIC PANEL: CPT | Mod: ORL

## 2022-10-19 NOTE — TELEPHONE ENCOUNTER
12- OV note from Dr. Truong   She feels very exhausted after performing this.  When she has the palpitations, she has chest discomfort and dizzy spells.  She has presented to the emergency room twice recently in September and November.  In September, she presented with weakness and in November, she had palpitations.  However, by the time she got to the emergency room, she was back in sinus rhythm again.       [facility] Nurse is seeing patient for the first time today and last night patient had a episode of dizziness and is concerned about the symptoms.  Call was transferred from EP to Dr. Truong's team to be aware of these symptoms.  Patient is Covid + and Ablation was postponed until 1-.  The nurse recommended patient increase her fluid intake, not sure if patient is eating or drinking enough living alone in an apartment.  If patient has increased dizzy episodes and due not subside after vagal manuevers to call 911. Home Care Nurse is scheduled to see patient on 1-7-2021 if any more questions or concerns to contact Cardiology.    
home

## 2022-11-11 ENCOUNTER — TELEPHONE (OUTPATIENT)
Dept: CARDIOLOGY | Facility: CLINIC | Age: 87
End: 2022-11-11

## 2022-11-11 NOTE — TELEPHONE ENCOUNTER
"Daughter Luiza called and voiced concerns about bringing her mother to upcoming appointments.  Stated that it was extremely hard to take her out.  She is currently living in a memory care unit and is \"going down hill\".  She stated that her mother's memory just seems to be getting worse and worse.      Luiza stated that she would like to take a more palliative care approach with her mother. She states that of course her mother takes all her heart medications and also has the remote monitor for the PPM.     Appointments canceled.  Along with future orders.     Will route to Dr. Rey        "

## 2022-12-30 NOTE — ED TRIAGE NOTES
Presents with daughter. Lives in memory care unit. Four falls in the past week. Fall today, did hit head. Lac noted to left elbow. Staff at memory care unit concerned for UTI. Not on thinners, no LOC. Also reports bilateral leg swelling. VSS. ABCs intact.

## 2022-12-31 NOTE — ED PROVIDER NOTES
History   Chief Complaint:  Fall       HPI   Glendy Díaz is a 89 year old female with history of atrial fibrillation, type II diabetes mellitus, hypertension, and hyperlipidemia who presents with fall. The daughter reports that the patient has had four falls in the last week, and has been increasingly weak. The staff at the senior home has concern for a UTI or other infection. She fell yesterday and hit her head, and has an abrasion on her left temple. The patient remembers falling and denies loss of consciousness. Walker or cane at baseline. The daughter states the patient has been sitting in her recliner and refusing to participate in activities or sleep in her bed. She notes concern for a sore on her bottom that has open up and increased bilateral leg swelling. No fever, chills, chest pain, shortness of breath, abdominal pain, nausea, vomiting, or dysuria.    Review of Systems   Constitutional: Negative for chills and fever.   Respiratory: Negative for shortness of breath.    Cardiovascular: Negative for chest pain.   Gastrointestinal: Negative for abdominal pain, nausea and vomiting.   Genitourinary: Negative for dysuria.   Neurological: Positive for weakness. Negative for syncope.   All other systems reviewed and are negative.      Allergies:  Amlodipine  Fosamax  Lisinopril  Pravastatin  Simvastatin    Medications:  Aspirin 81 mg  Diltiazem   Gabapentin   Metoprolol succinate  Furosemide  Mirabegron  Solifenacin  Mirtazapine     Past Medical History:     Paroxysmal atrial fibrillation  Type II diabetes mellitus   Hyperlipidemia  Hyperparathyroidism   Chronic kidney disease  COPD  Hypertension  SVT  Osteopenia  Peripheral neuropathy  Permanent cardiac pacemaker  Anemia  Osteoarthrosis  Gastroesophageal reflux disease  Insomnia  Controlled substance agreement signed  Anxiety      Past Surgical History:    Appendectomy  Foot arthrodesis  Cholecystectomy  Bladder surgery  Cataract bilateral IOL  Hysterectomy    Cardiac pacemaker implantation  Repair hammer toe  Reverse shoulder arthroplasty right  Remove hardware left foot     Family History:    Mother- hyperlipidemia, diabetes mellitus, kidney disease  Brother- diabetes mellitus, colorectal cancer  Sister- breast cancer    Social History:  The patient presents to the ED via private vehicle  Daughter at bedside  Lives at [facility]  PCP: Abraham Cedeño     Physical Exam     Patient Vitals for the past 24 hrs:   BP Temp Temp src Pulse Resp SpO2   12/31/22 0328 131/65 98  F (36.7  C) Temporal 70 18 97 %   12/30/22 1559 126/58 98  F (36.7  C) Temporal 77 17 98 %       Physical Exam  Nursing note and vitals reviewed.  Constitutional: Well nourished.   Head: L. Frontal abrasion  Eyes: Conjunctiva normal.  Pupils are equal, round, and reactive to light.   ENT: Nose normal. Mucous membranes pink and moist.    Neck: Normal range of motion. No c-spine tenderness  CVS: Normal rate, regular rhythm.  Normal heart sounds.  No murmur.  Pulmonary: Lungs clear to auscultation bilaterally. No wheezes/rales/rhonchi.  GI: Abdomen soft. Nontender, nondistended. No rigidity or guarding.    MSK: Bilateral calf tenderness, +1 LE edema. No t/l bony tenderness. Moves all extremities.   Neuro: Alert to person and time (birthday). Follows simple commands.  Skin: Skin is warm and dry. No rash noted.   Psychiatric: Normal affect.       Emergency Department Course   ECG  ECG taken at 2106, ECG read at 2106  Normal sinus rhythm; left anterior fascicular block; moderate voltage criteria for LVH, may be normal variant; cannot rule out septal infarct, age undetermined   No prolonged QT as compared to prior, dated 2/16/2022.  Rate 78 bpm. ND interval 180 ms. QRS duration 96 ms. QT/QTc 428/487 ms. P-R-T axes 51 -45 58.      Imaging:  XR Chest 2 Views   Final Result   IMPRESSION: Improved aeration of both lungs. Resolved strands of linear atelectasis in the mid and lower lungs seen previously.  Both lungs are otherwise clear. No adenopathy or effusion. Normal cardiac size and pulmonary vascularity. Left chest pacer,    leads in the heart. Reverse right shoulder arthroplasty. Degenerative changes thoracic spine. Demineralization of the visualized bones. Monitoring leads have been removed.       US Lower Extremity Venous Duplex Bilateral   Final Result   IMPRESSION:   1.  No deep venous thrombosis in the bilateral lower extremities.      CT Cervical Spine w/o Contrast   Final Result   IMPRESSION:   1.  No acute cervical fracture or posttraumatic subluxation.   2.  No high-grade spinal canal or neural foraminal stenosis.      Head CT w/o contrast   Final Result   IMPRESSION:   1.  No acute intracranial process.        Report per radiology    Laboratory:  Labs Ordered and Resulted from Time of ED Arrival to Time of ED Departure   ROUTINE UA WITH MICROSCOPIC REFLEX TO CULTURE - Abnormal       Result Value    Color Urine Light Yellow      Appearance Urine Slightly Cloudy (*)     Glucose Urine Negative      Bilirubin Urine Negative      Ketones Urine Trace (*)     Specific Gravity Urine 1.018      Blood Urine Negative      pH Urine 6.0      Protein Albumin Urine 10 (*)     Urobilinogen Urine Normal      Nitrite Urine Positive (*)     Leukocyte Esterase Urine Negative      Bacteria Urine Many (*)     Mucus Urine Present (*)     RBC Urine 2      WBC Urine 26 (*)     Squamous Epithelials Urine <1     COMPREHENSIVE METABOLIC PANEL - Abnormal    Sodium 141      Potassium 4.1      Chloride 100      Carbon Dioxide (CO2) 28      Anion Gap 13      Urea Nitrogen 29.0 (*)     Creatinine 1.63 (*)     Calcium 10.0      Glucose 89      Alkaline Phosphatase 98      AST 21      ALT 14      Protein Total 7.4      Albumin 4.1      Bilirubin Total 0.4      GFR Estimate 30 (*)    TROPONIN T, HIGH SENSITIVITY - Abnormal    Troponin T, High Sensitivity 54 (*)    CBC WITH PLATELETS AND DIFFERENTIAL - Abnormal    WBC Count 7.7      RBC  Count 3.44 (*)     Hemoglobin 10.9 (*)     Hematocrit 35.0       (*)     MCH 31.7      MCHC 31.1 (*)     RDW 13.5      Platelet Count 246      % Neutrophils 59      % Lymphocytes 31      % Monocytes 9      % Eosinophils 1      % Basophils 0      % Immature Granulocytes 0      NRBCs per 100 WBC 0      Absolute Neutrophils 4.4      Absolute Lymphocytes 2.4      Absolute Monocytes 0.7      Absolute Eosinophils 0.1      Absolute Basophils 0.0      Absolute Immature Granulocytes 0.0      Absolute NRBCs 0.0     TROPONIN T, HIGH SENSITIVITY - Abnormal    Troponin T, High Sensitivity 53 (*)    D DIMER QUANTITATIVE - Abnormal    D-Dimer Quantitative 1.28 (*)    MAGNESIUM - Abnormal    Magnesium 2.5 (*)    NT PROBNP INPATIENT - Normal    N terminal Pro BNP Inpatient 1,129     INFLUENZA A/B & SARS-COV2 PCR MULTIPLEX - Normal    Influenza A PCR Negative      Influenza B PCR Negative      RSV PCR Negative      SARS CoV2 PCR Negative     URINE CULTURE        Emergency Department Course:     Reviewed:  I reviewed nursing notes, vitals, past medical history and Care Everywhere    Assessments:  0130 I obtained history and examined the patient as noted above.   0235 I rechecked the patient and explained laboratory findings. Antibiotics ordered.     Consults:  0237 I spoke to Dr. Garcia from hospitalist service regarding the patient's presentation and workup.    Interventions:  0214: Aspirin 324 mg PO  0315: Rocephin 1 g in  mL IV infusion    Disposition:  The patient was admitted to the hospital under the care of Dr. Garcia.     Impression & Plan     Medical Decision Making:  Patient is an 89-year-old female presenting with weakness status post recurrent falls.  Her work-up was initiated in triage given prolonged wait times.  Unfortunately I evaluated the patient over 8 hours after her initial evaluation.  On my assessment she is nontoxic, in no significant distress.  EKG without STEMI.  Her initial troponin is mildly  elevated though repeat stable.  She was given an aspirin.  She denies any active chest pain.  I suspect this is more secondary to type II demand as she does appear mildly fluid overloaded today.  Labs without profound anemia or significant electrolyte derangements. CKD appears at baseline. She is noted however to have a UTI today.  She was given IV Rocephin, formal urine culture sent.  No evidence to suggest severe sepsis at this time.  From a trauma standpoint, head CT and C-spine reassuring.  The remainder of her trauma exam is unremarkable.  She did also complain of lower extremity pain though fortunately duplex negative.  D-dimer elevated though age-adjusted D-dimer unremarkable.  Low clinical suspicion for PE at this point in time and I do not feel further imaging is warranted at this point in time.  She remained hemodynamically stable, excepted by hospitalist for admission.    Diagnosis:    ICD-10-CM    1. Urinary tract infection with hematuria, site unspecified  N39.0     R31.9       2. Recurrent falls  R29.6       3. Abrasion of forehead, initial encounter  S00.81XA       4. Elevated troponin  R77.8       5. Chronic kidney disease, unspecified CKD stage  N18.9       6. Congestive heart failure, unspecified HF chronicity, unspecified heart failure type (H)  I50.9           Scribe Disclosure:  I, Noelle Breen, am serving as a scribe at 1:24 AM on 12/31/2022 to document services personally performed by Myra Oropeza DO based on my observations and the provider's statements to me.            Myra Oropeza DO  12/31/22 0457

## 2022-12-31 NOTE — PLAN OF CARE
PRIMARY DIAGNOSIS: GENERALIZED WEAKNESS    OUTPATIENT/OBSERVATION GOALS TO BE MET BEFORE DISCHARGE  1. Orthostatic performed: No    2. Tolerating PO medications: Yes    3. Return to near baseline physical activity: Yes, ambulated to and from the bathroom with A1 walker gb. Confused and forgetful at times needs cues and reminders.     4. Cleared for discharge by consultants (if involved): No    Discharge Planner Nurse   Safe discharge environment identified: Yes  Barriers to discharge: Yes       Entered by: Chad Ward RN 12/31/2022 10:25 AM     Please review provider order for any additional goals.   Nurse to notify provider when observation goals have been met and patient is ready for discharge.

## 2022-12-31 NOTE — ED NOTES
[facility]  ED Nurse Handoff Report    Glendy Díaz is a 89 year old female   ED Chief complaint: Fall  . ED Diagnosis:   Final diagnoses:   Urinary tract infection with hematuria, site unspecified   Recurrent falls   Abrasion of forehead, initial encounter   Elevated troponin     Allergies:   Allergies   Allergen Reactions     Amlodipine      Leg edema with 5 mg     Fosamax [Alendronic Acid]      Bone pain     Lisinopril      Increased potassium     Pravastatin      Muscle aches      Simvastatin      Muscle aches        Code Status: DNR / DNI  Activity level - Baseline/Home:  Assist X 1. Activity Level - Current:   Assist X 2. Lift room needed: No. Bariatric: No   Needed: No   Isolation: No. Infection: Not Applicable.     Vital Signs:   Vitals:    12/30/22 1559   BP: 126/58   Pulse: 77   Resp: 17   Temp: 98  F (36.7  C)   TempSrc: Temporal   SpO2: 98%       Cardiac Rhythm:  ,      Pain level:    Patient confused: Yes. Patient Falls Risk: Yes.   Elimination Status: Has voided   Patient Report - Initial Complaint: Fall. Focused Assessment: History:   Glendy Díaz is a 89 year old female who presents following a fall. She hit her head, did not lose consciousness. Denies blood thinner usage. Her daughter reports her legs have become gradually weaker and more swollen over time.     Exam:   General:  Alert, interactive  Cardiovascular:  Well perfused  Lungs:  No respiratory distress, no accessory muscle use  Neuro:  Moving all 4 extremities  Skin:  Warm, dry  Psych:  Normal affect   Tests Performed: labs, CT, US. Abnormal Results: .edelabs  CT Cervical Spine w/o Contrast   Final Result   IMPRESSION:   1.  No acute cervical fracture or posttraumatic subluxation.   2.  No high-grade spinal canal or neural foraminal stenosis.      Head CT w/o contrast   Final Result   IMPRESSION:   1.  No acute intracranial process.      US Lower Extremity Venous Duplex Bilateral    (Results Pending)   XR  Chest 2 Views    (Results Pending)   .   Treatments provided: see MAR  Family Comments: daughter at bedside  OBS brochure/video discussed/provided to patient:  No  ED Medications:   Medications   cefTRIAXone (ROCEPHIN) 1 g vial to attach to  mL bag for ADULTS or NS 50 mL bag for PEDS (has no administration in time range)   aspirin (ASA) chewable tablet 324 mg (324 mg Oral Given 12/31/22 0214)     Drips infusing:  No  For the majority of the shift, the patient's behavior Green. Interventions performed were N/a.    Sepsis treatment initiated: No     Patient tested for COVID 19 prior to admission: YES    ED Nurse Name/Phone Number: Hien Laureano RN,   2:45 AM  RECEIVING UNIT ED HANDOFF REVIEW    Above ED Nurse Handoff Report was reviewed: Yes  Reviewed by: Jeanne Esparza RN on December 31, 2022 at 11:56 PM

## 2022-12-31 NOTE — ED NOTES
Rapid Assessment Note    History:   Glendy Díaz is a 89 year old female who presents following a fall. She hit her head, did not lose consciousness. Denies blood thinner usage. Her daughter reports her legs have become gradually weaker and more swollen over time.    Exam:   General:  Alert, interactive  Cardiovascular:  Well perfused  Lungs:  No respiratory distress, no accessory muscle use  Neuro:  Moving all 4 extremities  Skin:  Warm, dry  Psych:  Normal affect    Plan of Care:   I evaluated the patient and developed an initial plan of care. I discussed this plan and explained that I, or one of my partners, would be returning to complete the evaluation.     I, Mariya Ludwig, am serving as a scribe to document services personally performed by Luis Carlos Martins MD, based on my observations and the provider's statements to me.    12/31/2022  [employer]    Portions of this medical record were completed by a scribe. UPON MY REVIEW AND AUTHENTICATION BY ELECTRONIC SIGNATURE, this confirms (a) I performed the applicable clinical services, and (b) the record is accurate.

## 2022-12-31 NOTE — H&P
[facility]  Hospitalist H&P    Name: Glendy Díaz      MRN: [id number]  YOB: 1933    Age: 89 year old  Date of admission: 12/30/2022  Primary care provider: Abraham Cedeño            Assessment and Plan:     Glendy Díaz is a 89 year old female with a history of atrial fibrillation status post pacemaker, type 2 diabetes mellitus, chronic kidney disease, hypertension, hyperlipidemia, and advanced dementia who presents with generalized weakness, recurrent falls, and lower extremity swelling.    1. Generalized weakness: Could be related to urinary tract infection but urinalysis is not overwhelming.  She does not appear septic or unstable at the bedside.  COVID-19, influenza, and RSV testing is negative.  She has no fever nor leukocytosis.  No focal neurological deficits.  She actually feels rather well and has no specific complaints herself.  For now we will registers observation and request PT and social work consultations.  2. Urinary tract infection: Some pyuria on UA so we will start IV ceftriaxone and follow-up urine culture.  3. Dementia: She resides at memory care unit.  At baseline gets around with a walker or cane.  Her daughter indicates that she has been quite sedentary recently sitting in her recliner most of the day.  4. Bilateral lower extremity edema: Likely dependent edema from sitting in a recliner most of the day.  BNP is normal and chest x-ray shows no pulmonary edema.  Lower extremity ultrasound showed no DVT.  Will not further work-up at this time.  5. Atrial fibrillation status post pacemaker: Currently rate controlled.  Continue diltiazem, metoprolol, and aspirin.  She is not on anticoagulation.  6. Diabetes mellitus: I do not believe she is on chronic medications.  We will start low sliding scale insulin and routine Accu-Cheks.  7. Troponin elevation: Flat on recheck.  No cardiopulmonary or anginal symptoms.  Likely mild demand ischemia.  Not consistent  with ACS and will not further work-up at this time.  8. Chronic kidney disease: Creatinine appears stable at baseline.    Code status: DNR/DNI.  Admit to observation status.  Prophylaxis: PCD's.  Disposition: Likely back to [facility] later in the day today.            Chief Complaint:     Generalized weakness and recurrent falls.         History of Present Illness:   Glendy Díaz is a 89 year old female who presents with generalized weakness and recurrent falls.  History was obtained from my discussion with the patient and daughter at the bedside.  I also discussed the case with the ED provider.  The electronic medical record was also reviewed.    The patient resides at a memory care unit.  Over the past week she has been increasingly weak and fatigued.  She sits in her recliner for most of the day.  For several week she has had worsening lower extremity edema of both legs.  She has not had any shortness of breath, cough, fever, or chest pain.  She said at least 4 or 5 falls over the past several days but does have a history of recurrent falls due to her dementia.  She fell yesterday and hit her head and had an abrasion on her left temple.  There was evidently no loss of consciousness.  She uses a walker or cane at baseline.  Her facility became concerned regarding the recurrent falls and a possible urinary tract infection so brought her to the ED for evaluation.    Here in the hospital her temperature is 98, heart rate 70, blood pressure 131/65, respiratory 18 and oxygen saturation 100% on room air.  Labs show creatinine of 1.6 but otherwise normal basic metabolic panel, liver function test, and BNP.  Troponin was 54 on initial check and 53 on follow-up.  CBC shows hemoglobin of 10.9.  D-dimer is 1.28.  Urinalysis with 10 protein, positive nitrite, 26 white blood cells, negative leukocyte esterase, many bacteria.  Lower extremity ultrasounds and chest x-ray are negative.  Head CT and CT spine unremarkable.  Patient  was given ceftriaxone and being admitted for observation.            Past Medical History:     Past Medical History:   Diagnosis Date     CKD (chronic kidney disease) stage 3, GFR 30-59 ml/min (H)      Falls frequently      Hematuria      Hyperlipidemia LDL goal <100      Hyperparathyroidism (H)      Hypertension     No Cardiologist     Incontinence      Mumps      Neck pain, chronic      Osteoarthritis      Paroxysmal atrial fibrillation (H)      Peripheral neuropathy      Sinus node dysfunction (H)              Past Surgical History:     Past Surgical History:   Procedure Laterality Date     APPENDECTOMY       ARTHRODESIS FOOT  5/1/2014    Procedure: ARTHRODESIS FOOT;  Surgeon: Sid aMrks DPM;  Location: [facility] OR     ARTHROSCOPY ANKLE, OPEN REPAIR LIGAMENT, COMBINED  5/31/2012    Procedure:COMBINED ARTHROSCOPY ANKLE, OPEN REPAIR LIGAMENT; LEFT ANKLE ARTHROSCOPY, LATERAL LIGAMENT RECONSTRUCTION, ENDOSCOPIC DRISS, KIRIT SECOND TOE RAY CORRECTION    LEFT KNEE Marcaine AND CORTIZONE INJECTION, 5 CC Marcaine, 1 CC CELESTONE, ; Surgeon:VARSHA GERMAN; Location:[facility]     BLADDER SURGERY       CATARACT IOL, RT/LT       CHOLECYSTECTOMY       CYSTOSCOPY       EP PPM INSERT OF NEW OR REPL W/VENT LEAD N/A 3/3/2021    Procedure: EP Pacemaker;  Surgeon: Bobby Hester MD;  Location:  [facility]     EXCISE MASS FOOT  12/4/2012    Procedure: EXCISE MASS FOOT;;  Surgeon: Varsha German MD;  Location: [facility]     HYSTERECTOMY TOTAL ABDOMINAL      ovaries are in     RELEASE TENDON TOE  5/1/2014    Procedure: RELEASE TENDON TOE;  Surgeon: Sid Marks DPM;  Location:  OR     REMOVE HARDWARE FOOT  12/4/2012    Procedure: REMOVE HARDWARE FOOT;  REMOVAL HARDWARE(SYNTHES SCREW) LEFT FOOT, EXCISION OF INCLUSION CYST;  Surgeon: Varsha German MD;  Location: [facility]     REPAIR HAMMER TOE  5/1/2014    Procedure: REPAIR HAMMER TOE;  Surgeon: Sid Marks DPM;  Location:  OR      REVERSE ARTHROPLASTY SHOULDER Right 2016    Procedure: REVERSE ARTHROPLASTY SHOULDER;  Surgeon: Marlo Alejandro MD;  Location: [facility] OR             Social History:     Social History     Tobacco Use     Smoking status: Former     Packs/day: 1.00     Years: 20.00     Pack years: 20.00     Types: Cigarettes     Start date:      Quit date:      Years since quittin.0     Smokeless tobacco: Never   Substance Use Topics     Alcohol use: No             Family History:   The family history was fully reviewed and non-contributory in this case.         Allergies:     Allergies   Allergen Reactions     Amlodipine      Leg edema with 5 mg     Fosamax [Alendronic Acid]      Bone pain     Lisinopril      Increased potassium     Pravastatin      Muscle aches      Simvastatin      Muscle aches              Medications:     Prior to Admission medications    Medication Sig Last Dose Taking? Auth Provider Long Term End Date   acetaminophen (TYLENOL) 325 MG tablet Take 650 mg by mouth every 8 hours as needed for mild pain or fever   Unknown, Entered By History     amiodarone (PACERONE) 200 MG tablet Take 0.5 tablets (100 mg) by mouth daily   Patito Rey MD Yes    aspirin 81 MG EC tablet Take 81 mg by mouth At Bedtime Prn   Unknown, Entered By History     Cholecalciferol (VITAMIN D) 2000 UNITS tablet Take 2,000 Units by mouth daily  Patient taking differently: Take 2,000 Units by mouth daily Vitamin d3   Veronica Davis MD     fluticasone (FLONASE) 50 MCG/ACT nasal spray SPRAY 1 SPRAY INTO BOTH NOSTRILS DAILY AS NEEDED FOR RHINITIS OR ALLERGIES   Trent Basilio, APRN CNP     furosemide (LASIX) 20 MG tablet Take 1 tablet (20 mg) by mouth daily as needed (swollen legs)   Patito Rey MD Yes    melatonin 3 MG tablet Take 6 mg at bed time for 2 weeks then take 3 mg at bed time   Veronica Davis MD     metoprolol succinate ER (TOPROL-XL) 100 MG 24 hr tablet Take 1 tablet (100 mg) by  mouth daily   Roxanna Gunderson APRN CNP Yes    mirtazapine (REMERON) 7.5 MG tablet Take 1 tablet (7.5 mg) by mouth At Bedtime   Veronica Davis MD Yes    OLANZapine (ZYPREXA) 5 MG tablet Take 2.5 mg by mouth   Reported, Patient Yes    polyethylene glycol (MIRALAX) 17 g packet Take 1 packet by mouth daily as needed for constipation Mix in 8 ounces of water until completely dissolved   Unknown, Entered By History     solifenacin (VESICARE) 10 MG tablet Take 1 tablet (10 mg) by mouth daily   Magalys Escobedo PA-C     vitamin B-12 (CYANOCOBALAMIN) 2500 MCG sublingual tablet Take 2,500 mcg by mouth daily   Unknown, Entered By History               Review of Systems:     A Comprehensive greater than 10 system review of systems was carried out.  Pertinent positives and negatives are noted above.  Otherwise negative for contributory information.           Physical Exam:   Blood pressure 131/65, pulse 70, temperature 98  F (36.7  C), temperature source Temporal, resp. rate 18, SpO2 97 %, not currently breastfeeding.  Wt Readings from Last 1 Encounters:   05/31/22 63.5 kg (140 lb)     Exam:  GENERAL: No apparent distress. Awake, alert, and fully oriented.  HEENT: Normocephalic, atraumatic. Extraocular movements intact.  CARDIOVASCULAR: Regular rate and rhythm without murmurs or rubs. No S3.  PULMONARY: Clear to auscultation bilaterally.  ABDOMINAL: Soft, non-tender, non-distended. Bowel sounds normoactive.   EXTREMITIES: No cyanosis or clubbing. 1+ BL LE edema.  NEUROLOGICAL: CN 2-12 grossly intact, no focal neurological deficits.  DERMATOLOGICAL: No rash, ulcer, bruising, nor jaundice.          Data:   EKG:  Personally reviewed.   Normal sinus rhythm; left anterior fascicular block; moderate voltage criteria for LVH, may be normal variant; cannot rule out septal infarct, age undetermined   No prolonged QT as compared to prior, dated 2/16/2022.  Rate 78 bpm. WA interval 180 ms. QRS duration 96 ms.  QT/QTc 428/487 ms. P-R-T axes 51 -45 58.     Laboratory:  Recent Labs   Lab 12/30/22 1959   WBC 7.7   HGB 10.9*   HCT 35.0   *        Recent Labs   Lab 12/30/22 1848      POTASSIUM 4.1   CHLORIDE 100   CO2 28   ANIONGAP 13   GLC 89   BUN 29.0*   CR 1.63*   GFRESTIMATED 30*   MUMTAZ 10.0     Recent Labs   Lab 12/30/22  1848   NTBNPI 1,129     Recent Labs   Lab 12/31/22  0148   DD 1.28*     Recent Labs   Lab 12/31/22  0200   COLOR Light Yellow   APPEARANCE Slightly Cloudy*   URINEGLC Negative   URINEBILI Negative   URINEKETONE Trace*   SG 1.018   UBLD Negative   URINEPH 6.0   PROTEIN 10*   NITRITE Positive*   LEUKEST Negative   RBCU 2   WBCU 26*     No results for input(s): CULT in the last 168 hours.    Imaging:  Recent Results (from the past 24 hour(s))   Head CT w/o contrast    Narrative    EXAM: CT HEAD W/O CONTRAST  LOCATION: [facility]  DATE/TIME: 12/30/2022 5:14 PM    INDICATION: Trauma, fall, laceration to left eyebrow  COMPARISON:  CT head 05/28/2022.  TECHNIQUE: Routine CT Head without IV contrast. Multiplanar reformats. Dose reduction techniques were used.    FINDINGS:  INTRACRANIAL CONTENTS: No intracranial hemorrhage, extraaxial collection, or mass effect.  No CT evidence of acute infarct. Mild presumed chronic small vessel ischemic changes. Mild to moderate generalized volume loss. No hydrocephalus.     VISUALIZED ORBITS/SINUSES/MASTOIDS: No intraorbital abnormality. No paranasal sinus mucosal disease. No middle ear or mastoid effusion.    BONES/SOFT TISSUES: No acute abnormality. Chronic appearing nasal bone deformity.      Impression    IMPRESSION:  1.  No acute intracranial process.   CT Cervical Spine w/o Contrast    Narrative    EXAM: CT CERVICAL SPINE W/O CONTRAST  LOCATION: [facility]  DATE/TIME: 12/30/2022 7:15 PM    INDICATION: Fall, trauma, neck pain.  COMPARISON: None.  TECHNIQUE: Routine CT Cervical Spine without IV contrast.  Multiplanar reformats. Dose reduction techniques were used.    FINDINGS:  VERTEBRA: Satisfactory vertebral body height. 1 mm anterior subluxation C4-C5, 1 mm retrolisthesis C5-C6. Interspace narrowing/osteophytes C5-C6. Articular pillars are intact. No facet joint space widening or disruption is present. Patent airway.   Satisfactory cervical prevertebral soft tissues. Occipital condyles are satisfactory in appearance. Leftward convexity curve apex C6. Foramen magnum is normal. C1 ring is intact. Hyoid bone and neck cartilage structures are normal. No evidence for   displaced upper rib fractures. No acute cervical fracture or posttraumatic subluxation.     CANAL/FORAMINA: No severe cervical canal or neural foraminal stenosis. Moderate canal narrowing C5-C6 due to generalized disc bulge and some thickening of the ligamentum flavum posteriorly appears chronic, series 4 image 56.    PARASPINAL: Scarring in the lung apices. Thyroid is satisfactory. Cardiac pacer. No focal fluid collections, mass, or adenopathy noted within the neck soft tissues. Moderate degenerative changes involve the mandibular condylar head on the left.      Impression    IMPRESSION:  1.  No acute cervical fracture or posttraumatic subluxation.  2.  No high-grade spinal canal or neural foraminal stenosis.   US Lower Extremity Venous Duplex Bilateral    Narrative    EXAM: US LOWER EXTREMITY VENOUS DUPLEX BILATERAL  LOCATION: [facility]  DATE/TIME: 12/31/2022 2:56 AM    INDICATION: Bilateral leg pain.  COMPARISON: None.  TECHNIQUE: Venous Duplex ultrasound of bilateral lower extremities with and without compression, augmentation and duplex. Color flow and spectral Doppler with waveform analysis performed.    FINDINGS: Exam includes the common femoral, femoral, popliteal veins as well as segmentally visualized deep calf veins and greater saphenous vein.     RIGHT: No deep vein thrombosis. No superficial thrombophlebitis. No  popliteal cyst.    LEFT: No deep vein thrombosis. No superficial thrombophlebitis. No popliteal cyst.      Impression    IMPRESSION:  1.  No deep venous thrombosis in the bilateral lower extremities.   XR Chest 2 Views    Narrative    EXAM: XR CHEST 2 VIEWS  LOCATION: [facility]  DATE/TIME: 12/31/2022 3:03 AM    INDICATION: Weakness.  COMPARISON: Chest x-ray single view 5/28/2022 at 1014 hours.      Impression    IMPRESSION: Improved aeration of both lungs. Resolved strands of linear atelectasis in the mid and lower lungs seen previously. Both lungs are otherwise clear. No adenopathy or effusion. Normal cardiac size and pulmonary vascularity. Left chest pacer,   leads in the heart. Reverse right shoulder arthroplasty. Degenerative changes thoracic spine. Demineralization of the visualized bones. Monitoring leads have been removed.        Jose Garcia DO MPH  [facility] Hospitalist  [address] [place] [address]  12/31/2022

## 2022-12-31 NOTE — PROGRESS NOTES
Patient seen and examined.  Chart reviewed.  Please see admission history and physical by Dr. Garcia from earlier today's for details.

## 2022-12-31 NOTE — PHARMACY-ADMISSION MEDICATION HISTORY
Admission medication history interview status for this patient is complete. See [facility] admission navigator for allergy information, prior to admission medications and immunization status.     Medication history interview done, indicate source(s): [facility]  Medication history resources (including written lists, pill bottles, clinic record):None  Pharmacy: -    Changes made to PTA medication list:  Added: Zyprexa prn, calmoseptine, trazodone  Changed: furosemide 20mg daily prn --> daily, mirtazapine 7.5mg at bedtime --> 15mg at bedtime--> Miralax -->3 times weekly   Reported as Not Taking: melatonin  Removed: none    Actions taken by pharmacist (provider contacted, etc):None     Additional medication history information:None    Medication reconciliation/reorder completed by provider prior to medication history?  N   (Y/N)         Prior to Admission medications    Medication Sig Last Dose Taking? Auth Provider Long Term End Date   acetaminophen (TYLENOL) 500 MG tablet Take 1,000 mg by mouth 3 times daily as needed for pain or fever  at prn Yes Unknown, Entered By History     amiodarone (PACERONE) 200 MG tablet Take 0.5 tablets (100 mg) by mouth daily 12/30/2022 at 1430 Yes Patito Rey MD Yes    cyanocobalamin (VITAMIN B-12) 1000 MCG tablet Take 1,000 mcg by mouth daily  at 1430 Yes Unknown, Entered By History     furosemide (LASIX) 20 MG tablet Take 1 tablet (20 mg) by mouth daily as needed (swollen legs)  Patient taking differently: Take 20 mg by mouth daily 12/30/2022 at 0900 Yes Patito Rey MD Yes    menthol-zinc oxide (CALMOSEPTINE) 0.44-20.6 % OINT ointment Apply to buttocks twice daily (0900 & 1430) until redness & skin irritation are resolved 12/30/2022 at 1430 Yes Unknown, Entered By History     mirtazapine (REMERON) 7.5 MG tablet Take 1 tablet (7.5 mg) by mouth At Bedtime  Patient taking differently: Take 15 mg by mouth At Bedtime 12/30/2022 at 2145 Yes Veronica Davis MD Yes     OLANZapine (ZYPREXA) 5 MG tablet Take 2.5 mg by mouth daily as needed (anxiety) More than a month Yes Unknown, Entered By History Yes    OLANZapine (ZYPREXA) 5 MG tablet Take 2.5 mg by mouth 2 times daily 12/30/2022 at 1430 Yes Reported, Patient Yes    polyethylene glycol (MIRALAX) 17 g packet Take 1 packet by mouth three times a week Mix in 8 ounces of water until completely dissolved. Takes on Monday, Wednesday, and Friday 12/30/2022 at 1430 Yes Unknown, Entered By History     traZODone (DESYREL) 50 MG tablet Take 25 mg by mouth At Bedtime 12/29/2022 at 2145 Yes Unknown, Entered By History No    melatonin 3 MG tablet Take 6 mg at bed time for 2 weeks then take 3 mg at bed time  Patient not taking: Reported on 12/31/2022 Not Taking  Veronica Davis MD

## 2022-12-31 NOTE — PLAN OF CARE
PRIMARY DIAGNOSIS: GENERALIZED WEAKNESS    OUTPATIENT/OBSERVATION GOALS TO BE MET BEFORE DISCHARGE  1. Orthostatic performed: No    2. Tolerating PO medications: Yes    3. Return to near baseline physical activity: Yes    4. Cleared for discharge by consultants (if involved): No    Discharge Planner Nurse   Safe discharge environment identified: Yes  Barriers to discharge: Yes, needs more diuretics and lab work on AM per MD.        Entered by: Chad Ward RN 12/31/2022 3:12 PM     Please review provider order for any additional goals.   Nurse to notify provider when observation goals have been met and patient is ready for discharge.

## 2023-01-01 ENCOUNTER — DOCUMENTATION ONLY (OUTPATIENT)
Dept: OTHER | Facility: CLINIC | Age: 88
End: 2023-01-01
Payer: MEDICARE

## 2023-01-01 ENCOUNTER — ANCILLARY PROCEDURE (OUTPATIENT)
Dept: CARDIOLOGY | Facility: CLINIC | Age: 88
End: 2023-01-01
Attending: INTERNAL MEDICINE
Payer: MEDICARE

## 2023-01-01 ENCOUNTER — LAB REQUISITION (OUTPATIENT)
Dept: LAB | Facility: CLINIC | Age: 88
End: 2023-01-01
Payer: OTHER MISCELLANEOUS

## 2023-01-01 VITALS
SYSTOLIC BLOOD PRESSURE: 106 MMHG | DIASTOLIC BLOOD PRESSURE: 54 MMHG | TEMPERATURE: 97.6 F | WEIGHT: 118.5 LBS | BODY MASS INDEX: 20.99 KG/M2 | HEART RATE: 70 BPM | OXYGEN SATURATION: 99 % | RESPIRATION RATE: 18 BRPM

## 2023-01-01 DIAGNOSIS — R53.1 WEAKNESS: ICD-10-CM

## 2023-01-01 DIAGNOSIS — Z95.0 CARDIAC PACEMAKER IN SITU: ICD-10-CM

## 2023-01-01 DIAGNOSIS — I49.5 SICK SINUS SYNDROME (H): ICD-10-CM

## 2023-01-01 DIAGNOSIS — Z79.899 ON AMIODARONE THERAPY: Primary | ICD-10-CM

## 2023-01-01 LAB
ALBUMIN UR-MCNC: NEGATIVE MG/DL
ANION GAP SERPL CALCULATED.3IONS-SCNC: 12 MMOL/L (ref 7–15)
APPEARANCE UR: CLEAR
ATRIAL RATE - MUSE: 78 BPM
BACTERIA UR CULT: ABNORMAL
BILIRUB UR QL STRIP: NEGATIVE
BUN SERPL-MCNC: 26.9 MG/DL (ref 8–23)
CALCIUM SERPL-MCNC: 9.2 MG/DL (ref 8.8–10.2)
CAOX CRY #/AREA URNS HPF: ABNORMAL /HPF
CHLORIDE SERPL-SCNC: 101 MMOL/L (ref 98–107)
COLOR UR AUTO: ABNORMAL
CREAT SERPL-MCNC: 1.53 MG/DL (ref 0.51–0.95)
DEPRECATED HCO3 PLAS-SCNC: 28 MMOL/L (ref 22–29)
DIASTOLIC BLOOD PRESSURE - MUSE: NORMAL MMHG
ERYTHROCYTE [DISTWIDTH] IN BLOOD BY AUTOMATED COUNT: 13.2 % (ref 10–15)
GFR SERPL CREATININE-BSD FRML MDRD: 32 ML/MIN/1.73M2
GLUCOSE BLDC GLUCOMTR-MCNC: 109 MG/DL (ref 70–99)
GLUCOSE BLDC GLUCOMTR-MCNC: 53 MG/DL (ref 70–99)
GLUCOSE BLDC GLUCOMTR-MCNC: 54 MG/DL (ref 70–99)
GLUCOSE BLDC GLUCOMTR-MCNC: 59 MG/DL (ref 70–99)
GLUCOSE SERPL-MCNC: 181 MG/DL (ref 70–99)
GLUCOSE UR STRIP-MCNC: NEGATIVE MG/DL
HCT VFR BLD AUTO: 32.2 % (ref 35–47)
HGB BLD-MCNC: 10.2 G/DL (ref 11.7–15.7)
HGB UR QL STRIP: NEGATIVE
INTERPRETATION ECG - MUSE: NORMAL
KETONES UR STRIP-MCNC: NEGATIVE MG/DL
LEUKOCYTE ESTERASE UR QL STRIP: NEGATIVE
MCH RBC QN AUTO: 31.7 PG (ref 26.5–33)
MCHC RBC AUTO-ENTMCNC: 31.7 G/DL (ref 31.5–36.5)
MCV RBC AUTO: 100 FL (ref 78–100)
MDC_IDC_EPISODE_DTM: NORMAL
MDC_IDC_EPISODE_ID: NORMAL
MDC_IDC_EPISODE_TYPE: NORMAL
MDC_IDC_LEAD_IMPLANT_DT: NORMAL
MDC_IDC_LEAD_IMPLANT_DT: NORMAL
MDC_IDC_LEAD_LOCATION: NORMAL
MDC_IDC_LEAD_LOCATION: NORMAL
MDC_IDC_LEAD_LOCATION_DETAIL_1: NORMAL
MDC_IDC_LEAD_LOCATION_DETAIL_1: NORMAL
MDC_IDC_LEAD_MFG: NORMAL
MDC_IDC_LEAD_MFG: NORMAL
MDC_IDC_LEAD_MODEL: NORMAL
MDC_IDC_LEAD_MODEL: NORMAL
MDC_IDC_LEAD_POLARITY_TYPE: NORMAL
MDC_IDC_LEAD_POLARITY_TYPE: NORMAL
MDC_IDC_LEAD_SERIAL: NORMAL
MDC_IDC_LEAD_SERIAL: NORMAL
MDC_IDC_MSMT_BATTERY_DTM: NORMAL
MDC_IDC_MSMT_BATTERY_REMAINING_LONGEVITY: 156 MO
MDC_IDC_MSMT_BATTERY_REMAINING_PERCENTAGE: 100 %
MDC_IDC_MSMT_BATTERY_STATUS: NORMAL
MDC_IDC_MSMT_LEADCHNL_RA_IMPEDANCE_VALUE: 637 OHM
MDC_IDC_MSMT_LEADCHNL_RA_PACING_THRESHOLD_AMPLITUDE: 0.5 V
MDC_IDC_MSMT_LEADCHNL_RA_PACING_THRESHOLD_PULSEWIDTH: 0.4 MS
MDC_IDC_MSMT_LEADCHNL_RV_IMPEDANCE_VALUE: 479 OHM
MDC_IDC_MSMT_LEADCHNL_RV_PACING_THRESHOLD_AMPLITUDE: 1.2 V
MDC_IDC_MSMT_LEADCHNL_RV_PACING_THRESHOLD_PULSEWIDTH: 0.4 MS
MDC_IDC_PG_IMPLANT_DTM: NORMAL
MDC_IDC_PG_MFG: NORMAL
MDC_IDC_PG_MODEL: NORMAL
MDC_IDC_PG_SERIAL: NORMAL
MDC_IDC_PG_TYPE: NORMAL
MDC_IDC_SESS_CLINIC_NAME: NORMAL
MDC_IDC_SESS_DTM: NORMAL
MDC_IDC_SESS_TYPE: NORMAL
MDC_IDC_SET_BRADY_AT_MODE_SWITCH_MODE: NORMAL
MDC_IDC_SET_BRADY_AT_MODE_SWITCH_RATE: 170 {BEATS}/MIN
MDC_IDC_SET_BRADY_LOWRATE: 60 {BEATS}/MIN
MDC_IDC_SET_BRADY_MAX_SENSOR_RATE: 130 {BEATS}/MIN
MDC_IDC_SET_BRADY_MAX_TRACKING_RATE: 130 {BEATS}/MIN
MDC_IDC_SET_BRADY_MODE: NORMAL
MDC_IDC_SET_BRADY_PAV_DELAY_HIGH: 150 MS
MDC_IDC_SET_BRADY_PAV_DELAY_LOW: 200 MS
MDC_IDC_SET_BRADY_SAV_DELAY_HIGH: 150 MS
MDC_IDC_SET_BRADY_SAV_DELAY_LOW: 200 MS
MDC_IDC_SET_LEADCHNL_RA_PACING_AMPLITUDE: 2 V
MDC_IDC_SET_LEADCHNL_RA_PACING_CAPTURE_MODE: NORMAL
MDC_IDC_SET_LEADCHNL_RA_PACING_POLARITY: NORMAL
MDC_IDC_SET_LEADCHNL_RA_PACING_PULSEWIDTH: 0.4 MS
MDC_IDC_SET_LEADCHNL_RA_SENSING_ADAPTATION_MODE: NORMAL
MDC_IDC_SET_LEADCHNL_RA_SENSING_POLARITY: NORMAL
MDC_IDC_SET_LEADCHNL_RA_SENSING_SENSITIVITY: 0.25 MV
MDC_IDC_SET_LEADCHNL_RV_PACING_AMPLITUDE: 1.6 V
MDC_IDC_SET_LEADCHNL_RV_PACING_CAPTURE_MODE: NORMAL
MDC_IDC_SET_LEADCHNL_RV_PACING_POLARITY: NORMAL
MDC_IDC_SET_LEADCHNL_RV_PACING_PULSEWIDTH: 0.4 MS
MDC_IDC_SET_LEADCHNL_RV_SENSING_ADAPTATION_MODE: NORMAL
MDC_IDC_SET_LEADCHNL_RV_SENSING_POLARITY: NORMAL
MDC_IDC_SET_LEADCHNL_RV_SENSING_SENSITIVITY: 1.5 MV
MDC_IDC_SET_ZONE_DETECTION_INTERVAL: 375 MS
MDC_IDC_SET_ZONE_TYPE: NORMAL
MDC_IDC_SET_ZONE_VENDOR_TYPE: NORMAL
MDC_IDC_STAT_AT_BURDEN_PERCENT: 1 %
MDC_IDC_STAT_AT_DTM_END: NORMAL
MDC_IDC_STAT_AT_DTM_START: NORMAL
MDC_IDC_STAT_BRADY_DTM_END: NORMAL
MDC_IDC_STAT_BRADY_DTM_START: NORMAL
MDC_IDC_STAT_BRADY_RA_PERCENT_PACED: 43 %
MDC_IDC_STAT_BRADY_RV_PERCENT_PACED: 2 %
MDC_IDC_STAT_EPISODE_RECENT_COUNT: 2
MDC_IDC_STAT_EPISODE_RECENT_COUNT: 3
MDC_IDC_STAT_EPISODE_RECENT_COUNT: 345
MDC_IDC_STAT_EPISODE_RECENT_COUNT: 56
MDC_IDC_STAT_EPISODE_RECENT_COUNT: 92
MDC_IDC_STAT_EPISODE_RECENT_COUNT_DTM_END: NORMAL
MDC_IDC_STAT_EPISODE_RECENT_COUNT_DTM_START: NORMAL
MDC_IDC_STAT_EPISODE_TYPE: NORMAL
MDC_IDC_STAT_EPISODE_VENDOR_TYPE: NORMAL
NITRATE UR QL: NEGATIVE
P AXIS - MUSE: 51 DEGREES
PH UR STRIP: 5.5 [PH] (ref 5–7)
PLATELET # BLD AUTO: 193 10E3/UL (ref 150–450)
POTASSIUM SERPL-SCNC: 3.8 MMOL/L (ref 3.4–5.3)
PR INTERVAL - MUSE: 180 MS
QRS DURATION - MUSE: 96 MS
QT - MUSE: 428 MS
QTC - MUSE: 487 MS
R AXIS - MUSE: -45 DEGREES
RBC # BLD AUTO: 3.22 10E6/UL (ref 3.8–5.2)
RBC URINE: 0 /HPF
SODIUM SERPL-SCNC: 141 MMOL/L (ref 136–145)
SP GR UR STRIP: 1.02 (ref 1–1.03)
SQUAMOUS EPITHELIAL: <1 /HPF
SYSTOLIC BLOOD PRESSURE - MUSE: NORMAL MMHG
T AXIS - MUSE: 58 DEGREES
UROBILINOGEN UR STRIP-MCNC: NORMAL MG/DL
VENTRICULAR RATE- MUSE: 78 BPM
WBC # BLD AUTO: 5.7 10E3/UL (ref 4–11)
WBC URINE: 0 /HPF

## 2023-01-01 PROCEDURE — 250N000013 HC RX MED GY IP 250 OP 250 PS 637: Performed by: PHYSICIAN ASSISTANT

## 2023-01-01 PROCEDURE — 93296 REM INTERROG EVL PM/IDS: CPT | Mod: GW | Performed by: INTERNAL MEDICINE

## 2023-01-01 PROCEDURE — 96376 TX/PRO/DX INJ SAME DRUG ADON: CPT

## 2023-01-01 PROCEDURE — 36415 COLL VENOUS BLD VENIPUNCTURE: CPT | Performed by: PHYSICIAN ASSISTANT

## 2023-01-01 PROCEDURE — G0378 HOSPITAL OBSERVATION PER HR: HCPCS

## 2023-01-01 PROCEDURE — 93294 REM INTERROG EVL PM/LDLS PM: CPT | Mod: GW | Performed by: INTERNAL MEDICINE

## 2023-01-01 PROCEDURE — 81001 URINALYSIS AUTO W/SCOPE: CPT | Mod: ORL | Performed by: FAMILY MEDICINE

## 2023-01-01 PROCEDURE — 80048 BASIC METABOLIC PNL TOTAL CA: CPT | Performed by: PHYSICIAN ASSISTANT

## 2023-01-01 PROCEDURE — 85027 COMPLETE CBC AUTOMATED: CPT | Performed by: PHYSICIAN ASSISTANT

## 2023-01-01 PROCEDURE — 82962 GLUCOSE BLOOD TEST: CPT

## 2023-01-01 PROCEDURE — 99239 HOSP IP/OBS DSCHRG MGMT >30: CPT | Performed by: PHYSICIAN ASSISTANT

## 2023-01-01 PROCEDURE — 250N000011 HC RX IP 250 OP 636: Performed by: HOSPITALIST

## 2023-01-01 PROCEDURE — 250N000013 HC RX MED GY IP 250 OP 250 PS 637: Performed by: INTERNAL MEDICINE

## 2023-01-01 RX ORDER — NICOTINE POLACRILEX 4 MG
15-30 LOZENGE BUCCAL
Status: DISCONTINUED | OUTPATIENT
Start: 2023-01-01 | End: 2023-01-01 | Stop reason: HOSPADM

## 2023-01-01 RX ORDER — MIRTAZAPINE 7.5 MG/1
15 TABLET, FILM COATED ORAL AT BEDTIME
Start: 2023-01-01

## 2023-01-01 RX ORDER — DEXTROSE MONOHYDRATE 25 G/50ML
25-50 INJECTION, SOLUTION INTRAVENOUS
Status: DISCONTINUED | OUTPATIENT
Start: 2023-01-01 | End: 2023-01-01 | Stop reason: HOSPADM

## 2023-01-01 RX ORDER — CIPROFLOXACIN 250 MG/1
250 TABLET, FILM COATED ORAL 2 TIMES DAILY
Qty: 6 TABLET | Refills: 0 | Status: SHIPPED | OUTPATIENT
Start: 2023-01-01 | End: 2023-01-01

## 2023-01-01 RX ADMIN — FUROSEMIDE 20 MG: 20 TABLET ORAL at 09:05

## 2023-01-01 RX ADMIN — OLANZAPINE 2.5 MG: 2.5 TABLET, FILM COATED ORAL at 09:05

## 2023-01-01 RX ADMIN — CYANOCOBALAMIN TAB 1000 MCG 1000 MCG: 1000 TAB at 09:06

## 2023-01-01 RX ADMIN — DEXTROSE 15 G: 15 GEL ORAL at 09:24

## 2023-01-01 RX ADMIN — AMIODARONE HYDROCHLORIDE 100 MG: 200 TABLET ORAL at 09:24

## 2023-01-01 RX ADMIN — CEFTRIAXONE 1 G: 1 INJECTION, POWDER, FOR SOLUTION INTRAMUSCULAR; INTRAVENOUS at 03:17

## 2023-01-01 ASSESSMENT — ACTIVITIES OF DAILY LIVING (ADL)
ADLS_ACUITY_SCORE: 39
ADLS_ACUITY_SCORE: 41
ADLS_ACUITY_SCORE: 39
DEPENDENT_IADLS:: MEDICATION MANAGEMENT;MONEY MANAGEMENT;TRANSPORTATION;MEAL PREPARATION;SHOPPING;LAUNDRY;COOKING;CLEANING
ADLS_ACUITY_SCORE: 39
ADLS_ACUITY_SCORE: 41

## 2023-01-01 NOTE — PLAN OF CARE
Refusing to eat, as she insists that she is supposed to be NPO except for sips of water. Daughter aware says she has been doing that for a while, nutrition paged. Ambulating to the bathroom with SB, stage 2 on her coccyx, repositioned, up in the WC with lots of encouragement. Will keep monitoring.

## 2023-01-01 NOTE — PROVIDER NOTIFICATION
Blood glucose was checked this AM -59. OJ was given & provider notified. Recheck bg was -53. Per provider 15g of glucose gel was given. Recheck bg-109. Provider made aware.

## 2023-01-01 NOTE — PLAN OF CARE
PRIMARY DIAGNOSIS: GENERALIZED WEAKNESS    OUTPATIENT/OBSERVATION GOALS TO BE MET BEFORE DISCHARGE  1. Orthostatic performed: No    2. Tolerating PO medications: Yes    3. Return to near baseline physical activity: Yes    4. Cleared for discharge by consultants (if involved): No    Discharge Planner Nurse   Safe discharge environment identified: Yes  Barriers to discharge: Yes       Entered by: Mitra Luther RN 01/01/2023    Please review provider order for any additional goals.   Nurse to notify provider when observation goals have been met and patient is ready for discharge.    Patient is alert but forgetful. VSS. Assist x1 with walker & gait belt. Ambulated to the bathroom, steady on feet. Pt ambulated in the fay with staff, gait belt & walker- pt steady on her feet and denied pain or discomfort when ambulating. On regular diet, good appetite. Tolerating oral medications well. BG- 59 this AM, provider was notified. 2x Wound on coccyx, mepilex applied- provider notified. Pt repositioned with pillows. Currently resting in bed, call light with in reach.

## 2023-01-01 NOTE — DISCHARGE INSTRUCTIONS
Your home care and hospice referrals were sent to [facility]  If you haven't heard from them within the next 24-48 hours,  Please call them at [phone]

## 2023-01-01 NOTE — PLAN OF CARE
PRIMARY DIAGNOSIS: GENERALIZED WEAKNESS    OUTPATIENT/OBSERVATION GOALS TO BE MET BEFORE DISCHARGE  1. Orthostatic performed: No    2. Tolerating PO medications: Yes    3. Return to near baseline physical activity: Yes    4. Cleared for discharge by consultants (if involved): No    Discharge Planner Nurse   Safe discharge environment identified: Yes  Barriers to discharge: Yes       Entered by: Mitra Luther RN 01/01/2023    Please review provider order for any additional goals.   Nurse to notify provider when observation goals have been met and patient is ready for discharge.    Patient is alert but forgetful. VSS. Assist x1 with walker & gait belt. Ambulated to the bathroom, steady on feet. On regular diet, good appetite. Tolerating oral medications well. BG- 59 this AM, provider was notified. 2x Wound on coccyx, mepilex applied- provider notified. Pt repositioned with pillows. Currently resting in bed, call light with in reach.

## 2023-01-01 NOTE — PLAN OF CARE
Patient's After Visit Summary was reviewed with patient and family.   Patient verbalized understanding of After Visit Summary, recommended follow up and was given an opportunity to ask questions.   Discharge medications sent home with patient/family: No, script sent to pharmacy   Discharged with daughter    Patient is alert but confused. VSS. IV removed by NST. Denies pain. Belongings sent home with it. WC ride given to private vehicle.         OBSERVATION patient END time: 1154  /54 (BP Location: Left arm)   Pulse 70   Temp 97.6  F (36.4  C) (Oral)   Resp 18   Wt 53.8 kg (118 lb 8 oz)   SpO2 99%   BMI 20.99 kg/m

## 2023-01-01 NOTE — PLAN OF CARE
"Goal Outcome Evaluation:            PRIMARY DIAGNOSIS: GENERALIZED WEAKNESS    OUTPATIENT/OBSERVATION GOALS TO BE MET BEFORE DISCHARGE  1. Orthostatic performed: No    2. Tolerating PO medications: Yes    3. Return to near baseline physical activity: Yes    4. Cleared for discharge by consultants (if involved): No    Discharge Planner Nurse   Safe discharge environment identified: Yes  Barriers to discharge: Yes       Entered by: Jeanne Esparza RN 01/01/2023   /60 (BP Location: Left arm)   Pulse 81   Temp 98.2  F (36.8  C) (Oral)   Resp 16   Wt 52.1 kg (114 lb 12.8 oz)   SpO2 97%   BMI 20.34 kg/m     Pt. Alert with some confusion & forgetfulness.VSS. Assist x 1 with walker & belt. On regular diet.Very resistant to offers for drink or food tonight despite encouragement. States \" I am not supposed to eat anything:\". On abx Rocephin q24hrs. IV saline locked between abx.Writer unable to conduct  a complete skin check as pt, is non compliant, Has also refused brief changes or assistance to the bathroom despite several attempts. PT & SW following for discharge planning & disposition.Will continue to offer support.  Please review provider order for any additional goals.   Nurse to notify provider when observation goals have been met and patient is ready for discharge.      "

## 2023-01-01 NOTE — DISCHARGE SUMMARY
[facility]    Discharge Summary  Hospitalist    Date of Admission:  12/30/2022  Date of Discharge:  1/1/2023  Discharging Provider: Jojo Saez PA-C  Date of Service (when I saw the patient): 1/1/23    Discharge Diagnoses   Recurrent fall  Dehydration  Advanced Dementia  Bilateral LE Edema   UTI    History of Present Illness  Glendy Díaz is a 89 year old female who presents with generalized weakness and recurrent falls.      The patient resides at a memory care unit.  Over the past week she had been increasingly weak and fatigued. They had increased her lasix for leg edema.  She sits in her recliner for most of the day.  For several week she has had worsening lower extremity edema of both legs.  She has not had any shortness of breath, cough, fever, or chest pain.  She said at least 4 or 5 falls over the past several days but does have a history of recurrent falls due to her dementia.  She fell yesterday and hit her head and had an abrasion on her left temple.  There was evidently no loss of consciousness.  She uses a walker or cane at baseline.  Her facility became concerned regarding the recurrent falls and a possible urinary tract infection so brought her to the ED for evaluation.     Here in the hospital her temperature is 98, heart rate 70, blood pressure 131/65, respiratory 18 and oxygen saturation 100% on room air.  Labs show creatinine of 1.6 but otherwise normal basic metabolic panel, liver function test, and BNP.  Troponin was 54 on initial check and 53 on follow-up.  CBC shows hemoglobin of 10.9.  D-dimer is 1.28.  Urinalysis with 10 protein, positive nitrite, 26 white blood cells, negative leukocyte esterase, many bacteria.  Lower extremity ultrasounds and chest x-ray are negative.  Head CT and CT spine unremarkable.  Patient was given ceftriaxone and being admitted for observation.      Please see admitting H & P  by Jose Garcia MD, on 12/30/2022 for full details of the  encounter.     Hospital Course   Glendy Díaz was admitted on 12/30/2022.  The following problems were addressed during her hospitalization:    Recurrent Falls  Likely due to adv dementia, deconditioning, dehydration/orthostatic intolerance given her lasix had been increased despite softer BP. Stop lasix. Treating for UTI. Daughter does not want patient brought in to hospital any more as she gets further from baseline for this. Return to memory care, ambulatory at/near baseline with walker. Did not require pt. Not a candidate for TCU. No acute injuries sustained from fall.     Advanced Dementia with behavior disturbance   Oriented to self only. Decline in function, weight loss (over 20 lbs in 6 months) psychiatric symptoms including paranoia/hallucination. Daughter and stated medical decision maker are interested in hospice enrollment which is appropriate. Currently following with [facility] provider and Psychiatrist. Failed symptom improvement with antipsychotics complicated by recurrent falls with medication adjustments.  Hospice referral placed at dismissal.      Bilateral LE Edema  Likely due to pvd, lymphedema, dependent position (ususally sedentary in recliner), malnutrition. Stop lasix - not helping. No CHF or DVT.       UTI  Hx of recurrent UTI.   No sepsis, ADELA. UCx prelim with multiple organisms. concern for possible contribution to recurrent falls thus elected to treat despite being asymptomatic. Given rocephin while admited, sicharge on course of PO cipro based on prior sensitivities.     Pending Results   These results will be followed up by hospitalist  Unresulted Labs Ordered in the Past 30 Days of this Admission     Date and Time Order Name Status Description    12/31/2022  2:23 AM Urine Culture Preliminary           Code Status   Comfort Care - DNR/DNI, anticipated hospice enrollment at dismissal.        Primary Care Physician   Abraham Cedeño      INTERVAL HISTORY  Pt doing well.  Afebrile. Eating but appetite low. No nausea, vomiting. Denies pain, up with walker SBA.     Additional hx from daughter -   On palliative care   Lasix doesn't help swelling - increased Lasix at [facility] prior to this.  Daughter does not want her brought into the hospital anymore because she gets further from her baseline  And is very withdrawn of the facility does not like participating in activities.  She has lost 20 pounds in the last 6 months.    /67 (BP Location: Left arm)   Pulse 79   Temp 97.5  F (36.4  C) (Axillary)   Resp 18   Wt 52.1 kg (114 lb 12.8 oz)   SpO2 98%   BMI 20.34 kg/m      Constitutional: Awake, alert, no apparent distress  Respiratory:  Normal work of breathing. Lungs clear to auscultation bilaterally, no crackles or wheezing.  Cardiovascular: Regular rate and rhythm, normal S1 and S2, and no murmur appreciated.   GI: Bowel sounds present. soft, non-distended, non-tender.   Skin/Integument: Warm, dry. no peripheral edema.  Neuro: No focal deficits. oriented to self only.Moving all extremities with normal strength. Coordination and sensation grossly intact. Speech clear.   Psych: no hallucinations.         Discharge Disposition   Discharged to [facility] with hospice  Condition at discharge: Fair    Consultations This Hospital Stay   CARE MANAGEMENT / SOCIAL WORK IP CONSULT  PHYSICAL THERAPY ADULT IP CONSULT    Time Spent on this Encounter   IJojo PA-C, personally saw the patient today and spent greater than 30 minutes discharging this patient.    Discharge Orders   No discharge procedures on file.  Discharge Medications   Current Discharge Medication List      CONTINUE these medications which have NOT CHANGED    Details   acetaminophen (TYLENOL) 500 MG tablet Take 1,000 mg by mouth 3 times daily as needed for pain or fever      amiodarone (PACERONE) 200 MG tablet Take 0.5 tablets (100 mg) by mouth daily  Qty: 90 tablet, Refills: 3    Associated Diagnoses:  Paroxysmal A-fib (H); Cardiac pacemaker in situ      cyanocobalamin (VITAMIN B-12) 1000 MCG tablet Take 1,000 mcg by mouth daily      furosemide (LASIX) 20 MG tablet Take 1 tablet (20 mg) by mouth daily as needed (swollen legs)  Qty: 90 tablet, Refills: 3    Associated Diagnoses: Bilateral leg edema      menthol-zinc oxide (CALMOSEPTINE) 0.44-20.6 % OINT ointment Apply to buttocks twice daily (0900 & 1430) until redness & skin irritation are resolved      mirtazapine (REMERON) 7.5 MG tablet Take 1 tablet (7.5 mg) by mouth At Bedtime  Qty: 30 tablet, Refills: 3    Associated Diagnoses: Sleep disorder      !! OLANZapine (ZYPREXA) 5 MG tablet Take 2.5 mg by mouth daily as needed (anxiety)      !! OLANZapine (ZYPREXA) 5 MG tablet Take 2.5 mg by mouth 2 times daily      polyethylene glycol (MIRALAX) 17 g packet Take 1 packet by mouth three times a week Mix in 8 ounces of water until completely dissolved. Takes on Monday, Wednesday, and Friday      traZODone (DESYREL) 50 MG tablet Take 25 mg by mouth At Bedtime      melatonin 3 MG tablet Take 6 mg at bed time for 2 weeks then take 3 mg at bed time  Qty: 60 tablet, Refills: 3    Associated Diagnoses: Sleep disorder       !! - Potential duplicate medications found. Please discuss with provider.        Allergies   Allergies   Allergen Reactions     Amlodipine      Leg edema with 5 mg     Fosamax [Alendronic Acid]      Bone pain     Lisinopril      Increased potassium     Pravastatin      Muscle aches      Simvastatin      Muscle aches      Data   Most Recent 3 CBC's:Recent Labs   Lab Test 01/01/23  1239 12/30/22  1959 09/22/22  1125   WBC 5.7 7.7 8.6   HGB 10.2* 10.9* 11.2*    102* 98    246 252      Most Recent 3 BMP's:  Recent Labs   Lab Test 01/01/23  1239 01/01/23  1016 01/01/23  0919 01/01/23  0818 12/30/22  1848 12/01/22  1455     --   --   --  141 140   POTASSIUM 3.8  --   --   --  4.1 4.4   CHLORIDE 101  --   --   --  100 102   CO2 28  --   --    --  28 15*   BUN 26.9*  --   --   --  29.0* 30.2*   CR 1.53*  --   --   --  1.63* 1.96*   ANIONGAP 12  --   --   --  13 23*   MUMTAZ 9.2  --   --   --  10.0 9.8   * 109* 54*   < > 89 82    < > = values in this interval not displayed.     Most Recent 2 LFT's:  Recent Labs   Lab Test 12/30/22  1848 12/01/22  1455   AST 21 28   ALT 14 9*   ALKPHOS 98 79   BILITOTAL 0.4 0.3     Most Recent INR's and Anticoagulation Dosing History:  Anticoagulation Dose History     Recent Dosing and Labs Latest Ref Rng & Units 7/12/2019 3/2/2021 2/16/2022    INR 0.85 - 1.15 0.91 1.03 0.94        Most Recent 3 Troponin's:  Recent Labs   Lab Test 03/02/21  1737 01/06/21  1803 12/30/20  0718 09/12/20  0736 07/12/19  0936   TROPI <0.015 <0.015 <0.015   < >  --    TROPONIN  --   --   --   --  0.00    < > = values in this interval not displayed.     Most Recent Cholesterol Panel:  Recent Labs   Lab Test 09/24/19  0959   CHOL 196   *   HDL 53   TRIG 139     Most Recent 6 Bacteria Isolates From Any Culture (See EPIC Reports for Culture Details):  Recent Labs   Lab Test 02/09/18  1624   CULT No growth     Most Recent TSH, T4 and A1c Labs:  Recent Labs   Lab Test 12/01/22  1455 10/11/18  0826 03/30/18  0816   TSH 1.22   < >  --    T4 1.97*   < >  --    A1C  --   --  5.0    < > = values in this interval not displayed.     Results for orders placed or performed during the hospital encounter of 12/30/22   Head CT w/o contrast    Narrative    EXAM: CT HEAD W/O CONTRAST  LOCATION: [facility]  DATE/TIME: 12/30/2022 5:14 PM    INDICATION: Trauma, fall, laceration to left eyebrow  COMPARISON:  CT head 05/28/2022.  TECHNIQUE: Routine CT Head without IV contrast. Multiplanar reformats. Dose reduction techniques were used.    FINDINGS:  INTRACRANIAL CONTENTS: No intracranial hemorrhage, extraaxial collection, or mass effect.  No CT evidence of acute infarct. Mild presumed chronic small vessel ischemic changes. Mild to  moderate generalized volume loss. No hydrocephalus.     VISUALIZED ORBITS/SINUSES/MASTOIDS: No intraorbital abnormality. No paranasal sinus mucosal disease. No middle ear or mastoid effusion.    BONES/SOFT TISSUES: No acute abnormality. Chronic appearing nasal bone deformity.      Impression    IMPRESSION:  1.  No acute intracranial process.   CT Cervical Spine w/o Contrast    Narrative    EXAM: CT CERVICAL SPINE W/O CONTRAST  LOCATION: [facility]  DATE/TIME: 12/30/2022 7:15 PM    INDICATION: Fall, trauma, neck pain.  COMPARISON: None.  TECHNIQUE: Routine CT Cervical Spine without IV contrast. Multiplanar reformats. Dose reduction techniques were used.    FINDINGS:  VERTEBRA: Satisfactory vertebral body height. 1 mm anterior subluxation C4-C5, 1 mm retrolisthesis C5-C6. Interspace narrowing/osteophytes C5-C6. Articular pillars are intact. No facet joint space widening or disruption is present. Patent airway.   Satisfactory cervical prevertebral soft tissues. Occipital condyles are satisfactory in appearance. Leftward convexity curve apex C6. Foramen magnum is normal. C1 ring is intact. Hyoid bone and neck cartilage structures are normal. No evidence for   displaced upper rib fractures. No acute cervical fracture or posttraumatic subluxation.     CANAL/FORAMINA: No severe cervical canal or neural foraminal stenosis. Moderate canal narrowing C5-C6 due to generalized disc bulge and some thickening of the ligamentum flavum posteriorly appears chronic, series 4 image 56.    PARASPINAL: Scarring in the lung apices. Thyroid is satisfactory. Cardiac pacer. No focal fluid collections, mass, or adenopathy noted within the neck soft tissues. Moderate degenerative changes involve the mandibular condylar head on the left.      Impression    IMPRESSION:  1.  No acute cervical fracture or posttraumatic subluxation.  2.  No high-grade spinal canal or neural foraminal stenosis.   US Lower Extremity Venous  Duplex Bilateral    Narrative    EXAM: US LOWER EXTREMITY VENOUS DUPLEX BILATERAL  LOCATION: [facility]  DATE/TIME: 12/31/2022 2:56 AM    INDICATION: Bilateral leg pain.  COMPARISON: None.  TECHNIQUE: Venous Duplex ultrasound of bilateral lower extremities with and without compression, augmentation and duplex. Color flow and spectral Doppler with waveform analysis performed.    FINDINGS: Exam includes the common femoral, femoral, popliteal veins as well as segmentally visualized deep calf veins and greater saphenous vein.     RIGHT: No deep vein thrombosis. No superficial thrombophlebitis. No popliteal cyst.    LEFT: No deep vein thrombosis. No superficial thrombophlebitis. No popliteal cyst.      Impression    IMPRESSION:  1.  No deep venous thrombosis in the bilateral lower extremities.   XR Chest 2 Views    Narrative    EXAM: XR CHEST 2 VIEWS  LOCATION: [facility]  DATE/TIME: 12/31/2022 3:03 AM    INDICATION: Weakness.  COMPARISON: Chest x-ray single view 5/28/2022 at 1014 hours.      Impression    IMPRESSION: Improved aeration of both lungs. Resolved strands of linear atelectasis in the mid and lower lungs seen previously. Both lungs are otherwise clear. No adenopathy or effusion. Normal cardiac size and pulmonary vascularity. Left chest pacer,   leads in the heart. Reverse right shoulder arthroplasty. Degenerative changes thoracic spine. Demineralization of the visualized bones. Monitoring leads have been removed.        Jojo Saez PA-C  [facility]

## 2023-01-01 NOTE — CONSULTS
Care Management Initial Consult    General Information  Assessment completed with: Children, Caregiver, Dtr Abigail [phone] and  nurse Elisa [phone]  Type of CM/SW Visit: Offer D/C Planning    Primary Care Provider verified and updated as needed: Yes   Readmission within the last 30 days: no previous admission in last 30 days      Reason for Consult: discharge planning    Communication Assessment  Patient's communication style: spoken language (English or Bilingual)    Hearing Difficulty or Deaf: yes   Wear Glasses or Blind: yes    Cognitive  Cognitive/Neuro/Behavioral: .WDL except  Level of Consciousness: confused  Arousal Level: opens eyes spontaneously  Orientation: disoriented to, situation, time  Mood/Behavior: calm, cooperative  Best Language: 0 - No aphasia  Speech: clear    Living Environment:   People in home: facility resident     Current living Arrangements: assisted living  Name of Facility: [facility]   Able to return to prior arrangements: yes       Family/Social Support:  Care provided by: self ( staff)  Provides care for: no one, unable/limited ability to care for self  Marital Status:   Children          Description of Support System: Supportive, Involved    Support Assessment: Adequate family and caregiver support    Current Resources:   Patient receiving home care services: No     Community Resources: None  Equipment currently used at home: walker, standard  Supplies currently used at home: None    Employment/Financial:  Employment Status:          Financial Concerns:             Lifestyle & Psychosocial Needs:  Social Determinants of Health     Tobacco Use: Medium Risk     Smoking Tobacco Use: Former     Smokeless Tobacco Use: Never     Passive Exposure: Not on file   Alcohol Use: Not on file   Financial Resource Strain: Not on file   Food Insecurity: Not on file   Transportation Needs: Not on file   Physical Activity: Not on file   Stress: Not on file    Social Connections: Not on file   Intimate Partner Violence: Not on file   Depression: Not at risk     PHQ-2 Score: 0   Housing Stability: Not on file       Functional Status:  Prior to admission patient needed assistance:   Dependent ADLs:: Ambulation-walker, Dressing, Bathing, Toileting  Dependent IADLs:: Medication Management, Money Management, Transportation, Meal Preparation, Shopping, Laundry, Cooking, Cleaning     Care Management Discharge Note    Discharge Date: 01/01/2023     Discharge Disposition: Assisted Living    Additional Information:  CM following for discharge planning, per provider pt is medically ready for return to  w/ home RN for wound management and hospice referral as family does not want pt to return to the hospital. Per bedside RN, pt ambulates with assist of 1 and WW.     Pt resides at [facility], called and spoke with nurse Pérez [phone] who reports that pt was independent with WW prior to admission but they can provide this increased level of assistance to pt. They are able to take back today. Any new medications to be filled at [facility] pharmacy.     Spoke with tylor Hayes who is able to transport today at 1230. HC and Hospice referral sent via e-mail to [facility], [facility] updated.     Called updated nurse Pérez with time and orders faxed. Provider and bedside RN updated.     Mary Chaudhry RN BSN   Inpatient Care Coordination  [facility]   Phone [phone]

## 2023-01-01 NOTE — PLAN OF CARE
ROOM # 215    Living Situation (if not independent, order SW consult):Senior Living facility  Facility name:[facility]  : Daughter ( Abigail)    Activity level at baseline: Walker & Cane  Activity level on admit: Assist x 1 with Walker & GB    Who will be transporting you at discharge: Daughter    Patient registered to observation; given Patient Bill of Rights; given the opportunity to ask questions about observation status and their plan of care.  Patient has been oriented to the observation room, bathroom and call light is in place.    Discussed discharge goals and expectations with patient/family.         Goal Outcome Evaluation:

## 2025-01-31 NOTE — TELEPHONE ENCOUNTER
Chief Complaint       Pharyngitis (Started last night with sore throat/) and Congestion      History of Present Illness   Source of history provided by: patient and guardian.     Vimal Finney is a 7 y.o. old female presenting to the walk in clinic for evaluation of sore throat since last night. Reports associated nasal congestion and mild HA.  Denies any fever, chills, loss of taste/smell, dyspnea, dysphagia, CP, SOB, cough, nausea, vomiting, rash, or lethargy.  Patient sister was just diagnosed with influenza A in our office today.    ROS    Unless otherwise stated in this report or unable to obtain because of the patient's clinical or mental status as evidenced by the medical record, this patients's positive and negative responses for Review of Systems, constitutional, psych, eyes, ENT, cardiovascular, respiratory, gastrointestinal, neurological, genitourinary, musculoskeletal, integument systems and systems related to the presenting problem are either stated in the preceding or were not pertinent or were negative for the symptoms and/or complaints related to the medical problem.    Past Medical History:  has no past medical history on file.  Past Surgical History:  has no past surgical history on file.  Social History:    Family History: family history is not on file.   Allergies: Patient has no known allergies.    Physical Exam         VS:  Pulse 83   Temp 97.9 °F (36.6 °C)   Ht 1.295 m (4' 3\")   Wt 27.2 kg (60 lb)   SpO2 97%   BMI 16.22 kg/m²    Oxygen Saturation Interpretation: Normal.    Constitutional:  Alert, development consistent with age.  Ears:  TMs dull without perforation, injection, or bulging.  External canals clear without swelling or exudate.  Throat: Airway patent.  Posterior pharynx with moderate erythema and mild tonsillar hypertrophy.  No exudate noted.    Neck:  Supple with full ROM. There is mild anterior bilateral adenopathy.    Lungs:  Clear to auscultation and breath sounds  Spoke to daughter (on consent to communicate) regarding scheduled SVT ablation 1/12.  Daughter reports patient has been at [facility] since 1/6 (unable to see hospital stay as patient has two MR set up MRN [id number] and [id number])  Did received a call from Rocio discharge coordinator at [facility] as she was confirming patient was scheduled for ablation. Alerted her to two medical records and only data in MRN [id number] was hospital data.  She was to call the medical records team to alert them so both charts can be consolidated to one.  Patient was discharged from hospital today and went to TCU ([facility]  [facility] [phone]).  It was confirmed with them by Rocio TAVREAS discharging  Nurse [facility]  that patient would be allowed to have outpatient procedure and may return back to facility.   Daughter was provided with all instructions for SVT ablation and she was to provide that to the nursing staff when she brought her there tonight.  Arrival time to hospital is 6:30am.  NPO after midnight and may take sip of water with medications.  No COVID testing needed as patient recently tested positive.  Daughter provided verbal understanding regarding above.  DARCY Gutiérrez

## (undated) DEVICE — PACK PCMKR PERM SRG PROC LF SAN32PC573

## (undated) DEVICE — WIRE GLIDE 0.035"X150CM 3CM ANG REG UWR6035

## (undated) DEVICE — SHEATH PRELUDE SNAP 7FRX13CM PLS-1007

## (undated) DEVICE — DEFIB PRO-PADZ LVP LQD GEL ADULT 8900-2105-01

## (undated) DEVICE — CABLE PACING ALLIGATOR CLIP 301-CG

## (undated) RX ORDER — LIDOCAINE HYDROCHLORIDE 10 MG/ML
INJECTION, SOLUTION EPIDURAL; INFILTRATION; INTRACAUDAL; PERINEURAL
Status: DISPENSED
Start: 2021-01-12

## (undated) RX ORDER — BUPIVACAINE HYDROCHLORIDE 2.5 MG/ML
INJECTION, SOLUTION EPIDURAL; INFILTRATION; INTRACAUDAL
Status: DISPENSED
Start: 2021-03-03

## (undated) RX ORDER — FENTANYL CITRATE 50 UG/ML
INJECTION, SOLUTION INTRAMUSCULAR; INTRAVENOUS
Status: DISPENSED
Start: 2021-03-03

## (undated) RX ORDER — FENTANYL CITRATE 50 UG/ML
INJECTION, SOLUTION INTRAMUSCULAR; INTRAVENOUS
Status: DISPENSED
Start: 2021-01-12

## (undated) RX ORDER — HEPARIN SODIUM 1000 [USP'U]/ML
INJECTION, SOLUTION INTRAVENOUS; SUBCUTANEOUS
Status: DISPENSED
Start: 2021-01-12

## (undated) RX ORDER — LIDOCAINE HYDROCHLORIDE 10 MG/ML
INJECTION, SOLUTION EPIDURAL; INFILTRATION; INTRACAUDAL; PERINEURAL
Status: DISPENSED
Start: 2021-03-03